# Patient Record
Sex: FEMALE | Race: WHITE | Employment: OTHER | URBAN - METROPOLITAN AREA
[De-identification: names, ages, dates, MRNs, and addresses within clinical notes are randomized per-mention and may not be internally consistent; named-entity substitution may affect disease eponyms.]

---

## 2017-09-02 PROBLEM — T14.8XXA CLOSED FRACTURE: Status: ACTIVE | Noted: 2017-09-02

## 2017-09-09 PROBLEM — S42.253A CLOSED FRACTURE OF GREATER TUBEROSITY OF HUMERUS: Status: ACTIVE | Noted: 2017-09-09

## 2017-09-10 PROBLEM — S42.202D: Status: ACTIVE | Noted: 2017-09-09

## 2017-09-19 ENCOUNTER — CARE COORDINATION (OUTPATIENT)
Dept: CASE MANAGEMENT | Age: 77
End: 2017-09-19

## 2017-10-02 ENCOUNTER — CARE COORDINATION (OUTPATIENT)
Dept: CASE MANAGEMENT | Age: 77
End: 2017-10-02

## 2017-10-02 NOTE — CARE COORDINATION
Called [facility] for a patient update.  informed this nurse patient discharged 9/25/17 with Jessie 76. Notified Johanna Dillon RN to f/u. Post acute care transition coordinator signing off.  Edward Michael, BSN RN  Care Transition Coordinator  [phone]

## 2017-12-11 PROBLEM — G45.3 AMAUROSIS FUGAX OF RIGHT EYE: Status: ACTIVE | Noted: 2017-12-11

## 2017-12-11 PROBLEM — H54.40 BLINDNESS OF LEFT EYE: Status: ACTIVE | Noted: 2017-12-11

## 2017-12-11 PROBLEM — R70.0 ELEVATED SED RATE: Status: ACTIVE | Noted: 2017-12-11

## 2017-12-18 ENCOUNTER — CARE COORDINATION (OUTPATIENT)
Dept: CARE COORDINATION | Age: 77
End: 2017-12-18

## 2017-12-21 PROBLEM — Z98.890 POST-OPERATIVE STATE: Status: ACTIVE | Noted: 2017-12-21

## 2018-03-13 ENCOUNTER — OFFICE VISIT (OUTPATIENT)
Dept: FAMILY MEDICINE CLINIC | Age: 78
End: 2018-03-13
Payer: MEDICARE

## 2018-03-13 VITALS
SYSTOLIC BLOOD PRESSURE: 176 MMHG | HEIGHT: 60 IN | BODY MASS INDEX: 38.32 KG/M2 | HEART RATE: 66 BPM | TEMPERATURE: 97.2 F | RESPIRATION RATE: 16 BRPM | OXYGEN SATURATION: 98 % | WEIGHT: 195.2 LBS | DIASTOLIC BLOOD PRESSURE: 74 MMHG

## 2018-03-13 DIAGNOSIS — R60.9 PERIPHERAL EDEMA: Primary | ICD-10-CM

## 2018-03-13 PROCEDURE — G8417 CALC BMI ABV UP PARAM F/U: HCPCS | Performed by: FAMILY MEDICINE

## 2018-03-13 PROCEDURE — G8484 FLU IMMUNIZE NO ADMIN: HCPCS | Performed by: FAMILY MEDICINE

## 2018-03-13 PROCEDURE — G8400 PT W/DXA NO RESULTS DOC: HCPCS | Performed by: FAMILY MEDICINE

## 2018-03-13 PROCEDURE — G8427 DOCREV CUR MEDS BY ELIG CLIN: HCPCS | Performed by: FAMILY MEDICINE

## 2018-03-13 PROCEDURE — 1036F TOBACCO NON-USER: CPT | Performed by: FAMILY MEDICINE

## 2018-03-13 PROCEDURE — 1090F PRES/ABSN URINE INCON ASSESS: CPT | Performed by: FAMILY MEDICINE

## 2018-03-13 PROCEDURE — 1123F ACP DISCUSS/DSCN MKR DOCD: CPT | Performed by: FAMILY MEDICINE

## 2018-03-13 PROCEDURE — 4040F PNEUMOC VAC/ADMIN/RCVD: CPT | Performed by: FAMILY MEDICINE

## 2018-03-13 PROCEDURE — 99213 OFFICE O/P EST LOW 20 MIN: CPT | Performed by: FAMILY MEDICINE

## 2018-03-13 NOTE — PROGRESS NOTES
3/13/2018    Chief Complaint   Patient presents with    Edema     Pt here for 1 week follow-up for leg swelling       HPI    Laurian Gitelman is a 76 y.o. patient that presents today for 1 week follow-up for bilateral leg/ankle swelling--states she notices a little improvement. Edema: Patient complains of edema. The location of the edema is lower leg(s) bilateral.  The edema has been moderate. Onset of symptoms was several weeks ago, stable since that time. The edema is present all day and worsens as the day progresses. The patient states the patient has had a few such episodes. The swelling has been aggravated by dependency of involved area, relieved by diuretics, support stockings, and been associated with nothing. Cardiac risk factors include advanced age (older than 54 for men, 72 for women), dyslipidemia, hypertension, obesity (BMI >= 30 kg/m2) and sedentary lifestyle. LAST VISIT, noted 1+ pitting edema LE-B. Feeling well otherwise, no complaints. Taking medications as prescribed. Patient's past medical, surgical, social and/or family history reviewed, updated in chart, and are non-contributory (unless otherwise stated). Medications and allergies also reviewed and updated in chart.      ROS  Review of Systems - General ROS: negative for - chills, fatigue, fever, night sweats, sleep disturbance, weight gain or weight loss  Psychological ROS: negative for - anxiety, behavioral disorder, depression, hallucinations, irritability, memory difficulties, mood swings, sleep disturbances or suicidal ideation  ENT ROS: negative for - epistaxis, headaches, hearing change, nasal congestion, nasal discharge, nasal polyps, sinus pain, tinnitus, vertigo or visual changes  Hematological and Lymphatic ROS: negative for - bleeding problems, blood clots, fatigue or swollen lymph nodes  Respiratory ROS: negative for - cough, orthopnea, shortness of breath, sputum changes, tachypnea or wheezing  Cardiovascular person, place, and time, normal mood, behavior, speech, dress, motor activity, and thought processes      Assessment/Plan  Ricky Vigil was seen today for edema. Diagnoses and all orders for this visit:    Peripheral edema  - RESTART HCTZ and 1 lasix in AM. Check BP in 1 week        Return in about 7 weeks (around 5/1/2018), or if symptoms worsen or fail to improve. Daja Peña, DO    Call or go to ED immediately if symptoms worsen or persist.    Educational materials and/or home exercises printed for patient's review and were included in patient instructions on his/her After Visit Summary and given to patient at the end of visit. Counseled regarding above diagnosis, including possible risks and complications,  especially if left uncontrolled. Counseled regarding the possible side effects, risks, benefits and alternatives to treatment; patient and/or guardian verbalizes understanding, agrees, feels comfortable with and wishes to proceed with above treatment plan. Advised patient to call with any new medication issues, and read all Rx info from pharmacy to assure aware of all possible risks and side effects of medication before taking. Reviewed age and gender appropriate health screening exams and vaccinations. Advised patient regarding importance of keeping up with recommended health maintenance and to schedule as soon as possible if overdue, as this is important in assessing for undiagnosed pathology, especially cancer, as well as protecting against potentially harmful/life threatening disease. Patient and/or guardian verbalizes understanding and agrees with above counseling, assessment and plan. All questions answered.

## 2018-03-28 ENCOUNTER — TELEPHONE (OUTPATIENT)
Dept: FAMILY MEDICINE CLINIC | Age: 78
End: 2018-03-28

## 2018-03-28 ENCOUNTER — HOSPITAL ENCOUNTER (OUTPATIENT)
Age: 78
Discharge: HOME | End: 2018-03-28
Payer: MEDICARE

## 2018-03-28 DIAGNOSIS — R60.9 PERIPHERAL EDEMA: ICD-10-CM

## 2018-03-28 LAB
ANION GAP SERPL CALCULATED.3IONS-SCNC: 15 MMOL/L (ref 7–16)
BUN BLDV-MCNC: 20 MG/DL (ref 8–23)
CALCIUM SERPL-MCNC: 9.1 MG/DL (ref 8.6–10.2)
CHLORIDE BLD-SCNC: 99 MMOL/L (ref 98–107)
CO2: 26 MMOL/L (ref 22–29)
CREAT SERPL-MCNC: 0.7 MG/DL (ref 0.5–1)
GFR AFRICAN AMERICAN: >60
GFR NON-AFRICAN AMERICAN: >60 ML/MIN/1.73
GLUCOSE BLD-MCNC: 109 MG/DL (ref 74–109)
POTASSIUM SERPL-SCNC: 3.6 MMOL/L (ref 3.5–5)
SODIUM BLD-SCNC: 140 MMOL/L (ref 132–146)

## 2018-03-28 PROCEDURE — 36415 COLL VENOUS BLD VENIPUNCTURE: CPT

## 2018-03-28 PROCEDURE — 80048 BASIC METABOLIC PNL TOTAL CA: CPT

## 2018-04-02 ENCOUNTER — TELEPHONE (OUTPATIENT)
Dept: FAMILY MEDICINE CLINIC | Age: 78
End: 2018-04-02

## 2018-04-02 RX ORDER — POTASSIUM CHLORIDE 20 MEQ/1
20 TABLET, EXTENDED RELEASE ORAL DAILY
Qty: 30 TABLET | Refills: 0 | Status: SHIPPED | OUTPATIENT
Start: 2018-04-02 | End: 2018-08-08 | Stop reason: SDUPTHER

## 2018-04-09 ENCOUNTER — OFFICE VISIT (OUTPATIENT)
Dept: FAMILY MEDICINE CLINIC | Age: 78
End: 2018-04-09
Payer: MEDICARE

## 2018-04-09 VITALS
RESPIRATION RATE: 18 BRPM | SYSTOLIC BLOOD PRESSURE: 138 MMHG | HEIGHT: 60 IN | WEIGHT: 194 LBS | TEMPERATURE: 97.4 F | BODY MASS INDEX: 38.09 KG/M2 | OXYGEN SATURATION: 96 % | HEART RATE: 68 BPM | DIASTOLIC BLOOD PRESSURE: 76 MMHG

## 2018-04-09 DIAGNOSIS — Z76.0 MEDICATION REFILL: ICD-10-CM

## 2018-04-09 DIAGNOSIS — R41.89 COGNITIVE CHANGE: Primary | ICD-10-CM

## 2018-04-09 DIAGNOSIS — G89.29 CHRONIC NONINTRACTABLE HEADACHE, UNSPECIFIED HEADACHE TYPE: ICD-10-CM

## 2018-04-09 DIAGNOSIS — R51.9 CHRONIC NONINTRACTABLE HEADACHE, UNSPECIFIED HEADACHE TYPE: ICD-10-CM

## 2018-04-09 PROCEDURE — 1090F PRES/ABSN URINE INCON ASSESS: CPT | Performed by: FAMILY MEDICINE

## 2018-04-09 PROCEDURE — 99214 OFFICE O/P EST MOD 30 MIN: CPT | Performed by: FAMILY MEDICINE

## 2018-04-09 PROCEDURE — G8417 CALC BMI ABV UP PARAM F/U: HCPCS | Performed by: FAMILY MEDICINE

## 2018-04-09 PROCEDURE — 1123F ACP DISCUSS/DSCN MKR DOCD: CPT | Performed by: FAMILY MEDICINE

## 2018-04-09 PROCEDURE — G8427 DOCREV CUR MEDS BY ELIG CLIN: HCPCS | Performed by: FAMILY MEDICINE

## 2018-04-09 PROCEDURE — G8400 PT W/DXA NO RESULTS DOC: HCPCS | Performed by: FAMILY MEDICINE

## 2018-04-09 PROCEDURE — 1036F TOBACCO NON-USER: CPT | Performed by: FAMILY MEDICINE

## 2018-04-09 PROCEDURE — 4040F PNEUMOC VAC/ADMIN/RCVD: CPT | Performed by: FAMILY MEDICINE

## 2018-04-09 RX ORDER — LORAZEPAM 1 MG/1
1 TABLET ORAL DAILY PRN
Qty: 30 TABLET | Refills: 2 | Status: SHIPPED | OUTPATIENT
Start: 2018-04-09 | End: 2018-06-06 | Stop reason: SDUPTHER

## 2018-04-11 PROBLEM — Z98.890 POST-OPERATIVE STATE: Status: RESOLVED | Noted: 2017-12-21 | Resolved: 2018-04-11

## 2018-04-13 ENCOUNTER — HOSPITAL ENCOUNTER (OUTPATIENT)
Dept: MRI IMAGING | Age: 78
Discharge: HOME | End: 2018-04-15
Payer: MEDICARE

## 2018-04-13 DIAGNOSIS — R41.89 COGNITIVE CHANGE: ICD-10-CM

## 2018-04-13 PROCEDURE — 70551 MRI BRAIN STEM W/O DYE: CPT

## 2018-04-18 ENCOUNTER — TELEPHONE (OUTPATIENT)
Dept: FAMILY MEDICINE CLINIC | Age: 78
End: 2018-04-18

## 2018-04-18 ENCOUNTER — HOSPITAL ENCOUNTER (OUTPATIENT)
Dept: OCCUPATIONAL THERAPY | Age: 78
Setting detail: THERAPIES SERIES
Discharge: HOME | End: 2018-04-18
Payer: MEDICARE

## 2018-04-18 NOTE — PROGRESS NOTES
Phone: [phone] Fax: [phone]     Occupational Therapy   Cancellation    Patient Name:  Isabella Epperson  : 1940  Date:  2018  MRN: [id number]    For today's appointment patient:   [x]  Cancelled   []  Rescheduled appointment   []  No-show     Reason given by patient:   []  Patient ill   []  Conflicting appointment   []  No transportation   []  Conflict with work   []  No reason given   [x]  Other:    Comments: Pt unable to attend today. Re-scheduled for 8:00 am next Tuesday. Electronically signed by:  Lonny Santiago [address] [place] [address], OTR/L [id number]

## 2018-04-24 ENCOUNTER — HOSPITAL ENCOUNTER (OUTPATIENT)
Dept: OCCUPATIONAL THERAPY | Age: 78
Setting detail: THERAPIES SERIES
Discharge: HOME | End: 2018-04-24
Payer: MEDICARE

## 2018-04-24 ENCOUNTER — TELEPHONE (OUTPATIENT)
Dept: FAMILY MEDICINE CLINIC | Age: 78
End: 2018-04-24

## 2018-04-24 ENCOUNTER — HOSPITAL ENCOUNTER (EMERGENCY)
Age: 78
Discharge: HOME | End: 2018-04-24
Attending: EMERGENCY MEDICINE
Payer: MEDICARE

## 2018-04-24 VITALS
WEIGHT: 194 LBS | SYSTOLIC BLOOD PRESSURE: 108 MMHG | DIASTOLIC BLOOD PRESSURE: 53 MMHG | HEIGHT: 60 IN | OXYGEN SATURATION: 96 % | HEART RATE: 53 BPM | RESPIRATION RATE: 16 BRPM | BODY MASS INDEX: 38.09 KG/M2 | TEMPERATURE: 97.5 F

## 2018-04-24 DIAGNOSIS — I10 ESSENTIAL HYPERTENSION: Primary | ICD-10-CM

## 2018-04-24 DIAGNOSIS — F41.1 ANXIETY STATE: ICD-10-CM

## 2018-04-24 LAB
BACTERIA: ABNORMAL /HPF
BILIRUBIN URINE: NEGATIVE
BLOOD, URINE: ABNORMAL
CLARITY: CLEAR
COLOR: YELLOW
EPITHELIAL CELLS, UA: ABNORMAL /HPF
GLUCOSE URINE: NEGATIVE MG/DL
KETONES, URINE: NEGATIVE MG/DL
LEUKOCYTE ESTERASE, URINE: ABNORMAL
NITRITE, URINE: NEGATIVE
PH UA: 5.5 (ref 5–9)
PROTEIN UA: NEGATIVE MG/DL
RBC UA: ABNORMAL /HPF (ref 0–2)
SPECIFIC GRAVITY UA: 1.01 (ref 1–1.03)
UROBILINOGEN, URINE: 0.2 E.U./DL
WBC UA: ABNORMAL /HPF (ref 0–5)

## 2018-04-24 PROCEDURE — 87088 URINE BACTERIA CULTURE: CPT

## 2018-04-24 PROCEDURE — 6360000002 HC RX W HCPCS: Performed by: EMERGENCY MEDICINE

## 2018-04-24 PROCEDURE — 99283 EMERGENCY DEPT VISIT LOW MDM: CPT

## 2018-04-24 PROCEDURE — 81001 URINALYSIS AUTO W/SCOPE: CPT

## 2018-04-24 PROCEDURE — 6370000000 HC RX 637 (ALT 250 FOR IP): Performed by: EMERGENCY MEDICINE

## 2018-04-24 RX ORDER — CLONIDINE HYDROCHLORIDE 0.1 MG/1
0.1 TABLET ORAL ONCE
Status: COMPLETED | OUTPATIENT
Start: 2018-04-24 | End: 2018-04-24

## 2018-04-24 RX ORDER — LORAZEPAM 1 MG/1
1 TABLET ORAL ONCE
Status: COMPLETED | OUTPATIENT
Start: 2018-04-24 | End: 2018-04-24

## 2018-04-24 RX ORDER — ONDANSETRON 4 MG/1
4 TABLET, ORALLY DISINTEGRATING ORAL ONCE
Status: COMPLETED | OUTPATIENT
Start: 2018-04-24 | End: 2018-04-24

## 2018-04-24 RX ADMIN — CLONIDINE HYDROCHLORIDE 0.1 MG: 0.1 TABLET ORAL at 04:07

## 2018-04-24 RX ADMIN — ONDANSETRON 4 MG: 4 TABLET, ORALLY DISINTEGRATING ORAL at 04:07

## 2018-04-24 RX ADMIN — LORAZEPAM 1 MG: 1 TABLET ORAL at 04:07

## 2018-04-24 NOTE — PROGRESS NOTES
Phone: [phone] Fax: [phone]     Occupational Therapy   Cancellation    Patient Name:  Greta Vrama  : 1940  Date:  2018  MRN: [id number]    For today's appointment patient:   [x]  Cancelled   []  Rescheduled appointment   []  No-show     Reason given by patient:   []  Patient ill   []  Conflicting appointment   []  No transportation   []  Conflict with work   []  No reason given   [x]  Other:    Comments: Pt unable to attend today 2* to having a medical issue and she was in ED last night. Pt is rescheduled for next Tuesday.      Electronically signed by: Long Bell OT/AKBAR, JUAN ANTONIO

## 2018-04-25 ENCOUNTER — OFFICE VISIT (OUTPATIENT)
Dept: FAMILY MEDICINE CLINIC | Age: 78
End: 2018-04-25
Payer: MEDICARE

## 2018-04-25 VITALS
WEIGHT: 190.8 LBS | BODY MASS INDEX: 37.46 KG/M2 | RESPIRATION RATE: 16 BRPM | DIASTOLIC BLOOD PRESSURE: 72 MMHG | SYSTOLIC BLOOD PRESSURE: 176 MMHG | OXYGEN SATURATION: 98 % | HEIGHT: 60 IN | TEMPERATURE: 97.4 F | HEART RATE: 60 BPM

## 2018-04-25 DIAGNOSIS — I10 ESSENTIAL HYPERTENSION: Primary | ICD-10-CM

## 2018-04-25 PROCEDURE — 1090F PRES/ABSN URINE INCON ASSESS: CPT | Performed by: FAMILY MEDICINE

## 2018-04-25 PROCEDURE — G8417 CALC BMI ABV UP PARAM F/U: HCPCS | Performed by: FAMILY MEDICINE

## 2018-04-25 PROCEDURE — 1123F ACP DISCUSS/DSCN MKR DOCD: CPT | Performed by: FAMILY MEDICINE

## 2018-04-25 PROCEDURE — 4040F PNEUMOC VAC/ADMIN/RCVD: CPT | Performed by: FAMILY MEDICINE

## 2018-04-25 PROCEDURE — 1036F TOBACCO NON-USER: CPT | Performed by: FAMILY MEDICINE

## 2018-04-25 PROCEDURE — G8400 PT W/DXA NO RESULTS DOC: HCPCS | Performed by: FAMILY MEDICINE

## 2018-04-25 PROCEDURE — 99213 OFFICE O/P EST LOW 20 MIN: CPT | Performed by: FAMILY MEDICINE

## 2018-04-25 PROCEDURE — G8427 DOCREV CUR MEDS BY ELIG CLIN: HCPCS | Performed by: FAMILY MEDICINE

## 2018-04-25 RX ORDER — LISINOPRIL 20 MG/1
20 TABLET ORAL DAILY
Qty: 30 TABLET | Refills: 0 | Status: SHIPPED | OUTPATIENT
Start: 2018-04-25 | End: 2018-09-27

## 2018-04-26 LAB — URINE CULTURE, ROUTINE: NORMAL

## 2018-05-01 ENCOUNTER — HOSPITAL ENCOUNTER (OUTPATIENT)
Dept: OCCUPATIONAL THERAPY | Age: 78
Setting detail: THERAPIES SERIES
Discharge: HOME | End: 2018-05-01
Payer: MEDICARE

## 2018-05-01 PROCEDURE — G8988 SELF CARE GOAL STATUS: HCPCS | Performed by: OCCUPATIONAL THERAPIST

## 2018-05-01 PROCEDURE — 97166 OT EVAL MOD COMPLEX 45 MIN: CPT | Performed by: OCCUPATIONAL THERAPIST

## 2018-05-01 PROCEDURE — G8987 SELF CARE CURRENT STATUS: HCPCS | Performed by: OCCUPATIONAL THERAPIST

## 2018-05-01 PROCEDURE — 97110 THERAPEUTIC EXERCISES: CPT | Performed by: OCCUPATIONAL THERAPIST

## 2018-05-03 ENCOUNTER — OFFICE VISIT (OUTPATIENT)
Dept: FAMILY MEDICINE CLINIC | Age: 78
End: 2018-05-03
Payer: MEDICARE

## 2018-05-03 VITALS
SYSTOLIC BLOOD PRESSURE: 126 MMHG | OXYGEN SATURATION: 98 % | TEMPERATURE: 98.2 F | HEIGHT: 60 IN | DIASTOLIC BLOOD PRESSURE: 60 MMHG | RESPIRATION RATE: 16 BRPM | HEART RATE: 58 BPM | WEIGHT: 189.2 LBS | BODY MASS INDEX: 37.15 KG/M2

## 2018-05-03 DIAGNOSIS — I10 ESSENTIAL HYPERTENSION: Primary | ICD-10-CM

## 2018-05-03 PROCEDURE — 99213 OFFICE O/P EST LOW 20 MIN: CPT | Performed by: FAMILY MEDICINE

## 2018-05-03 PROCEDURE — 4040F PNEUMOC VAC/ADMIN/RCVD: CPT | Performed by: FAMILY MEDICINE

## 2018-05-03 PROCEDURE — 1036F TOBACCO NON-USER: CPT | Performed by: FAMILY MEDICINE

## 2018-05-03 PROCEDURE — 1123F ACP DISCUSS/DSCN MKR DOCD: CPT | Performed by: FAMILY MEDICINE

## 2018-05-03 PROCEDURE — 1090F PRES/ABSN URINE INCON ASSESS: CPT | Performed by: FAMILY MEDICINE

## 2018-05-03 PROCEDURE — G8427 DOCREV CUR MEDS BY ELIG CLIN: HCPCS | Performed by: FAMILY MEDICINE

## 2018-05-03 PROCEDURE — G8400 PT W/DXA NO RESULTS DOC: HCPCS | Performed by: FAMILY MEDICINE

## 2018-05-03 PROCEDURE — G8417 CALC BMI ABV UP PARAM F/U: HCPCS | Performed by: FAMILY MEDICINE

## 2018-05-03 RX ORDER — CETIRIZINE HYDROCHLORIDE 10 MG/1
10 TABLET ORAL DAILY
Qty: 30 TABLET | Refills: 6 | Status: SHIPPED | OUTPATIENT
Start: 2018-05-03 | End: 2018-09-27 | Stop reason: SDUPTHER

## 2018-05-04 ENCOUNTER — TELEPHONE (OUTPATIENT)
Dept: FAMILY MEDICINE CLINIC | Age: 78
End: 2018-05-04

## 2018-05-07 RX ORDER — AMLODIPINE BESYLATE 5 MG/1
5 TABLET ORAL DAILY
Qty: 30 TABLET | Refills: 1 | Status: SHIPPED | OUTPATIENT
Start: 2018-05-07 | End: 2018-07-26 | Stop reason: SDUPTHER

## 2018-05-08 ENCOUNTER — HOSPITAL ENCOUNTER (OUTPATIENT)
Dept: OCCUPATIONAL THERAPY | Age: 78
Setting detail: THERAPIES SERIES
Discharge: HOME | End: 2018-05-08
Payer: MEDICARE

## 2018-05-08 PROCEDURE — 97110 THERAPEUTIC EXERCISES: CPT | Performed by: OCCUPATIONAL THERAPIST

## 2018-05-08 PROCEDURE — 97140 MANUAL THERAPY 1/> REGIONS: CPT | Performed by: OCCUPATIONAL THERAPIST

## 2018-05-15 ENCOUNTER — HOSPITAL ENCOUNTER (OUTPATIENT)
Dept: OCCUPATIONAL THERAPY | Age: 78
Setting detail: THERAPIES SERIES
Discharge: HOME | End: 2018-05-15
Payer: MEDICARE

## 2018-05-15 PROCEDURE — 97110 THERAPEUTIC EXERCISES: CPT | Performed by: OCCUPATIONAL THERAPIST

## 2018-05-15 PROCEDURE — 97140 MANUAL THERAPY 1/> REGIONS: CPT | Performed by: OCCUPATIONAL THERAPIST

## 2018-05-25 ENCOUNTER — HOSPITAL ENCOUNTER (OUTPATIENT)
Dept: OCCUPATIONAL THERAPY | Age: 78
Setting detail: THERAPIES SERIES
Discharge: HOME | End: 2018-05-25
Payer: MEDICARE

## 2018-05-25 PROCEDURE — 97140 MANUAL THERAPY 1/> REGIONS: CPT

## 2018-05-25 PROCEDURE — 97110 THERAPEUTIC EXERCISES: CPT

## 2018-05-29 DIAGNOSIS — K59.01 SLOW TRANSIT CONSTIPATION: ICD-10-CM

## 2018-05-29 RX ORDER — ASPIRIN 81 MG
1 TABLET, DELAYED RELEASE (ENTERIC COATED) ORAL DAILY
Qty: 90 TABLET | Refills: 1 | Status: SHIPPED | OUTPATIENT
Start: 2018-05-29 | End: 2019-03-07 | Stop reason: SDUPTHER

## 2018-05-29 RX ORDER — LEVOTHYROXINE SODIUM 88 UG/1
88 TABLET ORAL DAILY
Qty: 90 TABLET | Refills: 1 | Status: SHIPPED | OUTPATIENT
Start: 2018-05-29 | End: 2019-01-03 | Stop reason: SDUPTHER

## 2018-06-01 ENCOUNTER — APPOINTMENT (OUTPATIENT)
Dept: OCCUPATIONAL THERAPY | Age: 78
End: 2018-06-01
Payer: MEDICARE

## 2018-06-05 ENCOUNTER — HOSPITAL ENCOUNTER (OUTPATIENT)
Dept: OCCUPATIONAL THERAPY | Age: 78
Setting detail: THERAPIES SERIES
Discharge: HOME | End: 2018-06-05
Payer: MEDICARE

## 2018-06-05 PROCEDURE — 97110 THERAPEUTIC EXERCISES: CPT | Performed by: OCCUPATIONAL THERAPIST

## 2018-06-05 PROCEDURE — 97140 MANUAL THERAPY 1/> REGIONS: CPT | Performed by: OCCUPATIONAL THERAPIST

## 2018-06-06 ENCOUNTER — OFFICE VISIT (OUTPATIENT)
Dept: FAMILY MEDICINE CLINIC | Age: 78
End: 2018-06-06
Payer: MEDICARE

## 2018-06-06 VITALS
RESPIRATION RATE: 16 BRPM | TEMPERATURE: 97.4 F | BODY MASS INDEX: 36.52 KG/M2 | OXYGEN SATURATION: 97 % | WEIGHT: 187 LBS | HEART RATE: 60 BPM | SYSTOLIC BLOOD PRESSURE: 136 MMHG | DIASTOLIC BLOOD PRESSURE: 60 MMHG

## 2018-06-06 DIAGNOSIS — Z76.0 MEDICATION REFILL: ICD-10-CM

## 2018-06-06 DIAGNOSIS — S13.9XXA NECK SPRAIN, INITIAL ENCOUNTER: Primary | ICD-10-CM

## 2018-06-06 PROCEDURE — 1090F PRES/ABSN URINE INCON ASSESS: CPT | Performed by: FAMILY MEDICINE

## 2018-06-06 PROCEDURE — G8400 PT W/DXA NO RESULTS DOC: HCPCS | Performed by: FAMILY MEDICINE

## 2018-06-06 PROCEDURE — 99213 OFFICE O/P EST LOW 20 MIN: CPT | Performed by: FAMILY MEDICINE

## 2018-06-06 PROCEDURE — 4040F PNEUMOC VAC/ADMIN/RCVD: CPT | Performed by: FAMILY MEDICINE

## 2018-06-06 PROCEDURE — G8427 DOCREV CUR MEDS BY ELIG CLIN: HCPCS | Performed by: FAMILY MEDICINE

## 2018-06-06 PROCEDURE — G8417 CALC BMI ABV UP PARAM F/U: HCPCS | Performed by: FAMILY MEDICINE

## 2018-06-06 PROCEDURE — 1036F TOBACCO NON-USER: CPT | Performed by: FAMILY MEDICINE

## 2018-06-06 PROCEDURE — 1123F ACP DISCUSS/DSCN MKR DOCD: CPT | Performed by: FAMILY MEDICINE

## 2018-06-06 RX ORDER — LORAZEPAM 1 MG/1
1 TABLET ORAL DAILY PRN
Qty: 30 TABLET | Refills: 2 | Status: SHIPPED | OUTPATIENT
Start: 2018-06-06 | End: 2018-07-06

## 2018-06-08 ENCOUNTER — HOSPITAL ENCOUNTER (OUTPATIENT)
Dept: OCCUPATIONAL THERAPY | Age: 78
Setting detail: THERAPIES SERIES
Discharge: HOME | End: 2018-06-08
Payer: MEDICARE

## 2018-06-12 ENCOUNTER — APPOINTMENT (OUTPATIENT)
Dept: OCCUPATIONAL THERAPY | Age: 78
End: 2018-06-12
Payer: MEDICARE

## 2018-06-15 ENCOUNTER — APPOINTMENT (OUTPATIENT)
Dept: OCCUPATIONAL THERAPY | Age: 78
End: 2018-06-15
Payer: MEDICARE

## 2018-07-17 ENCOUNTER — TELEPHONE (OUTPATIENT)
Dept: FAMILY MEDICINE CLINIC | Age: 78
End: 2018-07-17

## 2018-07-24 ENCOUNTER — HOSPITAL ENCOUNTER (EMERGENCY)
Age: 78
Discharge: HOME | End: 2018-07-24
Payer: MEDICARE

## 2018-07-24 VITALS
TEMPERATURE: 97.6 F | RESPIRATION RATE: 16 BRPM | HEIGHT: 66 IN | BODY MASS INDEX: 30.53 KG/M2 | DIASTOLIC BLOOD PRESSURE: 70 MMHG | WEIGHT: 190 LBS | HEART RATE: 66 BPM | OXYGEN SATURATION: 98 % | SYSTOLIC BLOOD PRESSURE: 130 MMHG
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
## 2018-07-24 PROCEDURE — 99281 EMR DPT VST MAYX REQ PHY/QHP: CPT

## 2018-07-24 PROCEDURE — 96374 THER/PROPH/DIAG INJ IV PUSH: CPT

## 2018-07-24 PROCEDURE — S0028 INJECTION, FAMOTIDINE, 20 MG: HCPCS | Performed by: NURSE PRACTITIONER

## 2018-07-24 PROCEDURE — 2500000003 HC RX 250 WO HCPCS: Performed by: NURSE PRACTITIONER

## 2018-07-24 PROCEDURE — 2580000003 HC RX 258: Performed by: NURSE PRACTITIONER

## 2018-07-24 PROCEDURE — 96375 TX/PRO/DX INJ NEW DRUG ADDON: CPT

## 2018-07-24 PROCEDURE — 6360000002 HC RX W HCPCS: Performed by: NURSE PRACTITIONER

## 2018-07-24 RX ORDER — DIPHENHYDRAMINE HCL 25 MG
25 CAPSULE ORAL EVERY 6 HOURS PRN
Qty: 20 CAPSULE | Refills: 0 | Status: SHIPPED | OUTPATIENT
Start: 2018-07-24 | End: 2018-07-29

## 2018-07-24 RX ORDER — FAMOTIDINE 20 MG/1
20 TABLET, FILM COATED ORAL 2 TIMES DAILY
Qty: 14 TABLET | Refills: 0 | Status: SHIPPED | OUTPATIENT
Start: 2018-07-24 | End: 2018-12-27 | Stop reason: ALTCHOICE

## 2018-07-24 RX ORDER — 0.9 % SODIUM CHLORIDE 0.9 %
500 INTRAVENOUS SOLUTION INTRAVENOUS ONCE
Status: COMPLETED | OUTPATIENT
Start: 2018-07-24 | End: 2018-07-24

## 2018-07-24 RX ORDER — DIPHENHYDRAMINE HYDROCHLORIDE 50 MG/ML
12.5 INJECTION INTRAMUSCULAR; INTRAVENOUS ONCE
Status: COMPLETED | OUTPATIENT
Start: 2018-07-24 | End: 2018-07-24

## 2018-07-24 RX ORDER — METHYLPREDNISOLONE SODIUM SUCCINATE 125 MG/2ML
125 INJECTION, POWDER, LYOPHILIZED, FOR SOLUTION INTRAMUSCULAR; INTRAVENOUS ONCE
Status: COMPLETED | OUTPATIENT
Start: 2018-07-24 | End: 2018-07-24

## 2018-07-24 RX ADMIN — METHYLPREDNISOLONE SODIUM SUCCINATE 125 MG: 125 INJECTION, POWDER, FOR SOLUTION INTRAMUSCULAR; INTRAVENOUS at 18:03

## 2018-07-24 RX ADMIN — SODIUM CHLORIDE 1000 ML: 9 INJECTION, SOLUTION INTRAVENOUS at 18:02

## 2018-07-24 RX ADMIN — DIPHENHYDRAMINE HYDROCHLORIDE 12.5 MG: 50 INJECTION, SOLUTION INTRAMUSCULAR; INTRAVENOUS at 18:03

## 2018-07-24 RX ADMIN — FAMOTIDINE 20 MG: 10 INJECTION, SOLUTION INTRAVENOUS at 18:03

## 2018-07-24 ASSESSMENT — PAIN DESCRIPTION - PAIN TYPE: TYPE: ACUTE PAIN

## 2018-07-24 ASSESSMENT — PAIN SCALES - GENERAL: PAINLEVEL_OUTOF10: 2

## 2018-07-24 ASSESSMENT — PAIN DESCRIPTION - PROGRESSION: CLINICAL_PROGRESSION: GRADUALLY WORSENING

## 2018-07-24 ASSESSMENT — PAIN DESCRIPTION - FREQUENCY: FREQUENCY: CONTINUOUS

## 2018-07-24 ASSESSMENT — PAIN DESCRIPTION - DESCRIPTORS: DESCRIPTORS: BURNING

## 2018-07-24 ASSESSMENT — PAIN DESCRIPTION - ORIENTATION: ORIENTATION: LEFT

## 2018-07-24 ASSESSMENT — PAIN DESCRIPTION - LOCATION: LOCATION: CHEST

## 2018-07-25 NOTE — ED PROVIDER NOTES
[facility]     Department of Emergency Medicine   ED  Provider Note  Admit Date/RoomTime: 7/24/2018  5:33 PM  ED Room: 11/11  Chief Complaint   Insect Bite (pt states she was stung by a bee, bee sting to left side of chest.  Pt states her face feels flushed, denies any throat swelling or difficulty swallowing. Airway patent)    History of Present Illness   Source of history provided by:  patient. History/Exam Limitations: none. Barbara Silverman is a [de-identified] y.o. 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 Mumtaz Clarke Sleep apnea     uses cpap at times     Stroke [facility])     Thyroid disease     presents to the emergency department by private vehicle, for a bee bite to the right chest wall, which occured 10 minute(s) prior to arrival.  The complaint is associated with itching and flushed feeling. Since onset the symptoms have been persistent. She denies difficulty breathing, difficulty swallowing, wheezing, throat tightness, hoarseness, stridor, lightheadedness, dizziness, facial swelling, lip swelling or tongue swelling. The patient has a history of allergies to bee stings with throat swelling in the past. Tetanus Status:  unknown.     Additional Symptoms:      Drainage:   no.     Abrasion:   no.     Pustule:   no.     Puncture:   no.     ROS    Pertinent positives and negatives are noted.  Integument:  Normal turgor. Warm and dry 2 cm of erythema to right chest wall with puncture wound in the center. Lymphatics: No lymphangitis or adenopathy noted. Neurological:  Oriented. Motor functions intact. Lab / Imaging Results   (All laboratory and radiology results have been personally reviewed by myself)  Labs:  No results found for this visit on 07/24/18. Imaging: All Radiology results interpreted by Radiologist unless otherwise noted. No orders to display     ED Course / Medical Decision Making     Medications   0.9 % sodium chloride bolus (0 mLs Intravenous Stopped 7/24/18 2013)   diphenhydrAMINE (BENADRYL) injection 12.5 mg (12.5 mg Intravenous Given 7/24/18 1803)   methylPREDNISolone sodium (SOLU-MEDROL) injection 125 mg (125 mg Intravenous Given 7/24/18 1803)   famotidine (PEPCID) injection 20 mg (20 mg Intravenous Given 7/24/18 1803)     ED Course as of Jul 24 2225   Tue Jul 24, 2018   1935 Awake and alert. Respirations even and non labored. No wheezing. No face or tongue swelling noted. No hives or rash noted. Will continue to monitor. [JG]   2028 Exam unchanged. No new symptoms. Plan to discharge with instructions to return for new or worsening symptoms. Refusing prednisone stating she \"gets hives\" from prednisone. She has tolerated the solumedrol without difficulty while in the emergency department. [JG]      ED Course User Index  [JG] BETINA Damian - CNP     Consults:   None    Procedure(s):   none    Medical Decision Making:     Patient medicated with IV solumedrol, IV fluids, benadryl and pepcid as she has had what she describes as anaphylaxsis in the past. She is monitored for a Time in the emergency department with no worsening or return of symptoms. Patient remained hemodynamically stable throughout her ED course of treatment.  When discussing discharge with the patient she states that she is allergic to prednisone stating she had a reaction when she had a

## 2018-07-26 ENCOUNTER — OFFICE VISIT (OUTPATIENT)
Dept: FAMILY MEDICINE CLINIC | Age: 78
End: 2018-07-26
Payer: MEDICARE

## 2018-07-26 VITALS
OXYGEN SATURATION: 96 % | TEMPERATURE: 97.4 F | DIASTOLIC BLOOD PRESSURE: 60 MMHG | WEIGHT: 182.2 LBS | HEART RATE: 60 BPM | BODY MASS INDEX: 35.77 KG/M2 | HEIGHT: 60 IN | SYSTOLIC BLOOD PRESSURE: 120 MMHG
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
## 2018-07-26 DIAGNOSIS — K64.9 HEMORRHOIDS, UNSPECIFIED HEMORRHOID TYPE: ICD-10-CM

## 2018-07-26 DIAGNOSIS — F41.9 ANXIETY AND DEPRESSION: Chronic | ICD-10-CM

## 2018-07-26 DIAGNOSIS — F32.A ANXIETY AND DEPRESSION: Chronic | ICD-10-CM

## 2018-07-26 PROCEDURE — 1123F ACP DISCUSS/DSCN MKR DOCD: CPT | Performed by: FAMILY MEDICINE

## 2018-07-26 PROCEDURE — 1101F PT FALLS ASSESS-DOCD LE1/YR: CPT | Performed by: FAMILY MEDICINE

## 2018-07-26 PROCEDURE — 1090F PRES/ABSN URINE INCON ASSESS: CPT | Performed by: FAMILY MEDICINE

## 2018-07-26 PROCEDURE — 1036F TOBACCO NON-USER: CPT | Performed by: FAMILY MEDICINE

## 2018-07-26 PROCEDURE — G8417 CALC BMI ABV UP PARAM F/U: HCPCS | Performed by: FAMILY MEDICINE

## 2018-07-26 PROCEDURE — G8427 DOCREV CUR MEDS BY ELIG CLIN: HCPCS | Performed by: FAMILY MEDICINE

## 2018-07-26 PROCEDURE — 4040F PNEUMOC VAC/ADMIN/RCVD: CPT | Performed by: FAMILY MEDICINE

## 2018-07-26 PROCEDURE — 99213 OFFICE O/P EST LOW 20 MIN: CPT | Performed by: FAMILY MEDICINE

## 2018-07-26 PROCEDURE — G8400 PT W/DXA NO RESULTS DOC: HCPCS | Performed by: FAMILY MEDICINE

## 2018-07-26 RX ORDER — LORAZEPAM 1 MG/1
1 TABLET ORAL NIGHTLY
Qty: 30 TABLET | Refills: 2 | Status: SHIPPED | OUTPATIENT
Start: 2018-07-26 | End: 2018-11-26 | Stop reason: SDUPTHER

## 2018-07-26 RX ORDER — DEXLANSOPRAZOLE 30 MG/1
30 CAPSULE, DELAYED RELEASE ORAL DAILY
Qty: 5 CAPSULE | Refills: 0 | Status: SHIPPED | OUTPATIENT
Start: 2018-07-26 | End: 2018-12-27

## 2018-07-26 RX ORDER — LORAZEPAM 1 MG/1
1 TABLET ORAL NIGHTLY
COMMUNITY
End: 2018-07-26 | Stop reason: SDUPTHER

## 2018-07-26 RX ORDER — AMLODIPINE BESYLATE 5 MG/1
5 TABLET ORAL DAILY
Qty: 30 TABLET | Refills: 5 | Status: SHIPPED | OUTPATIENT
Start: 2018-07-26 | End: 2018-12-21 | Stop reason: ALTCHOICE

## 2018-07-26 NOTE — PROGRESS NOTES
7/26/2018    Chief Complaint   Patient presents with    Insect Bite     Pt here for ER follow-up from bee sting Rt upper chest    Medication Refill       HPI    Jeff Gutierrez is a [de-identified] y.o. patient that presents today for:    Bit by a bee on Sunday. Went to ER. Given IVF, IV solumedrol, benadryl and Pepcid. Discharged home with instructions to continue Pepcid and benadryl. Called last night with complaints of pain. Denies fever, chills, nausea or vomiting. Hemorrhoid, internal. Sees Dr. Dav Smith. Using   Taking medications as prescribed. Patient's past medical, surgical, social and/or family history reviewed, updated in chart, and are non-contributory (unless otherwise stated). Medications and allergies also reviewed and updated in chart.      ROS  Review of Systems - General ROS: negative for - chills, fatigue, fever, night sweats, sleep disturbance, weight gain or weight loss  Psychological ROS: negative for - anxiety, behavioral disorder, depression, hallucinations, irritability, memory difficulties, mood swings, sleep disturbances or suicidal ideation  ENT ROS: negative for - epistaxis, headaches, hearing change, nasal congestion, nasal discharge, nasal polyps, sinus pain, tinnitus, vertigo or visual changes  Hematological and Lymphatic ROS: negative for - bleeding problems, blood clots, fatigue or swollen lymph nodes  Respiratory ROS: negative for - cough, orthopnea, shortness of breath, sputum changes, tachypnea or wheezing  Cardiovascular ROS: negative for - chest pain, dyspnea on exertion, irregular heartbeat, loss of consciousness, palpitations, paroxysmal nocturnal dyspnea or rapid heart rate  Gastrointestinal ROS: negative for - abdominal pain, blood in stools, change in bowel habits, constipation, diarrhea, gas/bloating, heartburn or nausea/vomiting  Musculoskeletal ROS: negative for - joint pain, joint stiffness, joint swelling or muscle, back pain, bowel or bladder tablet by mouth nightly for 30 days. .    Other orders  -     amLODIPine (NORVASC) 5 MG tablet; Take 1 tablet by mouth daily          Return if symptoms worsen or fail to improve. Daja Peña, DO    Call or go to ED immediately if symptoms worsen or persist.    Educational materials and/or home exercises printed for patient's review and were included in patient instructions on his/her After Visit Summary and given to patient at the end of visit. Counseled regarding above diagnosis, including possible risks and complications,  especially if left uncontrolled. Counseled regarding the possible side effects, risks, benefits and alternatives to treatment; patient and/or guardian verbalizes understanding, agrees, feels comfortable with and wishes to proceed with above treatment plan. Advised patient to call with any new medication issues, and read all Rx info from pharmacy to assure aware of all possible risks and side effects of medication before taking. Reviewed age and gender appropriate health screening exams and vaccinations. Advised patient regarding importance of keeping up with recommended health maintenance and to schedule as soon as possible if overdue, as this is important in assessing for undiagnosed pathology, especially cancer, as well as protecting against potentially harmful/life threatening disease. Patient and/or guardian verbalizes understanding and agrees with above counseling, assessment and plan. All questions answered.

## 2018-08-06 ENCOUNTER — HOSPITAL ENCOUNTER (OUTPATIENT)
Age: 78
Discharge: HOME | End: 2018-08-06
Payer: MEDICARE

## 2018-08-06 LAB
ALBUMIN SERPL-MCNC: 4.2 G/DL (ref 3.5–5.2)
ALP BLD-CCNC: 67 U/L (ref 35–104)
ALT SERPL-CCNC: 14 U/L (ref 0–32)
ANION GAP SERPL CALCULATED.3IONS-SCNC: 13 MMOL/L (ref 7–16)
AST SERPL-CCNC: 19 U/L (ref 0–31)
BASOPHILS ABSOLUTE: 0.04 E9/L (ref 0–0.2)
BASOPHILS RELATIVE PERCENT: 0.4 % (ref 0–2)
BILIRUB SERPL-MCNC: 0.4 MG/DL (ref 0–1.2)
BUN BLDV-MCNC: 19 MG/DL (ref 8–23)
C-REACTIVE PROTEIN: 0.9 MG/DL (ref 0–0.4)
CALCIUM SERPL-MCNC: 9.3 MG/DL (ref 8.6–10.2)
CEA: 3.1 NG/ML (ref 0–5.2)
CHLORIDE BLD-SCNC: 99 MMOL/L (ref 98–107)
CO2: 27 MMOL/L (ref 22–29)
CREAT SERPL-MCNC: 0.7 MG/DL (ref 0.5–1)
EOSINOPHILS ABSOLUTE: 0.11 E9/L (ref 0.05–0.5)
EOSINOPHILS RELATIVE PERCENT: 1.1 % (ref 0–6)
GFR AFRICAN AMERICAN: >60
GFR NON-AFRICAN AMERICAN: >60 ML/MIN/1.73
GLUCOSE BLD-MCNC: 102 MG/DL (ref 74–109)
HCT VFR BLD CALC: 39.4 % (ref 34–48)
HEMOGLOBIN: 13.3 G/DL (ref 11.5–15.5)
IMMATURE GRANULOCYTES #: 0.03 E9/L
IMMATURE GRANULOCYTES %: 0.3 % (ref 0–5)
LYMPHOCYTES ABSOLUTE: 2.57 E9/L (ref 1.5–4)
LYMPHOCYTES RELATIVE PERCENT: 26.6 % (ref 20–42)
MCH RBC QN AUTO: 29.2 PG (ref 26–35)
MCHC RBC AUTO-ENTMCNC: 33.8 % (ref 32–34.5)
MCV RBC AUTO: 86.6 FL (ref 80–99.9)
MONOCYTES ABSOLUTE: 0.6 E9/L (ref 0.1–0.95)
MONOCYTES RELATIVE PERCENT: 6.2 % (ref 2–12)
NEUTROPHILS ABSOLUTE: 6.31 E9/L (ref 1.8–7.3)
NEUTROPHILS RELATIVE PERCENT: 65.4 % (ref 43–80)
PDW BLD-RTO: 13.8 FL (ref 11.5–15)
PLATELET # BLD: 231 E9/L (ref 130–450)
PMV BLD AUTO: 9.8 FL (ref 7–12)
POTASSIUM SERPL-SCNC: 3.5 MMOL/L (ref 3.5–5)
RBC # BLD: 4.55 E12/L (ref 3.5–5.5)
SODIUM BLD-SCNC: 139 MMOL/L (ref 132–146)
TOTAL PROTEIN: 7.1 G/DL (ref 6.4–8.3)
WBC # BLD: 9.7 E9/L (ref 4.5–11.5)

## 2018-08-06 PROCEDURE — 36415 COLL VENOUS BLD VENIPUNCTURE: CPT

## 2018-08-06 PROCEDURE — 82378 CARCINOEMBRYONIC ANTIGEN: CPT

## 2018-08-06 PROCEDURE — 86140 C-REACTIVE PROTEIN: CPT

## 2018-08-06 PROCEDURE — 85025 COMPLETE CBC W/AUTO DIFF WBC: CPT

## 2018-08-06 PROCEDURE — 80053 COMPREHEN METABOLIC PANEL: CPT

## 2018-08-08 RX ORDER — POTASSIUM CHLORIDE 20 MEQ/1
20 TABLET, EXTENDED RELEASE ORAL DAILY
Qty: 30 TABLET | Refills: 5 | Status: SHIPPED | OUTPATIENT
Start: 2018-08-08 | End: 2019-03-07 | Stop reason: SDUPTHER

## 2018-08-08 NOTE — TELEPHONE ENCOUNTER
Denise Martini called and said she is going out of town for a few days and needs her K+ refilled. I looked in meds list and she hasn't had any since April refill. Said she wasn't taking every day. Does she need to continue this for now? Has appt here in Sept. Dr. Rick Grey did CMP last week and BW looked OK.

## 2018-08-21 ENCOUNTER — HOSPITAL ENCOUNTER (OUTPATIENT)
Dept: OCCUPATIONAL THERAPY | Age: 78
Setting detail: THERAPIES SERIES
Discharge: HOME | End: 2018-08-21
Payer: MEDICARE

## 2018-09-04 ENCOUNTER — HOSPITAL ENCOUNTER (OUTPATIENT)
Dept: CT IMAGING | Age: 78
Discharge: HOME | End: 2018-09-06
Payer: MEDICARE

## 2018-09-04 DIAGNOSIS — K57.30 DIVERTICULOSIS OF LARGE INTESTINE WITHOUT DIVERTICULITIS: ICD-10-CM

## 2018-09-04 PROCEDURE — 74176 CT ABD & PELVIS W/O CONTRAST: CPT

## 2018-09-04 PROCEDURE — 6360000004 HC RX CONTRAST MEDICATION: Performed by: RADIOLOGY

## 2018-09-04 RX ADMIN — IOHEXOL 50 ML: 240 INJECTION, SOLUTION INTRATHECAL; INTRAVASCULAR; INTRAVENOUS; ORAL at 09:24

## 2018-09-06 ENCOUNTER — HOSPITAL ENCOUNTER (OUTPATIENT)
Age: 78
Discharge: HOME | End: 2018-09-08
Payer: MEDICARE

## 2018-09-06 ENCOUNTER — OFFICE VISIT (OUTPATIENT)
Dept: FAMILY MEDICINE CLINIC | Age: 78
End: 2018-09-06
Payer: MEDICARE

## 2018-09-06 VITALS
DIASTOLIC BLOOD PRESSURE: 70 MMHG | BODY MASS INDEX: 37.3 KG/M2 | SYSTOLIC BLOOD PRESSURE: 130 MMHG | WEIGHT: 190 LBS | HEIGHT: 60 IN | HEART RATE: 56 BPM | TEMPERATURE: 97.4 F | OXYGEN SATURATION: 97 %

## 2018-09-06 DIAGNOSIS — R53.83 FATIGUE, UNSPECIFIED TYPE: ICD-10-CM

## 2018-09-06 DIAGNOSIS — I10 HTN (HYPERTENSION), BENIGN: ICD-10-CM

## 2018-09-06 DIAGNOSIS — Z00.00 ROUTINE GENERAL MEDICAL EXAMINATION AT A HEALTH CARE FACILITY: Primary | ICD-10-CM

## 2018-09-06 DIAGNOSIS — Z76.0 MEDICATION REFILL: ICD-10-CM

## 2018-09-06 LAB
FOLATE: 15.8 NG/ML (ref 4.8–24.2)
VITAMIN B-12: 292 PG/ML (ref 211–946)

## 2018-09-06 PROCEDURE — 36415 COLL VENOUS BLD VENIPUNCTURE: CPT | Performed by: FAMILY MEDICINE

## 2018-09-06 PROCEDURE — 82607 VITAMIN B-12: CPT

## 2018-09-06 PROCEDURE — 82746 ASSAY OF FOLIC ACID SERUM: CPT

## 2018-09-06 PROCEDURE — 4040F PNEUMOC VAC/ADMIN/RCVD: CPT | Performed by: FAMILY MEDICINE

## 2018-09-06 PROCEDURE — G0438 PPPS, INITIAL VISIT: HCPCS | Performed by: FAMILY MEDICINE

## 2018-09-06 RX ORDER — HYDROCHLOROTHIAZIDE 25 MG/1
25 TABLET ORAL DAILY
Qty: 90 TABLET | Refills: 1 | Status: SHIPPED | OUTPATIENT
Start: 2018-09-06 | End: 2019-03-07 | Stop reason: SDUPTHER

## 2018-09-06 RX ORDER — METOPROLOL SUCCINATE 50 MG/1
50 TABLET, EXTENDED RELEASE ORAL DAILY
Qty: 90 TABLET | Refills: 1 | Status: SHIPPED | OUTPATIENT
Start: 2018-09-06 | End: 2019-03-07 | Stop reason: SDUPTHER

## 2018-09-06 ASSESSMENT — PATIENT HEALTH QUESTIONNAIRE - PHQ9
SUM OF ALL RESPONSES TO PHQ QUESTIONS 1-9: 2
2. FEELING DOWN, DEPRESSED OR HOPELESS: 1
SUM OF ALL RESPONSES TO PHQ QUESTIONS 1-9: 2
1. LITTLE INTEREST OR PLEASURE IN DOING THINGS: 1
SUM OF ALL RESPONSES TO PHQ QUESTIONS 1-9: 2
SUM OF ALL RESPONSES TO PHQ QUESTIONS 1-9: 2
SUM OF ALL RESPONSES TO PHQ9 QUESTIONS 1 & 2: 2

## 2018-09-06 ASSESSMENT — ANXIETY QUESTIONNAIRES: GAD7 TOTAL SCORE: 1

## 2018-09-06 ASSESSMENT — LIFESTYLE VARIABLES: HOW OFTEN DO YOU HAVE A DRINK CONTAINING ALCOHOL: 0

## 2018-09-06 NOTE — PATIENT INSTRUCTIONS
Personalized Preventive Plan for [facility] 9/6/2018  Medicare offers a range of preventive health benefits. Some of the tests and screenings are paid in full while other may be subject to a deductible, co-insurance, and/or copay. Some of these benefits include a comprehensive review of your medical history including lifestyle, illnesses that may run in your family, and various assessments and screenings as appropriate. After reviewing your medical record and screening and assessments performed today your provider may have ordered immunizations, labs, imaging, and/or referrals for you. A list of these orders (if applicable) as well as your Preventive Care list are included within your After Visit Summary for your review. Other Preventive Recommendations:    · A preventive eye exam performed by an eye specialist is recommended every 1-2 years to screen for glaucoma; cataracts, macular degeneration, and other eye disorders. · A preventive dental visit is recommended every 6 months. · Try to get at least 150 minutes of exercise per week or 10,000 steps per day on a pedometer . · Order or download the FREE \"Exercise & Physical Activity: Your Everyday Guide\" from [employer]. Call 7-268.608.4158 or search [employer] online. · You need 4066-5803 mg of calcium and 7608-6234 IU of vitamin D per day. It is possible to meet your calcium requirement with diet alone, but a vitamin D supplement is usually necessary to meet this goal.  · When exposed to the sun, use a sunscreen that protects against both UVA and UVB radiation with an SPF of 30 or greater. Reapply every 2 to 3 hours or after sweating, drying off with a towel, or swimming. · Always wear a seat belt when traveling in a car. Always wear a helmet when riding a bicycle or motorcycle.

## 2018-09-06 NOTE — PROGRESS NOTES
Medicare Annual Wellness Visit  Name: Fay Junior Date: 2018   MRN: [id number] Sex: Female   Age: [de-identified] y.o. Ethnicity: Non-/Non    : 1940 Race: Stefanie Mata is here for Hypertension (Pt here for 6 month check-up); Medicare AWV (Annual Medicare check); Medication Refill (Also requesting samples of Linzess); and Results (Pt would like to discuss CT results & bloodwork)    Screenings for behavioral, psychosocial and functional/safety risks, and cognitive dysfunction are all negative except as indicated below. These results, as well as other patient data from the [address] form, are documented in Flowsheets linked to this Encounter. Allergies   Allergen Reactions    Iodine Shortness Of Breath, Swelling and Rash    Codeine     Amoxicillin-Pot Clavulanate Nausea And Vomiting    Darvocet [Propoxyphene N-Acetaminophen] Nausea And Vomiting    Eggs Or Egg-Derived Products Nausea And Vomiting    Percodan [Oxycodone-Aspirin] Nausea And Vomiting and Other (See Comments)     sick    Percodan [Oxycodone-Aspirin] Nausea And Vomiting       Prior to Visit Medications    Medication Sig Taking? Authorizing Provider   metoprolol succinate (TOPROL XL) 50 MG extended release tablet Take 1 tablet by mouth daily Yes Daja Boyd DO   hydrochlorothiazide (HYDRODIURIL) 25 MG tablet Take 1 tablet by mouth daily Yes Daja Boyd DO   potassium chloride (KLOR-CON M) 20 MEQ extended release tablet Take 1 tablet by mouth daily Yes Daja Boyd DO   levothyroxine (SYNTHROID) 88 MCG tablet Take 1 tablet by mouth daily Yes Daja Boyd DO   MAGNESIUM-OXIDE 400 (241.3 Mg) MG TABS tablet Take 1 tablet by mouth daily Yes Daja Boyd DO   cetirizine (ZYRTEC) 10 MG tablet Take 1 tablet by mouth daily Yes Daja Boyd DO   Misc.  Devices (RAISED TOILET SEAT/LOCK & ARMS) MISC 1 Units by Does not apply route daily Yes Daja Boyd DO back pain     Depression     Diarrhea     Eczema     Elevated sed rate 12/11/2017    GERD (gastroesophageal reflux disease)     Hemorrhoids     history of    Hyperlipidemia     diet controlled    Hypertension     Macular degeneration     Prosthetic eye globe     left eye implant;    Seasonal allergies     Skipped heart beats     on metoprolol; managed by Dr. Esequiel Harmon Sleep apnea     uses cpap at times     Stroke [facility])     Thyroid disease      Past Surgical History:   Procedure Laterality Date   [address]    open?     COLECTOMY  1974    COLONOSCOPY  04/26/2012    and egd    COLONOSCOPY  05/30/2014    COLONOSCOPY  01/08/2015    EYE SURGERY      left eye, has artificial eye    EYE SURGERY Left     HYSTERECTOMY  1974    JOINT REPLACEMENT Left 2010/2012    x 2-knee    SHOULDER SURGERY      LEFT SHOULDER    TONSILLECTOMY      TUMOR EXCISION      from arm, fatty    UPPER GASTROINTESTINAL ENDOSCOPY  05/30/2014    with biopsy    UPPER GASTROINTESTINAL ENDOSCOPY  01/08/2015       Family History   Problem Relation Age of Onset    Stroke Father     Hypertension Father     Heart Disease Father     Stroke Mother     Hypertension Mother     Other Mother         aneurysm    Heart Disease Mother     Hypertension Brother     Cancer Brother     Breast Cancer Sister     Hypertension Sister     Cancer Sister         breast ca    Heart Disease Sister     Hypertension Brother         parkinson    Heart Disease Brother     Cancer Brother     Cancer Brother     Cancer Brother     Cancer Brother     Heart Disease Brother     Cancer Brother     Cancer Sister     High Blood Pressure Daughter     Breast Cancer Daughter     High Blood Pressure Daughter        CareTeam (Including outside providers/suppliers regularly involved in providing care):   Patient Care Team:  Elton Boyd, DO as PCP - General (Family Medicine)  Elton Boyd, DO as PCP - [facility] Attributed Provider    Wt Readings from Last 3 Encounters:   09/06/18 190 lb (86.2 kg)   07/26/18 182 lb 3.2 oz (82.6 kg)   07/24/18 190 lb (86.2 kg)     Vitals:    09/06/18 0954   BP: 130/70   Pulse: 56   Temp: 97.4 °F (36.3 °C)   SpO2: 97%   Weight: 190 lb (86.2 kg)   Height: 5' (1.524 m)     Body mass index is 37.11 kg/m². General Appearance: alert and oriented to person, place and time, well developed and well- nourished, in no acute distress  Skin: warm and dry, no rash or erythema  Head: normocephalic and atraumatic  Eyes: pupils equal, round, and reactive to light, extraocular eye movements intact, conjunctivae normal  ENT: tympanic membrane, external ear and ear canal normal bilaterally, nose without deformity, nasal mucosa and turbinates normal without polyps  Neck: supple and non-tender without mass, no thyromegaly or thyroid nodules, no cervical lymphadenopathy  Pulmonary/Chest: clear to auscultation bilaterally- no wheezes, rales or rhonchi, normal air movement, no respiratory distress  Cardiovascular: normal rate, regular rhythm, normal S1 and S2, no murmurs, rubs, clicks, or gallops, distal pulses intact, no carotid bruits  Abdomen: soft, non-tender, non-distended, normal bowel sounds, no masses or organomegaly  Extremities: no cyanosis, clubbing or edema  Musculoskeletal: normal range of motion, no joint swelling, deformity or tenderness  Neurologic: reflexes normal and symmetric, no cranial nerve deficit, gait, coordination and speech normal    Patient's complete Health Risk Assessment and screening values have been reviewed and are found in Flowsheets. The following problems were reviewed today and where indicated follow up appointments were made and/or referrals ordered. Positive Risk Factor Screenings with Interventions:     Cognitive:   Words recalled: 2 Words Recalled  Total Score Interpretation: Positive Mini-Cog  Cognitive Impairment Interventions:  · None indicated    General Health:  General  In general, how would you say your health is?: Fair  In the past 7 days, have you experienced any of the following?: (!) Stress  Do you get the social and emotional support that you need?: Yes  Do you have a Living Will?: (!) No  General Health Risk Interventions:  · Stress: patient declines any further evaluation/treatment for this issue for depression, does NOT want to see counselor. Does deny homicidal or suicidal ideations.]    Health Habits/Nutrition:  Health Habits/Nutrition  Do you exercise for at least 20 minutes 2-3 times per week?: (!) No  Have you lost any weight without trying in the past 3 months?: No  Do you eat fewer than 2 meals per day?: No  Have you seen a dentist within the past year?: (!) No (will be scheduling exam)  Body mass index is 37.11 kg/m².   Health Habits/Nutrition Interventions:  · Dental exam overdue:  patient encouraged to make appointment with his/her dentist    Hearing/Vision:  Hearing/Vision  Do you or your family notice any trouble with your hearing?: No  Do you have difficulty driving, watching TV, or doing any of your daily activities because of your eyesight?: (!) Yes (Has only Rt eye--Lt eye is artificial)  Have you had an eye exam within the past year?: Yes  Hearing/Vision Interventions:  · Vision concerns:  patient encouraged to make appointment with his/her eye specialist    Safety:  Safety  Do you have working smoke detectors?: Yes  Have all throw rugs been removed or fastened?: Yes  Do you have non-slip mats in all bathtubs?: Yes  Do all of your stairways have a railing or banister?: Yes  Are your doorways, halls and stairs free of clutter?: Yes  Do you always fasten your seatbelt when you are in a car?: (!) No (Sometimes)  Safety Interventions:  · Patient declines any further evaluation/treatment for this issue    Personalized Preventive Plan   Current Health Maintenance Status  Immunization History   Administered Date(s) Administered    Pneumococcal 13-valent Conjugate ([id number]) 04/04/2016        Health Maintenance   Topic Date Due    DTaP/Tdap/Td vaccine (1 - Tdap) 07/02/1957    Shingles Vaccine (1 of 2 - 2 Dose Series) 07/02/1988    Pneumococcal low/med risk (2 of 2 - PPSV23) 04/04/2017    Flu vaccine (1) 09/01/2018    TSH testing  02/20/2019    Potassium monitoring  08/06/2019    Creatinine monitoring  08/06/2019    DEXA (modify frequency per FRAX score)  Addressed     Recommendations for Preventive Services Due: see orders and patient instructions/AVS.  .   Recommended screening schedule for the next 5-10 years is provided to the patient in written form: see Patient Instructions/AVS.

## 2018-09-24 ENCOUNTER — HOSPITAL ENCOUNTER (EMERGENCY)
Age: 78
Discharge: HOME | End: 2018-09-24
Payer: MEDICARE

## 2018-09-24 VITALS
HEART RATE: 58 BPM | BODY MASS INDEX: 38.68 KG/M2 | WEIGHT: 197 LBS | TEMPERATURE: 97.7 F | HEIGHT: 60 IN | SYSTOLIC BLOOD PRESSURE: 206 MMHG | OXYGEN SATURATION: 95 % | DIASTOLIC BLOOD PRESSURE: 84 MMHG | RESPIRATION RATE: 16 BRPM

## 2018-09-24 DIAGNOSIS — G89.29 ACUTE EXACERBATION OF CHRONIC LOW BACK PAIN: Primary | ICD-10-CM

## 2018-09-24 DIAGNOSIS — M54.50 ACUTE EXACERBATION OF CHRONIC LOW BACK PAIN: Primary | ICD-10-CM

## 2018-09-24 LAB
BACTERIA: NORMAL /HPF
BILIRUBIN URINE: NEGATIVE
BLOOD, URINE: NEGATIVE
CLARITY: CLEAR
COLOR: YELLOW
GLUCOSE URINE: NEGATIVE MG/DL
KETONES, URINE: NEGATIVE MG/DL
LEUKOCYTE ESTERASE, URINE: ABNORMAL
NITRITE, URINE: NEGATIVE
PH UA: 6 (ref 5–9)
PROTEIN UA: NEGATIVE MG/DL
RBC UA: NORMAL /HPF (ref 0–2)
SPECIFIC GRAVITY UA: 1.01 (ref 1–1.03)
UROBILINOGEN, URINE: 0.2 E.U./DL
WBC UA: NORMAL /HPF (ref 0–5)

## 2018-09-24 PROCEDURE — 99283 EMERGENCY DEPT VISIT LOW MDM: CPT

## 2018-09-24 PROCEDURE — 96372 THER/PROPH/DIAG INJ SC/IM: CPT

## 2018-09-24 PROCEDURE — 6360000002 HC RX W HCPCS: Performed by: PHYSICIAN ASSISTANT

## 2018-09-24 PROCEDURE — 81001 URINALYSIS AUTO W/SCOPE: CPT

## 2018-09-24 PROCEDURE — 87088 URINE BACTERIA CULTURE: CPT

## 2018-09-24 RX ORDER — KETOROLAC TROMETHAMINE 30 MG/ML
30 INJECTION, SOLUTION INTRAMUSCULAR; INTRAVENOUS ONCE
Status: COMPLETED | OUTPATIENT
Start: 2018-09-24 | End: 2018-09-24

## 2018-09-24 RX ORDER — HYDROCODONE BITARTRATE AND ACETAMINOPHEN 5; 325 MG/1; MG/1
1 TABLET ORAL EVERY 6 HOURS PRN
Qty: 12 TABLET | Refills: 0 | Status: SHIPPED | OUTPATIENT
Start: 2018-09-24 | End: 2018-09-27

## 2018-09-24 RX ORDER — METHYLPREDNISOLONE 4 MG/1
TABLET ORAL
Qty: 21 TABLET | Status: SHIPPED | OUTPATIENT
Start: 2018-09-24 | End: 2018-09-30

## 2018-09-24 RX ADMIN — KETOROLAC TROMETHAMINE 30 MG: 30 INJECTION, SOLUTION INTRAMUSCULAR; INTRAVENOUS at 11:14

## 2018-09-24 ASSESSMENT — PAIN DESCRIPTION - PROGRESSION: CLINICAL_PROGRESSION: NOT CHANGED

## 2018-09-24 ASSESSMENT — PAIN DESCRIPTION - DESCRIPTORS: DESCRIPTORS: SHARP

## 2018-09-24 ASSESSMENT — PAIN SCALES - GENERAL
PAINLEVEL_OUTOF10: 10
PAINLEVEL_OUTOF10: 10

## 2018-09-24 ASSESSMENT — PAIN DESCRIPTION - ORIENTATION: ORIENTATION: LOWER

## 2018-09-24 ASSESSMENT — PAIN DESCRIPTION - LOCATION: LOCATION: BACK

## 2018-09-24 ASSESSMENT — PAIN DESCRIPTION - FREQUENCY: FREQUENCY: CONTINUOUS

## 2018-09-24 ASSESSMENT — PAIN DESCRIPTION - PAIN TYPE: TYPE: CHRONIC PAIN

## 2018-09-24 NOTE — ED PROVIDER NOTES
TABLET    Take 1 tablet by mouth every 6 hours as needed for Pain for up to 3 days. .    METHYLPREDNISOLONE (MEDROL, TORSTEN,) 4 MG TABLET    Take as directed on package insert days 1-6     Electronically signed by YUDITH Menendez   DD: 9/24/18  **This report was transcribed using voice recognition software. Every effort was made to ensure accuracy; however, inadvertent computerized transcription errors may be present.   END OF ED PROVIDER NOTE       YUDITH Pineda  09/24/18 [address]  09/24/18 1392

## 2018-09-24 NOTE — ED NOTES
Discharge instructions given. Patient verbalizes understanding. No other noted or stated problems at this time. Patient will follow up with primary care.      Anahi Paul RN  09/24/18 [id number]

## 2018-09-25 ENCOUNTER — TELEPHONE (OUTPATIENT)
Dept: FAMILY MEDICINE CLINIC | Age: 78
End: 2018-09-25

## 2018-09-26 ENCOUNTER — TELEPHONE (OUTPATIENT)
Dept: FAMILY MEDICINE CLINIC | Age: 78
End: 2018-09-26

## 2018-09-26 LAB — URINE CULTURE, ROUTINE: NORMAL

## 2018-09-26 NOTE — TELEPHONE ENCOUNTER
Pt called and is having rectum pain. She was constipated but took something and had a bowel movement this am.   She wanted to know if her hemorrhoid is causing her sciatica pain ? Pt is coming in tomorrow afternoon. What can I tell her ? [facility] ph# [phone].

## 2018-09-27 ENCOUNTER — CARE COORDINATION (OUTPATIENT)
Dept: CARE COORDINATION | Age: 78
End: 2018-09-27

## 2018-09-27 ENCOUNTER — OFFICE VISIT (OUTPATIENT)
Dept: FAMILY MEDICINE CLINIC | Age: 78
End: 2018-09-27
Payer: MEDICARE

## 2018-09-27 VITALS
WEIGHT: 188.8 LBS | RESPIRATION RATE: 16 BRPM | HEIGHT: 60 IN | TEMPERATURE: 97.3 F | HEART RATE: 68 BPM | SYSTOLIC BLOOD PRESSURE: 138 MMHG | BODY MASS INDEX: 37.07 KG/M2 | OXYGEN SATURATION: 95 % | DIASTOLIC BLOOD PRESSURE: 62 MMHG

## 2018-09-27 DIAGNOSIS — K57.92 DIVERTICULITIS: Primary | ICD-10-CM

## 2018-09-27 PROCEDURE — 99214 OFFICE O/P EST MOD 30 MIN: CPT | Performed by: FAMILY MEDICINE

## 2018-09-27 PROCEDURE — 1036F TOBACCO NON-USER: CPT | Performed by: FAMILY MEDICINE

## 2018-09-27 PROCEDURE — 1123F ACP DISCUSS/DSCN MKR DOCD: CPT | Performed by: FAMILY MEDICINE

## 2018-09-27 PROCEDURE — 4040F PNEUMOC VAC/ADMIN/RCVD: CPT | Performed by: FAMILY MEDICINE

## 2018-09-27 PROCEDURE — G8417 CALC BMI ABV UP PARAM F/U: HCPCS | Performed by: FAMILY MEDICINE

## 2018-09-27 PROCEDURE — G8427 DOCREV CUR MEDS BY ELIG CLIN: HCPCS | Performed by: FAMILY MEDICINE

## 2018-09-27 PROCEDURE — G8400 PT W/DXA NO RESULTS DOC: HCPCS | Performed by: FAMILY MEDICINE

## 2018-09-27 PROCEDURE — 1090F PRES/ABSN URINE INCON ASSESS: CPT | Performed by: FAMILY MEDICINE

## 2018-09-27 PROCEDURE — 1101F PT FALLS ASSESS-DOCD LE1/YR: CPT | Performed by: FAMILY MEDICINE

## 2018-09-27 RX ORDER — CIPROFLOXACIN 500 MG/1
500 TABLET, FILM COATED ORAL 2 TIMES DAILY
Qty: 20 TABLET | Refills: 0 | Status: SHIPPED | OUTPATIENT
Start: 2018-09-27 | End: 2018-12-21 | Stop reason: ALTCHOICE

## 2018-09-27 RX ORDER — METRONIDAZOLE 500 MG/1
500 TABLET ORAL 3 TIMES DAILY
Qty: 30 TABLET | Refills: 0 | Status: SHIPPED | OUTPATIENT
Start: 2018-09-27 | End: 2018-10-07

## 2018-09-27 RX ORDER — CETIRIZINE HYDROCHLORIDE 10 MG/1
10 TABLET ORAL DAILY
Qty: 30 TABLET | Refills: 6 | Status: SHIPPED | OUTPATIENT
Start: 2018-09-27 | End: 2018-12-27

## 2018-09-27 ASSESSMENT — ENCOUNTER SYMPTOMS
VOICE CHANGE: 0
CONSTIPATION: 0
COUGH: 0
WHEEZING: 0
DIARRHEA: 0
SORE THROAT: 0
NAUSEA: 0
SHORTNESS OF BREATH: 0
BLOOD IN STOOL: 0
VOMITING: 0
CHEST TIGHTNESS: 0
SINUS PRESSURE: 0
PHOTOPHOBIA: 0
TROUBLE SWALLOWING: 0

## 2018-09-27 ASSESSMENT — PATIENT HEALTH QUESTIONNAIRE - PHQ9: SUM OF ALL RESPONSES TO PHQ QUESTIONS 1-9: 6

## 2018-09-27 NOTE — CARE COORDINATION
Ambulatory Care Coordination ED Follow up Call       Reason for ED Visit:  Back pain  Care Management Risk Score: CMRS 8      Left voice message for patient regarding ED follow up call. Requested patient please return call;  CCSS contact information provided.

## 2018-09-27 NOTE — PROGRESS NOTES
500 MG tablet; Take 1 tablet by mouth 2 times daily  -     metroNIDAZOLE (FLAGYL) 500 MG tablet; Take 1 tablet by mouth 3 times daily for 10 days    Other orders  -     cetirizine (ZYRTEC) 10 MG tablet; Take 1 tablet by mouth daily          Return in about 1 week (around 10/4/2018), or if symptoms worsen or fail to improve. Daja Peña, DO    Call or go to ED immediately if symptoms worsen or persist.    Educational materials and/or home exercises printed for patient's review and were included in patient instructions on his/her After Visit Summary and given to patient at the end of visit. Counseled regarding above diagnosis, including possible risks and complications,  especially if left uncontrolled. Counseled regarding the possible side effects, risks, benefits and alternatives to treatment; patient and/or guardian verbalizes understanding, agrees, feels comfortable with and wishes to proceed with above treatment plan. Advised patient to call with any new medication issues, and read all Rx info from pharmacy to assure aware of all possible risks and side effects of medication before taking. Reviewed age and gender appropriate health screening exams and vaccinations. Advised patient regarding importance of keeping up with recommended health maintenance and to schedule as soon as possible if overdue, as this is important in assessing for undiagnosed pathology, especially cancer, as well as protecting against potentially harmful/life threatening disease. Patient and/or guardian verbalizes understanding and agrees with above counseling, assessment and plan. All questions answered.

## 2018-09-28 ENCOUNTER — TELEPHONE (OUTPATIENT)
Dept: FAMILY MEDICINE CLINIC | Age: 78
End: 2018-09-28

## 2018-09-28 NOTE — TELEPHONE ENCOUNTER
Pt called and is having a lot of pain in her lower back. Should she try the medrol dose pk they gave her in the ER or what should she do ? [facility] ph# [phone].

## 2018-09-30 ASSESSMENT — ENCOUNTER SYMPTOMS
BACK PAIN: 1
ABDOMINAL PAIN: 1

## 2018-10-03 ENCOUNTER — OFFICE VISIT (OUTPATIENT)
Dept: FAMILY MEDICINE CLINIC | Age: 78
End: 2018-10-03
Payer: MEDICARE

## 2018-10-03 VITALS
OXYGEN SATURATION: 96 % | RESPIRATION RATE: 16 BRPM | BODY MASS INDEX: 36.21 KG/M2 | TEMPERATURE: 97.4 F | SYSTOLIC BLOOD PRESSURE: 138 MMHG | HEART RATE: 68 BPM | DIASTOLIC BLOOD PRESSURE: 78 MMHG | WEIGHT: 185.4 LBS

## 2018-10-03 DIAGNOSIS — Z91.81 AT HIGH RISK FOR FALLS: ICD-10-CM

## 2018-10-03 DIAGNOSIS — K57.92 DIVERTICULITIS: Primary | ICD-10-CM

## 2018-10-03 PROCEDURE — 1123F ACP DISCUSS/DSCN MKR DOCD: CPT | Performed by: FAMILY MEDICINE

## 2018-10-03 PROCEDURE — G8484 FLU IMMUNIZE NO ADMIN: HCPCS | Performed by: FAMILY MEDICINE

## 2018-10-03 PROCEDURE — 1101F PT FALLS ASSESS-DOCD LE1/YR: CPT | Performed by: FAMILY MEDICINE

## 2018-10-03 PROCEDURE — 1090F PRES/ABSN URINE INCON ASSESS: CPT | Performed by: FAMILY MEDICINE

## 2018-10-03 PROCEDURE — G8417 CALC BMI ABV UP PARAM F/U: HCPCS | Performed by: FAMILY MEDICINE

## 2018-10-03 PROCEDURE — G8400 PT W/DXA NO RESULTS DOC: HCPCS | Performed by: FAMILY MEDICINE

## 2018-10-03 PROCEDURE — 99213 OFFICE O/P EST LOW 20 MIN: CPT | Performed by: FAMILY MEDICINE

## 2018-10-03 PROCEDURE — 1036F TOBACCO NON-USER: CPT | Performed by: FAMILY MEDICINE

## 2018-10-03 PROCEDURE — 4040F PNEUMOC VAC/ADMIN/RCVD: CPT | Performed by: FAMILY MEDICINE

## 2018-10-03 PROCEDURE — G8427 DOCREV CUR MEDS BY ELIG CLIN: HCPCS | Performed by: FAMILY MEDICINE

## 2018-10-03 ASSESSMENT — ENCOUNTER SYMPTOMS
BACK PAIN: 0
DIARRHEA: 0
COUGH: 0
RECTAL PAIN: 1
PHOTOPHOBIA: 0
ABDOMINAL PAIN: 1
WHEEZING: 0
NAUSEA: 0
VOICE CHANGE: 0
SINUS PRESSURE: 0
SHORTNESS OF BREATH: 0
VOMITING: 0
CONSTIPATION: 0
ABDOMINAL DISTENTION: 1
BLOOD IN STOOL: 0
TROUBLE SWALLOWING: 0
CHEST TIGHTNESS: 0
SORE THROAT: 0

## 2018-10-22 ENCOUNTER — CARE COORDINATION (OUTPATIENT)
Dept: CARE COORDINATION | Age: 78
End: 2018-10-22

## 2018-10-22 NOTE — CARE COORDINATION
Spoke with Allegra Garcias today , denies any current complaints ,   other than her rectal discomfort , in which she states that she has an appointment with Dr. Viral Bustillos on Oct 29, 2108  No other physical complaints at this time   She does still express a concern of needing to talk with a counselor due to life's stressors   reviewed her medications and she is confused regarding a few of her meds as to whether she should be taking them or not , her lasix, potassium and her Zocor,, will clarify with the provider and inform patient   Patient still not participating in any routine exercise as erika, but expresses that she will after she sees the general surgeon and treatment plan is completes will revisit at that time    In the meantime , some chair exercises could be beneficial for the patient , as erika   Next outreach will be in 1 week

## 2018-11-01 ENCOUNTER — CARE COORDINATION (OUTPATIENT)
Dept: CARE COORDINATION | Age: 78
End: 2018-11-01

## 2018-11-07 ENCOUNTER — CARE COORDINATION (OUTPATIENT)
Dept: CARE COORDINATION | Age: 78
End: 2018-11-07

## 2018-11-07 NOTE — CARE COORDINATION
Spoke with patient today , she inquired about some resources needed for herself, safety bars in ehr bathroom, possible shower chair and medic alert button as well. Insured patient that I would look into all of those items for her. She is taking a trip to see her grandchildren and will call me when she returns . No other changes or physical concerns expresses during this encounter .

## 2018-11-19 ENCOUNTER — CARE COORDINATION (OUTPATIENT)
Dept: CARE COORDINATION | Age: 78
End: 2018-11-19

## 2018-11-19 NOTE — CARE COORDINATION
Spoke with Managers at [facility] apartment complex to check on allowance of additions to bathroom to increase the safety of it , requested  Shower rails , they stated that the walls are   not durable enough to hold that kind of equipment   They state that a adjustable height grab bar safety rail that fits on the cheli of the tub approx $32.84  She is also allowed a hand held shower head  of the tub

## 2018-11-26 DIAGNOSIS — F32.A ANXIETY AND DEPRESSION: Chronic | ICD-10-CM

## 2018-11-26 DIAGNOSIS — F41.9 ANXIETY AND DEPRESSION: Chronic | ICD-10-CM

## 2018-11-28 RX ORDER — LORAZEPAM 1 MG/1
1 TABLET ORAL NIGHTLY
Qty: 30 TABLET | Refills: 2 | Status: SHIPPED | OUTPATIENT
Start: 2018-11-28 | End: 2018-12-28

## 2018-12-17 ENCOUNTER — OFFICE VISIT (OUTPATIENT)
Dept: FAMILY MEDICINE CLINIC | Age: 78
End: 2018-12-17
Payer: MEDICARE

## 2018-12-17 VITALS
HEIGHT: 60 IN | SYSTOLIC BLOOD PRESSURE: 132 MMHG | RESPIRATION RATE: 16 BRPM | OXYGEN SATURATION: 96 % | BODY MASS INDEX: 36.91 KG/M2 | DIASTOLIC BLOOD PRESSURE: 80 MMHG | WEIGHT: 188 LBS | HEART RATE: 66 BPM | TEMPERATURE: 96.8 F

## 2018-12-17 DIAGNOSIS — B96.89 ACUTE BACTERIAL SINUSITIS: Primary | ICD-10-CM

## 2018-12-17 DIAGNOSIS — F32.A DEPRESSION, UNSPECIFIED DEPRESSION TYPE: ICD-10-CM

## 2018-12-17 DIAGNOSIS — J01.90 ACUTE BACTERIAL SINUSITIS: Primary | ICD-10-CM

## 2018-12-17 DIAGNOSIS — R53.1 GENERALIZED WEAKNESS: ICD-10-CM

## 2018-12-17 PROCEDURE — 1101F PT FALLS ASSESS-DOCD LE1/YR: CPT | Performed by: FAMILY MEDICINE

## 2018-12-17 PROCEDURE — 1090F PRES/ABSN URINE INCON ASSESS: CPT | Performed by: FAMILY MEDICINE

## 2018-12-17 PROCEDURE — 1036F TOBACCO NON-USER: CPT | Performed by: FAMILY MEDICINE

## 2018-12-17 PROCEDURE — 99213 OFFICE O/P EST LOW 20 MIN: CPT | Performed by: FAMILY MEDICINE

## 2018-12-17 PROCEDURE — G8427 DOCREV CUR MEDS BY ELIG CLIN: HCPCS | Performed by: FAMILY MEDICINE

## 2018-12-17 PROCEDURE — 1123F ACP DISCUSS/DSCN MKR DOCD: CPT | Performed by: FAMILY MEDICINE

## 2018-12-17 PROCEDURE — G8400 PT W/DXA NO RESULTS DOC: HCPCS | Performed by: FAMILY MEDICINE

## 2018-12-17 PROCEDURE — 4040F PNEUMOC VAC/ADMIN/RCVD: CPT | Performed by: FAMILY MEDICINE

## 2018-12-17 PROCEDURE — G8484 FLU IMMUNIZE NO ADMIN: HCPCS | Performed by: FAMILY MEDICINE

## 2018-12-17 PROCEDURE — G8417 CALC BMI ABV UP PARAM F/U: HCPCS | Performed by: FAMILY MEDICINE

## 2018-12-17 RX ORDER — CEFDINIR 300 MG/1
300 CAPSULE ORAL 2 TIMES DAILY
Qty: 14 CAPSULE | Refills: 0 | Status: SHIPPED | OUTPATIENT
Start: 2018-12-17 | End: 2018-12-21 | Stop reason: ALTCHOICE

## 2018-12-17 ASSESSMENT — ENCOUNTER SYMPTOMS
ABDOMINAL PAIN: 0
DIARRHEA: 0
SHORTNESS OF BREATH: 0
WHEEZING: 0
BACK PAIN: 0
VOMITING: 0
BLOOD IN STOOL: 0
CONSTIPATION: 0
VOICE CHANGE: 0
NAUSEA: 0
SORE THROAT: 0
PHOTOPHOBIA: 0
CHEST TIGHTNESS: 0
SINUS PRESSURE: 0
COUGH: 0
TROUBLE SWALLOWING: 0

## 2018-12-17 NOTE — PROGRESS NOTES
12/31/2018    Chief Complaint   Patient presents with    Foot Injury     dropped a kettle on left foot friday and hit so hard that it gave her pain right to her  head also said her blood pressure was high yesterday at home     Back Pain    Rectal Pain    Otalgia    Headache     head gets hot        HPI    Charli Simental is a [de-identified] y.o. patient that presents today for:    Dropped a kettle on her foot and now has pain in her head. Complaining of pain in her sacrum and coccyx. Thought it was due to prior history of rectal cancer. Did go to see Dr. Yandy Lam. No concerns. Headache with sinus drainage. Denies fever, chills, nausea or vomiting. KNown depression. Improved since she moved. Does deny homicidal or suicidal ideations. Feeling well otherwise, no complaints. Labs reviewed. Taking medications as prescribed. Patient's past medical, surgical, social and/or family history reviewed, updated in chart, and are non-contributory (unless otherwise stated). Medications and allergies also reviewed and updated in chart. Review of Systems   Constitutional: Negative for activity change, appetite change, chills, diaphoresis, fatigue, fever and unexpected weight change. HENT: Negative for hearing loss, sinus pressure, sore throat, tinnitus, trouble swallowing and voice change. Eyes: Negative for photophobia and visual disturbance. Respiratory: Negative for cough, chest tightness, shortness of breath and wheezing. Cardiovascular: Negative for chest pain, palpitations and leg swelling. Gastrointestinal: Negative for abdominal pain, blood in stool, constipation, diarrhea, nausea and vomiting. Endocrine: Negative for cold intolerance, heat intolerance, polydipsia, polyphagia and polyuria. Genitourinary: Negative for difficulty urinating, frequency, hematuria and urgency. Musculoskeletal: Negative for back pain, joint swelling and myalgias.    Allergic/Immunologic: Negative for environmental allergies, food allergies and immunocompromised state. Neurological: Negative for dizziness, weakness, numbness and headaches. Hematological: Negative for adenopathy. Does not bruise/bleed easily. Psychiatric/Behavioral: Negative for agitation, behavioral problems, confusion, decreased concentration and sleep disturbance. The patient is not nervous/anxious. All other systems reviewed and are negative. Physical Exam  Temp Readings from Last 3 Encounters:   12/27/18 96.9 °F (36.1 °C)   12/26/18 96.9 °F (36.1 °C)   12/21/18 97.5 °F (36.4 °C) (Oral)     Wt Readings from Last 3 Encounters:   12/27/18 189 lb (85.7 kg)   12/26/18 189 lb (85.7 kg)   12/21/18 189 lb (85.7 kg)     BP Readings from Last 3 Encounters:   12/27/18 (!) 143/58   12/26/18 124/68   12/21/18 (!) 123/53     Pulse Readings from Last 3 Encounters:   12/27/18 69   12/26/18 56   12/21/18 60       General appearance: alert, well appearing, and in no distress, oriented to person, place, and time and normal appearing weight. CVS exam: normal rate, regular rhythm, normal S1, S2, no murmurs, rubs, clicks or gallops. Radial pulses 2+ bilateral.  PT/DP pulse 2+ bilat. No C/C/E    Chest: clear to auscultation, no wheezes, rales or rhonchi, symmetric air entry. Abdomen: Soft, non-tender, non-distended, positive BS in all 4 quadrants    Extremities:Dorsalis pedis pulses palpated bilaterally, no clubbing, cyanosis, edema or erythema     SKIN: no lesions, jaundice, petechiae, pallor, cyanosis, ecchymosis    NEURO: gross motor exam normal by observation, gait normal    Mental status - alert, oriented to person, place, and time, normal mood, behavior, speech, dress, motor activity, and thought processes      Assessment/Plan  Mari Landeros was seen today for foot injury, back pain, rectal pain, otalgia and headache.     Diagnoses and all orders for this visit:    Acute bacterial sinusitis  -     Discontinue: cefdinir (OMNICEF) 300 MG capsule; Take 1 capsule by mouth 2 times daily for 7 days    Generalized weakness  - Recent labs and CT, as well as ER visits with normal CT. Advised on symptomatic relief. Does want to go back to see GI. To call to schedule appointment    Depression  -  Stable, continue medications as prescribed. Return if symptoms worsen or fail to improve. Daja Peña, DO    Call or go to ED immediately if symptoms worsen or persist.    Educational materials and/or home exercises printed for patient's review and were included in patient instructions on his/her After Visit Summary and given to patient at the end of visit. Counseled regarding above diagnosis, including possible risks and complications,  especially if left uncontrolled. Counseled regarding the possible side effects, risks, benefits and alternatives to treatment; patient and/or guardian verbalizes understanding, agrees, feels comfortable with and wishes to proceed with above treatment plan. Advised patient to call with any new medication issues, and read all Rx info from pharmacy to assure aware of all possible risks and side effects of medication before taking. Reviewed age and gender appropriate health screening exams and vaccinations. Advised patient regarding importance of keeping up with recommended health maintenance and to schedule as soon as possible if overdue, as this is important in assessing for undiagnosed pathology, especially cancer, as well as protecting against potentially harmful/life threatening disease. Patient and/or guardian verbalizes understanding and agrees with above counseling, assessment and plan. All questions answered.

## 2018-12-20 ENCOUNTER — HOSPITAL ENCOUNTER (OUTPATIENT)
Dept: GENERAL RADIOLOGY | Age: 78
Discharge: HOME | End: 2018-12-22
Payer: MEDICARE

## 2018-12-20 ENCOUNTER — HOSPITAL ENCOUNTER (EMERGENCY)
Age: 78
Discharge: HOME | End: 2018-12-21
Attending: EMERGENCY MEDICINE
Payer: MEDICARE

## 2018-12-20 ENCOUNTER — HOSPITAL ENCOUNTER (OUTPATIENT)
Age: 78
Discharge: HOME | End: 2018-12-22
Payer: MEDICARE

## 2018-12-20 ENCOUNTER — TELEPHONE (OUTPATIENT)
Dept: FAMILY MEDICINE CLINIC | Age: 78
End: 2018-12-20

## 2018-12-20 DIAGNOSIS — N39.0 URINARY TRACT INFECTION WITH HEMATURIA, SITE UNSPECIFIED: ICD-10-CM

## 2018-12-20 DIAGNOSIS — D18.09 VERTEBRAL BODY HEMANGIOMA: ICD-10-CM

## 2018-12-20 DIAGNOSIS — R05.9 COUGH: ICD-10-CM

## 2018-12-20 DIAGNOSIS — R31.9 URINARY TRACT INFECTION WITH HEMATURIA, SITE UNSPECIFIED: ICD-10-CM

## 2018-12-20 DIAGNOSIS — R05.9 COUGH: Primary | ICD-10-CM

## 2018-12-20 DIAGNOSIS — R10.9 ABDOMINAL PAIN, UNSPECIFIED ABDOMINAL LOCATION: Primary | ICD-10-CM

## 2018-12-20 PROCEDURE — 99284 EMERGENCY DEPT VISIT MOD MDM: CPT

## 2018-12-20 PROCEDURE — 71046 X-RAY EXAM CHEST 2 VIEWS: CPT

## 2018-12-21 ENCOUNTER — TELEPHONE (OUTPATIENT)
Dept: FAMILY MEDICINE CLINIC | Age: 78
End: 2018-12-21

## 2018-12-21 ENCOUNTER — APPOINTMENT (OUTPATIENT)
Dept: CT IMAGING | Age: 78
End: 2018-12-21
Payer: MEDICARE

## 2018-12-21 VITALS
HEART RATE: 60 BPM | DIASTOLIC BLOOD PRESSURE: 53 MMHG | RESPIRATION RATE: 16 BRPM | SYSTOLIC BLOOD PRESSURE: 123 MMHG | HEIGHT: 60 IN | TEMPERATURE: 97.5 F | BODY MASS INDEX: 37.11 KG/M2 | OXYGEN SATURATION: 90 % | WEIGHT: 189 LBS

## 2018-12-21 LAB
ALBUMIN SERPL-MCNC: 4.4 G/DL (ref 3.5–5.2)
ALP BLD-CCNC: 69 U/L (ref 35–104)
ALT SERPL-CCNC: 12 U/L (ref 0–32)
ANION GAP SERPL CALCULATED.3IONS-SCNC: 14 MMOL/L (ref 7–16)
AST SERPL-CCNC: 15 U/L (ref 0–31)
BACTERIA: ABNORMAL /HPF
BASOPHILS ABSOLUTE: 0.06 E9/L (ref 0–0.2)
BASOPHILS RELATIVE PERCENT: 0.6 % (ref 0–2)
BILIRUB SERPL-MCNC: 0.4 MG/DL (ref 0–1.2)
BILIRUBIN DIRECT: <0.2 MG/DL (ref 0–0.3)
BILIRUBIN URINE: NEGATIVE
BILIRUBIN, INDIRECT: NORMAL MG/DL (ref 0–1)
BLOOD, URINE: NEGATIVE
BUN BLDV-MCNC: 20 MG/DL (ref 8–23)
CALCIUM SERPL-MCNC: 9.2 MG/DL (ref 8.6–10.2)
CHLORIDE BLD-SCNC: 100 MMOL/L (ref 98–107)
CLARITY: CLEAR
CO2: 24 MMOL/L (ref 22–29)
COLOR: YELLOW
CREAT SERPL-MCNC: 0.8 MG/DL (ref 0.5–1)
EKG ATRIAL RATE: 59 BPM
EKG P AXIS: 13 DEGREES
EKG P-R INTERVAL: 176 MS
EKG Q-T INTERVAL: 462 MS
EKG QRS DURATION: 76 MS
EKG QTC CALCULATION (BAZETT): 457 MS
EKG R AXIS: -12 DEGREES
EKG T AXIS: 12 DEGREES
EKG VENTRICULAR RATE: 59 BPM
EOSINOPHILS ABSOLUTE: 0.18 E9/L (ref 0.05–0.5)
EOSINOPHILS RELATIVE PERCENT: 1.9 % (ref 0–6)
EPITHELIAL CELLS, UA: ABNORMAL /HPF
GFR AFRICAN AMERICAN: >60
GFR NON-AFRICAN AMERICAN: >60 ML/MIN/1.73
GLUCOSE BLD-MCNC: 101 MG/DL (ref 74–99)
GLUCOSE URINE: NEGATIVE MG/DL
HCT VFR BLD CALC: 40.7 % (ref 34–48)
HEMOGLOBIN: 13.8 G/DL (ref 11.5–15.5)
IMMATURE GRANULOCYTES #: 0.03 E9/L
IMMATURE GRANULOCYTES %: 0.3 % (ref 0–5)
INR BLD: 1
KETONES, URINE: ABNORMAL MG/DL
LEUKOCYTE ESTERASE, URINE: ABNORMAL
LIPASE: 37 U/L (ref 13–60)
LYMPHOCYTES ABSOLUTE: 3.93 E9/L (ref 1.5–4)
LYMPHOCYTES RELATIVE PERCENT: 42 % (ref 20–42)
MCH RBC QN AUTO: 29.5 PG (ref 26–35)
MCHC RBC AUTO-ENTMCNC: 33.9 % (ref 32–34.5)
MCV RBC AUTO: 87 FL (ref 80–99.9)
MONOCYTES ABSOLUTE: 0.89 E9/L (ref 0.1–0.95)
MONOCYTES RELATIVE PERCENT: 9.5 % (ref 2–12)
NEUTROPHILS ABSOLUTE: 4.27 E9/L (ref 1.8–7.3)
NEUTROPHILS RELATIVE PERCENT: 45.7 % (ref 43–80)
NITRITE, URINE: NEGATIVE
PDW BLD-RTO: 13.4 FL (ref 11.5–15)
PH UA: 6 (ref 5–9)
PLATELET # BLD: 221 E9/L (ref 130–450)
PMV BLD AUTO: 9.9 FL (ref 7–12)
POTASSIUM REFLEX MAGNESIUM: 3.7 MMOL/L (ref 3.5–5)
PROTEIN UA: NEGATIVE MG/DL
PROTHROMBIN TIME: 11.4 SEC (ref 9.3–12.4)
RBC # BLD: 4.68 E12/L (ref 3.5–5.5)
RBC UA: ABNORMAL /HPF (ref 0–2)
SODIUM BLD-SCNC: 138 MMOL/L (ref 132–146)
SPECIFIC GRAVITY UA: 1.02 (ref 1–1.03)
TOTAL PROTEIN: 7.1 G/DL (ref 6.4–8.3)
UROBILINOGEN, URINE: 0.2 E.U./DL
WBC # BLD: 9.4 E9/L (ref 4.5–11.5)
WBC UA: ABNORMAL /HPF (ref 0–5)

## 2018-12-21 PROCEDURE — 6360000002 HC RX W HCPCS: Performed by: EMERGENCY MEDICINE

## 2018-12-21 PROCEDURE — 80076 HEPATIC FUNCTION PANEL: CPT

## 2018-12-21 PROCEDURE — 96374 THER/PROPH/DIAG INJ IV PUSH: CPT

## 2018-12-21 PROCEDURE — 83690 ASSAY OF LIPASE: CPT

## 2018-12-21 PROCEDURE — 6370000000 HC RX 637 (ALT 250 FOR IP): Performed by: EMERGENCY MEDICINE

## 2018-12-21 PROCEDURE — 71250 CT THORAX DX C-: CPT

## 2018-12-21 PROCEDURE — 85610 PROTHROMBIN TIME: CPT

## 2018-12-21 PROCEDURE — 2500000003 HC RX 250 WO HCPCS: Performed by: EMERGENCY MEDICINE

## 2018-12-21 PROCEDURE — 74150 CT ABDOMEN W/O CONTRAST: CPT

## 2018-12-21 PROCEDURE — 6360000004 HC RX CONTRAST MEDICATION: Performed by: RADIOLOGY

## 2018-12-21 PROCEDURE — S0028 INJECTION, FAMOTIDINE, 20 MG: HCPCS | Performed by: EMERGENCY MEDICINE

## 2018-12-21 PROCEDURE — 96375 TX/PRO/DX INJ NEW DRUG ADDON: CPT

## 2018-12-21 PROCEDURE — 80048 BASIC METABOLIC PNL TOTAL CA: CPT

## 2018-12-21 PROCEDURE — 85025 COMPLETE CBC W/AUTO DIFF WBC: CPT

## 2018-12-21 PROCEDURE — 81001 URINALYSIS AUTO W/SCOPE: CPT

## 2018-12-21 PROCEDURE — 70450 CT HEAD/BRAIN W/O DYE: CPT

## 2018-12-21 PROCEDURE — 2580000003 HC RX 258: Performed by: EMERGENCY MEDICINE

## 2018-12-21 RX ORDER — KETOROLAC TROMETHAMINE 30 MG/ML
15 INJECTION, SOLUTION INTRAMUSCULAR; INTRAVENOUS ONCE
Status: COMPLETED | OUTPATIENT
Start: 2018-12-21 | End: 2018-12-21

## 2018-12-21 RX ORDER — MORPHINE SULFATE 2 MG/ML
2 INJECTION, SOLUTION INTRAMUSCULAR; INTRAVENOUS ONCE
Status: COMPLETED | OUTPATIENT
Start: 2018-12-21 | End: 2018-12-21

## 2018-12-21 RX ORDER — ONDANSETRON 4 MG/1
4 TABLET, ORALLY DISINTEGRATING ORAL EVERY 8 HOURS PRN
Qty: 24 TABLET | Refills: 0 | Status: SHIPPED | OUTPATIENT
Start: 2018-12-21 | End: 2018-12-27 | Stop reason: ALTCHOICE

## 2018-12-21 RX ORDER — LIDOCAINE 4 G/G
1 PATCH TOPICAL ONCE
Status: DISCONTINUED | OUTPATIENT
Start: 2018-12-21 | End: 2018-12-21 | Stop reason: HOSPADM

## 2018-12-21 RX ORDER — ONDANSETRON 2 MG/ML
4 INJECTION INTRAMUSCULAR; INTRAVENOUS ONCE
Status: COMPLETED | OUTPATIENT
Start: 2018-12-21 | End: 2018-12-21

## 2018-12-21 RX ORDER — HYDRALAZINE HYDROCHLORIDE 20 MG/ML
10 INJECTION INTRAMUSCULAR; INTRAVENOUS ONCE
Status: COMPLETED | OUTPATIENT
Start: 2018-12-21 | End: 2018-12-21

## 2018-12-21 RX ORDER — 0.9 % SODIUM CHLORIDE 0.9 %
500 INTRAVENOUS SOLUTION INTRAVENOUS ONCE
Status: COMPLETED | OUTPATIENT
Start: 2018-12-21 | End: 2018-12-21

## 2018-12-21 RX ORDER — CEFDINIR 300 MG/1
300 CAPSULE ORAL 2 TIMES DAILY
Qty: 14 CAPSULE | Refills: 0 | Status: SHIPPED | OUTPATIENT
Start: 2018-12-21 | End: 2018-12-26 | Stop reason: ALTCHOICE

## 2018-12-21 RX ORDER — LIDOCAINE 50 MG/G
1 PATCH TOPICAL ONCE
Status: DISCONTINUED | OUTPATIENT
Start: 2018-12-21 | End: 2018-12-21 | Stop reason: CLARIF

## 2018-12-21 RX ADMIN — HYDRALAZINE HYDROCHLORIDE 10 MG: 20 INJECTION INTRAMUSCULAR; INTRAVENOUS at 02:36

## 2018-12-21 RX ADMIN — KETOROLAC TROMETHAMINE 15 MG: 30 INJECTION, SOLUTION INTRAMUSCULAR at 02:36

## 2018-12-21 RX ADMIN — LIDOCAINE HYDROCHLORIDE: 20 SOLUTION ORAL; TOPICAL at 03:23

## 2018-12-21 RX ADMIN — FAMOTIDINE 20 MG: 10 INJECTION, SOLUTION INTRAVENOUS at 01:29

## 2018-12-21 RX ADMIN — SODIUM CHLORIDE 500 ML: 9 INJECTION, SOLUTION INTRAVENOUS at 01:29

## 2018-12-21 RX ADMIN — MORPHINE SULFATE 2 MG: 2 INJECTION, SOLUTION INTRAMUSCULAR; INTRAVENOUS at 01:29

## 2018-12-21 RX ADMIN — IOHEXOL 50 ML: 240 INJECTION, SOLUTION INTRATHECAL; INTRAVASCULAR; INTRAVENOUS; ORAL at 02:50

## 2018-12-21 RX ADMIN — ONDANSETRON 4 MG: 2 INJECTION INTRAMUSCULAR; INTRAVENOUS at 01:29

## 2018-12-21 ASSESSMENT — PAIN SCALES - GENERAL
PAINLEVEL_OUTOF10: 10

## 2018-12-21 ASSESSMENT — PAIN DESCRIPTION - ORIENTATION: ORIENTATION: RIGHT

## 2018-12-21 ASSESSMENT — PAIN DESCRIPTION - LOCATION: LOCATION: ABDOMEN

## 2018-12-21 ASSESSMENT — PAIN DESCRIPTION - DESCRIPTORS: DESCRIPTORS: ACHING;STABBING

## 2018-12-21 ASSESSMENT — PAIN DESCRIPTION - PAIN TYPE: TYPE: ACUTE PAIN

## 2018-12-24 ENCOUNTER — CARE COORDINATION (OUTPATIENT)
Dept: CARE COORDINATION | Age: 78
End: 2018-12-24

## 2018-12-26 ENCOUNTER — CARE COORDINATION (OUTPATIENT)
Dept: CARE COORDINATION | Age: 78
End: 2018-12-26

## 2018-12-26 ENCOUNTER — OFFICE VISIT (OUTPATIENT)
Dept: FAMILY MEDICINE CLINIC | Age: 78
End: 2018-12-26
Payer: MEDICARE

## 2018-12-26 ENCOUNTER — HOSPITAL ENCOUNTER (OUTPATIENT)
Age: 78
Discharge: HOME | End: 2018-12-28
Payer: MEDICARE

## 2018-12-26 VITALS
TEMPERATURE: 96.9 F | RESPIRATION RATE: 16 BRPM | HEART RATE: 56 BPM | WEIGHT: 189 LBS | BODY MASS INDEX: 37.11 KG/M2 | DIASTOLIC BLOOD PRESSURE: 68 MMHG | HEIGHT: 60 IN | OXYGEN SATURATION: 98 % | SYSTOLIC BLOOD PRESSURE: 124 MMHG

## 2018-12-26 DIAGNOSIS — N39.0 URINARY TRACT INFECTION WITHOUT HEMATURIA, SITE UNSPECIFIED: Primary | ICD-10-CM

## 2018-12-26 DIAGNOSIS — N39.0 URINARY TRACT INFECTION WITHOUT HEMATURIA, SITE UNSPECIFIED: ICD-10-CM

## 2018-12-26 LAB
BILIRUBIN, POC: ABNORMAL
BLOOD URINE, POC: ABNORMAL
CLARITY, POC: CLEAR
COLOR, POC: YELLOW
GLUCOSE URINE, POC: ABNORMAL
KETONES, POC: ABNORMAL
LEUKOCYTE EST, POC: ABNORMAL
NITRITE, POC: ABNORMAL
PH, POC: 5.5
PROTEIN, POC: ABNORMAL
SPECIFIC GRAVITY, POC: 1
UROBILINOGEN, POC: 0.2

## 2018-12-26 PROCEDURE — 1090F PRES/ABSN URINE INCON ASSESS: CPT | Performed by: PHYSICIAN ASSISTANT

## 2018-12-26 PROCEDURE — 99213 OFFICE O/P EST LOW 20 MIN: CPT | Performed by: PHYSICIAN ASSISTANT

## 2018-12-26 PROCEDURE — 4040F PNEUMOC VAC/ADMIN/RCVD: CPT | Performed by: PHYSICIAN ASSISTANT

## 2018-12-26 PROCEDURE — G8400 PT W/DXA NO RESULTS DOC: HCPCS | Performed by: PHYSICIAN ASSISTANT

## 2018-12-26 PROCEDURE — 1101F PT FALLS ASSESS-DOCD LE1/YR: CPT | Performed by: PHYSICIAN ASSISTANT

## 2018-12-26 PROCEDURE — 1123F ACP DISCUSS/DSCN MKR DOCD: CPT | Performed by: PHYSICIAN ASSISTANT

## 2018-12-26 PROCEDURE — G8427 DOCREV CUR MEDS BY ELIG CLIN: HCPCS | Performed by: PHYSICIAN ASSISTANT

## 2018-12-26 PROCEDURE — 87088 URINE BACTERIA CULTURE: CPT

## 2018-12-26 PROCEDURE — G8417 CALC BMI ABV UP PARAM F/U: HCPCS | Performed by: PHYSICIAN ASSISTANT

## 2018-12-26 PROCEDURE — 1036F TOBACCO NON-USER: CPT | Performed by: PHYSICIAN ASSISTANT

## 2018-12-26 PROCEDURE — G8484 FLU IMMUNIZE NO ADMIN: HCPCS | Performed by: PHYSICIAN ASSISTANT

## 2018-12-26 PROCEDURE — 81002 URINALYSIS NONAUTO W/O SCOPE: CPT | Performed by: PHYSICIAN ASSISTANT

## 2018-12-26 RX ORDER — NITROFURANTOIN 25; 75 MG/1; MG/1
100 CAPSULE ORAL 2 TIMES DAILY
Qty: 20 CAPSULE | Refills: 0 | Status: CANCELLED | OUTPATIENT
Start: 2018-12-26 | End: 2019-01-05

## 2018-12-26 RX ORDER — NITROFURANTOIN 25; 75 MG/1; MG/1
100 CAPSULE ORAL 2 TIMES DAILY
Qty: 20 CAPSULE | Refills: 0 | Status: SHIPPED | OUTPATIENT
Start: 2018-12-26 | End: 2019-01-05

## 2018-12-26 ASSESSMENT — ENCOUNTER SYMPTOMS
DIARRHEA: 0
NAUSEA: 0
COUGH: 0
ABDOMINAL PAIN: 0
VOMITING: 0
SHORTNESS OF BREATH: 0

## 2018-12-26 NOTE — PROGRESS NOTES
OFFICE PROGRESS NOTE      SUBJECTIVE:        Patient ID:   Adonis Campa is a [de-identified] y.o. female who presents for ER follow up    Chief Complaint   Patient presents with    Abdominal Pain     er follow up     HPI:   HPI   Patient was seen in the ER 6 days ago and was diagnosed with a UTI. She had been on omnicef prior to that for a sinus infection which was continued. No urine culture was done in the ER. She finished the antibiotic yesterday. She reports lower abdominal pressure and increased urinary frequency are still present. She also reports a foul smelling odor to the urine. CT scan was normal. Denies fever, chills, N/V/D, or hematuria. Prior to Admission medications    Medication Sig Start Date End Date Taking? Authorizing Provider   nitrofurantoin, macrocrystal-monohydrate, (MACROBID) 100 MG capsule Take 1 capsule by mouth 2 times daily for 10 days 12/26/18 1/5/19 Yes Marylou De Guzman PA-C   ondansetron (ZOFRAN ODT) 4 MG disintegrating tablet Take 1 tablet by mouth every 8 hours as needed for Nausea or Vomiting 12/21/18  Yes Brandon Fulton,    LORazepam (ATIVAN) 1 MG tablet Take 1 tablet by mouth nightly for 30 days. . 11/28/18 12/28/18 Yes Daja Boyd DO   cetirizine (ZYRTEC) 10 MG tablet Take 1 tablet by mouth daily 9/27/18  Yes Daja Boyd DO   metoprolol succinate (TOPROL XL) 50 MG extended release tablet Take 1 tablet by mouth daily 9/6/18  Yes Daja Boyd DO   hydrochlorothiazide (HYDRODIURIL) 25 MG tablet Take 1 tablet by mouth daily 9/6/18  Yes Daja Boyd DO   Linaclotide (LINZESS) 72 MCG CAPS Take 1 tablet by mouth nightly 3 sample boxes given Lot [id number]  Exp  6-19 9/6/18  Yes Daja Boyd DO   potassium chloride (KLOR-CON M) 20 MEQ extended release tablet Take 1 tablet by mouth daily 8/8/18  Yes Daja Boyd DO   hydrocortisone (ANUSOL-HC) 2.5 % rectal cream Place rectally 2 times daily.  7/26/18  Yes [facility] [employer]

## 2018-12-27 ENCOUNTER — HOSPITAL ENCOUNTER (EMERGENCY)
Age: 78
Discharge: HOME | End: 2018-12-27
Attending: EMERGENCY MEDICINE
Payer: MEDICARE

## 2018-12-27 VITALS
RESPIRATION RATE: 18 BRPM | DIASTOLIC BLOOD PRESSURE: 58 MMHG | TEMPERATURE: 96.9 F | SYSTOLIC BLOOD PRESSURE: 143 MMHG | HEIGHT: 60 IN | HEART RATE: 69 BPM | OXYGEN SATURATION: 94 % | WEIGHT: 189 LBS | BODY MASS INDEX: 37.11 KG/M2

## 2018-12-27 DIAGNOSIS — R10.84 GENERALIZED ABDOMINAL PAIN: Primary | ICD-10-CM

## 2018-12-27 DIAGNOSIS — R11.0 NAUSEA: ICD-10-CM

## 2018-12-27 LAB
ALBUMIN SERPL-MCNC: 4 G/DL (ref 3.5–5.2)
ALP BLD-CCNC: 70 U/L (ref 35–104)
ALT SERPL-CCNC: 9 U/L (ref 0–32)
ANION GAP SERPL CALCULATED.3IONS-SCNC: 9 MMOL/L (ref 7–16)
AST SERPL-CCNC: 17 U/L (ref 0–31)
BILIRUB SERPL-MCNC: 0.4 MG/DL (ref 0–1.2)
BILIRUBIN URINE: NEGATIVE
BLOOD, URINE: NEGATIVE
BUN BLDV-MCNC: 23 MG/DL (ref 8–23)
CALCIUM SERPL-MCNC: 8.8 MG/DL (ref 8.6–10.2)
CHLORIDE BLD-SCNC: 101 MMOL/L (ref 98–107)
CLARITY: CLEAR
CO2: 26 MMOL/L (ref 22–29)
COLOR: YELLOW
CREAT SERPL-MCNC: 0.8 MG/DL (ref 0.5–1)
EKG ATRIAL RATE: 68 BPM
EKG P AXIS: 69 DEGREES
EKG P-R INTERVAL: 174 MS
EKG Q-T INTERVAL: 394 MS
EKG QRS DURATION: 72 MS
EKG QTC CALCULATION (BAZETT): 418 MS
EKG R AXIS: 25 DEGREES
EKG T AXIS: 57 DEGREES
EKG VENTRICULAR RATE: 68 BPM
GFR AFRICAN AMERICAN: >60
GFR NON-AFRICAN AMERICAN: >60 ML/MIN/1.73
GLUCOSE BLD-MCNC: 112 MG/DL (ref 74–99)
GLUCOSE URINE: NEGATIVE MG/DL
HCT VFR BLD CALC: 41.8 % (ref 34–48)
HEMOGLOBIN: 13.7 G/DL (ref 11.5–15.5)
KETONES, URINE: NEGATIVE MG/DL
LEUKOCYTE ESTERASE, URINE: NEGATIVE
LIPASE: 40 U/L (ref 13–60)
MCH RBC QN AUTO: 29 PG (ref 26–35)
MCHC RBC AUTO-ENTMCNC: 32.8 % (ref 32–34.5)
MCV RBC AUTO: 88.4 FL (ref 80–99.9)
NITRITE, URINE: NEGATIVE
PDW BLD-RTO: 13.1 FL (ref 11.5–15)
PH UA: 5.5 (ref 5–9)
PLATELET # BLD: 235 E9/L (ref 130–450)
PMV BLD AUTO: 10.2 FL (ref 7–12)
POTASSIUM SERPL-SCNC: 4.1 MMOL/L (ref 3.5–5)
PROTEIN UA: NEGATIVE MG/DL
RBC # BLD: 4.73 E12/L (ref 3.5–5.5)
SODIUM BLD-SCNC: 136 MMOL/L (ref 132–146)
SPECIFIC GRAVITY UA: 1.02 (ref 1–1.03)
TOTAL PROTEIN: 7.1 G/DL (ref 6.4–8.3)
UROBILINOGEN, URINE: 0.2 E.U./DL
WBC # BLD: 9.6 E9/L (ref 4.5–11.5)

## 2018-12-27 PROCEDURE — 93005 ELECTROCARDIOGRAM TRACING: CPT | Performed by: EMERGENCY MEDICINE

## 2018-12-27 PROCEDURE — 96374 THER/PROPH/DIAG INJ IV PUSH: CPT

## 2018-12-27 PROCEDURE — 81003 URINALYSIS AUTO W/O SCOPE: CPT

## 2018-12-27 PROCEDURE — 6370000000 HC RX 637 (ALT 250 FOR IP): Performed by: STUDENT IN AN ORGANIZED HEALTH CARE EDUCATION/TRAINING PROGRAM

## 2018-12-27 PROCEDURE — 2580000003 HC RX 258: Performed by: STUDENT IN AN ORGANIZED HEALTH CARE EDUCATION/TRAINING PROGRAM

## 2018-12-27 PROCEDURE — 85027 COMPLETE CBC AUTOMATED: CPT

## 2018-12-27 PROCEDURE — 87040 BLOOD CULTURE FOR BACTERIA: CPT

## 2018-12-27 PROCEDURE — 36415 COLL VENOUS BLD VENIPUNCTURE: CPT

## 2018-12-27 PROCEDURE — 6360000002 HC RX W HCPCS: Performed by: STUDENT IN AN ORGANIZED HEALTH CARE EDUCATION/TRAINING PROGRAM

## 2018-12-27 PROCEDURE — S0028 INJECTION, FAMOTIDINE, 20 MG: HCPCS | Performed by: STUDENT IN AN ORGANIZED HEALTH CARE EDUCATION/TRAINING PROGRAM

## 2018-12-27 PROCEDURE — 96375 TX/PRO/DX INJ NEW DRUG ADDON: CPT

## 2018-12-27 PROCEDURE — 2500000003 HC RX 250 WO HCPCS: Performed by: STUDENT IN AN ORGANIZED HEALTH CARE EDUCATION/TRAINING PROGRAM

## 2018-12-27 PROCEDURE — 87088 URINE BACTERIA CULTURE: CPT

## 2018-12-27 PROCEDURE — 80053 COMPREHEN METABOLIC PANEL: CPT

## 2018-12-27 PROCEDURE — 99284 EMERGENCY DEPT VISIT MOD MDM: CPT

## 2018-12-27 PROCEDURE — 83690 ASSAY OF LIPASE: CPT

## 2018-12-27 RX ORDER — ONDANSETRON 4 MG/1
4 TABLET, ORALLY DISINTEGRATING ORAL EVERY 8 HOURS PRN
Qty: 10 TABLET | Refills: 0 | Status: SHIPPED | OUTPATIENT
Start: 2018-12-27 | End: 2019-03-07

## 2018-12-27 RX ORDER — ONDANSETRON 2 MG/ML
4 INJECTION INTRAMUSCULAR; INTRAVENOUS ONCE
Status: COMPLETED | OUTPATIENT
Start: 2018-12-27 | End: 2018-12-27

## 2018-12-27 RX ORDER — 0.9 % SODIUM CHLORIDE 0.9 %
1000 INTRAVENOUS SOLUTION INTRAVENOUS ONCE
Status: COMPLETED | OUTPATIENT
Start: 2018-12-27 | End: 2018-12-27

## 2018-12-27 RX ORDER — CETIRIZINE HYDROCHLORIDE 10 MG/1
10 TABLET ORAL NIGHTLY
COMMUNITY
End: 2019-07-23 | Stop reason: SDUPTHER

## 2018-12-27 RX ORDER — KETOROLAC TROMETHAMINE 30 MG/ML
15 INJECTION, SOLUTION INTRAMUSCULAR; INTRAVENOUS ONCE
Status: COMPLETED | OUTPATIENT
Start: 2018-12-27 | End: 2018-12-27

## 2018-12-27 RX ORDER — METOCLOPRAMIDE HYDROCHLORIDE 5 MG/ML
10 INJECTION INTRAMUSCULAR; INTRAVENOUS ONCE
Status: COMPLETED | OUTPATIENT
Start: 2018-12-27 | End: 2018-12-27

## 2018-12-27 RX ORDER — FAMOTIDINE 20 MG/1
20 TABLET, FILM COATED ORAL 2 TIMES DAILY
Qty: 14 TABLET | Refills: 0 | Status: SHIPPED | OUTPATIENT
Start: 2018-12-27 | End: 2019-03-07 | Stop reason: ALTCHOICE

## 2018-12-27 RX ORDER — MORPHINE SULFATE 4 MG/ML
6 INJECTION, SOLUTION INTRAMUSCULAR; INTRAVENOUS ONCE
Status: COMPLETED | OUTPATIENT
Start: 2018-12-27 | End: 2018-12-27

## 2018-12-27 RX ADMIN — METOCLOPRAMIDE 10 MG: 5 INJECTION, SOLUTION INTRAMUSCULAR; INTRAVENOUS at 13:30

## 2018-12-27 RX ADMIN — FAMOTIDINE 20 MG: 10 INJECTION, SOLUTION INTRAVENOUS at 13:25

## 2018-12-27 RX ADMIN — KETOROLAC TROMETHAMINE 15 MG: 30 INJECTION, SOLUTION INTRAMUSCULAR at 09:55

## 2018-12-27 RX ADMIN — MORPHINE SULFATE 6 MG: 4 INJECTION, SOLUTION INTRAMUSCULAR; INTRAVENOUS at 07:43

## 2018-12-27 RX ADMIN — ONDANSETRON HYDROCHLORIDE 4 MG: 2 SOLUTION INTRAMUSCULAR; INTRAVENOUS at 07:43

## 2018-12-27 RX ADMIN — LIDOCAINE HYDROCHLORIDE: 20 SOLUTION ORAL; TOPICAL at 13:23

## 2018-12-27 RX ADMIN — SODIUM CHLORIDE 1000 ML: 9 INJECTION, SOLUTION INTRAVENOUS at 07:43

## 2018-12-27 ASSESSMENT — ENCOUNTER SYMPTOMS
VOMITING: 1
BACK PAIN: 0
EYE REDNESS: 0
BLOOD IN STOOL: 0
SHORTNESS OF BREATH: 0
DIARRHEA: 0
HEMATEMESIS: 0
CONSTIPATION: 0
ANAL BLEEDING: 0
HEMATOCHEZIA: 0
SORE THROAT: 0
NAUSEA: 1
COUGH: 0
ABDOMINAL PAIN: 1
TROUBLE SWALLOWING: 0
FLATUS: 0

## 2018-12-27 ASSESSMENT — PAIN SCALES - GENERAL
PAINLEVEL_OUTOF10: 6
PAINLEVEL_OUTOF10: 6
PAINLEVEL_OUTOF10: 4

## 2018-12-27 ASSESSMENT — PAIN DESCRIPTION - LOCATION: LOCATION: ABDOMEN

## 2018-12-27 ASSESSMENT — PAIN DESCRIPTION - PAIN TYPE: TYPE: ACUTE PAIN

## 2018-12-28 ENCOUNTER — CARE COORDINATION (OUTPATIENT)
Dept: CARE COORDINATION | Age: 78
End: 2018-12-28

## 2018-12-28 ENCOUNTER — TELEPHONE (OUTPATIENT)
Dept: FAMILY MEDICINE CLINIC | Age: 78
End: 2018-12-28

## 2018-12-28 DIAGNOSIS — R11.0 NAUSEA: ICD-10-CM

## 2018-12-28 DIAGNOSIS — R10.84 GENERALIZED ABDOMINAL PAIN: Primary | ICD-10-CM

## 2018-12-28 LAB — URINE CULTURE, ROUTINE: NORMAL

## 2018-12-29 LAB
ORGANISM: ABNORMAL
URINE CULTURE, ROUTINE: ABNORMAL
URINE CULTURE, ROUTINE: ABNORMAL

## 2019-01-01 LAB
BLOOD CULTURE, ROUTINE: NORMAL
CULTURE, BLOOD 2: NORMAL

## 2019-01-02 ENCOUNTER — HOSPITAL ENCOUNTER (OUTPATIENT)
Age: 79
Discharge: HOME | End: 2019-01-04
Payer: MEDICARE

## 2019-01-02 ENCOUNTER — NURSE ONLY (OUTPATIENT)
Dept: FAMILY MEDICINE CLINIC | Age: 79
End: 2019-01-02
Payer: MEDICARE

## 2019-01-02 DIAGNOSIS — N39.0 URINARY TRACT INFECTION WITHOUT HEMATURIA, SITE UNSPECIFIED: Primary | ICD-10-CM

## 2019-01-02 LAB
BILIRUBIN, POC: NEGATIVE
BLOOD URINE, POC: NEGATIVE
CLARITY, POC: CLEAR
COLOR, POC: YELLOW
GLUCOSE URINE, POC: NEGATIVE
KETONES, POC: NEGATIVE
LEUKOCYTE EST, POC: NEGATIVE
NITRITE, POC: NEGATIVE
PH, POC: 6.5
PROTEIN, POC: NEGATIVE
SPECIFIC GRAVITY, POC: <=1.005
UROBILINOGEN, POC: 0.2

## 2019-01-02 PROCEDURE — 81002 URINALYSIS NONAUTO W/O SCOPE: CPT | Performed by: FAMILY MEDICINE

## 2019-01-02 PROCEDURE — 87088 URINE BACTERIA CULTURE: CPT

## 2019-01-03 ENCOUNTER — OFFICE VISIT (OUTPATIENT)
Dept: FAMILY MEDICINE CLINIC | Age: 79
End: 2019-01-03
Payer: MEDICARE

## 2019-01-03 ENCOUNTER — HOSPITAL ENCOUNTER (OUTPATIENT)
Age: 79
Discharge: HOME | End: 2019-01-03
Payer: MEDICARE

## 2019-01-03 VITALS
BODY MASS INDEX: 36.28 KG/M2 | HEIGHT: 60 IN | RESPIRATION RATE: 16 BRPM | DIASTOLIC BLOOD PRESSURE: 74 MMHG | WEIGHT: 184.8 LBS | TEMPERATURE: 97 F | SYSTOLIC BLOOD PRESSURE: 138 MMHG

## 2019-01-03 DIAGNOSIS — K21.9 GASTROESOPHAGEAL REFLUX DISEASE, ESOPHAGITIS PRESENCE NOT SPECIFIED: Primary | ICD-10-CM

## 2019-01-03 LAB
ALBUMIN SERPL-MCNC: 4.2 G/DL (ref 3.5–5.2)
ALP BLD-CCNC: 67 U/L (ref 35–104)
ALT SERPL-CCNC: 19 U/L (ref 0–32)
ANION GAP SERPL CALCULATED.3IONS-SCNC: 12 MMOL/L (ref 7–16)
AST SERPL-CCNC: 19 U/L (ref 0–31)
BASOPHILS ABSOLUTE: 0.05 E9/L (ref 0–0.2)
BASOPHILS RELATIVE PERCENT: 0.6 % (ref 0–2)
BILIRUB SERPL-MCNC: 0.3 MG/DL (ref 0–1.2)
BUN BLDV-MCNC: 15 MG/DL (ref 8–23)
CALCIUM SERPL-MCNC: 8.9 MG/DL (ref 8.6–10.2)
CHLORIDE BLD-SCNC: 102 MMOL/L (ref 98–107)
CO2: 24 MMOL/L (ref 22–29)
CREAT SERPL-MCNC: 0.7 MG/DL (ref 0.5–1)
EOSINOPHILS ABSOLUTE: 0.1 E9/L (ref 0.05–0.5)
EOSINOPHILS RELATIVE PERCENT: 1.2 % (ref 0–6)
GFR AFRICAN AMERICAN: >60
GFR NON-AFRICAN AMERICAN: >60 ML/MIN/1.73
GLUCOSE BLD-MCNC: 122 MG/DL (ref 74–99)
HCT VFR BLD CALC: 40 % (ref 34–48)
HEMOGLOBIN: 13.2 G/DL (ref 11.5–15.5)
IMMATURE GRANULOCYTES #: 0.04 E9/L
IMMATURE GRANULOCYTES %: 0.5 % (ref 0–5)
LYMPHOCYTES ABSOLUTE: 3.24 E9/L (ref 1.5–4)
LYMPHOCYTES RELATIVE PERCENT: 38.3 % (ref 20–42)
MCH RBC QN AUTO: 29.1 PG (ref 26–35)
MCHC RBC AUTO-ENTMCNC: 33 % (ref 32–34.5)
MCV RBC AUTO: 88.3 FL (ref 80–99.9)
MONOCYTES ABSOLUTE: 0.67 E9/L (ref 0.1–0.95)
MONOCYTES RELATIVE PERCENT: 7.9 % (ref 2–12)
NEUTROPHILS ABSOLUTE: 4.35 E9/L (ref 1.8–7.3)
NEUTROPHILS RELATIVE PERCENT: 51.5 % (ref 43–80)
PDW BLD-RTO: 13.1 FL (ref 11.5–15)
PLATELET # BLD: 276 E9/L (ref 130–450)
PMV BLD AUTO: 9.7 FL (ref 7–12)
POTASSIUM SERPL-SCNC: 3.8 MMOL/L (ref 3.5–5)
RBC # BLD: 4.53 E12/L (ref 3.5–5.5)
SODIUM BLD-SCNC: 138 MMOL/L (ref 132–146)
TOTAL PROTEIN: 7 G/DL (ref 6.4–8.3)
WBC # BLD: 8.5 E9/L (ref 4.5–11.5)

## 2019-01-03 PROCEDURE — G8427 DOCREV CUR MEDS BY ELIG CLIN: HCPCS | Performed by: FAMILY MEDICINE

## 2019-01-03 PROCEDURE — 1090F PRES/ABSN URINE INCON ASSESS: CPT | Performed by: FAMILY MEDICINE

## 2019-01-03 PROCEDURE — 80053 COMPREHEN METABOLIC PANEL: CPT

## 2019-01-03 PROCEDURE — 99213 OFFICE O/P EST LOW 20 MIN: CPT | Performed by: FAMILY MEDICINE

## 2019-01-03 PROCEDURE — 4040F PNEUMOC VAC/ADMIN/RCVD: CPT | Performed by: FAMILY MEDICINE

## 2019-01-03 PROCEDURE — 36415 COLL VENOUS BLD VENIPUNCTURE: CPT

## 2019-01-03 PROCEDURE — 1101F PT FALLS ASSESS-DOCD LE1/YR: CPT | Performed by: FAMILY MEDICINE

## 2019-01-03 PROCEDURE — 1123F ACP DISCUSS/DSCN MKR DOCD: CPT | Performed by: FAMILY MEDICINE

## 2019-01-03 PROCEDURE — 85025 COMPLETE CBC W/AUTO DIFF WBC: CPT

## 2019-01-03 PROCEDURE — G8484 FLU IMMUNIZE NO ADMIN: HCPCS | Performed by: FAMILY MEDICINE

## 2019-01-03 PROCEDURE — G8417 CALC BMI ABV UP PARAM F/U: HCPCS | Performed by: FAMILY MEDICINE

## 2019-01-03 PROCEDURE — G8400 PT W/DXA NO RESULTS DOC: HCPCS | Performed by: FAMILY MEDICINE

## 2019-01-03 PROCEDURE — 1036F TOBACCO NON-USER: CPT | Performed by: FAMILY MEDICINE

## 2019-01-03 RX ORDER — LEVOTHYROXINE SODIUM 88 UG/1
88 TABLET ORAL DAILY
Qty: 90 TABLET | Refills: 1 | Status: SHIPPED
Start: 2019-01-03 | End: 2020-08-05 | Stop reason: SDUPTHER

## 2019-01-03 ASSESSMENT — ENCOUNTER SYMPTOMS
DIARRHEA: 0
PHOTOPHOBIA: 0
CHEST TIGHTNESS: 0
ABDOMINAL PAIN: 0
VOMITING: 0
COUGH: 0
SORE THROAT: 0
BACK PAIN: 0
WHEEZING: 0
BLOOD IN STOOL: 0
NAUSEA: 0
VOICE CHANGE: 0
SINUS PRESSURE: 0
SHORTNESS OF BREATH: 0
CONSTIPATION: 0
TROUBLE SWALLOWING: 0

## 2019-01-04 LAB — URINE CULTURE, ROUTINE: NORMAL

## 2019-01-06 ENCOUNTER — HOSPITAL ENCOUNTER (EMERGENCY)
Age: 79
Discharge: HOME | End: 2019-01-06
Attending: EMERGENCY MEDICINE
Payer: MEDICARE

## 2019-01-06 ENCOUNTER — APPOINTMENT (OUTPATIENT)
Dept: CT IMAGING | Age: 79
End: 2019-01-06
Payer: MEDICARE

## 2019-01-06 VITALS
HEART RATE: 64 BPM | HEIGHT: 60 IN | RESPIRATION RATE: 16 BRPM | WEIGHT: 189 LBS | OXYGEN SATURATION: 97 % | BODY MASS INDEX: 37.11 KG/M2 | DIASTOLIC BLOOD PRESSURE: 65 MMHG | SYSTOLIC BLOOD PRESSURE: 148 MMHG | TEMPERATURE: 97.8 F

## 2019-01-06 DIAGNOSIS — R10.9 ABDOMINAL PAIN, UNSPECIFIED ABDOMINAL LOCATION: Primary | ICD-10-CM

## 2019-01-06 LAB
ALBUMIN SERPL-MCNC: 4.4 G/DL (ref 3.5–5.2)
ALP BLD-CCNC: 74 U/L (ref 35–104)
ALT SERPL-CCNC: 16 U/L (ref 0–32)
ANION GAP SERPL CALCULATED.3IONS-SCNC: 12 MMOL/L (ref 7–16)
AST SERPL-CCNC: 17 U/L (ref 0–31)
BACTERIA: ABNORMAL /HPF
BASOPHILS ABSOLUTE: 0.04 E9/L (ref 0–0.2)
BASOPHILS RELATIVE PERCENT: 0.4 % (ref 0–2)
BILIRUB SERPL-MCNC: 0.4 MG/DL (ref 0–1.2)
BILIRUBIN URINE: NEGATIVE
BLOOD, URINE: NEGATIVE
BUN BLDV-MCNC: 22 MG/DL (ref 8–23)
CALCIUM SERPL-MCNC: 9.3 MG/DL (ref 8.6–10.2)
CHLORIDE BLD-SCNC: 102 MMOL/L (ref 98–107)
CLARITY: CLEAR
CO2: 23 MMOL/L (ref 22–29)
COLOR: YELLOW
CREAT SERPL-MCNC: 0.8 MG/DL (ref 0.5–1)
EOSINOPHILS ABSOLUTE: 0.12 E9/L (ref 0.05–0.5)
EOSINOPHILS RELATIVE PERCENT: 1.3 % (ref 0–6)
EPITHELIAL CELLS, UA: ABNORMAL /HPF
GFR AFRICAN AMERICAN: >60
GFR NON-AFRICAN AMERICAN: >60 ML/MIN/1.73
GLUCOSE BLD-MCNC: 123 MG/DL (ref 74–99)
GLUCOSE URINE: NEGATIVE MG/DL
HCT VFR BLD CALC: 40.2 % (ref 34–48)
HEMOGLOBIN: 13.6 G/DL (ref 11.5–15.5)
IMMATURE GRANULOCYTES #: 0.05 E9/L
IMMATURE GRANULOCYTES %: 0.5 % (ref 0–5)
KETONES, URINE: NEGATIVE MG/DL
LACTIC ACID: 1 MMOL/L (ref 0.5–2.2)
LEUKOCYTE ESTERASE, URINE: ABNORMAL
LIPASE: 103 U/L (ref 13–60)
LYMPHOCYTES ABSOLUTE: 2.75 E9/L (ref 1.5–4)
LYMPHOCYTES RELATIVE PERCENT: 28.9 % (ref 20–42)
MCH RBC QN AUTO: 29.4 PG (ref 26–35)
MCHC RBC AUTO-ENTMCNC: 33.8 % (ref 32–34.5)
MCV RBC AUTO: 86.8 FL (ref 80–99.9)
MONOCYTES ABSOLUTE: 0.91 E9/L (ref 0.1–0.95)
MONOCYTES RELATIVE PERCENT: 9.6 % (ref 2–12)
NEUTROPHILS ABSOLUTE: 5.63 E9/L (ref 1.8–7.3)
NEUTROPHILS RELATIVE PERCENT: 59.3 % (ref 43–80)
NITRITE, URINE: NEGATIVE
PDW BLD-RTO: 13.2 FL (ref 11.5–15)
PH UA: 5.5 (ref 5–9)
PLATELET # BLD: 289 E9/L (ref 130–450)
PMV BLD AUTO: 9.6 FL (ref 7–12)
POTASSIUM SERPL-SCNC: 3.8 MMOL/L (ref 3.5–5)
PROTEIN UA: NEGATIVE MG/DL
RBC # BLD: 4.63 E12/L (ref 3.5–5.5)
RBC UA: ABNORMAL /HPF (ref 0–2)
SODIUM BLD-SCNC: 137 MMOL/L (ref 132–146)
SPECIFIC GRAVITY UA: 1.02 (ref 1–1.03)
TOTAL PROTEIN: 7.2 G/DL (ref 6.4–8.3)
UROBILINOGEN, URINE: 0.2 E.U./DL
WBC # BLD: 9.5 E9/L (ref 4.5–11.5)
WBC UA: ABNORMAL /HPF (ref 0–5)

## 2019-01-06 PROCEDURE — 80053 COMPREHEN METABOLIC PANEL: CPT

## 2019-01-06 PROCEDURE — 85025 COMPLETE CBC W/AUTO DIFF WBC: CPT

## 2019-01-06 PROCEDURE — 83605 ASSAY OF LACTIC ACID: CPT

## 2019-01-06 PROCEDURE — 96374 THER/PROPH/DIAG INJ IV PUSH: CPT

## 2019-01-06 PROCEDURE — 2580000003 HC RX 258: Performed by: EMERGENCY MEDICINE

## 2019-01-06 PROCEDURE — 81001 URINALYSIS AUTO W/SCOPE: CPT

## 2019-01-06 PROCEDURE — 83690 ASSAY OF LIPASE: CPT

## 2019-01-06 PROCEDURE — 96375 TX/PRO/DX INJ NEW DRUG ADDON: CPT

## 2019-01-06 PROCEDURE — 99284 EMERGENCY DEPT VISIT MOD MDM: CPT

## 2019-01-06 PROCEDURE — 74176 CT ABD & PELVIS W/O CONTRAST: CPT

## 2019-01-06 PROCEDURE — 6360000002 HC RX W HCPCS: Performed by: EMERGENCY MEDICINE

## 2019-01-06 RX ORDER — 0.9 % SODIUM CHLORIDE 0.9 %
500 INTRAVENOUS SOLUTION INTRAVENOUS ONCE
Status: COMPLETED | OUTPATIENT
Start: 2019-01-06 | End: 2019-01-06

## 2019-01-06 RX ORDER — KETOROLAC TROMETHAMINE 30 MG/ML
15 INJECTION, SOLUTION INTRAMUSCULAR; INTRAVENOUS ONCE
Status: COMPLETED | OUTPATIENT
Start: 2019-01-06 | End: 2019-01-06

## 2019-01-06 RX ORDER — SUCRALFATE 1 G/1
1 TABLET ORAL 4 TIMES DAILY
Qty: 120 TABLET | Refills: 3 | Status: ON HOLD | OUTPATIENT
Start: 2019-01-06 | End: 2019-12-30 | Stop reason: HOSPADM

## 2019-01-06 RX ORDER — HYOSCYAMINE SULFATE 0.125 MG
0.12 TABLET ORAL EVERY 4 HOURS PRN
Qty: 30 TABLET | Refills: 3 | Status: SHIPPED | OUTPATIENT
Start: 2019-01-06 | End: 2019-10-03

## 2019-01-06 RX ORDER — MORPHINE SULFATE 2 MG/ML
4 INJECTION, SOLUTION INTRAMUSCULAR; INTRAVENOUS ONCE
Status: COMPLETED | OUTPATIENT
Start: 2019-01-06 | End: 2019-01-06

## 2019-01-06 RX ORDER — ONDANSETRON 2 MG/ML
4 INJECTION INTRAMUSCULAR; INTRAVENOUS ONCE
Status: COMPLETED | OUTPATIENT
Start: 2019-01-06 | End: 2019-01-06

## 2019-01-06 RX ORDER — ONDANSETRON 2 MG/ML
4 INJECTION INTRAMUSCULAR; INTRAVENOUS ONCE
Status: DISCONTINUED | OUTPATIENT
Start: 2019-01-06 | End: 2019-01-06

## 2019-01-06 RX ADMIN — MORPHINE SULFATE 4 MG: 2 INJECTION, SOLUTION INTRAMUSCULAR; INTRAVENOUS at 03:08

## 2019-01-06 RX ADMIN — SODIUM CHLORIDE 500 ML: 9 INJECTION, SOLUTION INTRAVENOUS at 03:01

## 2019-01-06 RX ADMIN — ONDANSETRON 4 MG: 2 INJECTION INTRAMUSCULAR; INTRAVENOUS at 03:09

## 2019-01-06 RX ADMIN — KETOROLAC TROMETHAMINE 15 MG: 30 INJECTION, SOLUTION INTRAMUSCULAR; INTRAVENOUS at 03:09

## 2019-01-06 ASSESSMENT — PAIN DESCRIPTION - PAIN TYPE: TYPE: ACUTE PAIN

## 2019-01-06 ASSESSMENT — PAIN DESCRIPTION - FREQUENCY: FREQUENCY: CONTINUOUS

## 2019-01-06 ASSESSMENT — PAIN DESCRIPTION - PROGRESSION: CLINICAL_PROGRESSION: GRADUALLY IMPROVING

## 2019-01-06 ASSESSMENT — PAIN SCALES - GENERAL
PAINLEVEL_OUTOF10: 8
PAINLEVEL_OUTOF10: 3

## 2019-01-06 ASSESSMENT — PAIN DESCRIPTION - ONSET: ONSET: ON-GOING

## 2019-01-06 ASSESSMENT — PAIN DESCRIPTION - LOCATION: LOCATION: ABDOMEN

## 2019-01-06 ASSESSMENT — PAIN DESCRIPTION - DESCRIPTORS: DESCRIPTORS: ACHING

## 2019-01-06 ASSESSMENT — PAIN DESCRIPTION - ORIENTATION: ORIENTATION: RIGHT;UPPER;LOWER

## 2019-01-08 ENCOUNTER — CARE COORDINATION (OUTPATIENT)
Dept: CARE COORDINATION | Age: 79
End: 2019-01-08

## 2019-01-18 ENCOUNTER — CARE COORDINATION (OUTPATIENT)
Dept: CARE COORDINATION | Age: 79
End: 2019-01-18

## 2019-02-06 ENCOUNTER — CARE COORDINATION (OUTPATIENT)
Dept: CARE COORDINATION | Age: 79
End: 2019-02-06

## 2019-02-13 ENCOUNTER — HOSPITAL ENCOUNTER (EMERGENCY)
Age: 79
Discharge: HOME | End: 2019-02-13
Attending: EMERGENCY MEDICINE
Payer: MEDICARE

## 2019-02-13 VITALS
OXYGEN SATURATION: 97 % | WEIGHT: 198 LBS | RESPIRATION RATE: 16 BRPM | SYSTOLIC BLOOD PRESSURE: 165 MMHG | HEART RATE: 64 BPM | TEMPERATURE: 97.6 F | DIASTOLIC BLOOD PRESSURE: 73 MMHG | BODY MASS INDEX: 38.87 KG/M2 | HEIGHT: 60 IN

## 2019-02-13 DIAGNOSIS — M54.30 SCIATICA, UNSPECIFIED LATERALITY: ICD-10-CM

## 2019-02-13 DIAGNOSIS — S39.012A STRAIN OF LUMBAR REGION, INITIAL ENCOUNTER: Primary | ICD-10-CM

## 2019-02-13 DIAGNOSIS — I10 ESSENTIAL HYPERTENSION: ICD-10-CM

## 2019-02-13 PROCEDURE — 6370000000 HC RX 637 (ALT 250 FOR IP)

## 2019-02-13 PROCEDURE — 96372 THER/PROPH/DIAG INJ SC/IM: CPT

## 2019-02-13 PROCEDURE — 6370000000 HC RX 637 (ALT 250 FOR IP): Performed by: EMERGENCY MEDICINE

## 2019-02-13 PROCEDURE — 6360000002 HC RX W HCPCS: Performed by: EMERGENCY MEDICINE

## 2019-02-13 PROCEDURE — 99283 EMERGENCY DEPT VISIT LOW MDM: CPT

## 2019-02-13 RX ORDER — METOPROLOL SUCCINATE 50 MG/1
50 TABLET, EXTENDED RELEASE ORAL DAILY
Status: DISCONTINUED | OUTPATIENT
Start: 2019-02-13 | End: 2019-02-13

## 2019-02-13 RX ORDER — KETOROLAC TROMETHAMINE 30 MG/ML
30 INJECTION, SOLUTION INTRAMUSCULAR; INTRAVENOUS ONCE
Status: COMPLETED | OUTPATIENT
Start: 2019-02-13 | End: 2019-02-13

## 2019-02-13 RX ORDER — CLONIDINE HYDROCHLORIDE 0.1 MG/1
0.2 TABLET ORAL ONCE
Status: DISCONTINUED | OUTPATIENT
Start: 2019-02-13 | End: 2019-02-13 | Stop reason: HOSPADM

## 2019-02-13 RX ORDER — CLONIDINE HYDROCHLORIDE 0.1 MG/1
0.2 TABLET ORAL 2 TIMES DAILY
Status: DISCONTINUED | OUTPATIENT
Start: 2019-02-13 | End: 2019-02-13

## 2019-02-13 RX ORDER — METOPROLOL SUCCINATE 50 MG/1
50 TABLET, EXTENDED RELEASE ORAL ONCE
Status: DISCONTINUED | OUTPATIENT
Start: 2019-02-13 | End: 2019-02-13 | Stop reason: HOSPADM

## 2019-02-13 RX ORDER — PREDNISONE 20 MG/1
40 TABLET ORAL ONCE
Status: COMPLETED | OUTPATIENT
Start: 2019-02-13 | End: 2019-02-13

## 2019-02-13 RX ORDER — CLONIDINE HYDROCHLORIDE 0.1 MG/1
0.2 TABLET ORAL DAILY
Status: DISCONTINUED | OUTPATIENT
Start: 2019-02-13 | End: 2019-02-13 | Stop reason: CLARIF

## 2019-02-13 RX ORDER — CLONIDINE HYDROCHLORIDE 0.1 MG/1
TABLET ORAL
Status: COMPLETED
Start: 2019-02-13 | End: 2019-02-13

## 2019-02-13 RX ADMIN — KETOROLAC TROMETHAMINE 30 MG: 30 INJECTION INTRAMUSCULAR; INTRAVENOUS at 04:20

## 2019-02-13 RX ADMIN — PREDNISONE 40 MG: 20 TABLET ORAL at 04:14

## 2019-02-13 RX ADMIN — CLONIDINE HYDROCHLORIDE 0.2 MG: 0.1 TABLET ORAL at 04:19

## 2019-02-13 ASSESSMENT — ENCOUNTER SYMPTOMS
SINUS PRESSURE: 0
SHORTNESS OF BREATH: 0
BACK PAIN: 1
SORE THROAT: 0
NAUSEA: 0
EYE PAIN: 0
COUGH: 0
ABDOMINAL DISTENTION: 0
EYE REDNESS: 0
VOMITING: 0
WHEEZING: 0
EYE DISCHARGE: 0
DIARRHEA: 0

## 2019-02-13 ASSESSMENT — PAIN DESCRIPTION - PAIN TYPE: TYPE: ACUTE PAIN

## 2019-02-13 ASSESSMENT — PAIN DESCRIPTION - ONSET: ONSET: ON-GOING

## 2019-02-13 ASSESSMENT — PAIN DESCRIPTION - ORIENTATION: ORIENTATION: LOWER;MID

## 2019-02-13 ASSESSMENT — PAIN DESCRIPTION - LOCATION: LOCATION: BUTTOCKS;BACK

## 2019-02-13 ASSESSMENT — PAIN SCALES - GENERAL
PAINLEVEL_OUTOF10: 10
PAINLEVEL_OUTOF10: 10
PAINLEVEL_OUTOF10: 5

## 2019-02-13 ASSESSMENT — PAIN DESCRIPTION - DESCRIPTORS: DESCRIPTORS: ACHING

## 2019-02-13 ASSESSMENT — PAIN DESCRIPTION - FREQUENCY: FREQUENCY: CONTINUOUS

## 2019-02-13 ASSESSMENT — PAIN DESCRIPTION - PROGRESSION: CLINICAL_PROGRESSION: GRADUALLY IMPROVING

## 2019-02-14 ENCOUNTER — TELEPHONE (OUTPATIENT)
Dept: FAMILY MEDICINE CLINIC | Age: 79
End: 2019-02-14

## 2019-02-19 LAB
EKG ATRIAL RATE: 52 BPM
EKG P AXIS: 31 DEGREES
EKG P-R INTERVAL: 188 MS
EKG Q-T INTERVAL: 452 MS
EKG QRS DURATION: 74 MS
EKG QTC CALCULATION (BAZETT): 420 MS
EKG R AXIS: -8 DEGREES
EKG T AXIS: 20 DEGREES
EKG VENTRICULAR RATE: 52 BPM

## 2019-03-04 ENCOUNTER — PREP FOR PROCEDURE (OUTPATIENT)
Dept: SURGERY | Age: 79
End: 2019-03-04

## 2019-03-04 RX ORDER — SODIUM CHLORIDE 9 MG/ML
INJECTION, SOLUTION INTRAVENOUS CONTINUOUS
Status: CANCELLED | OUTPATIENT
Start: 2019-03-04

## 2019-03-04 NOTE — H&P (VIEW-ONLY)
Date:   [facility]  Name: Sirisha Gibbs                 : 1940 Sex: F  Age: [de-identified] yrs  Acct#:  [id number]            Patient was referred by Shadi Herring D.O..  Patient's primary care provider is Shadi Herring D.O..  CC: Patient presents for right sided abdominal pain     HPI: Patient is a [de-identified]year old with right upper abdomina pain. Also has had some vomiting. Present for some months. Seems to be related to food. S/p ex lap with bowel resection. She is s/p cholecystectomy. Meds Prior to Visit:  Lorazepam     Hydrochlorothiazide     Metoprolol Tartrate     Levothyroxine Sodium     Magnesium Oxide     Linzess     Multi Vitamin Daily     Aspir-81     Cetirizine HCL     Potassium Chloride ER     Dicyclomine HCL        Allergies:  Contrast Dye, Iodine - Shortness Of Breath, Swelling, Rash, Codeine, Prednisone - Hypertension After IV Steriod, Amoxicillin, Propoxyphene - Nausea And Vomiting, Eggs or Egg-derived Products - Nausea And Vomiting, Oxycodone - Nausea And Vomiting, Citalopram    PMH:  Problem List: Generalized abdominal pain, Right upper quadrant pain  (Health Maintenance)  (Medical Problems)  Surgical Hx:  Removal of Gallbladder, Knee Replacement, Knee Replacement, Bowel Cancer  Reviewed, no changes. FH:  Father:    Stroke. Mother:    Stroke. Brother 1: due to Cancer. Brother 2: due to Cancer. Brother 3: due to Cancer. Brother 4: due to Heart Attack. Brother 5: due to Heart Attack. Sister 1:    Cancer  Heart Attack. Reviewed, no changes. SH:  Personal Habits:  Smoking: Patient has never smoked. Alcohol: Current Alcohol Use; Social.Drug Use: Denies Drug Use. Daily Caffeine: Uses Caffeine, Consumes on average 2 cups of regular coffee per day. Reviewed, no changes. Date: 2019  Was the patient queried about smoking behavior? Yes  No  Does the patient currently smoke? Smoking: Patient has never smoked. ROS:  Const: Reports anxiety and fatigue, but denies anorexia, night sweats, weight gain and weight loss. Eyes: Denies eye symptoms. ENMT: Denies ear symptoms. Denies nasal symptoms. Denies mouth or throat symptoms. CV: Reports hypertension, but denies other cardiovascular symptoms. Resp: Denies respiratory symptoms. GI: Reports other gastrointestinal symptoms, but denies hepatitis and liver disease. Musculo: Denies musculoskeletal symptoms. Skin: Denies skin, hair and nail symptoms. Breast: Denies breast problems. Neuro: Reports transient ischemic attack. Psych: Reports depression, but denies substance abuse. Endocrine: Reports thyroid disease but denies diabetes and kidney disease. Hema/Lymph: Reports past transfusion, but denies anemia, blood disease and cancer. Allergy/Immuno: Denies immunosuppression. Reviewed, no changes. Wt Prior: 188lb as of 06/01/16    Exam:  Const: Appears healthy and well developed. No signs of apparent distress present. Head/Face: Atraumatic, normocephalic on inspection. Eyes: Conjunctivae pink. Pupils equal, round and reactive to light. Sclerae clear and anicteric. ENMT: External ears WNL. External nose WNL. Lips: Appear normal and healthy. Neck: Normal to inspection. Normal to palpation. No masses appreciated. Trachea midline. Thyroid is normal in size. No jugular venous distention. Resp: Respiration rate is normal. No wheezing. Clear to auscultation bilaterally. No rales or rhonchi appreciated over the lungs bilaterally. CV: Rate is regular. Rhythm is regular. S1 is normal. S2 is normal. No heart murmur appreciated. Extremities: No clubbing or cyanosis. No edema of the lower limbs bilaterally. Abdomen: No visible herniations. Surgical scars are present. Positive bowel sounds in all quadrants. Abdomen is soft, nontender, and nondistended without guarding, rigidity or rebound tenderness.  No palpable abdominal aneurysms present. No palpable hepatosplenomegaly. Lymph: No palpable lymphadenopathy in the cervical, supraclavicular, axillary and inguinal region(s). Musculo: Walks with a normal gait. Upper Extremities: Normal to inspection. ROM: Full ROM bilaterally. Lower Extremities: Normal to inspection. ROM: Full ROM bilaterally. Skin: Skin warm and dry with no evidence of unusual rashes or suspicious lesions. Neuro: Alert and oriented x3. Mood is normal.  Cranial Nerves: Cranial nerves II-XII grossly intact. Psych: Cognition: Judgment and insight are grossly intact. Assessment #1: Hx R10.11 Right upper quadrant pain   Care Plan:              Comments       :  right upper pain  no gallbladder  EGD?   Risks, benefits, alternatives, complications, discussed     start omeprazole         Seen by:

## 2019-03-07 ENCOUNTER — HOSPITAL ENCOUNTER (OUTPATIENT)
Age: 79
Discharge: HOME | End: 2019-03-09
Payer: MEDICARE

## 2019-03-07 ENCOUNTER — OFFICE VISIT (OUTPATIENT)
Dept: FAMILY MEDICINE CLINIC | Age: 79
End: 2019-03-07
Payer: MEDICARE

## 2019-03-07 ENCOUNTER — CARE COORDINATION (OUTPATIENT)
Dept: CARE COORDINATION | Age: 79
End: 2019-03-07

## 2019-03-07 VITALS
WEIGHT: 186.8 LBS | OXYGEN SATURATION: 98 % | BODY MASS INDEX: 36.67 KG/M2 | HEIGHT: 60 IN | RESPIRATION RATE: 16 BRPM | TEMPERATURE: 96.7 F | HEART RATE: 60 BPM | DIASTOLIC BLOOD PRESSURE: 70 MMHG | SYSTOLIC BLOOD PRESSURE: 164 MMHG

## 2019-03-07 DIAGNOSIS — K21.9 GASTROESOPHAGEAL REFLUX DISEASE WITHOUT ESOPHAGITIS: Chronic | ICD-10-CM

## 2019-03-07 DIAGNOSIS — R73.01 ELEVATED FASTING BLOOD SUGAR: ICD-10-CM

## 2019-03-07 DIAGNOSIS — F41.9 ANXIETY AND DEPRESSION: Chronic | ICD-10-CM

## 2019-03-07 DIAGNOSIS — K59.01 SLOW TRANSIT CONSTIPATION: ICD-10-CM

## 2019-03-07 DIAGNOSIS — Z76.0 MEDICATION REFILL: ICD-10-CM

## 2019-03-07 DIAGNOSIS — I10 HTN (HYPERTENSION), BENIGN: Primary | ICD-10-CM

## 2019-03-07 DIAGNOSIS — F32.A ANXIETY AND DEPRESSION: Chronic | ICD-10-CM

## 2019-03-07 DIAGNOSIS — E78.2 MIXED HYPERLIPIDEMIA: Chronic | ICD-10-CM

## 2019-03-07 DIAGNOSIS — I10 HTN (HYPERTENSION), BENIGN: ICD-10-CM

## 2019-03-07 DIAGNOSIS — E03.9 HYPOTHYROIDISM, UNSPECIFIED TYPE: ICD-10-CM

## 2019-03-07 DIAGNOSIS — S61.209A WOUND, OPEN, FINGER, INITIAL ENCOUNTER: ICD-10-CM

## 2019-03-07 LAB
ALBUMIN SERPL-MCNC: 4.4 G/DL (ref 3.5–5.2)
ALP BLD-CCNC: 71 U/L (ref 35–104)
ALT SERPL-CCNC: 15 U/L (ref 0–32)
ANION GAP SERPL CALCULATED.3IONS-SCNC: 17 MMOL/L (ref 7–16)
AST SERPL-CCNC: 25 U/L (ref 0–31)
BILIRUB SERPL-MCNC: 0.4 MG/DL (ref 0–1.2)
BUN BLDV-MCNC: 15 MG/DL (ref 8–23)
CALCIUM SERPL-MCNC: 9.5 MG/DL (ref 8.6–10.2)
CHLORIDE BLD-SCNC: 105 MMOL/L (ref 98–107)
CHOLESTEROL, TOTAL: 224 MG/DL (ref 0–199)
CO2: 22 MMOL/L (ref 22–29)
CREAT SERPL-MCNC: 0.7 MG/DL (ref 0.5–1)
GFR AFRICAN AMERICAN: >60
GFR NON-AFRICAN AMERICAN: >60 ML/MIN/1.73
GLUCOSE BLD-MCNC: 96 MG/DL (ref 74–99)
HBA1C MFR BLD: 5.6 % (ref 4–5.6)
HDLC SERPL-MCNC: 65 MG/DL
LDL CHOLESTEROL CALCULATED: 143 MG/DL (ref 0–99)
POTASSIUM SERPL-SCNC: 4.2 MMOL/L (ref 3.5–5)
SODIUM BLD-SCNC: 144 MMOL/L (ref 132–146)
TOTAL PROTEIN: 7.6 G/DL (ref 6.4–8.3)
TRIGL SERPL-MCNC: 80 MG/DL (ref 0–149)
TSH SERPL DL<=0.05 MIU/L-ACNC: 2.48 UIU/ML (ref 0.27–4.2)
VLDLC SERPL CALC-MCNC: 16 MG/DL

## 2019-03-07 PROCEDURE — 4040F PNEUMOC VAC/ADMIN/RCVD: CPT | Performed by: FAMILY MEDICINE

## 2019-03-07 PROCEDURE — 90471 IMMUNIZATION ADMIN: CPT | Performed by: FAMILY MEDICINE

## 2019-03-07 PROCEDURE — 1123F ACP DISCUSS/DSCN MKR DOCD: CPT | Performed by: FAMILY MEDICINE

## 2019-03-07 PROCEDURE — 1036F TOBACCO NON-USER: CPT | Performed by: FAMILY MEDICINE

## 2019-03-07 PROCEDURE — 83036 HEMOGLOBIN GLYCOSYLATED A1C: CPT

## 2019-03-07 PROCEDURE — G8417 CALC BMI ABV UP PARAM F/U: HCPCS | Performed by: FAMILY MEDICINE

## 2019-03-07 PROCEDURE — G8400 PT W/DXA NO RESULTS DOC: HCPCS | Performed by: FAMILY MEDICINE

## 2019-03-07 PROCEDURE — G8427 DOCREV CUR MEDS BY ELIG CLIN: HCPCS | Performed by: FAMILY MEDICINE

## 2019-03-07 PROCEDURE — 99214 OFFICE O/P EST MOD 30 MIN: CPT | Performed by: FAMILY MEDICINE

## 2019-03-07 PROCEDURE — 80061 LIPID PANEL: CPT

## 2019-03-07 PROCEDURE — 1090F PRES/ABSN URINE INCON ASSESS: CPT | Performed by: FAMILY MEDICINE

## 2019-03-07 PROCEDURE — 80053 COMPREHEN METABOLIC PANEL: CPT

## 2019-03-07 PROCEDURE — G8484 FLU IMMUNIZE NO ADMIN: HCPCS | Performed by: FAMILY MEDICINE

## 2019-03-07 PROCEDURE — 90714 TD VACC NO PRESV 7 YRS+ IM: CPT | Performed by: FAMILY MEDICINE

## 2019-03-07 PROCEDURE — 84443 ASSAY THYROID STIM HORMONE: CPT

## 2019-03-07 PROCEDURE — 1101F PT FALLS ASSESS-DOCD LE1/YR: CPT | Performed by: FAMILY MEDICINE

## 2019-03-07 RX ORDER — POTASSIUM CHLORIDE 20 MEQ/1
20 TABLET, EXTENDED RELEASE ORAL DAILY
Qty: 90 TABLET | Refills: 1 | Status: SHIPPED | OUTPATIENT
Start: 2019-03-07 | End: 2019-12-29

## 2019-03-07 RX ORDER — CLINDAMYCIN HYDROCHLORIDE 300 MG/1
300 CAPSULE ORAL PRN
COMMUNITY
End: 2019-03-07

## 2019-03-07 RX ORDER — HYDROCHLOROTHIAZIDE 25 MG/1
25 TABLET ORAL DAILY
Qty: 90 TABLET | Refills: 1 | Status: SHIPPED | OUTPATIENT
Start: 2019-03-07 | End: 2019-08-15 | Stop reason: SDUPTHER

## 2019-03-07 RX ORDER — CITALOPRAM 10 MG/1
10 TABLET ORAL DAILY
Qty: 30 TABLET | Refills: 3 | Status: SHIPPED | OUTPATIENT
Start: 2019-03-07 | End: 2019-09-05 | Stop reason: SINTOL

## 2019-03-07 RX ORDER — ASPIRIN 81 MG
1 TABLET, DELAYED RELEASE (ENTERIC COATED) ORAL DAILY
Qty: 90 TABLET | Refills: 1 | Status: SHIPPED | OUTPATIENT
Start: 2019-03-07 | End: 2019-12-20

## 2019-03-07 RX ORDER — METOPROLOL SUCCINATE 50 MG/1
50 TABLET, EXTENDED RELEASE ORAL DAILY
Qty: 90 TABLET | Refills: 1 | Status: SHIPPED | OUTPATIENT
Start: 2019-03-07 | End: 2019-11-18 | Stop reason: SDUPTHER

## 2019-03-07 RX ORDER — LORAZEPAM 1 MG/1
TABLET ORAL
Refills: 2 | COMMUNITY
Start: 2019-02-12 | End: 2019-03-18 | Stop reason: SDUPTHER

## 2019-03-07 RX ORDER — OMEPRAZOLE 20 MG/1
CAPSULE, DELAYED RELEASE ORAL
Refills: 2 | COMMUNITY
Start: 2019-03-01 | End: 2019-07-23 | Stop reason: SDUPTHER

## 2019-03-07 ASSESSMENT — PATIENT HEALTH QUESTIONNAIRE - PHQ9
SUM OF ALL RESPONSES TO PHQ9 QUESTIONS 1 & 2: 2
SUM OF ALL RESPONSES TO PHQ QUESTIONS 1-9: 2
2. FEELING DOWN, DEPRESSED OR HOPELESS: 1
SUM OF ALL RESPONSES TO PHQ QUESTIONS 1-9: 2
1. LITTLE INTEREST OR PLEASURE IN DOING THINGS: 1

## 2019-03-07 ASSESSMENT — ENCOUNTER SYMPTOMS
DIARRHEA: 0
SORE THROAT: 0
CHEST TIGHTNESS: 0
CONSTIPATION: 0
TROUBLE SWALLOWING: 0
BACK PAIN: 0
PHOTOPHOBIA: 0
SHORTNESS OF BREATH: 0
VOMITING: 0
COUGH: 0
BLOOD IN STOOL: 0
ABDOMINAL PAIN: 0
SINUS PRESSURE: 0
VOICE CHANGE: 0
WHEEZING: 0
NAUSEA: 0

## 2019-03-11 ENCOUNTER — TELEPHONE (OUTPATIENT)
Dept: FAMILY MEDICINE CLINIC | Age: 79
End: 2019-03-11

## 2019-03-18 DIAGNOSIS — F32.A ANXIETY AND DEPRESSION: Chronic | ICD-10-CM

## 2019-03-18 DIAGNOSIS — F41.9 ANXIETY AND DEPRESSION: Chronic | ICD-10-CM

## 2019-03-18 RX ORDER — LORAZEPAM 1 MG/1
TABLET ORAL
Qty: 30 TABLET | Refills: 2 | Status: SHIPPED | OUTPATIENT
Start: 2019-03-18 | End: 2019-06-28 | Stop reason: SDUPTHER

## 2019-03-22 NOTE — PROGRESS NOTES
[facility] PRE-ADMISSION TESTING INSTRUCTIONS    The Preadmission Testing patient is instructed accordingly using the following criteria (check applicable):    ARRIVAL INSTRUCTIONS:  [x] Parking the day of Surgery is located in the Main Entrance lot. Upon entering the door, make an immediate right to the surgery reception desk    [x] Complimentary [facility] Park[facility] is available Monday through Friday 6 am to 6 pm    [x] Bring photo ID and insurance card    [] Bring in a copy of Living will or Durable Power of  papers. [x] Please be sure to arrange for responsible adult to provide transportation to and from the hospital    [x] Please arrange for responsible adult to be with you for the 24 hour period post procedure due to having anesthesia      GENERAL INSTRUCTIONS:    [x] Nothing by mouth after midnight, including gum, candy, mints or water    [x] You may brush your teeth, but do not swallow any water    [x] Take medications as instructed with 1-2 oz of water    [x] Stop herbal supplements and vitamins 5 days prior to procedure    [] Follow preop dosing of blood thinners per physician instructions    [] Take 1/2 dose of evening insulin, but no insulin after midnight    [] No oral diabetic medications after midnight    [] If diabetic and have low blood sugar or feel symptomatic, take 1-2oz apple juice only    [] Bring inhalers day of surgery    [] Bring C-PAP/ Bi-Pap day of surgery    [] Bring urine specimen day of surgery    [x]  no lotion, powders or creams     [] Follow bowel prep as instructed per surgeon    [] No tobacco products within 24 hours of surgery     [] No alcohol or illegal drug use within 24 hours of surgery.     [x] Jewelry, body piercing's, eyeglasses, contact lenses and dentures are not permitted into surgery (bring cases)      [x] Please do not wear any nail polish, make up or hair products on the day of surgery    [x] If not already done, you can expect a call

## 2019-03-27 ENCOUNTER — OFFICE VISIT (OUTPATIENT)
Dept: FAMILY MEDICINE CLINIC | Age: 79
End: 2019-03-27
Payer: MEDICARE

## 2019-03-27 VITALS
RESPIRATION RATE: 16 BRPM | OXYGEN SATURATION: 98 % | HEART RATE: 50 BPM | HEIGHT: 60 IN | TEMPERATURE: 97.6 F | BODY MASS INDEX: 36.79 KG/M2 | WEIGHT: 187.4 LBS | SYSTOLIC BLOOD PRESSURE: 168 MMHG | DIASTOLIC BLOOD PRESSURE: 78 MMHG

## 2019-03-27 DIAGNOSIS — I10 HTN (HYPERTENSION), BENIGN: Primary | ICD-10-CM

## 2019-03-27 PROCEDURE — 1090F PRES/ABSN URINE INCON ASSESS: CPT | Performed by: FAMILY MEDICINE

## 2019-03-27 PROCEDURE — 1123F ACP DISCUSS/DSCN MKR DOCD: CPT | Performed by: FAMILY MEDICINE

## 2019-03-27 PROCEDURE — G8400 PT W/DXA NO RESULTS DOC: HCPCS | Performed by: FAMILY MEDICINE

## 2019-03-27 PROCEDURE — G8417 CALC BMI ABV UP PARAM F/U: HCPCS | Performed by: FAMILY MEDICINE

## 2019-03-27 PROCEDURE — 1036F TOBACCO NON-USER: CPT | Performed by: FAMILY MEDICINE

## 2019-03-27 PROCEDURE — 99214 OFFICE O/P EST MOD 30 MIN: CPT | Performed by: FAMILY MEDICINE

## 2019-03-27 PROCEDURE — G8484 FLU IMMUNIZE NO ADMIN: HCPCS | Performed by: FAMILY MEDICINE

## 2019-03-27 PROCEDURE — G8427 DOCREV CUR MEDS BY ELIG CLIN: HCPCS | Performed by: FAMILY MEDICINE

## 2019-03-27 PROCEDURE — 4040F PNEUMOC VAC/ADMIN/RCVD: CPT | Performed by: FAMILY MEDICINE

## 2019-03-27 RX ORDER — IRBESARTAN 75 MG/1
75 TABLET ORAL DAILY
Qty: 30 TABLET | Refills: 0 | Status: SHIPPED | OUTPATIENT
Start: 2019-03-27 | End: 2019-05-13 | Stop reason: SDUPTHER

## 2019-04-01 ENCOUNTER — ANESTHESIA EVENT (OUTPATIENT)
Dept: ENDOSCOPY | Age: 79
End: 2019-04-01
Payer: MEDICARE

## 2019-04-01 ENCOUNTER — HOSPITAL ENCOUNTER (OUTPATIENT)
Age: 79
Setting detail: OUTPATIENT SURGERY
Discharge: HOME | End: 2019-04-01
Attending: SURGERY | Admitting: SURGERY
Payer: MEDICARE

## 2019-04-01 ENCOUNTER — ANESTHESIA (OUTPATIENT)
Dept: ENDOSCOPY | Age: 79
End: 2019-04-01
Payer: MEDICARE

## 2019-04-01 VITALS
BODY MASS INDEX: 37.11 KG/M2 | RESPIRATION RATE: 18 BRPM | OXYGEN SATURATION: 95 % | DIASTOLIC BLOOD PRESSURE: 73 MMHG | SYSTOLIC BLOOD PRESSURE: 155 MMHG | HEIGHT: 60 IN | TEMPERATURE: 97 F | WEIGHT: 189 LBS | HEART RATE: 53 BPM

## 2019-04-01 VITALS
SYSTOLIC BLOOD PRESSURE: 179 MMHG | RESPIRATION RATE: 17 BRPM | OXYGEN SATURATION: 96 % | DIASTOLIC BLOOD PRESSURE: 70 MMHG

## 2019-04-01 PROCEDURE — 3609012400 HC EGD TRANSORAL BIOPSY SINGLE/MULTIPLE: Performed by: SURGERY

## 2019-04-01 PROCEDURE — 6360000002 HC RX W HCPCS: Performed by: NURSE ANESTHETIST, CERTIFIED REGISTERED

## 2019-04-01 PROCEDURE — 2580000003 HC RX 258: Performed by: SURGERY

## 2019-04-01 PROCEDURE — 7100000011 HC PHASE II RECOVERY - ADDTL 15 MIN: Performed by: SURGERY

## 2019-04-01 PROCEDURE — 88342 IMHCHEM/IMCYTCHM 1ST ANTB: CPT

## 2019-04-01 PROCEDURE — 3700000000 HC ANESTHESIA ATTENDED CARE: Performed by: SURGERY

## 2019-04-01 PROCEDURE — 7100000010 HC PHASE II RECOVERY - FIRST 15 MIN: Performed by: SURGERY

## 2019-04-01 PROCEDURE — 3700000001 HC ADD 15 MINUTES (ANESTHESIA): Performed by: SURGERY

## 2019-04-01 PROCEDURE — 88305 TISSUE EXAM BY PATHOLOGIST: CPT

## 2019-04-01 PROCEDURE — 2709999900 HC NON-CHARGEABLE SUPPLY: Performed by: SURGERY

## 2019-04-01 RX ORDER — SODIUM CHLORIDE 9 MG/ML
INJECTION, SOLUTION INTRAVENOUS CONTINUOUS
Status: DISCONTINUED | OUTPATIENT
Start: 2019-04-01 | End: 2019-04-01 | Stop reason: HOSPADM

## 2019-04-01 RX ORDER — PROPOFOL 10 MG/ML
INJECTION, EMULSION INTRAVENOUS PRN
Status: DISCONTINUED | OUTPATIENT
Start: 2019-04-01 | End: 2019-04-01 | Stop reason: SDUPTHER

## 2019-04-01 RX ADMIN — PROPOFOL 170 MG: 10 INJECTION, EMULSION INTRAVENOUS at 09:27

## 2019-04-01 RX ADMIN — SODIUM CHLORIDE: 9 INJECTION, SOLUTION INTRAVENOUS at 09:15

## 2019-04-01 ASSESSMENT — PAIN DESCRIPTION - LOCATION
LOCATION: ABDOMEN

## 2019-04-01 ASSESSMENT — PAIN - FUNCTIONAL ASSESSMENT
PAIN_FUNCTIONAL_ASSESSMENT: ACTIVITIES ARE NOT PREVENTED

## 2019-04-01 ASSESSMENT — PAIN SCALES - GENERAL
PAINLEVEL_OUTOF10: 0

## 2019-04-01 ASSESSMENT — PAIN DESCRIPTION - PAIN TYPE: TYPE: OTHER (COMMENT)

## 2019-04-01 ASSESSMENT — PAIN DESCRIPTION - PROGRESSION
CLINICAL_PROGRESSION: NOT CHANGED
CLINICAL_PROGRESSION: NOT CHANGED

## 2019-04-01 NOTE — OP NOTE
ESOPHAGOGASTRODUODENOSCOPY PROCEDURE NOTE    DATE OF PROCEDURE: 4/1/2019    PREOPERATIVE DIAGNOSIS:  Right upper abdominal pain     POSTOPERATIVE DIAGNOSIS/FINDINGS:  bx    SURGEON: Romeo Beth MD    ASSISTANT: None    OPERATION: Esophagogastroduodenoscopy bx    ANESTHESIA: Local monitored anesthesia. CONDITION: Stable    COMPLICATIONS: None. BRIEF HISTORY:  This is a [de-identified] y.o. female who presents with the complaint of right upper abdominal pain. It was recommended the patient undergo an esophagogastrduodenoscopy. The risks/benefits/alternatives/expected outcomes were explained the the patient. The patient verbalized understanding and agreed to proceed. PROCEDURE:  The patient was brought into the endoscopy suite and placed in the left lateral decubitus position. A bite block was placed in the patients mouth. After the initiation of LMAC anesthesia, a gastroscope was inserted into the patient's mouth and passed into the esophagus and into the stomach. Immediately upon entering the stomach the note of gastritis  was made. The scope was advanced through the pylorus into the first and second portion of the duodenum making the note of normal mucosa. The scope was then withdrawn back into the stomach where it was retroflexed. There was no hiatal hernia noted. The scope was then straightened out and bx done. The scope was then withdrawn the entire length of the esophagus, making the note of a normal esophagus without masses, ulcerations, or lesions noted. The scope was withdrawn entirely. The patient tolerated the procedure well and there were no complications. GEJ at 35 cm.        Romeo Beth MD  4/1/2019

## 2019-04-01 NOTE — INTERVAL H&P NOTE
H&P Update    Patient's History and Physical from April 1,  was reviewed. Patient examined. There has been no change.     Sonja Levin

## 2019-04-01 NOTE — ANESTHESIA PRE PROCEDURE
Department of Anesthesiology  Preprocedure Note       Name:  Danial Tijerina   Age:  [de-identified] y.o.  :  1940                                          MRN:  [id number]         Date:  2019      Surgeon: Junior Castillo):  Ayush Walton MD    Procedure: EGD ESOPHAGOGASTRODUODENOSCOPY  ++IODINE ALLERGY++ (N/A )    Medications prior to admission:   Prior to Admission medications    Medication Sig Start Date End Date Taking?  Authorizing Provider   irbesartan (AVAPRO) 75 MG tablet Take 1 tablet by mouth daily 3/27/19  Yes Daja Boyd DO   LORazepam (ATIVAN) 1 MG tablet TAKE ONE TABLET BY MOUTH NIGHTLY 3/18/19 4/18/19 Yes Twan Boyd DO   omeprazole (PRILOSEC) 20 MG delayed release capsule TAKE ONE CAPSULE BY MOUTH EVERY DAY 3/1/19  Yes Historical Provider, MD   Docusate Sodium 100 MG TABS Take 1 tablet by mouth daily 3/7/19  Yes Daja Boyd DO   MAGNESIUM-OXIDE 400 (241.3 Mg) MG TABS tablet Take 1 tablet by mouth daily 3/7/19  Yes Twan Boyd DO   potassium chloride (KLOR-CON M) 20 MEQ extended release tablet Take 1 tablet by mouth daily 3/7/19  Yes Daja Boyd DO   hydrochlorothiazide (HYDRODIURIL) 25 MG tablet Take 1 tablet by mouth daily 3/7/19  Yes Daja Boyd DO   metoprolol succinate (TOPROL XL) 50 MG extended release tablet Take 1 tablet by mouth daily 3/7/19  Yes Daja Boyd DO   citalopram (CELEXA) 10 MG tablet Take 1 tablet by mouth daily 3/7/19  Yes Daja Boyd DO   Linaclotide (LINZESS) 72 MCG CAPS Take 1 tablet by mouth nightly 3 sample boxes given Lot [id number]  Exp  6-19 3/7/19  Yes Daja Boyd DO   sucralfate (CARAFATE) 1 GM tablet Take 1 tablet by mouth 4 times daily 19  Yes Valencia Salter MD   hyoscyamine (LEVSIN) 125 MCG tablet Take 1 tablet by mouth every 4 hours as needed for Cramping 19  Yes Valencia Salter MD   levothyroxine (SYNTHROID) 88 MCG tablet Take 1 tablet by mouth daily 1/3/19  Yes Daja Boyd,    cetirizine  Abdominal pain R10.9    Constipation K59.00    Closed fracture T14. 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, treated with surgery and chemo    Cerebral artery occlusion with cerebral infarction ([address]     possibly TIA    Chronic back pain     Constipation     Depression     Elevated sed rate 12/11/2017    hx of    GERD (gastroesophageal reflux disease)     Hemorrhoids     history of    Hyperlipidemia     diet controlled    Hypertension     Left foot pain     dropped a Kettle on foot    Macular degeneration     Poor vision     right eye / had bleeding behind eye, is receiving \"shots\" at this time  / 3/22/2019    Prosthetic eye globe     left eye implant;    Seasonal allergies     Skipped heart beats     on metoprolol; managed by Dr. Shirley Cook Sleep apnea     uses cpap at times     Thyroid disease        Past Surgical History:        Procedure Laterality Date   [address]    open?  COLECTOMY  1974    COLONOSCOPY  04/26/2012    and egd    COLONOSCOPY  05/30/2014    COLONOSCOPY  01/08/2015    CYST REMOVAL  years ago    upper gum    EYE SURGERY  years ago    left eye removal,  has artificial eye    HYSTERECTOMY  1974    JOINT REPLACEMENT Left 2010/2012    x 2-knee    TONSILLECTOMY      TUMOR EXCISION      from arm, fatty    UPPER GASTROINTESTINAL ENDOSCOPY  05/30/2014    with biopsy    UPPER GASTROINTESTINAL ENDOSCOPY  01/08/2015       Social History:    Social History     Tobacco Use    Smoking status: Never Smoker    Smokeless tobacco: Never Used   Substance Use Topics    Alcohol use:  No                                Counseling given: Not Answered      Vital Signs (Current):   Vitals:    03/22/19 1142 04/01/19 0856   BP:  (!) 178/88   Pulse:  56   Resp:  16   Temp:  96.9 °F (36.1 °C)   TempSrc:  Temporal   SpO2:  94%   Weight: 189 lb (85.7 kg) 189 lb (85.7 kg)   Height: 5' (1.524 m) 5' (1.524 m)                                              BP Readings from Last 3 Encounters:   04/01/19 (!) 178/88   03/27/19 (!) 168/78   03/07/19 (!) 164/70       NPO Status: Time of last liquid consumption: 1700                        Time of last solid consumption: 1700                        Date of last liquid consumption: 03/31/19                        Date of last solid food consumption: 03/31/19    BMI:   Wt Readings from Last 3 Encounters:   04/01/19 189 lb (85.7 kg)   03/27/19 187 lb 6.4 oz (85 kg)   03/07/19 186 lb 12.8 oz (84.7 kg)     Body mass index is 36.91 kg/m². CBC:   Lab Results   Component Value Date    WBC 9.5 01/06/2019    RBC 4.63 01/06/2019    HGB 13.6 01/06/2019    HCT 40.2 01/06/2019    MCV 86.8 01/06/2019    RDW 13.2 01/06/2019     01/06/2019       CMP:   Lab Results   Component Value Date     03/07/2019    K 4.2 03/07/2019    K 3.7 12/21/2018     03/07/2019    CO2 22 03/07/2019    BUN 15 03/07/2019    CREATININE 0.7 03/07/2019    GFRAA >60 03/07/2019    LABGLOM >60 03/07/2019    GLUCOSE 96 03/07/2019    GLUCOSE 109 05/04/2012    PROT 7.6 03/07/2019    CALCIUM 9.5 03/07/2019    BILITOT 0.4 03/07/2019    ALKPHOS 71 03/07/2019    AST 25 03/07/2019    ALT 15 03/07/2019       POC Tests: No results for input(s): POCGLU, POCNA, POCK, POCCL, POCBUN, POCHEMO, POCHCT in the last 72 hours.     Coags:   Lab Results   Component Value Date    PROTIME 11.4 12/21/2018    PROTIME 10.8 03/24/2012    INR 1.0 12/21/2018    APTT 30.1 12/13/2017       HCG (If Applicable): No results found for: PREGTESTUR, PREGSERUM, HCG, HCGQUANT     ABGs:   Lab Results   Component Value Date    T1ODEOMR 98.4 06/14/2012        Type & Screen (If Applicable):  No results found for: LABABO, [address]    Anesthesia Evaluation  Patient summary reviewed and Nursing notes reviewed no history of anesthetic complications:   Airway: Mallampati: III  TM distance: >3 FB   Neck ROM: full  Mouth opening: > = 3 FB Dental:      Comment: Several capped teeth    Pulmonary:   (+) sleep apnea: on CPAP and noncompliant,                             Cardiovascular:    (+) hypertension:, dysrhythmias:,                   Neuro/Psych:   (+) CVA:, TIA, headaches:, psychiatric history:            GI/Hepatic/Renal:   (+) GERD:,           Endo/Other:    (+) hypothyroidism: arthritis:., .                 Abdominal:           Vascular:                                        Anesthesia Plan      MAC     ASA 3       Induction: intravenous. Anesthetic plan and risks discussed with patient. Plan discussed with CRNA.                   Ashley Nino MD   4/1/2019

## 2019-04-01 NOTE — PROGRESS NOTES
7794 admit to phase 2 pacu from endoscopy on cart. Call light within reach. Sister here with patient. Stephenie Scott   1031 alert. Vss. Continues to deny any post procedure discomfort or nausea. Po water taken freely. Up to bathroom, voided, dressed for discharge. Activity tolerated.  Stephenie Scott

## 2019-04-01 NOTE — ANESTHESIA POSTPROCEDURE EVALUATION
Department of Anesthesiology  Postprocedure Note    Patient: Jc Bhatti  MRN: [id number]  YOB: 1940  Date of evaluation: 4/1/2019  Time:  10:49 AM     Procedure Summary     Date:  04/01/19 Room / Location:  [place] [address] / [facility] ENDOSCOPY    Anesthesia Start:  0926 Anesthesia Stop:  4321    Procedure:  EGD ESOPHAGOGASTRODUODENOSCOPY  ++IODINE ALLERGY++ and bx (N/A ) Diagnosis:  (RIGHT UPPER QUAD PAIN )    Surgeon:  Carlos Patton MD Responsible Provider:  Serg Goff MD    Anesthesia Type:  MAC ASA Status:  3          Anesthesia Type: MAC    Yesenia Phase I: Yesenia Score: 10    Yesenia Phase II: Yesenia Score: 10    Last vitals: Reviewed and per EMR flowsheets.        Anesthesia Post Evaluation    Patient location during evaluation: PACU  Patient participation: complete - patient participated  Level of consciousness: awake and alert  Pain score: 0  Airway patency: patent  Nausea & Vomiting: no vomiting and no nausea  Complications: no  Cardiovascular status: hemodynamically stable  Respiratory status: spontaneous ventilation  Hydration status: stable

## 2019-04-04 ENCOUNTER — NURSE ONLY (OUTPATIENT)
Dept: FAMILY MEDICINE CLINIC | Age: 79
End: 2019-04-04

## 2019-04-04 VITALS — DIASTOLIC BLOOD PRESSURE: 62 MMHG | SYSTOLIC BLOOD PRESSURE: 138 MMHG

## 2019-04-17 ENCOUNTER — OFFICE VISIT (OUTPATIENT)
Dept: FAMILY MEDICINE CLINIC | Age: 79
End: 2019-04-17
Payer: MEDICARE

## 2019-04-17 ENCOUNTER — CARE COORDINATION (OUTPATIENT)
Dept: CARE COORDINATION | Age: 79
End: 2019-04-17

## 2019-04-17 VITALS
HEART RATE: 56 BPM | RESPIRATION RATE: 16 BRPM | SYSTOLIC BLOOD PRESSURE: 128 MMHG | DIASTOLIC BLOOD PRESSURE: 68 MMHG | HEIGHT: 60 IN | TEMPERATURE: 97.9 F | BODY MASS INDEX: 37.42 KG/M2 | OXYGEN SATURATION: 98 % | WEIGHT: 190.6 LBS

## 2019-04-17 DIAGNOSIS — R09.82 PND (POST-NASAL DRIP): Primary | ICD-10-CM

## 2019-04-17 DIAGNOSIS — J30.2 SEASONAL ALLERGIES: ICD-10-CM

## 2019-04-17 PROCEDURE — G8417 CALC BMI ABV UP PARAM F/U: HCPCS | Performed by: PHYSICIAN ASSISTANT

## 2019-04-17 PROCEDURE — 1090F PRES/ABSN URINE INCON ASSESS: CPT | Performed by: PHYSICIAN ASSISTANT

## 2019-04-17 PROCEDURE — 1123F ACP DISCUSS/DSCN MKR DOCD: CPT | Performed by: PHYSICIAN ASSISTANT

## 2019-04-17 PROCEDURE — 1036F TOBACCO NON-USER: CPT | Performed by: PHYSICIAN ASSISTANT

## 2019-04-17 PROCEDURE — 99213 OFFICE O/P EST LOW 20 MIN: CPT | Performed by: PHYSICIAN ASSISTANT

## 2019-04-17 PROCEDURE — G8400 PT W/DXA NO RESULTS DOC: HCPCS | Performed by: PHYSICIAN ASSISTANT

## 2019-04-17 PROCEDURE — 4040F PNEUMOC VAC/ADMIN/RCVD: CPT | Performed by: PHYSICIAN ASSISTANT

## 2019-04-17 PROCEDURE — G8427 DOCREV CUR MEDS BY ELIG CLIN: HCPCS | Performed by: PHYSICIAN ASSISTANT

## 2019-04-17 RX ORDER — FLUTICASONE PROPIONATE 50 MCG
2 SPRAY, SUSPENSION (ML) NASAL DAILY
Qty: 1 BOTTLE | Refills: 0 | Status: SHIPPED
Start: 2019-04-17 | End: 2019-07-16

## 2019-04-17 NOTE — PROGRESS NOTES
Chief Complaint:   Otalgia (Pt having Lt inner ear pain x 1 week) and Pharyngitis (Pt has alot of drainage -- previously had sore throat, is better now)    History of Present Illness   Source of history provided by:  patient. Iglesia Hoyt is a [de-identified] y.o. old female presenting for pain in the left ear which began 5 days ago. The patient has had sneezing, nasal congestion, and throat irritation- was sore a week ago but is now just scratchy. She takes zyrtec at night. Denies any fever, chills, cough, HA, chills, abdominal pain, N/V/D, or lethargy. ROS    Unless otherwise stated in this report or unable to obtain because of the patient's clinical or mental status as evidenced by the medical record, this patients's positive and negative responses for Review of Systems, constitutional, psych, eyes, ENT, cardiovascular, respiratory, gastrointestinal, neurological, genitourinary, musculoskeletal, integument systems and systems related to the presenting problem are either stated in the preceding or were not pertinent or were negative for the symptoms and/or complaints related to the medical problem. Past Surgical History:  has a past surgical history that includes Eye surgery (years ago); colectomy (1974); Colonoscopy (04/26/2012); Cholecystectomy (1985); Hysterectomy (1974); tumor excision; Upper gastrointestinal endoscopy (05/30/2014); Colonoscopy (05/30/2014); Tonsillectomy; joint replacement (Left, 2010/2012); Upper gastrointestinal endoscopy (01/08/2015); Colonoscopy (01/08/2015); cyst removal (years ago); and Upper gastrointestinal endoscopy (N/A, 4/1/2019). Social History:  reports that she has never smoked. She has never used smokeless tobacco. She reports that she does not drink alcohol or use drugs. Family History: family history includes Breast Cancer in her daughter and sister; Cancer in her brother, brother, brother, brother, brother, brother, sister, and sister;  Heart Disease in her brother, brother, father, mother, and sister; High Blood Pressure in her daughter and daughter; Hypertension in her brother, brother, father, mother, and sister; Other in her mother; Stroke in her father and mother. Allergies: Iodine; Codeine; Oxycodone-aspirin; Amoxicillin-pot clavulanate; Darvocet [propoxyphene n-acetaminophen]; Eggs or egg-derived products; Influenza vaccines; Percodan [oxycodone-aspirin]; and Percodan [oxycodone-aspirin]    Physical Exam         VS:  /68   Pulse 56   Temp 97.9 °F (36.6 °C)   Resp 16   Ht 5' (1.524 m)   Wt 190 lb 9.6 oz (86.5 kg)   LMP 07/24/1974   SpO2 98%   BMI 37.22 kg/m²    Oxygen Saturation Interpretation: Normal.    Constitutional:  Alert, development consistent with age. Ears:  External Ears: Normal pinna bilaterally. TM's & External Canals:  External canals without erythema or cerumen. Bilateral TM's without erythema or perforation. Nose:  Patent nostrils present. Throat:  PND noted. Pharynx without injection, exudate, or tonsillar hypertrophy. Airway patient. Neck:  Normal ROM. Supple. No adenopathy. Respiratory:  Clear to auscultation and breath sounds equal.    CV: Regular rate and rhythm, normal heart sounds, without pathological murmurs, ectopy, gallops, or rubs. Skin:  No rashes, erythema present, unless noted elsewhere. Lymphatic: No lymphangitis or adenopathy noted. Neurological:  Oriented. Motor functions intact. Lab / Imaging Results   (All laboratory and radiology results have been personally reviewed by myself)  Labs:  No results found for this visit on 04/17/19. Assessment / Plan     Impression(s):  1. PND (post-nasal drip)    2. Seasonal allergies      Disposition:  Disposition: Discharge to home    New Prescriptions    FLUTICASONE (FLONASE) 50 MCG/ACT NASAL SPRAY    2 sprays by Nasal route daily       To call if symptoms persist, change, or worsen. Discussed red flag symptoms.  Advised to take all medications as prescribed.

## 2019-04-19 NOTE — CARE COORDINATION
General Assessment    Do you have any symptoms that are causing concern?:  Yes  Progression since Onset:  Unchanged  Reported Symptoms:   (Comment: see visit note from today /Allergies )     patient was in office today to see PA   Spoke with me regarding concerns of financial instability   She doesn't feel that she brings on enough to make ends meet   Or even afford all of her medications   All of the time , states that she has many bills   Informed patient to gather all of her financial information , bills , income and expenditures and we will confer with Social work to explore options , and community resources

## 2019-05-12 ENCOUNTER — APPOINTMENT (OUTPATIENT)
Dept: GENERAL RADIOLOGY | Age: 79
End: 2019-05-12
Payer: MEDICARE

## 2019-05-12 ENCOUNTER — HOSPITAL ENCOUNTER (EMERGENCY)
Age: 79
Discharge: HOME | End: 2019-05-12
Attending: EMERGENCY MEDICINE
Payer: MEDICARE

## 2019-05-12 ENCOUNTER — APPOINTMENT (OUTPATIENT)
Dept: CT IMAGING | Age: 79
End: 2019-05-12
Payer: MEDICARE

## 2019-05-12 VITALS
TEMPERATURE: 98 F | SYSTOLIC BLOOD PRESSURE: 172 MMHG | WEIGHT: 189 LBS | RESPIRATION RATE: 18 BRPM | HEIGHT: 63 IN | HEART RATE: 63 BPM | DIASTOLIC BLOOD PRESSURE: 86 MMHG | BODY MASS INDEX: 33.49 KG/M2 | OXYGEN SATURATION: 96 %

## 2019-05-12 DIAGNOSIS — I10 HYPERTENSION, UNSPECIFIED TYPE: ICD-10-CM

## 2019-05-12 DIAGNOSIS — R51.9 ACUTE NONINTRACTABLE HEADACHE, UNSPECIFIED HEADACHE TYPE: Primary | ICD-10-CM

## 2019-05-12 LAB
ALBUMIN SERPL-MCNC: 4.2 G/DL (ref 3.5–5.2)
ALP BLD-CCNC: 82 U/L (ref 35–104)
ALT SERPL-CCNC: 16 U/L (ref 0–32)
ANION GAP SERPL CALCULATED.3IONS-SCNC: 13 MMOL/L (ref 7–16)
AST SERPL-CCNC: 23 U/L (ref 0–31)
BACTERIA: NORMAL /HPF
BASOPHILS ABSOLUTE: 0.04 E9/L (ref 0–0.2)
BASOPHILS RELATIVE PERCENT: 0.5 % (ref 0–2)
BILIRUB SERPL-MCNC: 0.5 MG/DL (ref 0–1.2)
BILIRUBIN DIRECT: <0.2 MG/DL (ref 0–0.3)
BILIRUBIN URINE: NEGATIVE
BILIRUBIN, INDIRECT: NORMAL MG/DL (ref 0–1)
BLOOD, URINE: NEGATIVE
BUN BLDV-MCNC: 21 MG/DL (ref 8–23)
CALCIUM SERPL-MCNC: 9 MG/DL (ref 8.6–10.2)
CHLORIDE BLD-SCNC: 101 MMOL/L (ref 98–107)
CLARITY: CLEAR
CO2: 24 MMOL/L (ref 22–29)
COLOR: YELLOW
CREAT SERPL-MCNC: 0.7 MG/DL (ref 0.5–1)
EOSINOPHILS ABSOLUTE: 0.13 E9/L (ref 0.05–0.5)
EOSINOPHILS RELATIVE PERCENT: 1.5 % (ref 0–6)
EPITHELIAL CELLS, UA: NORMAL /HPF
GFR AFRICAN AMERICAN: >60
GFR NON-AFRICAN AMERICAN: >60 ML/MIN/1.73
GLUCOSE BLD-MCNC: 104 MG/DL (ref 74–99)
GLUCOSE URINE: NEGATIVE MG/DL
HCT VFR BLD CALC: 40.6 % (ref 34–48)
HEMOGLOBIN: 13.7 G/DL (ref 11.5–15.5)
IMMATURE GRANULOCYTES #: 0.04 E9/L
IMMATURE GRANULOCYTES %: 0.5 % (ref 0–5)
KETONES, URINE: NEGATIVE MG/DL
LEUKOCYTE ESTERASE, URINE: ABNORMAL
LYMPHOCYTES ABSOLUTE: 3.2 E9/L (ref 1.5–4)
LYMPHOCYTES RELATIVE PERCENT: 37.3 % (ref 20–42)
MCH RBC QN AUTO: 29.1 PG (ref 26–35)
MCHC RBC AUTO-ENTMCNC: 33.7 % (ref 32–34.5)
MCV RBC AUTO: 86.2 FL (ref 80–99.9)
MONOCYTES ABSOLUTE: 0.63 E9/L (ref 0.1–0.95)
MONOCYTES RELATIVE PERCENT: 7.4 % (ref 2–12)
NEUTROPHILS ABSOLUTE: 4.53 E9/L (ref 1.8–7.3)
NEUTROPHILS RELATIVE PERCENT: 52.8 % (ref 43–80)
NITRITE, URINE: NEGATIVE
PDW BLD-RTO: 13.4 FL (ref 11.5–15)
PH UA: 6 (ref 5–9)
PLATELET # BLD: 239 E9/L (ref 130–450)
PMV BLD AUTO: 9.7 FL (ref 7–12)
POTASSIUM SERPL-SCNC: 3.9 MMOL/L (ref 3.5–5)
PROTEIN UA: NEGATIVE MG/DL
RBC # BLD: 4.71 E12/L (ref 3.5–5.5)
RBC UA: NORMAL /HPF (ref 0–2)
SODIUM BLD-SCNC: 138 MMOL/L (ref 132–146)
SPECIFIC GRAVITY UA: <=1.005 (ref 1–1.03)
TOTAL PROTEIN: 7.3 G/DL (ref 6.4–8.3)
TROPONIN: <0.01 NG/ML (ref 0–0.03)
UROBILINOGEN, URINE: 0.2 E.U./DL
WBC # BLD: 8.6 E9/L (ref 4.5–11.5)
WBC UA: NORMAL /HPF (ref 0–5)

## 2019-05-12 PROCEDURE — 99284 EMERGENCY DEPT VISIT MOD MDM: CPT

## 2019-05-12 PROCEDURE — 85025 COMPLETE CBC W/AUTO DIFF WBC: CPT

## 2019-05-12 PROCEDURE — 93005 ELECTROCARDIOGRAM TRACING: CPT | Performed by: PHYSICIAN ASSISTANT

## 2019-05-12 PROCEDURE — 96375 TX/PRO/DX INJ NEW DRUG ADDON: CPT

## 2019-05-12 PROCEDURE — 93010 ELECTROCARDIOGRAM REPORT: CPT | Performed by: INTERNAL MEDICINE

## 2019-05-12 PROCEDURE — 84484 ASSAY OF TROPONIN QUANT: CPT

## 2019-05-12 PROCEDURE — 81001 URINALYSIS AUTO W/SCOPE: CPT

## 2019-05-12 PROCEDURE — 36415 COLL VENOUS BLD VENIPUNCTURE: CPT

## 2019-05-12 PROCEDURE — 96374 THER/PROPH/DIAG INJ IV PUSH: CPT

## 2019-05-12 PROCEDURE — 80048 BASIC METABOLIC PNL TOTAL CA: CPT

## 2019-05-12 PROCEDURE — 71045 X-RAY EXAM CHEST 1 VIEW: CPT

## 2019-05-12 PROCEDURE — 80076 HEPATIC FUNCTION PANEL: CPT

## 2019-05-12 PROCEDURE — 70450 CT HEAD/BRAIN W/O DYE: CPT

## 2019-05-12 PROCEDURE — 6360000002 HC RX W HCPCS: Performed by: EMERGENCY MEDICINE

## 2019-05-12 RX ORDER — METOCLOPRAMIDE HYDROCHLORIDE 5 MG/ML
10 INJECTION INTRAMUSCULAR; INTRAVENOUS ONCE
Status: COMPLETED | OUTPATIENT
Start: 2019-05-12 | End: 2019-05-12

## 2019-05-12 RX ORDER — ONDANSETRON 2 MG/ML
4 INJECTION INTRAMUSCULAR; INTRAVENOUS ONCE
Status: DISCONTINUED | OUTPATIENT
Start: 2019-05-12 | End: 2019-05-12

## 2019-05-12 RX ORDER — DIPHENHYDRAMINE HYDROCHLORIDE 50 MG/ML
25 INJECTION INTRAMUSCULAR; INTRAVENOUS ONCE
Status: COMPLETED | OUTPATIENT
Start: 2019-05-12 | End: 2019-05-12

## 2019-05-12 RX ADMIN — DIPHENHYDRAMINE HYDROCHLORIDE 25 MG: 50 INJECTION, SOLUTION INTRAMUSCULAR; INTRAVENOUS at 19:10

## 2019-05-12 RX ADMIN — METOCLOPRAMIDE 10 MG: 5 INJECTION, SOLUTION INTRAMUSCULAR; INTRAVENOUS at 19:10

## 2019-05-12 ASSESSMENT — ENCOUNTER SYMPTOMS
VOMITING: 0
CHEST TIGHTNESS: 0
COUGH: 0
BLOOD IN STOOL: 0
SHORTNESS OF BREATH: 0
COLOR CHANGE: 0
BACK PAIN: 0
NAUSEA: 1
ABDOMINAL PAIN: 0
DIARRHEA: 0

## 2019-05-12 ASSESSMENT — PAIN DESCRIPTION - DESCRIPTORS: DESCRIPTORS: ACHING

## 2019-05-12 ASSESSMENT — PAIN DESCRIPTION - LOCATION: LOCATION: HEAD;ABDOMEN

## 2019-05-12 ASSESSMENT — PAIN SCALES - GENERAL: PAINLEVEL_OUTOF10: 6

## 2019-05-12 ASSESSMENT — PAIN DESCRIPTION - PAIN TYPE: TYPE: ACUTE PAIN

## 2019-05-13 DIAGNOSIS — I10 HTN (HYPERTENSION), BENIGN: ICD-10-CM

## 2019-05-13 LAB
EKG ATRIAL RATE: 59 BPM
EKG P AXIS: 68 DEGREES
EKG P-R INTERVAL: 170 MS
EKG Q-T INTERVAL: 454 MS
EKG QRS DURATION: 70 MS
EKG QTC CALCULATION (BAZETT): 449 MS
EKG R AXIS: -21 DEGREES
EKG T AXIS: 16 DEGREES
EKG VENTRICULAR RATE: 59 BPM

## 2019-05-13 RX ORDER — IRBESARTAN 75 MG/1
75 TABLET ORAL DAILY
Qty: 30 TABLET | Refills: 5 | Status: SHIPPED | OUTPATIENT
Start: 2019-05-13 | End: 2019-09-26 | Stop reason: SDUPTHER

## 2019-05-13 NOTE — ED NOTES
Discharge instructions given. Patient verbalizes understanding. No other noted or stated problems at this time. Patient will follow up with primary care.      Lam Tai RN  05/12/19 9452

## 2019-05-16 ENCOUNTER — CARE COORDINATION (OUTPATIENT)
Dept: CARE COORDINATION | Age: 79
End: 2019-05-16

## 2019-05-17 NOTE — CARE COORDINATION
spoke with Ivan Maldonado over the phone today to discuss her ED visit on 05/12/2019 for HTN  Ivan Maldonado went to the ED because she ran out of her BP medication and hadn't taken it for over a week   Erika Sotelo been working with Ivan Maldonado to increase her knowledge of going to the right place at the right time for the right care   I informed Ivan Maldonado that she should have called her PCP as soon as she realized that she was out of medicine and optimally , she should have called 7 days prior to running out of her medicine to prevent interruptions in her regimen   If she had done this it would have prevented her trip to the ED department   She verbalizes and understanding, but continues to make excuses for the encounter   Patient stated that she has all of her medications now     Today she is complaining of feeling constipated ,  She states that she hasn't had a BM in a few days .   she has already consumed 1/2 of a bottle of MOM, which is more than the recommended dosage, and an enema    instructed her to not take any more a this time ,     allow the medicine to work    drink plenty of water  , increase activity as erika   verbal understanding noted   instructed her to call the office tomorrow if she still hadn't had a BM ,    If she experiences severe pain report to ED      Also reminded her of the need to review finances , to determine if she qualifies for additional resources

## 2019-05-23 ENCOUNTER — TELEPHONE (OUTPATIENT)
Dept: FAMILY MEDICINE CLINIC | Age: 79
End: 2019-05-23

## 2019-05-23 NOTE — TELEPHONE ENCOUNTER
Pt called and stated she talked to you last night re her blood pressure. She said her bp this am was 158/ 84. And she wanted to know if she should take extra bp meds like she did last night again today ? Or what should she do ? Rhonda's ph# [phone].

## 2019-05-23 NOTE — TELEPHONE ENCOUNTER
Extra lorazepam. Do not repeatedly take BP. If symptomatic, to ER otherwise will have BP check tomorrow. I spoke to kia about this.

## 2019-05-23 NOTE — TELEPHONE ENCOUNTER
Renae Mcghee spoke to pt and gave her info re her bp and pt is coming in tomorrow to get her BP checked.

## 2019-05-24 ENCOUNTER — NURSE ONLY (OUTPATIENT)
Dept: FAMILY MEDICINE CLINIC | Age: 79
End: 2019-05-24

## 2019-05-24 VITALS — DIASTOLIC BLOOD PRESSURE: 60 MMHG | SYSTOLIC BLOOD PRESSURE: 138 MMHG

## 2019-06-20 ENCOUNTER — CARE COORDINATION (OUTPATIENT)
Dept: CARE COORDINATION | Age: 79
End: 2019-06-20

## 2019-06-21 NOTE — CARE COORDINATION
Unable to reach by phone for [address] outreach call , left message for patient to reach me M-F [phone] @ [phone] or cell [phone], if I do not hear from patient today, next attempt to call will be wihtin 1-2 weeks (refer to next outreach. ..

## 2019-06-26 ENCOUNTER — TELEPHONE (OUTPATIENT)
Dept: FAMILY MEDICINE CLINIC | Age: 79
End: 2019-06-26

## 2019-06-28 ENCOUNTER — HOSPITAL ENCOUNTER (OUTPATIENT)
Dept: ULTRASOUND IMAGING | Age: 79
Discharge: HOME | End: 2019-06-30
Payer: MEDICARE

## 2019-06-28 ENCOUNTER — OFFICE VISIT (OUTPATIENT)
Dept: FAMILY MEDICINE CLINIC | Age: 79
End: 2019-06-28
Payer: MEDICARE

## 2019-06-28 VITALS
SYSTOLIC BLOOD PRESSURE: 134 MMHG | DIASTOLIC BLOOD PRESSURE: 70 MMHG | WEIGHT: 192 LBS | HEART RATE: 73 BPM | OXYGEN SATURATION: 96 % | TEMPERATURE: 97.3 F | HEIGHT: 63 IN | RESPIRATION RATE: 18 BRPM | BODY MASS INDEX: 34.02 KG/M2

## 2019-06-28 DIAGNOSIS — F32.A ANXIETY AND DEPRESSION: Chronic | ICD-10-CM

## 2019-06-28 DIAGNOSIS — M79.605 LEFT LEG PAIN: ICD-10-CM

## 2019-06-28 DIAGNOSIS — I10 HTN (HYPERTENSION), BENIGN: Primary | Chronic | ICD-10-CM

## 2019-06-28 DIAGNOSIS — F41.9 ANXIETY AND DEPRESSION: Chronic | ICD-10-CM

## 2019-06-28 PROCEDURE — 93971 EXTREMITY STUDY: CPT

## 2019-06-28 PROCEDURE — G8417 CALC BMI ABV UP PARAM F/U: HCPCS | Performed by: PHYSICIAN ASSISTANT

## 2019-06-28 PROCEDURE — 4040F PNEUMOC VAC/ADMIN/RCVD: CPT | Performed by: PHYSICIAN ASSISTANT

## 2019-06-28 PROCEDURE — 99213 OFFICE O/P EST LOW 20 MIN: CPT | Performed by: PHYSICIAN ASSISTANT

## 2019-06-28 PROCEDURE — G8400 PT W/DXA NO RESULTS DOC: HCPCS | Performed by: PHYSICIAN ASSISTANT

## 2019-06-28 PROCEDURE — 1090F PRES/ABSN URINE INCON ASSESS: CPT | Performed by: PHYSICIAN ASSISTANT

## 2019-06-28 PROCEDURE — 1123F ACP DISCUSS/DSCN MKR DOCD: CPT | Performed by: PHYSICIAN ASSISTANT

## 2019-06-28 PROCEDURE — 1036F TOBACCO NON-USER: CPT | Performed by: PHYSICIAN ASSISTANT

## 2019-06-28 PROCEDURE — G8427 DOCREV CUR MEDS BY ELIG CLIN: HCPCS | Performed by: PHYSICIAN ASSISTANT

## 2019-06-28 RX ORDER — LORAZEPAM 1 MG/1
TABLET ORAL
Qty: 30 TABLET | Refills: 2 | Status: SHIPPED | OUTPATIENT
Start: 2019-06-28 | End: 2019-07-29

## 2019-06-28 ASSESSMENT — ENCOUNTER SYMPTOMS
DIARRHEA: 0
COUGH: 0
NAUSEA: 0
SHORTNESS OF BREATH: 0
VOMITING: 0
ABDOMINAL PAIN: 0

## 2019-06-28 NOTE — PROGRESS NOTES
1 tablet by mouth daily 3/7/19  Yes Daja Boyd, DO   potassium chloride (KLOR-CON M) 20 MEQ extended release tablet Take 1 tablet by mouth daily 3/7/19  Yes Daja Boyd DO   hydrochlorothiazide (HYDRODIURIL) 25 MG tablet Take 1 tablet by mouth daily 3/7/19  Yes Daja Boyd DO   metoprolol succinate (TOPROL XL) 50 MG extended release tablet Take 1 tablet by mouth daily 3/7/19  Yes Daja Boyd DO   citalopram (CELEXA) 10 MG tablet Take 1 tablet by mouth daily 3/7/19  Yes Daja Boyd DO   Linaclotide (LINZESS) 72 MCG CAPS Take 1 tablet by mouth nightly 3 sample boxes given Lot [id number]  Exp  6-19 3/7/19  Yes Daja Boyd DO   sucralfate (CARAFATE) 1 GM tablet Take 1 tablet by mouth 4 times daily 1/6/19  Yes Risa Ganser, MD   hyoscyamine (LEVSIN) 125 MCG tablet Take 1 tablet by mouth every 4 hours as needed for Cramping 1/6/19  Yes Risa Ganser, MD   levothyroxine (SYNTHROID) 88 MCG tablet Take 1 tablet by mouth daily 1/3/19  Yes Daja Boyd DO   cetirizine (ZYRTEC) 10 MG tablet Take 10 mg by mouth nightly   Yes Historical Provider, MD   Multiple Vitamins-Minerals (PRESERVISION AREDS 2) CAPS Take 2 capsules by mouth daily LD 3/26/2019   Yes Historical Provider, MD        Social History     Socioeconomic History    Marital status:       Spouse name: Not on file    Number of children: Not on file    Years of education: Not on file    Highest education level: Not on file   Occupational History    Not on file   Social Needs    Financial resource strain: Not on file    Food insecurity:     Worry: Not on file     Inability: Not on file    Transportation needs:     Medical: Not on file     Non-medical: Not on file   Tobacco Use    Smoking status: Never Smoker    Smokeless tobacco: Never Used   Substance and Sexual Activity    Alcohol use: No    Drug use: No    Sexual activity: Not on file   Lifestyle    Physical activity:     Days per week: Not on file Minutes per session: Not on file    Stress: Not on file   Relationships    Social connections:     Talks on phone: Not on file     Gets together: Not on file     Attends [employer]ism service: Not on file     Active member of club or organization: Not on file     Attends meetings of clubs or organizations: Not on file     Relationship status: Not on file    Intimate partner violence:     Fear of current or ex partner: Not on file     Emotionally abused: Not on file     Physically abused: Not on file     Forced sexual activity: Not on file   Other Topics Concern    Not on file   Social History Narrative    Not on file       I have reviewed Rhonda's allergies, medications, problem list, medical, social and family history and have updated as needed in the electronic medical record    Review Of Systems:    Review of Systems   Constitutional: Negative for chills and fever. HENT: Negative for ear pain. Eyes: Negative for visual disturbance. Respiratory: Negative for cough and shortness of breath. Cardiovascular: Negative for chest pain, palpitations and leg swelling. Gastrointestinal: Negative for abdominal pain, diarrhea, nausea and vomiting. Genitourinary: Negative for dysuria and frequency. Musculoskeletal:        +left calf pain   Skin: Negative for rash. Neurological: Negative for dizziness (resolved after stopping Irbesartan). OBJECTIVE:     VS:  Wt Readings from Last 3 Encounters:   06/28/19 192 lb (87.1 kg)   05/12/19 189 lb (85.7 kg)   04/17/19 190 lb 9.6 oz (86.5 kg)                       Vitals:    06/28/19 0930   BP: 134/70   Pulse: 73   Resp: 18   Temp: 97.3 °F (36.3 °C)   SpO2: 96%      Physical Exam   Constitutional: She is oriented to person, place, and time. She appears well-developed and well-nourished. HENT:   Head: Normocephalic. Neck: Neck supple. No thyromegaly present. Cardiovascular: Normal rate, regular rhythm, normal heart sounds and intact distal pulses.  Exam reveals no gallop and no friction rub. No murmur heard. Pulmonary/Chest: Effort normal and breath sounds normal. No respiratory distress. She has no wheezes. She has no rales. Musculoskeletal: She exhibits no edema. Mild TTP of left calf. No erythema or warmth noted. No rash or wounds. Negative emery's sign bilaterally. Neurological: She is alert and oriented to person, place, and time. Skin: Skin is warm and dry. No rash noted.       Results for orders placed or performed during the hospital encounter of 05/12/19   CBC Auto Differential   Result Value Ref Range    WBC 8.6 4.5 - 11.5 E9/L    RBC 4.71 3.50 - 5.50 E12/L    Hemoglobin 13.7 11.5 - 15.5 g/dL    Hematocrit 40.6 34.0 - 48.0 %    MCV 86.2 80.0 - 99.9 fL    MCH 29.1 26.0 - 35.0 pg    MCHC 33.7 32.0 - 34.5 %    RDW 13.4 11.5 - 15.0 fL    Platelets 826 391 - 157 E9/L    MPV 9.7 7.0 - 12.0 fL    Neutrophils % 52.8 43.0 - 80.0 %    Immature Granulocytes % 0.5 0.0 - 5.0 %    Lymphocytes % 37.3 20.0 - 42.0 %    Monocytes % 7.4 2.0 - 12.0 %    Eosinophils % 1.5 0.0 - 6.0 %    Basophils % 0.5 0.0 - 2.0 %    Neutrophils # 4.53 1.80 - 7.30 E9/L    Immature Granulocytes # 0.04 E9/L    Lymphocytes # 3.20 1.50 - 4.00 E9/L    Monocytes # 0.63 0.10 - 0.95 E9/L    Eosinophils # 0.13 0.05 - 0.50 E9/L    Basophils # 0.04 0.00 - 0.20 C5/L   Basic Metabolic Panel   Result Value Ref Range    Sodium 138 132 - 146 mmol/L    Potassium 3.9 3.5 - 5.0 mmol/L    Chloride 101 98 - 107 mmol/L    CO2 24 22 - 29 mmol/L    Anion Gap 13 7 - 16 mmol/L    Glucose 104 (H) 74 - 99 mg/dL    BUN 21 8 - 23 mg/dL    CREATININE 0.7 0.5 - 1.0 mg/dL    GFR Non-African American >60 >=60 mL/min/1.73    GFR African American >60     Calcium 9.0 8.6 - 10.2 mg/dL   Hepatic Function Panel   Result Value Ref Range    Total Protein 7.3 6.4 - 8.3 g/dL    Alb 4.2 3.5 - 5.2 g/dL    Alkaline Phosphatase 82 35 - 104 U/L    ALT 16 0 - 32 U/L    AST 23 0 - 31 U/L    Total Bilirubin 0.5 0.0 - 1.2 mg/dL Bilirubin, Direct <0.2 0.0 - 0.3 mg/dL    Bilirubin, Indirect see below 0.0 - 1.0 mg/dL   Urinalysis   Result Value Ref Range    Color, UA Yellow Straw/Yellow    Clarity, UA Clear Clear    Glucose, Ur Negative Negative mg/dL    Bilirubin Urine Negative Negative    Ketones, Urine Negative Negative mg/dL    Specific Gravity, UA <=1.005 1.005 - 1.030    Blood, Urine Negative Negative    pH, UA 6.0 5.0 - 9.0    Protein, UA Negative Negative mg/dL    Urobilinogen, Urine 0.2 <2.0 E.U./dL    Nitrite, Urine Negative Negative    Leukocyte Esterase, Urine SMALL (A) Negative   Troponin   Result Value Ref Range    Troponin <0.01 0.00 - 0.03 ng/mL   Microscopic Urinalysis   Result Value Ref Range    WBC, UA 2-5 0 - 5 /HPF    RBC, UA NONE 0 - 2 /HPF    Epi Cells RARE /HPF    Bacteria, UA NONE /HPF   EKG 12 Lead   Result Value Ref Range    Ventricular Rate 59 BPM    Atrial Rate 59 BPM    P-R Interval 170 ms    QRS Duration 70 ms    Q-T Interval 454 ms    QTc Calculation (Bazett) 449 ms    P Axis 68 degrees    R Axis -21 degrees    T Axis 16 degrees     ASSESSMENT/PLAN   Peter Mi was seen today for leg pain and hypotension. Diagnoses and all orders for this visit:    HTN (hypertension), benign        -     Doing well off Irbesartan. Discussed checking BP and contacting office if >140/90.         -     Discussed red flag symptoms. Anxiety and depression  -     Refilled LORazepam (ATIVAN) 1 MG tablet; TAKE ONE TABLET BY MOUTH NIGHTLY    Left leg pain  -     US Duplex Lower Extremity Left Orlin; Future    Phone/MyChart follow up if tests abnormal.    Return in about 2 weeks (around 7/12/2019). I have reviewed my findings and recommendations with Mouna Fernandez.     Kp Weaver PA-C      Controlled Substance Monitoring:  Acute and Chronic Pain Monitoring:   RX Monitoring 6/28/2019   Attestation -   Periodic Controlled Substance Monitoring Possible medication side effects, risk of tolerance/dependence & alternative treatments discussed. ;No signs of potential drug abuse or diversion identified.

## 2019-07-11 ENCOUNTER — TELEPHONE (OUTPATIENT)
Dept: FAMILY MEDICINE CLINIC | Age: 79
End: 2019-07-11

## 2019-07-16 ENCOUNTER — HOSPITAL ENCOUNTER (OUTPATIENT)
Age: 79
Discharge: HOME | End: 2019-07-18
Payer: MEDICARE

## 2019-07-16 ENCOUNTER — OFFICE VISIT (OUTPATIENT)
Dept: FAMILY MEDICINE CLINIC | Age: 79
End: 2019-07-16
Payer: MEDICARE

## 2019-07-16 VITALS
WEIGHT: 191 LBS | TEMPERATURE: 97.3 F | DIASTOLIC BLOOD PRESSURE: 78 MMHG | SYSTOLIC BLOOD PRESSURE: 158 MMHG | HEART RATE: 71 BPM | BODY MASS INDEX: 33.84 KG/M2 | RESPIRATION RATE: 16 BRPM | OXYGEN SATURATION: 98 % | HEIGHT: 63 IN

## 2019-07-16 DIAGNOSIS — F32.A DEPRESSION, UNSPECIFIED DEPRESSION TYPE: ICD-10-CM

## 2019-07-16 DIAGNOSIS — M06.9 RHEUMATOID ARTHRITIS, INVOLVING UNSPECIFIED SITE, UNSPECIFIED RHEUMATOID FACTOR PRESENCE: ICD-10-CM

## 2019-07-16 DIAGNOSIS — R31.9 URINARY TRACT INFECTION WITH HEMATURIA, SITE UNSPECIFIED: Primary | ICD-10-CM

## 2019-07-16 DIAGNOSIS — N39.0 URINARY TRACT INFECTION WITH HEMATURIA, SITE UNSPECIFIED: ICD-10-CM

## 2019-07-16 DIAGNOSIS — R31.9 URINARY TRACT INFECTION WITH HEMATURIA, SITE UNSPECIFIED: ICD-10-CM

## 2019-07-16 DIAGNOSIS — N39.0 URINARY TRACT INFECTION WITH HEMATURIA, SITE UNSPECIFIED: Primary | ICD-10-CM

## 2019-07-16 DIAGNOSIS — I10 HTN (HYPERTENSION), BENIGN: ICD-10-CM

## 2019-07-16 LAB
BILIRUBIN, POC: NORMAL
BLOOD URINE, POC: NORMAL
CLARITY, POC: CLEAR
COLOR, POC: YELLOW
GLUCOSE URINE, POC: NORMAL
KETONES, POC: NORMAL
LEUKOCYTE EST, POC: NORMAL
NITRITE, POC: NORMAL
PH, POC: 5
PROTEIN, POC: NORMAL
SPECIFIC GRAVITY, POC: 1.01
UROBILINOGEN, POC: 0.2

## 2019-07-16 PROCEDURE — G8427 DOCREV CUR MEDS BY ELIG CLIN: HCPCS | Performed by: FAMILY MEDICINE

## 2019-07-16 PROCEDURE — 1036F TOBACCO NON-USER: CPT | Performed by: FAMILY MEDICINE

## 2019-07-16 PROCEDURE — 87088 URINE BACTERIA CULTURE: CPT

## 2019-07-16 PROCEDURE — G8400 PT W/DXA NO RESULTS DOC: HCPCS | Performed by: FAMILY MEDICINE

## 2019-07-16 PROCEDURE — 81003 URINALYSIS AUTO W/O SCOPE: CPT | Performed by: FAMILY MEDICINE

## 2019-07-16 PROCEDURE — 1123F ACP DISCUSS/DSCN MKR DOCD: CPT | Performed by: FAMILY MEDICINE

## 2019-07-16 PROCEDURE — 1090F PRES/ABSN URINE INCON ASSESS: CPT | Performed by: FAMILY MEDICINE

## 2019-07-16 PROCEDURE — 99214 OFFICE O/P EST MOD 30 MIN: CPT | Performed by: FAMILY MEDICINE

## 2019-07-16 PROCEDURE — 4040F PNEUMOC VAC/ADMIN/RCVD: CPT | Performed by: FAMILY MEDICINE

## 2019-07-16 PROCEDURE — G8417 CALC BMI ABV UP PARAM F/U: HCPCS | Performed by: FAMILY MEDICINE

## 2019-07-16 RX ORDER — NITROFURANTOIN 25; 75 MG/1; MG/1
100 CAPSULE ORAL 2 TIMES DAILY
Qty: 14 CAPSULE | Refills: 0
Start: 2019-07-16 | End: 2019-07-23

## 2019-07-16 RX ORDER — NITROFURANTOIN 25; 75 MG/1; MG/1
100 CAPSULE ORAL 2 TIMES DAILY
Qty: 14 CAPSULE | Refills: 0 | Status: SHIPPED | OUTPATIENT
Start: 2019-07-16 | End: 2019-07-16

## 2019-07-16 NOTE — PATIENT INSTRUCTIONS
Take Irbesartan 75 mg and Hydrochlorothiazide 25 mg in AM   Take Metoprolol 50 mg in PM      Counselors:    Andrew Nicholas PhD  [address], [facility]    21  [address]   [address]  [place], [address] [place] [address]   21  [address]  [facility], [facility]  [phone]    [facility]  [address] [place] [address]. [address]  [place] [address]  [phone]

## 2019-07-19 LAB — URINE CULTURE, ROUTINE: NORMAL

## 2019-07-23 ENCOUNTER — OFFICE VISIT (OUTPATIENT)
Dept: FAMILY MEDICINE CLINIC | Age: 79
End: 2019-07-23
Payer: MEDICARE

## 2019-07-23 VITALS
BODY MASS INDEX: 33.84 KG/M2 | HEART RATE: 74 BPM | DIASTOLIC BLOOD PRESSURE: 80 MMHG | OXYGEN SATURATION: 98 % | HEIGHT: 63 IN | TEMPERATURE: 97.4 F | SYSTOLIC BLOOD PRESSURE: 138 MMHG | RESPIRATION RATE: 16 BRPM | WEIGHT: 191 LBS

## 2019-07-23 DIAGNOSIS — K21.9 GASTROESOPHAGEAL REFLUX DISEASE WITHOUT ESOPHAGITIS: Chronic | ICD-10-CM

## 2019-07-23 DIAGNOSIS — F32.89 OTHER DEPRESSION: ICD-10-CM

## 2019-07-23 DIAGNOSIS — R31.9 URINARY TRACT INFECTION WITH HEMATURIA, SITE UNSPECIFIED: Primary | ICD-10-CM

## 2019-07-23 DIAGNOSIS — N39.0 URINARY TRACT INFECTION WITH HEMATURIA, SITE UNSPECIFIED: Primary | ICD-10-CM

## 2019-07-23 LAB
BILIRUBIN, POC: NORMAL
BLOOD URINE, POC: NORMAL
CLARITY, POC: CLEAR
COLOR, POC: YELLOW
GLUCOSE URINE, POC: NORMAL
KETONES, POC: NORMAL
LEUKOCYTE EST, POC: NORMAL
NITRITE, POC: NORMAL
PH, POC: 5.5
PROTEIN, POC: NORMAL
SPECIFIC GRAVITY, POC: 1
UROBILINOGEN, POC: 0.2

## 2019-07-23 PROCEDURE — 1123F ACP DISCUSS/DSCN MKR DOCD: CPT | Performed by: FAMILY MEDICINE

## 2019-07-23 PROCEDURE — 1090F PRES/ABSN URINE INCON ASSESS: CPT | Performed by: FAMILY MEDICINE

## 2019-07-23 PROCEDURE — 81003 URINALYSIS AUTO W/O SCOPE: CPT | Performed by: FAMILY MEDICINE

## 2019-07-23 PROCEDURE — G8427 DOCREV CUR MEDS BY ELIG CLIN: HCPCS | Performed by: FAMILY MEDICINE

## 2019-07-23 PROCEDURE — G8417 CALC BMI ABV UP PARAM F/U: HCPCS | Performed by: FAMILY MEDICINE

## 2019-07-23 PROCEDURE — 1036F TOBACCO NON-USER: CPT | Performed by: FAMILY MEDICINE

## 2019-07-23 PROCEDURE — 4040F PNEUMOC VAC/ADMIN/RCVD: CPT | Performed by: FAMILY MEDICINE

## 2019-07-23 PROCEDURE — G8400 PT W/DXA NO RESULTS DOC: HCPCS | Performed by: FAMILY MEDICINE

## 2019-07-23 PROCEDURE — 99213 OFFICE O/P EST LOW 20 MIN: CPT | Performed by: FAMILY MEDICINE

## 2019-07-23 RX ORDER — CETIRIZINE HYDROCHLORIDE 10 MG/1
10 TABLET ORAL NIGHTLY
Qty: 30 TABLET | Refills: 5 | Status: SHIPPED | OUTPATIENT
Start: 2019-07-23 | End: 2019-08-15

## 2019-07-23 RX ORDER — OMEPRAZOLE 20 MG/1
20 CAPSULE, DELAYED RELEASE ORAL DAILY
Qty: 30 CAPSULE | Refills: 5 | Status: SHIPPED | OUTPATIENT
Start: 2019-07-23 | End: 2019-08-15 | Stop reason: SDUPTHER

## 2019-07-30 ENCOUNTER — OFFICE VISIT (OUTPATIENT)
Dept: FAMILY MEDICINE CLINIC | Age: 79
End: 2019-07-30
Payer: MEDICARE

## 2019-07-30 ENCOUNTER — HOSPITAL ENCOUNTER (OUTPATIENT)
Age: 79
Discharge: HOME | End: 2019-08-01
Payer: MEDICARE

## 2019-07-30 VITALS
OXYGEN SATURATION: 97 % | DIASTOLIC BLOOD PRESSURE: 58 MMHG | BODY MASS INDEX: 33.49 KG/M2 | HEART RATE: 76 BPM | RESPIRATION RATE: 18 BRPM | TEMPERATURE: 97.2 F | HEIGHT: 63 IN | WEIGHT: 189 LBS | SYSTOLIC BLOOD PRESSURE: 168 MMHG

## 2019-07-30 DIAGNOSIS — F32.89 OTHER DEPRESSION: ICD-10-CM

## 2019-07-30 DIAGNOSIS — R42 DIZZINESS: Primary | ICD-10-CM

## 2019-07-30 DIAGNOSIS — R42 DIZZINESS: ICD-10-CM

## 2019-07-30 LAB
BILIRUBIN, POC: NEGATIVE
BLOOD URINE, POC: NEGATIVE
CLARITY, POC: NORMAL
COLOR, POC: YELLOW
GLUCOSE URINE, POC: NEGATIVE
KETONES, POC: NEGATIVE
LEUKOCYTE EST, POC: NORMAL
NITRITE, POC: NEGATIVE
PH, POC: 6
PROTEIN, POC: NEGATIVE
SPECIFIC GRAVITY, POC: <=1.005
UROBILINOGEN, POC: 0.2

## 2019-07-30 PROCEDURE — 81002 URINALYSIS NONAUTO W/O SCOPE: CPT | Performed by: FAMILY MEDICINE

## 2019-07-30 PROCEDURE — G8400 PT W/DXA NO RESULTS DOC: HCPCS | Performed by: FAMILY MEDICINE

## 2019-07-30 PROCEDURE — G8427 DOCREV CUR MEDS BY ELIG CLIN: HCPCS | Performed by: FAMILY MEDICINE

## 2019-07-30 PROCEDURE — 4040F PNEUMOC VAC/ADMIN/RCVD: CPT | Performed by: FAMILY MEDICINE

## 2019-07-30 PROCEDURE — 87088 URINE BACTERIA CULTURE: CPT

## 2019-07-30 PROCEDURE — G8417 CALC BMI ABV UP PARAM F/U: HCPCS | Performed by: FAMILY MEDICINE

## 2019-07-30 PROCEDURE — 1123F ACP DISCUSS/DSCN MKR DOCD: CPT | Performed by: FAMILY MEDICINE

## 2019-07-30 PROCEDURE — 1090F PRES/ABSN URINE INCON ASSESS: CPT | Performed by: FAMILY MEDICINE

## 2019-07-30 PROCEDURE — 1036F TOBACCO NON-USER: CPT | Performed by: FAMILY MEDICINE

## 2019-07-30 PROCEDURE — 99213 OFFICE O/P EST LOW 20 MIN: CPT | Performed by: FAMILY MEDICINE

## 2019-07-30 NOTE — PROGRESS NOTES
change in bowel habits, constipation, diarrhea, gas/bloating, heartburn or nausea/vomiting  Musculoskeletal ROS: negative for - joint pain, joint stiffness, joint swelling or muscle, back pain, bowel or bladder incontinence  Neurological ROS: negative for - behavioral changes, confusion, dizziness, headaches, memory loss, numbness/tingling, seizures or speech problems, weakness  Dermatological ROS: negative for - dry skin, mole changes, nail changes, pruritus, rash or skin lesion changes    Physical Exam  BP (!) 168/58 (Site: Right Upper Arm)   Pulse 76   Temp 97.2 °F (36.2 °C) (Oral)   Resp 18   Ht 5' 3\" (1.6 m)   Wt 189 lb (85.7 kg)   LMP 07/24/1974   SpO2 97%   BMI 33.48 kg/m²     Wt Readings from Last 3 Encounters:   07/30/19 189 lb (85.7 kg)   07/23/19 191 lb (86.6 kg)   07/16/19 191 lb (86.6 kg)     BP Readings from Last 3 Encounters:   07/30/19 (!) 168/58   07/23/19 138/80   07/16/19 (!) 158/78         General appearance: alert, well appearing, and in no distress, oriented to person, place, and time and normal appearing weight. HEENT:  HEAD: Atraumatic, normocephalic  EYES: PERRL  EOM intact  EARS: bilateral TM's and external ear canals normal  NOSE: nasal mucosa, septum, turbinates normal bilaterally  MOUTH: mucous membranes moist and normal tonsils  NECK: supple, full range of motion, no mass, normal lymphadenopathy, no thyromegaly    CVS exam: normal rate, regular rhythm, normal S1, S2, no murmurs, rubs, clicks or gallops. Radial pulses 2+ bilateral.  PT/DP pulse 2+ bilat. No C/C/E    Chest: clear to auscultation, no wheezes, rales or rhonchi, symmetric air entry. SKIN: no lesions, jaundice, petechiae, pallor, cyanosis, ecchymosis        Assessment/Plan  Brittney Monzon was seen today for sinus problem, dizziness and sweats.     Diagnoses and all orders for this visit:    Dizziness  - Advised to stay hydrated, will send for culture and treat if necessary  -     POCT Urinalysis no Micro  -     URINE

## 2019-07-31 ENCOUNTER — CARE COORDINATION (OUTPATIENT)
Dept: CARE COORDINATION | Age: 79
End: 2019-07-31

## 2019-08-01 ENCOUNTER — HOSPITAL ENCOUNTER (EMERGENCY)
Age: 79
Discharge: HOME | End: 2019-08-01
Attending: EMERGENCY MEDICINE
Payer: MEDICARE

## 2019-08-01 ENCOUNTER — TELEPHONE (OUTPATIENT)
Dept: FAMILY MEDICINE CLINIC | Age: 79
End: 2019-08-01

## 2019-08-01 ENCOUNTER — APPOINTMENT (OUTPATIENT)
Dept: CT IMAGING | Age: 79
End: 2019-08-01
Payer: MEDICARE

## 2019-08-01 ENCOUNTER — APPOINTMENT (OUTPATIENT)
Dept: GENERAL RADIOLOGY | Age: 79
End: 2019-08-01
Payer: MEDICARE

## 2019-08-01 VITALS
WEIGHT: 190 LBS | HEIGHT: 60 IN | DIASTOLIC BLOOD PRESSURE: 75 MMHG | BODY MASS INDEX: 37.3 KG/M2 | OXYGEN SATURATION: 94 % | RESPIRATION RATE: 18 BRPM | HEART RATE: 54 BPM | SYSTOLIC BLOOD PRESSURE: 169 MMHG | TEMPERATURE: 97.7 F

## 2019-08-01 DIAGNOSIS — R42 LIGHTHEADEDNESS: Primary | ICD-10-CM

## 2019-08-01 LAB
ANION GAP SERPL CALCULATED.3IONS-SCNC: 10 MMOL/L (ref 7–16)
BACTERIA: NORMAL /HPF
BASOPHILS ABSOLUTE: 0.07 E9/L (ref 0–0.2)
BASOPHILS RELATIVE PERCENT: 0.9 % (ref 0–2)
BILIRUBIN URINE: NEGATIVE
BLOOD, URINE: NEGATIVE
BUN BLDV-MCNC: 23 MG/DL (ref 8–23)
CALCIUM SERPL-MCNC: 9.8 MG/DL (ref 8.6–10.2)
CHLORIDE BLD-SCNC: 101 MMOL/L (ref 98–107)
CLARITY: CLEAR
CO2: 30 MMOL/L (ref 22–29)
COLOR: YELLOW
CREAT SERPL-MCNC: 0.9 MG/DL (ref 0.5–1)
EOSINOPHILS ABSOLUTE: 0.18 E9/L (ref 0.05–0.5)
EOSINOPHILS RELATIVE PERCENT: 2.2 % (ref 0–6)
GFR AFRICAN AMERICAN: >60
GFR NON-AFRICAN AMERICAN: >60 ML/MIN/1.73
GLUCOSE BLD-MCNC: 101 MG/DL (ref 74–99)
GLUCOSE URINE: NEGATIVE MG/DL
HCT VFR BLD CALC: 40.4 % (ref 34–48)
HEMOGLOBIN: 13.5 G/DL (ref 11.5–15.5)
IMMATURE GRANULOCYTES #: 0.01 E9/L
IMMATURE GRANULOCYTES %: 0.1 % (ref 0–5)
KETONES, URINE: NEGATIVE MG/DL
LEUKOCYTE ESTERASE, URINE: ABNORMAL
LYMPHOCYTES ABSOLUTE: 3.05 E9/L (ref 1.5–4)
LYMPHOCYTES RELATIVE PERCENT: 37.7 % (ref 20–42)
MCH RBC QN AUTO: 29 PG (ref 26–35)
MCHC RBC AUTO-ENTMCNC: 33.4 % (ref 32–34.5)
MCV RBC AUTO: 86.9 FL (ref 80–99.9)
MONOCYTES ABSOLUTE: 0.76 E9/L (ref 0.1–0.95)
MONOCYTES RELATIVE PERCENT: 9.4 % (ref 2–12)
NEUTROPHILS ABSOLUTE: 4.03 E9/L (ref 1.8–7.3)
NEUTROPHILS RELATIVE PERCENT: 49.7 % (ref 43–80)
NITRITE, URINE: NEGATIVE
PDW BLD-RTO: 13.2 FL (ref 11.5–15)
PH UA: 6 (ref 5–9)
PLATELET # BLD: 239 E9/L (ref 130–450)
PMV BLD AUTO: 10.5 FL (ref 7–12)
POTASSIUM REFLEX MAGNESIUM: 3.8 MMOL/L (ref 3.5–5)
PROTEIN UA: NEGATIVE MG/DL
RBC # BLD: 4.65 E12/L (ref 3.5–5.5)
RBC UA: NORMAL /HPF (ref 0–2)
SODIUM BLD-SCNC: 141 MMOL/L (ref 132–146)
SPECIFIC GRAVITY UA: <=1.005 (ref 1–1.03)
UROBILINOGEN, URINE: 0.2 E.U./DL
WBC # BLD: 8.1 E9/L (ref 4.5–11.5)
WBC UA: NORMAL /HPF (ref 0–5)

## 2019-08-01 PROCEDURE — 85025 COMPLETE CBC W/AUTO DIFF WBC: CPT

## 2019-08-01 PROCEDURE — 6370000000 HC RX 637 (ALT 250 FOR IP): Performed by: EMERGENCY MEDICINE

## 2019-08-01 PROCEDURE — 93005 ELECTROCARDIOGRAM TRACING: CPT | Performed by: EMERGENCY MEDICINE

## 2019-08-01 PROCEDURE — 87088 URINE BACTERIA CULTURE: CPT

## 2019-08-01 PROCEDURE — 99284 EMERGENCY DEPT VISIT MOD MDM: CPT

## 2019-08-01 PROCEDURE — 36415 COLL VENOUS BLD VENIPUNCTURE: CPT

## 2019-08-01 PROCEDURE — 70450 CT HEAD/BRAIN W/O DYE: CPT

## 2019-08-01 PROCEDURE — 80048 BASIC METABOLIC PNL TOTAL CA: CPT

## 2019-08-01 PROCEDURE — 81001 URINALYSIS AUTO W/SCOPE: CPT

## 2019-08-01 PROCEDURE — 71045 X-RAY EXAM CHEST 1 VIEW: CPT

## 2019-08-01 RX ORDER — MECLIZINE HCL 12.5 MG/1
25 TABLET ORAL ONCE
Status: COMPLETED | OUTPATIENT
Start: 2019-08-01 | End: 2019-08-01

## 2019-08-01 RX ADMIN — MECLIZINE 25 MG: 12.5 TABLET ORAL at 19:09

## 2019-08-01 NOTE — ED PROVIDER NOTES
perfused, no clubbing, cyanosis, or edema. Integument: Skin warm and dry. No rashes. Neurologic: GCS 15, CN II-XII grossly intact, no focal deficits,   Psychiatric: Normal Affect    -------------------------------------------------- RESULTS -------------------------------------------------  I have personally reviewed all laboratory and imaging results for this patient. Results are listed below.      LABS:  Results for orders placed or performed during the hospital encounter of 08/01/19   Urine Culture   Result Value Ref Range    Urine Culture, Routine <10,000 CFU/mL  Mixed gram positive organisms      CBC Auto Differential   Result Value Ref Range    WBC 8.1 4.5 - 11.5 E9/L    RBC 4.65 3.50 - 5.50 E12/L    Hemoglobin 13.5 11.5 - 15.5 g/dL    Hematocrit 40.4 34.0 - 48.0 %    MCV 86.9 80.0 - 99.9 fL    MCH 29.0 26.0 - 35.0 pg    MCHC 33.4 32.0 - 34.5 %    RDW 13.2 11.5 - 15.0 fL    Platelets 450 954 - 260 E9/L    MPV 10.5 7.0 - 12.0 fL    Neutrophils % 49.7 43.0 - 80.0 %    Immature Granulocytes % 0.1 0.0 - 5.0 %    Lymphocytes % 37.7 20.0 - 42.0 %    Monocytes % 9.4 2.0 - 12.0 %    Eosinophils % 2.2 0.0 - 6.0 %    Basophils % 0.9 0.0 - 2.0 %    Neutrophils # 4.03 1.80 - 7.30 E9/L    Immature Granulocytes # 0.01 E9/L    Lymphocytes # 3.05 1.50 - 4.00 E9/L    Monocytes # 0.76 0.10 - 0.95 E9/L    Eosinophils # 0.18 0.05 - 0.50 E9/L    Basophils # 0.07 0.00 - 0.20 K1/U   Basic Metabolic Panel w/ Reflex to MG   Result Value Ref Range    Sodium 141 132 - 146 mmol/L    Potassium reflex Magnesium 3.8 3.5 - 5.0 mmol/L    Chloride 101 98 - 107 mmol/L    CO2 30 (H) 22 - 29 mmol/L    Anion Gap 10 7 - 16 mmol/L    Glucose 101 (H) 74 - 99 mg/dL    BUN 23 8 - 23 mg/dL    CREATININE 0.9 0.5 - 1.0 mg/dL    GFR Non-African American >60 >=60 mL/min/1.73    GFR African American >60     Calcium 9.8 8.6 - 10.2 mg/dL   Urinalysis, reflex to microscopic   Result Value Ref Range    Color, UA Yellow Straw/Yellow    Clarity, UA Clear Clear IMPRESSION AND DISPOSITION ---------------------------------    IMPRESSION  1. Lightheadedness        DISPOSITION  Disposition: Discharge to home  Patient condition is good    SCRIBE ATTESTATION  8/1/19, 6:48 PM.    This note is prepared by Sarai Aguilar, acting as Scribe for DO Shekhar. DO Shekhar: The scribe's documentation has been prepared under my direction and personally reviewed by me in its entirety. I confirm that the note above accurately reflects all work, treatment, procedures, and medical decision making performed by me. NOTE: This report was transcribed using voice recognition software. Every effort was made to ensure accuracy; however, inadvertent computerized transcription errors may be present.        DO Shekhar  08/02/19 1761

## 2019-08-01 NOTE — ED NOTES
Bed: 18B-18  Expected date:   Expected time:   Means of arrival:   Comments:  ems     Efrain Villalpando RN  08/01/19 8473

## 2019-08-02 ENCOUNTER — CARE COORDINATION (OUTPATIENT)
Dept: CARE COORDINATION | Age: 79
End: 2019-08-02

## 2019-08-02 LAB
EKG ATRIAL RATE: 59 BPM
EKG P AXIS: 67 DEGREES
EKG P-R INTERVAL: 174 MS
EKG Q-T INTERVAL: 426 MS
EKG QRS DURATION: 76 MS
EKG QTC CALCULATION (BAZETT): 421 MS
EKG R AXIS: -10 DEGREES
EKG T AXIS: 38 DEGREES
EKG VENTRICULAR RATE: 59 BPM
URINE CULTURE, ROUTINE: NORMAL

## 2019-08-02 PROCEDURE — 93010 ELECTROCARDIOGRAM REPORT: CPT | Performed by: INTERNAL MEDICINE

## 2019-08-02 NOTE — CARE COORDINATION
Ambulatory Care Coordination ED Follow up Call       Reason for ED Visit:  Dizziness   Care Management Risk Score: CMRS 8  How are you feeling? :     Unknown   Patient Reports the following:  Patient reports she isn't dizzy at this time. She is very tired. The medication she was given in ED helped her to sleep last night. Did you call your PCP prior to going to the ED? No          Post Discharge Status:  What health concerns since you left the Emergency Room? None    Do you have wounds that you are caring for at home? No    Do you have a follow up appt scheduled? No.Declined to have call transferred to Pre-Service to schedule a follow up appointment. Review of Instructions:                                 Do you have any questions regarding your discharge instructions?:  No  Medications:    What questions do you have about your medications? None  Are you taking your medications as directed? If not - why? Yes   Can you afford your medications? yes  ADLS:  Do you need assistance of any kind at home? Yes:   What assistance is needed? Has home aide. Helps with light housework      FU appts/Provider:    Future Appointments   Date Time Provider Tay Singh   8/15/2019  9:00 AM Francisco Boyd DO [facility]   9/12/2019  9:00 AM Francisco Boyd DO [facility]       Health Maintenance Due   Topic Date Due    DTaP/Tdap/Td vaccine (1 - Tdap) 07/02/1957    Shingles Vaccine (1 of 2) 07/02/1988    Pneumococcal 65+ years Vaccine (2 of 2 - PPSV23) 04/04/2017     Patient advised to contact PCP office to have HM items/records faxed to PCP Office directly?   N/A

## 2019-08-03 LAB — URINE CULTURE, ROUTINE: NORMAL

## 2019-08-15 ENCOUNTER — HOSPITAL ENCOUNTER (OUTPATIENT)
Age: 79
Discharge: HOME | End: 2019-08-17
Payer: MEDICARE

## 2019-08-15 ENCOUNTER — OFFICE VISIT (OUTPATIENT)
Dept: FAMILY MEDICINE CLINIC | Age: 79
End: 2019-08-15
Payer: MEDICARE

## 2019-08-15 VITALS
SYSTOLIC BLOOD PRESSURE: 146 MMHG | TEMPERATURE: 98.1 F | WEIGHT: 190.4 LBS | HEIGHT: 59 IN | RESPIRATION RATE: 16 BRPM | HEART RATE: 62 BPM | BODY MASS INDEX: 38.38 KG/M2 | DIASTOLIC BLOOD PRESSURE: 62 MMHG | OXYGEN SATURATION: 98 %

## 2019-08-15 DIAGNOSIS — I10 HTN (HYPERTENSION), BENIGN: ICD-10-CM

## 2019-08-15 DIAGNOSIS — R42 DIZZINESS: Primary | ICD-10-CM

## 2019-08-15 DIAGNOSIS — F33.1 MODERATE EPISODE OF RECURRENT MAJOR DEPRESSIVE DISORDER (HCC): ICD-10-CM

## 2019-08-15 DIAGNOSIS — R30.0 DYSURIA: ICD-10-CM

## 2019-08-15 LAB
BILIRUBIN, POC: NORMAL
BLOOD URINE, POC: NORMAL
CLARITY, POC: CLEAR
COLOR, POC: YELLOW
GLUCOSE URINE, POC: NORMAL
KETONES, POC: NORMAL
LEUKOCYTE EST, POC: NORMAL
NITRITE, POC: NORMAL
PH, POC: 5
PROTEIN, POC: NORMAL
SPECIFIC GRAVITY, POC: 1
UROBILINOGEN, POC: 0.2

## 2019-08-15 PROCEDURE — G8400 PT W/DXA NO RESULTS DOC: HCPCS | Performed by: FAMILY MEDICINE

## 2019-08-15 PROCEDURE — 1036F TOBACCO NON-USER: CPT | Performed by: FAMILY MEDICINE

## 2019-08-15 PROCEDURE — 81003 URINALYSIS AUTO W/O SCOPE: CPT | Performed by: FAMILY MEDICINE

## 2019-08-15 PROCEDURE — 87088 URINE BACTERIA CULTURE: CPT

## 2019-08-15 PROCEDURE — G8417 CALC BMI ABV UP PARAM F/U: HCPCS | Performed by: FAMILY MEDICINE

## 2019-08-15 PROCEDURE — 99213 OFFICE O/P EST LOW 20 MIN: CPT | Performed by: FAMILY MEDICINE

## 2019-08-15 PROCEDURE — G8427 DOCREV CUR MEDS BY ELIG CLIN: HCPCS | Performed by: FAMILY MEDICINE

## 2019-08-15 PROCEDURE — 1090F PRES/ABSN URINE INCON ASSESS: CPT | Performed by: FAMILY MEDICINE

## 2019-08-15 PROCEDURE — 4040F PNEUMOC VAC/ADMIN/RCVD: CPT | Performed by: FAMILY MEDICINE

## 2019-08-15 PROCEDURE — 1123F ACP DISCUSS/DSCN MKR DOCD: CPT | Performed by: FAMILY MEDICINE

## 2019-08-15 RX ORDER — LORAZEPAM 1 MG/1
TABLET ORAL
Refills: 2 | COMMUNITY
Start: 2019-08-02 | End: 2019-09-30 | Stop reason: SDUPTHER

## 2019-08-15 RX ORDER — CETIRIZINE HYDROCHLORIDE 10 MG/1
TABLET ORAL
COMMUNITY
Start: 2019-07-23 | End: 2019-10-14 | Stop reason: SDUPTHER

## 2019-08-15 RX ORDER — OMEPRAZOLE 20 MG/1
20 CAPSULE, DELAYED RELEASE ORAL DAILY
Status: ON HOLD | COMMUNITY
Start: 2019-07-23 | End: 2019-12-30 | Stop reason: HOSPADM

## 2019-08-15 RX ORDER — CALCIUM CARBONATE 300MG(750)
400 TABLET,CHEWABLE ORAL
COMMUNITY
Start: 2019-03-07 | End: 2019-12-29

## 2019-08-15 RX ORDER — HYDROCHLOROTHIAZIDE 25 MG/1
25 TABLET ORAL DAILY
Qty: 90 TABLET | Refills: 1 | Status: SHIPPED
Start: 2019-08-15 | End: 2020-05-04 | Stop reason: SDUPTHER

## 2019-08-15 NOTE — PROGRESS NOTES
habits, constipation, diarrhea, gas/bloating, heartburn or nausea/vomiting  Musculoskeletal ROS: negative for - joint pain, joint stiffness, joint swelling or muscle, back pain, bowel or bladder incontinence  Neurological ROS: negative for - behavioral changes, confusion, dizziness, headaches, memory loss, numbness/tingling, seizures or speech problems, weakness  Dermatological ROS: negative for - dry skin, mole changes, nail changes, pruritus, rash or skin lesion changes    Physical Exam  Temp Readings from Last 3 Encounters:   08/15/19 98.1 °F (36.7 °C) (Oral)   08/01/19 97.7 °F (36.5 °C) (Oral)   07/30/19 97.2 °F (36.2 °C) (Oral)     Wt Readings from Last 3 Encounters:   08/15/19 190 lb 6.4 oz (86.4 kg)   08/01/19 190 lb (86.2 kg)   07/30/19 189 lb (85.7 kg)     BP Readings from Last 3 Encounters:   08/15/19 (!) 146/62   08/01/19 (!) 169/75   07/30/19 (!) 168/58     Pulse Readings from Last 3 Encounters:   08/15/19 62   08/01/19 54   07/30/19 76       General appearance: alert, well appearing, and in no distress, oriented to person, place, and time and normal appearing weight. CVS exam: normal rate, regular rhythm, normal S1, S2, no murmurs, rubs, clicks or gallops. Radial pulses 2+ bilateral.  PT/DP pulse 2+ bilat. No C/C/E    Chest: clear to auscultation, no wheezes, rales or rhonchi, symmetric air entry. Abdomen: Soft, non-tender, non-distended, positive BS in all 4 quadrants    Extremities:Dorsalis pedis pulses palpated bilaterally, no clubbing, cyanosis, edema or erythema     SKIN: no lesions, jaundice, petechiae, pallor, cyanosis, ecchymosis    NEURO: gross motor exam normal by observation, gait normal    Mental status - alert, oriented to person, place, and time, normal mood, behavior, speech, dress, motor activity, and thought processes      Assessment/Plan  [facility] was seen today for dizziness and urinary tract infection.     Diagnoses and all orders for this visit:    Dizziness  - To see ENT    Moderate episode of recurrent major depressive disorder (HCC)  - Stable    HTN (hypertension), benign  - Did not take meds as prescribed today    Dysuria  -     POCT Urinalysis No Micro (Auto)- NORMAL        Quality & Risk Score Accuracy    Visit Dx:  F33.1 - Moderate episode of recurrent major depressive disorder ([facility] [address]  Assessment and plan:  Stable based upon symptoms and exam. Continue current treatment plan and follow up at least yearly. Last edited 08/15/19 09:57 EDT by Edilberto Hoang, DO           Return in about 3 months (around 11/15/2019). Daja Peña, DO    Call or go to ED immediately if symptoms worsen or persist.    Educational materials and/or home exercises printed for patient's review and were included in patient instructions on his/her After Visit Summary and given to patient at the end of visit. Counseled regarding above diagnosis, including possible risks and complications,  especially if left uncontrolled. Counseled regarding the possible side effects, risks, benefits and alternatives to treatment; patient and/or guardian verbalizes understanding, agrees, feels comfortable with and wishes to proceed with above treatment plan. Advised patient to call with any new medication issues, and read all Rx info from pharmacy to assure aware of all possible risks and side effects of medication before taking. Reviewed age and gender appropriate health screening exams and vaccinations. Advised patient regarding importance of keeping up with recommended health maintenance and to schedule as soon as possible if overdue, as this is important in assessing for undiagnosed pathology, especially cancer, as well as protecting against potentially harmful/life threatening disease. Patient and/or guardian verbalizes understanding and agrees with above counseling, assessment and plan. All questions answered.

## 2019-08-17 LAB — URINE CULTURE, ROUTINE: NORMAL

## 2019-08-22 ENCOUNTER — TELEPHONE (OUTPATIENT)
Dept: PHARMACY | Facility: CLINIC | Age: 79
End: 2019-08-22

## 2019-08-22 NOTE — TELEPHONE ENCOUNTER
CLINICAL PHARMACY CONSULT: MED RECONCILIATION/REVIEW ADDENDUM    For Pharmacy Admin Tracking Only    PHSO: Yes  Total # of Interventions Recommended: 1  - New Order #: 0 New Medication Order Reason(s): Adherence  - Maintenance Safety Lab Monitoring #: 1  Recommended intervention potential cost savings: 1  Time Spent (min): 15    Chcihi Ramos CP.   [address]

## 2019-09-03 ENCOUNTER — CARE COORDINATION (OUTPATIENT)
Dept: FAMILY MEDICINE CLINIC | Age: 79
End: 2019-09-03

## 2019-09-03 ENCOUNTER — TELEPHONE (OUTPATIENT)
Dept: FAMILY MEDICINE CLINIC | Age: 79
End: 2019-09-03

## 2019-09-03 SDOH — HEALTH STABILITY: MENTAL HEALTH
STRESS IS WHEN SOMEONE FEELS TENSE, NERVOUS, ANXIOUS, OR CAN'T SLEEP AT NIGHT BECAUSE THEIR MIND IS TROUBLED. HOW STRESSED ARE YOU?: VERY MUCH

## 2019-09-03 SDOH — SOCIAL STABILITY: SOCIAL INSECURITY
WITHIN THE LAST YEAR, HAVE YOU BEEN KICKED, HIT, SLAPPED, OR OTHERWISE PHYSICALLY HURT BY YOUR PARTNER OR EX-PARTNER?: NO

## 2019-09-03 SDOH — SOCIAL STABILITY: SOCIAL NETWORK
DO YOU BELONG TO ANY CLUBS OR ORGANIZATIONS SUCH AS CHURCH GROUPS UNIONS, FRATERNAL OR ATHLETIC GROUPS, OR SCHOOL GROUPS?: YES

## 2019-09-03 SDOH — SOCIAL STABILITY: SOCIAL NETWORK: HOW OFTEN DO YOU GET TOGETHER WITH FRIENDS OR RELATIVES?: MORE THAN THREE TIMES A WEEK

## 2019-09-03 SDOH — ECONOMIC STABILITY: FOOD INSECURITY: WITHIN THE PAST 12 MONTHS, YOU WORRIED THAT YOUR FOOD WOULD RUN OUT BEFORE YOU GOT MONEY TO BUY MORE.: SOMETIMES TRUE

## 2019-09-03 SDOH — ECONOMIC STABILITY: TRANSPORTATION INSECURITY
IN THE PAST 12 MONTHS, HAS THE LACK OF TRANSPORTATION KEPT YOU FROM MEDICAL APPOINTMENTS OR FROM GETTING MEDICATIONS?: NO

## 2019-09-03 SDOH — SOCIAL STABILITY: SOCIAL NETWORK: HOW OFTEN DO YOU ATTENT MEETINGS OF THE CLUB OR ORGANIZATION YOU BELONG TO?: 1 TO 4 TIMES PER YEAR

## 2019-09-03 SDOH — SOCIAL STABILITY: SOCIAL INSECURITY: WITHIN THE LAST YEAR, HAVE YOU BEEN AFRAID OF YOUR PARTNER OR EX-PARTNER?: NO

## 2019-09-03 SDOH — SOCIAL STABILITY: SOCIAL NETWORK
IN A TYPICAL WEEK, HOW MANY TIMES DO YOU TALK ON THE PHONE WITH FAMILY, FRIENDS, OR NEIGHBORS?: MORE THAN THREE TIMES A WEEK

## 2019-09-03 SDOH — SOCIAL STABILITY: SOCIAL NETWORK: HOW OFTEN DO YOU ATTEND CHURCH OR RELIGIOUS SERVICES?: MORE THAN 4 TIMES PER YEAR

## 2019-09-03 SDOH — ECONOMIC STABILITY: INCOME INSECURITY: HOW HARD IS IT FOR YOU TO PAY FOR THE VERY BASICS LIKE FOOD, HOUSING, MEDICAL CARE, AND HEATING?: SOMEWHAT HARD

## 2019-09-03 SDOH — HEALTH STABILITY: MENTAL HEALTH: HOW OFTEN DO YOU HAVE A DRINK CONTAINING ALCOHOL?: NEVER

## 2019-09-03 SDOH — SOCIAL STABILITY: SOCIAL NETWORK: ARE YOU MARRIED, WIDOWED, DIVORCED, SEPARATED, NEVER MARRIED, OR LIVING WITH A PARTNER?: WIDOWED

## 2019-09-03 SDOH — SOCIAL STABILITY: SOCIAL INSECURITY: WITHIN THE LAST YEAR, HAVE YOU BEEN HUMILIATED OR EMOTIONALLY ABUSED IN OTHER WAYS BY YOUR PARTNER OR EX-PARTNER?: NO

## 2019-09-03 SDOH — ECONOMIC STABILITY: TRANSPORTATION INSECURITY
IN THE PAST 12 MONTHS, HAS LACK OF TRANSPORTATION KEPT YOU FROM MEETINGS, WORK, OR FROM GETTING THINGS NEEDED FOR DAILY LIVING?: NO

## 2019-09-03 SDOH — SOCIAL STABILITY: SOCIAL INSECURITY
WITHIN THE LAST YEAR, HAVE TO BEEN RAPED OR FORCED TO HAVE ANY KIND OF SEXUAL ACTIVITY BY YOUR PARTNER OR EX-PARTNER?: NO

## 2019-09-03 SDOH — ECONOMIC STABILITY: FOOD INSECURITY: WITHIN THE PAST 12 MONTHS, THE FOOD YOU BOUGHT JUST DIDN'T LAST AND YOU DIDN'T HAVE MONEY TO GET MORE.: SOMETIMES TRUE

## 2019-09-03 SDOH — HEALTH STABILITY: PHYSICAL HEALTH: ON AVERAGE, HOW MANY DAYS PER WEEK DO YOU ENGAGE IN MODERATE TO STRENUOUS EXERCISE (LIKE A BRISK WALK)?: 0 DAYS

## 2019-09-03 SDOH — HEALTH STABILITY: PHYSICAL HEALTH: ON AVERAGE, HOW MANY MINUTES DO YOU ENGAGE IN EXERCISE AT THIS LEVEL?: 0 MIN

## 2019-09-03 NOTE — TELEPHONE ENCOUNTER
Chastity Bond called and was just on the phone with patient for 40 minutes. Patient is leaving for [facility] on Monday. Patient is having sinus infection symptoms and OTC medication is not working. No fever, runny nose, headache. Requesting a Rx [place]. Also Patient's depression and anxiety is very bad-Patient cried almost the entire 40 minutes of the telephone conversation. Patient is in agreement to start a depression medication however the Celexa was not working so she has stopped it. But is willing to try something else if you are willing to call something in. And Patient is calling Chastity Bond when she gets back from [facility] to set up Counseling and work on application for [place].

## 2019-09-05 ENCOUNTER — OFFICE VISIT (OUTPATIENT)
Dept: FAMILY MEDICINE CLINIC | Age: 79
End: 2019-09-05
Payer: MEDICARE

## 2019-09-05 VITALS
RESPIRATION RATE: 14 BRPM | BODY MASS INDEX: 39.67 KG/M2 | TEMPERATURE: 97.6 F | DIASTOLIC BLOOD PRESSURE: 80 MMHG | WEIGHT: 189 LBS | OXYGEN SATURATION: 96 % | HEART RATE: 69 BPM | HEIGHT: 58 IN | SYSTOLIC BLOOD PRESSURE: 130 MMHG

## 2019-09-05 DIAGNOSIS — J01.81 OTHER ACUTE RECURRENT SINUSITIS: Primary | ICD-10-CM

## 2019-09-05 PROCEDURE — 1123F ACP DISCUSS/DSCN MKR DOCD: CPT | Performed by: FAMILY MEDICINE

## 2019-09-05 PROCEDURE — G8400 PT W/DXA NO RESULTS DOC: HCPCS | Performed by: FAMILY MEDICINE

## 2019-09-05 PROCEDURE — 1036F TOBACCO NON-USER: CPT | Performed by: FAMILY MEDICINE

## 2019-09-05 PROCEDURE — 99214 OFFICE O/P EST MOD 30 MIN: CPT | Performed by: FAMILY MEDICINE

## 2019-09-05 PROCEDURE — G8417 CALC BMI ABV UP PARAM F/U: HCPCS | Performed by: FAMILY MEDICINE

## 2019-09-05 PROCEDURE — G8427 DOCREV CUR MEDS BY ELIG CLIN: HCPCS | Performed by: FAMILY MEDICINE

## 2019-09-05 PROCEDURE — 1090F PRES/ABSN URINE INCON ASSESS: CPT | Performed by: FAMILY MEDICINE

## 2019-09-05 PROCEDURE — 4040F PNEUMOC VAC/ADMIN/RCVD: CPT | Performed by: FAMILY MEDICINE

## 2019-09-05 RX ORDER — CEFDINIR 300 MG/1
300 CAPSULE ORAL 2 TIMES DAILY
Qty: 14 CAPSULE | Refills: 0 | Status: SHIPPED | OUTPATIENT
Start: 2019-09-05 | End: 2019-09-12

## 2019-09-05 NOTE — PROGRESS NOTES
on his/her After Visit Summary and given to patient at the end of visit. Counseled regarding above diagnosis, including possible risks and complications,  especially if left uncontrolled. Counseled regarding the possible side effects, risks, benefits and alternatives to treatment; patient and/or guardian verbalizes understanding, agrees, feels comfortable with and wishes to proceed with above treatment plan. Advised patient to call with any new medication issues, and read all Rx info from pharmacy to assure aware of all possible risks and side effects of medication before taking. Reviewed age and gender appropriate health screening exams and vaccinations. Advised patient regarding importance of keeping up with recommended health maintenance and to schedule as soon as possible if overdue, as this is important in assessing for undiagnosed pathology, especially cancer, as well as protecting against potentially harmful/life threatening disease. Patient and/or guardian verbalizes understanding and agrees with above counseling, assessment and plan. All questions answered.

## 2019-09-23 ENCOUNTER — HOSPITAL ENCOUNTER (OUTPATIENT)
Age: 79
Discharge: HOME | End: 2019-09-25
Payer: MEDICARE

## 2019-09-23 ENCOUNTER — OFFICE VISIT (OUTPATIENT)
Dept: FAMILY MEDICINE CLINIC | Age: 79
End: 2019-09-23
Payer: MEDICARE

## 2019-09-23 VITALS
HEART RATE: 63 BPM | WEIGHT: 192 LBS | TEMPERATURE: 97.5 F | SYSTOLIC BLOOD PRESSURE: 146 MMHG | RESPIRATION RATE: 16 BRPM | OXYGEN SATURATION: 97 % | BODY MASS INDEX: 40.13 KG/M2 | DIASTOLIC BLOOD PRESSURE: 60 MMHG

## 2019-09-23 DIAGNOSIS — R31.9 HEMATURIA, UNSPECIFIED TYPE: ICD-10-CM

## 2019-09-23 DIAGNOSIS — Z87.19 HISTORY OF DIVERTICULITIS: ICD-10-CM

## 2019-09-23 DIAGNOSIS — R10.9 LEFT SIDED ABDOMINAL PAIN: Primary | ICD-10-CM

## 2019-09-23 DIAGNOSIS — R93.89 ABNORMAL FINDING ON CT SCAN: ICD-10-CM

## 2019-09-23 LAB
BILIRUBIN, POC: NEGATIVE
BLOOD URINE, POC: NORMAL
CLARITY, POC: CLEAR
COLOR, POC: NORMAL
GLUCOSE URINE, POC: NEGATIVE
KETONES, POC: NEGATIVE
LEUKOCYTE EST, POC: NORMAL
NITRITE, POC: NEGATIVE
PH, POC: 5
PROTEIN, POC: NEGATIVE
SPECIFIC GRAVITY, POC: <=1.005
UROBILINOGEN, POC: 0.2

## 2019-09-23 PROCEDURE — 1123F ACP DISCUSS/DSCN MKR DOCD: CPT | Performed by: PHYSICIAN ASSISTANT

## 2019-09-23 PROCEDURE — 81002 URINALYSIS NONAUTO W/O SCOPE: CPT | Performed by: PHYSICIAN ASSISTANT

## 2019-09-23 PROCEDURE — 4040F PNEUMOC VAC/ADMIN/RCVD: CPT | Performed by: PHYSICIAN ASSISTANT

## 2019-09-23 PROCEDURE — G8417 CALC BMI ABV UP PARAM F/U: HCPCS | Performed by: PHYSICIAN ASSISTANT

## 2019-09-23 PROCEDURE — 1036F TOBACCO NON-USER: CPT | Performed by: PHYSICIAN ASSISTANT

## 2019-09-23 PROCEDURE — G8400 PT W/DXA NO RESULTS DOC: HCPCS | Performed by: PHYSICIAN ASSISTANT

## 2019-09-23 PROCEDURE — 1090F PRES/ABSN URINE INCON ASSESS: CPT | Performed by: PHYSICIAN ASSISTANT

## 2019-09-23 PROCEDURE — 87088 URINE BACTERIA CULTURE: CPT

## 2019-09-23 PROCEDURE — G8427 DOCREV CUR MEDS BY ELIG CLIN: HCPCS | Performed by: PHYSICIAN ASSISTANT

## 2019-09-23 PROCEDURE — 99213 OFFICE O/P EST LOW 20 MIN: CPT | Performed by: PHYSICIAN ASSISTANT

## 2019-09-23 NOTE — PROGRESS NOTES
[oxycodone-aspirin]; and Percodan [oxycodone-aspirin]    Physical Exam         VS:  BP (!) 146/60 (Site: Right Upper Arm, Position: Sitting, Cuff Size: Large Adult)   Pulse 63   Temp 97.5 °F (36.4 °C) (Oral)   Resp 16   Wt 192 lb (87.1 kg)   LMP 07/24/1974   SpO2 97%   BMI 40.13 kg/m²    Oxygen Saturation Interpretation: Normal.    General Appearance/Constitutional:  Alert, development consistent with age, NAD. HEENT:  NCAT. Lungs: CTAB without wheezing, rales, or rhonchi. Heart:  RRR, no murmurs, rubs, or gallops. Abdomen:  General Appearance: No obvious trauma or bruising. No rashes or lesions. Bowel sounds: BS+x4       Distension:  No distension. Tenderness: Generalized tenderness to deep palpation, non-distended without guarding, rebound, or rigidity. Liver/Spleen: Non-tender and no hepatosplenomegaly. Pulsatile Mass: None noted. Back: CVA Tenderness: No bilateral tenderness or bruising. Skin:  Normal turgor. Warm, dry, without visible rash, unless noted elsewhere. Neurological:  Orientation age-appropriate. Motor functions intact. Lab / Imaging Results   (All laboratory and radiology results have been personally reviewed by myself)  Labs:  Results for orders placed or performed in visit on 09/23/19   POCT Urinalysis no Micro   Result Value Ref Range    Color, UA light yellow     Clarity, UA clear     Glucose, UA POC negative     Bilirubin, UA negative     Ketones, UA negative     Spec Grav, UA <=1.005     Blood, UA POC Trace-lysed     pH, UA 5.0     Protein, UA POC negative     Urobilinogen, UA 0.2     Leukocytes, UA Trace     Nitrite, UA negative      Imaging: All Radiology results interpreted by Radiologist unless otherwise noted. Assessment / Plan     Impression(s):  1. Left sided abdominal pain    2. Hematuria, unspecified type    3. History of diverticulitis    4.  Abnormal finding on CT scan      Disposition:  Disposition: Discharge to home    Discussed with ANA. Urine culture sent and CT abd/pelvis ordered due to symptoms and to recheck lymph nodes from previous CT. Instructed to avoid NSAID use. Patient voiced understanding and states that she knows she shouldn't take it and stopped it a few days ago. She would like to hold off on Flagyl until CT scan. Recommended bland diet. Avoid spicy foods, caffeine, and alcohol to prevent exacerbation. Advise f/u with PCP in 5-7 days for recheck and further workup as indicated. ED sooner if symptoms worsen or change. ED immediately with the development of fever, shaking chills, body aches, severe/worsening pain, lethargy,  hematochezia, hematemesis, coffee-ground emesis, CP, or SOB. Pt is in agreement with this care plan. All questions answered.

## 2019-09-25 LAB — URINE CULTURE, ROUTINE: NORMAL

## 2019-09-26 DIAGNOSIS — I10 HTN (HYPERTENSION), BENIGN: ICD-10-CM

## 2019-09-26 RX ORDER — IRBESARTAN 75 MG/1
75 TABLET ORAL DAILY
Qty: 90 TABLET | Refills: 1 | Status: SHIPPED
Start: 2019-09-26 | End: 2020-05-04 | Stop reason: SDUPTHER

## 2019-09-27 ENCOUNTER — TELEPHONE (OUTPATIENT)
Dept: FAMILY MEDICINE CLINIC | Age: 79
End: 2019-09-27

## 2019-09-27 ENCOUNTER — HOSPITAL ENCOUNTER (OUTPATIENT)
Dept: CT IMAGING | Age: 79
Discharge: HOME | End: 2019-09-29
Payer: MEDICARE

## 2019-09-27 DIAGNOSIS — Z87.19 HISTORY OF DIVERTICULITIS: ICD-10-CM

## 2019-09-27 DIAGNOSIS — R31.9 HEMATURIA, UNSPECIFIED TYPE: ICD-10-CM

## 2019-09-27 DIAGNOSIS — R93.89 ABNORMAL FINDING ON CT SCAN: ICD-10-CM

## 2019-09-27 DIAGNOSIS — R10.9 LEFT SIDED ABDOMINAL PAIN: ICD-10-CM

## 2019-09-27 DIAGNOSIS — R10.9 LEFT SIDED ABDOMINAL PAIN: Primary | ICD-10-CM

## 2019-09-27 PROCEDURE — 74176 CT ABD & PELVIS W/O CONTRAST: CPT

## 2019-09-27 NOTE — TELEPHONE ENCOUNTER
CLINICAL PHARMACY CONSULT: MED RECONCILIATION/REVIEW ADDENDUM    For Pharmacy Admin Tracking Only    PHSO: Yes  Total # of Interventions Recommended: 1  - New Order #: 1 New Medication Order Reason(s): Adherence  - Maintenance Safety Lab Monitoring #: 1  Recommended intervention potential cost savings: 1  Time Spent (min): 15    Chichi Ramos CP.   [address]

## 2019-09-30 DIAGNOSIS — F41.9 ANXIETY AND DEPRESSION: Primary | Chronic | ICD-10-CM

## 2019-09-30 DIAGNOSIS — F32.A ANXIETY AND DEPRESSION: Primary | Chronic | ICD-10-CM

## 2019-09-30 RX ORDER — LORAZEPAM 1 MG/1
1 TABLET ORAL NIGHTLY PRN
Qty: 30 TABLET | Refills: 2 | Status: SHIPPED | OUTPATIENT
Start: 2019-09-30 | End: 2019-10-30

## 2019-10-02 ENCOUNTER — CARE COORDINATION (OUTPATIENT)
Dept: CARE COORDINATION | Age: 79
End: 2019-10-02

## 2019-10-03 ENCOUNTER — OFFICE VISIT (OUTPATIENT)
Dept: FAMILY MEDICINE CLINIC | Age: 79
End: 2019-10-03
Payer: MEDICARE

## 2019-10-03 VITALS
BODY MASS INDEX: 40.26 KG/M2 | WEIGHT: 191.8 LBS | HEART RATE: 57 BPM | HEIGHT: 58 IN | TEMPERATURE: 98.1 F | RESPIRATION RATE: 18 BRPM | DIASTOLIC BLOOD PRESSURE: 70 MMHG | SYSTOLIC BLOOD PRESSURE: 138 MMHG | OXYGEN SATURATION: 97 %

## 2019-10-03 DIAGNOSIS — Z13.31 POSITIVE DEPRESSION SCREENING: ICD-10-CM

## 2019-10-03 DIAGNOSIS — E66.01 MORBID OBESITY WITH BMI OF 40.0-44.9, ADULT (HCC): ICD-10-CM

## 2019-10-03 DIAGNOSIS — Z00.00 ROUTINE GENERAL MEDICAL EXAMINATION AT A HEALTH CARE FACILITY: Primary | ICD-10-CM

## 2019-10-03 PROCEDURE — 1123F ACP DISCUSS/DSCN MKR DOCD: CPT | Performed by: FAMILY MEDICINE

## 2019-10-03 PROCEDURE — G8431 POS CLIN DEPRES SCRN F/U DOC: HCPCS | Performed by: FAMILY MEDICINE

## 2019-10-03 PROCEDURE — G0439 PPPS, SUBSEQ VISIT: HCPCS | Performed by: FAMILY MEDICINE

## 2019-10-03 PROCEDURE — G0444 DEPRESSION SCREEN ANNUAL: HCPCS | Performed by: FAMILY MEDICINE

## 2019-10-03 PROCEDURE — 4040F PNEUMOC VAC/ADMIN/RCVD: CPT | Performed by: FAMILY MEDICINE

## 2019-10-03 PROCEDURE — G8484 FLU IMMUNIZE NO ADMIN: HCPCS | Performed by: FAMILY MEDICINE

## 2019-10-03 ASSESSMENT — LIFESTYLE VARIABLES: HOW OFTEN DO YOU HAVE A DRINK CONTAINING ALCOHOL: 0

## 2019-10-03 ASSESSMENT — PATIENT HEALTH QUESTIONNAIRE - PHQ9: SUM OF ALL RESPONSES TO PHQ QUESTIONS 1-9: 15

## 2019-10-07 ENCOUNTER — CARE COORDINATION (OUTPATIENT)
Dept: CARE COORDINATION | Age: 79
End: 2019-10-07

## 2019-10-08 ENCOUNTER — CARE COORDINATION (OUTPATIENT)
Dept: FAMILY MEDICINE CLINIC | Age: 79
End: 2019-10-08

## 2019-10-09 ENCOUNTER — CARE COORDINATION (OUTPATIENT)
Dept: FAMILY MEDICINE CLINIC | Age: 79
End: 2019-10-09

## 2019-10-14 RX ORDER — CETIRIZINE HYDROCHLORIDE 10 MG/1
10 TABLET ORAL DAILY
Qty: 90 TABLET | Refills: 1 | Status: SHIPPED | OUTPATIENT
Start: 2019-10-14 | End: 2019-12-20

## 2019-11-07 ENCOUNTER — CARE COORDINATION (OUTPATIENT)
Dept: CARE COORDINATION | Age: 79
End: 2019-11-07

## 2019-11-08 ENCOUNTER — CARE COORDINATION (OUTPATIENT)
Dept: FAMILY MEDICINE CLINIC | Age: 79
End: 2019-11-08

## 2019-11-18 DIAGNOSIS — Z76.0 MEDICATION REFILL: ICD-10-CM

## 2019-11-18 RX ORDER — METOPROLOL SUCCINATE 50 MG/1
50 TABLET, EXTENDED RELEASE ORAL DAILY
Qty: 90 TABLET | Refills: 1 | Status: ON HOLD | OUTPATIENT
Start: 2019-11-18 | End: 2019-12-22 | Stop reason: HOSPADM

## 2019-12-03 ENCOUNTER — OFFICE VISIT (OUTPATIENT)
Dept: FAMILY MEDICINE CLINIC | Age: 79
End: 2019-12-03
Payer: MEDICARE

## 2019-12-03 ENCOUNTER — TELEPHONE (OUTPATIENT)
Dept: FAMILY MEDICINE CLINIC | Age: 79
End: 2019-12-03

## 2019-12-03 VITALS
BODY MASS INDEX: 40.3 KG/M2 | HEIGHT: 58 IN | WEIGHT: 192 LBS | TEMPERATURE: 98.6 F | SYSTOLIC BLOOD PRESSURE: 132 MMHG | DIASTOLIC BLOOD PRESSURE: 74 MMHG | OXYGEN SATURATION: 98 % | RESPIRATION RATE: 16 BRPM | HEART RATE: 74 BPM

## 2019-12-03 DIAGNOSIS — J32.9 SINOBRONCHITIS: Primary | ICD-10-CM

## 2019-12-03 DIAGNOSIS — J40 SINOBRONCHITIS: Primary | ICD-10-CM

## 2019-12-03 DIAGNOSIS — Z91.81 AT HIGH RISK FOR FALLS: ICD-10-CM

## 2019-12-03 PROCEDURE — G8417 CALC BMI ABV UP PARAM F/U: HCPCS | Performed by: FAMILY MEDICINE

## 2019-12-03 PROCEDURE — 1090F PRES/ABSN URINE INCON ASSESS: CPT | Performed by: FAMILY MEDICINE

## 2019-12-03 PROCEDURE — G8400 PT W/DXA NO RESULTS DOC: HCPCS | Performed by: FAMILY MEDICINE

## 2019-12-03 PROCEDURE — G8427 DOCREV CUR MEDS BY ELIG CLIN: HCPCS | Performed by: FAMILY MEDICINE

## 2019-12-03 PROCEDURE — 99214 OFFICE O/P EST MOD 30 MIN: CPT | Performed by: FAMILY MEDICINE

## 2019-12-03 PROCEDURE — 1036F TOBACCO NON-USER: CPT | Performed by: FAMILY MEDICINE

## 2019-12-03 PROCEDURE — 1123F ACP DISCUSS/DSCN MKR DOCD: CPT | Performed by: FAMILY MEDICINE

## 2019-12-03 PROCEDURE — G8484 FLU IMMUNIZE NO ADMIN: HCPCS | Performed by: FAMILY MEDICINE

## 2019-12-03 PROCEDURE — 4040F PNEUMOC VAC/ADMIN/RCVD: CPT | Performed by: FAMILY MEDICINE

## 2019-12-03 RX ORDER — DOXYCYCLINE HYCLATE 100 MG
100 TABLET ORAL 2 TIMES DAILY
Qty: 20 TABLET | Refills: 0 | Status: SHIPPED | OUTPATIENT
Start: 2019-12-03 | End: 2019-12-12 | Stop reason: ALTCHOICE

## 2019-12-03 RX ORDER — BENZONATATE 200 MG/1
200 CAPSULE ORAL 3 TIMES DAILY PRN
Qty: 30 CAPSULE | Refills: 0 | Status: SHIPPED | OUTPATIENT
Start: 2019-12-03 | End: 2019-12-10

## 2019-12-09 ENCOUNTER — CARE COORDINATION (OUTPATIENT)
Dept: FAMILY MEDICINE CLINIC | Age: 79
End: 2019-12-09

## 2019-12-12 ENCOUNTER — TELEPHONE (OUTPATIENT)
Dept: FAMILY MEDICINE CLINIC | Age: 79
End: 2019-12-12

## 2019-12-12 ENCOUNTER — CARE COORDINATION (OUTPATIENT)
Dept: CARE COORDINATION | Age: 79
End: 2019-12-12

## 2019-12-12 RX ORDER — LORAZEPAM 1 MG/1
TABLET ORAL
Refills: 2 | COMMUNITY
Start: 2019-11-03 | End: 2019-12-31 | Stop reason: SINTOL

## 2019-12-17 ENCOUNTER — CARE COORDINATION (OUTPATIENT)
Dept: FAMILY MEDICINE CLINIC | Age: 79
End: 2019-12-17

## 2019-12-19 ENCOUNTER — APPOINTMENT (OUTPATIENT)
Dept: GENERAL RADIOLOGY | Age: 79
End: 2019-12-19
Payer: MEDICARE

## 2019-12-19 ENCOUNTER — HOSPITAL ENCOUNTER (OUTPATIENT)
Age: 79
Setting detail: OBSERVATION
Discharge: HOME | End: 2019-12-22
Attending: EMERGENCY MEDICINE | Admitting: FAMILY MEDICINE
Payer: MEDICARE

## 2019-12-19 ENCOUNTER — APPOINTMENT (OUTPATIENT)
Dept: CT IMAGING | Age: 79
End: 2019-12-19
Payer: MEDICARE

## 2019-12-19 DIAGNOSIS — R26.2 AMBULATORY DYSFUNCTION: ICD-10-CM

## 2019-12-19 DIAGNOSIS — R42 DIZZINESS: Primary | ICD-10-CM

## 2019-12-19 LAB
ALBUMIN SERPL-MCNC: 4.1 G/DL (ref 3.5–5.2)
ALP BLD-CCNC: 79 U/L (ref 35–104)
ALT SERPL-CCNC: 14 U/L (ref 0–32)
ANION GAP SERPL CALCULATED.3IONS-SCNC: 13 MMOL/L (ref 7–16)
APTT: 34.6 SEC (ref 24.5–35.1)
AST SERPL-CCNC: 20 U/L (ref 0–31)
BACTERIA: NORMAL /HPF
BASOPHILS ABSOLUTE: 0.04 E9/L (ref 0–0.2)
BASOPHILS RELATIVE PERCENT: 0.4 % (ref 0–2)
BILIRUB SERPL-MCNC: 0.2 MG/DL (ref 0–1.2)
BILIRUBIN URINE: NEGATIVE
BLOOD, URINE: NEGATIVE
BUN BLDV-MCNC: 26 MG/DL (ref 8–23)
CALCIUM SERPL-MCNC: 9 MG/DL (ref 8.6–10.2)
CHLORIDE BLD-SCNC: 103 MMOL/L (ref 98–107)
CHP ED QC CHECK: NORMAL
CLARITY: CLEAR
CO2: 21 MMOL/L (ref 22–29)
COLOR: ABNORMAL
CREAT SERPL-MCNC: 1.1 MG/DL (ref 0.5–1)
EOSINOPHILS ABSOLUTE: 0.26 E9/L (ref 0.05–0.5)
EOSINOPHILS RELATIVE PERCENT: 2.7 % (ref 0–6)
GFR AFRICAN AMERICAN: 58
GFR NON-AFRICAN AMERICAN: 48 ML/MIN/1.73
GLUCOSE BLD-MCNC: 110 MG/DL
GLUCOSE BLD-MCNC: 110 MG/DL (ref 74–99)
GLUCOSE URINE: NEGATIVE MG/DL
HCT VFR BLD CALC: 38.2 % (ref 34–48)
HEMOGLOBIN: 12.4 G/DL (ref 11.5–15.5)
IMMATURE GRANULOCYTES #: 0.03 E9/L
IMMATURE GRANULOCYTES %: 0.3 % (ref 0–5)
INR BLD: 0.9
KETONES, URINE: NEGATIVE MG/DL
LEUKOCYTE ESTERASE, URINE: ABNORMAL
LYMPHOCYTES ABSOLUTE: 2.75 E9/L (ref 1.5–4)
LYMPHOCYTES RELATIVE PERCENT: 28.1 % (ref 20–42)
MCH RBC QN AUTO: 28.3 PG (ref 26–35)
MCHC RBC AUTO-ENTMCNC: 32.5 % (ref 32–34.5)
MCV RBC AUTO: 87.2 FL (ref 80–99.9)
MONOCYTES ABSOLUTE: 0.87 E9/L (ref 0.1–0.95)
MONOCYTES RELATIVE PERCENT: 8.9 % (ref 2–12)
NEUTROPHILS ABSOLUTE: 5.83 E9/L (ref 1.8–7.3)
NEUTROPHILS RELATIVE PERCENT: 59.6 % (ref 43–80)
NITRITE, URINE: NEGATIVE
PDW BLD-RTO: 13.4 FL (ref 11.5–15)
PH UA: 5.5 (ref 5–9)
PLATELET # BLD: 232 E9/L (ref 130–450)
PMV BLD AUTO: 10.2 FL (ref 7–12)
POTASSIUM SERPL-SCNC: 3.8 MMOL/L (ref 3.5–5)
PROTEIN UA: NEGATIVE MG/DL
PROTHROMBIN TIME: 10.4 SEC (ref 9.3–12.4)
RBC # BLD: 4.38 E12/L (ref 3.5–5.5)
RBC UA: NORMAL /HPF (ref 0–2)
SODIUM BLD-SCNC: 137 MMOL/L (ref 132–146)
SPECIFIC GRAVITY UA: <=1.005 (ref 1–1.03)
TOTAL CK: 111 U/L (ref 20–180)
TOTAL PROTEIN: 7.3 G/DL (ref 6.4–8.3)
TROPONIN: <0.01 NG/ML (ref 0–0.03)
TSH SERPL DL<=0.05 MIU/L-ACNC: 4.53 UIU/ML (ref 0.27–4.2)
UROBILINOGEN, URINE: 0.2 E.U./DL
WBC # BLD: 9.8 E9/L (ref 4.5–11.5)
WBC UA: NORMAL /HPF (ref 0–5)

## 2019-12-19 PROCEDURE — 96376 TX/PRO/DX INJ SAME DRUG ADON: CPT

## 2019-12-19 PROCEDURE — 93005 ELECTROCARDIOGRAM TRACING: CPT | Performed by: EMERGENCY MEDICINE

## 2019-12-19 PROCEDURE — 70450 CT HEAD/BRAIN W/O DYE: CPT

## 2019-12-19 PROCEDURE — 81001 URINALYSIS AUTO W/SCOPE: CPT

## 2019-12-19 PROCEDURE — 85025 COMPLETE CBC W/AUTO DIFF WBC: CPT

## 2019-12-19 PROCEDURE — 85610 PROTHROMBIN TIME: CPT

## 2019-12-19 PROCEDURE — 96374 THER/PROPH/DIAG INJ IV PUSH: CPT

## 2019-12-19 PROCEDURE — 84484 ASSAY OF TROPONIN QUANT: CPT

## 2019-12-19 PROCEDURE — 6360000002 HC RX W HCPCS: Performed by: EMERGENCY MEDICINE

## 2019-12-19 PROCEDURE — 85730 THROMBOPLASTIN TIME PARTIAL: CPT

## 2019-12-19 PROCEDURE — 99285 EMERGENCY DEPT VISIT HI MDM: CPT

## 2019-12-19 PROCEDURE — 80053 COMPREHEN METABOLIC PANEL: CPT

## 2019-12-19 PROCEDURE — 71045 X-RAY EXAM CHEST 1 VIEW: CPT

## 2019-12-19 PROCEDURE — 6370000000 HC RX 637 (ALT 250 FOR IP): Performed by: EMERGENCY MEDICINE

## 2019-12-19 PROCEDURE — 82550 ASSAY OF CK (CPK): CPT

## 2019-12-19 PROCEDURE — 6360000002 HC RX W HCPCS

## 2019-12-19 PROCEDURE — 84443 ASSAY THYROID STIM HORMONE: CPT

## 2019-12-19 PROCEDURE — 96375 TX/PRO/DX INJ NEW DRUG ADDON: CPT

## 2019-12-19 RX ORDER — ONDANSETRON 2 MG/ML
INJECTION INTRAMUSCULAR; INTRAVENOUS
Status: COMPLETED
Start: 2019-12-19 | End: 2019-12-19

## 2019-12-19 RX ORDER — HYDRALAZINE HYDROCHLORIDE 20 MG/ML
5 INJECTION INTRAMUSCULAR; INTRAVENOUS ONCE
Status: COMPLETED | OUTPATIENT
Start: 2019-12-19 | End: 2019-12-19

## 2019-12-19 RX ORDER — ONDANSETRON 2 MG/ML
4 INJECTION INTRAMUSCULAR; INTRAVENOUS ONCE
Status: COMPLETED | OUTPATIENT
Start: 2019-12-19 | End: 2019-12-19

## 2019-12-19 RX ORDER — MECLIZINE HCL 12.5 MG/1
12.5 TABLET ORAL ONCE
Status: COMPLETED | OUTPATIENT
Start: 2019-12-19 | End: 2019-12-19

## 2019-12-19 RX ADMIN — HYDRALAZINE HYDROCHLORIDE 5 MG: 20 INJECTION INTRAMUSCULAR; INTRAVENOUS at 22:04

## 2019-12-19 RX ADMIN — MECLIZINE 12.5 MG: 12.5 TABLET ORAL at 22:46

## 2019-12-19 RX ADMIN — ONDANSETRON 4 MG: 2 INJECTION INTRAMUSCULAR; INTRAVENOUS at 22:44

## 2019-12-19 ASSESSMENT — ENCOUNTER SYMPTOMS
CONSTIPATION: 0
ABDOMINAL PAIN: 1
NAUSEA: 1
EYE PAIN: 0
BLOOD IN STOOL: 0
DIARRHEA: 0
EYE REDNESS: 0
WHEEZING: 0
COUGH: 1
SINUS PAIN: 0
SHORTNESS OF BREATH: 0
SINUS PRESSURE: 0
VOMITING: 0

## 2019-12-20 ENCOUNTER — APPOINTMENT (OUTPATIENT)
Dept: MRI IMAGING | Age: 79
End: 2019-12-20
Payer: MEDICARE

## 2019-12-20 ENCOUNTER — APPOINTMENT (OUTPATIENT)
Dept: ULTRASOUND IMAGING | Age: 79
End: 2019-12-20
Payer: MEDICARE

## 2019-12-20 PROBLEM — R26.2 AMBULATORY DYSFUNCTION: Status: ACTIVE | Noted: 2019-12-20

## 2019-12-20 PROBLEM — R42 DIZZINESS: Status: ACTIVE | Noted: 2019-12-20

## 2019-12-20 LAB
ANION GAP SERPL CALCULATED.3IONS-SCNC: 10 MMOL/L (ref 7–16)
BASOPHILS ABSOLUTE: 0.03 E9/L (ref 0–0.2)
BASOPHILS RELATIVE PERCENT: 0.4 % (ref 0–2)
BUN BLDV-MCNC: 26 MG/DL (ref 8–23)
CALCIUM SERPL-MCNC: 8.8 MG/DL (ref 8.6–10.2)
CHLORIDE BLD-SCNC: 106 MMOL/L (ref 98–107)
CO2: 24 MMOL/L (ref 22–29)
CREAT SERPL-MCNC: 0.8 MG/DL (ref 0.5–1)
EKG ATRIAL RATE: 64 BPM
EKG P AXIS: 38 DEGREES
EKG P-R INTERVAL: 178 MS
EKG Q-T INTERVAL: 426 MS
EKG QRS DURATION: 74 MS
EKG QTC CALCULATION (BAZETT): 439 MS
EKG R AXIS: -10 DEGREES
EKG T AXIS: 39 DEGREES
EKG VENTRICULAR RATE: 64 BPM
EOSINOPHILS ABSOLUTE: 0.17 E9/L (ref 0.05–0.5)
EOSINOPHILS RELATIVE PERCENT: 2.4 % (ref 0–6)
GFR AFRICAN AMERICAN: >60
GFR NON-AFRICAN AMERICAN: >60 ML/MIN/1.73
GLUCOSE BLD-MCNC: 123 MG/DL (ref 74–99)
HCT VFR BLD CALC: 36.7 % (ref 34–48)
HEMOGLOBIN: 12 G/DL (ref 11.5–15.5)
IMMATURE GRANULOCYTES #: 0.03 E9/L
IMMATURE GRANULOCYTES %: 0.4 % (ref 0–5)
LYMPHOCYTES ABSOLUTE: 1.28 E9/L (ref 1.5–4)
LYMPHOCYTES RELATIVE PERCENT: 18.2 % (ref 20–42)
MAGNESIUM: 2.2 MG/DL (ref 1.6–2.6)
MCH RBC QN AUTO: 28.8 PG (ref 26–35)
MCHC RBC AUTO-ENTMCNC: 32.7 % (ref 32–34.5)
MCV RBC AUTO: 88 FL (ref 80–99.9)
MONOCYTES ABSOLUTE: 0.58 E9/L (ref 0.1–0.95)
MONOCYTES RELATIVE PERCENT: 8.2 % (ref 2–12)
NEUTROPHILS ABSOLUTE: 4.96 E9/L (ref 1.8–7.3)
NEUTROPHILS RELATIVE PERCENT: 70.4 % (ref 43–80)
PDW BLD-RTO: 13.4 FL (ref 11.5–15)
PLATELET # BLD: 237 E9/L (ref 130–450)
PMV BLD AUTO: 10.6 FL (ref 7–12)
POTASSIUM REFLEX MAGNESIUM: 4.1 MMOL/L (ref 3.5–5)
RBC # BLD: 4.17 E12/L (ref 3.5–5.5)
SODIUM BLD-SCNC: 140 MMOL/L (ref 132–146)
WBC # BLD: 7.1 E9/L (ref 4.5–11.5)

## 2019-12-20 PROCEDURE — 83735 ASSAY OF MAGNESIUM: CPT

## 2019-12-20 PROCEDURE — 6370000000 HC RX 637 (ALT 250 FOR IP): Performed by: FAMILY MEDICINE

## 2019-12-20 PROCEDURE — 70553 MRI BRAIN STEM W/O & W/DYE: CPT

## 2019-12-20 PROCEDURE — 93880 EXTRACRANIAL BILAT STUDY: CPT

## 2019-12-20 PROCEDURE — 96372 THER/PROPH/DIAG INJ SC/IM: CPT

## 2019-12-20 PROCEDURE — 96376 TX/PRO/DX INJ SAME DRUG ADON: CPT

## 2019-12-20 PROCEDURE — G0378 HOSPITAL OBSERVATION PER HR: HCPCS

## 2019-12-20 PROCEDURE — 85025 COMPLETE CBC W/AUTO DIFF WBC: CPT

## 2019-12-20 PROCEDURE — 6360000002 HC RX W HCPCS: Performed by: FAMILY MEDICINE

## 2019-12-20 PROCEDURE — A9577 INJ MULTIHANCE: HCPCS | Performed by: RADIOLOGY

## 2019-12-20 PROCEDURE — 99222 1ST HOSP IP/OBS MODERATE 55: CPT | Performed by: FAMILY MEDICINE

## 2019-12-20 PROCEDURE — 93010 ELECTROCARDIOGRAM REPORT: CPT | Performed by: INTERNAL MEDICINE

## 2019-12-20 PROCEDURE — 36415 COLL VENOUS BLD VENIPUNCTURE: CPT

## 2019-12-20 PROCEDURE — 97161 PT EVAL LOW COMPLEX 20 MIN: CPT

## 2019-12-20 PROCEDURE — 80048 BASIC METABOLIC PNL TOTAL CA: CPT

## 2019-12-20 PROCEDURE — 2580000003 HC RX 258: Performed by: FAMILY MEDICINE

## 2019-12-20 PROCEDURE — 6360000004 HC RX CONTRAST MEDICATION: Performed by: RADIOLOGY

## 2019-12-20 RX ORDER — ASPIRIN 81 MG/1
81 TABLET, CHEWABLE ORAL DAILY
Status: DISCONTINUED | OUTPATIENT
Start: 2019-12-20 | End: 2019-12-22 | Stop reason: HOSPADM

## 2019-12-20 RX ORDER — LORAZEPAM 1 MG/1
1 TABLET ORAL EVERY EVENING
Status: DISCONTINUED | OUTPATIENT
Start: 2019-12-20 | End: 2019-12-20

## 2019-12-20 RX ORDER — ACETAMINOPHEN 325 MG/1
650 TABLET ORAL EVERY 4 HOURS PRN
Status: DISCONTINUED | OUTPATIENT
Start: 2019-12-20 | End: 2019-12-22 | Stop reason: HOSPADM

## 2019-12-20 RX ORDER — PANTOPRAZOLE SODIUM 40 MG/1
40 TABLET, DELAYED RELEASE ORAL
Status: DISCONTINUED | OUTPATIENT
Start: 2019-12-20 | End: 2019-12-22 | Stop reason: HOSPADM

## 2019-12-20 RX ORDER — LOSARTAN POTASSIUM 25 MG/1
25 TABLET ORAL DAILY
Status: DISCONTINUED | OUTPATIENT
Start: 2019-12-20 | End: 2019-12-22 | Stop reason: HOSPADM

## 2019-12-20 RX ORDER — METOPROLOL SUCCINATE 50 MG/1
50 TABLET, EXTENDED RELEASE ORAL DAILY
Status: DISCONTINUED | OUTPATIENT
Start: 2019-12-20 | End: 2019-12-22

## 2019-12-20 RX ORDER — LORAZEPAM 1 MG/1
1 TABLET ORAL ONCE
Status: COMPLETED | OUTPATIENT
Start: 2019-12-20 | End: 2019-12-20

## 2019-12-20 RX ORDER — HYDRALAZINE HYDROCHLORIDE 20 MG/ML
5 INJECTION INTRAMUSCULAR; INTRAVENOUS EVERY 6 HOURS PRN
Status: DISCONTINUED | OUTPATIENT
Start: 2019-12-20 | End: 2019-12-22 | Stop reason: HOSPADM

## 2019-12-20 RX ORDER — HYDROCHLOROTHIAZIDE 25 MG/1
25 TABLET ORAL DAILY
Status: DISCONTINUED | OUTPATIENT
Start: 2019-12-20 | End: 2019-12-22 | Stop reason: HOSPADM

## 2019-12-20 RX ORDER — DIPHENHYDRAMINE HCL 25 MG
50 TABLET ORAL ONCE
Status: CANCELLED | OUTPATIENT
Start: 2019-12-20 | End: 2019-12-20

## 2019-12-20 RX ORDER — LEVOTHYROXINE SODIUM 88 UG/1
88 TABLET ORAL DAILY
Status: DISCONTINUED | OUTPATIENT
Start: 2019-12-20 | End: 2019-12-22 | Stop reason: HOSPADM

## 2019-12-20 RX ORDER — SODIUM CHLORIDE 0.9 % (FLUSH) 0.9 %
10 SYRINGE (ML) INJECTION PRN
Status: DISCONTINUED | OUTPATIENT
Start: 2019-12-20 | End: 2019-12-22 | Stop reason: HOSPADM

## 2019-12-20 RX ORDER — ONDANSETRON 2 MG/ML
4 INJECTION INTRAMUSCULAR; INTRAVENOUS EVERY 6 HOURS PRN
Status: DISCONTINUED | OUTPATIENT
Start: 2019-12-20 | End: 2019-12-22 | Stop reason: HOSPADM

## 2019-12-20 RX ORDER — SODIUM CHLORIDE 0.9 % (FLUSH) 0.9 %
10 SYRINGE (ML) INJECTION EVERY 12 HOURS SCHEDULED
Status: DISCONTINUED | OUTPATIENT
Start: 2019-12-20 | End: 2019-12-22 | Stop reason: HOSPADM

## 2019-12-20 RX ADMIN — LEVOTHYROXINE SODIUM 88 MCG: 88 TABLET ORAL at 06:34

## 2019-12-20 RX ADMIN — LORAZEPAM 1 MG: 1 TABLET ORAL at 04:05

## 2019-12-20 RX ADMIN — PANTOPRAZOLE SODIUM 40 MG: 40 TABLET, DELAYED RELEASE ORAL at 06:34

## 2019-12-20 RX ADMIN — METOPROLOL SUCCINATE 50 MG: 50 TABLET, EXTENDED RELEASE ORAL at 08:22

## 2019-12-20 RX ADMIN — ENOXAPARIN SODIUM 40 MG: 40 INJECTION SUBCUTANEOUS at 08:17

## 2019-12-20 RX ADMIN — ACETAMINOPHEN 650 MG: 325 TABLET ORAL at 07:16

## 2019-12-20 RX ADMIN — ACETAMINOPHEN 650 MG: 325 TABLET ORAL at 23:05

## 2019-12-20 RX ADMIN — GADOBENATE DIMEGLUMINE 18 ML: 529 INJECTION, SOLUTION INTRAVENOUS at 13:49

## 2019-12-20 RX ADMIN — ASPIRIN 81 MG 81 MG: 81 TABLET ORAL at 08:16

## 2019-12-20 RX ADMIN — SODIUM CHLORIDE, PRESERVATIVE FREE 10 ML: 5 INJECTION INTRAVENOUS at 20:29

## 2019-12-20 RX ADMIN — ONDANSETRON 4 MG: 2 INJECTION INTRAMUSCULAR; INTRAVENOUS at 04:05

## 2019-12-20 RX ADMIN — LOSARTAN POTASSIUM 25 MG: 25 TABLET ORAL at 08:17

## 2019-12-20 RX ADMIN — HYDROCHLOROTHIAZIDE 25 MG: 25 TABLET ORAL at 08:17

## 2019-12-20 ASSESSMENT — PAIN SCALES - GENERAL
PAINLEVEL_OUTOF10: 7
PAINLEVEL_OUTOF10: 2
PAINLEVEL_OUTOF10: 10
PAINLEVEL_OUTOF10: 5

## 2019-12-20 ASSESSMENT — ENCOUNTER SYMPTOMS
CONSTIPATION: 0
BLOOD IN STOOL: 0
DIARRHEA: 0
WHEEZING: 0
SHORTNESS OF BREATH: 0
NAUSEA: 0
VOMITING: 0
ABDOMINAL PAIN: 0

## 2019-12-20 ASSESSMENT — PAIN DESCRIPTION - PAIN TYPE
TYPE: CHRONIC PAIN
TYPE: ACUTE PAIN

## 2019-12-20 ASSESSMENT — PAIN DESCRIPTION - DESCRIPTORS: DESCRIPTORS: HEADACHE

## 2019-12-20 ASSESSMENT — PAIN DESCRIPTION - LOCATION: LOCATION: BACK

## 2019-12-21 ENCOUNTER — APPOINTMENT (OUTPATIENT)
Dept: ULTRASOUND IMAGING | Age: 79
End: 2019-12-21
Payer: MEDICARE

## 2019-12-21 PROBLEM — H70.93 MASTOIDITIS OF BOTH SIDES: Status: ACTIVE | Noted: 2019-12-21

## 2019-12-21 LAB
ANION GAP SERPL CALCULATED.3IONS-SCNC: 11 MMOL/L (ref 7–16)
BASOPHILS ABSOLUTE: 0.03 E9/L (ref 0–0.2)
BASOPHILS RELATIVE PERCENT: 0.5 % (ref 0–2)
BUN BLDV-MCNC: 27 MG/DL (ref 8–23)
CALCIUM SERPL-MCNC: 8.7 MG/DL (ref 8.6–10.2)
CHLORIDE BLD-SCNC: 101 MMOL/L (ref 98–107)
CO2: 24 MMOL/L (ref 22–29)
CREAT SERPL-MCNC: 1 MG/DL (ref 0.5–1)
EOSINOPHILS ABSOLUTE: 0.1 E9/L (ref 0.05–0.5)
EOSINOPHILS RELATIVE PERCENT: 1.8 % (ref 0–6)
GFR AFRICAN AMERICAN: >60
GFR NON-AFRICAN AMERICAN: 53 ML/MIN/1.73
GLUCOSE BLD-MCNC: 94 MG/DL (ref 74–99)
HCT VFR BLD CALC: 37.7 % (ref 34–48)
HEMOGLOBIN: 12.3 G/DL (ref 11.5–15.5)
IMMATURE GRANULOCYTES #: 0.02 E9/L
IMMATURE GRANULOCYTES %: 0.4 % (ref 0–5)
LV EF: 63 %
LVEF MODALITY: NORMAL
LYMPHOCYTES ABSOLUTE: 1.42 E9/L (ref 1.5–4)
LYMPHOCYTES RELATIVE PERCENT: 26 % (ref 20–42)
MCH RBC QN AUTO: 28.7 PG (ref 26–35)
MCHC RBC AUTO-ENTMCNC: 32.6 % (ref 32–34.5)
MCV RBC AUTO: 88.1 FL (ref 80–99.9)
MONOCYTES ABSOLUTE: 0.58 E9/L (ref 0.1–0.95)
MONOCYTES RELATIVE PERCENT: 10.6 % (ref 2–12)
NEUTROPHILS ABSOLUTE: 3.31 E9/L (ref 1.8–7.3)
NEUTROPHILS RELATIVE PERCENT: 60.7 % (ref 43–80)
PDW BLD-RTO: 13.7 FL (ref 11.5–15)
PLATELET # BLD: 206 E9/L (ref 130–450)
PMV BLD AUTO: 10.8 FL (ref 7–12)
POTASSIUM REFLEX MAGNESIUM: 3.9 MMOL/L (ref 3.5–5)
RBC # BLD: 4.28 E12/L (ref 3.5–5.5)
SODIUM BLD-SCNC: 136 MMOL/L (ref 132–146)
WBC # BLD: 5.5 E9/L (ref 4.5–11.5)

## 2019-12-21 PROCEDURE — 36415 COLL VENOUS BLD VENIPUNCTURE: CPT

## 2019-12-21 PROCEDURE — 96376 TX/PRO/DX INJ SAME DRUG ADON: CPT

## 2019-12-21 PROCEDURE — 93971 EXTREMITY STUDY: CPT

## 2019-12-21 PROCEDURE — G0378 HOSPITAL OBSERVATION PER HR: HCPCS

## 2019-12-21 PROCEDURE — 99232 SBSQ HOSP IP/OBS MODERATE 35: CPT | Performed by: FAMILY MEDICINE

## 2019-12-21 PROCEDURE — 6360000004 HC RX CONTRAST MEDICATION: Performed by: FAMILY MEDICINE

## 2019-12-21 PROCEDURE — 2580000003 HC RX 258: Performed by: FAMILY MEDICINE

## 2019-12-21 PROCEDURE — 93306 TTE W/DOPPLER COMPLETE: CPT

## 2019-12-21 PROCEDURE — 96372 THER/PROPH/DIAG INJ SC/IM: CPT

## 2019-12-21 PROCEDURE — 85025 COMPLETE CBC W/AUTO DIFF WBC: CPT

## 2019-12-21 PROCEDURE — 80048 BASIC METABOLIC PNL TOTAL CA: CPT

## 2019-12-21 PROCEDURE — 6360000002 HC RX W HCPCS: Performed by: FAMILY MEDICINE

## 2019-12-21 PROCEDURE — 6370000000 HC RX 637 (ALT 250 FOR IP): Performed by: FAMILY MEDICINE

## 2019-12-21 RX ORDER — TRAZODONE HYDROCHLORIDE 50 MG/1
50 TABLET ORAL NIGHTLY
Status: DISCONTINUED | OUTPATIENT
Start: 2019-12-21 | End: 2019-12-22 | Stop reason: HOSPADM

## 2019-12-21 RX ORDER — MECLIZINE HCL 12.5 MG/1
12.5 TABLET ORAL 3 TIMES DAILY PRN
Status: DISCONTINUED | OUTPATIENT
Start: 2019-12-21 | End: 2019-12-22 | Stop reason: HOSPADM

## 2019-12-21 RX ORDER — FLUTICASONE PROPIONATE 50 MCG
2 SPRAY, SUSPENSION (ML) NASAL DAILY
Status: DISCONTINUED | OUTPATIENT
Start: 2019-12-21 | End: 2019-12-22 | Stop reason: HOSPADM

## 2019-12-21 RX ORDER — MINERAL OIL 100 G/100G
1 OIL RECTAL PRN
Status: DISCONTINUED | OUTPATIENT
Start: 2019-12-21 | End: 2019-12-22 | Stop reason: HOSPADM

## 2019-12-21 RX ADMIN — MINERAL OIL 1 ENEMA: 118 ENEMA RECTAL at 19:20

## 2019-12-21 RX ADMIN — METOPROLOL SUCCINATE 50 MG: 50 TABLET, EXTENDED RELEASE ORAL at 16:53

## 2019-12-21 RX ADMIN — HYDROCHLOROTHIAZIDE 25 MG: 25 TABLET ORAL at 09:36

## 2019-12-21 RX ADMIN — PERFLUTREN 2.2 MG: 6.52 INJECTION, SUSPENSION INTRAVENOUS at 08:41

## 2019-12-21 RX ADMIN — LOSARTAN POTASSIUM 25 MG: 25 TABLET ORAL at 09:36

## 2019-12-21 RX ADMIN — SODIUM CHLORIDE, PRESERVATIVE FREE 10 ML: 5 INJECTION INTRAVENOUS at 16:54

## 2019-12-21 RX ADMIN — SODIUM CHLORIDE, PRESERVATIVE FREE 10 ML: 5 INJECTION INTRAVENOUS at 20:24

## 2019-12-21 RX ADMIN — PANTOPRAZOLE SODIUM 40 MG: 40 TABLET, DELAYED RELEASE ORAL at 05:41

## 2019-12-21 RX ADMIN — ACETAMINOPHEN 650 MG: 325 TABLET ORAL at 22:44

## 2019-12-21 RX ADMIN — ACETAMINOPHEN 650 MG: 325 TABLET ORAL at 09:36

## 2019-12-21 RX ADMIN — ONDANSETRON 4 MG: 2 INJECTION INTRAMUSCULAR; INTRAVENOUS at 22:48

## 2019-12-21 RX ADMIN — ENOXAPARIN SODIUM 40 MG: 40 INJECTION SUBCUTANEOUS at 09:36

## 2019-12-21 RX ADMIN — FLUTICASONE PROPIONATE 2 SPRAY: 50 SPRAY, METERED NASAL at 16:52

## 2019-12-21 RX ADMIN — TRAZODONE HYDROCHLORIDE 50 MG: 50 TABLET ORAL at 20:24

## 2019-12-21 RX ADMIN — LEVOTHYROXINE SODIUM 88 MCG: 88 TABLET ORAL at 05:41

## 2019-12-21 RX ADMIN — ASPIRIN 81 MG 81 MG: 81 TABLET ORAL at 09:36

## 2019-12-21 ASSESSMENT — PAIN DESCRIPTION - DESCRIPTORS: DESCRIPTORS: SHARP;SHOOTING

## 2019-12-21 ASSESSMENT — PAIN DESCRIPTION - PAIN TYPE: TYPE: ACUTE PAIN

## 2019-12-21 ASSESSMENT — PAIN DESCRIPTION - LOCATION: LOCATION: BACK;LEG

## 2019-12-21 ASSESSMENT — PAIN SCALES - GENERAL
PAINLEVEL_OUTOF10: 10
PAINLEVEL_OUTOF10: 8
PAINLEVEL_OUTOF10: 8

## 2019-12-21 ASSESSMENT — PAIN DESCRIPTION - ORIENTATION: ORIENTATION: LEFT

## 2019-12-22 VITALS
HEIGHT: 59 IN | TEMPERATURE: 97.6 F | RESPIRATION RATE: 16 BRPM | WEIGHT: 194.8 LBS | BODY MASS INDEX: 39.27 KG/M2 | OXYGEN SATURATION: 93 % | HEART RATE: 55 BPM | SYSTOLIC BLOOD PRESSURE: 144 MMHG | DIASTOLIC BLOOD PRESSURE: 60 MMHG

## 2019-12-22 PROBLEM — H70.93 MASTOIDITIS OF BOTH SIDES: Status: RESOLVED | Noted: 2019-12-21 | Resolved: 2019-12-22

## 2019-12-22 PROBLEM — R42 DIZZINESS: Status: RESOLVED | Noted: 2019-12-20 | Resolved: 2019-12-22

## 2019-12-22 LAB
ANION GAP SERPL CALCULATED.3IONS-SCNC: 12 MMOL/L (ref 7–16)
BASOPHILS ABSOLUTE: 0.02 E9/L (ref 0–0.2)
BASOPHILS RELATIVE PERCENT: 0.3 % (ref 0–2)
BUN BLDV-MCNC: 24 MG/DL (ref 8–23)
CALCIUM SERPL-MCNC: 8.7 MG/DL (ref 8.6–10.2)
CHLORIDE BLD-SCNC: 97 MMOL/L (ref 98–107)
CO2: 23 MMOL/L (ref 22–29)
CREAT SERPL-MCNC: 0.9 MG/DL (ref 0.5–1)
EOSINOPHILS ABSOLUTE: 0.16 E9/L (ref 0.05–0.5)
EOSINOPHILS RELATIVE PERCENT: 2.4 % (ref 0–6)
GFR AFRICAN AMERICAN: >60
GFR NON-AFRICAN AMERICAN: >60 ML/MIN/1.73
GLUCOSE BLD-MCNC: 103 MG/DL (ref 74–99)
HCT VFR BLD CALC: 38.8 % (ref 34–48)
HEMOGLOBIN: 12.7 G/DL (ref 11.5–15.5)
IMMATURE GRANULOCYTES #: 0.03 E9/L
IMMATURE GRANULOCYTES %: 0.5 % (ref 0–5)
LYMPHOCYTES ABSOLUTE: 1.72 E9/L (ref 1.5–4)
LYMPHOCYTES RELATIVE PERCENT: 26.1 % (ref 20–42)
MCH RBC QN AUTO: 28.6 PG (ref 26–35)
MCHC RBC AUTO-ENTMCNC: 32.7 % (ref 32–34.5)
MCV RBC AUTO: 87.4 FL (ref 80–99.9)
MONOCYTES ABSOLUTE: 0.61 E9/L (ref 0.1–0.95)
MONOCYTES RELATIVE PERCENT: 9.2 % (ref 2–12)
NEUTROPHILS ABSOLUTE: 4.06 E9/L (ref 1.8–7.3)
NEUTROPHILS RELATIVE PERCENT: 61.5 % (ref 43–80)
PDW BLD-RTO: 13.5 FL (ref 11.5–15)
PLATELET # BLD: 223 E9/L (ref 130–450)
PMV BLD AUTO: 10.3 FL (ref 7–12)
POTASSIUM REFLEX MAGNESIUM: 3.9 MMOL/L (ref 3.5–5)
RBC # BLD: 4.44 E12/L (ref 3.5–5.5)
SODIUM BLD-SCNC: 132 MMOL/L (ref 132–146)
WBC # BLD: 6.6 E9/L (ref 4.5–11.5)

## 2019-12-22 PROCEDURE — 99238 HOSP IP/OBS DSCHRG MGMT 30/<: CPT | Performed by: FAMILY MEDICINE

## 2019-12-22 PROCEDURE — 36415 COLL VENOUS BLD VENIPUNCTURE: CPT

## 2019-12-22 PROCEDURE — G0378 HOSPITAL OBSERVATION PER HR: HCPCS

## 2019-12-22 PROCEDURE — 6370000000 HC RX 637 (ALT 250 FOR IP): Performed by: FAMILY MEDICINE

## 2019-12-22 PROCEDURE — 96372 THER/PROPH/DIAG INJ SC/IM: CPT

## 2019-12-22 PROCEDURE — 85025 COMPLETE CBC W/AUTO DIFF WBC: CPT

## 2019-12-22 PROCEDURE — 96376 TX/PRO/DX INJ SAME DRUG ADON: CPT

## 2019-12-22 PROCEDURE — 80048 BASIC METABOLIC PNL TOTAL CA: CPT

## 2019-12-22 RX ORDER — CETIRIZINE HYDROCHLORIDE 10 MG/1
10 TABLET ORAL DAILY
Qty: 90 TABLET | Refills: 1 | Status: ON HOLD | OUTPATIENT
Start: 2019-12-22 | End: 2020-01-03 | Stop reason: HOSPADM

## 2019-12-22 RX ORDER — MECLIZINE HCL 12.5 MG/1
12.5 TABLET ORAL 3 TIMES DAILY PRN
Qty: 30 TABLET | Refills: 0 | Status: SHIPPED | OUTPATIENT
Start: 2019-12-22 | End: 2020-01-01

## 2019-12-22 RX ORDER — NAPROXEN 500 MG/1
500 TABLET ORAL ONCE
Status: COMPLETED | OUTPATIENT
Start: 2019-12-22 | End: 2019-12-22

## 2019-12-22 RX ORDER — TRAMADOL HYDROCHLORIDE 50 MG/1
25 TABLET ORAL EVERY 6 HOURS PRN
Status: DISCONTINUED | OUTPATIENT
Start: 2019-12-22 | End: 2019-12-22 | Stop reason: HOSPADM

## 2019-12-22 RX ORDER — FLUTICASONE PROPIONATE 50 MCG
2 SPRAY, SUSPENSION (ML) NASAL DAILY
Qty: 1 BOTTLE | Refills: 3 | Status: ON HOLD | OUTPATIENT
Start: 2019-12-22 | End: 2021-02-03 | Stop reason: HOSPADM

## 2019-12-22 RX ADMIN — HYDROCHLOROTHIAZIDE 25 MG: 25 TABLET ORAL at 09:51

## 2019-12-22 RX ADMIN — NAPROXEN 500 MG: 500 TABLET ORAL at 10:05

## 2019-12-22 RX ADMIN — PANTOPRAZOLE SODIUM 40 MG: 40 TABLET, DELAYED RELEASE ORAL at 05:14

## 2019-12-22 RX ADMIN — LOSARTAN POTASSIUM 25 MG: 25 TABLET ORAL at 09:51

## 2019-12-22 RX ADMIN — ACETAMINOPHEN 650 MG: 325 TABLET ORAL at 02:58

## 2019-12-22 RX ADMIN — LEVOTHYROXINE SODIUM 88 MCG: 88 TABLET ORAL at 05:14

## 2019-12-22 RX ADMIN — ASPIRIN 81 MG 81 MG: 81 TABLET ORAL at 09:51

## 2019-12-22 ASSESSMENT — ENCOUNTER SYMPTOMS
VOMITING: 0
NAUSEA: 0
SHORTNESS OF BREATH: 0
DIARRHEA: 0
CONSTIPATION: 0
ABDOMINAL PAIN: 0
BACK PAIN: 1

## 2019-12-22 ASSESSMENT — PAIN SCALES - GENERAL
PAINLEVEL_OUTOF10: 6
PAINLEVEL_OUTOF10: 6

## 2019-12-23 ENCOUNTER — TELEPHONE (OUTPATIENT)
Dept: FAMILY MEDICINE CLINIC | Age: 79
End: 2019-12-23

## 2019-12-24 ENCOUNTER — HOSPITAL ENCOUNTER (EMERGENCY)
Age: 79
Discharge: HOME | End: 2019-12-24
Payer: MEDICARE

## 2019-12-24 VITALS
HEART RATE: 78 BPM | TEMPERATURE: 97.6 F | HEIGHT: 59 IN | RESPIRATION RATE: 18 BRPM | DIASTOLIC BLOOD PRESSURE: 61 MMHG | SYSTOLIC BLOOD PRESSURE: 172 MMHG | WEIGHT: 192 LBS | BODY MASS INDEX: 38.71 KG/M2 | OXYGEN SATURATION: 97 %

## 2019-12-24 DIAGNOSIS — R19.7 DIARRHEA, UNSPECIFIED TYPE: Primary | ICD-10-CM

## 2019-12-24 LAB
ALBUMIN SERPL-MCNC: 4.2 G/DL (ref 3.5–5.2)
ALP BLD-CCNC: 73 U/L (ref 35–104)
ALT SERPL-CCNC: 24 U/L (ref 0–32)
ANION GAP SERPL CALCULATED.3IONS-SCNC: 14 MMOL/L (ref 7–16)
AST SERPL-CCNC: 32 U/L (ref 0–31)
BACTERIA: ABNORMAL /HPF
BASOPHILS ABSOLUTE: 0.03 E9/L (ref 0–0.2)
BASOPHILS RELATIVE PERCENT: 0.4 % (ref 0–2)
BILIRUB SERPL-MCNC: 0.4 MG/DL (ref 0–1.2)
BILIRUBIN DIRECT: <0.2 MG/DL (ref 0–0.3)
BILIRUBIN URINE: NEGATIVE
BILIRUBIN, INDIRECT: ABNORMAL MG/DL (ref 0–1)
BLOOD, URINE: NEGATIVE
BUN BLDV-MCNC: 25 MG/DL (ref 8–23)
CALCIUM SERPL-MCNC: 8.8 MG/DL (ref 8.6–10.2)
CHLORIDE BLD-SCNC: 99 MMOL/L (ref 98–107)
CLARITY: CLEAR
CO2: 24 MMOL/L (ref 22–29)
COLOR: ABNORMAL
CREAT SERPL-MCNC: 0.8 MG/DL (ref 0.5–1)
EOSINOPHILS ABSOLUTE: 0.16 E9/L (ref 0.05–0.5)
EOSINOPHILS RELATIVE PERCENT: 2.1 % (ref 0–6)
GFR AFRICAN AMERICAN: >60
GFR NON-AFRICAN AMERICAN: >60 ML/MIN/1.73
GLUCOSE BLD-MCNC: 101 MG/DL (ref 74–99)
GLUCOSE URINE: NEGATIVE MG/DL
HCT VFR BLD CALC: 38.2 % (ref 34–48)
HEMOGLOBIN: 12.7 G/DL (ref 11.5–15.5)
IMMATURE GRANULOCYTES #: 0.02 E9/L
IMMATURE GRANULOCYTES %: 0.3 % (ref 0–5)
KETONES, URINE: NEGATIVE MG/DL
LEUKOCYTE ESTERASE, URINE: ABNORMAL
LYMPHOCYTES ABSOLUTE: 2.67 E9/L (ref 1.5–4)
LYMPHOCYTES RELATIVE PERCENT: 35.8 % (ref 20–42)
MCH RBC QN AUTO: 28.7 PG (ref 26–35)
MCHC RBC AUTO-ENTMCNC: 33.2 % (ref 32–34.5)
MCV RBC AUTO: 86.4 FL (ref 80–99.9)
MONOCYTES ABSOLUTE: 0.84 E9/L (ref 0.1–0.95)
MONOCYTES RELATIVE PERCENT: 11.3 % (ref 2–12)
NEUTROPHILS ABSOLUTE: 3.74 E9/L (ref 1.8–7.3)
NEUTROPHILS RELATIVE PERCENT: 50.1 % (ref 43–80)
NITRITE, URINE: NEGATIVE
PDW BLD-RTO: 13.4 FL (ref 11.5–15)
PH UA: 6 (ref 5–9)
PLATELET # BLD: 227 E9/L (ref 130–450)
PMV BLD AUTO: 9.8 FL (ref 7–12)
POTASSIUM SERPL-SCNC: 3.4 MMOL/L (ref 3.5–5)
PROTEIN UA: NEGATIVE MG/DL
RBC # BLD: 4.42 E12/L (ref 3.5–5.5)
RBC UA: ABNORMAL /HPF (ref 0–2)
SODIUM BLD-SCNC: 137 MMOL/L (ref 132–146)
SPECIFIC GRAVITY UA: <=1.005 (ref 1–1.03)
TOTAL PROTEIN: 7.5 G/DL (ref 6.4–8.3)
UROBILINOGEN, URINE: 0.2 E.U./DL
WBC # BLD: 7.5 E9/L (ref 4.5–11.5)
WBC UA: ABNORMAL /HPF (ref 0–5)

## 2019-12-24 PROCEDURE — 2580000003 HC RX 258: Performed by: PHYSICIAN ASSISTANT

## 2019-12-24 PROCEDURE — 99284 EMERGENCY DEPT VISIT MOD MDM: CPT

## 2019-12-24 PROCEDURE — 80048 BASIC METABOLIC PNL TOTAL CA: CPT

## 2019-12-24 PROCEDURE — 80076 HEPATIC FUNCTION PANEL: CPT

## 2019-12-24 PROCEDURE — 81001 URINALYSIS AUTO W/SCOPE: CPT

## 2019-12-24 PROCEDURE — 85025 COMPLETE CBC W/AUTO DIFF WBC: CPT

## 2019-12-24 RX ORDER — 0.9 % SODIUM CHLORIDE 0.9 %
1000 INTRAVENOUS SOLUTION INTRAVENOUS ONCE
Status: COMPLETED | OUTPATIENT
Start: 2019-12-24 | End: 2019-12-24

## 2019-12-24 RX ADMIN — SODIUM CHLORIDE 1000 ML: 9 INJECTION, SOLUTION INTRAVENOUS at 19:54

## 2019-12-26 ENCOUNTER — HOSPITAL ENCOUNTER (OUTPATIENT)
Age: 79
Discharge: HOME | End: 2019-12-26
Payer: MEDICARE

## 2019-12-26 DIAGNOSIS — R19.7 DIARRHEA, UNSPECIFIED TYPE: ICD-10-CM

## 2019-12-26 PROCEDURE — 87449 NOS EACH ORGANISM AG IA: CPT

## 2019-12-26 PROCEDURE — 87324 CLOSTRIDIUM AG IA: CPT

## 2019-12-27 LAB — C DIFF TOXIN/ANTIGEN: NORMAL

## 2019-12-29 ENCOUNTER — APPOINTMENT (OUTPATIENT)
Dept: GENERAL RADIOLOGY | Age: 79
End: 2019-12-29
Payer: MEDICARE

## 2019-12-29 ENCOUNTER — APPOINTMENT (OUTPATIENT)
Dept: CT IMAGING | Age: 79
End: 2019-12-29
Payer: MEDICARE

## 2019-12-29 ENCOUNTER — HOSPITAL ENCOUNTER (OUTPATIENT)
Age: 79
Setting detail: OBSERVATION
Discharge: HOME | End: 2019-12-30
Attending: EMERGENCY MEDICINE | Admitting: INTERNAL MEDICINE
Payer: MEDICARE

## 2019-12-29 PROBLEM — I10 ESSENTIAL HYPERTENSION: Chronic | Status: ACTIVE | Noted: 2019-12-29

## 2019-12-29 PROBLEM — H54.40 BLINDNESS OF LEFT EYE: Status: RESOLVED | Noted: 2017-12-11 | Resolved: 2019-12-29

## 2019-12-29 PROBLEM — R26.2 AMBULATORY DYSFUNCTION: Status: RESOLVED | Noted: 2019-12-20 | Resolved: 2019-12-29

## 2019-12-29 PROBLEM — G45.3 AMAUROSIS FUGAX OF RIGHT EYE: Status: RESOLVED | Noted: 2017-12-11 | Resolved: 2019-12-29

## 2019-12-29 PROBLEM — S42.202D: Status: RESOLVED | Noted: 2017-09-09 | Resolved: 2019-12-29

## 2019-12-29 PROBLEM — T14.8XXA CLOSED FRACTURE: Status: RESOLVED | Noted: 2017-09-02 | Resolved: 2019-12-29

## 2019-12-29 PROBLEM — R70.0 ELEVATED SED RATE: Status: RESOLVED | Noted: 2017-12-11 | Resolved: 2019-12-29

## 2019-12-29 PROBLEM — R53.1 WEAKNESS: Status: ACTIVE | Noted: 2019-12-29

## 2019-12-29 PROBLEM — R29.6 FREQUENT FALLS: Status: ACTIVE | Noted: 2019-12-29

## 2019-12-29 LAB
ALBUMIN SERPL-MCNC: 4 G/DL (ref 3.5–5.2)
ALP BLD-CCNC: 84 U/L (ref 35–104)
ALT SERPL-CCNC: 19 U/L (ref 0–32)
ANION GAP SERPL CALCULATED.3IONS-SCNC: 12 MMOL/L (ref 7–16)
APTT: 34.1 SEC (ref 24.5–35.1)
AST SERPL-CCNC: 22 U/L (ref 0–31)
BACTERIA: NORMAL /HPF
BILIRUB SERPL-MCNC: 0.3 MG/DL (ref 0–1.2)
BILIRUBIN URINE: NEGATIVE
BLOOD, URINE: NEGATIVE
BUN BLDV-MCNC: 24 MG/DL (ref 8–23)
CALCIUM SERPL-MCNC: 9.2 MG/DL (ref 8.6–10.2)
CHLORIDE BLD-SCNC: 105 MMOL/L (ref 98–107)
CLARITY: CLEAR
CO2: 27 MMOL/L (ref 22–29)
COLOR: YELLOW
CREAT SERPL-MCNC: 0.7 MG/DL (ref 0.5–1)
GFR AFRICAN AMERICAN: >60
GFR NON-AFRICAN AMERICAN: >60 ML/MIN/1.73
GLUCOSE BLD-MCNC: 102 MG/DL (ref 74–99)
GLUCOSE URINE: NEGATIVE MG/DL
HCT VFR BLD CALC: 38.2 % (ref 34–48)
HEMOGLOBIN: 12.3 G/DL (ref 11.5–15.5)
INR BLD: 0.9
KETONES, URINE: NEGATIVE MG/DL
LEUKOCYTE ESTERASE, URINE: ABNORMAL
MCH RBC QN AUTO: 28.1 PG (ref 26–35)
MCHC RBC AUTO-ENTMCNC: 32.2 % (ref 32–34.5)
MCV RBC AUTO: 87.4 FL (ref 80–99.9)
NITRITE, URINE: NEGATIVE
PDW BLD-RTO: 13.2 FL (ref 11.5–15)
PH UA: 6 (ref 5–9)
PLATELET # BLD: 253 E9/L (ref 130–450)
PMV BLD AUTO: 9.9 FL (ref 7–12)
POTASSIUM REFLEX MAGNESIUM: 3.9 MMOL/L (ref 3.5–5)
PROTEIN UA: NEGATIVE MG/DL
PROTHROMBIN TIME: 10.5 SEC (ref 9.3–12.4)
RBC # BLD: 4.37 E12/L (ref 3.5–5.5)
RBC UA: NORMAL /HPF (ref 0–2)
SODIUM BLD-SCNC: 144 MMOL/L (ref 132–146)
SPECIFIC GRAVITY UA: <=1.005 (ref 1–1.03)
T4 FREE: 1.16 NG/DL (ref 0.93–1.7)
TOTAL PROTEIN: 7.4 G/DL (ref 6.4–8.3)
TSH SERPL DL<=0.05 MIU/L-ACNC: 1.63 UIU/ML (ref 0.27–4.2)
UROBILINOGEN, URINE: 0.2 E.U./DL
WBC # BLD: 7.9 E9/L (ref 4.5–11.5)
WBC UA: NORMAL /HPF (ref 0–5)

## 2019-12-29 PROCEDURE — 36415 COLL VENOUS BLD VENIPUNCTURE: CPT

## 2019-12-29 PROCEDURE — 84443 ASSAY THYROID STIM HORMONE: CPT

## 2019-12-29 PROCEDURE — 80053 COMPREHEN METABOLIC PANEL: CPT

## 2019-12-29 PROCEDURE — 71045 X-RAY EXAM CHEST 1 VIEW: CPT

## 2019-12-29 PROCEDURE — 2580000003 HC RX 258: Performed by: NURSE PRACTITIONER

## 2019-12-29 PROCEDURE — 2580000003 HC RX 258: Performed by: PHYSICIAN ASSISTANT

## 2019-12-29 PROCEDURE — 81001 URINALYSIS AUTO W/SCOPE: CPT

## 2019-12-29 PROCEDURE — 96374 THER/PROPH/DIAG INJ IV PUSH: CPT

## 2019-12-29 PROCEDURE — 6360000002 HC RX W HCPCS: Performed by: PHYSICIAN ASSISTANT

## 2019-12-29 PROCEDURE — 72072 X-RAY EXAM THORAC SPINE 3VWS: CPT

## 2019-12-29 PROCEDURE — G0378 HOSPITAL OBSERVATION PER HR: HCPCS

## 2019-12-29 PROCEDURE — 72170 X-RAY EXAM OF PELVIS: CPT

## 2019-12-29 PROCEDURE — 84439 ASSAY OF FREE THYROXINE: CPT

## 2019-12-29 PROCEDURE — 6360000002 HC RX W HCPCS: Performed by: NURSE PRACTITIONER

## 2019-12-29 PROCEDURE — 72125 CT NECK SPINE W/O DYE: CPT

## 2019-12-29 PROCEDURE — 85610 PROTHROMBIN TIME: CPT

## 2019-12-29 PROCEDURE — 85730 THROMBOPLASTIN TIME PARTIAL: CPT

## 2019-12-29 PROCEDURE — 99285 EMERGENCY DEPT VISIT HI MDM: CPT

## 2019-12-29 PROCEDURE — 6370000000 HC RX 637 (ALT 250 FOR IP): Performed by: NURSE PRACTITIONER

## 2019-12-29 PROCEDURE — 73030 X-RAY EXAM OF SHOULDER: CPT

## 2019-12-29 PROCEDURE — 96375 TX/PRO/DX INJ NEW DRUG ADDON: CPT

## 2019-12-29 PROCEDURE — 72110 X-RAY EXAM L-2 SPINE 4/>VWS: CPT

## 2019-12-29 PROCEDURE — 73564 X-RAY EXAM KNEE 4 OR MORE: CPT

## 2019-12-29 PROCEDURE — 85027 COMPLETE CBC AUTOMATED: CPT

## 2019-12-29 RX ORDER — SODIUM CHLORIDE 0.9 % (FLUSH) 0.9 %
10 SYRINGE (ML) INJECTION EVERY 12 HOURS SCHEDULED
Status: DISCONTINUED | OUTPATIENT
Start: 2019-12-29 | End: 2019-12-30 | Stop reason: HOSPADM

## 2019-12-29 RX ORDER — ONDANSETRON 2 MG/ML
4 INJECTION INTRAMUSCULAR; INTRAVENOUS EVERY 6 HOURS PRN
Status: DISCONTINUED | OUTPATIENT
Start: 2019-12-29 | End: 2019-12-30 | Stop reason: HOSPADM

## 2019-12-29 RX ORDER — SUCRALFATE 1 G/1
1 TABLET ORAL 4 TIMES DAILY
Status: DISCONTINUED | OUTPATIENT
Start: 2019-12-29 | End: 2019-12-30

## 2019-12-29 RX ORDER — LEVOTHYROXINE SODIUM 88 UG/1
88 TABLET ORAL DAILY
Status: DISCONTINUED | OUTPATIENT
Start: 2019-12-30 | End: 2019-12-30 | Stop reason: HOSPADM

## 2019-12-29 RX ORDER — MECLIZINE HCL 12.5 MG/1
12.5 TABLET ORAL 3 TIMES DAILY PRN
Status: DISCONTINUED | OUTPATIENT
Start: 2019-12-29 | End: 2019-12-30 | Stop reason: HOSPADM

## 2019-12-29 RX ORDER — 0.9 % SODIUM CHLORIDE 0.9 %
1000 INTRAVENOUS SOLUTION INTRAVENOUS ONCE
Status: COMPLETED | OUTPATIENT
Start: 2019-12-29 | End: 2019-12-29

## 2019-12-29 RX ORDER — POTASSIUM CHLORIDE 20 MEQ/1
20 TABLET, EXTENDED RELEASE ORAL DAILY
Status: DISCONTINUED | OUTPATIENT
Start: 2019-12-29 | End: 2019-12-30 | Stop reason: HOSPADM

## 2019-12-29 RX ORDER — SODIUM CHLORIDE 0.9 % (FLUSH) 0.9 %
10 SYRINGE (ML) INJECTION PRN
Status: DISCONTINUED | OUTPATIENT
Start: 2019-12-29 | End: 2019-12-30 | Stop reason: HOSPADM

## 2019-12-29 RX ORDER — HYDRALAZINE HYDROCHLORIDE 20 MG/ML
10 INJECTION INTRAMUSCULAR; INTRAVENOUS ONCE
Status: COMPLETED | OUTPATIENT
Start: 2019-12-29 | End: 2019-12-29

## 2019-12-29 RX ORDER — HYDROCHLOROTHIAZIDE 25 MG/1
25 TABLET ORAL DAILY
Status: DISCONTINUED | OUTPATIENT
Start: 2019-12-30 | End: 2019-12-30 | Stop reason: HOSPADM

## 2019-12-29 RX ORDER — LORAZEPAM 1 MG/1
1 TABLET ORAL NIGHTLY PRN
Status: DISCONTINUED | OUTPATIENT
Start: 2019-12-29 | End: 2019-12-30 | Stop reason: HOSPADM

## 2019-12-29 RX ORDER — METHYLPREDNISOLONE SODIUM SUCCINATE 40 MG/ML
40 INJECTION, POWDER, LYOPHILIZED, FOR SOLUTION INTRAMUSCULAR; INTRAVENOUS ONCE
Status: COMPLETED | OUTPATIENT
Start: 2019-12-29 | End: 2019-12-29

## 2019-12-29 RX ORDER — PANTOPRAZOLE SODIUM 40 MG/1
40 TABLET, DELAYED RELEASE ORAL
Status: DISCONTINUED | OUTPATIENT
Start: 2019-12-30 | End: 2019-12-30 | Stop reason: HOSPADM

## 2019-12-29 RX ORDER — ACETAMINOPHEN 325 MG/1
650 TABLET ORAL EVERY 4 HOURS PRN
Status: DISCONTINUED | OUTPATIENT
Start: 2019-12-29 | End: 2019-12-30 | Stop reason: HOSPADM

## 2019-12-29 RX ORDER — ASPIRIN 81 MG/1
81 TABLET, CHEWABLE ORAL DAILY
Status: DISCONTINUED | OUTPATIENT
Start: 2019-12-30 | End: 2019-12-30 | Stop reason: HOSPADM

## 2019-12-29 RX ORDER — LOSARTAN POTASSIUM 25 MG/1
25 TABLET ORAL DAILY
Status: DISCONTINUED | OUTPATIENT
Start: 2019-12-30 | End: 2019-12-30 | Stop reason: HOSPADM

## 2019-12-29 RX ADMIN — SODIUM CHLORIDE, PRESERVATIVE FREE 10 ML: 5 INJECTION INTRAVENOUS at 20:54

## 2019-12-29 RX ADMIN — LORAZEPAM 1 MG: 1 TABLET ORAL at 20:53

## 2019-12-29 RX ADMIN — HYDRALAZINE HYDROCHLORIDE 10 MG: 20 INJECTION INTRAMUSCULAR; INTRAVENOUS at 18:27

## 2019-12-29 RX ADMIN — SODIUM CHLORIDE 1000 ML: 9 INJECTION, SOLUTION INTRAVENOUS at 11:45

## 2019-12-29 RX ADMIN — METHYLPREDNISOLONE SODIUM SUCCINATE 40 MG: 40 INJECTION, POWDER, FOR SOLUTION INTRAMUSCULAR; INTRAVENOUS at 18:26

## 2019-12-29 ASSESSMENT — PAIN SCALES - GENERAL
PAINLEVEL_OUTOF10: 5
PAINLEVEL_OUTOF10: 2

## 2019-12-29 NOTE — ED PROVIDER NOTES
reflux disease), Hemorrhoids, Hyperlipidemia, Hypertension, Left foot pain, Macular degeneration, Poor vision, Prosthetic eye globe, Seasonal allergies, Skipped heart beats, Sleep apnea, and Thyroid disease. Past Surgical History:  has a past surgical history that includes Eye surgery (years ago); colectomy (1974); Colonoscopy (04/26/2012); Cholecystectomy (1985); Hysterectomy (1974); tumor excision; Upper gastrointestinal endoscopy (05/30/2014); Colonoscopy (05/30/2014); Tonsillectomy; joint replacement (Left, 2010/2012); Upper gastrointestinal endoscopy (01/08/2015); Colonoscopy (01/08/2015); cyst removal (years ago); and Upper gastrointestinal endoscopy (N/A, 4/1/2019). Social History:  reports that she has never smoked. She has never used smokeless tobacco. She reports that she does not drink alcohol or use drugs. Family History: family history includes Breast Cancer in her daughter and sister; Cancer in her brother, brother, brother, brother, brother, brother, sister, and sister; Heart Disease in her brother, brother, father, mother, and sister; High Blood Pressure in her daughter and daughter; Hypertension in her brother, brother, father, mother, and sister; Other in her mother; Stroke in her father and mother. The patients home medications have been reviewed. Allergies: Iodine; Codeine; Oxycodone-aspirin; Amoxicillin-pot clavulanate; Darvocet [propoxyphene n-acetaminophen]; Eggs or egg-derived products; Influenza vaccines; Percodan [oxycodone-aspirin]; and Percodan [oxycodone-aspirin]            ------------------------- NURSING NOTES AND VITALS REVIEWED ---------------------------   The nursing notes within the ED encounter and vital signs as below have been reviewed.    BP (!) 190/75   Pulse 60   Temp 97.9 °F (36.6 °C) (Oral)   Resp (!) 6   Ht 4' 11\" (1.499 m)   Wt 192 lb (87.1 kg)   LMP 07/24/1974   SpO2 95%   BMI 38.78 kg/m²   Oxygen Saturation Interpretation: Normal    The patients available past medical records and past encounters were reviewed.           -------------------------------------------------- RESULTS -------------------------------------------------    LABS:  Results for orders placed or performed during the hospital encounter of 12/29/19   CBC   Result Value Ref Range    WBC 7.9 4.5 - 11.5 E9/L    RBC 4.37 3.50 - 5.50 E12/L    Hemoglobin 12.3 11.5 - 15.5 g/dL    Hematocrit 38.2 34.0 - 48.0 %    MCV 87.4 80.0 - 99.9 fL    MCH 28.1 26.0 - 35.0 pg    MCHC 32.2 32.0 - 34.5 %    RDW 13.2 11.5 - 15.0 fL    Platelets 537 235 - 358 E9/L    MPV 9.9 7.0 - 12.0 fL   Comprehensive Metabolic Panel w/ Reflex to MG   Result Value Ref Range    Sodium 144 132 - 146 mmol/L    Potassium reflex Magnesium 3.9 3.5 - 5.0 mmol/L    Chloride 105 98 - 107 mmol/L    CO2 27 22 - 29 mmol/L    Anion Gap 12 7 - 16 mmol/L    Glucose 102 (H) 74 - 99 mg/dL    BUN 24 (H) 8 - 23 mg/dL    CREATININE 0.7 0.5 - 1.0 mg/dL    GFR Non-African American >60 >=60 mL/min/1.73    GFR African American >60     Calcium 9.2 8.6 - 10.2 mg/dL    Total Protein 7.4 6.4 - 8.3 g/dL    Alb 4.0 3.5 - 5.2 g/dL    Total Bilirubin 0.3 0.0 - 1.2 mg/dL    Alkaline Phosphatase 84 35 - 104 U/L    ALT 19 0 - 32 U/L    AST 22 0 - 31 U/L   Protime-INR   Result Value Ref Range    Protime 10.5 9.3 - 12.4 sec    INR 0.9    APTT   Result Value Ref Range    aPTT 34.1 24.5 - 35.1 sec   Urinalysis   Result Value Ref Range    Color, UA Yellow Straw/Yellow    Clarity, UA Clear Clear    Glucose, Ur Negative Negative mg/dL    Bilirubin Urine Negative Negative    Ketones, Urine Negative Negative mg/dL    Specific Gravity, UA <=1.005 1.005 - 1.030    Blood, Urine Negative Negative    pH, UA 6.0 5.0 - 9.0    Protein, UA Negative Negative mg/dL    Urobilinogen, Urine 0.2 <2.0 E.U./dL    Nitrite, Urine Negative Negative    Leukocyte Esterase, Urine TRACE (A) Negative   Microscopic Urinalysis   Result Value Ref Range    WBC, UA 1-3 0 - 5 /HPF    RBC, UA density pulmonary apical regions could   represent mild edema. XR CHEST 1 VW    (Results Pending)           ---------------------------------------------------PHYSICAL EXAM--------------------------------------      Primary Survey:  Airway: patient, trachea midline,   Breathing: Spontaneous, breath sounds equal bilaterally, symmetric chest rise  Circulation: 2+ femoral pulses, 2+ DP/PT pulses  Disability: GCS 15      Constitutional/General: Alert and oriented x3, well appearing, non toxic in NAD  Head: Normocephalic and atraumatic  Eyes: PERRL, EOMI  Ears: TMs clear with no hemotympanum  Mouth: Oropharynx clear, handling secretions, no trismus. No dental trauma, no oral trauma  Neck: No crepitus, no lacerations, abrasions, deformities, or stepoffs. Back: No midline cervical, thoracic, lumbar spine tenderness. No Stepoffs, abrasions, lacerations, or deformities. Pulmonary: Lungs clear to auscultation bilaterally, no wheezes, rales, or rhonchi. Not in respiratory distress  Chest: no chest wall tenderness, no crepitus  Cardiovascular:  Regular rate and rhythm, no murmurs, gallops, or rubs. 2+ distal pulses  Abdomen: Soft, non tender, non distended, +BS, no rebound, guarding, or rigidity. No pulsatile masses appreciated  Extremities: Moves all extremities x 4. Warm and well perfused, no clubbing, cyanosis, or edema.  Capillary refill <3 seconds, TTP to left shoulder, hip, and lumbar spine  Skin: warm and dry without rash  Neurologic: GCS 15, CN 2-12 grossly intact, no focal deficits, symmetric strength 5/5 in the upper and lower extremities bilaterally  Psych: Normal Affect        ------------------------------ ED COURSE/MEDICAL DECISION MAKING----------------------  Medications   hydrALAZINE (APRESOLINE) injection 10 mg (has no administration in time range)   0.9 % sodium chloride bolus (0 mLs Intravenous Stopped 12/29/19 [id number])         Medical Decision Making:    Male presenting to the emergency department status post fall yesterday. Patient does not have any acute injuries from her fall however she has had many falls over the last several months. Patient has seen her PCP regarding these falls who recommended PT OT evaluation strengthening however patient has been reluctant to do this up until today. At this time patient feels that it would be best for her to be in a rehab facility for further strengthening so she does not injure herself any further with any further falls. Patient is agreeable to hospital admission at this time for further evaluation and possible rehab placement. Re-Evaluations:             Re-evaluation. Patients symptoms are improving      Consultations:         Spoke with Art Laird who was agreeable to hospital admission to evaluate for rehab placement. This patient's ED course included: multiple bedside re-evaluations and a personal history and physicial eaxmination    This patient has remained hemodynamically stable and improved during their ED course. Counseling: The emergency provider has spoken with the patient and discussed todays results, in addition to providing specific details for the plan of care and counseling regarding the diagnosis and prognosis. Questions are answered at this time and they are agreeable with the plan.       --------------------------------- IMPRESSION AND DISPOSITION ---------------------------------    IMPRESSION  1. Frequent falls    2.  Lumbar strain, initial encounter        DISPOSITION  Disposition: Admit to med/surg floor  Patient condition is good           Ashkan Arellanoma  12/29/19 2411

## 2019-12-30 VITALS
HEIGHT: 59 IN | OXYGEN SATURATION: 95 % | RESPIRATION RATE: 18 BRPM | BODY MASS INDEX: 38.71 KG/M2 | TEMPERATURE: 97.2 F | DIASTOLIC BLOOD PRESSURE: 78 MMHG | HEART RATE: 86 BPM | WEIGHT: 192 LBS | SYSTOLIC BLOOD PRESSURE: 162 MMHG

## 2019-12-30 LAB
ANION GAP SERPL CALCULATED.3IONS-SCNC: 18 MMOL/L (ref 7–16)
BUN BLDV-MCNC: 23 MG/DL (ref 8–23)
CALCIUM SERPL-MCNC: 9.4 MG/DL (ref 8.6–10.2)
CHLORIDE BLD-SCNC: 103 MMOL/L (ref 98–107)
CO2: 19 MMOL/L (ref 22–29)
CREAT SERPL-MCNC: 0.7 MG/DL (ref 0.5–1)
GFR AFRICAN AMERICAN: >60
GFR NON-AFRICAN AMERICAN: >60 ML/MIN/1.73
GLUCOSE BLD-MCNC: 133 MG/DL (ref 74–99)
HCT VFR BLD CALC: 38.3 % (ref 34–48)
HEMOGLOBIN: 12.4 G/DL (ref 11.5–15.5)
MAGNESIUM: 1.9 MG/DL (ref 1.6–2.6)
MCH RBC QN AUTO: 27.8 PG (ref 26–35)
MCHC RBC AUTO-ENTMCNC: 32.4 % (ref 32–34.5)
MCV RBC AUTO: 85.9 FL (ref 80–99.9)
PDW BLD-RTO: 13.3 FL (ref 11.5–15)
PLATELET # BLD: 259 E9/L (ref 130–450)
PMV BLD AUTO: 10.5 FL (ref 7–12)
POTASSIUM REFLEX MAGNESIUM: 3.8 MMOL/L (ref 3.5–5)
RBC # BLD: 4.46 E12/L (ref 3.5–5.5)
SODIUM BLD-SCNC: 140 MMOL/L (ref 132–146)
WBC # BLD: 8.4 E9/L (ref 4.5–11.5)

## 2019-12-30 PROCEDURE — 83735 ASSAY OF MAGNESIUM: CPT

## 2019-12-30 PROCEDURE — 36415 COLL VENOUS BLD VENIPUNCTURE: CPT

## 2019-12-30 PROCEDURE — 97161 PT EVAL LOW COMPLEX 20 MIN: CPT

## 2019-12-30 PROCEDURE — G0378 HOSPITAL OBSERVATION PER HR: HCPCS

## 2019-12-30 PROCEDURE — 97530 THERAPEUTIC ACTIVITIES: CPT

## 2019-12-30 PROCEDURE — 6370000000 HC RX 637 (ALT 250 FOR IP): Performed by: NURSE PRACTITIONER

## 2019-12-30 PROCEDURE — 85027 COMPLETE CBC AUTOMATED: CPT

## 2019-12-30 PROCEDURE — 80048 BASIC METABOLIC PNL TOTAL CA: CPT

## 2019-12-30 RX ORDER — IRBESARTAN 75 MG/1
150 TABLET ORAL DAILY
Qty: 90 TABLET | Refills: 1 | Status: CANCELLED
Start: 2019-12-30

## 2019-12-30 RX ORDER — PANTOPRAZOLE SODIUM 40 MG/1
40 TABLET, DELAYED RELEASE ORAL
Qty: 30 TABLET | Refills: 0 | Status: SHIPPED | OUTPATIENT
Start: 2019-12-31 | End: 2019-12-30

## 2019-12-30 RX ORDER — PANTOPRAZOLE SODIUM 40 MG/1
40 TABLET, DELAYED RELEASE ORAL
Qty: 30 TABLET | Refills: 0 | Status: SHIPPED | OUTPATIENT
Start: 2019-12-31 | End: 2020-02-20 | Stop reason: SDUPTHER

## 2019-12-30 RX ADMIN — ACETAMINOPHEN 650 MG: 325 TABLET, FILM COATED ORAL at 05:20

## 2019-12-30 RX ADMIN — ASPIRIN 81 MG 81 MG: 81 TABLET ORAL at 10:57

## 2019-12-30 RX ADMIN — LOSARTAN POTASSIUM 25 MG: 25 TABLET, FILM COATED ORAL at 10:57

## 2019-12-30 RX ADMIN — LEVOTHYROXINE SODIUM 88 MCG: 88 TABLET ORAL at 10:57

## 2019-12-30 RX ADMIN — HYDROCHLOROTHIAZIDE 25 MG: 25 TABLET ORAL at 10:57

## 2019-12-30 RX ADMIN — PANTOPRAZOLE SODIUM 40 MG: 40 TABLET, DELAYED RELEASE ORAL at 06:34

## 2019-12-30 ASSESSMENT — PAIN SCALES - GENERAL
PAINLEVEL_OUTOF10: 8
PAINLEVEL_OUTOF10: 6

## 2019-12-30 NOTE — HOME CARE
[facility] received referral. Will follow after discharge. Spoke with patient and verified demographics. Haley Keller LPN, [facility].

## 2019-12-30 NOTE — PROGRESS NOTES
Patient's  Gold earrings removed and placed in an envelope. Envelope given to patient. Patient left CT department with jewelry.
increasing L calf pain. Pt required VCs for sequencing with steps. Pt amb back to room with decreased gait speed. Pt performed all bed mobility as noted above and SPT to bedside chair. Pt instructed in seated TE (marches, LAQs, AP, heel raises and glut set) and benefits of therapy services at discharge. Pt would benefit from Marco Riddle PT for home safety evaluation and outpatient PT following for higher level balance retraining and BLE strengthening. Pt was left sitting up in bedside chair with call button within reach and all needs met. Patient education  Pt educated on PT role, safety with mobility, transfer technique, gait training and postural reducation. Education provided on benefits of OOB activity to prevent iatrogenic effects. Patient response to education:   Pt verbalized understanding Pt demonstrated skill Pt requires further education in this area   Yes Requires assist/VCs Yes     Rehab potential is Good for reaching above PT goals. Pts/ family goals   1. To not fall    Patient and or family understand(s) diagnosis, prognosis, and plan of care. PLAN  PT care will be provided in accordance with the objectives noted above. Whenever appropriate, clear delegation orders will be provided for nursing staff. Exercises and functional mobility practice will be used as well as appropriate assistive devices or modalities to obtain goals. Patient and family education will also be administered as needed. Frequency of treatments will be 2-5x/week x 7-10 days.     Time in: [facility]  Time out: [address] Dayo Aguilar DPT  License [id number]

## 2019-12-30 NOTE — H&P
[address]                  [address] [facility],  [facility]                              HISTORY AND PHYSICAL    PATIENT NAME: Ronald Faria                      :        1940  MED REC NO:   [id number]                            ROOM:       8208  ACCOUNT NO:   [de-identified]                           ADMIT DATE: 2019  PROVIDER:     Jose Burgos DO      CHIEF COMPLAINT:  Status post fall. HISTORY OF PRESENT ILLNESS:  The patient is an 78-year-old   female who presented to the emergency room after a fall at home. There  was no loss of consciousness. Also, she fell about a week ago. She was  dizzy. PRIMARY CARE PROVIDER:  Marli Boyd DO    MEDICATIONS PRIOR TO ADMISSION:  Antivert, Flonase, Zyrtec, Ativan,  Avapro, Prilosec, hydrochlorothiazide, aspirin, Carafate, Synthroid,  PreserVision vitamins. PAST MEDICAL HISTORY:  Macular degeneration, right eye, receiving  injections; prosthetic, left eye; hypertension; hypothyroidism; GERD;  CVA; bowel cancer, status post surgery and chemo. REVIEW OF SYSTEMS:  Remarkable for above-stated chief complaint plus  chronic left lower extremity pain. ALLERGIES:  IODINE, CODEINE, OXYCODONE-ASPIRIN, AUGMENTIN, DARVOCET,  EGGS or EGG-DERIVED PRODUCTS, INFLUENZA VACCINE, PERCODAN. SOCIAL HISTORY:  No tobacco, no alcohol. PAST SURGICAL HISTORY:  Left eye removal, colectomy, cholecystectomy,  hysterectomy, tonsillectomy, left total knee replacement x2, right eye  cataract surgery. PHYSICAL EXAMINATION:  GENERAL APPEARANCE:  Reveals an 78-year-old  female who is  alert and oriented x3, cooperative and a fair historian. VITAL SIGNS:  On admission, temperature 98 degrees, pulse 60,  respirations 40, blood pressure 126/57. HEENT:  Head:  Normocephalic, atraumatic. Eyes:  Left eye prosthetic  eye noted. Right eye, pupil round and reactive to light.   Fundus not  well visualized. Nose:  No obstruction, polyp or discharge noted. Mouth mucosa without lesion. Teeth fair repair. Pharynx noninjected  without exudate. NECK:  Supple. No JVD. No thyromegaly. No carotid bruits. HEART:  Regular rate and rhythm without murmur. LUNGS:  Clear to auscultation bilaterally. ABDOMEN:  Positive bowel sounds, soft, nontender. No rebound or  guarding. No hepatosplenomegaly. No masses. BACK:  With increased thoracic kyphosis. EXTREMITIES:  With minimal edema in the bilateral lower extremities. LYMPH NODES:  No adenopathy noted. SKIN:  With a few abrasions noted on the extremities. IMPRESSION:  Status post fall, hypertension, hypothyroidism, GERD. PLAN:  Assign the patient observation status. PT, OT eval.  Social  Services for discharge planning. Discharge plan, home versus rehab as  per the patient's progress.         Francoise Cano DO    D: 12/30/2019 8:46:31       T: 12/30/2019 8:50:57     [email or web address]  Job#: [id number]     Doc#: [id number]    CC:

## 2019-12-31 ENCOUNTER — OFFICE VISIT (OUTPATIENT)
Dept: FAMILY MEDICINE CLINIC | Age: 79
End: 2019-12-31
Payer: MEDICARE

## 2019-12-31 ENCOUNTER — CARE COORDINATION (OUTPATIENT)
Dept: CASE MANAGEMENT | Age: 79
End: 2019-12-31

## 2019-12-31 VITALS
DIASTOLIC BLOOD PRESSURE: 66 MMHG | TEMPERATURE: 97.6 F | RESPIRATION RATE: 16 BRPM | HEART RATE: 68 BPM | OXYGEN SATURATION: 97 % | SYSTOLIC BLOOD PRESSURE: 128 MMHG | BODY MASS INDEX: 38.58 KG/M2 | WEIGHT: 191 LBS

## 2019-12-31 DIAGNOSIS — R26.81 GAIT INSTABILITY: ICD-10-CM

## 2019-12-31 DIAGNOSIS — R53.1 GENERALIZED WEAKNESS: Primary | ICD-10-CM

## 2019-12-31 PROCEDURE — 99496 TRANSJ CARE MGMT HIGH F2F 7D: CPT | Performed by: FAMILY MEDICINE

## 2019-12-31 PROCEDURE — 1111F DSCHRG MED/CURRENT MED MERGE: CPT | Performed by: FAMILY MEDICINE

## 2020-01-02 ENCOUNTER — APPOINTMENT (OUTPATIENT)
Dept: CT IMAGING | Age: 80
DRG: 149 | End: 2020-01-02
Payer: MEDICARE

## 2020-01-02 ENCOUNTER — HOSPITAL ENCOUNTER (INPATIENT)
Age: 80
LOS: 1 days | Discharge: NURSING, REHAB OR LONG-TERM CARE | DRG: 149 | End: 2020-01-03
Attending: EMERGENCY MEDICINE | Admitting: INTERNAL MEDICINE
Payer: MEDICARE

## 2020-01-02 PROBLEM — R53.1 WEAKNESS: Status: RESOLVED | Noted: 2019-12-29 | Resolved: 2020-01-02

## 2020-01-02 PROBLEM — R29.6 FREQUENT FALLS: Chronic | Status: ACTIVE | Noted: 2019-12-29

## 2020-01-02 PROBLEM — R27.0 ATAXIA: Status: ACTIVE | Noted: 2020-01-02

## 2020-01-02 PROBLEM — R55 NEAR SYNCOPE: Status: ACTIVE | Noted: 2020-01-02

## 2020-01-02 PROBLEM — H54.40 BLINDNESS OF LEFT EYE: Chronic | Status: ACTIVE | Noted: 2017-12-11

## 2020-01-02 LAB
ALBUMIN SERPL-MCNC: 3.7 G/DL (ref 3.5–5.2)
ALP BLD-CCNC: 69 U/L (ref 35–104)
ALT SERPL-CCNC: 15 U/L (ref 0–32)
ANION GAP SERPL CALCULATED.3IONS-SCNC: 12 MMOL/L (ref 7–16)
AST SERPL-CCNC: 18 U/L (ref 0–31)
BACTERIA: NORMAL /HPF
BILIRUB SERPL-MCNC: 0.3 MG/DL (ref 0–1.2)
BILIRUBIN URINE: NEGATIVE
BLOOD, URINE: NEGATIVE
BUN BLDV-MCNC: 25 MG/DL (ref 8–23)
CALCIUM SERPL-MCNC: 9.2 MG/DL (ref 8.6–10.2)
CHLORIDE BLD-SCNC: 100 MMOL/L (ref 98–107)
CLARITY: CLEAR
CO2: 24 MMOL/L (ref 22–29)
COLOR: ABNORMAL
CREAT SERPL-MCNC: 0.8 MG/DL (ref 0.5–1)
EKG ATRIAL RATE: 59 BPM
EKG P AXIS: 59 DEGREES
EKG P-R INTERVAL: 174 MS
EKG Q-T INTERVAL: 450 MS
EKG QRS DURATION: 76 MS
EKG QTC CALCULATION (BAZETT): 445 MS
EKG R AXIS: -18 DEGREES
EKG T AXIS: 13 DEGREES
EKG VENTRICULAR RATE: 59 BPM
EPITHELIAL CELLS, UA: NORMAL /HPF
GFR AFRICAN AMERICAN: >60
GFR NON-AFRICAN AMERICAN: >60 ML/MIN/1.73
GLUCOSE BLD-MCNC: 111 MG/DL (ref 74–99)
GLUCOSE URINE: NEGATIVE MG/DL
HCT VFR BLD CALC: 36.9 % (ref 34–48)
HEMOGLOBIN: 11.8 G/DL (ref 11.5–15.5)
KETONES, URINE: NEGATIVE MG/DL
LEUKOCYTE ESTERASE, URINE: ABNORMAL
MCH RBC QN AUTO: 27.8 PG (ref 26–35)
MCHC RBC AUTO-ENTMCNC: 32 % (ref 32–34.5)
MCV RBC AUTO: 86.8 FL (ref 80–99.9)
NITRITE, URINE: NEGATIVE
PDW BLD-RTO: 13.6 FL (ref 11.5–15)
PH UA: 6 (ref 5–9)
PLATELET # BLD: 240 E9/L (ref 130–450)
PMV BLD AUTO: 9.7 FL (ref 7–12)
POTASSIUM SERPL-SCNC: 4 MMOL/L (ref 3.5–5)
PROTEIN UA: NEGATIVE MG/DL
RBC # BLD: 4.25 E12/L (ref 3.5–5.5)
RBC UA: NORMAL /HPF (ref 0–2)
SODIUM BLD-SCNC: 136 MMOL/L (ref 132–146)
SPECIFIC GRAVITY UA: <=1.005 (ref 1–1.03)
TOTAL PROTEIN: 6.8 G/DL (ref 6.4–8.3)
TROPONIN: <0.01 NG/ML (ref 0–0.03)
UROBILINOGEN, URINE: 0.2 E.U./DL
WBC # BLD: 8.6 E9/L (ref 4.5–11.5)
WBC UA: NORMAL /HPF (ref 0–5)

## 2020-01-02 PROCEDURE — 97535 SELF CARE MNGMENT TRAINING: CPT

## 2020-01-02 PROCEDURE — 97162 PT EVAL MOD COMPLEX 30 MIN: CPT

## 2020-01-02 PROCEDURE — 96374 THER/PROPH/DIAG INJ IV PUSH: CPT

## 2020-01-02 PROCEDURE — 97530 THERAPEUTIC ACTIVITIES: CPT

## 2020-01-02 PROCEDURE — 96375 TX/PRO/DX INJ NEW DRUG ADDON: CPT

## 2020-01-02 PROCEDURE — 96372 THER/PROPH/DIAG INJ SC/IM: CPT

## 2020-01-02 PROCEDURE — 99285 EMERGENCY DEPT VISIT HI MDM: CPT

## 2020-01-02 PROCEDURE — 2580000003 HC RX 258: Performed by: INTERNAL MEDICINE

## 2020-01-02 PROCEDURE — 6360000002 HC RX W HCPCS: Performed by: EMERGENCY MEDICINE

## 2020-01-02 PROCEDURE — 93005 ELECTROCARDIOGRAM TRACING: CPT | Performed by: EMERGENCY MEDICINE

## 2020-01-02 PROCEDURE — 2060000000 HC ICU INTERMEDIATE R&B

## 2020-01-02 PROCEDURE — 80053 COMPREHEN METABOLIC PANEL: CPT

## 2020-01-02 PROCEDURE — 81001 URINALYSIS AUTO W/SCOPE: CPT

## 2020-01-02 PROCEDURE — 2580000003 HC RX 258: Performed by: EMERGENCY MEDICINE

## 2020-01-02 PROCEDURE — 36415 COLL VENOUS BLD VENIPUNCTURE: CPT

## 2020-01-02 PROCEDURE — 6360000002 HC RX W HCPCS: Performed by: INTERNAL MEDICINE

## 2020-01-02 PROCEDURE — 6370000000 HC RX 637 (ALT 250 FOR IP): Performed by: INTERNAL MEDICINE

## 2020-01-02 PROCEDURE — 97165 OT EVAL LOW COMPLEX 30 MIN: CPT

## 2020-01-02 PROCEDURE — 6370000000 HC RX 637 (ALT 250 FOR IP): Performed by: EMERGENCY MEDICINE

## 2020-01-02 PROCEDURE — 84484 ASSAY OF TROPONIN QUANT: CPT

## 2020-01-02 PROCEDURE — 93010 ELECTROCARDIOGRAM REPORT: CPT | Performed by: INTERNAL MEDICINE

## 2020-01-02 PROCEDURE — 99221 1ST HOSP IP/OBS SF/LOW 40: CPT | Performed by: PSYCHIATRY & NEUROLOGY

## 2020-01-02 PROCEDURE — 85027 COMPLETE CBC AUTOMATED: CPT

## 2020-01-02 PROCEDURE — 70450 CT HEAD/BRAIN W/O DYE: CPT

## 2020-01-02 RX ORDER — LOSARTAN POTASSIUM 25 MG/1
25 TABLET ORAL DAILY
Status: DISCONTINUED | OUTPATIENT
Start: 2020-01-02 | End: 2020-01-03 | Stop reason: HOSPADM

## 2020-01-02 RX ORDER — DIPHENHYDRAMINE HYDROCHLORIDE 50 MG/ML
25 INJECTION INTRAMUSCULAR; INTRAVENOUS ONCE
Status: COMPLETED | OUTPATIENT
Start: 2020-01-02 | End: 2020-01-02

## 2020-01-02 RX ORDER — SODIUM CHLORIDE 9 MG/ML
INJECTION, SOLUTION INTRAVENOUS CONTINUOUS
Status: DISCONTINUED | OUTPATIENT
Start: 2020-01-02 | End: 2020-01-03

## 2020-01-02 RX ORDER — VITS A,C,E/LUTEIN/MINERALS 300MCG-200
1 TABLET ORAL 2 TIMES DAILY
Status: DISCONTINUED | OUTPATIENT
Start: 2020-01-02 | End: 2020-01-03 | Stop reason: HOSPADM

## 2020-01-02 RX ORDER — LEVOTHYROXINE SODIUM 88 UG/1
88 TABLET ORAL DAILY
Status: DISCONTINUED | OUTPATIENT
Start: 2020-01-02 | End: 2020-01-03 | Stop reason: HOSPADM

## 2020-01-02 RX ORDER — COVID-19 ANTIGEN TEST
1 KIT MISCELLANEOUS 2 TIMES DAILY PRN
Status: ON HOLD | COMMUNITY
End: 2020-01-03 | Stop reason: HOSPADM

## 2020-01-02 RX ORDER — ONDANSETRON 2 MG/ML
4 INJECTION INTRAMUSCULAR; INTRAVENOUS EVERY 6 HOURS PRN
Status: DISCONTINUED | OUTPATIENT
Start: 2020-01-02 | End: 2020-01-03 | Stop reason: HOSPADM

## 2020-01-02 RX ORDER — CHOLECALCIFEROL (VITAMIN D3) 125 MCG
5 CAPSULE ORAL NIGHTLY
COMMUNITY
End: 2020-07-30

## 2020-01-02 RX ORDER — PANTOPRAZOLE SODIUM 40 MG/1
40 TABLET, DELAYED RELEASE ORAL
Status: DISCONTINUED | OUTPATIENT
Start: 2020-01-03 | End: 2020-01-03 | Stop reason: HOSPADM

## 2020-01-02 RX ORDER — CHOLECALCIFEROL (VITAMIN D3) 125 MCG
5 CAPSULE ORAL NIGHTLY
Status: DISCONTINUED | OUTPATIENT
Start: 2020-01-02 | End: 2020-01-03 | Stop reason: HOSPADM

## 2020-01-02 RX ORDER — SODIUM CHLORIDE 0.9 % (FLUSH) 0.9 %
10 SYRINGE (ML) INJECTION PRN
Status: DISCONTINUED | OUTPATIENT
Start: 2020-01-02 | End: 2020-01-03 | Stop reason: HOSPADM

## 2020-01-02 RX ORDER — SODIUM CHLORIDE 0.9 % (FLUSH) 0.9 %
10 SYRINGE (ML) INJECTION EVERY 12 HOURS SCHEDULED
Status: DISCONTINUED | OUTPATIENT
Start: 2020-01-02 | End: 2020-01-03 | Stop reason: HOSPADM

## 2020-01-02 RX ORDER — ASPIRIN 81 MG/1
81 TABLET, CHEWABLE ORAL DAILY
Status: DISCONTINUED | OUTPATIENT
Start: 2020-01-02 | End: 2020-01-03 | Stop reason: HOSPADM

## 2020-01-02 RX ORDER — HYDROCHLOROTHIAZIDE 25 MG/1
25 TABLET ORAL DAILY
Status: DISCONTINUED | OUTPATIENT
Start: 2020-01-02 | End: 2020-01-03 | Stop reason: HOSPADM

## 2020-01-02 RX ORDER — ACETAMINOPHEN 325 MG/1
650 TABLET ORAL EVERY 4 HOURS PRN
Status: DISCONTINUED | OUTPATIENT
Start: 2020-01-02 | End: 2020-01-03 | Stop reason: HOSPADM

## 2020-01-02 RX ORDER — LORAZEPAM 2 MG/ML
0.5 INJECTION INTRAMUSCULAR ONCE
Status: COMPLETED | OUTPATIENT
Start: 2020-01-02 | End: 2020-01-02

## 2020-01-02 RX ORDER — FLUTICASONE PROPIONATE 50 MCG
2 SPRAY, SUSPENSION (ML) NASAL DAILY
Status: DISCONTINUED | OUTPATIENT
Start: 2020-01-02 | End: 2020-01-03 | Stop reason: HOSPADM

## 2020-01-02 RX ORDER — CETIRIZINE HYDROCHLORIDE 10 MG/1
5 TABLET ORAL NIGHTLY
Status: DISCONTINUED | OUTPATIENT
Start: 2020-01-02 | End: 2020-01-03 | Stop reason: HOSPADM

## 2020-01-02 RX ORDER — MECLIZINE HCL 12.5 MG/1
25 TABLET ORAL ONCE
Status: COMPLETED | OUTPATIENT
Start: 2020-01-02 | End: 2020-01-02

## 2020-01-02 RX ADMIN — SODIUM CHLORIDE: 9 INJECTION, SOLUTION INTRAVENOUS at 02:25

## 2020-01-02 RX ADMIN — CYCLOPENTOLATE HYDROCHLORIDE 1 TABLET: 10 SOLUTION/ DROPS OPHTHALMIC at 11:10

## 2020-01-02 RX ADMIN — SODIUM CHLORIDE, PRESERVATIVE FREE 10 ML: 5 INJECTION INTRAVENOUS at 11:09

## 2020-01-02 RX ADMIN — Medication 5 MG: at 21:01

## 2020-01-02 RX ADMIN — ACETAMINOPHEN 650 MG: 325 TABLET, FILM COATED ORAL at 18:25

## 2020-01-02 RX ADMIN — LEVOTHYROXINE SODIUM 88 MCG: 88 TABLET ORAL at 11:10

## 2020-01-02 RX ADMIN — HYDROCHLOROTHIAZIDE 25 MG: 25 TABLET ORAL at 11:10

## 2020-01-02 RX ADMIN — CETIRIZINE HYDROCHLORIDE 5 MG: 10 TABLET, FILM COATED ORAL at 21:01

## 2020-01-02 RX ADMIN — SODIUM CHLORIDE, PRESERVATIVE FREE 10 ML: 5 INJECTION INTRAVENOUS at 21:02

## 2020-01-02 RX ADMIN — CYCLOPENTOLATE HYDROCHLORIDE 1 TABLET: 10 SOLUTION/ DROPS OPHTHALMIC at 21:01

## 2020-01-02 RX ADMIN — LORAZEPAM 0.5 MG: 2 INJECTION INTRAMUSCULAR; INTRAVENOUS at 04:00

## 2020-01-02 RX ADMIN — LOSARTAN POTASSIUM 25 MG: 25 TABLET, FILM COATED ORAL at 11:10

## 2020-01-02 RX ADMIN — ASPIRIN 81 MG 81 MG: 81 TABLET ORAL at 11:10

## 2020-01-02 RX ADMIN — ACETAMINOPHEN 650 MG: 325 TABLET, FILM COATED ORAL at 11:10

## 2020-01-02 RX ADMIN — ENOXAPARIN SODIUM 40 MG: 40 INJECTION SUBCUTANEOUS at 11:09

## 2020-01-02 RX ADMIN — MECLIZINE 25 MG: 12.5 TABLET ORAL at 02:23

## 2020-01-02 RX ADMIN — DIPHENHYDRAMINE HYDROCHLORIDE 25 MG: 50 INJECTION, SOLUTION INTRAMUSCULAR; INTRAVENOUS at 04:00

## 2020-01-02 ASSESSMENT — PAIN SCALES - GENERAL
PAINLEVEL_OUTOF10: 9
PAINLEVEL_OUTOF10: 9
PAINLEVEL_OUTOF10: 6
PAINLEVEL_OUTOF10: 0

## 2020-01-02 ASSESSMENT — ENCOUNTER SYMPTOMS
BACK PAIN: 1
GASTROINTESTINAL NEGATIVE: 1
RESPIRATORY NEGATIVE: 1
EYES NEGATIVE: 1

## 2020-01-02 ASSESSMENT — PAIN DESCRIPTION - DESCRIPTORS: DESCRIPTORS: ACHING;CONSTANT;DISCOMFORT;SORE

## 2020-01-02 ASSESSMENT — PAIN - FUNCTIONAL ASSESSMENT: PAIN_FUNCTIONAL_ASSESSMENT: PREVENTS OR INTERFERES SOME ACTIVE ACTIVITIES AND ADLS

## 2020-01-02 ASSESSMENT — PAIN DESCRIPTION - LOCATION: LOCATION: BACK;LEG

## 2020-01-02 ASSESSMENT — PAIN DESCRIPTION - ORIENTATION: ORIENTATION: LEFT

## 2020-01-02 ASSESSMENT — PAIN DESCRIPTION - ONSET: ONSET: ON-GOING

## 2020-01-02 ASSESSMENT — PAIN DESCRIPTION - FREQUENCY: FREQUENCY: CONTINUOUS

## 2020-01-02 ASSESSMENT — PAIN DESCRIPTION - PROGRESSION: CLINICAL_PROGRESSION: NOT CHANGED

## 2020-01-02 ASSESSMENT — PAIN DESCRIPTION - PAIN TYPE: TYPE: CHRONIC PAIN

## 2020-01-02 NOTE — DISCHARGE INSTR - COC
Continuity of Care Form    Patient Name: Nick De La Cruz   :  1940  MRN:  [id number]    Admit date:  2020  Discharge date:  1/3/2020    Code Status Order: Full Code   Advance Directives: None    Admitting Physician:  Jp Juarez DO  PCP: Ruperto Arce DO    Discharging Nurse: Katalina Prabhakar Unit/Room#: 8619/6586-K  Discharging Unit Phone Number: [phone]    Emergency Contact:   Extended Emergency Contact Information  Primary Emergency Contact: Marbella Stafford [facility] [address]  Phone: [phone]  Mobile Phone: [phone]  Relation: Brother/Sister  Secondary Emergency Contact: Madhavi Choi  Address: [facility] [address]  Phone: [phone]  Work Phone: [phone]  Relation: Child    Past Surgical History:  Past Surgical History:   Procedure Laterality Date   [address]    open?     COLECTOMY  1974    COLONOSCOPY  2012    and egd    COLONOSCOPY  2014    COLONOSCOPY  2015    CYST REMOVAL  years ago    upper gum    EYE SURGERY  years ago    left eye removal,  has artificial eye    HYSTERECTOMY  1974    JOINT REPLACEMENT Left 2010/2012    x 2-knee    TONSILLECTOMY      TUMOR EXCISION      from arm, fatty    UPPER GASTROINTESTINAL ENDOSCOPY  2014    with biopsy    UPPER GASTROINTESTINAL ENDOSCOPY  2015    UPPER GASTROINTESTINAL ENDOSCOPY N/A 2019    EGD ESOPHAGOGASTRODUODENOSCOPY  ++IODINE ALLERGY++ and bx performed by Candice House MD at [address] [place] [address]       Immunization History:   Immunization History   Administered Date(s) Administered    Pneumococcal Conjugate 13-valent ([id number]) 2016    Tetanus 2019       Active Problems:  Patient Active Problem List   Diagnosis Code    Dizziness R42    Hypothyroidism E03.9    Obstructive sleep apnea syndrome, non-compliant with CPAP therapy G47.33    Hyperlipidemia LDL goal <100 E78.5    Obesity Restriction: no  Last Modified Barium Swallow with Video (Video Swallowing Test): not done    Treatments at the Time of Hospital Discharge:   Respiratory Treatments: None  Oxygen Therapy:  Room air  Ventilator:    - No ventilator support    Rehab Therapies: Physical Therapy and Occupational Therapy  Weight Bearing Status/Restrictions: No weight bearing restirctions  Other Medical Equipment (for information only, NOT a DME order):  walker, bedside commode and hospital bed  Other Treatments: Up with assistance    Patient's personal belongings (please select all that are sent with patient):  pajamas & keys    RN SIGNATURE:  Electronically signed by Colton Boyer RN on 1/3/20 at 4:02 PM    CASE MANAGEMENT/SOCIAL WORK SECTION    Inpatient Status Date: ***    Readmission Risk Assessment Score:  Readmission Risk              Risk of Unplanned Readmission:        11           Discharging to Facility/ Agency   · Name:   · Address:  · Phone:  · Fax:    Dialysis Facility (if applicable)   · Name:  · Address:  · Dialysis Schedule:  · Phone:  · Fax:    / signature: {Esignature:[id number]}    PHYSICIAN SECTION    Prognosis: {Prognosis:1438810018}    Condition at Discharge: [address] [place] [address] Patient Condition:539287688}    Rehab Potential (if transferring to Rehab): {Prognosis:1632538259}    Recommended Labs or Other Treatments After Discharge: ***    Physician Certification: I certify the above information and transfer of Remy Bejarano  is necessary for the continuing treatment of the diagnosis listed and that she requires {Admit to Appropriate Level of Care:59600} for {GREATER/LESS:361062540} 30 days.      Update Admission H&P: {CHP DME Changes in OKFSD:092530576}    PHYSICIAN SIGNATURE:  {Esignature:[id number]} Electronically signed by Myron May DO on 1/2/2020 at 2:41 PM

## 2020-01-02 NOTE — PROGRESS NOTES
[facility] PICU  Physical Therapy Initial Evaluation    Name: Katt Gilbert  : 1940  MRN: [id number]    Date of Service: 2020    Evaluating Therapist: Jolene Saenz PT, DPT  [id number]    Room #: 3827/9933-X  DIAGNOSIS: Near Syncope  PRECAUTIONS: Falls, L eye blind  PMH: Anxiety, OA, Bowel Ca, TIA, Chronic back pain, Depression, GERD, HLD, HTN, Macular degeneration, L prosthetic eye     Social:  Pt lives alone in a 1 story with 1 entry step and no HR. Pt owns a SPC and uses it intermittently as needed. Pt also owns a rollator. Initial Evaluation  Date:  Treatment  Short Term/ Long Term   Goals   Was pt agreeable to Eval/treatment? Yes     Does pt have pain? No c/o pain     Bed Mobility  Rolling: Supervision  Supine to sit: SBA  Sit to supine: NT  Scooting: SBA to EOB  Mod Independent    Transfers Sit to stand: Min A  Stand to sit: CGA  Stand pivot: Min A using no AD  Supervision   Ambulation   125 feet using no AD with Min A  >250 feet using AAD with Supervision   Stair negotiation:  NT due to dizziness  >1 steps using no rail with Supervision   ROM RLE: WFL  LLE: WFL     Strength RLE: 4-/5  LLE: 4/5     Balance   Sitting: SBA  Standing: Min A using no AD     AM-PAC Basic Mobility Inpatient Short Form Raw Score:  16/24       Patient is A&Ox4. Pt demonstrated STM deficits during evaluation. Sensation: NT  Coordination: NT  Edema: None noted     ASSESSMENT  Pt displays functional ability as noted in the objective portion of this evaluation. Comments/Treatment:  Pt was cleared by RN prior to PT evaluation. Pt was received supine and was agreeable to PT evaluation. Pt's BP assessed in position changes and was as noted below. Pt amb with guarded gait and declined use of WW due to not using one at home.   Pt very unsteady and demonstrated increased balance deficits since last time seen by this therapist.  Pt reported increasing dizziness and RLE feeling weaker with increasing distance amb and requested to return to bed. Pt was assisted back to bedside and performed STS from bedside chair with assist to steady upon standing. Pt returned to sitting in chair and reported dizziness subsiding. Pt was left with call button within reach and all needs met. Pt would benefit from continued therapy services at discharge to improve strength, balance, functional tolerance and reduce risk for falls to increase functional independence and promote return to PLOF. Patient education  Pt educated on PT role, safety with mobility, transfer technique, gait training and postural reducation. Education provided on benefits of OOB activity to prevent iatrogenic effects. Patient response to education:   Pt verbalized understanding Pt demonstrated skill Pt requires further education in this area   Yes Requires assist/VCs Yes     Rehab potential is Good for reaching above PT goals. Pts/ family goals   1. To improve balance and not fall. Patient and or family understand(s) diagnosis, prognosis, and plan of care. PLAN  PT care will be provided in accordance with the objectives noted above. Whenever appropriate, clear delegation orders will be provided for nursing staff. Exercises and functional mobility practice will be used as well as appropriate assistive devices or modalities to obtain goals. Patient and family education will also be administered as needed. Frequency of treatments will be 2-5x/week x 7-10 days.     Time in: 1010  Time out: Corky Boone DPT  License [id number]

## 2020-01-02 NOTE — ED NOTES
Pt alert oriented skin warm dry resp easy c/o dizziness and feels shaky     Bon Neal RN  01/02/20 1646

## 2020-01-02 NOTE — PROGRESS NOTES
Status  Date: Short Term Goals  Treatment frequency: PRN 1-3 tx/wk   Feeding IND                          IND   while seated up in chair to increase activity tolerance        Grooming S; set up                          S   while standing sink level requiring WW as needed for balance and G  tolerance      UB dressing/bathing Min A                      S; set up       LB dressing/bathing Mod A  Seated EOB; decreased functional reach. Min c/o dizziness throughout session. Pt reports improved from earlier today. S; set up   using AE as needed for safe reach/ energy conservation       Toileting Min A  Using bedside commode                          S     Bed Mobility  Supine to sit: SBA    Sit to supine: SBA                         Mod I  in prep of ADL tasks & transfers   Functional Transfers Sit to stand: Min A    Stand to sit: Min A    SPT to commode: Min A    BSC transfer: SBA                        S  sit<>stand/functional bathroom transfers using AD/DME as needed for balance and safety   Functional Mobility Min A hand held                        S   functional/bathroom mobility using AD as needed & demonstrating G safety     Balance Sitting:     Static:  SBA    Dynamic:Min A   Standing: Min A                 Grace dynamic sitting balance and S dynamic standing balance during ADL tasks & transfers   Endurance/Activity Tolerance   F tolerance with light activity; lethargic throughout session due to c/o dizziness     G   tolerance with moderate activity/self care routine   Visual/  Perceptual               WFL  Reports no new vision changes. Comments/Treatment: OK from RN to see patient. Upon arrival, patient supine in bed; agreeable to session. Patient assisted to EOB to increase functional endurance in preparation of self care activities and functional transfers. Patient educated on energy conservation regarding LB dressing and self care routine.  Pt required assist to

## 2020-01-03 ENCOUNTER — APPOINTMENT (OUTPATIENT)
Dept: MRI IMAGING | Age: 80
DRG: 149 | End: 2020-01-03
Payer: MEDICARE

## 2020-01-03 VITALS
WEIGHT: 203.9 LBS | HEART RATE: 62 BPM | DIASTOLIC BLOOD PRESSURE: 67 MMHG | RESPIRATION RATE: 16 BRPM | SYSTOLIC BLOOD PRESSURE: 152 MMHG | TEMPERATURE: 96.8 F | OXYGEN SATURATION: 95 % | BODY MASS INDEX: 41.11 KG/M2 | HEIGHT: 59 IN

## 2020-01-03 PROCEDURE — A9579 GAD-BASE MR CONTRAST NOS,1ML: HCPCS | Performed by: RADIOLOGY

## 2020-01-03 PROCEDURE — 2580000003 HC RX 258: Performed by: INTERNAL MEDICINE

## 2020-01-03 PROCEDURE — 6360000002 HC RX W HCPCS: Performed by: INTERNAL MEDICINE

## 2020-01-03 PROCEDURE — 96372 THER/PROPH/DIAG INJ SC/IM: CPT

## 2020-01-03 PROCEDURE — 70553 MRI BRAIN STEM W/O & W/DYE: CPT

## 2020-01-03 PROCEDURE — 96376 TX/PRO/DX INJ SAME DRUG ADON: CPT

## 2020-01-03 PROCEDURE — 6370000000 HC RX 637 (ALT 250 FOR IP): Performed by: INTERNAL MEDICINE

## 2020-01-03 PROCEDURE — 6370000000 HC RX 637 (ALT 250 FOR IP): Performed by: NURSE PRACTITIONER

## 2020-01-03 PROCEDURE — 6360000004 HC RX CONTRAST MEDICATION: Performed by: RADIOLOGY

## 2020-01-03 RX ORDER — MECLIZINE HCL 12.5 MG/1
25 TABLET ORAL EVERY 6 HOURS SCHEDULED
Status: DISCONTINUED | OUTPATIENT
Start: 2020-01-03 | End: 2020-01-03 | Stop reason: HOSPADM

## 2020-01-03 RX ORDER — CETIRIZINE HYDROCHLORIDE 5 MG/1
5 TABLET ORAL NIGHTLY
Qty: 30 TABLET | Refills: 0
Start: 2020-01-03 | End: 2021-01-26 | Stop reason: SDUPTHER

## 2020-01-03 RX ORDER — LORAZEPAM 2 MG/ML
0.5 INJECTION INTRAMUSCULAR ONCE
Status: COMPLETED | OUTPATIENT
Start: 2020-01-03 | End: 2020-01-03

## 2020-01-03 RX ORDER — MECLIZINE HYDROCHLORIDE 25 MG/1
25 TABLET ORAL 3 TIMES DAILY PRN
Qty: 30 TABLET | Refills: 0
Start: 2020-01-03 | End: 2021-01-26 | Stop reason: SDUPTHER

## 2020-01-03 RX ADMIN — LOSARTAN POTASSIUM 25 MG: 25 TABLET, FILM COATED ORAL at 10:12

## 2020-01-03 RX ADMIN — ACETAMINOPHEN 650 MG: 325 TABLET, FILM COATED ORAL at 00:46

## 2020-01-03 RX ADMIN — ACETAMINOPHEN 650 MG: 325 TABLET, FILM COATED ORAL at 16:37

## 2020-01-03 RX ADMIN — LEVOTHYROXINE SODIUM 88 MCG: 88 TABLET ORAL at 06:18

## 2020-01-03 RX ADMIN — GADOTERIDOL 19 ML: 279.3 INJECTION, SOLUTION INTRAVENOUS at 08:15

## 2020-01-03 RX ADMIN — ACETAMINOPHEN 650 MG: 325 TABLET, FILM COATED ORAL at 10:19

## 2020-01-03 RX ADMIN — SODIUM CHLORIDE, PRESERVATIVE FREE 10 ML: 5 INJECTION INTRAVENOUS at 10:12

## 2020-01-03 RX ADMIN — PANTOPRAZOLE SODIUM 40 MG: 40 TABLET, DELAYED RELEASE ORAL at 06:18

## 2020-01-03 RX ADMIN — ACETAMINOPHEN 650 MG: 325 TABLET, FILM COATED ORAL at 06:18

## 2020-01-03 RX ADMIN — ENOXAPARIN SODIUM 40 MG: 40 INJECTION SUBCUTANEOUS at 10:12

## 2020-01-03 RX ADMIN — CYCLOPENTOLATE HYDROCHLORIDE 1 TABLET: 10 SOLUTION/ DROPS OPHTHALMIC at 10:13

## 2020-01-03 RX ADMIN — ASPIRIN 81 MG 81 MG: 81 TABLET ORAL at 10:13

## 2020-01-03 RX ADMIN — LORAZEPAM 0.5 MG: 2 INJECTION INTRAMUSCULAR; INTRAVENOUS at 07:26

## 2020-01-03 RX ADMIN — HYDROCHLOROTHIAZIDE 25 MG: 25 TABLET ORAL at 10:12

## 2020-01-03 RX ADMIN — MECLIZINE 25 MG: 12.5 TABLET ORAL at 15:03

## 2020-01-03 ASSESSMENT — PAIN DESCRIPTION - DESCRIPTORS
DESCRIPTORS: CONSTANT;DISCOMFORT;SORE

## 2020-01-03 ASSESSMENT — PAIN - FUNCTIONAL ASSESSMENT
PAIN_FUNCTIONAL_ASSESSMENT: PREVENTS OR INTERFERES SOME ACTIVE ACTIVITIES AND ADLS

## 2020-01-03 ASSESSMENT — PAIN DESCRIPTION - ORIENTATION
ORIENTATION: LEFT

## 2020-01-03 ASSESSMENT — PAIN DESCRIPTION - LOCATION
LOCATION: LEG
LOCATION: BACK;LEG

## 2020-01-03 ASSESSMENT — PAIN DESCRIPTION - PROGRESSION
CLINICAL_PROGRESSION: NOT CHANGED

## 2020-01-03 ASSESSMENT — PAIN DESCRIPTION - PAIN TYPE
TYPE: ACUTE PAIN

## 2020-01-03 ASSESSMENT — PAIN SCALES - GENERAL
PAINLEVEL_OUTOF10: 10
PAINLEVEL_OUTOF10: 0
PAINLEVEL_OUTOF10: 0
PAINLEVEL_OUTOF10: 10
PAINLEVEL_OUTOF10: 0
PAINLEVEL_OUTOF10: 8
PAINLEVEL_OUTOF10: 7
PAINLEVEL_OUTOF10: 8

## 2020-01-03 ASSESSMENT — PAIN DESCRIPTION - ONSET
ONSET: ON-GOING

## 2020-01-03 ASSESSMENT — PAIN DESCRIPTION - FREQUENCY
FREQUENCY: CONTINUOUS

## 2020-01-03 NOTE — DISCHARGE SUMMARY
[address]                  [address] [facility],  [facility]                               DISCHARGE SUMMARY    PATIENT NAME: Reece Jarrell                      :        1940  MED REC NO:   [id number]                            ROOM:       8208  ACCOUNT NO:   [de-identified]                           ADMIT DATE: 2019  PROVIDER:     Jesús Glass DO                  DISCHARGE DATE:  2019    DISCHARGE DIAGNOSES:  1.  Status post fall. 2.  Hypertension. 3.  Hypothyroidism. 4.  GERD. HOSPITAL COURSE:  The patient is an 47-year-old  female who  presented to the emergency room after a fall at home. She was assigned  to observation status. She was seen by PT/OT. She was discharged to  home in stable condition on 2019, with recommendations to followup  as an outpatient with primary care physician, Dr. Kd Echeverria,  call office for appointment. DISCHARGE MEDICATIONS:  As per discharge med rec which include:  1. Protonix 40 mg daily. 2.  Zyrtec daily. 3.  Aspirin daily. 4.  Flonase daily. 5.  Hydrochlorothiazide daily. 6.  Avapro daily. 7.  Synthroid daily. 8.  PreserVision vitamins b.i.d. Home care to follow.         Nitesh Yang DO    D: 2020 16:00:02       T: 2020 0:57:51     [id number]  Job#: [id number]     Doc#: [id number]    CC:  Kaye Boyd DO

## 2020-01-03 NOTE — CARE COORDINATION
Patient for discharge today. Rachel arranged for  at 1630. Paco notified of discharge time. Discussed with patient, she plans to discuss with family and does not want me to notify them of discharge. She will notify them herself.

## 2020-01-06 ENCOUNTER — CARE COORDINATION (OUTPATIENT)
Dept: CARE COORDINATION | Age: 80
End: 2020-01-06

## 2020-01-14 ENCOUNTER — CARE COORDINATION (OUTPATIENT)
Dept: CARE COORDINATION | Age: 80
End: 2020-01-14

## 2020-01-14 NOTE — CARE COORDINATION
[facility] Transitions Initial Follow Up Call    Call within 2 business days of discharge: Yes    Patient: Leigha Peraza Patient : 1940   MRN: [id number]  Reason for Admission: Ataxia  Discharge Date: 1/3/20 RARS: Readmission Risk Score: 15      Last Discharge [employer]       Complaint Diagnosis Description Type Department Provider    20 Dizziness Dizziness . .. ED to Hosp-Admission (Discharged) (ADMITTED) [facility] PICU Stuart Villafana DO; Fernandez Daniel. .. Spoke with: Patient, Gerda Calero. Facility: Discharged from [facility] to [facility] on 1/3/2020. Discharged from [facility] to home on 2020. Non-face-to-face services provided:  Medication reconciliation completed with the patient. -Meclizine 25 mg tablet listed on AVS is not listed on patient's current medication list.   -Patient wishes to discuss with PCP at  instructions for taking Meclizine. Care Transitions 24 Hour Call    Do you have any ongoing symptoms?:  Yes  Patient-reported symptoms:  Pain (Comment: pain on left side when ambulating; \"hurts\" rates pain at 8/10)  Interventions for patient-reported symptoms:   (Comment: note forwarded to PCP for review)  Do you have a copy of your discharge instructions?:  Yes  Do you have all of your prescriptions and are they filled?:  Yes  Have you been contacted by a [facility] Pharmacist?:  No  Have you scheduled your follow up appointment?:  Yes  How are you going to get to your appointment?:  Car - family or friend to transport (Comment: friend)  -Patient reports she is visually impaired. Were you discharged with any Home Care or Post Acute Services:  No  Post Acute Services: Outpatient therapies at [facility]  Patient DME:  Straight cane  Do you feel like you have everything you need to keep you well at home?:  Yes  Care Transitions Interventions; none at this time    Supportive listening and emotional support provided.   CTN thanked the patient for taking the time to call back and complete this call.        Follow Up  Future Appointments   Date Time Provider Tay Singh   1/15/2020  9:45 AM DO DOUGLAS [facility]   4/6/2020  8:15 AM DO DOUGLAS Jose [facility]   10/8/2020  8:15 AM DO DOUGLAS Jose [facility]       Baljinder Messer RN

## 2020-01-15 ENCOUNTER — OFFICE VISIT (OUTPATIENT)
Dept: FAMILY MEDICINE CLINIC | Age: 80
End: 2020-01-15
Payer: MEDICARE

## 2020-01-15 ENCOUNTER — TELEPHONE (OUTPATIENT)
Dept: FAMILY MEDICINE CLINIC | Age: 80
End: 2020-01-15

## 2020-01-15 VITALS
BODY MASS INDEX: 37.9 KG/M2 | OXYGEN SATURATION: 98 % | SYSTOLIC BLOOD PRESSURE: 114 MMHG | HEART RATE: 71 BPM | DIASTOLIC BLOOD PRESSURE: 70 MMHG | HEIGHT: 59 IN | TEMPERATURE: 97.8 F | WEIGHT: 188 LBS | RESPIRATION RATE: 14 BRPM

## 2020-01-15 PROCEDURE — G8427 DOCREV CUR MEDS BY ELIG CLIN: HCPCS | Performed by: FAMILY MEDICINE

## 2020-01-15 PROCEDURE — 1123F ACP DISCUSS/DSCN MKR DOCD: CPT | Performed by: FAMILY MEDICINE

## 2020-01-15 PROCEDURE — 4040F PNEUMOC VAC/ADMIN/RCVD: CPT | Performed by: FAMILY MEDICINE

## 2020-01-15 PROCEDURE — G8417 CALC BMI ABV UP PARAM F/U: HCPCS | Performed by: FAMILY MEDICINE

## 2020-01-15 PROCEDURE — 1036F TOBACCO NON-USER: CPT | Performed by: FAMILY MEDICINE

## 2020-01-15 PROCEDURE — G8484 FLU IMMUNIZE NO ADMIN: HCPCS | Performed by: FAMILY MEDICINE

## 2020-01-15 PROCEDURE — 1111F DSCHRG MED/CURRENT MED MERGE: CPT | Performed by: FAMILY MEDICINE

## 2020-01-15 PROCEDURE — 1090F PRES/ABSN URINE INCON ASSESS: CPT | Performed by: FAMILY MEDICINE

## 2020-01-15 PROCEDURE — 99213 OFFICE O/P EST LOW 20 MIN: CPT | Performed by: FAMILY MEDICINE

## 2020-01-15 PROCEDURE — G8400 PT W/DXA NO RESULTS DOC: HCPCS | Performed by: FAMILY MEDICINE

## 2020-01-15 ASSESSMENT — PATIENT HEALTH QUESTIONNAIRE - PHQ9
1. LITTLE INTEREST OR PLEASURE IN DOING THINGS: 0
2. FEELING DOWN, DEPRESSED OR HOPELESS: 0
SUM OF ALL RESPONSES TO PHQ9 QUESTIONS 1 & 2: 0
SUM OF ALL RESPONSES TO PHQ QUESTIONS 1-9: 0
SUM OF ALL RESPONSES TO PHQ QUESTIONS 1-9: 0

## 2020-01-15 NOTE — PROGRESS NOTES
1/15/2020    Chief Complaint   Patient presents with    Dizziness     2 week follow up  going to therapy now  not sure if it is helping        HPI    Antione Valencia is a 78 y.o. patient that presents today for:    Dizziness is not improved. Is to see Dr. Lyndsey Bahena on 1/20/20. Spent 1 week in rehab, had to leave due to Medicare refusing her eye injections while inpatient. Is to call to schedule OP rehab, has not called yet. No CP, diaphoresis, SOB, palp, HA, visual issues. Patient's past medical, surgical, social and/or family history reviewed, updated in chart, and are non-contributory (unless otherwise stated). Medications and allergies also reviewed and updated in chart.      ROS Unless otherwise specified  Review of Systems - General ROS: negative for - chills, fatigue, fever, night sweats, sleep disturbance, weight gain or weight loss  Psychological ROS: negative for - anxiety, behavioral disorder, depression, hallucinations, irritability, memory difficulties, mood swings, sleep disturbances or suicidal ideation  ENT ROS: negative for - epistaxis, headaches, hearing change, nasal congestion, nasal discharge, nasal polyps, sinus pain, tinnitus, vertigo or visual changes  Hematological and Lymphatic ROS: negative for - bleeding problems, blood clots, fatigue or swollen lymph nodes  Respiratory ROS: negative for - cough, orthopnea, shortness of breath, sputum changes, tachypnea or wheezing  Cardiovascular ROS: negative for - chest pain, dyspnea on exertion, irregular heartbeat, loss of consciousness, palpitations, paroxysmal nocturnal dyspnea or rapid heart rate  Gastrointestinal ROS: negative for - abdominal pain, blood in stools, change in bowel habits, constipation, diarrhea, gas/bloating, heartburn or nausea/vomiting  Musculoskeletal ROS: negative for - joint pain, joint stiffness, joint swelling or muscle, back pain, bowel or bladder incontinence  Neurological ROS: negative for - behavioral changes, confusion, dizziness, headaches, memory loss, numbness/tingling, seizures or speech problems, weakness  Dermatological ROS: negative for - dry skin, mole changes, nail changes, pruritus, rash or skin lesion changes    Physical Exam  Temp Readings from Last 3 Encounters:   01/15/20 97.8 °F (36.6 °C)   01/03/20 96.8 °F (36 °C) (Temporal)   12/31/19 97.6 °F (36.4 °C)     Wt Readings from Last 3 Encounters:   01/15/20 188 lb (85.3 kg)   01/02/20 203 lb 14.4 oz (92.5 kg)   12/31/19 191 lb (86.6 kg)     BP Readings from Last 3 Encounters:   01/15/20 114/70   01/03/20 (!) 152/67   12/31/19 128/66     Pulse Readings from Last 3 Encounters:   01/15/20 71   01/03/20 62   12/31/19 68       General appearance: alert, well appearing, and in no distress, oriented to person, place, and time and normal appearing weight. CVS exam: normal rate, regular rhythm, normal S1, S2, no murmurs, rubs, clicks or gallops. Radial pulses 2+ bilateral.  PT/DP pulse 2+ bilat. No C/C/E    Chest: clear to auscultation, no wheezes, rales or rhonchi, symmetric air entry. Abdomen: Soft, non-tender, non-distended, positive BS in all 4 quadrants    Extremities:Dorsalis pedis pulses palpated bilaterally, no clubbing, cyanosis, edema or erythema,     SKIN: no lesions, jaundice, petechiae, pallor, cyanosis, ecchymosis    NEURO: gross motor exam normal by observation, gait normal    Mental status - alert, oriented to person, place, and time, normal mood, behavior, speech, dress, motor activity, and thought processes      Assessment/Plan  Amelie Chen was seen today for dizziness. Diagnoses and all orders for this visit:    Dizziness  - Appointment with Dr. Maria Gutierrez    Obesity (BMI 35.0-39.9 without comorbidity)  - Body mass index is 37.97 kg/m². BMI was elevated today, and weight loss plan recommended is : conventional weight loss and daily exercise regimen. Other orders  -     linaCLOtide (LINZESS) 72 MCG CAPS capsule;  Take 1 capsule by mouth

## 2020-02-07 ENCOUNTER — HOSPITAL ENCOUNTER (EMERGENCY)
Age: 80
Discharge: HOME | End: 2020-02-07
Attending: EMERGENCY MEDICINE
Payer: MEDICARE

## 2020-02-07 ENCOUNTER — TELEPHONE (OUTPATIENT)
Dept: FAMILY MEDICINE CLINIC | Age: 80
End: 2020-02-07

## 2020-02-07 VITALS
HEART RATE: 55 BPM | BODY MASS INDEX: 38.3 KG/M2 | SYSTOLIC BLOOD PRESSURE: 156 MMHG | DIASTOLIC BLOOD PRESSURE: 86 MMHG | RESPIRATION RATE: 16 BRPM | HEIGHT: 59 IN | OXYGEN SATURATION: 92 % | TEMPERATURE: 97.9 F | WEIGHT: 190 LBS

## 2020-02-07 LAB
ANION GAP SERPL CALCULATED.3IONS-SCNC: 12 MMOL/L (ref 7–16)
BASOPHILS ABSOLUTE: 0.04 E9/L (ref 0–0.2)
BASOPHILS RELATIVE PERCENT: 0.6 % (ref 0–2)
BILIRUBIN URINE: NEGATIVE
BLOOD, URINE: NEGATIVE
BUN BLDV-MCNC: 24 MG/DL (ref 8–23)
CALCIUM SERPL-MCNC: 9 MG/DL (ref 8.6–10.2)
CHLORIDE BLD-SCNC: 100 MMOL/L (ref 98–107)
CHP ED QC CHECK: NORMAL
CLARITY: CLEAR
CO2: 25 MMOL/L (ref 22–29)
COLOR: NORMAL
CREAT SERPL-MCNC: 1 MG/DL (ref 0.5–1)
EOSINOPHILS ABSOLUTE: 0.14 E9/L (ref 0.05–0.5)
EOSINOPHILS RELATIVE PERCENT: 1.9 % (ref 0–6)
GFR AFRICAN AMERICAN: >60
GFR NON-AFRICAN AMERICAN: 53 ML/MIN/1.73
GLUCOSE BLD-MCNC: 102 MG/DL
GLUCOSE BLD-MCNC: 99 MG/DL (ref 74–99)
GLUCOSE URINE: NEGATIVE MG/DL
HCT VFR BLD CALC: 40.4 % (ref 34–48)
HEMOGLOBIN: 13.3 G/DL (ref 11.5–15.5)
IMMATURE GRANULOCYTES #: 0.02 E9/L
IMMATURE GRANULOCYTES %: 0.3 % (ref 0–5)
KETONES, URINE: NEGATIVE MG/DL
LEUKOCYTE ESTERASE, URINE: NEGATIVE
LYMPHOCYTES ABSOLUTE: 2.41 E9/L (ref 1.5–4)
LYMPHOCYTES RELATIVE PERCENT: 33.2 % (ref 20–42)
MCH RBC QN AUTO: 28.7 PG (ref 26–35)
MCHC RBC AUTO-ENTMCNC: 32.9 % (ref 32–34.5)
MCV RBC AUTO: 87.3 FL (ref 80–99.9)
METER GLUCOSE: 102 MG/DL (ref 74–99)
MONOCYTES ABSOLUTE: 0.61 E9/L (ref 0.1–0.95)
MONOCYTES RELATIVE PERCENT: 8.4 % (ref 2–12)
NEUTROPHILS ABSOLUTE: 4.03 E9/L (ref 1.8–7.3)
NEUTROPHILS RELATIVE PERCENT: 55.6 % (ref 43–80)
NITRITE, URINE: NEGATIVE
PDW BLD-RTO: 13.9 FL (ref 11.5–15)
PH UA: 7 (ref 5–9)
PLATELET # BLD: 235 E9/L (ref 130–450)
PMV BLD AUTO: 10 FL (ref 7–12)
POTASSIUM REFLEX MAGNESIUM: 3.7 MMOL/L (ref 3.5–5)
PROTEIN UA: NEGATIVE MG/DL
RBC # BLD: 4.63 E12/L (ref 3.5–5.5)
SODIUM BLD-SCNC: 137 MMOL/L (ref 132–146)
SPECIFIC GRAVITY UA: <=1.005 (ref 1–1.03)
UROBILINOGEN, URINE: 0.2 E.U./DL
WBC # BLD: 7.3 E9/L (ref 4.5–11.5)

## 2020-02-07 PROCEDURE — 82962 GLUCOSE BLOOD TEST: CPT

## 2020-02-07 PROCEDURE — 80048 BASIC METABOLIC PNL TOTAL CA: CPT

## 2020-02-07 PROCEDURE — 93005 ELECTROCARDIOGRAM TRACING: CPT | Performed by: EMERGENCY MEDICINE

## 2020-02-07 PROCEDURE — 99284 EMERGENCY DEPT VISIT MOD MDM: CPT

## 2020-02-07 PROCEDURE — 81003 URINALYSIS AUTO W/O SCOPE: CPT

## 2020-02-07 PROCEDURE — 85025 COMPLETE CBC W/AUTO DIFF WBC: CPT

## 2020-02-07 ASSESSMENT — ENCOUNTER SYMPTOMS
VOICE CHANGE: 0
SHORTNESS OF BREATH: 0
COUGH: 0
PHOTOPHOBIA: 0
DIARRHEA: 0
VOMITING: 0
NAUSEA: 0
COLOR CHANGE: 0
EYE PAIN: 0
SINUS PAIN: 0
ABDOMINAL PAIN: 0
CHEST TIGHTNESS: 0
CONSTIPATION: 0
VISUAL CHANGE: 0
BLOOD IN STOOL: 0
SINUS PRESSURE: 0
TROUBLE SWALLOWING: 0
EYE REDNESS: 0

## 2020-02-07 NOTE — ED PROVIDER NOTES
Pulmonary effort is normal. No respiratory distress. Breath sounds: Normal breath sounds. No wheezing or rales. Abdominal:      General: Bowel sounds are normal. There is no distension. Palpations: Abdomen is soft. Tenderness: There is no abdominal tenderness. There is no guarding or rebound. Musculoskeletal: Normal range of motion. General: No tenderness or deformity. Right lower leg: No edema. Left lower leg: No edema. Skin:     General: Skin is warm and dry. Capillary Refill: Capillary refill takes less than 2 seconds. Coloration: Skin is not pale. Findings: No erythema or rash. Neurological:      General: No focal deficit present. Mental Status: She is alert and oriented to person, place, and time. GCS: GCS eye subscore is 4. GCS verbal subscore is 5. GCS motor subscore is 6. Cranial Nerves: No cranial nerve deficit. Sensory: No sensory deficit. Motor: No weakness or abnormal muscle tone. Coordination: Coordination normal.      Deep Tendon Reflexes: Reflexes are normal and symmetric. Reflexes normal.      Comments: No facial droop or slurred speech, no drift of the upper or lower extremities, visual fields intact along with extraocular motion; finger-nose and heel-to-shin intact bilaterally; patient intact in face torso upper and lower extremities, strength is 5/5 bilaterally in upper and lower extremities   Psychiatric:      Comments: Anxious          Procedures     MDM     ED Course as of Feb 07 1417   Fri Feb 07, 2020   1414 ATTENDING PROVIDER ATTESTATION:     I have personally performed and/or participated in the history, exam, medical decision making, and procedures and agree with all pertinent clinical information unless otherwise noted. I have also reviewed and agree with the past medical, family and social history unless otherwise noted.     I have discussed this patient in detail with the resident and provided the interpreted by me. Rate: 62  Rhythm: Sinus  Interpretation: no acute changes and non-specific EKG  Comparison: stable as compared to patient's most recent EKG      --------------------------------------------- PAST HISTORY ---------------------------------------------  Past Medical History:  has a past medical history of Amaurosis fugax of right eye, Anxiety, Arthritis, CA - cancer of bowel, Cerebral artery occlusion with cerebral infarction (HCC), Chronic back pain, Constipation, Depression, Elevated sed rate, GERD (gastroesophageal reflux disease), Hemorrhoids, Hyperlipidemia, Hypertension, Left foot pain, Macular degeneration, Poor vision, Prosthetic eye globe, Seasonal allergies, Skipped heart beats, Sleep apnea, and Thyroid disease. Past Surgical History:  has a past surgical history that includes Eye surgery (years ago); colectomy (1974); Colonoscopy (04/26/2012); Cholecystectomy (1985); Hysterectomy (1974); tumor excision; Upper gastrointestinal endoscopy (05/30/2014); Colonoscopy (05/30/2014); Tonsillectomy; joint replacement (Left, 2010/2012); Upper gastrointestinal endoscopy (01/08/2015); Colonoscopy (01/08/2015); cyst removal (years ago); and Upper gastrointestinal endoscopy (N/A, 4/1/2019). Social History:  reports that she has never smoked. She has never used smokeless tobacco. She reports that she does not drink alcohol or use drugs. Family History: family history includes Breast Cancer in her daughter and sister; Cancer in her brother, brother, brother, brother, brother, brother, sister, and sister; Heart Disease in her brother, brother, father, mother, and sister; High Blood Pressure in her daughter and daughter; Hypertension in her brother, brother, father, mother, and sister; Other in her mother; Stroke in her father and mother. The patients home medications have been reviewed. Allergies: Iodine; Codeine;  Oxycodone-aspirin; Amoxicillin-pot clavulanate; Darvocet [propoxyphene n-acetaminophen]; Eggs or egg-derived products;  Influenza vaccines; Percodan [oxycodone-aspirin]; and Percodan [oxycodone-aspirin]    -------------------------------------------------- RESULTS -------------------------------------------------  Labs:  Results for orders placed or performed during the hospital encounter of 02/07/20   CBC Auto Differential   Result Value Ref Range    WBC 7.3 4.5 - 11.5 E9/L    RBC 4.63 3.50 - 5.50 E12/L    Hemoglobin 13.3 11.5 - 15.5 g/dL    Hematocrit 40.4 34.0 - 48.0 %    MCV 87.3 80.0 - 99.9 fL    MCH 28.7 26.0 - 35.0 pg    MCHC 32.9 32.0 - 34.5 %    RDW 13.9 11.5 - 15.0 fL    Platelets 124 307 - 229 E9/L    MPV 10.0 7.0 - 12.0 fL    Neutrophils % 55.6 43.0 - 80.0 %    Immature Granulocytes % 0.3 0.0 - 5.0 %    Lymphocytes % 33.2 20.0 - 42.0 %    Monocytes % 8.4 2.0 - 12.0 %    Eosinophils % 1.9 0.0 - 6.0 %    Basophils % 0.6 0.0 - 2.0 %    Neutrophils Absolute 4.03 1.80 - 7.30 E9/L    Immature Granulocytes # 0.02 E9/L    Lymphocytes Absolute 2.41 1.50 - 4.00 E9/L    Monocytes Absolute 0.61 0.10 - 0.95 E9/L    Eosinophils Absolute 0.14 0.05 - 0.50 E9/L    Basophils Absolute 0.04 0.00 - 0.20 E9/L   Urinalysis, reflex to microscopic   Result Value Ref Range    Color, UA Straw Straw/Yellow    Clarity, UA Clear Clear    Glucose, Ur Negative Negative mg/dL    Bilirubin Urine Negative Negative    Ketones, Urine Negative Negative mg/dL    Specific Gravity, UA <=1.005 1.005 - 1.030    Blood, Urine Negative Negative    pH, UA 7.0 5.0 - 9.0    Protein, UA Negative Negative mg/dL    Urobilinogen, Urine 0.2 <2.0 E.U./dL    Nitrite, Urine Negative Negative    Leukocyte Esterase, Urine Negative Negative   Basic Metabolic Panel w/ Reflex to MG   Result Value Ref Range    Sodium 137 132 - 146 mmol/L    Potassium reflex Magnesium 3.7 3.5 - 5.0 mmol/L    Chloride 100 98 - 107 mmol/L    CO2 25 22 - 29 mmol/L    Anion Gap 12 7 - 16 mmol/L    Glucose 99 74 - 99 mg/dL    BUN 24 (H) 8 - 23 mg/dL CREATININE 1.0 0.5 - 1.0 mg/dL    GFR Non-African American 53 >=60 mL/min/1.73    GFR African American >60     Calcium 9.0 8.6 - 10.2 mg/dL   POCT Glucose   Result Value Ref Range    Glucose 102 mg/dL    QC OK? ok    POCT Glucose   Result Value Ref Range    Meter Glucose 102 (H) 74 - 99 mg/dL   EKG 12 Lead   Result Value Ref Range    Ventricular Rate 62 BPM    Atrial Rate 62 BPM    P-R Interval 156 ms    QRS Duration 72 ms    Q-T Interval 420 ms    QTc Calculation (Bazett) 426 ms    P Axis 66 degrees    R Axis -21 degrees    T Axis 38 degrees       Radiology:  No orders to display       ------------------------- NURSING NOTES AND VITALS REVIEWED ---------------------------  Date / Time Roomed:  2/7/2020 12:25 PM  ED Bed Assignment:  26/26    The nursing notes within the ED encounter and vital signs as below have been reviewed. BP (!) 156/86   Pulse 55   Temp 97.9 °F (36.6 °C) (Oral)   Resp 16   Ht 4' 11\" (1.499 m)   Wt 190 lb (86.2 kg)   LMP 07/24/1974   SpO2 92%   BMI 38.38 kg/m²   Oxygen Saturation Interpretation: Normal      ------------------------------------------ PROGRESS NOTES ------------------------------------------  4:52 PM  I have spoken with the patient and discussed todays results, in addition to providing specific details for the plan of care and counseling regarding the diagnosis and prognosis. Their questions are answered at this time and they are agreeable with the plan. I discussed at length with them reasons for immediate return here for re evaluation. They will followup with their primary care physician by calling their office on Monday.      --------------------------------- ADDITIONAL PROVIDER NOTES ---------------------------------  At this time the patient is without objective evidence of an acute process requiring hospitalization or inpatient management.   They have remained hemodynamically stable throughout their entire ED visit and are stable for discharge with outpatient follow-up. The plan has been discussed in detail and they are aware of the specific conditions for emergent return, as well as the importance of follow-up. Discharge Medication List as of 2/7/2020  2:17 PM          Diagnosis:  1. Dizziness    2. Dysgeusia    3. Hypertension, unspecified type        Disposition:  Patient's disposition: Discharge to home  Patient's condition is stable.        Loida Andrea,   Resident  02/07/20 0151

## 2020-02-08 LAB
EKG ATRIAL RATE: 62 BPM
EKG P AXIS: 66 DEGREES
EKG P-R INTERVAL: 156 MS
EKG Q-T INTERVAL: 420 MS
EKG QRS DURATION: 72 MS
EKG QTC CALCULATION (BAZETT): 426 MS
EKG R AXIS: -21 DEGREES
EKG T AXIS: 38 DEGREES
EKG VENTRICULAR RATE: 62 BPM

## 2020-02-08 PROCEDURE — 93010 ELECTROCARDIOGRAM REPORT: CPT | Performed by: INTERNAL MEDICINE

## 2020-02-19 ENCOUNTER — OFFICE VISIT (OUTPATIENT)
Dept: FAMILY MEDICINE CLINIC | Age: 80
End: 2020-02-19
Payer: MEDICARE

## 2020-02-19 VITALS
HEART RATE: 67 BPM | WEIGHT: 191.8 LBS | BODY MASS INDEX: 38.74 KG/M2 | OXYGEN SATURATION: 97 % | RESPIRATION RATE: 16 BRPM | TEMPERATURE: 97.2 F | DIASTOLIC BLOOD PRESSURE: 58 MMHG | SYSTOLIC BLOOD PRESSURE: 116 MMHG

## 2020-02-19 PROCEDURE — 1123F ACP DISCUSS/DSCN MKR DOCD: CPT | Performed by: PHYSICIAN ASSISTANT

## 2020-02-19 PROCEDURE — G8417 CALC BMI ABV UP PARAM F/U: HCPCS | Performed by: PHYSICIAN ASSISTANT

## 2020-02-19 PROCEDURE — G8484 FLU IMMUNIZE NO ADMIN: HCPCS | Performed by: PHYSICIAN ASSISTANT

## 2020-02-19 PROCEDURE — G8400 PT W/DXA NO RESULTS DOC: HCPCS | Performed by: PHYSICIAN ASSISTANT

## 2020-02-19 PROCEDURE — 4040F PNEUMOC VAC/ADMIN/RCVD: CPT | Performed by: PHYSICIAN ASSISTANT

## 2020-02-19 PROCEDURE — 99214 OFFICE O/P EST MOD 30 MIN: CPT | Performed by: PHYSICIAN ASSISTANT

## 2020-02-19 PROCEDURE — 1090F PRES/ABSN URINE INCON ASSESS: CPT | Performed by: PHYSICIAN ASSISTANT

## 2020-02-19 PROCEDURE — G8427 DOCREV CUR MEDS BY ELIG CLIN: HCPCS | Performed by: PHYSICIAN ASSISTANT

## 2020-02-19 PROCEDURE — 1036F TOBACCO NON-USER: CPT | Performed by: PHYSICIAN ASSISTANT

## 2020-02-19 RX ORDER — CITALOPRAM 10 MG/1
10 TABLET ORAL DAILY
Qty: 30 TABLET | Refills: 0 | Status: SHIPPED
Start: 2020-02-19 | End: 2020-03-19 | Stop reason: SDUPTHER

## 2020-02-19 RX ORDER — SUCRALFATE ORAL 1 G/10ML
1 SUSPENSION ORAL 4 TIMES DAILY
Qty: 300 ML | Refills: 3 | Status: SHIPPED
Start: 2020-02-19 | End: 2020-02-20

## 2020-02-19 NOTE — PROGRESS NOTES
Chief Complaint:   Anxiety (was taken off of ativan, nerves are bad.); Sinus Problem (sinus drainage/headache); and Otalgia (right ear pain)    History of Present Illness   Source of history provided by:  patient. Romy Fernando is a 78 y.o. old female with a past medical history of:   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, treated with surgery and chemo    Cerebral artery occlusion with cerebral infarction ([address]     possibly TIA    Chronic back pain     Constipation     Depression     Elevated sed rate 12/11/2017    hx of    GERD (gastroesophageal reflux disease)     Hemorrhoids     history of    Hyperlipidemia     diet controlled    Hypertension     Left foot pain     dropped a Kettle on foot    Macular degeneration     Poor vision     right eye / had bleeding behind eye, is receiving \"shots\" at this time  / 3/22/2019    Prosthetic eye globe     left eye implant;    Seasonal allergies     Skipped heart beats     on metoprolol; managed by Dr. Klein Scale    Sleep apnea     uses cpap at times     Thyroid disease    Patient presents for persistent anxiety. She states that the hospital took her off Ativan and she has not been taking anything for anxiety since. She was on Celexa 10mg last year but states that she never asked for a refill. and stopped it herself. Anxiety increases BP which then causes her more anxiety. BP today is WNL. Denies suicidal or homicidal ideations. She states anxiety is now interfering with sleep. Patient reports persistent rhinorrhea, postnasal drainage, headache, and right ear pain. She has been seeing ENT for several years on and off  Takes Zyrtec with moderate relief. She has not been taking Flonase. Denies fever, chills, body aches, CP or SOB.      ROS    Unless otherwise stated in this report or unable to obtain because of the patient's clinical or mental Alert, development consistent with age. Ears:  External Ears: Bilateral pinna normal. TMs without erythema or perforation bilaterally. Canals normal bilaterally without swelling or exudate  Nose:  No congestion of the nasal mucosa. There is no injection to middle turbinates bilaterally. Throat: No posterior pharyngeal erythema with mild post nasal drip present. No exudate or tonsillar hypertrophy noted. Neck:  Supple. There is no anterior cervical adenopathy. Lungs: CTAB without wheezes, rales, or rhonchi  Heart:  Regular rate and rhythm, normal heart sounds, without pathological murmurs, ectopy, gallops, or rubs. Skin:  Normal turgor. Warm, dry, without visible rash. Neurological:  Alert and oriented. Motor functions intact. Responds to verbal commands. Lab / Imaging Results   (All laboratory and radiology results have been personally reviewed by myself)  Labs:  No results found for this visit on 02/19/20. Assessment / Plan     Impression(s):  1. Anxiety    2. Rhinorrhea      Disposition:  Disposition: home    Celexa 10mg started. Advised to take Flonase daily along with Zyrtec for PND. Side effects discussed. Increase fluids and rest. Symptomatic relief discussed. Recheck in 1 month. Red flag symptoms discussed. Pt is in agreement with this care plan. All questions answered. More than 25 minutes of face-to-face time was spent with the patient during the encounter, during which more than half of that time was spent counseling and/or coordinating her care.

## 2020-02-20 ENCOUNTER — HOSPITAL ENCOUNTER (OUTPATIENT)
Dept: PHYSICAL THERAPY | Age: 80
Setting detail: THERAPIES SERIES
Discharge: HOME | End: 2020-02-20
Payer: MEDICARE

## 2020-02-20 PROCEDURE — 97161 PT EVAL LOW COMPLEX 20 MIN: CPT

## 2020-02-20 RX ORDER — PANTOPRAZOLE SODIUM 40 MG/1
40 TABLET, DELAYED RELEASE ORAL
Qty: 30 TABLET | Refills: 3 | Status: SHIPPED
Start: 2020-02-20 | End: 2020-05-04 | Stop reason: SDUPTHER

## 2020-02-20 RX ORDER — SUCRALFATE 1 G/1
1 TABLET ORAL 4 TIMES DAILY
Qty: 120 TABLET | Refills: 3 | Status: ON HOLD | OUTPATIENT
Start: 2020-02-20 | End: 2021-02-03 | Stop reason: HOSPADM

## 2020-02-20 NOTE — PROGRESS NOTES
Physical Therapy  Initial Assessment  Date: 2020  Patient Name: Jayro Gaines  MRN: [id number]  : 1940          Restrictions       Subjective   General  Chart Reviewed: Yes  Patient assessed for rehabilitation services?: Yes  Additional Pertinent Hx: Patient presents to PT to address gait/Mobility issues and increased risk of falling. Patient has a long hx of degenerative issues and left LE radicualr issues.  PMHx Left TKA Recently hospitalized Recent hx of several falls   Family / Caregiver Present: No  Referring Practitioner: Dr Abdullahi Ospina   Referral Date : 19  Diagnosis: Gait Dysfunction   Follows Commands: Within Functional Limits  PT Visit Information  Onset Date: 19  PT Insurance Information: [facility] Medicare   Subjective  Subjective: Persistent back pain with limited mobility Impaired ADL's Impaired ambulation Limited endurance Dizziness Visual Deficits   Pain Screening  Patient Currently in Pain: Yes  Vital Signs  Patient Currently in Pain: Yes    Vision/Hearing  Vision  Vision: Within Functional Limits  Hearing  Hearing: Within functional limits    Orientation  Orientation  Overall Orientation Status: Within Functional Limits    Social/Functional History  Social/Functional History  Lives With: Alone  Lives With: Alone  Type of Home: Apartment  Home Layout: One level  Home Equipment: Cane;Rolling walker  Homemaking Responsibilities: Yes  Active : Yes  Mode of Transportation: Car  Occupation: Retired    Objective     Observation/Palpation  Posture: Fair  Palpation: Pain with palpation mid line lumbar region paraspinally   Observation: Moderate consistent limp left LE Limited stance time and step length left LE     AROM RLE (degrees)  RLE General AROM: Limited Hip Mobility   PROM LLE (degrees)  LLE General PROM: Limited Hip mobility all ranges limited with pain   Spine  Lumbar: Limited all ranges with pain     Strength RLE  Comment: 4- to 4/5   Strength LLE  Comment: 3+ to 4-/5

## 2020-02-24 ENCOUNTER — HOSPITAL ENCOUNTER (OUTPATIENT)
Dept: PHYSICAL THERAPY | Age: 80
Setting detail: THERAPIES SERIES
Discharge: HOME | End: 2020-02-24
Payer: MEDICARE

## 2020-02-26 ENCOUNTER — HOSPITAL ENCOUNTER (OUTPATIENT)
Dept: PHYSICAL THERAPY | Age: 80
Setting detail: THERAPIES SERIES
Discharge: HOME | End: 2020-02-26
Payer: MEDICARE

## 2020-02-26 PROCEDURE — 97110 THERAPEUTIC EXERCISES: CPT

## 2020-02-26 NOTE — PROGRESS NOTES
[facility]  Phone: [phone] Fax: [phone]       Physical Therapy Daily Treatment Note  Date:  2020    Patient Name:  Jennifer Webster    :  1940  MRN: [id number]    Restrictions/Precautions:    Diagnosis:  Gait Dysfunction   Treatment Diagnosis:    Insurance/Certification information:  [facility] Medicare   Referring Physician:  Dr Rodney Sandra of care signed (Y/N):    Visit# / total visits:  2/  Pain level: /10  Time In:   0950     Time Out:  1030        Subjective:      Exercises:  Exercise/Equipment Resistance/Repetitions Other comments   Seated Flex with ball  10 reps              LTR 10 reps       SKTC  5 reps bilateral      SLR/HS Stretch   5 reps bilateral       Sit to stand  5 reps      Lateral trunk stretch   5 reps       Trunk rotation  5 reps      Hip/knee flex on step  5 reps bilateral                                                                                Other Therapeutic Activities:      Home Exercise Program:      Manual Treatments:      Modalities:      Timed Code Treatment Minutes:  30     Total Treatment Minutes:  40     Treatment/Activity Tolerance:  [x] Patient tolerated treatment well [] Patient limited by fatigue  [] Patient limited by pain  [] Patient limited by other medical complications  [] Other:     Plan:   [x] Continue per plan of care [] Alter current plan (see comments)  [] Plan of care initiated [] Hold pending MD visit [] Discharge  Plan for Next Session:         Treatment Charges: Mins Units   Initial Evaluation     Re-Evaluation     Ther Exercise         TE 30 2   Manual Therapy     MT     Ther Activities        TA     Gait Training          GT     Neuro Re-education NR     Modalities     Non-Billable Service Time     Other     Total Time/Units 30 2     Electronically signed by:  Bernice Emanuel, [address] [place] [address]

## 2020-02-27 ENCOUNTER — APPOINTMENT (OUTPATIENT)
Dept: PHYSICAL THERAPY | Age: 80
End: 2020-02-27
Payer: MEDICARE

## 2020-03-02 ENCOUNTER — HOSPITAL ENCOUNTER (OUTPATIENT)
Dept: PHYSICAL THERAPY | Age: 80
Setting detail: THERAPIES SERIES
Discharge: HOME | End: 2020-03-02
Payer: MEDICARE

## 2020-03-02 PROCEDURE — 97110 THERAPEUTIC EXERCISES: CPT

## 2020-03-05 ENCOUNTER — HOSPITAL ENCOUNTER (OUTPATIENT)
Dept: PHYSICAL THERAPY | Age: 80
Setting detail: THERAPIES SERIES
Discharge: HOME | End: 2020-03-05
Payer: MEDICARE

## 2020-03-05 NOTE — PROGRESS NOTES
[facility]  Phone: [phone] Fax: [phone]     Physical Therapy  Cancellation/No-show Note  Patient Name:  Merrilyn Cooks  :  1940   Date:  3/5/2020    For today's appointment patient:  [x]  Cancelled  []  Rescheduled appointment  []  No-show     Reason given by patient:  [x]  Patient ill  []  Conflicting appointment  []  No transportation    []  Conflict with work  []  No reason given  [x]  Other:     Comments: next PT session: TBD                                                                 Patient having co-pay issues      Electronically signed by:  Oh Batres, [address] [facility]

## 2020-03-11 NOTE — FLOWSHEET NOTE
[facility]  Phone: [phone] Fax: [phone]     Physical Therapy  Out Patient Initial Evaluation    Date:  2020    Patient Name:  Buddy Garcia    :  1940  MRN: [id number]    DIAGNOSIS:  Impaired Mobility/Gait/Dysfunction   EVALUATION DATE:  2020  REFERRING PHYSICIAN:  Dr Glenda Medeirosary:  2019    PROBLEMS FOUND DURING EVALUATION  · Impaired mobility with altered/dysfunctional gait  · Back Pain with LE Radicular symptoms  · Deficits of ROM/Mobility lumbar region and LE's   · Deficits of strength trunk/core and LE's   · Deficits of endurance     SHORT TERM GOALS  · Patient will be able to engage in consistent and progressive active exercise   · Establish HEP    LONG TERM GOALS  · Patient will be able to sustain Fair or better quality of gait over functional distances on a consistent basis  · Patient will be bale to effectively control back/LE pain at 4/10 or less  · AROM trunk and LE's Fair or better  · Strength: Trunk/core 3+ to 4-/5 LE's 4-/5   · General endurance Fair or better  · Patient will be able to maintain and self direct an appropriate follow up independent exercise program     PATIENT GOALS  · Improve mobility     REHAB POTENTIAL:  Fair    FREQUENCY/DURATION:  1-2 times per week 4-5 weeks     PLAN OF CARE:  Progressive exercise HEP       Thank you for the opportunity to work with your patient. If you have questions or comments, please feel free to contact me by phone or fax.       Electronically Signed by Todd La PT [id number]        ___________________________________  2020    Physician     Date

## 2020-03-19 ENCOUNTER — OFFICE VISIT (OUTPATIENT)
Dept: FAMILY MEDICINE CLINIC | Age: 80
End: 2020-03-19
Payer: MEDICARE

## 2020-03-19 VITALS
SYSTOLIC BLOOD PRESSURE: 160 MMHG | BODY MASS INDEX: 38.42 KG/M2 | WEIGHT: 190.2 LBS | TEMPERATURE: 97.8 F | DIASTOLIC BLOOD PRESSURE: 62 MMHG | HEART RATE: 55 BPM | OXYGEN SATURATION: 99 % | RESPIRATION RATE: 16 BRPM

## 2020-03-19 PROCEDURE — G8484 FLU IMMUNIZE NO ADMIN: HCPCS | Performed by: FAMILY MEDICINE

## 2020-03-19 PROCEDURE — 1036F TOBACCO NON-USER: CPT | Performed by: FAMILY MEDICINE

## 2020-03-19 PROCEDURE — 99214 OFFICE O/P EST MOD 30 MIN: CPT | Performed by: FAMILY MEDICINE

## 2020-03-19 PROCEDURE — G8417 CALC BMI ABV UP PARAM F/U: HCPCS | Performed by: FAMILY MEDICINE

## 2020-03-19 PROCEDURE — 1123F ACP DISCUSS/DSCN MKR DOCD: CPT | Performed by: FAMILY MEDICINE

## 2020-03-19 PROCEDURE — 1090F PRES/ABSN URINE INCON ASSESS: CPT | Performed by: FAMILY MEDICINE

## 2020-03-19 PROCEDURE — 4040F PNEUMOC VAC/ADMIN/RCVD: CPT | Performed by: FAMILY MEDICINE

## 2020-03-19 PROCEDURE — G8427 DOCREV CUR MEDS BY ELIG CLIN: HCPCS | Performed by: FAMILY MEDICINE

## 2020-03-19 PROCEDURE — G8400 PT W/DXA NO RESULTS DOC: HCPCS | Performed by: FAMILY MEDICINE

## 2020-03-19 RX ORDER — CITALOPRAM 20 MG/1
20 TABLET ORAL DAILY
Qty: 30 TABLET | Refills: 5 | Status: SHIPPED
Start: 2020-03-19 | End: 2020-08-07 | Stop reason: ALTCHOICE

## 2020-03-19 NOTE — PROGRESS NOTES
changes  Hematological and Lymphatic ROS: negative for - bleeding problems, blood clots, fatigue or swollen lymph nodes  Respiratory ROS: negative for - cough, orthopnea, shortness of breath, sputum changes, tachypnea or wheezing  Cardiovascular ROS: negative for - chest pain, dyspnea on exertion, irregular heartbeat, loss of consciousness, palpitations, paroxysmal nocturnal dyspnea or rapid heart rate  Gastrointestinal ROS: negative for - abdominal pain, blood in stools, change in bowel habits, constipation, diarrhea, gas/bloating, heartburn or nausea/vomiting  Musculoskeletal ROS: negative for - joint pain, joint stiffness, joint swelling or muscle, back pain, bowel or bladder incontinence  Neurological ROS: negative for - behavioral changes, confusion, dizziness, headaches, memory loss, numbness/tingling, seizures or speech problems, weakness  Dermatological ROS: negative for - dry skin, mole changes, nail changes, pruritus, rash or skin lesion changes    Physical Exam  Temp Readings from Last 3 Encounters:   03/19/20 97.8 °F (36.6 °C)   02/19/20 97.2 °F (36.2 °C)   02/07/20 97.9 °F (36.6 °C) (Oral)     Wt Readings from Last 3 Encounters:   03/19/20 190 lb 3.2 oz (86.3 kg)   02/19/20 191 lb 12.8 oz (87 kg)   02/07/20 190 lb (86.2 kg)     BP Readings from Last 3 Encounters:   03/19/20 (!) 160/62   02/19/20 (!) 116/58   02/07/20 (!) 156/86     Pulse Readings from Last 3 Encounters:   03/19/20 55   02/19/20 67   02/07/20 55       General appearance: alert, well appearing, and in no distress, oriented to person, place, and time and normal appearing weight. CVS exam: normal rate, regular rhythm, normal S1, S2, no murmurs, rubs, clicks or gallops. Radial pulses 2+ bilateral.  PT/DP pulse 2+ bilat. No C/C/E    Chest: clear to auscultation, no wheezes, rales or rhonchi, symmetric air entry.      Abdomen: Soft, non-tender, non-distended, positive BS in all 4 quadrants    Extremities:Dorsalis pedis pulses palpated bilaterally, no clubbing, cyanosis, edema or erythema,     SKIN: no lesions, jaundice, petechiae, pallor, cyanosis, ecchymosis    NEURO: gross motor exam normal by observation, gait normal    Mental status - alert, oriented to person, place, and time, normal mood, behavior, speech, dress, motor activity, and thought processes      Assessment/Plan  Mychal Galvan was seen today for anxiety, dizziness and other. Diagnoses and all orders for this visit:    Vertigo  - Advised that restarting Lorazepam could worsen issue. Major Depression/Anxiety ([facility] [address]  -     START: citalopram (CELEXA) 20 MG tablet; Take 1 tablet by mouth daily          Return in about 3 months (around 6/19/2020), or if symptoms worsen or fail to improve. Daja Peña, DO    Call or go to ED immediately if symptoms worsen or persist.    Educational materials and/or home exercises printed for patient's review and were included in patient instructions on his/her After Visit Summary and given to patient at the end of visit. Counseled regarding above diagnosis, including possible risks and complications,  especially if left uncontrolled. Counseled regarding the possible side effects, risks, benefits and alternatives to treatment; patient and/or guardian verbalizes understanding, agrees, feels comfortable with and wishes to proceed with above treatment plan. Advised patient to call with any new medication issues, and read all Rx info from pharmacy to assure aware of all possible risks and side effects of medication before taking. Reviewed age and gender appropriate health screening exams and vaccinations. Advised patient regarding importance of keeping up with recommended health maintenance and to schedule as soon as possible if overdue, as this is important in assessing for undiagnosed pathology, especially cancer, as well as protecting against potentially harmful/life threatening disease.       Patient and/or guardian verbalizes understanding and agrees with above counseling, assessment and plan. All questions answered.

## 2020-03-30 PROBLEM — E66.01 MORBID OBESITY WITH BMI OF 40.0-44.9, ADULT (HCC): Status: ACTIVE | Noted: 2020-03-30

## 2020-03-30 PROBLEM — F33.1 MODERATE EPISODE OF RECURRENT MAJOR DEPRESSIVE DISORDER (HCC): Status: ACTIVE | Noted: 2020-03-30

## 2020-04-10 ENCOUNTER — TELEPHONE (OUTPATIENT)
Dept: FAMILY MEDICINE CLINIC | Age: 80
End: 2020-04-10

## 2020-04-20 ENCOUNTER — OFFICE VISIT (OUTPATIENT)
Dept: FAMILY MEDICINE CLINIC | Age: 80
End: 2020-04-20
Payer: MEDICARE

## 2020-04-20 VITALS
RESPIRATION RATE: 16 BRPM | HEART RATE: 83 BPM | HEIGHT: 59 IN | WEIGHT: 191 LBS | TEMPERATURE: 96.6 F | DIASTOLIC BLOOD PRESSURE: 80 MMHG | OXYGEN SATURATION: 98 % | SYSTOLIC BLOOD PRESSURE: 122 MMHG | BODY MASS INDEX: 38.51 KG/M2

## 2020-04-20 PROCEDURE — G8400 PT W/DXA NO RESULTS DOC: HCPCS | Performed by: FAMILY MEDICINE

## 2020-04-20 PROCEDURE — 1123F ACP DISCUSS/DSCN MKR DOCD: CPT | Performed by: FAMILY MEDICINE

## 2020-04-20 PROCEDURE — 1036F TOBACCO NON-USER: CPT | Performed by: FAMILY MEDICINE

## 2020-04-20 PROCEDURE — 1090F PRES/ABSN URINE INCON ASSESS: CPT | Performed by: FAMILY MEDICINE

## 2020-04-20 PROCEDURE — 99214 OFFICE O/P EST MOD 30 MIN: CPT | Performed by: FAMILY MEDICINE

## 2020-04-20 PROCEDURE — G8427 DOCREV CUR MEDS BY ELIG CLIN: HCPCS | Performed by: FAMILY MEDICINE

## 2020-04-20 PROCEDURE — G8417 CALC BMI ABV UP PARAM F/U: HCPCS | Performed by: FAMILY MEDICINE

## 2020-04-20 PROCEDURE — 4040F PNEUMOC VAC/ADMIN/RCVD: CPT | Performed by: FAMILY MEDICINE

## 2020-04-20 RX ORDER — NYSTATIN 100000 [USP'U]/G
POWDER TOPICAL
Qty: 60 G | Refills: 0 | Status: SHIPPED
Start: 2020-04-20 | End: 2021-06-09

## 2020-04-20 NOTE — PROGRESS NOTES
4/20/2020    Chief Complaint   Patient presents with    Rash     rash itchy under left breast        HPI    Tristin Goode is a 78 y.o. patient that presents today for:    Dermatitis: Patient complains of a rash. Symptoms began several days ago. Patient describes the rash as red. Characteristics of rash and associated history: Is rash pruritic?  yes. Patient's previous dermatologic history includes dermatitis. Family history of derm problems: none. Medications currently using: aloe vera. Environmental exposures or allergies: none      Patient's past medical, surgical, social and/or family history reviewed, updated in chart, and are non-contributory (unless otherwise stated). Medications and allergies also reviewed and updated in chart.      ROS Unless otherwise specified  Review of Systems - General ROS: negative for - chills, fatigue, fever, night sweats, sleep disturbance, weight gain or weight loss  Psychological ROS: negative for - anxiety, behavioral disorder, depression, hallucinations, irritability, memory difficulties, mood swings, sleep disturbances or suicidal ideation  ENT ROS: negative for - epistaxis, headaches, hearing change, nasal congestion, nasal discharge, nasal polyps, sinus pain, tinnitus, vertigo or visual changes  Hematological and Lymphatic ROS: negative for - bleeding problems, blood clots, fatigue or swollen lymph nodes  Respiratory ROS: negative for - cough, orthopnea, shortness of breath, sputum changes, tachypnea or wheezing  Cardiovascular ROS: negative for - chest pain, dyspnea on exertion, irregular heartbeat, loss of consciousness, palpitations, paroxysmal nocturnal dyspnea or rapid heart rate  Gastrointestinal ROS: negative for - abdominal pain, blood in stools, change in bowel habits, constipation, diarrhea, gas/bloating, heartburn or nausea/vomiting  Musculoskeletal ROS: negative for - joint pain, joint stiffness, joint swelling or muscle, back pain, bowel or bladder

## 2020-05-04 ENCOUNTER — OFFICE VISIT (OUTPATIENT)
Dept: FAMILY MEDICINE CLINIC | Age: 80
End: 2020-05-04
Payer: MEDICARE

## 2020-05-04 ENCOUNTER — HOSPITAL ENCOUNTER (OUTPATIENT)
Age: 80
Discharge: HOME | End: 2020-05-06
Payer: MEDICARE

## 2020-05-04 VITALS
RESPIRATION RATE: 16 BRPM | SYSTOLIC BLOOD PRESSURE: 134 MMHG | OXYGEN SATURATION: 99 % | WEIGHT: 189.6 LBS | DIASTOLIC BLOOD PRESSURE: 80 MMHG | HEIGHT: 59 IN | TEMPERATURE: 97 F | BODY MASS INDEX: 38.22 KG/M2 | HEART RATE: 60 BPM

## 2020-05-04 LAB
ANION GAP SERPL CALCULATED.3IONS-SCNC: 13 MMOL/L (ref 7–16)
BILIRUBIN, POC: NEGATIVE
BLOOD URINE, POC: NEGATIVE
BUN BLDV-MCNC: 18 MG/DL (ref 8–23)
CALCIUM SERPL-MCNC: 9.4 MG/DL (ref 8.6–10.2)
CHLORIDE BLD-SCNC: 101 MMOL/L (ref 98–107)
CHOLESTEROL, TOTAL: 194 MG/DL (ref 0–199)
CLARITY, POC: CLEAR
CO2: 25 MMOL/L (ref 22–29)
COLOR, POC: YELLOW
CREAT SERPL-MCNC: 0.8 MG/DL (ref 0.5–1)
GFR AFRICAN AMERICAN: >60
GFR NON-AFRICAN AMERICAN: >60 ML/MIN/1.73
GLUCOSE BLD-MCNC: 88 MG/DL (ref 74–99)
GLUCOSE URINE, POC: NEGATIVE
HCT VFR BLD CALC: 39.7 % (ref 34–48)
HDLC SERPL-MCNC: 59 MG/DL
HEMOGLOBIN: 12.7 G/DL (ref 11.5–15.5)
KETONES, POC: NEGATIVE
LDL CHOLESTEROL CALCULATED: 109 MG/DL (ref 0–99)
LEUKOCYTE EST, POC: NORMAL
MCH RBC QN AUTO: 28.1 PG (ref 26–35)
MCHC RBC AUTO-ENTMCNC: 32 % (ref 32–34.5)
MCV RBC AUTO: 87.8 FL (ref 80–99.9)
NITRITE, POC: NEGATIVE
PDW BLD-RTO: 13.5 FL (ref 11.5–15)
PH, POC: 5.5
PLATELET # BLD: 244 E9/L (ref 130–450)
PMV BLD AUTO: 10.9 FL (ref 7–12)
POTASSIUM SERPL-SCNC: 4.1 MMOL/L (ref 3.5–5)
PROTEIN, POC: NEGATIVE
RBC # BLD: 4.52 E12/L (ref 3.5–5.5)
RHEUMATOID FACTOR: >650 IU/ML (ref 0–13)
SEDIMENTATION RATE, ERYTHROCYTE: 30 MM/HR (ref 0–20)
SODIUM BLD-SCNC: 139 MMOL/L (ref 132–146)
SPECIFIC GRAVITY, POC: >=1.03
TRIGL SERPL-MCNC: 132 MG/DL (ref 0–149)
TSH SERPL DL<=0.05 MIU/L-ACNC: 2.56 UIU/ML (ref 0.27–4.2)
UROBILINOGEN, POC: 0.2
VLDLC SERPL CALC-MCNC: 26 MG/DL
WBC # BLD: 6.5 E9/L (ref 4.5–11.5)

## 2020-05-04 PROCEDURE — 4040F PNEUMOC VAC/ADMIN/RCVD: CPT | Performed by: FAMILY MEDICINE

## 2020-05-04 PROCEDURE — G8417 CALC BMI ABV UP PARAM F/U: HCPCS | Performed by: FAMILY MEDICINE

## 2020-05-04 PROCEDURE — 1036F TOBACCO NON-USER: CPT | Performed by: FAMILY MEDICINE

## 2020-05-04 PROCEDURE — 87088 URINE BACTERIA CULTURE: CPT

## 2020-05-04 PROCEDURE — 1123F ACP DISCUSS/DSCN MKR DOCD: CPT | Performed by: FAMILY MEDICINE

## 2020-05-04 PROCEDURE — 1090F PRES/ABSN URINE INCON ASSESS: CPT | Performed by: FAMILY MEDICINE

## 2020-05-04 PROCEDURE — 36415 COLL VENOUS BLD VENIPUNCTURE: CPT | Performed by: FAMILY MEDICINE

## 2020-05-04 PROCEDURE — 80061 LIPID PANEL: CPT

## 2020-05-04 PROCEDURE — 99214 OFFICE O/P EST MOD 30 MIN: CPT | Performed by: FAMILY MEDICINE

## 2020-05-04 PROCEDURE — 85027 COMPLETE CBC AUTOMATED: CPT

## 2020-05-04 PROCEDURE — G8400 PT W/DXA NO RESULTS DOC: HCPCS | Performed by: FAMILY MEDICINE

## 2020-05-04 PROCEDURE — 86038 ANTINUCLEAR ANTIBODIES: CPT

## 2020-05-04 PROCEDURE — 84443 ASSAY THYROID STIM HORMONE: CPT

## 2020-05-04 PROCEDURE — 81003 URINALYSIS AUTO W/O SCOPE: CPT | Performed by: FAMILY MEDICINE

## 2020-05-04 PROCEDURE — 85651 RBC SED RATE NONAUTOMATED: CPT

## 2020-05-04 PROCEDURE — 86431 RHEUMATOID FACTOR QUANT: CPT

## 2020-05-04 PROCEDURE — G8427 DOCREV CUR MEDS BY ELIG CLIN: HCPCS | Performed by: FAMILY MEDICINE

## 2020-05-04 PROCEDURE — 80048 BASIC METABOLIC PNL TOTAL CA: CPT

## 2020-05-04 RX ORDER — LORAZEPAM 0.5 MG/1
0.5 TABLET ORAL DAILY PRN
Qty: 30 TABLET | Refills: 2 | Status: SHIPPED | OUTPATIENT
Start: 2020-05-04 | End: 2020-06-03

## 2020-05-04 RX ORDER — IRBESARTAN 75 MG/1
75 TABLET ORAL DAILY
Qty: 90 TABLET | Refills: 3 | Status: ON HOLD
Start: 2020-05-04 | End: 2020-05-22 | Stop reason: SDUPTHER

## 2020-05-04 RX ORDER — PANTOPRAZOLE SODIUM 40 MG/1
40 TABLET, DELAYED RELEASE ORAL
Qty: 90 TABLET | Refills: 3 | Status: SHIPPED
Start: 2020-05-04 | End: 2020-10-01 | Stop reason: SDUPTHER

## 2020-05-04 RX ORDER — HYDROCHLOROTHIAZIDE 25 MG/1
25 TABLET ORAL DAILY
Qty: 90 TABLET | Refills: 3 | Status: ON HOLD
Start: 2020-05-04 | End: 2020-05-22 | Stop reason: HOSPADM

## 2020-05-04 NOTE — PROGRESS NOTES
Encounters:   05/04/20 97 °F (36.1 °C)   04/20/20 96.6 °F (35.9 °C)   03/19/20 97.8 °F (36.6 °C)     Wt Readings from Last 3 Encounters:   05/04/20 189 lb 9.6 oz (86 kg)   04/20/20 191 lb (86.6 kg)   03/19/20 190 lb 3.2 oz (86.3 kg)     BP Readings from Last 3 Encounters:   05/04/20 134/80   04/20/20 122/80   03/19/20 (!) 160/62     Pulse Readings from Last 3 Encounters:   05/04/20 60   04/20/20 83   03/19/20 55       General appearance: alert, well appearing, and in no distress, oriented to person, place, and time and normal appearing weight. CVS exam: normal rate, regular rhythm, normal S1, S2, no murmurs, rubs, clicks or gallops. Radial pulses 2+ bilateral.  PT/DP pulse 2+ bilat. No C/C/E    Chest: clear to auscultation, no wheezes, rales or rhonchi, symmetric air entry. Abdomen: Soft, non-tender, non-distended, positive BS in all 4 quadrants    Extremities:Dorsalis pedis pulses palpated bilaterally, no clubbing, cyanosis, edema or erythema,     SKIN: no lesions, jaundice, petechiae, pallor, cyanosis, ecchymosis    NEURO: gross motor exam normal by observation, gait normal    Mental status - alert, oriented to person, place, and time, normal mood, behavior, speech, dress, motor activity, and thought processes      Assessment/Plan  Breonna Delgado was seen today for rash, medication refill and discuss medications. Diagnoses and all orders for this visit:    Essential hypertension  -     irbesartan (AVAPRO) 75 MG tablet; Take 1 tablet by mouth daily  -     hydroCHLOROthiazide (HYDRODIURIL) 25 MG tablet; Take 1 tablet by mouth daily  -     CBC; Future  -     Basic Metabolic Panel; Future  -     Lipid Panel; Future    Anxiety  -     LORazepam (ATIVAN) 0.5 MG tablet; Take 1 tablet by mouth daily as needed for Anxiety for up to 30 days. Arthritis  -     ROMIE; Future  -     Rheumatoid Factor; Future  -     Sedimentation Rate; Future    Acquired hypothyroidism  -     TSH without Reflex;  Future    Dysuria  -  normal

## 2020-05-05 LAB — ANTI-NUCLEAR ANTIBODY (ANA): NEGATIVE

## 2020-05-06 ENCOUNTER — TELEPHONE (OUTPATIENT)
Dept: FAMILY MEDICINE CLINIC | Age: 80
End: 2020-05-06

## 2020-05-06 LAB — URINE CULTURE, ROUTINE: NORMAL

## 2020-05-12 ENCOUNTER — TELEPHONE (OUTPATIENT)
Dept: FAMILY MEDICINE CLINIC | Age: 80
End: 2020-05-12

## 2020-05-20 ENCOUNTER — APPOINTMENT (OUTPATIENT)
Dept: CT IMAGING | Age: 80
End: 2020-05-20
Payer: MEDICARE

## 2020-05-20 ENCOUNTER — HOSPITAL ENCOUNTER (OUTPATIENT)
Age: 80
Setting detail: OBSERVATION
Discharge: NURSING, REHAB OR LONG-TERM CARE | End: 2020-05-22
Attending: EMERGENCY MEDICINE | Admitting: FAMILY MEDICINE
Payer: MEDICARE

## 2020-05-20 PROBLEM — R26.81 GAIT INSTABILITY: Status: ACTIVE | Noted: 2020-05-20

## 2020-05-20 LAB
ANION GAP SERPL CALCULATED.3IONS-SCNC: 10 MMOL/L (ref 7–16)
APTT: 35.3 SEC (ref 24.5–35.1)
BACTERIA: ABNORMAL /HPF
BASOPHILS ABSOLUTE: 0.03 E9/L (ref 0–0.2)
BASOPHILS RELATIVE PERCENT: 0.5 % (ref 0–2)
BILIRUBIN URINE: NEGATIVE
BLOOD, URINE: NEGATIVE
BUN BLDV-MCNC: 22 MG/DL (ref 8–23)
CALCIUM SERPL-MCNC: 9.2 MG/DL (ref 8.6–10.2)
CHLORIDE BLD-SCNC: 102 MMOL/L (ref 98–107)
CLARITY: CLEAR
CO2: 24 MMOL/L (ref 22–29)
COLOR: YELLOW
CREAT SERPL-MCNC: 0.7 MG/DL (ref 0.5–1)
EOSINOPHILS ABSOLUTE: 0.13 E9/L (ref 0.05–0.5)
EOSINOPHILS RELATIVE PERCENT: 2.2 % (ref 0–6)
GFR AFRICAN AMERICAN: >60
GFR NON-AFRICAN AMERICAN: >60 ML/MIN/1.73
GLUCOSE BLD-MCNC: 97 MG/DL (ref 74–99)
GLUCOSE URINE: NEGATIVE MG/DL
HCT VFR BLD CALC: 38 % (ref 34–48)
HEMOGLOBIN: 12.4 G/DL (ref 11.5–15.5)
IMMATURE GRANULOCYTES #: 0.02 E9/L
IMMATURE GRANULOCYTES %: 0.3 % (ref 0–5)
KETONES, URINE: NEGATIVE MG/DL
LEUKOCYTE ESTERASE, URINE: ABNORMAL
LYMPHOCYTES ABSOLUTE: 2.23 E9/L (ref 1.5–4)
LYMPHOCYTES RELATIVE PERCENT: 37.5 % (ref 20–42)
MCH RBC QN AUTO: 28.4 PG (ref 26–35)
MCHC RBC AUTO-ENTMCNC: 32.6 % (ref 32–34.5)
MCV RBC AUTO: 87 FL (ref 80–99.9)
MONOCYTES ABSOLUTE: 0.48 E9/L (ref 0.1–0.95)
MONOCYTES RELATIVE PERCENT: 8.1 % (ref 2–12)
NEUTROPHILS ABSOLUTE: 3.05 E9/L (ref 1.8–7.3)
NEUTROPHILS RELATIVE PERCENT: 51.4 % (ref 43–80)
NITRITE, URINE: NEGATIVE
PDW BLD-RTO: 13.8 FL (ref 11.5–15)
PH UA: 6 (ref 5–9)
PLATELET # BLD: 242 E9/L (ref 130–450)
PMV BLD AUTO: 9.5 FL (ref 7–12)
POTASSIUM SERPL-SCNC: 3.5 MMOL/L (ref 3.5–5)
PROTEIN UA: NEGATIVE MG/DL
RBC # BLD: 4.37 E12/L (ref 3.5–5.5)
RBC UA: ABNORMAL /HPF (ref 0–2)
SODIUM BLD-SCNC: 136 MMOL/L (ref 132–146)
SPECIFIC GRAVITY UA: 1.01 (ref 1–1.03)
UROBILINOGEN, URINE: 0.2 E.U./DL
WBC # BLD: 5.9 E9/L (ref 4.5–11.5)
WBC UA: ABNORMAL /HPF (ref 0–5)

## 2020-05-20 PROCEDURE — 2580000003 HC RX 258: Performed by: NURSE PRACTITIONER

## 2020-05-20 PROCEDURE — 96376 TX/PRO/DX INJ SAME DRUG ADON: CPT

## 2020-05-20 PROCEDURE — 70450 CT HEAD/BRAIN W/O DYE: CPT

## 2020-05-20 PROCEDURE — 96375 TX/PRO/DX INJ NEW DRUG ADDON: CPT

## 2020-05-20 PROCEDURE — 72131 CT LUMBAR SPINE W/O DYE: CPT

## 2020-05-20 PROCEDURE — 81001 URINALYSIS AUTO W/SCOPE: CPT

## 2020-05-20 PROCEDURE — 93005 ELECTROCARDIOGRAM TRACING: CPT | Performed by: STUDENT IN AN ORGANIZED HEALTH CARE EDUCATION/TRAINING PROGRAM

## 2020-05-20 PROCEDURE — 70486 CT MAXILLOFACIAL W/O DYE: CPT

## 2020-05-20 PROCEDURE — 80048 BASIC METABOLIC PNL TOTAL CA: CPT

## 2020-05-20 PROCEDURE — G0378 HOSPITAL OBSERVATION PER HR: HCPCS

## 2020-05-20 PROCEDURE — 2580000003 HC RX 258: Performed by: STUDENT IN AN ORGANIZED HEALTH CARE EDUCATION/TRAINING PROGRAM

## 2020-05-20 PROCEDURE — 85730 THROMBOPLASTIN TIME PARTIAL: CPT

## 2020-05-20 PROCEDURE — 85025 COMPLETE CBC W/AUTO DIFF WBC: CPT

## 2020-05-20 PROCEDURE — 6370000000 HC RX 637 (ALT 250 FOR IP): Performed by: STUDENT IN AN ORGANIZED HEALTH CARE EDUCATION/TRAINING PROGRAM

## 2020-05-20 PROCEDURE — 6360000002 HC RX W HCPCS: Performed by: NURSE PRACTITIONER

## 2020-05-20 PROCEDURE — 72125 CT NECK SPINE W/O DYE: CPT

## 2020-05-20 PROCEDURE — 74176 CT ABD & PELVIS W/O CONTRAST: CPT

## 2020-05-20 PROCEDURE — 96374 THER/PROPH/DIAG INJ IV PUSH: CPT

## 2020-05-20 PROCEDURE — 99285 EMERGENCY DEPT VISIT HI MDM: CPT

## 2020-05-20 PROCEDURE — 71250 CT THORAX DX C-: CPT

## 2020-05-20 RX ORDER — LOSARTAN POTASSIUM 25 MG/1
25 TABLET ORAL DAILY
Status: DISCONTINUED | OUTPATIENT
Start: 2020-05-21 | End: 2020-05-21

## 2020-05-20 RX ORDER — PROMETHAZINE HYDROCHLORIDE 25 MG/1
12.5 TABLET ORAL EVERY 6 HOURS PRN
Status: DISCONTINUED | OUTPATIENT
Start: 2020-05-20 | End: 2020-05-22 | Stop reason: HOSPADM

## 2020-05-20 RX ORDER — TRAMADOL HYDROCHLORIDE 50 MG/1
50 TABLET ORAL ONCE
Status: DISCONTINUED | OUTPATIENT
Start: 2020-05-20 | End: 2020-05-20

## 2020-05-20 RX ORDER — HYDRALAZINE HYDROCHLORIDE 20 MG/ML
10 INJECTION INTRAMUSCULAR; INTRAVENOUS EVERY 6 HOURS PRN
Status: DISCONTINUED | OUTPATIENT
Start: 2020-05-20 | End: 2020-05-22 | Stop reason: HOSPADM

## 2020-05-20 RX ORDER — FENTANYL CITRATE 50 UG/ML
25 INJECTION, SOLUTION INTRAMUSCULAR; INTRAVENOUS ONCE
Status: COMPLETED | OUTPATIENT
Start: 2020-05-20 | End: 2020-05-20

## 2020-05-20 RX ORDER — 0.9 % SODIUM CHLORIDE 0.9 %
500 INTRAVENOUS SOLUTION INTRAVENOUS ONCE
Status: COMPLETED | OUTPATIENT
Start: 2020-05-20 | End: 2020-05-20

## 2020-05-20 RX ORDER — ONDANSETRON 2 MG/ML
4 INJECTION INTRAMUSCULAR; INTRAVENOUS EVERY 6 HOURS PRN
Status: DISCONTINUED | OUTPATIENT
Start: 2020-05-20 | End: 2020-05-22 | Stop reason: HOSPADM

## 2020-05-20 RX ORDER — ONDANSETRON 2 MG/ML
4 INJECTION INTRAMUSCULAR; INTRAVENOUS ONCE
Status: COMPLETED | OUTPATIENT
Start: 2020-05-20 | End: 2020-05-20

## 2020-05-20 RX ORDER — CETIRIZINE HYDROCHLORIDE 10 MG/1
5 TABLET ORAL NIGHTLY
Status: DISCONTINUED | OUTPATIENT
Start: 2020-05-20 | End: 2020-05-22 | Stop reason: HOSPADM

## 2020-05-20 RX ORDER — POLYETHYLENE GLYCOL 3350 17 G/17G
17 POWDER, FOR SOLUTION ORAL DAILY PRN
Status: DISCONTINUED | OUTPATIENT
Start: 2020-05-20 | End: 2020-05-22 | Stop reason: HOSPADM

## 2020-05-20 RX ORDER — KETOROLAC TROMETHAMINE 30 MG/ML
15 INJECTION, SOLUTION INTRAMUSCULAR; INTRAVENOUS EVERY 6 HOURS PRN
Status: DISCONTINUED | OUTPATIENT
Start: 2020-05-20 | End: 2020-05-22 | Stop reason: HOSPADM

## 2020-05-20 RX ORDER — LEVOTHYROXINE SODIUM 88 UG/1
88 TABLET ORAL DAILY
Status: DISCONTINUED | OUTPATIENT
Start: 2020-05-21 | End: 2020-05-22 | Stop reason: HOSPADM

## 2020-05-20 RX ORDER — PANTOPRAZOLE SODIUM 40 MG/1
40 TABLET, DELAYED RELEASE ORAL
Status: DISCONTINUED | OUTPATIENT
Start: 2020-05-21 | End: 2020-05-22 | Stop reason: HOSPADM

## 2020-05-20 RX ORDER — FENTANYL CITRATE 50 UG/ML
50 INJECTION, SOLUTION INTRAMUSCULAR; INTRAVENOUS ONCE
Status: COMPLETED | OUTPATIENT
Start: 2020-05-20 | End: 2020-05-20

## 2020-05-20 RX ORDER — CHOLECALCIFEROL (VITAMIN D3) 125 MCG
5 CAPSULE ORAL NIGHTLY
Status: DISCONTINUED | OUTPATIENT
Start: 2020-05-20 | End: 2020-05-20 | Stop reason: CLARIF

## 2020-05-20 RX ORDER — SODIUM CHLORIDE 0.9 % (FLUSH) 0.9 %
10 SYRINGE (ML) INJECTION EVERY 12 HOURS SCHEDULED
Status: DISCONTINUED | OUTPATIENT
Start: 2020-05-20 | End: 2020-05-22 | Stop reason: HOSPADM

## 2020-05-20 RX ORDER — SODIUM CHLORIDE 0.9 % (FLUSH) 0.9 %
10 SYRINGE (ML) INJECTION PRN
Status: DISCONTINUED | OUTPATIENT
Start: 2020-05-20 | End: 2020-05-22 | Stop reason: HOSPADM

## 2020-05-20 RX ORDER — OXYCODONE HYDROCHLORIDE AND ACETAMINOPHEN 5; 325 MG/1; MG/1
1 TABLET ORAL ONCE
Status: DISCONTINUED | OUTPATIENT
Start: 2020-05-20 | End: 2020-05-20

## 2020-05-20 RX ORDER — ACETAMINOPHEN 325 MG/1
650 TABLET ORAL EVERY 6 HOURS PRN
Status: DISCONTINUED | OUTPATIENT
Start: 2020-05-20 | End: 2020-05-22 | Stop reason: HOSPADM

## 2020-05-20 RX ORDER — HYDROCHLOROTHIAZIDE 25 MG/1
25 TABLET ORAL DAILY
Status: DISCONTINUED | OUTPATIENT
Start: 2020-05-21 | End: 2020-05-21

## 2020-05-20 RX ORDER — ACETAMINOPHEN 650 MG/1
650 SUPPOSITORY RECTAL EVERY 6 HOURS PRN
Status: DISCONTINUED | OUTPATIENT
Start: 2020-05-20 | End: 2020-05-22 | Stop reason: HOSPADM

## 2020-05-20 RX ORDER — LORAZEPAM 0.5 MG/1
0.5 TABLET ORAL DAILY PRN
Status: DISCONTINUED | OUTPATIENT
Start: 2020-05-20 | End: 2020-05-22 | Stop reason: HOSPADM

## 2020-05-20 RX ORDER — SUCRALFATE 1 G/1
1 TABLET ORAL 4 TIMES DAILY
Status: DISCONTINUED | OUTPATIENT
Start: 2020-05-20 | End: 2020-05-22 | Stop reason: HOSPADM

## 2020-05-20 RX ORDER — ASPIRIN 81 MG/1
81 TABLET, CHEWABLE ORAL DAILY
Status: DISCONTINUED | OUTPATIENT
Start: 2020-05-21 | End: 2020-05-22 | Stop reason: HOSPADM

## 2020-05-20 RX ADMIN — LORAZEPAM 0.5 MG: 0.5 TABLET ORAL at 22:46

## 2020-05-20 RX ADMIN — SODIUM CHLORIDE 500 ML: 9 INJECTION, SOLUTION INTRAVENOUS at 15:10

## 2020-05-20 RX ADMIN — FENTANYL CITRATE 50 MCG: 50 INJECTION, SOLUTION INTRAMUSCULAR; INTRAVENOUS at 17:13

## 2020-05-20 RX ADMIN — SUCRALFATE 1 G: 1 TABLET ORAL at 22:53

## 2020-05-20 RX ADMIN — FENTANYL CITRATE 25 MCG: 50 INJECTION, SOLUTION INTRAMUSCULAR; INTRAVENOUS at 15:10

## 2020-05-20 RX ADMIN — SODIUM CHLORIDE 500 ML: 9 INJECTION, SOLUTION INTRAVENOUS at 14:16

## 2020-05-20 RX ADMIN — CETIRIZINE HYDROCHLORIDE 5 MG: 10 TABLET, FILM COATED ORAL at 22:53

## 2020-05-20 RX ADMIN — FENTANYL CITRATE 25 MCG: 50 INJECTION, SOLUTION INTRAMUSCULAR; INTRAVENOUS at 14:16

## 2020-05-20 RX ADMIN — Medication 10 ML: at 22:53

## 2020-05-20 RX ADMIN — PROMETHAZINE HYDROCHLORIDE 12.5 MG: 25 TABLET ORAL at 22:41

## 2020-05-20 RX ADMIN — ONDANSETRON 4 MG: 2 INJECTION INTRAMUSCULAR; INTRAVENOUS at 18:33

## 2020-05-20 ASSESSMENT — PAIN SCALES - GENERAL
PAINLEVEL_OUTOF10: 0
PAINLEVEL_OUTOF10: 8
PAINLEVEL_OUTOF10: 7
PAINLEVEL_OUTOF10: 5
PAINLEVEL_OUTOF10: 8
PAINLEVEL_OUTOF10: 8
PAINLEVEL_OUTOF10: 9

## 2020-05-20 ASSESSMENT — ENCOUNTER SYMPTOMS
NAUSEA: 0
COUGH: 0
SHORTNESS OF BREATH: 0
ABDOMINAL PAIN: 0
VOMITING: 0
RHINORRHEA: 0

## 2020-05-20 ASSESSMENT — PAIN DESCRIPTION - LOCATION
LOCATION: BACK;RIB CAGE
LOCATION: HEAD;MOUTH
LOCATION: HEAD

## 2020-05-20 ASSESSMENT — PAIN DESCRIPTION - FREQUENCY
FREQUENCY: CONTINUOUS
FREQUENCY: CONTINUOUS

## 2020-05-20 ASSESSMENT — PAIN DESCRIPTION - PAIN TYPE
TYPE: ACUTE PAIN
TYPE: ACUTE PAIN

## 2020-05-20 ASSESSMENT — PAIN DESCRIPTION - DESCRIPTORS
DESCRIPTORS: SHARP;THROBBING
DESCRIPTORS: SHARP;THROBBING

## 2020-05-20 ASSESSMENT — PAIN DESCRIPTION - ORIENTATION
ORIENTATION: MID;RIGHT
ORIENTATION: RIGHT;MID

## 2020-05-20 NOTE — ED NOTES
Assessment: Bruising and some swelling noted on forehead. No open laceration noted. Patient denies numbness and tingling in extremities. All peripheral pulses moderate to strong. Assessment otherwise within range for patient.       Marlo Jones RN  05/20/20 0990

## 2020-05-20 NOTE — ED PROVIDER NOTES
Index  [SE] BETINA Amanda CNP     Re-examination:  5/20/20     Time: 1648 cervical collar removed 1502 notified by nurse that pain is returning and will re medicate   1658 Patients symptoms show no change. 1830 attempted to ambulate and unable to get patient off of cart pain is improved and is nauseated and dizzy. Consult(s):   IP CONSULT TO FAMILY MEDICINE  IP CONSULT TO SOCIAL WORK 3413 discussed with Dr. Anton Braga and he will be into see patient and put order in for the patient. Procedure(s):   none    MDM:   Patient complaining of neck pain was placed in a hard cervical collar will obtain CT images of head, cervical spine, chest, lumbar spine and abdomen and pelvis will give IV fluids medicate and reassess. CT images of the head were negative for any fractures or any intracranial bleed. Patient did get relief of pain after fentanyl administration. CT of the lumbar spine reveals spondylitic changes patient unable to ambulate consulted PCP for admission and further evaluation because patient lives at home alone unable to ambulate safely. Counseling: The emergency provider has spoken with the patient and discussed todays results, in addition to providing specific details for the plan of care and counseling regarding the diagnosis and prognosis. Questions are answered at this time and they are agreeable with the plan. Assessment      1. Facial contusion, initial encounter    2. Cervical sprain, initial encounter    3. Lumbar sprain, initial encounter    4. Fall from slip, trip, or stumble, initial encounter    5. Pain, dental      Plan   Admit to general medical surgical bed  Patient condition is stable    New Medications     New Prescriptions    No medications on file     Electronically signed by BETINA Amanda CNP   DD: 5/20/20  **This report was transcribed using voice recognition software.  Every effort was made to ensure accuracy; however, inadvertent computerized

## 2020-05-20 NOTE — ED NOTES
Please call sister Ja Basilio  at [phone] as soon as possible. With up dates and/or patient condition.       Gee Vicente RN  05/20/20 4269

## 2020-05-21 LAB
ANION GAP SERPL CALCULATED.3IONS-SCNC: 11 MMOL/L (ref 7–16)
BASOPHILS ABSOLUTE: 0.03 E9/L (ref 0–0.2)
BASOPHILS RELATIVE PERCENT: 0.4 % (ref 0–2)
BUN BLDV-MCNC: 17 MG/DL (ref 8–23)
CALCIUM SERPL-MCNC: 8.8 MG/DL (ref 8.6–10.2)
CHLORIDE BLD-SCNC: 105 MMOL/L (ref 98–107)
CO2: 22 MMOL/L (ref 22–29)
CREAT SERPL-MCNC: 0.7 MG/DL (ref 0.5–1)
EKG ATRIAL RATE: 58 BPM
EKG P AXIS: 28 DEGREES
EKG P-R INTERVAL: 188 MS
EKG Q-T INTERVAL: 468 MS
EKG QRS DURATION: 74 MS
EKG QTC CALCULATION (BAZETT): 459 MS
EKG R AXIS: -5 DEGREES
EKG T AXIS: 33 DEGREES
EKG VENTRICULAR RATE: 58 BPM
EOSINOPHILS ABSOLUTE: 0.07 E9/L (ref 0.05–0.5)
EOSINOPHILS RELATIVE PERCENT: 1 % (ref 0–6)
GFR AFRICAN AMERICAN: >60
GFR NON-AFRICAN AMERICAN: >60 ML/MIN/1.73
GLUCOSE BLD-MCNC: 196 MG/DL (ref 74–99)
HCT VFR BLD CALC: 35.8 % (ref 34–48)
HEMOGLOBIN: 11.8 G/DL (ref 11.5–15.5)
IMMATURE GRANULOCYTES #: 0.03 E9/L
IMMATURE GRANULOCYTES %: 0.4 % (ref 0–5)
LYMPHOCYTES ABSOLUTE: 2.46 E9/L (ref 1.5–4)
LYMPHOCYTES RELATIVE PERCENT: 35.3 % (ref 20–42)
MAGNESIUM: 2.3 MG/DL (ref 1.6–2.6)
MCH RBC QN AUTO: 28.9 PG (ref 26–35)
MCHC RBC AUTO-ENTMCNC: 33 % (ref 32–34.5)
MCV RBC AUTO: 87.5 FL (ref 80–99.9)
MONOCYTES ABSOLUTE: 0.58 E9/L (ref 0.1–0.95)
MONOCYTES RELATIVE PERCENT: 8.3 % (ref 2–12)
NEUTROPHILS ABSOLUTE: 3.8 E9/L (ref 1.8–7.3)
NEUTROPHILS RELATIVE PERCENT: 54.6 % (ref 43–80)
PDW BLD-RTO: 13.9 FL (ref 11.5–15)
PLATELET # BLD: 262 E9/L (ref 130–450)
PMV BLD AUTO: 10.1 FL (ref 7–12)
POTASSIUM REFLEX MAGNESIUM: 3.2 MMOL/L (ref 3.5–5)
RBC # BLD: 4.09 E12/L (ref 3.5–5.5)
SODIUM BLD-SCNC: 138 MMOL/L (ref 132–146)
T4 FREE: 1.27 NG/DL (ref 0.93–1.7)
TOTAL CK: 92 U/L (ref 20–180)
TSH SERPL DL<=0.05 MIU/L-ACNC: 1.87 UIU/ML (ref 0.27–4.2)
WBC # BLD: 7 E9/L (ref 4.5–11.5)

## 2020-05-21 PROCEDURE — G0378 HOSPITAL OBSERVATION PER HR: HCPCS

## 2020-05-21 PROCEDURE — 84439 ASSAY OF FREE THYROXINE: CPT

## 2020-05-21 PROCEDURE — 36415 COLL VENOUS BLD VENIPUNCTURE: CPT

## 2020-05-21 PROCEDURE — 84443 ASSAY THYROID STIM HORMONE: CPT

## 2020-05-21 PROCEDURE — 99214 OFFICE O/P EST MOD 30 MIN: CPT | Performed by: INTERNAL MEDICINE

## 2020-05-21 PROCEDURE — 82550 ASSAY OF CK (CPK): CPT

## 2020-05-21 PROCEDURE — 2580000003 HC RX 258: Performed by: STUDENT IN AN ORGANIZED HEALTH CARE EDUCATION/TRAINING PROGRAM

## 2020-05-21 PROCEDURE — 85025 COMPLETE CBC W/AUTO DIFF WBC: CPT

## 2020-05-21 PROCEDURE — 93010 ELECTROCARDIOGRAM REPORT: CPT | Performed by: INTERNAL MEDICINE

## 2020-05-21 PROCEDURE — 97161 PT EVAL LOW COMPLEX 20 MIN: CPT

## 2020-05-21 PROCEDURE — 6370000000 HC RX 637 (ALT 250 FOR IP): Performed by: FAMILY MEDICINE

## 2020-05-21 PROCEDURE — 6370000000 HC RX 637 (ALT 250 FOR IP): Performed by: STUDENT IN AN ORGANIZED HEALTH CARE EDUCATION/TRAINING PROGRAM

## 2020-05-21 PROCEDURE — 80048 BASIC METABOLIC PNL TOTAL CA: CPT

## 2020-05-21 PROCEDURE — 6360000002 HC RX W HCPCS: Performed by: STUDENT IN AN ORGANIZED HEALTH CARE EDUCATION/TRAINING PROGRAM

## 2020-05-21 PROCEDURE — 99219 PR INITIAL OBSERVATION CARE/DAY 50 MINUTES: CPT | Performed by: FAMILY MEDICINE

## 2020-05-21 PROCEDURE — 97165 OT EVAL LOW COMPLEX 30 MIN: CPT

## 2020-05-21 PROCEDURE — APPSS45 APP SPLIT SHARED TIME 31-45 MINUTES: Performed by: NURSE PRACTITIONER

## 2020-05-21 PROCEDURE — 83735 ASSAY OF MAGNESIUM: CPT

## 2020-05-21 RX ORDER — LOSARTAN POTASSIUM 50 MG/1
50 TABLET ORAL DAILY
Status: DISCONTINUED | OUTPATIENT
Start: 2020-05-22 | End: 2020-05-22 | Stop reason: HOSPADM

## 2020-05-21 RX ORDER — POTASSIUM CHLORIDE 20 MEQ/1
40 TABLET, EXTENDED RELEASE ORAL 2 TIMES DAILY WITH MEALS
Status: DISCONTINUED | OUTPATIENT
Start: 2020-05-21 | End: 2020-05-22

## 2020-05-21 RX ORDER — MECLIZINE HCL 12.5 MG/1
12.5 TABLET ORAL 3 TIMES DAILY PRN
Status: DISCONTINUED | OUTPATIENT
Start: 2020-05-21 | End: 2020-05-21

## 2020-05-21 RX ORDER — LIDOCAINE 4 G/G
1 PATCH TOPICAL DAILY
Status: DISCONTINUED | OUTPATIENT
Start: 2020-05-21 | End: 2020-05-22 | Stop reason: HOSPADM

## 2020-05-21 RX ORDER — AMLODIPINE BESYLATE 5 MG/1
5 TABLET ORAL DAILY
Status: DISCONTINUED | OUTPATIENT
Start: 2020-05-22 | End: 2020-05-22 | Stop reason: HOSPADM

## 2020-05-21 RX ADMIN — Medication 10 ML: at 09:08

## 2020-05-21 RX ADMIN — LEVOTHYROXINE SODIUM 88 MCG: 0.09 TABLET ORAL at 06:50

## 2020-05-21 RX ADMIN — SUCRALFATE 1 G: 1 TABLET ORAL at 21:52

## 2020-05-21 RX ADMIN — ONDANSETRON 4 MG: 2 INJECTION INTRAMUSCULAR; INTRAVENOUS at 08:54

## 2020-05-21 RX ADMIN — SUCRALFATE 1 G: 1 TABLET ORAL at 06:50

## 2020-05-21 RX ADMIN — PANTOPRAZOLE SODIUM 40 MG: 40 TABLET, DELAYED RELEASE ORAL at 06:50

## 2020-05-21 RX ADMIN — CETIRIZINE HYDROCHLORIDE 5 MG: 10 TABLET, FILM COATED ORAL at 21:52

## 2020-05-21 RX ADMIN — HYDROCHLOROTHIAZIDE 25 MG: 25 TABLET ORAL at 09:07

## 2020-05-21 RX ADMIN — MAGNESIUM OXIDE TAB 400 MG (241.3 MG ELEMENTAL MG) 400 MG: 400 (241.3 MG) TAB at 09:07

## 2020-05-21 RX ADMIN — LOSARTAN POTASSIUM 25 MG: 25 TABLET, FILM COATED ORAL at 09:07

## 2020-05-21 RX ADMIN — ENOXAPARIN SODIUM 40 MG: 40 INJECTION SUBCUTANEOUS at 09:08

## 2020-05-21 RX ADMIN — POTASSIUM CHLORIDE 40 MEQ: 20 TABLET, EXTENDED RELEASE ORAL at 12:25

## 2020-05-21 RX ADMIN — SUCRALFATE 1 G: 1 TABLET ORAL at 12:25

## 2020-05-21 RX ADMIN — POTASSIUM CHLORIDE 40 MEQ: 20 TABLET, EXTENDED RELEASE ORAL at 17:05

## 2020-05-21 RX ADMIN — LORAZEPAM 0.5 MG: 0.5 TABLET ORAL at 21:52

## 2020-05-21 RX ADMIN — Medication 10 ML: at 22:02

## 2020-05-21 RX ADMIN — SUCRALFATE 1 G: 1 TABLET ORAL at 17:05

## 2020-05-21 ASSESSMENT — PAIN SCALES - GENERAL
PAINLEVEL_OUTOF10: 0

## 2020-05-21 ASSESSMENT — ENCOUNTER SYMPTOMS
FACIAL SWELLING: 1
COUGH: 0
ABDOMINAL PAIN: 0
BACK PAIN: 1
DIARRHEA: 0
EYE REDNESS: 0
SORE THROAT: 0
SHORTNESS OF BREATH: 0

## 2020-05-21 NOTE — PROGRESS NOTES
Physical Therapy    Facility/Department: [facility] MED SURG  Initial Assessment    NAME: John Agudelo  : 1940  MRN: [id number]    Date of Service: 2020       REQUIRES PT FOLLOW UP: Yes       Patient Diagnosis(es): The primary encounter diagnosis was Facial contusion, initial encounter. Diagnoses of Cervical sprain, initial encounter, Lumbar sprain, initial encounter, Fall from slip, trip, or stumble, initial encounter, and Pain, dental were also pertinent to this visit. has a past medical history of Amaurosis fugax of right eye, Anxiety, Arthritis, CA - cancer of bowel, Cerebral artery occlusion with cerebral infarction ([facility] [address] Chronic back pain, Constipation, Depression, Elevated sed rate, GERD (gastroesophageal reflux disease), Hemorrhoids, Hyperlipidemia, Hypertension, Left foot pain, Macular degeneration, Poor vision, Prosthetic eye globe, Seasonal allergies, Skipped heart beats, Sleep apnea, and Thyroid disease. has a past surgical history that includes Eye surgery (years ago); colectomy (); Colonoscopy (2012); Cholecystectomy (); Hysterectomy (); tumor excision; Upper gastrointestinal endoscopy (2014); Colonoscopy (2014); Tonsillectomy; joint replacement (Left, ); Upper gastrointestinal endoscopy (2015); Colonoscopy (2015); cyst removal (years ago); and Upper gastrointestinal endoscopy (N/A, 2019). Evaluating Therapist: Rani Awad PT     Referring Provider:  Monroe Luther MD    Room #: 505   DIAGNOSIS:  Gait instability, fall   PRECAUTIONS: falls, decreased vision      Social:  Pt lives alone  in a 1 floor apartment with  1  step to enter. Prior to admission pt walked with no AD, but has [facility]      Initial Evaluation  Date: 2020  Treatment      Short Term/ Long Term   Goals   Was pt agreeable to Eval/treatment? yes      Does pt have pain?  Reports dizziness and nausea      Bed Mobility  Rolling:  Min assist Supine to sit: mod assist   Sit to supine: NT   Scooting: NT    SBA    Transfers Sit to stand: min assist   Stand to sit: min assist   Stand pivot: min assist    SBA    Ambulation     3  feet with  ww  with  Min assist    100  feet with ww  with  SBA        Stair negotiation: ascended and descended  NT    1-4  steps with  1  rail with  SBA    LE ROM  WFL      LE strength  4/ 5      AM- PAC RAW score   15/ 24            Pt is alert and Oriented x  3     Balance:  Min assist, fall risk. Pt reports dizziness with mobility   Endurance: poor   Bed/Chair alarm: yes      ASSESSMENT  Pt displays functional ability as noted in the objective portion of this evaluation. Treatment/Education:     mobility as above. Pt reports dizziness and nausea throughout . RN informed. Limited activity tolerance     Pt educated on fall risk,  Safety with mobility        Patient response to education:   Pt verbalized understanding Pt demonstrated skill Pt requires further education in this area   x  x       Comments:  Pt left  In chair after session, with call light in reach. Rehab potential is Good for reaching above PT goals. Pts/ family goals   1. None stated     Patient and or family understand(s) diagnosis, prognosis, and plan of care. - yes     PLAN  PT care will be provided in accordance with the objectives noted above. Whenever appropriate, clear delegation orders will be provided for nursing staff. Exercises and functional mobility practice will be used as well as appropriate assistive devices or modalities to obtain goals. Patient and family education will also be administered as needed. Frequency of treatments will be 2-5x/week x  5 days.     Time in: 0827   Time out: 0838       Evaluation Time includes thorough review of current medical information, gathering information on past medical history/social history and prior level of function, completion of standardized testing/informal observation of tasks, assessment of data and education on plan of care and goals.     CPT codes:  [x] Low Complexity PT evaluation 86762  [] Moderate Complexity PT evaluation 17224  [] High Complexity PT evaluation 97745  [] PT Re-evaluation 34709  [] Gait training 29847  minutes  [] Therapeutic activities 44005  minutes  [] Therapeutic exercises 68490  minutes  [] Neuromuscular reeducation 48005  minutes       [address] number:  PT 6463

## 2020-05-21 NOTE — PROGRESS NOTES
[address]  Progress Note    Chief complaint :  Chief Complaint   Patient presents with    Fall     in parking lot, hit front of head and right side of face and mouth, no LOC, no dizziness       Subjective:    No overnight problems. Patient describes feeling better than yesterday. She states that she hasn't felt dizzy today but did overnight. Denies any current nausea. Patient states that she has taken Meclizine in the past for her dizziness. Patient states that she has tenderness on her forehead and pain in her back and left flank. Tolerating diet. Past medical, surgical, family and social history were reviewed, non-contributory, and unchanged unless otherwise stated. 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, treated with surgery and chemo    Cerebral artery occlusion with cerebral infarction ([facility] [address]     possibly TIA    Chronic back pain     Constipation     Depression     Elevated sed rate 12/11/2017    hx of    GERD (gastroesophageal reflux disease)     Hemorrhoids     history of    Hyperlipidemia     diet controlled    Hypertension     Left foot pain     dropped a Kettle on foot    Macular degeneration     Poor vision     right eye / had bleeding behind eye, is receiving \"shots\" at this time  / 3/22/2019    Prosthetic eye globe     left eye implant;    Seasonal allergies     Skipped heart beats     on metoprolol; managed by Dr. Nils Ureña    Sleep apnea     uses cpap at times     Thyroid disease        Review of Systems   Constitutional: Negative for activity change, chills, fatigue and fever. HENT: Positive for facial swelling. Negative for congestion and sore throat. Eyes: Negative for redness and visual disturbance. Respiratory: Negative for cough and shortness of breath. Cardiovascular: Negative for chest pain and palpitations. Gastrointestinal: Negative for abdominal pain and diarrhea. Endocrine: Negative for polydipsia and polyuria. Genitourinary: Negative for dysuria and hematuria. Musculoskeletal: Positive for arthralgias and back pain. Negative for joint swelling. Skin: Negative for rash and wound. Neurological: Negative for dizziness, light-headedness and headaches. Psychiatric/Behavioral: Negative for confusion and dysphoric mood. Objective:  BP (!) 143/63   Pulse 53   Temp 97.6 °F (36.4 °C) (Oral)   Resp 16   Ht 4' 11\" (1.499 m)   Wt 193 lb 8 oz (87.8 kg)   LMP 07/24/2014   SpO2 97%   BMI 39.08 kg/m²     Physical Exam  Vitals signs reviewed. Constitutional:       Appearance: Normal appearance. She is not ill-appearing. Comments: Patient did not experience dizziness when sitting up   HENT:      Head: Normocephalic. Comments: Swelling over right eye     Mouth/Throat:      Mouth: Mucous membranes are moist.   Cardiovascular:      Rate and Rhythm: Normal rate and regular rhythm. Heart sounds: Normal heart sounds. Pulmonary:      Effort: Pulmonary effort is normal.      Breath sounds: Normal breath sounds. No wheezing. Abdominal:      General: Bowel sounds are normal.      Palpations: Abdomen is soft. Tenderness: There is no abdominal tenderness. Musculoskeletal:      Right lower leg: Edema (trace, pitting) present. Left lower leg: Edema (trace, pitting) present. Comments: Tenderness to left flank, no obvious ecchymosis   Skin:     General: Skin is warm. Capillary Refill: Capillary refill takes less than 2 seconds. Coloration: Skin is not pale. Neurological:      Mental Status: She is alert and oriented to person, place, and time. Psychiatric:         Mood and Affect: Mood normal.         Behavior: Behavior normal.         Thought Content:  Thought content normal.         Judgment: Judgment normal.         Labs:  Recent Results (from the past 24 hour(s)) CBC Auto Differential    Collection Time: 05/20/20  2:06 PM   Result Value Ref Range    WBC 5.9 4.5 - 11.5 E9/L    RBC 4.37 3.50 - 5.50 E12/L    Hemoglobin 12.4 11.5 - 15.5 g/dL    Hematocrit 38.0 34.0 - 48.0 %    MCV 87.0 80.0 - 99.9 fL    MCH 28.4 26.0 - 35.0 pg    MCHC 32.6 32.0 - 34.5 %    RDW 13.8 11.5 - 15.0 fL    Platelets 675 382 - 650 E9/L    MPV 9.5 7.0 - 12.0 fL    Neutrophils % 51.4 43.0 - 80.0 %    Immature Granulocytes % 0.3 0.0 - 5.0 %    Lymphocytes % 37.5 20.0 - 42.0 %    Monocytes % 8.1 2.0 - 12.0 %    Eosinophils % 2.2 0.0 - 6.0 %    Basophils % 0.5 0.0 - 2.0 %    Neutrophils Absolute 3.05 1.80 - 7.30 E9/L    Immature Granulocytes # 0.02 E9/L    Lymphocytes Absolute 2.23 1.50 - 4.00 E9/L    Monocytes Absolute 0.48 0.10 - 0.95 E9/L    Eosinophils Absolute 0.13 0.05 - 0.50 E9/L    Basophils Absolute 0.03 0.00 - 0.20 P2/G   Basic Metabolic Panel    Collection Time: 05/20/20  2:06 PM   Result Value Ref Range    Sodium 136 132 - 146 mmol/L    Potassium 3.5 3.5 - 5.0 mmol/L    Chloride 102 98 - 107 mmol/L    CO2 24 22 - 29 mmol/L    Anion Gap 10 7 - 16 mmol/L    Glucose 97 74 - 99 mg/dL    BUN 22 8 - 23 mg/dL    CREATININE 0.7 0.5 - 1.0 mg/dL    GFR Non-African American >60 >=60 mL/min/1.73    GFR African American >60     Calcium 9.2 8.6 - 10.2 mg/dL   APTT    Collection Time: 05/20/20  2:06 PM   Result Value Ref Range    aPTT 35.3 (H) 24.5 - 35.1 sec   Urinalysis    Collection Time: 05/20/20  2:06 PM   Result Value Ref Range    Color, UA Yellow Straw/Yellow    Clarity, UA Clear Clear    Glucose, Ur Negative Negative mg/dL    Bilirubin Urine Negative Negative    Ketones, Urine Negative Negative mg/dL    Specific Gravity, UA 1.015 1.005 - 1.030    Blood, Urine Negative Negative    pH, UA 6.0 5.0 - 9.0    Protein, UA Negative Negative mg/dL    Urobilinogen, Urine 0.2 <2.0 E.U./dL    Nitrite, Urine Negative Negative    Leukocyte Esterase, Urine SMALL (A) Negative   Microscopic Urinalysis    Collection

## 2020-05-21 NOTE — PATIENT CARE CONFERENCE
[facility] Quality Flow/Interdisciplinary Rounds Progress Note        Quality Flow Rounds held on May 21, 2020    Disciplines Attending:  Bedside Nurse, ,  and Nursing Unit Leadership    Nabil Waterman was admitted on 5/20/2020  1:11 PM    Anticipated Discharge Date:  Expected Discharge Date: 05/22/20    Disposition:    Ozzy Score:  Ozzy Scale Score: 19    Readmission Risk              Risk of Unplanned Readmission:        0           Discussed patient goal for the day, patient clinical progression, and barriers to discharge.   The following Goal(s) of the Day/Commitment(s) have been identified:  Occupational Therapy - Obtain Consult Order and Physical Therapy - Obtain Consult Order      Italo Padilla  May 21, 2020

## 2020-05-21 NOTE — PROGRESS NOTES
arrival, patient supine in bed. At end of session, patient seated in chair with call light and phone within reach, all lines and tubes intact. Pt would benefit from continued skilled OT to increase safety and independence with completion of ADL/IADL tasks for functional independence and quality of life. Bed/chair alarm: ON.      Low Evaluation + 0 timed treatment minutes    Evaluation time includes thorough review of current medical information, gathering information on past medical history/social history and prior level of function, completion of standardized testing/informal observation of tasks, assessment of data, and development of POC/Goals    [facility] OTR/L  [id number]

## 2020-05-22 ENCOUNTER — APPOINTMENT (OUTPATIENT)
Dept: MRI IMAGING | Age: 80
End: 2020-05-22
Payer: MEDICARE

## 2020-05-22 VITALS
HEIGHT: 59 IN | SYSTOLIC BLOOD PRESSURE: 142 MMHG | RESPIRATION RATE: 14 BRPM | HEART RATE: 56 BPM | DIASTOLIC BLOOD PRESSURE: 62 MMHG | TEMPERATURE: 98.3 F | WEIGHT: 191.4 LBS | BODY MASS INDEX: 38.59 KG/M2 | OXYGEN SATURATION: 96 %

## 2020-05-22 LAB
ANION GAP SERPL CALCULATED.3IONS-SCNC: 8 MMOL/L (ref 7–16)
BUN BLDV-MCNC: 16 MG/DL (ref 8–23)
CALCIUM SERPL-MCNC: 8.8 MG/DL (ref 8.6–10.2)
CHLORIDE BLD-SCNC: 108 MMOL/L (ref 98–107)
CO2: 25 MMOL/L (ref 22–29)
CREAT SERPL-MCNC: 0.8 MG/DL (ref 0.5–1)
GFR AFRICAN AMERICAN: >60
GFR NON-AFRICAN AMERICAN: >60 ML/MIN/1.73
GLUCOSE BLD-MCNC: 94 MG/DL (ref 74–99)
POTASSIUM REFLEX MAGNESIUM: 4.3 MMOL/L (ref 3.5–5)
SARS-COV-2, NAAT: NOT DETECTED
SODIUM BLD-SCNC: 141 MMOL/L (ref 132–146)

## 2020-05-22 PROCEDURE — 2580000003 HC RX 258: Performed by: STUDENT IN AN ORGANIZED HEALTH CARE EDUCATION/TRAINING PROGRAM

## 2020-05-22 PROCEDURE — 70553 MRI BRAIN STEM W/O & W/DYE: CPT

## 2020-05-22 PROCEDURE — 6370000000 HC RX 637 (ALT 250 FOR IP): Performed by: STUDENT IN AN ORGANIZED HEALTH CARE EDUCATION/TRAINING PROGRAM

## 2020-05-22 PROCEDURE — 80048 BASIC METABOLIC PNL TOTAL CA: CPT

## 2020-05-22 PROCEDURE — 6370000000 HC RX 637 (ALT 250 FOR IP): Performed by: FAMILY MEDICINE

## 2020-05-22 PROCEDURE — G0378 HOSPITAL OBSERVATION PER HR: HCPCS

## 2020-05-22 PROCEDURE — A9579 GAD-BASE MR CONTRAST NOS,1ML: HCPCS | Performed by: RADIOLOGY

## 2020-05-22 PROCEDURE — 6360000002 HC RX W HCPCS: Performed by: STUDENT IN AN ORGANIZED HEALTH CARE EDUCATION/TRAINING PROGRAM

## 2020-05-22 PROCEDURE — 99217 PR OBSERVATION CARE DISCHARGE MANAGEMENT: CPT | Performed by: FAMILY MEDICINE

## 2020-05-22 PROCEDURE — U0002 COVID-19 LAB TEST NON-CDC: HCPCS

## 2020-05-22 PROCEDURE — 6360000004 HC RX CONTRAST MEDICATION: Performed by: RADIOLOGY

## 2020-05-22 PROCEDURE — 36415 COLL VENOUS BLD VENIPUNCTURE: CPT

## 2020-05-22 RX ORDER — DIPHENHYDRAMINE HCL 25 MG
50 TABLET ORAL ONCE
Status: DISCONTINUED | OUTPATIENT
Start: 2020-05-22 | End: 2020-05-22

## 2020-05-22 RX ORDER — MINERAL OIL AND WHITE PETROLATUM 150; 830 MG/G; MG/G
OINTMENT OPHTHALMIC PRN
Status: DISCONTINUED | OUTPATIENT
Start: 2020-05-22 | End: 2020-05-22

## 2020-05-22 RX ORDER — AMLODIPINE BESYLATE 5 MG/1
5 TABLET ORAL DAILY
Qty: 30 TABLET | Refills: 0 | DISCHARGE
Start: 2020-05-23 | End: 2020-05-28 | Stop reason: SDUPTHER

## 2020-05-22 RX ORDER — MINERAL OIL AND WHITE PETROLATUM 150; 830 MG/G; MG/G
OINTMENT OPHTHALMIC PRN
Status: DISCONTINUED | OUTPATIENT
Start: 2020-05-22 | End: 2020-05-22 | Stop reason: HOSPADM

## 2020-05-22 RX ORDER — IRBESARTAN 150 MG/1
150 TABLET ORAL DAILY
Qty: 30 TABLET | Refills: 0 | DISCHARGE
Start: 2020-05-22 | End: 2020-06-11 | Stop reason: DRUGHIGH

## 2020-05-22 RX ORDER — POLYVINYL ALCOHOL 14 MG/ML
1 SOLUTION/ DROPS OPHTHALMIC PRN
Status: DISCONTINUED | OUTPATIENT
Start: 2020-05-22 | End: 2020-05-22

## 2020-05-22 RX ADMIN — SUCRALFATE 1 G: 1 TABLET ORAL at 15:26

## 2020-05-22 RX ADMIN — LOSARTAN POTASSIUM 50 MG: 50 TABLET, FILM COATED ORAL at 08:37

## 2020-05-22 RX ADMIN — PANTOPRAZOLE SODIUM 40 MG: 40 TABLET, DELAYED RELEASE ORAL at 06:12

## 2020-05-22 RX ADMIN — POTASSIUM CHLORIDE 40 MEQ: 20 TABLET, EXTENDED RELEASE ORAL at 08:37

## 2020-05-22 RX ADMIN — SUCRALFATE 1 G: 1 TABLET ORAL at 10:36

## 2020-05-22 RX ADMIN — MAGNESIUM OXIDE TAB 400 MG (241.3 MG ELEMENTAL MG) 400 MG: 400 (241.3 MG) TAB at 08:37

## 2020-05-22 RX ADMIN — ENOXAPARIN SODIUM 40 MG: 40 INJECTION SUBCUTANEOUS at 08:36

## 2020-05-22 RX ADMIN — Medication 10 ML: at 08:37

## 2020-05-22 RX ADMIN — ASPIRIN 81 MG 81 MG: 81 TABLET ORAL at 08:37

## 2020-05-22 RX ADMIN — LEVOTHYROXINE SODIUM 88 MCG: 0.09 TABLET ORAL at 06:12

## 2020-05-22 RX ADMIN — AMLODIPINE BESYLATE 5 MG: 5 TABLET ORAL at 08:37

## 2020-05-22 RX ADMIN — SUCRALFATE 1 G: 1 TABLET ORAL at 06:12

## 2020-05-22 RX ADMIN — GADOTERIDOL 17 ML: 279.3 INJECTION, SOLUTION INTRAVENOUS at 12:48

## 2020-05-22 RX ADMIN — MINERAL OIL AND WHITE PETROLATUM: 150; 830 OINTMENT OPHTHALMIC at 10:37

## 2020-05-22 ASSESSMENT — ENCOUNTER SYMPTOMS
SHORTNESS OF BREATH: 0
BACK PAIN: 0
ABDOMINAL PAIN: 0
EYE PAIN: 0
COUGH: 0
DIARRHEA: 0
SORE THROAT: 0
EYE REDNESS: 0

## 2020-05-22 ASSESSMENT — PAIN SCALES - GENERAL: PAINLEVEL_OUTOF10: 0

## 2020-05-22 NOTE — PLAN OF CARE
Problem: Falls - Risk of:  Goal: Will remain free from falls  Description: Will remain free from falls  Outcome: Met This Shift  Goal: Absence of physical injury  Description: Absence of physical injury  Outcome: Met This Shift     Problem: Cardiac:  Goal: Ability to maintain vital signs within normal range will improve  Description: Ability to maintain vital signs within normal range will improve  Outcome: Met This Shift  Goal: Cardiovascular alteration will improve  Description: Cardiovascular alteration will improve  Outcome: Met This Shift

## 2020-05-22 NOTE — CARE COORDINATION
5/22/2020  Social Work Discharge Planning:SW was informed by Una Prescott that they accepted Pt to [facility] . No need to wait for precert. Can go over the weekend or when medically ready. N-17 generated. , PASSR completed and transport form is in chart. Electronically signed by BELINDA Camacho on 5/22/2020 at 9:36 AM    5/22/2020  Social Work Discharge Planning:SW set up Pt to go to [facility] by TERENCE Maynard, [facility] for transport at 7pm today. SW notified nurse here and Un[facility] Prescott at [address] and left a voicemail il with sister Julisa Bailey.  Electronically signed by BELINDA Camacho on 5/22/2020 at 3:20 PM

## 2020-05-22 NOTE — DISCHARGE INSTR - COC
Continuity of Care Form    Patient Name: Delano Lezama   :  1940  MRN:  [id number]    Admit date:  2020  Discharge date:  20    Code Status Order: Full Code   Advance Directives:   [address] [facility] Documentation     Date/Time Healthcare Directive Type of Healthcare Directive Copy in [address] Agent's Name Healthcare Agent's Phone Number    20 [phone]  No, patient does not have an advance directive for healthcare treatment  --  --  --  --  --          Admitting Physician:  Chris Dias MD  PCP: Acacia Nunez DO    Discharging Nurse: [facility] Unit/Room#: 7261/9074-Z  Discharging Unit Phone Number: [phone]    Emergency Contact:   Extended Emergency Contact Information  Primary Emergency Contact: Viki Robison [facility] [address] Phone: [phone]  Mobile Phone: [phone]  Relation: Brother/Sister  Secondary Emergency Contact: Madhavi Choi  Address: PO  [facility], [address] [place] [address] [address] Phone: [phone]  Work Phone: [phone]  Relation: Child    Past Surgical History:  Past Surgical History:   Procedure Laterality Date   [facility]    open?     COLECTOMY  1974    COLONOSCOPY  2012    and egd    COLONOSCOPY  2014    COLONOSCOPY  2015    CYST REMOVAL  years ago    upper gum    EYE SURGERY  years ago    left eye removal,  has artificial eye    HYSTERECTOMY  1974    JOINT REPLACEMENT Left 2010/2012    x 2-knee    TONSILLECTOMY      TUMOR EXCISION      from arm, fatty    UPPER GASTROINTESTINAL ENDOSCOPY  2014    with biopsy    UPPER GASTROINTESTINAL ENDOSCOPY  2015    UPPER GASTROINTESTINAL ENDOSCOPY N/A 2019    EGD ESOPHAGOGASTRODUODENOSCOPY  ++IODINE ALLERGY++ and bx performed by Jocelyne Reid MD at [address] History:   Immunization History   Administered Date(s) Administered   [place] Colostomy/Ileostomy/Ileal Conduit: No       Date of Last BM: 5/18    Intake/Output Summary (Last 24 hours) at 5/22/2020 5243  Last data filed at 5/21/2020 2336  Gross per 24 hour   Intake 240 ml   Output --   Net 240 ml     I/O last 3 completed shifts: In: 240 [P.O.:240]  Out: -     Safety Concerns:     History of Falls (last 30 days)    Impairments/Disabilities:      None    Nutrition Therapy:  Current Nutrition Therapy:   - Oral Diet:  Cardiac    Routes of Feeding: Oral  Liquids: Thin Liquids  Daily Fluid Restriction: no  Last Modified Barium Swallow with Video (Video Swallowing Test): not done    Treatments at the Time of Hospital Discharge:   Respiratory Treatments:   Oxygen Therapy:  is not on home oxygen therapy.   Ventilator:    - No ventilator support    Rehab Therapies: Physical Therapy and Occupational Therapy  Weight Bearing Status/Restrictions: No weight bearing restirctions  Other Medical Equipment (for information only, NOT a DME order):  hospital bed  Other Treatments:     Patient's personal belongings (please select all that are sent with patient):  None    RN SIGNATURE:  Electronically signed by Espinoza Woodall RN on 5/22/20 at 4:55 PM EDT    CASE MANAGEMENT/SOCIAL WORK SECTION    Inpatient Status Date:     Readmission Risk Assessment Score:  Readmission Risk              Risk of Unplanned Readmission:        0           Discharging to Facility/ Agency   · Name: [address] [place] [address]  · [address] [place] [address]  · Phone:[phone]  · Fax:[phone]    Dialysis Facility (if applicable)   · Name:  · Address:  · Dialysis Schedule:  · Phone:  · Fax:    / signature: Electronically signed by BELINDA Navarro on 5/22/2020 at 9:37 AM      PHYSICIAN SECTION    Prognosis: Fair    Condition at Discharge: Stable    Rehab Potential (if transferring to Rehab): Good    Recommended Labs or Other Treatments After Discharge: none    Physician Certification: I certify the above information and transfer of Shonna Nicole  is necessary for the continuing treatment of the diagnosis listed and that she requires skilled snf for greater 30 days.      Update Admission H&P: No change in H&P    PHYSICIAN SIGNATURE:  Electronically signed by Jeff Wu MD on 5/22/20 at 10:43 AM EDT

## 2020-05-22 NOTE — PROGRESS NOTES
mmol/L    Chloride 108 (H) 98 - 107 mmol/L    CO2 25 22 - 29 mmol/L    Anion Gap 8 7 - 16 mmol/L    Glucose 94 74 - 99 mg/dL    BUN 16 8 - 23 mg/dL    CREATININE 0.8 0.5 - 1.0 mg/dL    GFR Non-African American >60 >=60 mL/min/1.73    GFR African American >60     Calcium 8.8 8.6 - 10.2 mg/dL       Radiology and other tests reviewed:  CT Head WO Contrast   Final Result   1. No acute intracranial hemorrhage or edema. 2. 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 3. Chronic small vessel ischemic changes. 4. Age-appropriate atrophy. 5. There is scalp hematoma in the frontal area and left eye   prosthesis. CT Cervical Spine WO Contrast   Final Result   No acute fracture or dislocation at the face or C-spine. Degenerative spondylotic changes. See above. CT Facial Bones WO Contrast   Final Result   No acute fracture or dislocation at the face or C-spine. Degenerative spondylotic changes. See above. CT ABDOMEN PELVIS WO CONTRAST   Final Result         1. No acute abnormality seen in the abdomen or the pelvis. 2. Findings suggestive of mild mesenteric panniculitis. CT Lumbar Spine WO Contrast   Final Result   No acute fracture or dislocation. Degenerative spondylotic changes. Spinal stenoses as noted. CT Chest WO Contrast   Final Result   Small noncalcified pleural plaques bilaterally. No other active   process. Assessment:  Active Hospital Problems    Diagnosis Date Noted    Facial contusion, initial encounter [S00.83XA]     Cervical sprain, initial encounter [S13. 9XXA]     Fall from slip, trip, or stumble, initial encounter [W01. 0XXA]     Lumbar sprain [S33. 5XXA]     Gait instability [R26.81] 05/20/2020       Plan:  Falls/Dizzy/Light-headed  -Fall appears to be mechanical from story, light headedness, dizziness, and inability to ambulate is likely secondary to 100 mcg of fentanyl received in ER  -Pan CT negative  -PT/OT consulted  -Social work consulted for placement  -Up with assistance only; ambulate patient daily  -Orthostatics positive; repeat today  -Lidocaine patch for her left flank pain   -Cardiology consult- 30 day monitor as outpatient, stop HCTZ, optimize ARB and CCB, neurology outpatient for vertigo; repeat orthostatics   -Compression stockings     Headache  -Pain control with Tylenol and Toradol 15 mg q 6 hours PRN for breakthrough  -No focal neurologic defecits     Hypertension  Improved; /86  -Hydralazine PRN for elevated pressures  -Per cardiology recs- increased Losartan to 50 mg QD, stopped HCTZ, added amlodipine 5 mg     Continue home medication:   Nightly 0.5 mg of ativan ordered  Home Protonix ordered  Home synthroid ordered 88 mcg/daily  Continue home aspirin     Diet: Cardiac  DVT PPx: Lovenox  Code: Full     Dispo: discharge once accepted by [facility]    Electronically signed by Rebecca Aviles MD on 5/22/2020 at 6:17 AM

## 2020-05-22 NOTE — DISCHARGE SUMMARY
closer monitoring at this time. During admission, patient remained weak and intermittently dizzy. Patient decided to be discharged to a SNF. Patient requested that an MRI be done prior to discharge due to cardiology's recommendation for neurology to evaluate patient for vertigo. MRI showed no acute abnormality; no mass, mass effect, or hemorrhage. Patient has had an extensive work-up with ENT for her dizziness which has been normal.  Patient was discharged to SNF in a stable and improved condition. Things to follow-up as outpatient: 30-day cardiac monitoring and neurology referral for further evaluation of vertigo. Discharge Exam:  BP (!) 118/56   Pulse 58   Temp 98.7 °F (37.1 °C) (Oral)   Resp 16   Ht 4' 11\" (1.499 m)   Wt 191 lb 6.4 oz (86.8 kg)   LMP 07/24/2014   SpO2 97%   BMI 38.66 kg/m²      Physical Exam  Vitals signs reviewed. Constitutional:       Appearance: Normal appearance. She is not ill-appearing. HENT:      Head: Normocephalic. Mouth/Throat:      Mouth: Mucous membranes are moist.   Eyes:      Extraocular Movements: Extraocular movements intact. Pupils: Pupils are equal, round, and reactive to light. Comments: Ecchymosis present around right eye     Cardiovascular:      Rate and Rhythm: Normal rate and regular rhythm. Heart sounds: Normal heart sounds. No murmur. Pulmonary:      Effort: Pulmonary effort is normal.      Breath sounds: Normal breath sounds. No wheezing or rhonchi. Abdominal:      General: Bowel sounds are normal.      Palpations: Abdomen is soft. Tenderness: There is no abdominal tenderness. Musculoskeletal:      Right lower leg: Edema present. Left lower leg: Edema present. Skin:     General: Skin is warm. Capillary Refill: Capillary refill takes less than 2 seconds. Coloration: Skin is not pale. Neurological:      Mental Status: She is alert and oriented to person, place, and time.    Psychiatric:         Mood

## 2020-05-26 ENCOUNTER — OFFICE VISIT (OUTPATIENT)
Dept: FAMILY MEDICINE CLINIC | Age: 80
End: 2020-05-26
Payer: MEDICARE

## 2020-05-26 VITALS
HEART RATE: 72 BPM | WEIGHT: 191 LBS | DIASTOLIC BLOOD PRESSURE: 82 MMHG | OXYGEN SATURATION: 98 % | RESPIRATION RATE: 16 BRPM | HEIGHT: 59 IN | TEMPERATURE: 98.6 F | SYSTOLIC BLOOD PRESSURE: 144 MMHG | BODY MASS INDEX: 38.51 KG/M2

## 2020-05-26 PROCEDURE — G8427 DOCREV CUR MEDS BY ELIG CLIN: HCPCS | Performed by: FAMILY MEDICINE

## 2020-05-26 PROCEDURE — 99214 OFFICE O/P EST MOD 30 MIN: CPT | Performed by: FAMILY MEDICINE

## 2020-05-26 PROCEDURE — 1036F TOBACCO NON-USER: CPT | Performed by: FAMILY MEDICINE

## 2020-05-26 PROCEDURE — 1090F PRES/ABSN URINE INCON ASSESS: CPT | Performed by: FAMILY MEDICINE

## 2020-05-26 PROCEDURE — G8417 CALC BMI ABV UP PARAM F/U: HCPCS | Performed by: FAMILY MEDICINE

## 2020-05-26 PROCEDURE — 4040F PNEUMOC VAC/ADMIN/RCVD: CPT | Performed by: FAMILY MEDICINE

## 2020-05-26 PROCEDURE — 1123F ACP DISCUSS/DSCN MKR DOCD: CPT | Performed by: FAMILY MEDICINE

## 2020-05-26 PROCEDURE — G8400 PT W/DXA NO RESULTS DOC: HCPCS | Performed by: FAMILY MEDICINE

## 2020-05-26 NOTE — PROGRESS NOTES
5/26/2020    Chief Complaint   Patient presents with   Mayaeze Lozadaromy Fall     follow up from hospital and nursing home  signed self out of nursing home        HPI    Loan Love is a 78 y.o. patient that presents today for:    Thuan Gaines in [employer] lot. Taken to the ER. Admitted. States that she had no lorazepam in her system   Did stop HCTZ and began Amlodipine. Dizziness has resolved. BMP reviewed and was normal.   Is seeing Rheumatology, Dr. Felicita Aggarwal, 6/26/2020. Left nursing home due to conditions. States her bed was on the floor. Patient's past medical, surgical, social and/or family history reviewed, updated in chart, and are non-contributory (unless otherwise stated). Medications and allergies also reviewed and updated in chart.      ROS Unless otherwise specified  Review of Systems - General ROS: negative for - chills, fatigue, fever, night sweats, sleep disturbance, weight gain or weight loss  Psychological ROS: negative for - anxiety, behavioral disorder, depression, hallucinations, irritability, memory difficulties, mood swings, sleep disturbances or suicidal ideation  ENT ROS: negative for - epistaxis, headaches, hearing change, nasal congestion, nasal discharge, nasal polyps, sinus pain, tinnitus, vertigo or visual changes  Hematological and Lymphatic ROS: negative for - bleeding problems, blood clots, fatigue or swollen lymph nodes  Respiratory ROS: negative for - cough, orthopnea, shortness of breath, sputum changes, tachypnea or wheezing  Cardiovascular ROS: negative for - chest pain, dyspnea on exertion, irregular heartbeat, loss of consciousness, palpitations, paroxysmal nocturnal dyspnea or rapid heart rate  Gastrointestinal ROS: negative for - abdominal pain, blood in stools, change in bowel habits, constipation, diarrhea, gas/bloating, heartburn or nausea/vomiting  Musculoskeletal ROS: negative for - joint pain, joint stiffness, joint swelling or muscle, back pain, bowel or

## 2020-05-27 ENCOUNTER — TELEPHONE (OUTPATIENT)
Dept: FAMILY MEDICINE CLINIC | Age: 80
End: 2020-05-27

## 2020-05-27 ENCOUNTER — NURSE ONLY (OUTPATIENT)
Dept: CARDIOLOGY CLINIC | Age: 80
End: 2020-05-27

## 2020-05-27 VITALS — TEMPERATURE: 97 F

## 2020-05-28 ENCOUNTER — NURSE ONLY (OUTPATIENT)
Dept: CARDIOLOGY CLINIC | Age: 80
End: 2020-05-28

## 2020-05-28 VITALS — TEMPERATURE: 97.2 F

## 2020-05-28 RX ORDER — AMLODIPINE BESYLATE 5 MG/1
5 TABLET ORAL DAILY
Qty: 90 TABLET | Refills: 1 | Status: SHIPPED
Start: 2020-05-28 | End: 2021-02-04

## 2020-06-01 ENCOUNTER — TELEPHONE (OUTPATIENT)
Dept: FAMILY MEDICINE CLINIC | Age: 80
End: 2020-06-01

## 2020-06-01 ENCOUNTER — TELEPHONE (OUTPATIENT)
Dept: CARDIOLOGY CLINIC | Age: 80
End: 2020-06-01

## 2020-06-01 NOTE — TELEPHONE ENCOUNTER
Contacted Dr. Kavin Garza regarding patient and issues with monitor. He states we can send referral to EP, per her request.  Referral has been placed in [facility] for [facility].

## 2020-06-02 ENCOUNTER — TELEPHONE (OUTPATIENT)
Dept: NON INVASIVE DIAGNOSTICS | Age: 80
End: 2020-06-02

## 2020-06-10 ENCOUNTER — VIRTUAL VISIT (OUTPATIENT)
Dept: NON INVASIVE DIAGNOSTICS | Age: 80
End: 2020-06-10
Payer: MEDICARE

## 2020-06-10 PROCEDURE — 99204 OFFICE O/P NEW MOD 45 MIN: CPT | Performed by: INTERNAL MEDICINE

## 2020-06-10 NOTE — PROGRESS NOTES
6/10/2020    TELEHEALTH EVALUATION -- Audio/Visual (During JRPBE-74 public health emergency)    HPI:    Jadon Vela (:  1940) has requested an audio/video evaluation for the following concern(s): Linq placement    Patient is an 24-year-old female with a past medical history of hypothyroidism, TIA, colon cancer, hypertension, hyperlipidemia, and chronic back  who  presented to the emergency room 18 due to mechanical fall. She is not sure what really happened but she hit her head on the ground. She is not sure she had LOC. CT scans of the head, facial bones, chest, cervical spine, lumbar spine, abdomen pelvis were within normal limits. She had some dizziness and bradycardia while hospitalized and a 30 day event monitor was recommended. No AV block or indication for pacemaker was found by cardiology consult physician. It was also recommended that she stop hydrochlorothiazide and optimize ARB and calcium channel blocker, ARB was doubled and Norvasc 5 mg daily was added. MRI showed no acute abnormality; no mass, mass effect, or hemorrhage. No further falls since hospitalization. She has some dizziness associated with head spinning sensation. She saw ENT per pt and neurology was recommended    Review of Systems     General: No unusual weight gain, change in exercise tolerance  Skin: No rash or itching  EENT: No vision changes or nosebleeds  Cardiovascular: No orthopnea or paroxysmal nocturnal dyspnea, neg chest pain, palpitation  Respiratory: neg Cough  and sob  Gastrointestinal: No hematemesis or recent changes in bowel habits  Genitourinary: No hematuria, urgency or frequency  Musculoskeletal: No muscular weakness or joint swelling   Neurologic / Psychiatric: No incoordination or convulsions  Allergic / Immunologic/ Lymphatic / Endocrine: No anemia or bleeding tendency      Prior to Visit Medications    Medication Sig Taking?  Authorizing Provider   amLODIPine (NORVASC) 5 MG tablet Take 1 tablet regional wall motion abnormalities seen. Normal left ventricular wall thickness. Mildly enlarged right ventricle. Right ventricle global systolic function is normal.   The left atrium is mildly dilated. Agitated saline injected for shunt evaluation. No definitive evidence of atrial level shunt, but visualization was   limited. Lexiscan Stress test 4/2015  IMPRESSION:       No evidence of stress-induced left ventricular myocardial   ischemia. ASSESSMENT/PLAN:    1. Dizziness    2. Abnormal EKG    3. Essential hypertension    4. Obesity (BMI 35.0-39.9 without comorbidity)    5. Obstructive sleep apnea syndrome, non-compliant with CPAP therapy    6. Ataxia    7. Fall from slip, trip, or stumble, initial encounter      1. Dizziness  Unclear if she had syncope. I will mail a 30 day MCOT evaluate heart rhythm, she will keep clear diary of her symptoms. Further recommendations after that  LVEF normal 12/2019  No angina symptoms      2. ANA MARIA  CPAP compliance recommended    3. HTN  BP normotensive per pt  Agree with stopping diuretic, cont Norvasc and Avapro    4. TIA  On Asa    5. Obesity  BMI 38  Weight loss recommended      Return in about 8 weeks (around 8/5/2020). Allyson Meadows is a 78 y.o. female being evaluated by a Virtual Visit/phone/(video visit) encounter to address concerns as mentioned above. A caregiver was present when appropriate. Due to this being a TeleHealth encounter (During XKJZH-35 public health emergency), evaluation of the following organ systems was limited: Vitals/Constitutional/EENT/Resp/CV/GI//MS/Neuro/Skin/Heme-Lymph-Imm.   Pursuant to the emergency declaration under the [address] [place] [address], [address] [place] [address] authority and the [employer] Act, this Virtual Visit was conducted with patient's (and/or legal guardian's) consent, to reduce the patient's risk of exposure to COVID-19 and provide necessary medical care. The patient (and/or legal guardian) has also been advised to contact this office for worsening conditions or problems, and seek emergency medical treatment and/or call 911 if deemed necessary. Services were provided through a phone/video synchronous discussion virtually to substitute for in-person clinic visit. Patient and provider were located at their individual homes. --Dayanna Mcdonald MD on 6/10/2020 at 12:45 PM    An electronic signature was used to authenticate this note.

## 2020-06-11 ENCOUNTER — OFFICE VISIT (OUTPATIENT)
Dept: FAMILY MEDICINE CLINIC | Age: 80
End: 2020-06-11
Payer: MEDICARE

## 2020-06-11 VITALS
BODY MASS INDEX: 38.3 KG/M2 | SYSTOLIC BLOOD PRESSURE: 130 MMHG | HEART RATE: 76 BPM | DIASTOLIC BLOOD PRESSURE: 60 MMHG | TEMPERATURE: 97.8 F | HEIGHT: 59 IN | WEIGHT: 190 LBS | RESPIRATION RATE: 16 BRPM | OXYGEN SATURATION: 97 %

## 2020-06-11 PROCEDURE — 1036F TOBACCO NON-USER: CPT | Performed by: FAMILY MEDICINE

## 2020-06-11 PROCEDURE — 4040F PNEUMOC VAC/ADMIN/RCVD: CPT | Performed by: FAMILY MEDICINE

## 2020-06-11 PROCEDURE — 99213 OFFICE O/P EST LOW 20 MIN: CPT | Performed by: FAMILY MEDICINE

## 2020-06-11 PROCEDURE — 1090F PRES/ABSN URINE INCON ASSESS: CPT | Performed by: FAMILY MEDICINE

## 2020-06-11 PROCEDURE — G8400 PT W/DXA NO RESULTS DOC: HCPCS | Performed by: FAMILY MEDICINE

## 2020-06-11 PROCEDURE — G8427 DOCREV CUR MEDS BY ELIG CLIN: HCPCS | Performed by: FAMILY MEDICINE

## 2020-06-11 PROCEDURE — 1123F ACP DISCUSS/DSCN MKR DOCD: CPT | Performed by: FAMILY MEDICINE

## 2020-06-11 PROCEDURE — G8417 CALC BMI ABV UP PARAM F/U: HCPCS | Performed by: FAMILY MEDICINE

## 2020-06-11 NOTE — PROGRESS NOTES
6/30/2020    Chief Complaint   Patient presents with    Dizziness     recheck vertigo got dizziness again when getting hair done         HPI    Loan Love is a 78 y.o. patient that presents today for:    Thuan Gaines in [employer] lot. Taken to the ER. Admitted. States that she had no lorazepam in her system. Did stop HCTZ and began Amlodipine. Dizziness has resolved. BMP reviewed and was normal.   Is seeing Rheumatology, Dr. Felicita Aggarwal, 6/26/2020. Left nursing home due to conditions. States her bed was on the floor. Vertigo - Dizziness: Patient presents with dizziness . The dizziness has been present for several month. The patient describes the symptoms as disequalibirum, vertigo and lightheadedness. Symptoms are exacerbated by rapid head movements The patient also complains of none. Patient denies none. She has been treated with meclizine (Antivert) with inadequate improvement. Previous work up has been neurology. Patient's past medical, surgical, social and/or family history reviewed, updated in chart, and are non-contributory (unless otherwise stated). Medications and allergies also reviewed and updated in chart.      ROS Unless otherwise specified  Review of Systems - General ROS: negative for - chills, fatigue, fever, night sweats, sleep disturbance, weight gain or weight loss  Psychological ROS: negative for - anxiety, behavioral disorder, depression, hallucinations, irritability, memory difficulties, mood swings, sleep disturbances or suicidal ideation  ENT ROS: negative for - epistaxis, headaches, hearing change, nasal congestion, nasal discharge, nasal polyps, sinus pain, tinnitus, vertigo or visual changes  Hematological and Lymphatic ROS: negative for - bleeding problems, blood clots, fatigue or swollen lymph nodes  Respiratory ROS: negative for - cough, orthopnea, shortness of breath, sputum changes, tachypnea or wheezing  Cardiovascular ROS: negative for - chest pain, dyspnea on exertion, irregular heartbeat, loss of consciousness, palpitations, paroxysmal nocturnal dyspnea or rapid heart rate  Gastrointestinal ROS: negative for - abdominal pain, blood in stools, change in bowel habits, constipation, diarrhea, gas/bloating, heartburn or nausea/vomiting  Musculoskeletal ROS: negative for - joint pain, joint stiffness, joint swelling or muscle, back pain, bowel or bladder incontinence  Neurological ROS: negative for - behavioral changes, confusion, dizziness, headaches, memory loss, numbness/tingling, seizures or speech problems, weakness  Dermatological ROS: negative for - dry skin, mole changes, nail changes, pruritus, rash or skin lesion changes    Physical Exam  Temp Readings from Last 3 Encounters:   06/11/20 97.8 °F (36.6 °C)   05/28/20 97.2 °F (36.2 °C)   05/27/20 97 °F (36.1 °C)     Wt Readings from Last 3 Encounters:   06/11/20 190 lb (86.2 kg)   05/26/20 191 lb (86.6 kg)   05/22/20 191 lb 6.4 oz (86.8 kg)     BP Readings from Last 3 Encounters:   06/11/20 130/60   05/26/20 (!) 144/82   05/22/20 (!) 142/62     Pulse Readings from Last 3 Encounters:   06/11/20 76   05/26/20 72   05/22/20 56       General appearance: alert, well appearing, and in no distress, oriented to person, place, and time and normal appearing weight. CVS exam: normal rate, regular rhythm, normal S1, S2, no murmurs, rubs, clicks or gallops. Radial pulses 2+ bilateral.  PT/DP pulse 2+ bilat. No C/C/E    Chest: clear to auscultation, no wheezes, rales or rhonchi, symmetric air entry.      Abdomen: Soft, non-tender, non-distended, positive BS in all 4 quadrants    Extremities:Dorsalis pedis pulses palpated bilaterally, no clubbing, cyanosis, edema or erythema,     SKIN: no lesions, jaundice, petechiae, pallor, cyanosis, ecchymosis    NEURO: gross motor exam normal by observation, gait normal    Mental status - alert, oriented to person, place, and time, normal mood, behavior, speech, dress, motor

## 2020-07-02 ENCOUNTER — TELEPHONE (OUTPATIENT)
Dept: NON INVASIVE DIAGNOSTICS | Age: 80
End: 2020-07-02

## 2020-07-02 NOTE — TELEPHONE ENCOUNTER
Patient called stating she does not want to wear the 30 day heart monitor that Dr. Pierre Veronica recommended. She says she would like to get a linq implant.

## 2020-07-06 ENCOUNTER — TELEPHONE (OUTPATIENT)
Dept: FAMILY MEDICINE CLINIC | Age: 80
End: 2020-07-06

## 2020-07-06 NOTE — TELEPHONE ENCOUNTER
Patient had heart palpitations last night but feels tired and stressed out today, her sinus has drainage, has been taking  Zyrtec every night, will put on heart monitor today, other ones would not stay on

## 2020-07-07 ENCOUNTER — NURSE ONLY (OUTPATIENT)
Dept: NON INVASIVE DIAGNOSTICS | Age: 80
End: 2020-07-07

## 2020-07-15 ENCOUNTER — TELEPHONE (OUTPATIENT)
Dept: NON INVASIVE DIAGNOSTICS | Age: 80
End: 2020-07-15

## 2020-07-15 NOTE — TELEPHONE ENCOUNTER
Patient called today stating that she no longer wanted to wear the heart monitor due to skin irritation.

## 2020-07-29 ENCOUNTER — HOSPITAL ENCOUNTER (OUTPATIENT)
Age: 80
Discharge: HOME | End: 2020-07-29
Payer: MEDICARE

## 2020-07-29 ENCOUNTER — HOSPITAL ENCOUNTER (OUTPATIENT)
Age: 80
Discharge: HOME | End: 2020-07-31
Payer: MEDICARE

## 2020-07-29 ENCOUNTER — HOSPITAL ENCOUNTER (OUTPATIENT)
Dept: GENERAL RADIOLOGY | Age: 80
Discharge: HOME | End: 2020-07-31
Payer: MEDICARE

## 2020-07-29 LAB
ALT SERPL-CCNC: 9 U/L (ref 0–32)
AST SERPL-CCNC: 17 U/L (ref 0–31)
C-REACTIVE PROTEIN: 0.9 MG/DL (ref 0–0.4)
CREAT SERPL-MCNC: 0.7 MG/DL (ref 0.5–1)
GFR AFRICAN AMERICAN: >60
GFR NON-AFRICAN AMERICAN: >60 ML/MIN/1.73
HCT VFR BLD CALC: 37.8 % (ref 34–48)
HEMOGLOBIN: 12.5 G/DL (ref 11.5–15.5)
MCH RBC QN AUTO: 28.9 PG (ref 26–35)
MCHC RBC AUTO-ENTMCNC: 33.1 % (ref 32–34.5)
MCV RBC AUTO: 87.5 FL (ref 80–99.9)
PDW BLD-RTO: 13.6 FL (ref 11.5–15)
PLATELET # BLD: 237 E9/L (ref 130–450)
PMV BLD AUTO: 10 FL (ref 7–12)
RBC # BLD: 4.32 E12/L (ref 3.5–5.5)
RHEUMATOID FACTOR: >650 IU/ML (ref 0–13)
SEDIMENTATION RATE, ERYTHROCYTE: 38 MM/HR (ref 0–20)
TSH SERPL DL<=0.05 MIU/L-ACNC: 1.72 UIU/ML (ref 0.27–4.2)
URIC ACID, SERUM: 4.4 MG/DL (ref 2.4–5.7)
WBC # BLD: 7.2 E9/L (ref 4.5–11.5)

## 2020-07-29 PROCEDURE — 73630 X-RAY EXAM OF FOOT: CPT

## 2020-07-29 PROCEDURE — 85027 COMPLETE CBC AUTOMATED: CPT

## 2020-07-29 PROCEDURE — 87340 HEPATITIS B SURFACE AG IA: CPT

## 2020-07-29 PROCEDURE — 73110 X-RAY EXAM OF WRIST: CPT

## 2020-07-29 PROCEDURE — 36415 COLL VENOUS BLD VENIPUNCTURE: CPT

## 2020-07-29 PROCEDURE — 84450 TRANSFERASE (AST) (SGOT): CPT

## 2020-07-29 PROCEDURE — 86812 HLA TYPING A B OR C: CPT

## 2020-07-29 PROCEDURE — 86481 TB AG RESPONSE T-CELL SUSP: CPT

## 2020-07-29 PROCEDURE — 84550 ASSAY OF BLOOD/URIC ACID: CPT

## 2020-07-29 PROCEDURE — 73564 X-RAY EXAM KNEE 4 OR MORE: CPT

## 2020-07-29 PROCEDURE — 86038 ANTINUCLEAR ANTIBODIES: CPT

## 2020-07-29 PROCEDURE — 73130 X-RAY EXAM OF HAND: CPT

## 2020-07-29 PROCEDURE — 86140 C-REACTIVE PROTEIN: CPT

## 2020-07-29 PROCEDURE — 82565 ASSAY OF CREATININE: CPT

## 2020-07-29 PROCEDURE — 84443 ASSAY THYROID STIM HORMONE: CPT

## 2020-07-29 PROCEDURE — 85651 RBC SED RATE NONAUTOMATED: CPT

## 2020-07-29 PROCEDURE — 86431 RHEUMATOID FACTOR QUANT: CPT

## 2020-07-29 PROCEDURE — 84460 ALANINE AMINO (ALT) (SGPT): CPT

## 2020-07-30 ENCOUNTER — OFFICE VISIT (OUTPATIENT)
Dept: NEUROLOGY | Age: 80
End: 2020-07-30
Payer: MEDICARE

## 2020-07-30 VITALS
OXYGEN SATURATION: 96 % | WEIGHT: 190 LBS | BODY MASS INDEX: 38.3 KG/M2 | DIASTOLIC BLOOD PRESSURE: 88 MMHG | SYSTOLIC BLOOD PRESSURE: 170 MMHG | RESPIRATION RATE: 18 BRPM | HEIGHT: 59 IN | TEMPERATURE: 97.8 F

## 2020-07-30 LAB
ANTI-NUCLEAR ANTIBODY (ANA): NEGATIVE
HEPATITIS B SURFACE ANTIGEN INTERPRETATION: NORMAL

## 2020-07-30 PROCEDURE — 1123F ACP DISCUSS/DSCN MKR DOCD: CPT | Performed by: CLINICAL NURSE SPECIALIST

## 2020-07-30 PROCEDURE — G8400 PT W/DXA NO RESULTS DOC: HCPCS | Performed by: CLINICAL NURSE SPECIALIST

## 2020-07-30 PROCEDURE — 4040F PNEUMOC VAC/ADMIN/RCVD: CPT | Performed by: CLINICAL NURSE SPECIALIST

## 2020-07-30 PROCEDURE — 1036F TOBACCO NON-USER: CPT | Performed by: CLINICAL NURSE SPECIALIST

## 2020-07-30 PROCEDURE — G8427 DOCREV CUR MEDS BY ELIG CLIN: HCPCS | Performed by: CLINICAL NURSE SPECIALIST

## 2020-07-30 PROCEDURE — G8417 CALC BMI ABV UP PARAM F/U: HCPCS | Performed by: CLINICAL NURSE SPECIALIST

## 2020-07-30 PROCEDURE — 1090F PRES/ABSN URINE INCON ASSESS: CPT | Performed by: CLINICAL NURSE SPECIALIST

## 2020-07-30 PROCEDURE — 99204 OFFICE O/P NEW MOD 45 MIN: CPT | Performed by: CLINICAL NURSE SPECIALIST

## 2020-07-30 NOTE — PROGRESS NOTES
Reshma Connor is a 78 y.o. right handed woman    Past Medical History:     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, treated with surgery and chemo    Cerebral artery occlusion with cerebral infarction ([facility] [address]     possibly TIA    Chronic back pain     Constipation     Depression     Elevated sed rate 12/11/2017    hx of    GERD (gastroesophageal reflux disease)     Hemorrhoids     history of    Hyperlipidemia     diet controlled    Hypertension     Left foot pain     dropped a Kettle on foot    Macular degeneration     Poor vision     right eye / had bleeding behind eye, is receiving \"shots\" at this time  / 3/22/2019    Prosthetic eye globe     left eye implant;    Seasonal allergies     Skipped heart beats     on metoprolol; managed by Dr. Spike Jane Sleep apnea     uses cpap at times     Thyroid disease        Past Surgical History:     Past Surgical History:   Procedure Laterality Date   [address]    open?  COLECTOMY  1974    COLONOSCOPY  04/26/2012    and egd    COLONOSCOPY  05/30/2014    COLONOSCOPY  01/08/2015    CYST REMOVAL  years ago    upper gum    EYE SURGERY  years ago    left eye removal,  has artificial eye    HYSTERECTOMY  1974    JOINT REPLACEMENT Left 2010/2012    x 2-knee    TONSILLECTOMY      TUMOR EXCISION      from arm, fatty    UPPER GASTROINTESTINAL ENDOSCOPY  05/30/2014    with biopsy    UPPER GASTROINTESTINAL ENDOSCOPY  01/08/2015    UPPER GASTROINTESTINAL ENDOSCOPY N/A 4/1/2019    EGD ESOPHAGOGASTRODUODENOSCOPY  ++IODINE ALLERGY++ and bx performed by Kunal Macario MD at [facility] ENDOSCOPY       Allergies:     Iodine; Codeine; Oxycodone-aspirin; Amoxicillin-pot clavulanate; Darvocet [propoxyphene n-acetaminophen]; Eggs or egg-derived products;  Influenza vaccines; Percodan [oxycodone-aspirin]; and Percodan [oxycodone-aspirin]    Medications: Prior to Admission medications    Medication Sig Start Date End Date Taking?  Authorizing Provider   amLODIPine (NORVASC) 5 MG tablet Take 1 tablet by mouth daily 5/28/20  Yes Daja Boyd DO   pantoprazole (PROTONIX) 40 MG tablet Take 1 tablet by mouth every morning (before breakfast) 5/4/20  Yes Daja Boyd DO   Handicap Placard MISC by Does not apply route Patient cannot walk 200 ft without stopping Duration: Lifetime 2/24/20  Yes Daja Boyd DO   sucralfate (CARAFATE) 1 GM tablet Take 1 tablet by mouth 4 times daily 2/20/20  Yes Marylou De Guzman PA-C   magnesium oxide (MAG-OX) 400 (241.3 Mg) MG TABS tablet Take 1 tablet by mouth daily 2/20/20  Yes Marylou De Guzman PA-C   linaCLOtide (LINZESS) 72 MCG CAPS capsule Take 1 capsule by mouth every morning (before breakfast) 1/15/20  Yes Daja Boyd DO   cetirizine (ZYRTEC) 5 MG tablet Take 1 tablet by mouth nightly 1/3/20  Yes Porter Suarez, DO   fluticasone (FLONASE) 50 MCG/ACT nasal spray 2 sprays by Each Nostril route daily 12/22/19  Yes Alanan Stoddard MD   aspirin 81 MG tablet Take 81 mg by mouth daily    Yes Historical Provider, MD   levothyroxine (SYNTHROID) 88 MCG tablet Take 1 tablet by mouth daily 1/3/19  Yes Daja Boyd DO   Multiple Vitamins-Minerals (PRESERVISION AREDS 2) CAPS Take 1 capsule by mouth 2 times daily    Yes Historical Provider, MD   nystatin (MYCOSTATIN) 946055 UNIT/GM powder Apply 3 times daily under the breasts  Patient not taking: Reported on 7/30/2020 4/20/20   Julia Boyd DO   citalopram (CELEXA) 20 MG tablet Take 1 tablet by mouth daily  Patient not taking: Reported on 7/30/2020 3/19/20   Julia Boyd DO       Social History:     Social History     Tobacco Use    Smoking status: Never Smoker    Smokeless tobacco: Never Used   Substance Use Topics    Alcohol use: No     Frequency: Never     Binge frequency: Never    Drug use: Never       Review of Systems:     No chest pain or palpitations  No SOB  No vertigo, lightheadedness or loss of consciousness  No incontinence of bowels or bladder  No itching or bruising appreciated    ROS otherwise negative     Family History:     Family History   Problem Relation Age of Onset   [de-identified] Stroke Father     Hypertension Father     Heart Disease Father     Stroke Mother     Hypertension Mother     Other Mother         aneurysm    Heart Disease Mother     Hypertension Brother     Cancer Brother     Breast Cancer Sister     Hypertension Sister     Cancer Sister         breast ca    Heart Disease Sister     Hypertension Brother         parkinson    Heart Disease Brother     Cancer Brother     Cancer Brother     Cancer Brother     Cancer Brother     Heart Disease Brother     Cancer Brother     Cancer Sister     High Blood Pressure Daughter     Breast Cancer Daughter     High Blood Pressure Daughter         History of Present Illness:     Patient was referred for dizziness    Previously evaluated in hospital for spinning sensations and falls   MRI obtained and was unrevealing for acute events    She informs me that she is being worked up for RA by a new rheumatologist (per her PCP's notes indicate she is seeing Dr Belen Llanes)     For me, today    She denies spinning sensations  She states she feels lightheaded only when rising   If she sits or is supine she is \"fine\"    As soon as she gets up, she feels as if she is going to pass out    She tells me she never passed out \"completely\" -- she remains \"lucid\"     No GTC activity - no tongue biting     If she feels lightheaded, she gets supine and feels better, if she does not feel better, she goes to ED    She was recently told to wear a heart monitor but states the pads irritate her and it was recently removed   appt with cardiology next week     No focal arm or leg weakness    C/o back pains and calf pains    Niece is Melina August     Objective:   BP (!) 170/88 (Site: Right Upper Arm, Position: Sitting, Cuff Size: Medium Adult)   Temp 97.8 °F (36.6 °C) (Oral)   Resp 18   Ht 4' 11\" (1.499 m)   Wt 190 lb (86.2 kg)   LMP 07/24/2014   SpO2 96%   BMI 38.38 kg/m²      General appearance: alert, appears stated age and cooperative  Head: Normocephalic, without obvious abnormality, atraumatic  Neck: limited ROM  Extremities: no cyanosis or edema  Pulses: 2+ and symmetric  Skin: no rashes or lesions     Mental Status: Alert, oriented to person place and year     Speech: clear  Language: appropriate     Cranial Nerves:  I: smell    II: visual acuity     II: visual fields False left eye   II: pupils Right pupil minimally reactive    III,VII: ptosis Left    III,IV,VI: extraocular muscles  Right eye moves well without nystagmus    V: mastication    V: facial light touch sensation  Normal   V,VII: corneal reflex  Present   VII: facial muscle function - upper     VII: facial muscle function - lower Normal   VIII: hearing Normal   IX: soft palate elevation  Normal   IX,X: gag reflex Present   XI: trapezius strength  5/5   XI: sternocleidomastoid strength 5/5   XI: neck extension strength  5/5   XII: tongue strength  Normal     Motor:  5/5 throughout  Normal bulk and tone   No drift    Sensory:  LT normal  PP stocking glove to mid shin   Vibration markedly decreased in ankles     Coordination:   FN, FFM and ZORA normal    Gait:  Cane in right hand  Slow and steady     DTR:   No reflexes    No Babinski  No Case's     Laboratory/Radiology:     CBC with Differential:    Lab Results   Component Value Date    WBC 7.2 07/29/2020    RBC 4.32 07/29/2020    HGB 12.5 07/29/2020    HCT 37.8 07/29/2020     07/29/2020    MCV 87.5 07/29/2020    MCH 28.9 07/29/2020    MCHC 33.1 07/29/2020    RDW 13.6 07/29/2020    SEGSPCT 58 03/11/2014    LYMPHOPCT 35.3 05/21/2020    MONOPCT 8.3 05/21/2020    EOSPCT 1 10/07/2010    BASOPCT 0.4 05/21/2020    MONOSABS 0.58 05/21/2020    LYMPHSABS 2.46 05/21/2020    EOSABS 0.07 05/21/2020    BASOSABS 0.03 05/21/2020     CMP:    Lab Results   Component Value Date     05/22/2020    K 4.3 05/22/2020     05/22/2020    CO2 25 05/22/2020    BUN 16 05/22/2020    CREATININE 0.7 07/29/2020    GFRAA >60 07/29/2020    LABGLOM >60 07/29/2020    GLUCOSE 94 05/22/2020    GLUCOSE 109 05/04/2012    PROT 6.8 01/02/2020    LABALBU 3.7 01/02/2020    LABALBU 4.2 03/24/2012    CALCIUM 8.8 05/22/2020    BILITOT 0.3 01/02/2020    ALKPHOS 69 01/02/2020    AST 17 07/29/2020    ALT 9 07/29/2020      Ref. Range 7/29/2020 11:35   Sed Rate Latest Ref Range: 0 - 20 mm/Hr 38 (H)   Rheumatoid Factor Latest Ref Range: 0 - 13 IU/mL >650 (H)       MRI Brain May 2020  1. No acute intracranial abnormality. 2. No mass, mass effect, edema or hemorrhage. I independently reviewed the labs and imaging studies today. Assessment:     Patient c/o recurrent spells of lightheadedness and unsteadiness   Few falls also reported     On exam, I note marked sensory difficulties in her legs -- I cannot, at this time distinguish between a PN or a LS radiculopathy      Her unsteadiness is most likely from this and her orthostasis may be autonomic related - but may also be related to her sensory difficulties    She does not describe spinning or s/s consistent with vertigo for me     ANA MARIA - noncompliant with Cpap    Colon cancer    Rheumatoid arthritis vs other rheum disease -- currently being investigated       Plan: Will start with EMG of her legs   Depending on findings, she may need further lab work correlated with rheumatology or imaging of her lumber spine     Marianne Lan  10:03 AM  7/30/2020    I spent 45 minutes with the patient, with 50% or more counseling them on their diagnosis, diagnostic workup and treatment options.

## 2020-07-31 ENCOUNTER — TELEPHONE (OUTPATIENT)
Dept: NEUROLOGY | Age: 80
End: 2020-07-31

## 2020-07-31 NOTE — TELEPHONE ENCOUNTER
[place] [id number] No authorization needed for EMG - #[id number]    Scheduled 8/6 @ [place] 9 am w/arrival time 8:30 am    Scheduled by Margarette Peñaloza.  Confirmed with patient  Electronically signed by Gavino Sanders on 7/31/20 at 8:11 AM EDT

## 2020-08-02 LAB — HLA B27: NEGATIVE

## 2020-08-03 LAB
COMMENT: NORMAL
REPORT: NORMAL

## 2020-08-04 NOTE — PROGRESS NOTES
Patient was in today for help hooking up event monitor. Patient given instructions and verbalized understanding.

## 2020-08-05 ENCOUNTER — OFFICE VISIT (OUTPATIENT)
Dept: FAMILY MEDICINE CLINIC | Age: 80
End: 2020-08-05
Payer: MEDICARE

## 2020-08-05 VITALS
HEIGHT: 59 IN | WEIGHT: 188 LBS | OXYGEN SATURATION: 94 % | TEMPERATURE: 96.7 F | BODY MASS INDEX: 37.9 KG/M2 | HEART RATE: 77 BPM | SYSTOLIC BLOOD PRESSURE: 132 MMHG | RESPIRATION RATE: 16 BRPM | DIASTOLIC BLOOD PRESSURE: 78 MMHG

## 2020-08-05 PROCEDURE — 4040F PNEUMOC VAC/ADMIN/RCVD: CPT | Performed by: FAMILY MEDICINE

## 2020-08-05 PROCEDURE — 1090F PRES/ABSN URINE INCON ASSESS: CPT | Performed by: FAMILY MEDICINE

## 2020-08-05 PROCEDURE — G8427 DOCREV CUR MEDS BY ELIG CLIN: HCPCS | Performed by: FAMILY MEDICINE

## 2020-08-05 PROCEDURE — 1036F TOBACCO NON-USER: CPT | Performed by: FAMILY MEDICINE

## 2020-08-05 PROCEDURE — 1123F ACP DISCUSS/DSCN MKR DOCD: CPT | Performed by: FAMILY MEDICINE

## 2020-08-05 PROCEDURE — G8400 PT W/DXA NO RESULTS DOC: HCPCS | Performed by: FAMILY MEDICINE

## 2020-08-05 PROCEDURE — 99214 OFFICE O/P EST MOD 30 MIN: CPT | Performed by: FAMILY MEDICINE

## 2020-08-05 PROCEDURE — G8417 CALC BMI ABV UP PARAM F/U: HCPCS | Performed by: FAMILY MEDICINE

## 2020-08-05 RX ORDER — LEVOTHYROXINE SODIUM 88 UG/1
88 TABLET ORAL DAILY
Qty: 90 TABLET | Refills: 1 | Status: SHIPPED
Start: 2020-08-05 | End: 2021-02-08 | Stop reason: SDUPTHER

## 2020-08-05 NOTE — PROGRESS NOTES
8/5/2020    Chief Complaint   Patient presents with    Hypertension     has several doctor appointments with several complaints         Malcolm Peters is a 78 y.o. patient that presents today for:    Hypertension: Here for follow up chronic hypertension. Patient is  compliant with lifestyle modifications like exercise and adherence to a low salt diet. Patient is  well controlled. Denies chest pain, diaphoresis, dyspnea, dyspnea on exertion, peripheral edema, palpitations, HA, visual issues. Is currently on Amlodipine 5 mg. Taking as prescribed. No adverse effects. Hypothyroidism:  Patient is here today to follow up chronic hypothyroidism. This problem is longstanding. This is   generally controlled on current medication regimen, Levothyroxine 88 mcg. Takes medication as directed and tolerates well. Denies fatigue, changes in skin, hair or nails, unintentional weight loss or gain. Most recent labs reviewed with patient and are not remarkable. TSH:    Lab Results   Component Value Date    TSH 1.720 07/29/2020      Anxiety: Patient complains of evaluation of anxiety disorder. She has the following anxiety symptoms: irritable, racing thoughts. Onset of symptoms was approximately several years ago, gradually worsening since she has been off of lorazepam. She denies current suicidal and homicidal ideation. Family history significant for no psychiatric illness. Possible organic causes contributing are: endocrine/metabolic, neuro, nutritional. Risk factors: previous episode of depression Previous treatment includes Ativan and Lexapro and individual therapy. She complains of the following side effects from the treatment: none. Feeling well otherwise, no complaints. Patient's past medical, surgical, social and/or family history reviewed, updated in chart, and are non-contributory (unless otherwise stated). Medications and allergies also reviewed and updated in chart.     ROS Unless otherwise specified  Review of Systems - General ROS: negative for - chills, fatigue, fever, night sweats, sleep disturbance, weight gain or weight loss  Psychological ROS: negative for - anxiety, behavioral disorder, depression, hallucinations, irritability, memory difficulties, mood swings, sleep disturbances or suicidal ideation  ENT ROS: negative for - epistaxis, headaches, hearing change, nasal congestion, nasal discharge, nasal polyps, sinus pain, tinnitus, vertigo or visual changes  Hematological and Lymphatic ROS: negative for - bleeding problems, blood clots, fatigue or swollen lymph nodes  Respiratory ROS: negative for - cough, orthopnea, shortness of breath, sputum changes, tachypnea or wheezing  Cardiovascular ROS: negative for - chest pain, dyspnea on exertion, irregular heartbeat, loss of consciousness, palpitations, paroxysmal nocturnal dyspnea or rapid heart rate  Gastrointestinal ROS: negative for - abdominal pain, blood in stools, change in bowel habits, constipation, diarrhea, gas/bloating, heartburn or nausea/vomiting  Musculoskeletal ROS: negative for - joint pain, joint stiffness, joint swelling or muscle, back pain, bowel or bladder incontinence  Neurological ROS: negative for - behavioral changes, confusion, dizziness, headaches, memory loss, numbness/tingling, seizures or speech problems, weakness  Dermatological ROS: negative for - dry skin, mole changes, nail changes, pruritus, rash or skin lesion changes    Physical Exam  Wt Readings from Last 3 Encounters:   08/05/20 188 lb (85.3 kg)   07/30/20 190 lb (86.2 kg)   06/11/20 190 lb (86.2 kg)     Temp Readings from Last 3 Encounters:   08/05/20 96.7 °F (35.9 °C)   07/30/20 97.8 °F (36.6 °C) (Oral)   06/11/20 97.8 °F (36.6 °C)     BP Readings from Last 3 Encounters:   08/05/20 132/78   07/30/20 (!) 170/88   06/11/20 130/60     Pulse Readings from Last 3 Encounters:   08/05/20 77   06/11/20 76   05/26/20 72       General appearance: alert, well appearing, and in no distress, oriented to person, place, and time and normal appearing weight. CVS exam: normal rate, regular rhythm, normal S1, S2, no murmurs, rubs, clicks or gallops. Radial pulses 2+ bilateral.  PT/DP pulse 2+ bilat. No C/C/E    Chest: clear to auscultation, no wheezes, rales or rhonchi, symmetric air entry. Abdomen: Soft, non-tender, non-distended, positive BS in all 4 quadrants    Extremities:Dorsalis pedis pulses palpated bilaterally, no clubbing, cyanosis, edema or erythema     SKIN: warm, dry, no lesions, jaundice, petechiae, pallor, cyanosis, ecchymosis    NEURO: gross motor exam normal by observation, gait normal    Mental status - alert, oriented to person, place, and time, normal mood, behavior, speech, dress, motor activity, and thought processes      Assessment/Plan  Angy Ramsay was seen today for hypertension. Diagnoses and all orders for this visit:    Acquired hypothyroidism  -     TSH without Reflex; Future    Essential hypertension  -     CBC; Future  -     Comprehensive Metabolic Panel; Future    Hyperlipidemia LDL goal <100  -     Lipid Panel; Future    Other orders  -     levothyroxine (SYNTHROID) 88 MCG tablet; Take 1 tablet by mouth daily        Return in about 3 months (around 11/5/2020), or if symptoms worsen or fail to improve. Call or go to ED immediately if symptoms worsen or persist.    Educational materials and/or home exercises printed for patient's review and were included in patient instructions on his/her After Visit Summary and given to patient at the end of visit. Counseled regarding above diagnosis, including possible risks and complications,  especially if left uncontrolled. Counseled regarding the possible side effects, risks, benefits and alternatives to treatment; patient and/or guardian verbalizes understanding, agrees, feels comfortable with and wishes to proceed with above treatment plan.     Advised patient to call with any new medication issues, and read all Rx info from pharmacy to assure aware of all possible risks and side effects of medication before taking. Reviewed age and gender appropriate health screening exams and vaccinations. Advised patient regarding importance of keeping up with recommended health maintenance and to schedule as soon as possible if overdue, as this is important in assessing for undiagnosed pathology, especially cancer, as well as protecting against potentially harmful/life threatening disease. Patient and/or guardian verbalizes understanding and agrees with above counseling, assessment and plan. All questions answered.       Daja Peña, DO

## 2020-08-06 ENCOUNTER — OFFICE VISIT (OUTPATIENT)
Dept: NEUROLOGY | Age: 80
End: 2020-08-06
Payer: MEDICARE

## 2020-08-06 VITALS
BODY MASS INDEX: 38.3 KG/M2 | TEMPERATURE: 97.8 F | WEIGHT: 190 LBS | SYSTOLIC BLOOD PRESSURE: 110 MMHG | HEIGHT: 59 IN | DIASTOLIC BLOOD PRESSURE: 60 MMHG

## 2020-08-06 PROBLEM — M54.17 LUMBOSACRAL RADICULOPATHY: Chronic | Status: ACTIVE | Noted: 2020-08-06

## 2020-08-06 PROBLEM — G62.9 PERIPHERAL NEUROPATHY: Chronic | Status: ACTIVE | Noted: 2020-08-06

## 2020-08-06 PROCEDURE — G8427 DOCREV CUR MEDS BY ELIG CLIN: HCPCS | Performed by: PSYCHIATRY & NEUROLOGY

## 2020-08-06 PROCEDURE — 4040F PNEUMOC VAC/ADMIN/RCVD: CPT | Performed by: PSYCHIATRY & NEUROLOGY

## 2020-08-06 PROCEDURE — G8417 CALC BMI ABV UP PARAM F/U: HCPCS | Performed by: PSYCHIATRY & NEUROLOGY

## 2020-08-06 PROCEDURE — 99212 OFFICE O/P EST SF 10 MIN: CPT | Performed by: PSYCHIATRY & NEUROLOGY

## 2020-08-06 PROCEDURE — G8400 PT W/DXA NO RESULTS DOC: HCPCS | Performed by: PSYCHIATRY & NEUROLOGY

## 2020-08-06 PROCEDURE — 95911 NRV CNDJ TEST 9-10 STUDIES: CPT | Performed by: PSYCHIATRY & NEUROLOGY

## 2020-08-06 PROCEDURE — 1036F TOBACCO NON-USER: CPT | Performed by: PSYCHIATRY & NEUROLOGY

## 2020-08-06 PROCEDURE — 1123F ACP DISCUSS/DSCN MKR DOCD: CPT | Performed by: PSYCHIATRY & NEUROLOGY

## 2020-08-06 PROCEDURE — 1090F PRES/ABSN URINE INCON ASSESS: CPT | Performed by: PSYCHIATRY & NEUROLOGY

## 2020-08-06 PROCEDURE — 95886 MUSC TEST DONE W/N TEST COMP: CPT | Performed by: PSYCHIATRY & NEUROLOGY

## 2020-08-06 NOTE — PROGRESS NOTES
[address] [facility]  Electrodiagnostic Laboratory  *Accredited by the [facility] with exemplary status  [address]  Phone: [phone]  Fax: [phone]    Referring Provider: BETINA Jimenes*  Primary Care Physician: Katty Rodney DO  Patient Name: Ana Rosario  Patient YOB: 1940  Gender: female  BMI: Body mass index is 38.38 kg/m². Blood pressure 110/60, temperature 97.8 °F (36.6 °C), height 4' 11\" (1.499 m), weight 190 lb (86.2 kg), last menstrual period 07/24/2014, not currently breastfeeding. 8/6/2020    Description of clinical problem: referred for bilat. LE study, L/S radiculopathy, peripheral neuropathy. C/o low back and calf pains; peripheral neuropathy of legs on exam. Hx left knee replacement. Pain Yes   ; Numbness/tingling  Yes; Weakness  No       Brief physical exam:   Sensory deficit Yes; Weakness No; Atrophy  No; Reflex abnormality Yes  Limited neurologic exam performed of the bilateral lower extremity shows grossly intact motor power, somewhat effort-related without foot drop and normal motor tone. DTRs: Absent. No ankle clonus, plantar responses are flexor. Sensory exam to light touch and sharp stick testing shows a stocking distribution of sensory loss to the mid-thigh levels approximately; absent vibratory sensation at the ankles. Study Limitations:  Pain, obesity      Full Name: Trinidad Mata Gender: Female  MRN: [id number] YOB: 1940  Location[de-identified] [facility] (07)      Visit Date: 8/6/2020 08:42  Age: 78 Years 1 Months Old  Examining Physician: Dr. Collins Fenton   Referring Physician: BETINA De Paz-CNS  Technician: Brittani Greene   Height: 4 feet 11 inch  Weight: 190 lbs  Notes: L-S Radiculopathy, BLE study; chronic peripheral neuropathy. Motor NCS      Nerve / Sites Latency Amplitude Amp. 1-2 Distance Lat Diff Velocity Temp.    ms mV % cm ms m/s °C   R Peroneal - EDB      Ankle 3.85 3.0 100 8   31.2      Pop fossa 10.00 2.7 88.6 31 6.15 50 31.2   L Peroneal - EDB      Ankle 3.23 3.5 100 8   31.2      Pop fossa 9.74 2.0 57.2 32 6.51 49 31.2   R Tibial - AH      Ankle 3.91 4.8 100 8   31.1      Pop fossa 12.34 0.1 3.02 42 8.44 50 31.1   L Tibial - AH      Ankle 4.48 3.7 100 8   31.1      Pop fossa 12.92 0.4 11.9 42 8.44 50 31.1               Sensory NCS      Nerve / Sites Onset Lat Peak Lat PP Amp Amp. 1-2 Distance Velocity Temp.    ms ms µV % cm m/s °C   R Sural - Ankle (Calf)      Calf NR NR NR NR 14 NR 31.2   L Sural - Ankle (Calf)      Calf NR NR NR NR 14 NR 31.2   R Superficial peroneal - Ankle      Lat leg NR NR NR NR 10 NR 31.2   L Superficial peroneal - Ankle      Lat leg NR NR NR NR 10 NR 31.2              F  Wave      Nerve F Lat M Lat F-M Lat    ms ms ms   R Peroneal - EDB 49.0 4.0 45.0   R Tibial - AH 50.3 4.1 46.1   L Tibial - AH 51.9 4.2 47.8   L Peroneal - EDB 48.5 3.8 44.8       H Reflex      Nerve Lat Hmax    ms   R Tibial - Soleus NR   L Tibial - Soleus NR       EMG         EMG Summary Table     Spontaneous MUAP Recruitment   Muscle IA Fib PSW Fasc H.F. Amp Dur. PPP Pattern   R. Tibialis anterior Normal None None None None 1+ 1+ None Decr   R. Gastroc (Medial head) Normal None None None None 2+ 2+ None Decr   R. Flexor digitorum longus Normal None None None None 1+ 1+ None Decr   R. Extensor digitorum brevis Normal None None None None 2+ 2+ None Decr   R. Abductor hallucis Normal None None None None Giant 2+ None Decr   R. Vastus lateralis Normal None None None None 2+ 2+ None Decr   L. Tibialis anterior Normal None None None None 2+ 2+ None Decr   L. Gastroc (Medial head) Normal None None None None 2+ 2+ None Decr   L. Flexor digitorum longus Normal None None None None 2+ 2+ None Decr   L. Extensor digitorum brev Normal None None None None 2+ 2+ None Decr   L. Vastus lateralis Normal None None None None 2+ 2+ None Decr   R. Gluteus medius Normal None None None None 1+ 1+ None Decr   R.  Upper Sacral paraspinals Normal None None None None -- -- -- --       Summary of Findings:   Nerve conduction studies:   · The following nerve conduction studies were abnormal:   · The right and left sural and superficial peroneal sensory nerve conduction show no response. · The left tibial motor nerve conduction (recording from the abductor hallucis muscle) is normal in distal latency with reduced amplitude and normal motor nerve conduction velocity. · The bilateral H-reflexes show no response. · All other nerve conduction studies listed in the table above were normal in latency, amplitude and conduction velocity. Needle EMG:   · Needle EMG was performed using a concentric needle. · The following abnormalities were seen on needle EMG: Enlarged motor unit potentials in duration and amplitude with decreased recruitment (loss of motor units) in primarily a right and left lumbosacral ((I.e., L5/S1 myotomal distribution) of mostly a moderately severe degree.  All other muscles tested, as listed in the table above demonstrated normal amplitude, duration, phases and recruitment and no active denervation signs were seen. Diagnostic Interpretation: This study was abnormal.   Electrodiagnosis: NCS/EMG examination performed of the right and left lower extremities shows electrodiagnostic evidence of the followin. A chronic right and left lumbosacral radiculopathy (I.e., primarily L5/S1 myotomal distribution) with chronic denervation of mostly a moderately severe degree. 2.  A chronic sensorimotor peripheral neuropathy with chronic denervation of moderately severe degree underlying the above. Previous study: None recent    Technologist:   Physician: Jairo Ward MD    Nerve conduction studies and electromyography were performed according to our laboratory policies and procedures which can be provided upon request. All abnormal values are identified in the table.  Laboratory normal values can also be provided upon request.       Cc: Lamar Osuna., APRN*  Daja Peña, DO

## 2020-08-07 ENCOUNTER — TELEPHONE (OUTPATIENT)
Dept: NEUROLOGY | Age: 80
End: 2020-08-07

## 2020-08-07 ENCOUNTER — OFFICE VISIT (OUTPATIENT)
Dept: NON INVASIVE DIAGNOSTICS | Age: 80
End: 2020-08-07
Payer: MEDICARE

## 2020-08-07 VITALS
SYSTOLIC BLOOD PRESSURE: 148 MMHG | WEIGHT: 190 LBS | HEART RATE: 64 BPM | DIASTOLIC BLOOD PRESSURE: 70 MMHG | HEIGHT: 59 IN | BODY MASS INDEX: 38.3 KG/M2

## 2020-08-07 PROCEDURE — 93000 ELECTROCARDIOGRAM COMPLETE: CPT | Performed by: INTERNAL MEDICINE

## 2020-08-07 PROCEDURE — 99215 OFFICE O/P EST HI 40 MIN: CPT | Performed by: INTERNAL MEDICINE

## 2020-08-07 NOTE — PROGRESS NOTES
chest pain, palpitation  Respiratory: neg Cough  and sob  Gastrointestinal: No hematemesis or recent changes in bowel habits  Genitourinary: No hematuria, urgency or frequency  Musculoskeletal: No muscular weakness or joint swelling   Neurologic / Psychiatric: No incoordination or convulsions  Allergic / Immunologic/ Lymphatic / Endocrine: No anemia or bleeding tendency      Prior to Visit Medications    Medication Sig Taking?  Authorizing Provider   levothyroxine (SYNTHROID) 88 MCG tablet Take 1 tablet by mouth daily Yes Daja Boyd DO   amLODIPine (NORVASC) 5 MG tablet Take 1 tablet by mouth daily Yes Daja Boyd DO   pantoprazole (PROTONIX) 40 MG tablet Take 1 tablet by mouth every morning (before breakfast) Yes Daja Boyd DO   nystatin (MYCOSTATIN) 718710 UNIT/GM powder Apply 3 times daily under the breasts Yes Daja Boyd DO   Handicap Placard MISC by Does not apply route Patient cannot walk 200 ft without stopping Duration: Lifetime Yes Daja Boyd DO   sucralfate (CARAFATE) 1 GM tablet Take 1 tablet by mouth 4 times daily Yes Marylou De Guzman PA-C   magnesium oxide (MAG-OX) 400 (241.3 Mg) MG TABS tablet Take 1 tablet by mouth daily Yes Marylou De Guzman PA-C   linaCLOtide (LINZESS) 72 MCG CAPS capsule Take 1 capsule by mouth every morning (before breakfast) Yes Daja Boyd DO   cetirizine (ZYRTEC) 5 MG tablet Take 1 tablet by mouth nightly Yes India Gibson, DO   fluticasone (FLONASE) 50 MCG/ACT nasal spray 2 sprays by Each Nostril route daily Yes Elena Fowler MD   aspirin 81 MG tablet Take 81 mg by mouth daily  Yes Historical Provider, MD   Multiple Vitamins-Minerals (PRESERVISION AREDS 2) CAPS Take 1 capsule by mouth 2 times daily  Yes Historical Provider, MD       Social History     Tobacco Use    Smoking status: Never Smoker    Smokeless tobacco: Never Used   Substance Use Topics    Alcohol use: No     Frequency: Never     Binge frequency: Never    Drug use: Never      EK20: NSR, rate: 64 bpm    TTE 2019   Normal left ventricle size and systolic function. Ejection fraction is visually estimated at 60-65%. Grade II diastolic dysfunction. No regional wall motion abnormalities seen. Normal left ventricular wall thickness. Mildly enlarged right ventricle. Right ventricle global systolic function is normal.   The left atrium is mildly dilated. Agitated saline injected for shunt evaluation. No definitive evidence of atrial level shunt, but visualization was   limited. Lexiscan Stress test 2015  IMPRESSION:       No evidence of stress-induced left ventricular myocardial   ischemia. ASSESSMENT/PLAN:    1. Dizziness    2. Essential hypertension    3. Obstructive sleep apnea syndrome, non-compliant with CPAP therapy    4. Obesity (BMI 35.0-39.9 without comorbidity)      1. Dizziness  Unclear if she had syncope. - She was asked to wear a 30 day MCOT, in which she wore only 2 days  - Denies any episodes of syncope or dizziness  LVEF normal 2019  No angina symptoms  - will continue to monitor for reoccurrence, will schedule for a ILR to monitor for arrhythmia given abrupt syncopal episodes that have occurred a few times  - follow up in 6 months     2. ANA MARIA  CPAP compliance recommended    3. HTN  BP normotensive per pt  Agree with stopping diuretic, cont Norvasc and Avapro  Keep BP log    4. TIA  On Asa    5. Obesity  BMI: Body mass index is 38.38 kg/m².    Weight loss recommended      Johan Calderon M.D, [facility] [place], [facility] Electrophysiology  [address] [facility]

## 2020-08-07 NOTE — TELEPHONE ENCOUNTER
Pt called requesting results of EMG testing from yesterday. Pt also asking when she should follow up with provider. Forwarding to Gregory Maxwell DNP, for advisement upon his return 8/10. Pt would also like Noe Peñaloza to know she had a lot of x-rays done on 7/29 through [facility] ([facility]).   Electronically signed by Ambar Angela on 8/7/20 at 8:59 AM EDT

## 2020-08-10 ENCOUNTER — TELEPHONE (OUTPATIENT)
Dept: NON INVASIVE DIAGNOSTICS | Age: 80
End: 2020-08-10

## 2020-08-10 ENCOUNTER — TELEPHONE (OUTPATIENT)
Dept: NEUROLOGY | Age: 80
End: 2020-08-10

## 2020-08-10 NOTE — TELEPHONE ENCOUNTER
Spoke with patient let her know [address] [place] [address] would like her to schedule ILR implt. She was agreeable and would like to schedule in September. Once schedule is open I would call patient to schedule. She verbalized understanding.

## 2020-08-10 NOTE — TELEPHONE ENCOUNTER
Coleman CHOW @ [phone] No Authorization needed MRI Lumbar Spine @ [facility] - [id number]    Scheduled for 8/20 @ 5:45 pm w/arrval of 5 pm @ [facility]    Confirmed w/patient date/time/instructions  . Understood  Electronically signed by Escobar Mcdowell on 8/10/20 at 9:38 AM EDT

## 2020-08-10 NOTE — TELEPHONE ENCOUNTER
----- Message from Lubna Muir MD sent at 8/7/2020  1:14 PM EDT -----  Please schedule for ILR insertion.  [employer]

## 2020-08-18 ENCOUNTER — HOSPITAL ENCOUNTER (EMERGENCY)
Age: 80
Discharge: HOME | End: 2020-08-18
Attending: EMERGENCY MEDICINE
Payer: MEDICARE

## 2020-08-18 ENCOUNTER — TELEPHONE (OUTPATIENT)
Dept: ADMINISTRATIVE | Age: 80
End: 2020-08-18

## 2020-08-18 ENCOUNTER — APPOINTMENT (OUTPATIENT)
Dept: CT IMAGING | Age: 80
End: 2020-08-18
Payer: MEDICARE

## 2020-08-18 VITALS
OXYGEN SATURATION: 96 % | HEIGHT: 59 IN | RESPIRATION RATE: 14 BRPM | DIASTOLIC BLOOD PRESSURE: 68 MMHG | BODY MASS INDEX: 38.3 KG/M2 | TEMPERATURE: 97.7 F | HEART RATE: 59 BPM | SYSTOLIC BLOOD PRESSURE: 176 MMHG | WEIGHT: 190 LBS

## 2020-08-18 DIAGNOSIS — J40 BRONCHITIS: Primary | ICD-10-CM

## 2020-08-18 DIAGNOSIS — R53.1 GENERAL WEAKNESS: ICD-10-CM

## 2020-08-18 LAB
ALBUMIN SERPL-MCNC: 4 G/DL (ref 3.5–5.2)
ALP BLD-CCNC: 82 U/L (ref 35–104)
ALT SERPL-CCNC: 11 U/L (ref 0–32)
ANION GAP SERPL CALCULATED.3IONS-SCNC: 11 MMOL/L (ref 7–16)
APTT: 33 SEC (ref 24.5–35.1)
AST SERPL-CCNC: 15 U/L (ref 0–31)
BACTERIA: NORMAL /HPF
BASOPHILS ABSOLUTE: 0.04 E9/L (ref 0–0.2)
BASOPHILS RELATIVE PERCENT: 0.6 % (ref 0–2)
BILIRUB SERPL-MCNC: 0.3 MG/DL (ref 0–1.2)
BILIRUBIN URINE: NEGATIVE
BLOOD, URINE: NEGATIVE
BUN BLDV-MCNC: 19 MG/DL (ref 8–23)
CALCIUM SERPL-MCNC: 9 MG/DL (ref 8.6–10.2)
CHLORIDE BLD-SCNC: 102 MMOL/L (ref 98–107)
CLARITY: CLEAR
CO2: 25 MMOL/L (ref 22–29)
COLOR: YELLOW
CREAT SERPL-MCNC: 0.7 MG/DL (ref 0.5–1)
EOSINOPHILS ABSOLUTE: 0.13 E9/L (ref 0.05–0.5)
EOSINOPHILS RELATIVE PERCENT: 2.1 % (ref 0–6)
GFR AFRICAN AMERICAN: >60
GFR NON-AFRICAN AMERICAN: >60 ML/MIN/1.73
GLUCOSE BLD-MCNC: 96 MG/DL (ref 74–99)
GLUCOSE URINE: NEGATIVE MG/DL
HCT VFR BLD CALC: 37 % (ref 34–48)
HEMOGLOBIN: 12.3 G/DL (ref 11.5–15.5)
IMMATURE GRANULOCYTES #: 0.02 E9/L
IMMATURE GRANULOCYTES %: 0.3 % (ref 0–5)
INR BLD: 0.9
KETONES, URINE: NEGATIVE MG/DL
LACTIC ACID: 1.1 MMOL/L (ref 0.5–2.2)
LEUKOCYTE ESTERASE, URINE: ABNORMAL
LYMPHOCYTES ABSOLUTE: 2.14 E9/L (ref 1.5–4)
LYMPHOCYTES RELATIVE PERCENT: 34.3 % (ref 20–42)
MCH RBC QN AUTO: 28.9 PG (ref 26–35)
MCHC RBC AUTO-ENTMCNC: 33.2 % (ref 32–34.5)
MCV RBC AUTO: 87.1 FL (ref 80–99.9)
MONOCYTES ABSOLUTE: 0.51 E9/L (ref 0.1–0.95)
MONOCYTES RELATIVE PERCENT: 8.2 % (ref 2–12)
NEUTROPHILS ABSOLUTE: 3.4 E9/L (ref 1.8–7.3)
NEUTROPHILS RELATIVE PERCENT: 54.5 % (ref 43–80)
NITRITE, URINE: NEGATIVE
PDW BLD-RTO: 14 FL (ref 11.5–15)
PH UA: 7 (ref 5–9)
PLATELET # BLD: 228 E9/L (ref 130–450)
PMV BLD AUTO: 9.8 FL (ref 7–12)
POTASSIUM SERPL-SCNC: 4.1 MMOL/L (ref 3.5–5)
PROTEIN UA: NEGATIVE MG/DL
PROTHROMBIN TIME: 10.9 SEC (ref 9.3–12.4)
RBC # BLD: 4.25 E12/L (ref 3.5–5.5)
RBC UA: NORMAL /HPF (ref 0–2)
SODIUM BLD-SCNC: 138 MMOL/L (ref 132–146)
SPECIFIC GRAVITY UA: 1.01 (ref 1–1.03)
TOTAL PROTEIN: 7.1 G/DL (ref 6.4–8.3)
TROPONIN: <0.01 NG/ML (ref 0–0.03)
UROBILINOGEN, URINE: 0.2 E.U./DL
WBC # BLD: 6.2 E9/L (ref 4.5–11.5)
WBC UA: NORMAL /HPF (ref 0–5)

## 2020-08-18 PROCEDURE — 81001 URINALYSIS AUTO W/SCOPE: CPT

## 2020-08-18 PROCEDURE — 99283 EMERGENCY DEPT VISIT LOW MDM: CPT

## 2020-08-18 PROCEDURE — 83605 ASSAY OF LACTIC ACID: CPT

## 2020-08-18 PROCEDURE — 85025 COMPLETE CBC W/AUTO DIFF WBC: CPT

## 2020-08-18 PROCEDURE — 85610 PROTHROMBIN TIME: CPT

## 2020-08-18 PROCEDURE — U0003 INFECTIOUS AGENT DETECTION BY NUCLEIC ACID (DNA OR RNA); SEVERE ACUTE RESPIRATORY SYNDROME CORONAVIRUS 2 (SARS-COV-2) (CORONAVIRUS DISEASE [COVID-19]), AMPLIFIED PROBE TECHNIQUE, MAKING USE OF HIGH THROUGHPUT TECHNOLOGIES AS DESCRIBED BY CMS-2020-01-R: HCPCS

## 2020-08-18 PROCEDURE — 71250 CT THORAX DX C-: CPT

## 2020-08-18 PROCEDURE — 99282 EMERGENCY DEPT VISIT SF MDM: CPT

## 2020-08-18 PROCEDURE — 2580000003 HC RX 258: Performed by: EMERGENCY MEDICINE

## 2020-08-18 PROCEDURE — 93005 ELECTROCARDIOGRAM TRACING: CPT | Performed by: EMERGENCY MEDICINE

## 2020-08-18 PROCEDURE — 85730 THROMBOPLASTIN TIME PARTIAL: CPT

## 2020-08-18 PROCEDURE — 80053 COMPREHEN METABOLIC PANEL: CPT

## 2020-08-18 PROCEDURE — 84484 ASSAY OF TROPONIN QUANT: CPT

## 2020-08-18 RX ORDER — 0.9 % SODIUM CHLORIDE 0.9 %
250 INTRAVENOUS SOLUTION INTRAVENOUS ONCE
Status: COMPLETED | OUTPATIENT
Start: 2020-08-18 | End: 2020-08-18

## 2020-08-18 RX ORDER — ONDANSETRON 4 MG/1
4 TABLET, FILM COATED ORAL EVERY 8 HOURS PRN
Qty: 12 TABLET | Refills: 0 | Status: SHIPPED | OUTPATIENT
Start: 2020-08-18 | End: 2020-08-23

## 2020-08-18 RX ORDER — GUAIFENESIN/DEXTROMETHORPHAN 100-10MG/5
5 SYRUP ORAL 3 TIMES DAILY PRN
Qty: 120 ML | Refills: 0 | Status: SHIPPED | OUTPATIENT
Start: 2020-08-18 | End: 2020-08-28

## 2020-08-18 RX ORDER — LORAZEPAM 0.5 MG/1
0.5 TABLET ORAL PRN
COMMUNITY
Start: 2020-08-12 | End: 2020-09-15

## 2020-08-18 RX ADMIN — SODIUM CHLORIDE 250 ML: 9 INJECTION, SOLUTION INTRAVENOUS at 12:47

## 2020-08-18 NOTE — ED PROVIDER NOTES
HPI:  8/18/20,   Time: 2:04 PM EDT         Stella Yang is a 78 y.o. female presenting to the ED for generalized weakness and fatigue  HPI:  8/18/20,   Time: 2:04 PM EDT         Stella Yang is a 78 y.o. female presenting to the ED for generalized weakness and fatigue, beginning more than 1 day ago. The complaint has been intermittent, moderate in severity, and worsened by nothing. Patient states she has had a cough just does not feel well has generalized fatigue. . No vomiting    ROS:   Pertinent positives and negatives are stated within HPI, all other systems reviewed and are negative.  --------------------------------------------- PAST HISTORY ---------------------------------------------  Past Medical History:  has a past medical history of Amaurosis fugax of right eye, Anxiety, Arthritis, CA - cancer of bowel, Cerebral artery occlusion with cerebral infarction ([facility] [address] Chronic back pain, Constipation, Depression, Elevated sed rate, GERD (gastroesophageal reflux disease), Hemorrhoids, Hyperlipidemia, Hypertension, Left foot pain, Lumbosacral radiculopathy, Macular degeneration, Peripheral neuropathy, Poor vision, Prosthetic eye globe, Seasonal allergies, Skipped heart beats, Sleep apnea, and Thyroid disease. Past Surgical History:  has a past surgical history that includes Eye surgery (years ago); colectomy (1974); Colonoscopy (04/26/2012); Cholecystectomy (1985); Hysterectomy (1974); tumor excision; Upper gastrointestinal endoscopy (05/30/2014); Colonoscopy (05/30/2014); Tonsillectomy; joint replacement (Left, 2010/2012); Upper gastrointestinal endoscopy (01/08/2015); Colonoscopy (01/08/2015); cyst removal (years ago); and Upper gastrointestinal endoscopy (N/A, 4/1/2019). Social History:  reports that she has never smoked. She has never used smokeless tobacco. She reports that she does not drink alcohol or use drugs.     Family History: family history includes Breast Cancer in her daughter and sister; Cancer in her brother, brother, brother, brother, brother, brother, sister, and sister; Heart Disease in her brother, brother, father, mother, and sister; High Blood Pressure in her daughter and daughter; Hypertension in her brother, brother, father, mother, and sister; Other in her mother; Stroke in her father and mother. The patients home medications have been reviewed. Allergies: Iodine; Codeine; Oxycodone-aspirin; Amoxicillin-pot clavulanate; Darvocet [propoxyphene n-acetaminophen]; Eggs or egg-derived products;  Influenza vaccines; Percodan [oxycodone-aspirin]; and Percodan [oxycodone-aspirin]    -------------------------------------------------- RESULTS -------------------------------------------------  All laboratory and radiology results have been personally reviewed by myself   LABS:  Results for orders placed or performed during the hospital encounter of 08/18/20   CBC Auto Differential   Result Value Ref Range    WBC 6.2 4.5 - 11.5 E9/L    RBC 4.25 3.50 - 5.50 E12/L    Hemoglobin 12.3 11.5 - 15.5 g/dL    Hematocrit 37.0 34.0 - 48.0 %    MCV 87.1 80.0 - 99.9 fL    MCH 28.9 26.0 - 35.0 pg    MCHC 33.2 32.0 - 34.5 %    RDW 14.0 11.5 - 15.0 fL    Platelets 738 459 - 268 E9/L    MPV 9.8 7.0 - 12.0 fL    Neutrophils % 54.5 43.0 - 80.0 %    Immature Granulocytes % 0.3 0.0 - 5.0 %    Lymphocytes % 34.3 20.0 - 42.0 %    Monocytes % 8.2 2.0 - 12.0 %    Eosinophils % 2.1 0.0 - 6.0 %    Basophils % 0.6 0.0 - 2.0 %    Neutrophils Absolute 3.40 1.80 - 7.30 E9/L    Immature Granulocytes # 0.02 E9/L    Lymphocytes Absolute 2.14 1.50 - 4.00 E9/L    Monocytes Absolute 0.51 0.10 - 0.95 E9/L    Eosinophils Absolute 0.13 0.05 - 0.50 E9/L    Basophils Absolute 0.04 0.00 - 0.20 E9/L   Comprehensive Metabolic Panel   Result Value Ref Range    Sodium 138 132 - 146 mmol/L    Potassium 4.1 3.5 - 5.0 mmol/L    Chloride 102 98 - 107 mmol/L    CO2 25 22 - 29 mmol/L    Anion Gap 11 7 - 16 mmol/L    Glucose 96 74 - 99 mg/dL BUN 19 8 - 23 mg/dL    CREATININE 0.7 0.5 - 1.0 mg/dL    GFR Non-African American >60 >=60 mL/min/1.73    GFR African American >60     Calcium 9.0 8.6 - 10.2 mg/dL    Total Protein 7.1 6.4 - 8.3 g/dL    Alb 4.0 3.5 - 5.2 g/dL    Total Bilirubin 0.3 0.0 - 1.2 mg/dL    Alkaline Phosphatase 82 35 - 104 U/L    ALT 11 0 - 32 U/L    AST 15 0 - 31 U/L   Urinalysis   Result Value Ref Range    Color, UA Yellow Straw/Yellow    Clarity, UA Clear Clear    Glucose, Ur Negative Negative mg/dL    Bilirubin Urine Negative Negative    Ketones, Urine Negative Negative mg/dL    Specific Gravity, UA 1.010 1.005 - 1.030    Blood, Urine Negative Negative    pH, UA 7.0 5.0 - 9.0    Protein, UA Negative Negative mg/dL    Urobilinogen, Urine 0.2 <2.0 E.U./dL    Nitrite, Urine Negative Negative    Leukocyte Esterase, Urine SMALL (A) Negative   Lactic Acid, Plasma   Result Value Ref Range    Lactic Acid 1.1 0.5 - 2.2 mmol/L   Troponin   Result Value Ref Range    Troponin <0.01 0.00 - 0.03 ng/mL   Protime-INR   Result Value Ref Range    Protime 10.9 9.3 - 12.4 sec    INR 0.9    APTT   Result Value Ref Range    aPTT 33.0 24.5 - 35.1 sec   Microscopic Urinalysis   Result Value Ref Range    WBC, UA NONE 0 - 5 /HPF    RBC, UA NONE 0 - 2 /HPF    Bacteria, UA NONE SEEN None Seen /HPF   Covid-19 Ambulatory   Result Value Ref Range    Source NP swab    EKG 12 Lead   Result Value Ref Range    Ventricular Rate 54 BPM    Atrial Rate 54 BPM    P-R Interval 186 ms    QRS Duration 72 ms    Q-T Interval 460 ms    QTc Calculation (Bazett) 436 ms    P Axis 29 degrees    R Axis -9 degrees    T Axis 24 degrees       RADIOLOGY:  Interpreted by Radiologist.  CT CHEST WO CONTRAST   Final Result      1. No acute cardiopulmonary abnormality. 2. Minimal bilateral pulmonary scarring. 3. Evidence of stable mild mesenteric panniculitis in the the   partially visualized upper abdomen.           ------------------------- NURSING NOTES AND VITALS REVIEWED ---------------------------   The nursing notes within the ED encounter and vital signs as below have been reviewed. BP (!) 176/68   Pulse 59   Temp 97.7 °F (36.5 °C) (Oral)   Resp 14   Ht 4' 11\" (1.499 m)   Wt 190 lb (86.2 kg)   LMP 07/24/2014   SpO2 96%   BMI 38.38 kg/m²   Oxygen Saturation Interpretation: Normal      ---------------------------------------------------PHYSICAL EXAM--------------------------------------      Constitutional/General: Alert and oriented x3, well appearing, non toxic in NAD  Head: NC/AT  Eyes: PERRL, EOMI  Mouth: Oropharynx clear, handling secretions, no trismus  Neck: Supple, full ROM, no meningeal signs  Pulmonary: Lungs clear to auscultation bilaterally, no wheezes, rales, or rhonchi. Not in respiratory distress  Cardiovascular:  Regular rate and rhythm, no murmurs, gallops, or rubs. 2+ distal pulses  Abdomen: Soft, non tender, non distended,   Extremities: Moves all extremities x 4. Warm and well perfused  Skin: warm and dry without rash  Neurologic: GCS 15, moving all extremities no focal neurological deficits  Psych: Normal Affect      ------------------------------ ED COURSE/MEDICAL DECISION MAKING----------------------  Medications   0.9 % sodium chloride bolus (0 mLs Intravenous Stopped 8/18/20 1408)         Medical Decision Making:    Patient will have a generalized work-up including CBC CMP EKG and chest x-ray    Counseling: The emergency provider has spoken with the patient and discussed todays results, in addition to providing specific details for the plan of care and counseling regarding the diagnosis and prognosis. Questions are answered at this time and they are agreeable with the plan.      --------------------------------- IMPRESSION AND DISPOSITION ---------------------------------    IMPRESSION  1. Bronchitis    2.  General weakness        DISPOSITION  Disposition: Discharge to home  Patient condition is fair            ROS:   Pertinent positives and products;  Influenza vaccines; Percodan [oxycodone-aspirin]; and Percodan [oxycodone-aspirin]    -------------------------------------------------- RESULTS -------------------------------------------------  All laboratory and radiology results have been personally reviewed by myself   LABS:  Results for orders placed or performed during the hospital encounter of 08/18/20   CBC Auto Differential   Result Value Ref Range    WBC 6.2 4.5 - 11.5 E9/L    RBC 4.25 3.50 - 5.50 E12/L    Hemoglobin 12.3 11.5 - 15.5 g/dL    Hematocrit 37.0 34.0 - 48.0 %    MCV 87.1 80.0 - 99.9 fL    MCH 28.9 26.0 - 35.0 pg    MCHC 33.2 32.0 - 34.5 %    RDW 14.0 11.5 - 15.0 fL    Platelets 568 955 - 422 E9/L    MPV 9.8 7.0 - 12.0 fL    Neutrophils % 54.5 43.0 - 80.0 %    Immature Granulocytes % 0.3 0.0 - 5.0 %    Lymphocytes % 34.3 20.0 - 42.0 %    Monocytes % 8.2 2.0 - 12.0 %    Eosinophils % 2.1 0.0 - 6.0 %    Basophils % 0.6 0.0 - 2.0 %    Neutrophils Absolute 3.40 1.80 - 7.30 E9/L    Immature Granulocytes # 0.02 E9/L    Lymphocytes Absolute 2.14 1.50 - 4.00 E9/L    Monocytes Absolute 0.51 0.10 - 0.95 E9/L    Eosinophils Absolute 0.13 0.05 - 0.50 E9/L    Basophils Absolute 0.04 0.00 - 0.20 E9/L   Comprehensive Metabolic Panel   Result Value Ref Range    Sodium 138 132 - 146 mmol/L    Potassium 4.1 3.5 - 5.0 mmol/L    Chloride 102 98 - 107 mmol/L    CO2 25 22 - 29 mmol/L    Anion Gap 11 7 - 16 mmol/L    Glucose 96 74 - 99 mg/dL    BUN 19 8 - 23 mg/dL    CREATININE 0.7 0.5 - 1.0 mg/dL    GFR Non-African American >60 >=60 mL/min/1.73    GFR African American >60     Calcium 9.0 8.6 - 10.2 mg/dL    Total Protein 7.1 6.4 - 8.3 g/dL    Alb 4.0 3.5 - 5.2 g/dL    Total Bilirubin 0.3 0.0 - 1.2 mg/dL    Alkaline Phosphatase 82 35 - 104 U/L    ALT 11 0 - 32 U/L    AST 15 0 - 31 U/L   Urinalysis   Result Value Ref Range    Color, UA Yellow Straw/Yellow    Clarity, UA Clear Clear    Glucose, Ur Negative Negative mg/dL    Bilirubin Urine Negative Negative Ketones, Urine Negative Negative mg/dL    Specific Gravity, UA 1.010 1.005 - 1.030    Blood, Urine Negative Negative    pH, UA 7.0 5.0 - 9.0    Protein, UA Negative Negative mg/dL    Urobilinogen, Urine 0.2 <2.0 E.U./dL    Nitrite, Urine Negative Negative    Leukocyte Esterase, Urine SMALL (A) Negative   Lactic Acid, Plasma   Result Value Ref Range    Lactic Acid 1.1 0.5 - 2.2 mmol/L   Troponin   Result Value Ref Range    Troponin <0.01 0.00 - 0.03 ng/mL   Protime-INR   Result Value Ref Range    Protime 10.9 9.3 - 12.4 sec    INR 0.9    APTT   Result Value Ref Range    aPTT 33.0 24.5 - 35.1 sec   Microscopic Urinalysis   Result Value Ref Range    WBC, UA NONE 0 - 5 /HPF    RBC, UA NONE 0 - 2 /HPF    Bacteria, UA NONE SEEN None Seen /HPF   Covid-19 Ambulatory   Result Value Ref Range    Source NP swab    EKG 12 Lead   Result Value Ref Range    Ventricular Rate 54 BPM    Atrial Rate 54 BPM    P-R Interval 186 ms    QRS Duration 72 ms    Q-T Interval 460 ms    QTc Calculation (Bazett) 436 ms    P Axis 29 degrees    R Axis -9 degrees    T Axis 24 degrees       RADIOLOGY:  Interpreted by Radiologist.  CT CHEST WO CONTRAST   Final Result      1. No acute cardiopulmonary abnormality. 2. Minimal bilateral pulmonary scarring. 3. Evidence of stable mild mesenteric panniculitis in the the   partially visualized upper abdomen. ------------------------- NURSING NOTES AND VITALS REVIEWED ---------------------------   The nursing notes within the ED encounter and vital signs as below have been reviewed.    BP (!) 176/68   Pulse 59   Temp 97.7 °F (36.5 °C) (Oral)   Resp 14   Ht 4' 11\" (1.499 m)   Wt 190 lb (86.2 kg)   LMP 07/24/2014   SpO2 96%   BMI 38.38 kg/m²   Oxygen Saturation Interpretation: Normal      ---------------------------------------------------PHYSICAL EXAM--------------------------------------      Constitutional/General: Alert and oriented x3, well appearing, non toxic in NAD  Head: NC/AT  Eyes: PERRL, EOMI  Mouth: Oropharynx clear, handling secretions, no trismus  Neck: Supple, full ROM, no meningeal signs  Pulmonary: Lungs clear to auscultation bilaterally, no wheezes, rales, or rhonchi. Not in respiratory distress  Cardiovascular:  Regular rate and rhythm, no murmurs, gallops, or rubs. 2+ distal pulses  Abdomen: Soft, non tender, non distended,   Extremities: Moves all extremities x 4. Warm and well perfused  Skin: warm and dry without rash  Neurologic: GCS 15,  Psych: Normal Affect      ------------------------------ ED COURSE/MEDICAL DECISION MAKING----------------------  Medications   0.9 % sodium chloride bolus (0 mLs Intravenous Stopped 8/18/20 1408)         Medical Decision Making:    CBC and electrolytes were normal; CT of the chest revealed no acute process; outpatient cover testing was sent. Consult was placed to family medicine Case was discussed with Dr. Jennifer Mullen family medicine resident. She agrees she agrees patient may go home with close follow-up with Dr. Alan Sanders and also the patient has appointment to see cardiology tomorrow    Counseling: Advised patient to follow-up with cardiology as she has scheduled appointment tomorrow and also to follow-up with Dr. Prabha Duggan in 2 to 3 days. Patient is advised if symptoms worsen to return to ER. The emergency provider has spoken with the patient and discussed todays results, in addition to providing specific details for the plan of care and counseling regarding the diagnosis and prognosis. Questions are answered at this time and they are agreeable with the plan.      --------------------------------- IMPRESSION AND DISPOSITION ---------------------------------    IMPRESSION  1. Bronchitis    2.  General weakness        DISPOSITION  Disposition: Discharge to home  Patient condition is fair                  Mohit Smith MD  08/18/20 1821

## 2020-08-19 ENCOUNTER — CARE COORDINATION (OUTPATIENT)
Dept: CARE COORDINATION | Age: 80
End: 2020-08-19

## 2020-08-19 LAB
EKG ATRIAL RATE: 54 BPM
EKG P AXIS: 29 DEGREES
EKG P-R INTERVAL: 186 MS
EKG Q-T INTERVAL: 460 MS
EKG QRS DURATION: 72 MS
EKG QTC CALCULATION (BAZETT): 436 MS
EKG R AXIS: -9 DEGREES
EKG T AXIS: 24 DEGREES
EKG VENTRICULAR RATE: 54 BPM
SARS-COV-2: NOT DETECTED
SOURCE: NORMAL

## 2020-08-19 PROCEDURE — 93010 ELECTROCARDIOGRAM REPORT: CPT | Performed by: INTERNAL MEDICINE

## 2020-08-20 ENCOUNTER — HOSPITAL ENCOUNTER (OUTPATIENT)
Dept: MRI IMAGING | Age: 80
Discharge: HOME | End: 2020-08-22
Payer: MEDICARE

## 2020-08-20 PROCEDURE — 72148 MRI LUMBAR SPINE W/O DYE: CPT

## 2020-08-21 ENCOUNTER — TELEPHONE (OUTPATIENT)
Dept: ADMINISTRATIVE | Age: 80
End: 2020-08-21

## 2020-08-21 NOTE — TELEPHONE ENCOUNTER
Pt calling in stated that she was in [facility] and that they did a COVID-19 test on her and she wants to know what the results was, pt stated that she called the hospital and they told her to call her pcp, pt stated that alls she wants is to know the results, please advise.

## 2020-08-26 ENCOUNTER — TELEPHONE (OUTPATIENT)
Dept: NON INVASIVE DIAGNOSTICS | Age: 80
End: 2020-08-26

## 2020-08-26 NOTE — TELEPHONE ENCOUNTER
Called patient to scheduled ILR implt, patient refused to schedule, states she would like to talk to Dr. Bee Stephens first.

## 2020-08-28 ENCOUNTER — TELEPHONE (OUTPATIENT)
Dept: NEUROLOGY | Age: 80
End: 2020-08-28

## 2020-08-28 NOTE — TELEPHONE ENCOUNTER
Patient knew she had spinal stenosis, states PT does not help her and will not have done. Her concern is the burning in her feet. Please Advise.

## 2020-08-31 ENCOUNTER — OFFICE VISIT (OUTPATIENT)
Dept: CARDIOLOGY CLINIC | Age: 80
End: 2020-08-31
Payer: MEDICARE

## 2020-08-31 VITALS
SYSTOLIC BLOOD PRESSURE: 110 MMHG | WEIGHT: 190 LBS | DIASTOLIC BLOOD PRESSURE: 70 MMHG | RESPIRATION RATE: 14 BRPM | HEIGHT: 59 IN | BODY MASS INDEX: 38.3 KG/M2 | HEART RATE: 55 BPM

## 2020-08-31 PROCEDURE — 99213 OFFICE O/P EST LOW 20 MIN: CPT | Performed by: INTERNAL MEDICINE

## 2020-08-31 PROCEDURE — 93000 ELECTROCARDIOGRAM COMPLETE: CPT | Performed by: INTERNAL MEDICINE

## 2020-08-31 RX ORDER — PREDNISONE 1 MG/1
TABLET ORAL
Status: ON HOLD | COMMUNITY
Start: 2020-08-24 | End: 2021-02-03 | Stop reason: HOSPADM

## 2020-08-31 NOTE — PROGRESS NOTES
OUTPATIENT CARDIOLOGY FOLLOW-UP    Name: Lisa Garcia    Age: 78 y.o. Primary Care Physician: Cherilyn Brunner, DO    Date of Service: 8/31/2020    Chief Complaint:   Chief Complaint   Patient presents with    Follow-Up from Hospital        Interim History:   Here for follow-up of bradycardia. She had refused to wear a 30-day monitor saying it did not work. I referred her to EP. They recommended loop recorder which she is considering. She feels tired. She is non-compliant intermittently with her CPAP. She denies shortness of breath. She complains of occasional sharp left-sided chest pains that last seconds and are nonexertional.    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, treated with surgery and chemo    Cerebral artery occlusion with cerebral infarction ([address]     possibly TIA    Chronic back pain     Constipation     Depression     Elevated sed rate 12/11/2017    hx of    GERD (gastroesophageal reflux disease)     Hemorrhoids     history of    Hyperlipidemia     diet controlled    Hypertension     Left foot pain     dropped a Kettle on foot    Lumbosacral radiculopathy 8/6/2020    Macular degeneration     Peripheral neuropathy 8/6/2020    Poor vision     right eye / had bleeding behind eye, is receiving \"shots\" at this time  / 3/22/2019    Prosthetic eye globe     left eye implant;    Seasonal allergies     Skipped heart beats     on metoprolol; managed by Dr. Abby Perry Sleep apnea     uses cpap at times     Thyroid disease        Past Surgical History:  Past Surgical History:   Procedure Laterality Date   [address]    open?     COLECTOMY  1974    COLONOSCOPY  04/26/2012    and egd    COLONOSCOPY  05/30/2014    COLONOSCOPY  01/08/2015    CYST REMOVAL  years ago    upper gum    EYE SURGERY  years ago    left eye removal,  has artificial eye    HYSTERECTOMY  1974    JOINT REPLACEMENT Left 2010/2012    x 2-knee    TONSILLECTOMY      TUMOR EXCISION      from arm, fatty    UPPER GASTROINTESTINAL ENDOSCOPY  05/30/2014    with biopsy    UPPER GASTROINTESTINAL ENDOSCOPY  01/08/2015    UPPER GASTROINTESTINAL ENDOSCOPY N/A 4/1/2019    EGD ESOPHAGOGASTRODUODENOSCOPY  ++IODINE ALLERGY++ and bx performed by Breanna Caldera MD at [facility]       Family History:  Family History   Problem Relation Age of Onset   [place] Stroke Father     Hypertension Father     Heart Disease Father     Stroke Mother     Hypertension Mother     Other Mother         aneurysm    Heart Disease Mother     Hypertension Brother     Cancer Brother     Breast Cancer Sister     Hypertension Sister     Cancer Sister         breast ca    Heart Disease Sister     Hypertension Brother         parkinson    Heart Disease Brother     Cancer Brother     Cancer Brother     Cancer Brother     Cancer Brother     Heart Disease Brother     Cancer Brother     Cancer Sister     High Blood Pressure Daughter     Breast Cancer Daughter     High Blood Pressure Daughter        Social History:  Social History     Tobacco Use    Smoking status: Never Smoker    Smokeless tobacco: Never Used   Substance Use Topics    Alcohol use: No     Frequency: Never     Binge frequency: Never    Drug use: Never        Allergies:   Allergies   Allergen Reactions    Iodine Shortness Of Breath, Swelling and Rash    Codeine      Patient cannot remember reaction    Oxycodone-Aspirin      unknown    Amoxicillin-Pot Clavulanate Nausea And Vomiting    Darvocet [Propoxyphene N-Acetaminophen] Nausea And Vomiting    Eggs Or Egg-Derived Products Nausea And Vomiting    Influenza Vaccines Nausea And Vomiting    Percodan [Oxycodone-Aspirin] Nausea And Vomiting and Other (See Comments)     sick    Percodan [Oxycodone-Aspirin] Nausea And Vomiting       Current Medications:    Current Outpatient Medications:     LORazepam (ATIVAN) 0.5 MG tablet, Take 0.5 mg by mouth as needed. , Disp: , Rfl:     levothyroxine (SYNTHROID) 88 MCG tablet, Take 1 tablet by mouth daily, Disp: 90 tablet, Rfl: 1    amLODIPine (NORVASC) 5 MG tablet, Take 1 tablet by mouth daily, Disp: 90 tablet, Rfl: 1    pantoprazole (PROTONIX) 40 MG tablet, Take 1 tablet by mouth every morning (before breakfast), Disp: 90 tablet, Rfl: 3    Handicap Placard MISC, by Does not apply route Patient cannot walk 200 ft without stopping Duration: Lifetime, Disp: 1 each, Rfl: 0    sucralfate (CARAFATE) 1 GM tablet, Take 1 tablet by mouth 4 times daily, Disp: 120 tablet, Rfl: 3    magnesium oxide (MAG-OX) 400 (241.3 Mg) MG TABS tablet, Take 1 tablet by mouth daily, Disp: 30 tablet, Rfl: 3    linaCLOtide (LINZESS) 72 MCG CAPS capsule, Take 1 capsule by mouth every morning (before breakfast), Disp: 30 capsule, Rfl: 0    cetirizine (ZYRTEC) 5 MG tablet, Take 1 tablet by mouth nightly, Disp: 30 tablet, Rfl: 0    fluticasone (FLONASE) 50 MCG/ACT nasal spray, 2 sprays by Each Nostril route daily, Disp: 1 Bottle, Rfl: 3    aspirin 81 MG tablet, Take 81 mg by mouth 2 times daily , Disp: , Rfl:     Multiple Vitamins-Minerals (PRESERVISION AREDS 2) CAPS, Take 1 capsule by mouth 2 times daily , Disp: , Rfl:     methotrexate (RHEUMATREX) 2.5 MG chemo tablet, TAKE 5 TABLETS BY MOUTH ONCE A WEEK, Disp: , Rfl:     predniSONE (DELTASONE) 5 MG tablet, take 3 tablets (15mg) by mouth daily for a week  then 2 tablets (10mg) daily for a week  then 1 tablet (5mg) daily to continue, Disp: , Rfl:     nystatin (MYCOSTATIN) 567336 UNIT/GM powder, Apply 3 times daily under the breasts (Patient not taking: Reported on 8/31/2020), Disp: 60 g, Rfl: 0    Physical Exam:  /70   Pulse 55   Resp 14   Ht 4' 11\" (1.499 m)   Wt 190 lb (86.2 kg)   LMP 07/24/2014   BMI 38.38 kg/m²   Wt Readings from Last 3 Encounters:   08/31/20 190 lb (86.2 kg)   08/18/20 190 lb (86.2 kg)   08/07/20 190 lb (86.2 kg)     Appearance: Overweight female, awake, alert and oriented x 3, no acute respiratory distress  Skin: Intact, no rash  Head: Normocephalic, atraumatic  Eyes: Prosthetic left eye  ENMT: No pharyngeal erythema, MMM, no rhinorrhea  Neck: Supple, no elevated JVP, no carotid bruits  Lungs: Clear to auscultation bilaterally. No wheezes, rales, or rhonchi.   Cardiac: Regular rate and rhythm, +S1S2, no murmurs apparent  Abdomen: Soft, nontender, +bowel sounds  Extremities: Moves all extremities x 4, 1+ lower extremity edema  Neurologic: No focal motor deficits apparent, normal mood and affect, alert and oriented x 3  Peripheral Pulses: Intact posterior tibial pulses bilaterally    Laboratory Tests:  Lab Results   Component Value Date    CREATININE 0.7 08/18/2020    BUN 19 08/18/2020     08/18/2020    K 4.1 08/18/2020     08/18/2020    CO2 25 08/18/2020     Lab Results   Component Value Date    MG 2.3 05/21/2020     Lab Results   Component Value Date    WBC 6.2 08/18/2020    HGB 12.3 08/18/2020    HCT 37.0 08/18/2020    MCV 87.1 08/18/2020     08/18/2020     Lab Results   Component Value Date    ALT 11 08/18/2020    AST 15 08/18/2020    ALKPHOS 82 08/18/2020    BILITOT 0.3 08/18/2020     Lab Results   Component Value Date    CKTOTAL 92 05/21/2020    CKMB 3.8 04/25/2015    TROPONINI <0.01 08/18/2020    TROPONINI <0.01 01/02/2020    TROPONINI <0.01 12/19/2019     Lab Results   Component Value Date    INR 0.9 08/18/2020    INR 0.9 12/29/2019    INR 0.9 12/19/2019    PROTIME 10.9 08/18/2020    PROTIME 10.5 12/29/2019    PROTIME 10.4 12/19/2019     Lab Results   Component Value Date    TSH 1.720 07/29/2020     Lab Results   Component Value Date    LABA1C 5.6 03/07/2019     No results found for: EAG  Lab Results   Component Value Date    CHOL 194 05/04/2020    CHOL 224 (H) 03/07/2019    CHOL 204 (H) 02/20/2018     Lab Results   Component Value Date    TRIG 132 05/04/2020    TRIG 80 03/07/2019    TRIG 152 (H) 02/20/2018     Lab Results   Component Value Date    HDL 59 05/04/2020    HDL 65 03/07/2019    HDL 54 02/20/2018     Lab Results   Component Value Date    LDLCALC 109 (H) 05/04/2020    LDLCALC 143 (H) 03/07/2019    LDLCALC 120 (H) 02/20/2018     Lab Results   Component Value Date    LABVLDL 26 05/04/2020    LABVLDL 16 03/07/2019    LABVLDL 30 02/20/2018     No results found for: CHOLHDLRATIO  No results for input(s): PROBNP in the last 72 hours. Cardiac Tests:  ECG: Sinus bradycardia 55 bpm.  Normal axis normal intervals. No ST or T wave changes. Low voltage. Echocardiogram:   1. 2D echocardiogram December 2019      Normal left ventricle size and systolic function.   Ejection fraction is visually estimated at 60-65%.  Grade II diastolic dysfunction.   No regional wall motion abnormalities seen.   Normal left ventricular wall thickness.   Mildly enlarged right ventricle.   Right ventricle global systolic function is normal.   The left atrium is mildly dilated.   Agitated saline injected for shunt evaluation.   No definitive evidence of atrial level shunt, but visualization was   limited.   Mildly enlarged right atrium size.   Mild aortic regurgitation is noted. Stress test:   Pharm stress 2015   There is a normal distribution of left ventricular myocardial    activity on both the LexiScan stress and rest SPECT myocardial    perfusion images. Gated study shows normal left ventricular wall    motion and myocardial thickening during systole. Resting left    ventricular ejection fraction is calculated at 88%                   Impression    IMPRESSION:         No evidence of stress-induced left ventricular myocardial    ischemia.       Cardiac catheterization: NA    Orders Placed This Encounter   Procedures    EKG 12 lead        Requested Prescriptions      No prescriptions requested or ordered in this encounter ASSESSMENT / PLAN:  1. Dizziness  2. Questionable syncopal episode  3. Sinus bradycardia. No evidence of sinus node dysfunction, interventricular conduction delay or AV block on EKG. unable to complete 30-day monitor as recommended  4. Recurrent falls  5. Vertigo  6. TIA  7. Hypertension, currently well controlled  8. Possible supine hypertension / orthostatic hypotension  9. Obstructive sleep apnea, noncompliant with CPAP  10. Comorbid disease: Hyperlipidemia, left eye prosthesis, macular degeneration, colon cancer/colectomy, anxiety/depression, mastoiditis, shoulder fracture after fall, GERD, amaurosis fugax 2017, osteoarthritis    Recommendations:  Currently stable from a cardiac standpoint, blood pressure well controlled on present regimen. Overall symptoms seem to have improved. · Continue amlodipine for blood pressure  · Remain off diuretics for now  · Sleep with head of bed elevated  · Encourage compliance with CPAP  · Encourage follow-up with EP and proceed with implantable loop recorder, discussed at length with patient and all questions answered  · Aggressive risk factor modification  · Follow-up in 6 months    The patient's current medication list, allergies, problem list and results of all previously ordered testing were reviewed at today's visit.     Abbie Huynh MD   [facility]) Cardiology

## 2020-09-03 ENCOUNTER — TELEPHONE (OUTPATIENT)
Dept: NEUROLOGY | Age: 80
End: 2020-09-03

## 2020-09-03 NOTE — TELEPHONE ENCOUNTER
Returned call to patient, Message left to call office.   Electronically signed by Colt Serrato on 9/3/20 at 11:17 AM EDT

## 2020-09-15 RX ORDER — LORAZEPAM 0.5 MG/1
0.5 TABLET ORAL DAILY PRN
Qty: 30 TABLET | Refills: 2 | Status: SHIPPED
Start: 2020-09-15 | End: 2021-01-14 | Stop reason: SDUPTHER

## 2020-09-18 ENCOUNTER — TELEPHONE (OUTPATIENT)
Dept: FAMILY MEDICINE CLINIC | Age: 80
End: 2020-09-18

## 2020-09-18 RX ORDER — TIZANIDINE 2 MG/1
2 TABLET ORAL NIGHTLY PRN
Qty: 10 TABLET | Refills: 0 | Status: ON HOLD | OUTPATIENT
Start: 2020-09-18 | End: 2021-02-03 | Stop reason: HOSPADM

## 2020-10-01 ENCOUNTER — OFFICE VISIT (OUTPATIENT)
Dept: FAMILY MEDICINE CLINIC | Age: 80
End: 2020-10-01
Payer: MEDICARE

## 2020-10-01 VITALS
SYSTOLIC BLOOD PRESSURE: 138 MMHG | TEMPERATURE: 97.8 F | DIASTOLIC BLOOD PRESSURE: 60 MMHG | WEIGHT: 193 LBS | OXYGEN SATURATION: 97 % | HEART RATE: 70 BPM | BODY MASS INDEX: 38.91 KG/M2 | RESPIRATION RATE: 16 BRPM | HEIGHT: 59 IN

## 2020-10-01 PROCEDURE — G8417 CALC BMI ABV UP PARAM F/U: HCPCS | Performed by: FAMILY MEDICINE

## 2020-10-01 PROCEDURE — 1090F PRES/ABSN URINE INCON ASSESS: CPT | Performed by: FAMILY MEDICINE

## 2020-10-01 PROCEDURE — G8400 PT W/DXA NO RESULTS DOC: HCPCS | Performed by: FAMILY MEDICINE

## 2020-10-01 PROCEDURE — 1123F ACP DISCUSS/DSCN MKR DOCD: CPT | Performed by: FAMILY MEDICINE

## 2020-10-01 PROCEDURE — 99213 OFFICE O/P EST LOW 20 MIN: CPT | Performed by: FAMILY MEDICINE

## 2020-10-01 PROCEDURE — 4040F PNEUMOC VAC/ADMIN/RCVD: CPT | Performed by: FAMILY MEDICINE

## 2020-10-01 PROCEDURE — G8484 FLU IMMUNIZE NO ADMIN: HCPCS | Performed by: FAMILY MEDICINE

## 2020-10-01 PROCEDURE — G8427 DOCREV CUR MEDS BY ELIG CLIN: HCPCS | Performed by: FAMILY MEDICINE

## 2020-10-01 PROCEDURE — 0509F URINE INCON PLAN DOCD: CPT | Performed by: FAMILY MEDICINE

## 2020-10-01 PROCEDURE — 1036F TOBACCO NON-USER: CPT | Performed by: FAMILY MEDICINE

## 2020-10-01 RX ORDER — PANTOPRAZOLE SODIUM 40 MG/1
40 TABLET, DELAYED RELEASE ORAL
Qty: 90 TABLET | Refills: 3 | Status: SHIPPED
Start: 2020-10-01 | End: 2021-05-05 | Stop reason: SDUPTHER

## 2020-10-01 NOTE — PROGRESS NOTES
10/1/2020    Chief Complaint   Patient presents with    Urinary Frequency     wants to talk about urine frequency and medications        HPI    Mayra Parry is a 78 y.o. patient that presents today for dysuria. Dysuria: This has been going on for many month(s). Associated signs and symptoms include frequency, incontinence. Patient does not have a history of recurrent UTI. Patient does not have a history of pyelonephritis. Patient does not have genital complaints. Is not on any medications and is not interested on antimuscarinics. States that she does urinate frequently. Patient denies back pain, fever, chills, nausea, hematuria, nocturia, incontinence, stones or infection. Patient's past medical, surgical, social and/or family history reviewed, updated in chart, and are non-contributory (unless otherwise stated). Medications and allergies also reviewed and updated in chart.      ROS Unless otherwise specified  Review of Systems - General ROS: negative for - chills, fatigue, fever, night sweats, sleep disturbance, weight gain or weight loss  Psychological ROS: negative for - anxiety, behavioral disorder, depression, hallucinations, irritability, memory difficulties, mood swings, sleep disturbances or suicidal ideation  ENT ROS: negative for - epistaxis, headaches, hearing change, nasal congestion, nasal discharge, nasal polyps, sinus pain, tinnitus, vertigo or visual changes  Hematological and Lymphatic ROS: negative for - bleeding problems, blood clots, fatigue or swollen lymph nodes  Respiratory ROS: negative for - cough, orthopnea, shortness of breath, sputum changes, tachypnea or wheezing  Cardiovascular ROS: negative for - chest pain, dyspnea on exertion, irregular heartbeat, loss of consciousness, palpitations, paroxysmal nocturnal dyspnea or rapid heart rate  Gastrointestinal ROS: negative for - abdominal pain, blood in stools, change in bowel habits, constipation, diarrhea, gas/bloating, heartburn or nausea/vomiting  Musculoskeletal ROS: negative for - joint pain, joint stiffness, joint swelling or muscle, back pain, bowel or bladder incontinence  Neurological ROS: negative for - behavioral changes, confusion, dizziness, headaches, memory loss, numbness/tingling, seizures or speech problems, weakness  Dermatological ROS: negative for - dry skin, mole changes, nail changes, pruritus, rash or skin lesion changes    Physical Exam  Temp Readings from Last 3 Encounters:   10/01/20 97.8 °F (36.6 °C)   08/18/20 97.7 °F (36.5 °C) (Oral)   08/06/20 97.8 °F (36.6 °C)     Wt Readings from Last 3 Encounters:   10/01/20 193 lb (87.5 kg)   08/31/20 190 lb (86.2 kg)   08/18/20 190 lb (86.2 kg)     BP Readings from Last 3 Encounters:   10/01/20 138/60   08/31/20 110/70   08/18/20 (!) 176/68     Pulse Readings from Last 3 Encounters:   10/01/20 70   08/31/20 55   08/18/20 59       General appearance: alert, well appearing, and in no distress, oriented to person, place, and time and normal appearing weight. CVS exam: normal rate, regular rhythm, normal S1, S2, no murmurs, rubs, clicks or gallops. Radial pulses 2+ bilateral.  PT/DP pulse 2+ bilat. No C/C/E    Chest: clear to auscultation, no wheezes, rales or rhonchi, symmetric air entry. Abdomen: Soft, non-tender, non-distended, positive BS in all 4 quadrants    SKIN: no lesions, jaundice, petechiae, pallor, cyanosis, ecchymosis      Assessment/Plan  Al Lujan was seen today for urinary frequency. Diagnoses and all orders for this visit:    Mixed stress and urge urinary incontinence  -     Amb External Referral To Urology    Other orders  -     pantoprazole (PROTONIX) 40 MG tablet; Take 1 tablet by mouth every morning (before breakfast)          No follow-ups on file.       Daja Peña, DO    Call or go to ED immediately if symptoms worsen or persist.    Educational materials and/or home exercises printed for patient's review and were included in patient instructions on his/her After Visit Summary and given to patient at the end of visit. Counseled regarding above diagnosis, including possible risks and complications,  especially if left uncontrolled. Counseled regarding the possible side effects, risks, benefits and alternatives to treatment; patient and/or guardian verbalizes understanding, agrees, feels comfortable with and wishes to proceed with above treatment plan. Advised patient to call with any new medication issues, and read all Rx info from pharmacy to assure aware of all possible risks and side effects of medication before taking. Reviewed age and gender appropriate health screening exams and vaccinations. Advised patient regarding importance of keeping up with recommended health maintenance and to schedule as soon as possible if overdue, as this is important in assessing for undiagnosed pathology, especially cancer, as well as protecting against potentially harmful/life threatening disease. Patient and/or guardian verbalizes understanding and agrees with above counseling, assessment and plan. All questions answered.

## 2020-10-06 ENCOUNTER — TELEPHONE (OUTPATIENT)
Dept: FAMILY MEDICINE CLINIC | Age: 80
End: 2020-10-06

## 2020-10-31 ENCOUNTER — APPOINTMENT (OUTPATIENT)
Dept: CT IMAGING | Age: 80
End: 2020-10-31
Payer: MEDICARE

## 2020-10-31 ENCOUNTER — HOSPITAL ENCOUNTER (EMERGENCY)
Age: 80
Discharge: HOME | End: 2020-10-31
Attending: EMERGENCY MEDICINE
Payer: MEDICARE

## 2020-10-31 VITALS
RESPIRATION RATE: 14 BRPM | WEIGHT: 193 LBS | DIASTOLIC BLOOD PRESSURE: 71 MMHG | BODY MASS INDEX: 38.91 KG/M2 | TEMPERATURE: 98.1 F | HEART RATE: 71 BPM | SYSTOLIC BLOOD PRESSURE: 170 MMHG | HEIGHT: 59 IN | OXYGEN SATURATION: 95 %

## 2020-10-31 LAB
ANION GAP SERPL CALCULATED.3IONS-SCNC: 10 MMOL/L (ref 7–16)
BACTERIA: NORMAL /HPF
BASOPHILS ABSOLUTE: 0.04 E9/L (ref 0–0.2)
BASOPHILS RELATIVE PERCENT: 0.4 % (ref 0–2)
BILIRUBIN URINE: NEGATIVE
BLOOD, URINE: ABNORMAL
BUN BLDV-MCNC: 25 MG/DL (ref 8–23)
CALCIUM SERPL-MCNC: 9.2 MG/DL (ref 8.6–10.2)
CHLORIDE BLD-SCNC: 103 MMOL/L (ref 98–107)
CLARITY: CLEAR
CO2: 23 MMOL/L (ref 22–29)
COLOR: YELLOW
CREAT SERPL-MCNC: 0.9 MG/DL (ref 0.5–1)
EOSINOPHILS ABSOLUTE: 0.19 E9/L (ref 0.05–0.5)
EOSINOPHILS RELATIVE PERCENT: 1.8 % (ref 0–6)
GFR AFRICAN AMERICAN: >60
GFR NON-AFRICAN AMERICAN: 60 ML/MIN/1.73
GLUCOSE BLD-MCNC: 108 MG/DL (ref 74–99)
GLUCOSE URINE: NEGATIVE MG/DL
HCT VFR BLD CALC: 39.5 % (ref 34–48)
HEMOGLOBIN: 12.7 G/DL (ref 11.5–15.5)
IMMATURE GRANULOCYTES #: 0.04 E9/L
IMMATURE GRANULOCYTES %: 0.4 % (ref 0–5)
KETONES, URINE: NEGATIVE MG/DL
LACTIC ACID: 1 MMOL/L (ref 0.5–2.2)
LEUKOCYTE ESTERASE, URINE: ABNORMAL
LYMPHOCYTES ABSOLUTE: 3.24 E9/L (ref 1.5–4)
LYMPHOCYTES RELATIVE PERCENT: 31.1 % (ref 20–42)
MCH RBC QN AUTO: 28 PG (ref 26–35)
MCHC RBC AUTO-ENTMCNC: 32.2 % (ref 32–34.5)
MCV RBC AUTO: 87 FL (ref 80–99.9)
MONOCYTES ABSOLUTE: 0.87 E9/L (ref 0.1–0.95)
MONOCYTES RELATIVE PERCENT: 8.3 % (ref 2–12)
NEUTROPHILS ABSOLUTE: 6.05 E9/L (ref 1.8–7.3)
NEUTROPHILS RELATIVE PERCENT: 58 % (ref 43–80)
NITRITE, URINE: NEGATIVE
PDW BLD-RTO: 13.5 FL (ref 11.5–15)
PH UA: 7 (ref 5–9)
PLATELET # BLD: 261 E9/L (ref 130–450)
PMV BLD AUTO: 9.6 FL (ref 7–12)
POTASSIUM REFLEX MAGNESIUM: 4.2 MMOL/L (ref 3.5–5)
PROTEIN UA: 100 MG/DL
RBC # BLD: 4.54 E12/L (ref 3.5–5.5)
RBC UA: >20 /HPF (ref 0–2)
SODIUM BLD-SCNC: 136 MMOL/L (ref 132–146)
SPECIFIC GRAVITY UA: 1.02 (ref 1–1.03)
UROBILINOGEN, URINE: 0.2 E.U./DL
WBC # BLD: 10.4 E9/L (ref 4.5–11.5)
WBC UA: NORMAL /HPF (ref 0–5)

## 2020-10-31 PROCEDURE — 2580000003 HC RX 258: Performed by: RADIOLOGY

## 2020-10-31 PROCEDURE — 81001 URINALYSIS AUTO W/SCOPE: CPT

## 2020-10-31 PROCEDURE — 87088 URINE BACTERIA CULTURE: CPT

## 2020-10-31 PROCEDURE — 74176 CT ABD & PELVIS W/O CONTRAST: CPT

## 2020-10-31 PROCEDURE — 99285 EMERGENCY DEPT VISIT HI MDM: CPT

## 2020-10-31 PROCEDURE — 96374 THER/PROPH/DIAG INJ IV PUSH: CPT

## 2020-10-31 PROCEDURE — 51798 US URINE CAPACITY MEASURE: CPT

## 2020-10-31 PROCEDURE — 2580000003 HC RX 258: Performed by: EMERGENCY MEDICINE

## 2020-10-31 PROCEDURE — 96375 TX/PRO/DX INJ NEW DRUG ADDON: CPT

## 2020-10-31 PROCEDURE — 85025 COMPLETE CBC W/AUTO DIFF WBC: CPT

## 2020-10-31 PROCEDURE — 80048 BASIC METABOLIC PNL TOTAL CA: CPT

## 2020-10-31 PROCEDURE — 6360000002 HC RX W HCPCS: Performed by: EMERGENCY MEDICINE

## 2020-10-31 PROCEDURE — 83605 ASSAY OF LACTIC ACID: CPT

## 2020-10-31 RX ORDER — KETOROLAC TROMETHAMINE 30 MG/ML
15 INJECTION, SOLUTION INTRAMUSCULAR; INTRAVENOUS ONCE
Status: COMPLETED | OUTPATIENT
Start: 2020-10-31 | End: 2020-10-31

## 2020-10-31 RX ORDER — PHENAZOPYRIDINE HYDROCHLORIDE 100 MG/1
100 TABLET, FILM COATED ORAL 3 TIMES DAILY PRN
Qty: 9 TABLET | Refills: 0 | Status: SHIPPED | OUTPATIENT
Start: 2020-10-31 | End: 2020-11-03

## 2020-10-31 RX ORDER — 0.9 % SODIUM CHLORIDE 0.9 %
1000 INTRAVENOUS SOLUTION INTRAVENOUS ONCE
Status: DISCONTINUED | OUTPATIENT
Start: 2020-10-31 | End: 2020-10-31 | Stop reason: HOSPADM

## 2020-10-31 RX ORDER — SODIUM CHLORIDE 9 MG/ML
1000 INJECTION, SOLUTION INTRAVENOUS CONTINUOUS
Status: DISCONTINUED | OUTPATIENT
Start: 2020-10-31 | End: 2020-10-31 | Stop reason: HOSPADM

## 2020-10-31 RX ORDER — SODIUM CHLORIDE 0.9 % (FLUSH) 0.9 %
10 SYRINGE (ML) INJECTION 2 TIMES DAILY
Status: DISCONTINUED | OUTPATIENT
Start: 2020-10-31 | End: 2020-10-31 | Stop reason: HOSPADM

## 2020-10-31 RX ORDER — ATROPA BELLADONNA AND OPIUM 16.2; 6 MG/1; MG/1
60 SUPPOSITORY RECTAL 2 TIMES DAILY PRN
Qty: 5 SUPPOSITORY | Refills: 0 | Status: ON HOLD | OUTPATIENT
Start: 2020-10-31 | End: 2021-02-03 | Stop reason: HOSPADM

## 2020-10-31 RX ORDER — MORPHINE SULFATE 4 MG/ML
4 INJECTION, SOLUTION INTRAMUSCULAR; INTRAVENOUS ONCE
Status: COMPLETED | OUTPATIENT
Start: 2020-10-31 | End: 2020-10-31

## 2020-10-31 RX ADMIN — KETOROLAC TROMETHAMINE 15 MG: 30 INJECTION, SOLUTION INTRAMUSCULAR; INTRAVENOUS at 03:26

## 2020-10-31 RX ADMIN — SODIUM CHLORIDE 1000 ML: 9 INJECTION, SOLUTION INTRAVENOUS at 03:27

## 2020-10-31 RX ADMIN — Medication 10 ML: at 04:30

## 2020-10-31 RX ADMIN — MORPHINE SULFATE 4 MG: 4 INJECTION, SOLUTION INTRAMUSCULAR; INTRAVENOUS at 03:26

## 2020-10-31 ASSESSMENT — PAIN SCALES - GENERAL
PAINLEVEL_OUTOF10: 9
PAINLEVEL_OUTOF10: 9
PAINLEVEL_OUTOF10: 3

## 2020-10-31 ASSESSMENT — PAIN DESCRIPTION - PAIN TYPE: TYPE: ACUTE PAIN

## 2020-10-31 NOTE — ED PROVIDER NOTES
HPI:  10/31/20,   Time: 2:46 AM EDT         Pedro Sherman is a 78 y.o. female presenting to the ED for lower abdominal pain status post a genitourinary procedure that she had 2 days ago, beginning 2 days ago. The complaint has been constant, moderate in severity, and worsened by changing position. No alleviating factors. No fever chills night sweats or fatigue or cough no chest pain or shortness of breath. No vomiting or diarrhea or black or bloody stool. She is not sure exactly what she had done but she believes she had a scope put through her vagina to evaluate a possible uterine mass that was pressing against her colon    ROS:   Pertinent positives and negatives are stated within HPI, all other systems reviewed and are negative.  --------------------------------------------- PAST HISTORY ---------------------------------------------  Past Medical History:  has a past medical history of Amaurosis fugax of right eye, Anxiety, Arthritis, CA - cancer of bowel, Cerebral artery occlusion with cerebral infarction ([facility] [address] Chronic back pain, Constipation, Depression, Elevated sed rate, GERD (gastroesophageal reflux disease), Hemorrhoids, Hyperlipidemia, Hypertension, Left foot pain, Lumbosacral radiculopathy, Macular degeneration, Peripheral neuropathy, Poor vision, Prosthetic eye globe, Seasonal allergies, Skipped heart beats, Sleep apnea, and Thyroid disease. Past Surgical History:  has a past surgical history that includes Eye surgery (years ago); colectomy (1974); Colonoscopy (04/26/2012); Cholecystectomy (1985); Hysterectomy (1974); tumor excision; Upper gastrointestinal endoscopy (05/30/2014); Colonoscopy (05/30/2014); Tonsillectomy; joint replacement (Left, 2010/2012); Upper gastrointestinal endoscopy (01/08/2015); Colonoscopy (01/08/2015); cyst removal (years ago); and Upper gastrointestinal endoscopy (N/A, 4/1/2019). Social History:  reports that she has never smoked.  She has never used smokeless mmol/L    Potassium reflex Magnesium 4.2 3.5 - 5.0 mmol/L    Chloride 103 98 - 107 mmol/L    CO2 23 22 - 29 mmol/L    Anion Gap 10 7 - 16 mmol/L    Glucose 108 (H) 74 - 99 mg/dL    BUN 25 (H) 8 - 23 mg/dL    CREATININE 0.9 0.5 - 1.0 mg/dL    GFR Non-African American 60 >=60 mL/min/1.73    GFR African American >60     Calcium 9.2 8.6 - 10.2 mg/dL   Urinalysis, reflex to microscopic   Result Value Ref Range    Color, UA Yellow Straw/Yellow    Clarity, UA Clear Clear    Glucose, Ur Negative Negative mg/dL    Bilirubin Urine Negative Negative    Ketones, Urine Negative Negative mg/dL    Specific Gravity, UA 1.020 1.005 - 1.030    Blood, Urine LARGE (A) Negative    pH, UA 7.0 5.0 - 9.0    Protein,  (A) Negative mg/dL    Urobilinogen, Urine 0.2 <2.0 E.U./dL    Nitrite, Urine Negative Negative    Leukocyte Esterase, Urine SMALL (A) Negative   Lactic Acid, Plasma   Result Value Ref Range    Lactic Acid 1.0 0.5 - 2.2 mmol/L   Microscopic Urinalysis   Result Value Ref Range    WBC, UA 2-5 0 - 5 /HPF    RBC, UA >20 0 - 2 /HPF    Bacteria, UA NONE SEEN None Seen /HPF       RADIOLOGY:  Interpreted by Radiologist.  CT ABDOMEN PELVIS WO CONTRAST Additional Contrast? None   Final Result   1. Punctate non-obstructing right intrarenal calculus. 2. Mild right hydronephrosis and hydroureter although no obstructing stone   seen. 3. Small amount of air in the bladder may have been introduced during a   recent catheterization. 4. There is subtle perivesical ill definition which could reflect   infiltration. Correlate for possible cystitis. 5. Mild infiltration in the central mesentery has an appearance suggestive of   mild sclerosing mesenteritis. This finding is not significantly changed. ------------------------- NURSING NOTES AND VITALS REVIEWED ---------------------------   The nursing notes within the ED encounter and vital signs as below have been reviewed.    BP (!) 188/70   Pulse 80   Temp 98 °F (36.7

## 2020-11-02 LAB — URINE CULTURE, ROUTINE: NORMAL

## 2020-11-03 ENCOUNTER — TELEPHONE (OUTPATIENT)
Dept: FAMILY MEDICINE CLINIC | Age: 80
End: 2020-11-03

## 2020-11-03 NOTE — TELEPHONE ENCOUNTER
Patient wants to know if its okay to take advil at this time for the pain,it is helping, also her pressure is running high due to the pain she has(Dr Gates is going to increase her macrobid in a weeks time) also her ankles are a little swollen

## 2020-11-08 ENCOUNTER — APPOINTMENT (OUTPATIENT)
Dept: GENERAL RADIOLOGY | Age: 80
End: 2020-11-08
Payer: MEDICARE

## 2020-11-08 ENCOUNTER — HOSPITAL ENCOUNTER (EMERGENCY)
Age: 80
Discharge: HOME | End: 2020-11-08
Attending: EMERGENCY MEDICINE
Payer: MEDICARE

## 2020-11-08 ENCOUNTER — APPOINTMENT (OUTPATIENT)
Dept: CT IMAGING | Age: 80
End: 2020-11-08
Payer: MEDICARE

## 2020-11-08 VITALS
HEART RATE: 72 BPM | OXYGEN SATURATION: 96 % | RESPIRATION RATE: 16 BRPM | BODY MASS INDEX: 38.98 KG/M2 | SYSTOLIC BLOOD PRESSURE: 167 MMHG | WEIGHT: 193 LBS | TEMPERATURE: 97.2 F | DIASTOLIC BLOOD PRESSURE: 75 MMHG

## 2020-11-08 PROCEDURE — 74022 RADEX COMPL AQT ABD SERIES: CPT

## 2020-11-08 PROCEDURE — 99284 EMERGENCY DEPT VISIT MOD MDM: CPT

## 2020-11-08 PROCEDURE — 74176 CT ABD & PELVIS W/O CONTRAST: CPT

## 2020-11-08 RX ORDER — DOCUSATE SODIUM 100 MG/1
100 CAPSULE, LIQUID FILLED ORAL 2 TIMES DAILY
Qty: 20 CAPSULE | Refills: 0 | Status: SHIPPED | OUTPATIENT
Start: 2020-11-08 | End: 2020-11-18

## 2020-11-08 RX ORDER — POLYETHYLENE GLYCOL 3350 17 G/17G
17 POWDER, FOR SOLUTION ORAL DAILY
Qty: 255 G | Refills: 0 | Status: SHIPPED | OUTPATIENT
Start: 2020-11-08 | End: 2020-11-23

## 2020-11-08 ASSESSMENT — PAIN DESCRIPTION - DESCRIPTORS: DESCRIPTORS: ACHING;CONSTANT;DISCOMFORT

## 2020-11-08 ASSESSMENT — PAIN SCALES - GENERAL: PAINLEVEL_OUTOF10: 9

## 2020-11-08 ASSESSMENT — PAIN DESCRIPTION - PAIN TYPE: TYPE: ACUTE PAIN

## 2020-11-08 ASSESSMENT — ENCOUNTER SYMPTOMS
EYE PAIN: 0
TROUBLE SWALLOWING: 0
SHORTNESS OF BREATH: 0
CONSTIPATION: 1
PHOTOPHOBIA: 0
COUGH: 0
NAUSEA: 1
SORE THROAT: 0
VOMITING: 0
DIARRHEA: 0
ABDOMINAL DISTENTION: 0
ABDOMINAL PAIN: 0

## 2020-11-08 ASSESSMENT — PAIN DESCRIPTION - PROGRESSION: CLINICAL_PROGRESSION: NOT CHANGED

## 2020-11-08 ASSESSMENT — PAIN DESCRIPTION - LOCATION: LOCATION: ABDOMEN

## 2020-11-08 ASSESSMENT — PAIN DESCRIPTION - ONSET: ONSET: ON-GOING

## 2020-11-08 ASSESSMENT — PAIN DESCRIPTION - FREQUENCY: FREQUENCY: CONTINUOUS

## 2020-11-08 NOTE — ED PROVIDER NOTES
Patient is a 78-year-old female with past medical history colon cancer status post resection and chemo surgery, thyroid problem, sleep apnea, hypertension, hyperlipidemia, GERD, CVA who presented to ED complaining of constipation with nausea. Patient reports that her last bowel movement was 4 days ago and is with chronic constipation. She reports she took mag citrate and enema earlier this morning without any relief. She has generalized abdominal pain and has associated nausea without any vomiting. Patient also reports that she sees a urologist at [facility] who had stated that she has a lump in her likely causing the constipation. She has history of total hysterectomy. She does report having a daily bowel movement here in the ED. patient was recently seen on 10/31/2020 for cystitis. The history is provided by the patient. No  was used. Review of Systems   Constitutional: Negative for activity change, appetite change, chills, fatigue and fever. HENT: Negative for congestion, sore throat and trouble swallowing. Eyes: Negative for photophobia, pain and visual disturbance. Respiratory: Negative for cough and shortness of breath. Cardiovascular: Negative for chest pain, palpitations and leg swelling. Gastrointestinal: Positive for constipation and nausea. Negative for abdominal distention, abdominal pain, diarrhea and vomiting. Endocrine: Negative for heat intolerance and polydipsia. Genitourinary: Positive for frequency. Negative for dysuria and hematuria. Musculoskeletal: Negative for joint swelling, neck pain and neck stiffness. Skin: Negative for rash and wound. Neurological: Negative for dizziness, syncope, weakness, light-headedness, numbness and headaches. Psychiatric/Behavioral: Negative for agitation, behavioral problems and confusion. Physical Exam  Constitutional:       General: She is not in acute distress. Appearance: Normal appearance. She is obese. She is not ill-appearing, toxic-appearing or diaphoretic. HENT:      Head: Normocephalic and atraumatic. Right Ear: External ear normal.      Left Ear: External ear normal.      Nose: Nose normal. No congestion. Mouth/Throat:      Mouth: Mucous membranes are moist.      Pharynx: Oropharynx is clear. Eyes:      Conjunctiva/sclera: Conjunctivae normal.      Pupils: Pupils are equal, round, and reactive to light. Neck:      Musculoskeletal: Normal range of motion and neck supple. Cardiovascular:      Rate and Rhythm: Normal rate and regular rhythm. Pulses: Normal pulses. Heart sounds: Normal heart sounds. No murmur. Pulmonary:      Effort: Pulmonary effort is normal. No respiratory distress. Breath sounds: Normal breath sounds. No wheezing. Abdominal:      General: Bowel sounds are normal. There is no distension. Palpations: Abdomen is soft. There is no mass. Tenderness: There is abdominal tenderness (mildly and diffusely). There is no guarding or rebound. Hernia: No hernia is present. Comments: Mid longitudinal Abdominal scar   Musculoskeletal: Normal range of motion. General: No swelling or tenderness. Right lower leg: Edema (1+) present. Left lower leg: Edema (1+) present. Skin:     General: Skin is warm. Capillary Refill: Capillary refill takes less than 2 seconds. Coloration: Skin is not jaundiced. Neurological:      General: No focal deficit present. Mental Status: She is alert and oriented to person, place, and time. Mental status is at baseline. Cranial Nerves: No cranial nerve deficit. Psychiatric:         Mood and Affect: Mood normal.         Behavior: Behavior normal.         Thought Content:  Thought content normal.         Judgment: Judgment normal.          Procedures     MDM  Number of Diagnoses or Management Options  Constipation, unspecified constipation type:   [facility] Diagnosis management comments: Patient is a 78-year-old female with past medical history colon cancer status post resection and chemo surgery, thyroid problem, sleep apnea, hypertension, hyperlipidemia, GERD, CVA, chronic constipation who presented to ED complaining of constipation with nausea. Patient did have a small bowel movement here while in the ED. Does report that she strains a lot and is not in any bowel regimen except from [address] [place] [address]. Acute abdomen series showed ileus versus obstruction. CT abdomen without contrast was conducted that showed moderate amount of stool in the rectum but no other acute processes. Patient was offered manual disimpaction with mild relief. She is stable to be discharged home with a bowel regimen. ED Course as of Nov 08 1115   Enma Chen Nov 08, 2020   3050 ATTENDING PROVIDER ATTESTATION:     I have personally performed and/or participated in the history, exam, medical decision making, and procedures and agree with all pertinent clinical information unless otherwise noted. I have also reviewed and agree with the past medical, family and social history unless otherwise noted. I have discussed this patient in detail with the resident and provided the instruction and education regarding the evidence-based evaluation and treatment of constipation  History: Patient reports about 3 days of difficulty on a bowel movement. She describes her stools hard. She admits she did have small bowel movement shortly after arrival here. She tried enemas at home without success. She reports some cramping abdominal pain following magnesium citrate early this morning. My findings: Elinor Geiger is a 78 y.o. female whom is in no distress. Physical exam reveals she is alert and oriented. Heart is regular, lungs are clear. Abdomen is soft, nontender. Bowel sounds are normal.  Extremities are intact without edema. My plan: Symptomatic and supportive care. X-ray.     Electronically signed by Leisa Figueroa DO on 11/8/20 at 8:22 AM EST          [TG]      ED Course User Index  [TG] Leisa Figueroa DO            ED Course as of Nov 08 1115   Emperatriz Knox Nov 08, 2020   7179 ATTENDING PROVIDER ATTESTATION:     I have personally performed and/or participated in the history, exam, medical decision making, and procedures and agree with all pertinent clinical information unless otherwise noted. I have also reviewed and agree with the past medical, family and social history unless otherwise noted. I have discussed this patient in detail with the resident and provided the instruction and education regarding the evidence-based evaluation and treatment of constipation  History: Patient reports about 3 days of difficulty on a bowel movement. She describes her stools hard. She admits she did have small bowel movement shortly after arrival here. She tried enemas at home without success. She reports some cramping abdominal pain following magnesium citrate early this morning. My findings: Magdaleno Vigil is a 78 y.o. female whom is in no distress. Physical exam reveals she is alert and oriented. Heart is regular, lungs are clear. Abdomen is soft, nontender. Bowel sounds are normal.  Extremities are intact without edema. My plan: Symptomatic and supportive care. X-ray.     Electronically signed by Leisa Figueroa DO on 11/8/20 at 8:22 AM EST          [TG]      ED Course User Index  [TG] Leisa Figueroa DO       --------------------------------------------- PAST HISTORY ---------------------------------------------  Past Medical History:  has a past medical history of Amaurosis fugax of right eye, Anxiety, Arthritis, CA - cancer of bowel, Cerebral artery occlusion with cerebral infarction [facility]), Chronic back pain, Constipation, Depression, Elevated sed rate, GERD (gastroesophageal reflux disease), Hemorrhoids, Hyperlipidemia, Hypertension, Left foot pain, Lumbosacral radiculopathy, Macular degeneration, Peripheral neuropathy, Poor vision, Prosthetic eye globe, Seasonal allergies, Skipped heart beats, Sleep apnea, and Thyroid disease. Past Surgical History:  has a past surgical history that includes Eye surgery (years ago); colectomy (1974); Colonoscopy (04/26/2012); Cholecystectomy (1985); Hysterectomy (1974); tumor excision; Upper gastrointestinal endoscopy (05/30/2014); Colonoscopy (05/30/2014); Tonsillectomy; joint replacement (Left, 2010/2012); Upper gastrointestinal endoscopy (01/08/2015); Colonoscopy (01/08/2015); cyst removal (years ago); and Upper gastrointestinal endoscopy (N/A, 4/1/2019). Social History:  reports that she has never smoked. She has never used smokeless tobacco. She reports that she does not drink alcohol or use drugs. Family History: family history includes Breast Cancer in her daughter and sister; Cancer in her brother, brother, brother, brother, brother, brother, sister, and sister; Heart Disease in her brother, brother, father, mother, and sister; High Blood Pressure in her daughter and daughter; Hypertension in her brother, brother, father, mother, and sister; Other in her mother; Stroke in her father and mother. The patients home medications have been reviewed. Allergies: Iodine; Codeine; Oxycodone-aspirin; Amoxicillin-pot clavulanate; Darvocet [propoxyphene n-acetaminophen]; Eggs or egg-derived products; Influenza vaccines; Percodan [oxycodone-aspirin]; and Percodan [oxycodone-aspirin]    -------------------------------------------------- RESULTS -------------------------------------------------  Labs:  No results found for this visit on 11/08/20. Radiology:  CT ABDOMEN PELVIS WO CONTRAST Additional Contrast? None   Final Result   No acute process in abdomen or pelvis. XR ACUTE ABD SERIES CHEST 1 VW   Final Result   Multiple fluid fluid levels in the abdomen suggest possible obstruction   versus ileus. The bowel gas pattern is nonspecific. ------------------------- NURSING NOTES AND VITALS REVIEWED ---------------------------  Date / Time Roomed:  11/8/2020  7:07 AM  ED Bed Assignment:  17/17    The nursing notes within the ED encounter and vital signs as below have been reviewed. BP (!) 167/75   Pulse 72   Temp 97.2 °F (36.2 °C) (Oral)   Resp 16   Wt 193 lb (87.5 kg)   LMP 07/24/2014   SpO2 96%   BMI 38.98 kg/m²   Oxygen Saturation Interpretation: Normal      ------------------------------------------ PROGRESS NOTES ------------------------------------------  8:58 AM EST  I have spoken with the patient and discussed todays results, in addition to providing specific details for the plan of care and counseling regarding the diagnosis and prognosis. Their questions are answered at this time and they are agreeable with the plan. I discussed at length with them reasons for immediate return here for re evaluation. They will followup with their primary care physician by calling their office tomorrow. --------------------------------- ADDITIONAL PROVIDER NOTES ---------------------------------  At this time the patient is without objective evidence of an acute process requiring hospitalization or inpatient management. They have remained hemodynamically stable throughout their entire ED visit and are stable for discharge with outpatient follow-up. The plan has been discussed in detail and they are aware of the specific conditions for emergent return, as well as the importance of follow-up. Discharge Medication List as of 11/8/2020 11:04 AM      START taking these medications    Details   docusate sodium (COLACE) 100 MG capsule Take 1 capsule by mouth 2 times daily for 10 days, Disp-20 capsule,R-0Print      polyethylene glycol (MIRALAX) 17 GM/SCOOP powder Take 17 g by mouth daily for 15 days, Disp-255 g,R-0Print             Diagnosis:  1. Constipation, unspecified constipation type    2.  Fecal impaction ([facility] Disposition:  Patient's disposition: Discharge to home  Patient's condition is stable.        Peter Luna MD  Resident  11/08/20 [id number]

## 2020-11-08 NOTE — ED NOTES
Patient in bathroom at this time and moved bowels a small amount, Dr Demetra Lorenzana advised.      Kimberly Greene RN  11/08/20 [id number]

## 2020-11-10 ENCOUNTER — NURSE ONLY (OUTPATIENT)
Dept: FAMILY MEDICINE CLINIC | Age: 80
End: 2020-11-10
Payer: MEDICARE

## 2020-11-10 DIAGNOSIS — E78.5 HYPERLIPIDEMIA LDL GOAL <100: ICD-10-CM

## 2020-11-10 DIAGNOSIS — I10 ESSENTIAL HYPERTENSION: Chronic | ICD-10-CM

## 2020-11-10 DIAGNOSIS — Z85.038 HISTORY OF COLON CANCER: ICD-10-CM

## 2020-11-10 DIAGNOSIS — E03.9 ACQUIRED HYPOTHYROIDISM: ICD-10-CM

## 2020-11-10 LAB
ALBUMIN SERPL-MCNC: 4 G/DL (ref 3.5–5.2)
ALP BLD-CCNC: 76 U/L (ref 35–104)
ALT SERPL-CCNC: 16 U/L (ref 0–32)
ANION GAP SERPL CALCULATED.3IONS-SCNC: 14 MMOL/L (ref 7–16)
AST SERPL-CCNC: 29 U/L (ref 0–31)
BILIRUB SERPL-MCNC: 0.6 MG/DL (ref 0–1.2)
BUN BLDV-MCNC: 17 MG/DL (ref 8–23)
CALCIUM SERPL-MCNC: 9 MG/DL (ref 8.6–10.2)
CEA: 3.1 NG/ML (ref 0–5.2)
CHLORIDE BLD-SCNC: 102 MMOL/L (ref 98–107)
CHOLESTEROL, TOTAL: 194 MG/DL (ref 0–199)
CO2: 21 MMOL/L (ref 22–29)
CREAT SERPL-MCNC: 0.8 MG/DL (ref 0.5–1)
GFR AFRICAN AMERICAN: >60
GFR NON-AFRICAN AMERICAN: >60 ML/MIN/1.73
GLUCOSE BLD-MCNC: 83 MG/DL (ref 74–99)
HCT VFR BLD CALC: 37.6 % (ref 34–48)
HDLC SERPL-MCNC: 68 MG/DL
HEMOGLOBIN: 11.7 G/DL (ref 11.5–15.5)
LDL CHOLESTEROL CALCULATED: 114 MG/DL (ref 0–99)
MCH RBC QN AUTO: 27.7 PG (ref 26–35)
MCHC RBC AUTO-ENTMCNC: 31.1 % (ref 32–34.5)
MCV RBC AUTO: 89.1 FL (ref 80–99.9)
PDW BLD-RTO: 14.3 FL (ref 11.5–15)
PLATELET # BLD: 258 E9/L (ref 130–450)
PMV BLD AUTO: 10.3 FL (ref 7–12)
POTASSIUM SERPL-SCNC: 4.4 MMOL/L (ref 3.5–5)
RBC # BLD: 4.22 E12/L (ref 3.5–5.5)
SODIUM BLD-SCNC: 137 MMOL/L (ref 132–146)
TOTAL PROTEIN: 7.2 G/DL (ref 6.4–8.3)
TRIGL SERPL-MCNC: 61 MG/DL (ref 0–149)
TSH SERPL DL<=0.05 MIU/L-ACNC: 2.07 UIU/ML (ref 0.27–4.2)
VLDLC SERPL CALC-MCNC: 12 MG/DL
WBC # BLD: 5.7 E9/L (ref 4.5–11.5)

## 2020-11-10 PROCEDURE — 36415 COLL VENOUS BLD VENIPUNCTURE: CPT | Performed by: FAMILY MEDICINE

## 2020-11-11 ENCOUNTER — OFFICE VISIT (OUTPATIENT)
Dept: FAMILY MEDICINE CLINIC | Age: 80
End: 2020-11-11
Payer: MEDICARE

## 2020-11-11 ENCOUNTER — TELEPHONE (OUTPATIENT)
Dept: FAMILY MEDICINE CLINIC | Age: 80
End: 2020-11-11

## 2020-11-11 VITALS
HEART RATE: 78 BPM | BODY MASS INDEX: 38.78 KG/M2 | SYSTOLIC BLOOD PRESSURE: 138 MMHG | RESPIRATION RATE: 18 BRPM | WEIGHT: 192 LBS | OXYGEN SATURATION: 95 % | TEMPERATURE: 97.7 F | DIASTOLIC BLOOD PRESSURE: 70 MMHG

## 2020-11-11 PROCEDURE — 99214 OFFICE O/P EST MOD 30 MIN: CPT | Performed by: FAMILY MEDICINE

## 2020-11-11 PROCEDURE — G8417 CALC BMI ABV UP PARAM F/U: HCPCS | Performed by: FAMILY MEDICINE

## 2020-11-11 PROCEDURE — G8484 FLU IMMUNIZE NO ADMIN: HCPCS | Performed by: FAMILY MEDICINE

## 2020-11-11 PROCEDURE — 1123F ACP DISCUSS/DSCN MKR DOCD: CPT | Performed by: FAMILY MEDICINE

## 2020-11-11 PROCEDURE — 4040F PNEUMOC VAC/ADMIN/RCVD: CPT | Performed by: FAMILY MEDICINE

## 2020-11-11 PROCEDURE — G8400 PT W/DXA NO RESULTS DOC: HCPCS | Performed by: FAMILY MEDICINE

## 2020-11-11 PROCEDURE — 1036F TOBACCO NON-USER: CPT | Performed by: FAMILY MEDICINE

## 2020-11-11 PROCEDURE — 1090F PRES/ABSN URINE INCON ASSESS: CPT | Performed by: FAMILY MEDICINE

## 2020-11-11 PROCEDURE — G8427 DOCREV CUR MEDS BY ELIG CLIN: HCPCS | Performed by: FAMILY MEDICINE

## 2020-11-11 RX ORDER — CITALOPRAM 10 MG/1
10 TABLET ORAL DAILY
Qty: 30 TABLET | Refills: 2 | Status: SHIPPED
Start: 2020-11-11 | End: 2021-02-08 | Stop reason: SDUPTHER

## 2020-11-11 NOTE — TELEPHONE ENCOUNTER
Patient needs something for her stress and anxiety, can sh be ordered what she was on a couple years ago,sent to [facility]

## 2020-11-11 NOTE — PROGRESS NOTES
11/11/2020    Chief Complaint   Patient presents with    Back Pain     multiple complaints  leg pain     Constipation     went to ER        HPI    Neeta Hill is a 78 y.o. patient that presents today for:    Constipation: Patient complains of constipation. Stool pattern has been 1 firm stool(s) per 2-3 days. Onset was many years ago. Defecation has been difficult. Co-Morbid conditions:obesity and prior colon cancer  Associated signs and symptoms include no abdominal pain, change in bowel habits, or black or bloody stools. Symptoms have been gradually improving. Current Health Habits: Eating fiber? Yes  Exercise? No  Water intake? Yes Current OTC/RX therapy has been lubricant which has been effective Yes. Colonoscopy: Yes     Went to ER, CT A/P showed constipation. Daughter gave her an enema which caused her bowels to move. Takes prune juice daily. Preston Denney, but not regularly. Is having accident with bowels due to multiple medications. Is depressed. Does deny homicidal or suicidal ideations. Taking lorazepam prn anxiety. Was prescribed citalopram. DId not take. Patient's past medical, surgical, social and/or family history reviewed, updated in chart, and are non-contributory (unless otherwise stated). Medications and allergies also reviewed and updated in chart.      ROS Unless otherwise specified  Review of Systems - General ROS: negative for - chills, fatigue, fever, night sweats, sleep disturbance, weight gain or weight loss  Psychological ROS: negative for - anxiety, behavioral disorder, depression, hallucinations, irritability, memory difficulties, mood swings, sleep disturbances or suicidal ideation  ENT ROS: negative for - epistaxis, headaches, hearing change, nasal congestion, nasal discharge, nasal polyps, sinus pain, tinnitus, vertigo or visual changes  Hematological and Lymphatic ROS: negative for - bleeding problems, blood clots, fatigue or swollen lymph nodes  Respiratory ROS: negative for - cough, orthopnea, shortness of breath, sputum changes, tachypnea or wheezing  Cardiovascular ROS: negative for - chest pain, dyspnea on exertion, irregular heartbeat, loss of consciousness, palpitations, paroxysmal nocturnal dyspnea or rapid heart rate  Gastrointestinal ROS: negative for - abdominal pain, blood in stools, change in bowel habits, constipation, diarrhea, gas/bloating, heartburn or nausea/vomiting  Musculoskeletal ROS: negative for - joint pain, joint stiffness, joint swelling or muscle, back pain, bowel or bladder incontinence  Neurological ROS: negative for - behavioral changes, confusion, dizziness, headaches, memory loss, numbness/tingling, seizures or speech problems, weakness  Dermatological ROS: negative for - dry skin, mole changes, nail changes, pruritus, rash or skin lesion changes    Physical Exam  Temp Readings from Last 3 Encounters:   11/11/20 97.7 °F (36.5 °C)   11/08/20 97.2 °F (36.2 °C) (Oral)   10/31/20 98.1 °F (36.7 °C) (Oral)     Wt Readings from Last 3 Encounters:   11/11/20 192 lb (87.1 kg)   11/08/20 193 lb (87.5 kg)   10/31/20 193 lb (87.5 kg)     BP Readings from Last 3 Encounters:   11/11/20 138/70   11/08/20 (!) 167/75   10/31/20 (!) 170/71     Pulse Readings from Last 3 Encounters:   11/11/20 78   11/08/20 72   10/31/20 71       General appearance: alert, well appearing, and in no distress, oriented to person, place, and time and normal appearing weight. CVS exam: normal rate, regular rhythm, normal S1, S2, no murmurs, rubs, clicks or gallops. Radial pulses 2+ bilateral.  PT/DP pulse 2+ bilat. No C/C/E    Chest: clear to auscultation, no wheezes, rales or rhonchi, symmetric air entry.      Abdomen: Soft, non-tender, non-distended, positive BS in all 4 quadrants    Extremities:Dorsalis pedis pulses palpated bilaterally, no clubbing, cyanosis, edema or erythema,     SKIN: no lesions, jaundice, petechiae, pallor, cyanosis, ecchymosis    NEURO: gross motor exam normal by observation, gait normal    Mental status - alert, oriented to person, place, and time, normal mood, behavior, speech, dress, motor activity, and thought processes      Assessment/Plan  Agustin Randolph was seen today for back pain and constipation. Diagnoses and all orders for this visit:    Other constipation  - Improved with meds    Other depression  -     [facility], [place], [facility], [facility], [facility]  -     citalopram (CELEXA) 10 MG tablet; Take 1 tablet by mouth daily          No follow-ups on file. Daja Peña, DO    Call or go to ED immediately if symptoms worsen or persist.    Educational materials and/or home exercises printed for patient's review and were included in patient instructions on his/her After Visit Summary and given to patient at the end of visit. Counseled regarding above diagnosis, including possible risks and complications,  especially if left uncontrolled. Counseled regarding the possible side effects, risks, benefits and alternatives to treatment; patient and/or guardian verbalizes understanding, agrees, feels comfortable with and wishes to proceed with above treatment plan. Advised patient to call with any new medication issues, and read all Rx info from pharmacy to assure aware of all possible risks and side effects of medication before taking. Reviewed age and gender appropriate health screening exams and vaccinations. Advised patient regarding importance of keeping up with recommended health maintenance and to schedule as soon as possible if overdue, as this is important in assessing for undiagnosed pathology, especially cancer, as well as protecting against potentially harmful/life threatening disease. Patient and/or guardian verbalizes understanding and agrees with above counseling, assessment and plan. All questions answered.

## 2020-11-11 NOTE — TELEPHONE ENCOUNTER
[facility] Called patient to introduce self and role of integrated [facility] and to schedule.  Patient scheduled for Wednesday 11/18 at 8:00AM.

## 2020-11-17 ENCOUNTER — OFFICE VISIT (OUTPATIENT)
Dept: FAMILY MEDICINE CLINIC | Age: 80
End: 2020-11-17
Payer: MEDICARE

## 2020-11-17 VITALS
BODY MASS INDEX: 38.91 KG/M2 | HEIGHT: 59 IN | RESPIRATION RATE: 16 BRPM | OXYGEN SATURATION: 97 % | DIASTOLIC BLOOD PRESSURE: 80 MMHG | SYSTOLIC BLOOD PRESSURE: 124 MMHG | WEIGHT: 193 LBS | TEMPERATURE: 97.1 F | HEART RATE: 73 BPM

## 2020-11-17 PROCEDURE — 4040F PNEUMOC VAC/ADMIN/RCVD: CPT | Performed by: FAMILY MEDICINE

## 2020-11-17 PROCEDURE — G8484 FLU IMMUNIZE NO ADMIN: HCPCS | Performed by: FAMILY MEDICINE

## 2020-11-17 PROCEDURE — 1123F ACP DISCUSS/DSCN MKR DOCD: CPT | Performed by: FAMILY MEDICINE

## 2020-11-17 PROCEDURE — G8431 POS CLIN DEPRES SCRN F/U DOC: HCPCS | Performed by: FAMILY MEDICINE

## 2020-11-17 PROCEDURE — G0439 PPPS, SUBSEQ VISIT: HCPCS | Performed by: FAMILY MEDICINE

## 2020-11-17 RX ORDER — IRBESARTAN 75 MG/1
75 TABLET ORAL DAILY
Qty: 90 TABLET | Refills: 3 | Status: ON HOLD | OUTPATIENT
Start: 2020-11-17 | End: 2021-02-03 | Stop reason: HOSPADM

## 2020-11-17 ASSESSMENT — LIFESTYLE VARIABLES: HOW OFTEN DO YOU HAVE A DRINK CONTAINING ALCOHOL: 0

## 2020-11-17 ASSESSMENT — PATIENT HEALTH QUESTIONNAIRE - PHQ9
SUM OF ALL RESPONSES TO PHQ QUESTIONS 1-9: 12
8. MOVING OR SPEAKING SO SLOWLY THAT OTHER PEOPLE COULD HAVE NOTICED. OR THE OPPOSITE, BEING SO FIGETY OR RESTLESS THAT YOU HAVE BEEN MOVING AROUND A LOT MORE THAN USUAL: 3
1. LITTLE INTEREST OR PLEASURE IN DOING THINGS: 0
5. POOR APPETITE OR OVEREATING: 0
SUM OF ALL RESPONSES TO PHQ QUESTIONS 1-9: 3
4. FEELING TIRED OR HAVING LITTLE ENERGY: 3
7. TROUBLE CONCENTRATING ON THINGS, SUCH AS READING THE NEWSPAPER OR WATCHING TELEVISION: 3
2. FEELING DOWN, DEPRESSED OR HOPELESS: 3
10. IF YOU CHECKED OFF ANY PROBLEMS, HOW DIFFICULT HAVE THESE PROBLEMS MADE IT FOR YOU TO DO YOUR WORK, TAKE CARE OF THINGS AT HOME, OR GET ALONG WITH OTHER PEOPLE: 1
3. TROUBLE FALLING OR STAYING ASLEEP: 1
SUM OF ALL RESPONSES TO PHQ QUESTIONS 1-9: 11
SUM OF ALL RESPONSES TO PHQ QUESTIONS 1-9: 3
SUM OF ALL RESPONSES TO PHQ QUESTIONS 1-9: 12
SUM OF ALL RESPONSES TO PHQ QUESTIONS 1-9: 3
6. FEELING BAD ABOUT YOURSELF - OR THAT YOU ARE A FAILURE OR HAVE LET YOURSELF OR YOUR FAMILY DOWN: 1
9. THOUGHTS THAT YOU WOULD BE BETTER OFF DEAD, OR OF HURTING YOURSELF: 1
SUM OF ALL RESPONSES TO PHQ9 QUESTIONS 1 & 2: 3

## 2020-11-17 ASSESSMENT — COLUMBIA-SUICIDE SEVERITY RATING SCALE - C-SSRS
2. HAVE YOU ACTUALLY HAD ANY THOUGHTS OF KILLING YOURSELF?: NO
1. WITHIN THE PAST MONTH, HAVE YOU WISHED YOU WERE DEAD OR WISHED YOU COULD GO TO SLEEP AND NOT WAKE UP?: NO
6. HAVE YOU EVER DONE ANYTHING, STARTED TO DO ANYTHING, OR PREPARED TO DO ANYTHING TO END YOUR LIFE?: NO

## 2020-11-17 NOTE — PATIENT INSTRUCTIONS
Personalized Preventive Plan for Michael Martin - 11/17/2020  Medicare offers a range of preventive health benefits. Some of the tests and screenings are paid in full while other may be subject to a deductible, co-insurance, and/or copay. Some of these benefits include a comprehensive review of your medical history including lifestyle, illnesses that may run in your family, and various assessments and screenings as appropriate. After reviewing your medical record and screening and assessments performed today your provider may have ordered immunizations, labs, imaging, and/or referrals for you. A list of these orders (if applicable) as well as your Preventive Care list are included within your After Visit Summary for your review. Other Preventive Recommendations:    · A preventive eye exam performed by an eye specialist is recommended every 1-2 years to screen for glaucoma; cataracts, macular degeneration, and other eye disorders. · A preventive dental visit is recommended every 6 months. · Try to get at least 150 minutes of exercise per week or 10,000 steps per day on a pedometer . · Order or download the FREE \"Exercise & Physical Activity: Your Everyday Guide\" from [employer]. Call 7-118.670.1190 or search [employer] online. · You need 7954-7520 mg of calcium and 7289-0264 IU of vitamin D per day. It is possible to meet your calcium requirement with diet alone, but a vitamin D supplement is usually necessary to meet this goal.  · When exposed to the sun, use a sunscreen that protects against both UVA and UVB radiation with an SPF of 30 or greater. Reapply every 2 to 3 hours or after sweating, drying off with a towel, or swimming. · Always wear a seat belt when traveling in a car. Always wear a helmet when riding a bicycle or motorcycle.

## 2020-11-17 NOTE — PROGRESS NOTES
Medicare Annual Wellness Visit  Name: Deborah Leonard Date: 2020   MRN: [id number] Sex: Female   Age: 78 y.o. Ethnicity: Non-/Non    : 1940 Race: Dalia Desai is here for Medicare AWV    Screenings for behavioral, psychosocial and functional/safety risks, and cognitive dysfunction are all negative except as indicated below. These results, as well as other patient data from the [address] form, are documented in Flowsheets linked to this Encounter. Allergies   Allergen Reactions    Iodine Shortness Of Breath, Swelling and Rash    Codeine      Patient cannot remember reaction    Oxycodone-Aspirin      unknown    Amoxicillin-Pot Clavulanate Nausea And Vomiting    Darvocet [Propoxyphene N-Acetaminophen] Nausea And Vomiting    Eggs Or Egg-Derived Products Nausea And Vomiting    Influenza Vaccines Nausea And Vomiting    Percodan [Oxycodone-Aspirin] Nausea And Vomiting and Other (See Comments)     sick    Percodan [Oxycodone-Aspirin] Nausea And Vomiting         Prior to Visit Medications    Medication Sig Taking?  Authorizing Provider   irbesartan (AVAPRO) 75 MG tablet Take 1 tablet by mouth daily Yes Daja Boyd DO   citalopram (CELEXA) 10 MG tablet Take 1 tablet by mouth daily Yes Daja Boyd DO   docusate sodium (COLACE) 100 MG capsule Take 1 capsule by mouth 2 times daily for 10 days Yes Adán Mullen MD   polyethylene glycol (MIRALAX) 17 GM/SCOOP powder Take 17 g by mouth daily for 15 days Yes Adán Mullen MD   pantoprazole (PROTONIX) 40 MG tablet Take 1 tablet by mouth every morning (before breakfast) Yes Daja Boyd DO   tiZANidine (ZANAFLEX) 2 MG tablet Take 1 tablet by mouth nightly as needed (back spasm) Yes Mikaela Armstrong MD   methotrexate (RHEUMATREX) 2.5 MG chemo tablet TAKE 5 TABLETS BY MOUTH ONCE A WEEK Yes Historical Provider, MD   predniSONE (DELTASONE) 5 MG tablet take 3 tablets (15mg) by mouth daily for a week  then 2 tablets (10mg) daily for a week  then 1 tablet (5mg) daily to continue Yes Historical Provider, MD   levothyroxine (SYNTHROID) 88 MCG tablet Take 1 tablet by mouth daily Yes Daja Boyd DO   amLODIPine (NORVASC) 5 MG tablet Take 1 tablet by mouth daily Yes Daja Boyd DO   nystatin (MYCOSTATIN) 289971 UNIT/GM powder Apply 3 times daily under the breasts Yes Daja Boyd DO   Handicap Placard MISC by Does not apply route Patient cannot walk 200 ft without stopping Duration: Lifetime Yes Daja Boyd DO   sucralfate (CARAFATE) 1 GM tablet Take 1 tablet by mouth 4 times daily Yes Marylou De Guzman PA-C   magnesium oxide (MAG-OX) 400 (241.3 Mg) MG TABS tablet Take 1 tablet by mouth daily Yes Marylou De Guzman PA-C   linaCLOtide (LINZESS) 72 MCG CAPS capsule Take 1 capsule by mouth every morning (before breakfast) Yes Daja Boyd DO   cetirizine (ZYRTEC) 5 MG tablet Take 1 tablet by mouth nightly Yes Lazaro Gibson,    fluticasone (FLONASE) 50 MCG/ACT nasal spray 2 sprays by Each Nostril route daily Yes Armaan Sheppard, MD   aspirin 81 MG tablet Take 81 mg by mouth 2 times daily  Yes Historical Provider, MD   Multiple Vitamins-Minerals (PRESERVISION AREDS 2) CAPS Take 1 capsule by mouth 2 times daily  Yes Historical Provider, MD   opium-belladonna (B&O SUPPRETTES) 16.2-60 MG suppository Place 1 suppository rectally 2 times daily as needed for Pain for up to 3 days.   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, treated with surgery and chemo    Cerebral artery occlusion with cerebral infarction ([facility] [address]     possibly TIA    Chronic back pain     Constipation     Depression     Elevated sed rate 12/11/2017    hx of    GERD (gastroesophageal reflux disease)     Hemorrhoids     history of    Hyperlipidemia     diet controlled    Hypertension  Left foot pain     dropped a Kettle on foot    Lumbosacral radiculopathy 8/6/2020    Macular degeneration     Peripheral neuropathy 8/6/2020    Poor vision     right eye / had bleeding behind eye, is receiving \"shots\" at this time  / 3/22/2019    Prosthetic eye globe     left eye implant;    Seasonal allergies     Skipped heart beats     on metoprolol; managed by Dr. Fam Rajan Sleep apnea     uses cpap at times     Thyroid disease        Past Surgical History:   Procedure Laterality Date   [address]    open?     COLECTOMY  1974    COLONOSCOPY  04/26/2012    and egd    COLONOSCOPY  05/30/2014    COLONOSCOPY  01/08/2015    CYST REMOVAL  years ago    upper gum    EYE SURGERY  years ago    left eye removal,  has artificial eye    HYSTERECTOMY  1974    JOINT REPLACEMENT Left 2010/2012    x 2-knee    TONSILLECTOMY      TUMOR EXCISION      from arm, fatty    UPPER GASTROINTESTINAL ENDOSCOPY  05/30/2014    with biopsy    UPPER GASTROINTESTINAL ENDOSCOPY  01/08/2015    UPPER GASTROINTESTINAL ENDOSCOPY N/A 4/1/2019    EGD ESOPHAGOGASTRODUODENOSCOPY  ++IODINE ALLERGY++ and bx performed by Yudith Luevano MD at [facility]. History   Problem Relation Age of Onset   Blase Liming Stroke Father     Hypertension Father     Heart Disease Father     Stroke Mother     Hypertension Mother     Other Mother         aneurysm    Heart Disease Mother     Hypertension Brother     Cancer Brother     Breast Cancer Sister     Hypertension Sister     Cancer Sister         breast ca    Heart Disease Sister     Hypertension Brother         parkinson    Heart Disease Brother     Cancer Brother     Cancer Brother     Cancer Brother     Cancer Brother     Heart Disease Brother     Cancer Brother     Cancer Sister     High Blood Pressure Daughter     Breast Cancer Daughter     High Blood Pressure Daughter        CareTeam (Including outside providers/suppliers regularly involved in providing care):   Patient Care Team:  Rosemary Boyd DO as PCP - General (Family Medicine)  Rosemary Boyd DO [facility]  Aleyda Edward MD as Consulting Physician (Electrophysiology)    Wt Readings from Last 3 Encounters:   11/17/20 193 lb (87.5 kg)   11/11/20 192 lb (87.1 kg)   11/08/20 193 lb (87.5 kg)     Vitals:    11/17/20 0848   BP: 124/80   Pulse: 73   Resp: 16   Temp: 97.1 °F (36.2 °C)   SpO2: 97%   Weight: 193 lb (87.5 kg)   Height: 4' 11\" (1.499 m)     Body mass index is 38.98 kg/m². Based upon direct observation of the patient, evaluation of cognition reveals recent and remote memory intact. General Appearance: alert and oriented to person, place and time, well developed and well- nourished, in no acute distress  Skin: warm and dry, no rash or erythema  Head: normocephalic and atraumatic  Eyes: pupils equal, round, and reactive to light, extraocular eye movements intact, conjunctivae normal  ENT: tympanic membrane, external ear and ear canal normal bilaterally, nose without deformity, nasal mucosa and turbinates normal without polyps  Neck: supple and non-tender without mass, no thyromegaly or thyroid nodules, no cervical lymphadenopathy  Pulmonary/Chest: clear to auscultation bilaterally- no wheezes, rales or rhonchi, normal air movement, no respiratory distress  Cardiovascular: normal rate, regular rhythm, normal S1 and S2, no murmurs, rubs, clicks, or gallops, distal pulses intact, no carotid bruits  Abdomen: soft, non-tender, non-distended, normal bowel sounds, no masses or organomegaly  Extremities: no cyanosis, clubbing or edema  Musculoskeletal: normal range of motion, no joint swelling, deformity or tenderness  Neurologic: reflexes normal and symmetric, no cranial nerve deficit, gait, coordination and speech normal    Patient's complete Health Risk Assessment and screening values have been reviewed and are found in Flowsheets.  The following problems were reviewed today and where indicated follow up appointments were made and/or referrals ordered. Positive Risk Factor Screenings with Interventions:     Fall Risk:  Timed Up and Go Test > 12 seconds? (Complete if either Fall Risk answers are Yes): no  2 or more falls in past year?: no  Fall with injury in past year?: (!) yes  Fall Risk Interventions:    · Home safety tips provided    Cognitive: Words recalled: 2 Words Recalled  Total Score Interpretation: Positive Mini-Cog  Cognitive Impairment Interventions:  · Patient declines any further evaluation/treatment for cognitive impairment    Depression:  PHQ-2 Score: 3  PHQ-9 Total Score: 12    Severity:1-4 = minimal depression, 5-9 = mild depression, 10-14 = moderate depression, 15-19 = moderately severe depression, 20-27 = severe depression  Depression Interventions:  · sees Psych    General Health and ACP:  General  In general, how would you say your health is?: Fair  In the past 7 days, have you experienced any of the following?  New or Increased Pain, New or Increased Fatigue, Loneliness, Social Isolation, Stress or Anger?: (!) New or Increased Pain, Stress, Anger  Do you get the social and emotional support that you need?: (!) No  Do you have a Living Will?: (!) No  Advance Directives     Power of  Living Will ACP-Advance Directive ACP-Power of     Not on File [place] on 10/21/13 Filed [address] [place] [address] Bessy Risk Interventions:  · No Living Will: Advance Care Planning addressed with patient today    Health Habits/Nutrition:  Health Habits/Nutrition  Do you exercise for at least 20 minutes 2-3 times per week?: (!) No  Have you lost any weight without trying in the past 3 months?: No  Do you eat fewer than 2 meals per day?: No  Have you seen a dentist within the past year?: Yes  Body mass index: (!) 38.98  Health Habits/Nutrition Interventions:  · Inadequate physical activity:  patient is not ready to increase his/her physical activity level at this time    Hearing/Vision:  No exam data present  Hearing/Vision  Do you or your family notice any trouble with your hearing?: No  Do you have difficulty driving, watching TV, or doing any of your daily activities because of your eyesight?: (!) Yes  Have you had an eye exam within the past year?: Yes  Hearing/Vision Interventions:  · Vision concerns:  due for injection 11/30    ADL:  ADLs  In the past 7 days, did you need help from others to perform any of the following everyday activities? Eating, dressing, grooming, bathing, toileting, or walking/balance?: (!) Walking/Balance  In the past 7 days, did you need help from others to take care of any of the following? Laundry, housekeeping, banking/finances, shopping, telephone use, food preparation, transportation, or taking medications?: (!) Transportation  ADL Interventions:  · Patient declines any further evaluation/treatment for this issue    Personalized Preventive Plan   Current Health Maintenance Status  Immunization History   Administered Date(s) Administered    Pneumococcal Conjugate 13-valent ([id number]) 04/04/2016    Tetanus 03/07/2019        Health Maintenance   Topic Date Due    DTaP/Tdap/Td vaccine (1 - Tdap) 07[birth year]02/1957    Shingles Vaccine (1 of 2) 07/02/1988    Pneumococcal 65+ years Vaccine (2 of 2 - PPSV23) 04/04/2017    Annual Wellness Visit (AWV)  05/29/2019    TSH testing  11/10/2021    Potassium monitoring  11/10/2021    Creatinine monitoring  11/10/2021    DEXA (modify frequency per FRAX score)  Addressed    Hepatitis A vaccine  Aged Out    Hepatitis B vaccine  Aged Out    Hib vaccine  Aged Out    Meningococcal (ACWY) vaccine  Aged Out     Recommendations for [employer] Due: see orders and patient instructions/AVS.  . Recommended screening schedule for the next 5-10 years is provided to the patient in written form: see Patient Moo Jarvis was seen today for medicare awv.     Diagnoses and all orders for

## 2020-11-18 ENCOUNTER — VIRTUAL VISIT (OUTPATIENT)
Dept: FAMILY MEDICINE CLINIC | Age: 80
End: 2020-11-18
Payer: MEDICARE

## 2020-11-18 PROCEDURE — 90791 PSYCH DIAGNOSTIC EVALUATION: CPT | Performed by: SOCIAL WORKER

## 2020-11-18 NOTE — PROGRESS NOTES
Cordelia Her is a 78 y.o. female evaluated via telephone on 11/18/2020. Consent:  She and/or health care decision maker is aware that that she may receive a bill for this telephone service, depending on her insurance coverage, and has provided verbal consent to proceed: Yes    I affirm this is a Patient Initiated Episode with a Patient who has not had a related appointment within my department in the past 7 days or scheduled within the next 24 hours. Patient identification was verified at the start of the visit: Yes      1 [facility] Rose Mary Rodrigues,ALLYSSA,ADDIEW-S   Visit Date: 11/18/2020   Time of appointment:  8:00AM   Time spent with Patient: 35 minutes. This is patient's first appointment. Reason for Consult:  Depression     Referring Provider/PCP:    Daja Peña DO      Pt provided informed consent for the behavioral health program. Discussed with patient model of service to include the limits of confidentiality (i.e. abuse reporting, suicide intervention, etc.) and short-term intervention focused approach. PRESENTING PROBLEM AND Cosmekaushal Zamora is a 78 y.o. female who presents for new evaluation and treatment of  depression. She has the following symptoms: depressed mood, excessive crying, fatigue/lack of energy and low self-esteem. Irritability. Onset of symptoms was approximately several years ago. Symptoms have been gradually worsening since that time. She denies current suicidal and homicidal ideation. Risk factors: previous episode of depression. Previous treatment includes Celexa. She complains of the following medication side effects: none. MENTAL STATUS EXAM  Mood was reported to be sad. Suicidal ideation was denied. Homicidal ideation was denied. Attitude was Cooperative. Speech: rate - WNL, rhythm -  WNL, volume - WNL   Verbalizations were goal directed and coherent.   Thought processes were intact and goal-oriented without evidence of delusions, hallucinations, obsessions, or jose luis; without significant cognitive distortions. Somewhat tangential.   Associations were characterized by intact cognitive processes. Pt was oriented to person, place, time, and general circumstances;  recent:  good. Insight and judgment were estimated to be good to fair, AEB, a good  understanding of cyclical maladaptive patterns, and the ability to use insight to inform behavior change. CURRENT MEDICATIONS    Current Outpatient Medications:     irbesartan (AVAPRO) 75 MG tablet, Take 1 tablet by mouth daily, Disp: 90 tablet, Rfl: 3    citalopram (CELEXA) 10 MG tablet, Take 1 tablet by mouth daily, Disp: 30 tablet, Rfl: 2    docusate sodium (COLACE) 100 MG capsule, Take 1 capsule by mouth 2 times daily for 10 days, Disp: 20 capsule, Rfl: 0    polyethylene glycol (MIRALAX) 17 GM/SCOOP powder, Take 17 g by mouth daily for 15 days, Disp: 255 g, Rfl: 0    opium-belladonna (B&O SUPPRETTES) 16.2-60 MG suppository, Place 1 suppository rectally 2 times daily as needed for Pain for up to 3 days. , Disp: 5 suppository, Rfl: 0    pantoprazole (PROTONIX) 40 MG tablet, Take 1 tablet by mouth every morning (before breakfast), Disp: 90 tablet, Rfl: 3    tiZANidine (ZANAFLEX) 2 MG tablet, Take 1 tablet by mouth nightly as needed (back spasm), Disp: 10 tablet, Rfl: 0    methotrexate (RHEUMATREX) 2.5 MG chemo tablet, TAKE 5 TABLETS BY MOUTH ONCE A WEEK, Disp: , Rfl:     predniSONE (DELTASONE) 5 MG tablet, take 3 tablets (15mg) by mouth daily for a week  then 2 tablets (10mg) daily for a week  then 1 tablet (5mg) daily to continue, Disp: , Rfl:     levothyroxine (SYNTHROID) 88 MCG tablet, Take 1 tablet by mouth daily, Disp: 90 tablet, Rfl: 1    amLODIPine (NORVASC) 5 MG tablet, Take 1 tablet by mouth daily, Disp: 90 tablet, Rfl: 1    nystatin (MYCOSTATIN) 010261 UNIT/GM powder, Apply 3 times daily under the breasts, Disp: 60 g, Rfl: 0    Handicap Placard MISC, by Does not apply route Patient cannot walk 200 ft without stopping Duration: Lifetime, Disp: 1 each, Rfl: 0    sucralfate (CARAFATE) 1 GM tablet, Take 1 tablet by mouth 4 times daily, Disp: 120 tablet, Rfl: 3    magnesium oxide (MAG-OX) 400 (241.3 Mg) MG TABS tablet, Take 1 tablet by mouth daily, Disp: 30 tablet, Rfl: 3    linaCLOtide (LINZESS) 72 MCG CAPS capsule, Take 1 capsule by mouth every morning (before breakfast), Disp: 30 capsule, Rfl: 0    cetirizine (ZYRTEC) 5 MG tablet, Take 1 tablet by mouth nightly, Disp: 30 tablet, Rfl: 0    fluticasone (FLONASE) 50 MCG/ACT nasal spray, 2 sprays by Each Nostril route daily, Disp: 1 Bottle, Rfl: 3    aspirin 81 MG tablet, Take 81 mg by mouth 2 times daily , Disp: , Rfl:     Multiple Vitamins-Minerals (PRESERVISION AREDS 2) CAPS, Take 1 capsule by mouth 2 times daily , Disp: , Rfl:      FAMILY MEDICAL/MH HISTORY   Her family history includes Breast Cancer in her daughter and sister; Cancer in her brother, brother, brother, brother, brother, brother, sister, and sister; Heart Disease in her brother, brother, father, mother, and sister; High Blood Pressure in her daughter and daughter; Hypertension in her brother, brother, father, mother, and sister; Other in her mother; Stroke in her father and mother. PATIENT MENTAL HEALTH HISTORY  Patient reports she was in counseling briefly following the death of her spouse. PSYCHOSOCIAL HISTORY  Current living situation: lives alone     Work/Education: on disability     Support system: Jose Barraza reports having three children but none live locally. Some support reported. Spouse  28 years ago. Has services through [address], [address] of [place] [address]. Pentecostalism/Spirituality: identifies as Synagogue     DRUG AND ALCOHOL CURRENT USE/HISTORY  TOBACCO:  She reports that she has never smoked. She has never used smokeless tobacco.  ALCOHOL:  She reports no history of alcohol use. OTHER SUBSTANCES: She reports no history of drug use. [facility] presented to the appointment today for evaluation and treatment of symptoms of depression. She is currently deemed no risk to herself or others and meets criteria for outpatient tx. Hattie's symptoms are well controlled at this time. Periods of increased depression appear to be congruent with psychosocial stressors/changes and physical limitations. Patient reports many of these have been long-standing but noticing an increase in sx since the pandemic causing inability to do things she had done prior. She will also benefit from brief and solution-focused consultation to address cognitive and behavioral interventions for depression symptoms. Patient meets criteria for Major Depressive Disorder, recurrent, moderate, without psychotic features. Agustin Randolph was in agreement with recommendations. PHQ Scores 11/17/2020 11/17/2020 1/15/2020 10/3/2019 3/7/2019 9/27/2018 9/6/2018   PHQ2 Score - 3 0 4 2 3 2   PHQ9 Score 12 3 0 15 2 6 2     Interpretation of Total Score Depression Severity: 1-4 = Minimal depression, 5-9 = Mild depression, 10-14 = Moderate depression, 15-19 = Moderately severe depression, 20-27 = Severe depression    How often pt has had thoughts of death or hurting self (if PHQ positive for depression): none     DIAGNOSIS  Agustin Randolph was seen today for depression.     Diagnoses and all orders for this visit:    Moderate episode of recurrent major depressive disorder ([address]      INTERVENTION  Discussed prevalence of  depression for general population, Discussed and set plan for behavioral activation, [place]-setting to identify pt's primary goals for [facility] visit / overall health and Identified relevant behavioral strategies for targeting depression including maintaining routine/structure, implementation of adaptive coping and distress tolerance skills       PLAN  -implement adaptive coping when triggered (baking, watching TV, calling friends/family)   -maintain daily routine/structure   -F/U in one month or sooner if needed     INTERACTIVE COMPLEXITY  Is interactive complexity present?   No  Reason:  N/A  Additional Supporting Information:  N/A       Electronically signed by DORCAS Truong on 11/18/20

## 2020-12-02 ENCOUNTER — TELEPHONE (OUTPATIENT)
Dept: FAMILY MEDICINE CLINIC | Age: 80
End: 2020-12-02

## 2020-12-02 NOTE — TELEPHONE ENCOUNTER
Patient called with complaints of constipation and being in so much pain that she had to cancel her appointment with Dr Marbella Torres   She wants to know what she can do for the pain and constipation

## 2020-12-02 NOTE — TELEPHONE ENCOUNTER
She did all those things and had 3 bowel movements, now she is thinking she has an ulcer, wants to know if she should use zegerid that she has from Dr Armen Henry from years ago

## 2020-12-14 ENCOUNTER — TELEPHONE (OUTPATIENT)
Dept: FAMILY MEDICINE CLINIC | Age: 80
End: 2020-12-14

## 2020-12-18 ENCOUNTER — VIRTUAL VISIT (OUTPATIENT)
Dept: FAMILY MEDICINE CLINIC | Age: 80
End: 2020-12-18
Payer: MEDICARE

## 2020-12-18 PROCEDURE — 90832 PSYTX W PT 30 MINUTES: CPT | Performed by: SOCIAL WORKER

## 2020-12-18 NOTE — PROGRESS NOTES
Adrien Echeverria is a 78 y.o. female being evaluated by a Virtual Visit (video visit) encounter to address concerns as mentioned above. A caregiver was present when appropriate. Due to this being a TeleHealth encounter (During ZJQIW-99 public health emergency), evaluation of the following organ systems was limited: Vitals/Constitutional/EENT/Resp/CV/GI//MS/Neuro/Skin/Heme-Lymph-Imm. Pursuant to the emergency declaration under the [address] [place] [address] and the [employer] Act, this Virtual Visit was conducted with patient's (and/or legal guardian's) consent, to reduce the patient's risk of exposure to COVID-19 and provide necessary medical care. The patient (and/or legal guardian) has also been advised to contact this office for worsening conditions or problems, and seek emergency medical treatment and/or call 911 if deemed necessary. Patient identification was verified at the start of the visit: Yes    Services were provided through a telephone discussion virtually to substitute for in-person clinic visit. Patient and provider were located at their individual homes. ADULT BEHAVIORAL HEALTH FOLLOW UP  Kareen Garsia       Visit Date: 12/18/2020   Time of appointment:  8:30AM   Time spent with Patient: 30 minutes. This is patient's second appointment. Reason for Consult:  Depression     Referring Provider/PCP:    No ref. provider found  Mary Ann Perry DO      Pt provided informed consent for the behavioral health program. Discussed with patient model of service to include the limits of confidentiality (i.e. abuse reporting, suicide intervention, etc.) and short-term intervention focused approach. Pt indicated understanding.     SUBJECTIVE Kalpana Russell is a 78 y.o. female who presents for follow up of depression. She reports she had been doing okay but struggling the past few weeks due to upcoming holidays and unable to see family due to covid. MENTAL STATUS EXAM  Mood was depressed  Suicidal ideation was denied. Homicidal ideation was denied. Attitude was Cooperative. Speech: rate - WNL, rhythm - WNL, volume - WNL. Verbalizations were goal directed and coherent. Thought processes were intact and goal-oriented without evidence of delusions, hallucinations, obsessions, or jose luis; without significant cognitive distortions. Associations were characterized by intact cognitive processes. Pt was oriented to person, place, time, and general circumstances;  recent:  good. Insight and judgment were estimated to be good, AEB, a good  understanding of cyclical maladaptive patterns, and the ability to use insight to inform behavior change. [address] [facility] [address] presented to the appointment today for evaluation and treatment of symptoms of depression. She is currently deemed no risk to herself or others and meets criteria for depression. Patient is describing some periods of increased sadness related to upcoming holiday and not being able to see family due to the pandemic. She reports it has made her sad thinking of past times and memories that are no longer. Patient denies impairment in functioning, endorses eating/sleepign well. Patient reports being able to adaptively manage sx through talking with family, baking, etc.  Patient did state she was not sure if SSRI was effective. Will route to PCP to advise but also unsure of if situational. Patient still has [facility]  Mirian Pontiff she says is helpful.       PHQ Scores 11/17/2020 11/17/2020 1/15/2020 10/3/2019 3/7/2019 9/27/2018 9/6/2018   PHQ2 Score - 3 0 4 2 3 2   PHQ9 Score 12 3 0 15 2 6 2 Interpretation of Total Score Depression Severity: 1-4 = Minimal depression, 5-9 = Mild depression, 10-14 = Moderate depression, 15-19 = Moderately severe depression, 20-27 = Severe depression    No flowsheet data found. Interpretation of YUE-7 score: 5-9 = mild anxiety, 10-14 = moderate anxiety, 15+ = severe anxiety. Recommend referral to behavioral health for scores 10 or greater. DIAGNOSIS  Jono was seen today for depression. Diagnoses and all orders for this visit:    Moderate episode of recurrent major depressive disorder ([facility] [address]      INTERVENTION  CBT to target depression, Identified maladaptive thoughts and Identified relevant behavioral strategies for targeting depression including adaptive coping mechanisms, reframes. Discussed locus of control. Discussed plan for upcoming holidays to ensure social support and activity virtually. PLAN  -f/u in about one month t  -will route to PCP    INTERACTIVE COMPLEXITY  Is interactive complexity present?   No  Reason:  N/A  Additional Supporting Information:  N/A       Electronically signed by DORCAS Lopez on 12/18/20

## 2020-12-18 NOTE — Clinical Note
Celina Watts said she does not feel celexa is working but also unsure if this is related to recent situational/holiday stress. Just wanted to update you.

## 2021-01-07 ENCOUNTER — TELEPHONE (OUTPATIENT)
Dept: FAMILY MEDICINE CLINIC | Age: 81
End: 2021-01-07

## 2021-01-07 NOTE — TELEPHONE ENCOUNTER
Pt reports she is having trouble with blood presure being too high and also still having dizziness. Best number to reach her is [phone]. Unable to reach staff due to Pt care.

## 2021-01-13 ENCOUNTER — OFFICE VISIT (OUTPATIENT)
Dept: FAMILY MEDICINE CLINIC | Age: 81
End: 2021-01-13
Payer: MEDICARE

## 2021-01-13 ENCOUNTER — SOCIAL WORK (OUTPATIENT)
Dept: FAMILY MEDICINE CLINIC | Age: 81
End: 2021-01-13

## 2021-01-13 VITALS
DIASTOLIC BLOOD PRESSURE: 84 MMHG | HEIGHT: 59 IN | BODY MASS INDEX: 38.1 KG/M2 | WEIGHT: 189 LBS | TEMPERATURE: 97.4 F | HEART RATE: 69 BPM | OXYGEN SATURATION: 97 % | SYSTOLIC BLOOD PRESSURE: 148 MMHG | RESPIRATION RATE: 16 BRPM

## 2021-01-13 DIAGNOSIS — R60.9 PERIPHERAL EDEMA: ICD-10-CM

## 2021-01-13 DIAGNOSIS — I10 ESSENTIAL HYPERTENSION: Primary | Chronic | ICD-10-CM

## 2021-01-13 DIAGNOSIS — R68.89 FORGETFULNESS: ICD-10-CM

## 2021-01-13 PROCEDURE — G8417 CALC BMI ABV UP PARAM F/U: HCPCS | Performed by: FAMILY MEDICINE

## 2021-01-13 PROCEDURE — 4040F PNEUMOC VAC/ADMIN/RCVD: CPT | Performed by: FAMILY MEDICINE

## 2021-01-13 PROCEDURE — 99214 OFFICE O/P EST MOD 30 MIN: CPT | Performed by: FAMILY MEDICINE

## 2021-01-13 PROCEDURE — G8484 FLU IMMUNIZE NO ADMIN: HCPCS | Performed by: FAMILY MEDICINE

## 2021-01-13 PROCEDURE — G8427 DOCREV CUR MEDS BY ELIG CLIN: HCPCS | Performed by: FAMILY MEDICINE

## 2021-01-13 PROCEDURE — 1123F ACP DISCUSS/DSCN MKR DOCD: CPT | Performed by: FAMILY MEDICINE

## 2021-01-13 PROCEDURE — 1036F TOBACCO NON-USER: CPT | Performed by: FAMILY MEDICINE

## 2021-01-13 PROCEDURE — 1090F PRES/ABSN URINE INCON ASSESS: CPT | Performed by: FAMILY MEDICINE

## 2021-01-13 PROCEDURE — G8400 PT W/DXA NO RESULTS DOC: HCPCS | Performed by: FAMILY MEDICINE

## 2021-01-13 RX ORDER — HYDROCHLOROTHIAZIDE 25 MG/1
25 TABLET ORAL DAILY
Qty: 30 TABLET | Refills: 0 | Status: ON HOLD | OUTPATIENT
Start: 2021-01-13 | End: 2021-02-03 | Stop reason: HOSPADM

## 2021-01-13 NOTE — PROGRESS NOTES
SW met with patient who presented to PCP for f/u appointment. Reports continuing to struggle with periods of anxiety related to the covid pandemic and chronic health issues. She continues to report significant family and social supports and talking with them daily. Offered to reschedule counseling or f/u in one month to see how she is doing as she denies debilitating sx/functional impairments. Will f/u with patient in one month. Provided contact information if would like to talk sooner.

## 2021-01-13 NOTE — PROGRESS NOTES
1/13/2021    Chief Complaint   Patient presents with    Headache     having high blood pressure went to urgent care        Jaswant Alonso is a 78 y.o. patient that presents today for:    Hypertension: Here for follow up chronic hypertension. Patient is  compliant with lifestyle modifications like exercise and adherence to a low salt diet. Patient is not well controlled. Denies chest pain, diaphoresis, dyspnea, dyspnea on exertion, peripheral edema, palpitations, HA, visual issues. Med list reviewed. Taking as prescribed. No adverse effects. Edema: Patient complains of edema. The location of the edema is lower leg(s) bilateral.  The edema has been mild and moderate. Onset of symptoms was several months ago, stable since that time. The edema is present all day. The patient states the problem is long-standing. The swelling has been aggravated by dependency of involved area and use of calcium channel blockers, relieved by elevation of involved area, and been associated with nothing. Cardiac risk factors include hypertension, obesity (BMI >= 30 kg/m2) and sedentary lifestyle. Feeling forgetful. Concerned about dementia. Has left oven on at home. No issues with medications. Not concerned about finances. Would like to see Dr. Donna Michaels. Feeling well otherwise, no complaints. Patient's past medical, surgical, social and/or family history reviewed, updated in chart, and are non-contributory (unless otherwise stated). Medications and allergies also reviewed and updated in chart.     ROS Unless otherwise specified  Review of Systems - General ROS: negative for - chills, fatigue, fever, night sweats, sleep disturbance, weight gain or weight loss  Psychological ROS: negative for - anxiety, behavioral disorder, depression, hallucinations, irritability, memory difficulties, mood swings, sleep disturbances or suicidal ideation  ENT ROS: negative for - epistaxis, headaches, hearing change, nasal congestion, nasal quadrants    Extremities:Dorsalis pedis pulses palpated bilaterally, no clubbing, cyanosis, non pitting edema bilaterally     SKIN: warm, dry, no lesions, jaundice, petechiae, pallor, cyanosis, ecchymosis    NEURO: gross motor exam normal by observation, gait normal    Mental status - alert, oriented to person, place, and time, normal mood, behavior, speech, dress, motor activity, and thought processes      Assessment/Plan  [facility] was seen today for headache. Diagnoses and all orders for this visit:    Essential hypertension  -     START: hydroCHLOROthiazide (HYDRODIURIL) 25 MG tablet; Take 1 tablet by mouth daily  - rto 1 week for BP check and BMP    Peripheral edema  -     hydroCHLOROthiazide (HYDRODIURIL) 25 MG tablet; Take 1 tablet by mouth daily    Ariella Fontan. Return in about 1 week (around 1/20/2021), or if symptoms worsen or fail to improve. Call or go to ED immediately if symptoms worsen or persist.    Educational materials and/or home exercises printed for patient's review and were included in patient instructions on his/her After Visit Summary and given to patient at the end of visit. Counseled regarding above diagnosis, including possible risks and complications,  especially if left uncontrolled. Counseled regarding the possible side effects, risks, benefits and alternatives to treatment; patient and/or guardian verbalizes understanding, agrees, feels comfortable with and wishes to proceed with above treatment plan. Advised patient to call with any new medication issues, and read all Rx info from pharmacy to assure aware of all possible risks and side effects of medication before taking. Reviewed age and gender appropriate health screening exams and vaccinations.   Advised patient regarding importance of keeping up with recommended health maintenance and to schedule as soon as possible if overdue, as this is important in assessing for undiagnosed pathology, especially cancer, as well as protecting against potentially harmful/life threatening disease. Patient and/or guardian verbalizes understanding and agrees with above counseling, assessment and plan. All questions answered.       Daja Peña, DO

## 2021-01-14 DIAGNOSIS — F41.9 ANXIETY: ICD-10-CM

## 2021-01-14 RX ORDER — LORAZEPAM 0.5 MG/1
0.5 TABLET ORAL DAILY PRN
Qty: 30 TABLET | Refills: 2 | Status: SHIPPED
Start: 2021-01-14 | End: 2021-03-03 | Stop reason: SDUPTHER

## 2021-01-18 ENCOUNTER — TELEPHONE (OUTPATIENT)
Dept: FAMILY MEDICINE CLINIC | Age: 81
End: 2021-01-18

## 2021-01-19 ENCOUNTER — INITIAL CONSULT (OUTPATIENT)
Dept: GERIATRIC MEDICINE | Age: 81
End: 2021-01-19
Payer: MEDICARE

## 2021-01-19 VITALS
SYSTOLIC BLOOD PRESSURE: 140 MMHG | RESPIRATION RATE: 22 BRPM | HEART RATE: 72 BPM | BODY MASS INDEX: 37.52 KG/M2 | WEIGHT: 186.1 LBS | TEMPERATURE: 96.6 F | HEIGHT: 59 IN | DIASTOLIC BLOOD PRESSURE: 56 MMHG

## 2021-01-19 DIAGNOSIS — I70.90 ATHEROSCLEROSIS: ICD-10-CM

## 2021-01-19 DIAGNOSIS — G31.84 MCI (MILD COGNITIVE IMPAIRMENT): ICD-10-CM

## 2021-01-19 DIAGNOSIS — I70.90 ATHEROSCLEROSIS: Primary | ICD-10-CM

## 2021-01-19 LAB — HOMOCYSTEINE: 14 UMOL/L (ref 0–15)

## 2021-01-19 PROCEDURE — 1090F PRES/ABSN URINE INCON ASSESS: CPT | Performed by: INTERNAL MEDICINE

## 2021-01-19 PROCEDURE — 4040F PNEUMOC VAC/ADMIN/RCVD: CPT | Performed by: INTERNAL MEDICINE

## 2021-01-19 PROCEDURE — 36415 COLL VENOUS BLD VENIPUNCTURE: CPT | Performed by: INTERNAL MEDICINE

## 2021-01-19 PROCEDURE — G8400 PT W/DXA NO RESULTS DOC: HCPCS | Performed by: INTERNAL MEDICINE

## 2021-01-19 PROCEDURE — 1036F TOBACCO NON-USER: CPT | Performed by: INTERNAL MEDICINE

## 2021-01-19 PROCEDURE — G8484 FLU IMMUNIZE NO ADMIN: HCPCS | Performed by: INTERNAL MEDICINE

## 2021-01-19 PROCEDURE — G8427 DOCREV CUR MEDS BY ELIG CLIN: HCPCS | Performed by: INTERNAL MEDICINE

## 2021-01-19 PROCEDURE — 1123F ACP DISCUSS/DSCN MKR DOCD: CPT | Performed by: INTERNAL MEDICINE

## 2021-01-19 PROCEDURE — 99212 OFFICE O/P EST SF 10 MIN: CPT | Performed by: INTERNAL MEDICINE

## 2021-01-19 PROCEDURE — G8417 CALC BMI ABV UP PARAM F/U: HCPCS | Performed by: INTERNAL MEDICINE

## 2021-01-19 RX ORDER — CYANOCOBALAMIN 1000 UG/ML
1000 INJECTION INTRAMUSCULAR; SUBCUTANEOUS ONCE
Status: COMPLETED | OUTPATIENT
Start: 2021-01-19 | End: 2021-01-19

## 2021-01-19 RX ADMIN — CYANOCOBALAMIN 1000 MCG: 1000 INJECTION INTRAMUSCULAR; SUBCUTANEOUS at 16:11

## 2021-01-20 ENCOUNTER — OFFICE VISIT (OUTPATIENT)
Dept: FAMILY MEDICINE CLINIC | Age: 81
End: 2021-01-20
Payer: MEDICARE

## 2021-01-20 ENCOUNTER — TELEPHONE (OUTPATIENT)
Dept: GERIATRIC MEDICINE | Age: 81
End: 2021-01-20

## 2021-01-20 VITALS
BODY MASS INDEX: 38.1 KG/M2 | DIASTOLIC BLOOD PRESSURE: 68 MMHG | TEMPERATURE: 97.1 F | HEART RATE: 65 BPM | RESPIRATION RATE: 16 BRPM | HEIGHT: 59 IN | WEIGHT: 189 LBS | OXYGEN SATURATION: 97 % | SYSTOLIC BLOOD PRESSURE: 117 MMHG

## 2021-01-20 DIAGNOSIS — I10 ESSENTIAL HYPERTENSION: Chronic | ICD-10-CM

## 2021-01-20 DIAGNOSIS — I10 ESSENTIAL HYPERTENSION: Primary | Chronic | ICD-10-CM

## 2021-01-20 LAB
ANION GAP SERPL CALCULATED.3IONS-SCNC: 9 MMOL/L (ref 7–16)
BUN BLDV-MCNC: 27 MG/DL (ref 8–23)
CALCIUM SERPL-MCNC: 9.1 MG/DL (ref 8.6–10.2)
CHLORIDE BLD-SCNC: 101 MMOL/L (ref 98–107)
CO2: 27 MMOL/L (ref 22–29)
CREAT SERPL-MCNC: 0.8 MG/DL (ref 0.5–1)
GFR AFRICAN AMERICAN: >60
GFR NON-AFRICAN AMERICAN: >60 ML/MIN/1.73
GLUCOSE BLD-MCNC: 85 MG/DL (ref 74–99)
POTASSIUM SERPL-SCNC: 4.4 MMOL/L (ref 3.5–5)
SODIUM BLD-SCNC: 137 MMOL/L (ref 132–146)

## 2021-01-20 PROCEDURE — 1123F ACP DISCUSS/DSCN MKR DOCD: CPT | Performed by: FAMILY MEDICINE

## 2021-01-20 PROCEDURE — G8417 CALC BMI ABV UP PARAM F/U: HCPCS | Performed by: FAMILY MEDICINE

## 2021-01-20 PROCEDURE — 36415 COLL VENOUS BLD VENIPUNCTURE: CPT | Performed by: FAMILY MEDICINE

## 2021-01-20 PROCEDURE — 1036F TOBACCO NON-USER: CPT | Performed by: FAMILY MEDICINE

## 2021-01-20 PROCEDURE — G8427 DOCREV CUR MEDS BY ELIG CLIN: HCPCS | Performed by: FAMILY MEDICINE

## 2021-01-20 PROCEDURE — 99213 OFFICE O/P EST LOW 20 MIN: CPT | Performed by: FAMILY MEDICINE

## 2021-01-20 PROCEDURE — G8400 PT W/DXA NO RESULTS DOC: HCPCS | Performed by: FAMILY MEDICINE

## 2021-01-20 PROCEDURE — G8484 FLU IMMUNIZE NO ADMIN: HCPCS | Performed by: FAMILY MEDICINE

## 2021-01-20 PROCEDURE — 1090F PRES/ABSN URINE INCON ASSESS: CPT | Performed by: FAMILY MEDICINE

## 2021-01-20 PROCEDURE — 4040F PNEUMOC VAC/ADMIN/RCVD: CPT | Performed by: FAMILY MEDICINE

## 2021-01-20 NOTE — PROGRESS NOTES
visit:    Essential hypertension  -     BASIC METABOLIC PANEL; Future          Return in about 4 weeks (around 2/17/2021), or if symptoms worsen or fail to improve. Daja ePña, DO    Call or go to ED immediately if symptoms worsen or persist.    Educational materials and/or home exercises printed for patient's review and were included in patient instructions on his/her After Visit Summary and given to patient at the end of visit. Counseled regarding above diagnosis, including possible risks and complications,  especially if left uncontrolled. Counseled regarding the possible side effects, risks, benefits and alternatives to treatment; patient and/or guardian verbalizes understanding, agrees, feels comfortable with and wishes to proceed with above treatment plan. Advised patient to call with any new medication issues, and read all Rx info from pharmacy to assure aware of all possible risks and side effects of medication before taking. Reviewed age and gender appropriate health screening exams and vaccinations. Advised patient regarding importance of keeping up with recommended health maintenance and to schedule as soon as possible if overdue, as this is important in assessing for undiagnosed pathology, especially cancer, as well as protecting against potentially harmful/life threatening disease. Patient and/or guardian verbalizes understanding and agrees with above counseling, assessment and plan. All questions answered.

## 2021-01-22 ENCOUNTER — TELEPHONE (OUTPATIENT)
Dept: GERIATRIC MEDICINE | Age: 81
End: 2021-01-22

## 2021-01-22 NOTE — TELEPHONE ENCOUNTER
Pt notified of Homocysteine level result, & that DR has no new orders re: it. Pt would like additional detail re: what the Homocysteine level is / means. Please call pt to discuss & advise.

## 2021-01-26 ENCOUNTER — TELEPHONE (OUTPATIENT)
Dept: FAMILY MEDICINE CLINIC | Age: 81
End: 2021-01-26

## 2021-01-26 RX ORDER — CETIRIZINE HYDROCHLORIDE 5 MG/1
5 TABLET ORAL NIGHTLY
Qty: 30 TABLET | Refills: 0 | Status: SHIPPED
Start: 2021-01-26 | End: 2021-08-10

## 2021-01-26 RX ORDER — MECLIZINE HYDROCHLORIDE 25 MG/1
25 TABLET ORAL 3 TIMES DAILY PRN
Qty: 30 TABLET | Refills: 0 | Status: SHIPPED | OUTPATIENT
Start: 2021-01-26 | End: 2021-02-05

## 2021-01-29 ENCOUNTER — APPOINTMENT (OUTPATIENT)
Dept: GENERAL RADIOLOGY | Age: 81
DRG: 641 | End: 2021-01-29
Payer: MEDICARE

## 2021-01-29 ENCOUNTER — APPOINTMENT (OUTPATIENT)
Dept: CT IMAGING | Age: 81
DRG: 641 | End: 2021-01-29
Payer: MEDICARE

## 2021-01-29 ENCOUNTER — HOSPITAL ENCOUNTER (INPATIENT)
Age: 81
LOS: 1 days | Discharge: HOME | DRG: 641 | End: 2021-02-03
Attending: EMERGENCY MEDICINE | Admitting: INTERNAL MEDICINE
Payer: MEDICARE
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
## 2021-01-29 DIAGNOSIS — R55 NEAR SYNCOPE: Primary | ICD-10-CM

## 2021-01-29 PROBLEM — R26.81 GAIT INSTABILITY: Chronic | Status: ACTIVE | Noted: 2020-05-20

## 2021-01-29 LAB
ALBUMIN SERPL-MCNC: 4 G/DL (ref 3.5–5.2)
ALP BLD-CCNC: 101 U/L (ref 35–104)
ALT SERPL-CCNC: 14 U/L (ref 0–32)
ANION GAP SERPL CALCULATED.3IONS-SCNC: 13 MMOL/L (ref 7–16)
AST SERPL-CCNC: 21 U/L (ref 0–31)
BACTERIA: ABNORMAL /HPF
BASOPHILS ABSOLUTE: 0.05 E9/L (ref 0–0.2)
BASOPHILS RELATIVE PERCENT: 0.6 % (ref 0–2)
BILIRUB SERPL-MCNC: 0.2 MG/DL (ref 0–1.2)
BILIRUBIN URINE: NEGATIVE
BLOOD, URINE: NORMAL
BUN BLDV-MCNC: 26 MG/DL (ref 8–23)
CALCIUM SERPL-MCNC: 9 MG/DL (ref 8.6–10.2)
CHLORIDE BLD-SCNC: 101 MMOL/L (ref 98–107)
CLARITY: CLEAR
CO2: 25 MMOL/L (ref 22–29)
COLOR: NORMAL
CREAT SERPL-MCNC: 0.8 MG/DL (ref 0.5–1)
EKG ATRIAL RATE: 63 BPM
EKG P AXIS: 79 DEGREES
EKG P-R INTERVAL: 174 MS
EKG Q-T INTERVAL: 410 MS
EKG QRS DURATION: 78 MS
EKG QTC CALCULATION (BAZETT): 419 MS
EKG R AXIS: -6 DEGREES
EKG T AXIS: 57 DEGREES
EKG VENTRICULAR RATE: 63 BPM
EOSINOPHILS ABSOLUTE: 0.13 E9/L (ref 0.05–0.5)
EOSINOPHILS RELATIVE PERCENT: 1.7 % (ref 0–6)
GFR AFRICAN AMERICAN: >60
GFR NON-AFRICAN AMERICAN: >60 ML/MIN/1.73
GLUCOSE BLD-MCNC: 104 MG/DL (ref 74–99)
GLUCOSE URINE: NEGATIVE MG/DL
HCT VFR BLD CALC: 37.6 % (ref 34–48)
HEMOGLOBIN: 12.5 G/DL (ref 11.5–15.5)
IMMATURE GRANULOCYTES #: 0.05 E9/L
IMMATURE GRANULOCYTES %: 0.6 % (ref 0–5)
KETONES, URINE: NEGATIVE MG/DL
LEUKOCYTE ESTERASE, URINE: NEGATIVE
LYMPHOCYTES ABSOLUTE: 2.81 E9/L (ref 1.5–4)
LYMPHOCYTES RELATIVE PERCENT: 36.1 % (ref 20–42)
MCH RBC QN AUTO: 28 PG (ref 26–35)
MCHC RBC AUTO-ENTMCNC: 33.2 % (ref 32–34.5)
MCV RBC AUTO: 84.3 FL (ref 80–99.9)
MONOCYTES ABSOLUTE: 0.71 E9/L (ref 0.1–0.95)
MONOCYTES RELATIVE PERCENT: 9.1 % (ref 2–12)
NEUTROPHILS ABSOLUTE: 4.03 E9/L (ref 1.8–7.3)
NEUTROPHILS RELATIVE PERCENT: 51.9 % (ref 43–80)
NITRITE, URINE: NEGATIVE
PDW BLD-RTO: 13.8 FL (ref 11.5–15)
PH UA: 5.5 (ref 5–9)
PLATELET # BLD: 237 E9/L (ref 130–450)
PMV BLD AUTO: 9.7 FL (ref 7–12)
POTASSIUM SERPL-SCNC: 3.9 MMOL/L (ref 3.5–5)
PROTEIN UA: NEGATIVE MG/DL
RBC # BLD: 4.46 E12/L (ref 3.5–5.5)
RBC UA: ABNORMAL /HPF (ref 0–2)
SODIUM BLD-SCNC: 139 MMOL/L (ref 132–146)
SPECIFIC GRAVITY UA: <=1.005 (ref 1–1.03)
TOTAL PROTEIN: 7.2 G/DL (ref 6.4–8.3)
TROPONIN: <0.01 NG/ML (ref 0–0.03)
TSH SERPL DL<=0.05 MIU/L-ACNC: 4.33 UIU/ML (ref 0.27–4.2)
UROBILINOGEN, URINE: 0.2 E.U./DL
WBC # BLD: 7.8 E9/L (ref 4.5–11.5)
WBC UA: ABNORMAL /HPF (ref 0–5)

## 2021-01-29 PROCEDURE — 84484 ASSAY OF TROPONIN QUANT: CPT

## 2021-01-29 PROCEDURE — 72131 CT LUMBAR SPINE W/O DYE: CPT

## 2021-01-29 PROCEDURE — 6370000000 HC RX 637 (ALT 250 FOR IP): Performed by: INTERNAL MEDICINE

## 2021-01-29 PROCEDURE — 96361 HYDRATE IV INFUSION ADD-ON: CPT

## 2021-01-29 PROCEDURE — G0378 HOSPITAL OBSERVATION PER HR: HCPCS

## 2021-01-29 PROCEDURE — 71045 X-RAY EXAM CHEST 1 VIEW: CPT

## 2021-01-29 PROCEDURE — 72125 CT NECK SPINE W/O DYE: CPT

## 2021-01-29 PROCEDURE — 99284 EMERGENCY DEPT VISIT MOD MDM: CPT

## 2021-01-29 PROCEDURE — 80053 COMPREHEN METABOLIC PANEL: CPT

## 2021-01-29 PROCEDURE — 81001 URINALYSIS AUTO W/SCOPE: CPT

## 2021-01-29 PROCEDURE — 93010 ELECTROCARDIOGRAM REPORT: CPT | Performed by: INTERNAL MEDICINE

## 2021-01-29 PROCEDURE — 70450 CT HEAD/BRAIN W/O DYE: CPT

## 2021-01-29 PROCEDURE — 2580000003 HC RX 258: Performed by: INTERNAL MEDICINE

## 2021-01-29 PROCEDURE — 6360000002 HC RX W HCPCS: Performed by: INTERNAL MEDICINE

## 2021-01-29 PROCEDURE — 84443 ASSAY THYROID STIM HORMONE: CPT

## 2021-01-29 PROCEDURE — 85025 COMPLETE CBC W/AUTO DIFF WBC: CPT

## 2021-01-29 PROCEDURE — 2580000003 HC RX 258: Performed by: EMERGENCY MEDICINE

## 2021-01-29 PROCEDURE — 93005 ELECTROCARDIOGRAM TRACING: CPT | Performed by: EMERGENCY MEDICINE

## 2021-01-29 PROCEDURE — 96372 THER/PROPH/DIAG INJ SC/IM: CPT

## 2021-01-29 RX ORDER — 0.9 % SODIUM CHLORIDE 0.9 %
500 INTRAVENOUS SOLUTION INTRAVENOUS ONCE
Status: COMPLETED | OUTPATIENT
Start: 2021-01-29 | End: 2021-01-29

## 2021-01-29 RX ORDER — PROMETHAZINE HYDROCHLORIDE 25 MG/1
12.5 TABLET ORAL EVERY 6 HOURS PRN
Status: DISCONTINUED | OUTPATIENT
Start: 2021-01-29 | End: 2021-02-03 | Stop reason: HOSPADM

## 2021-01-29 RX ORDER — AMLODIPINE BESYLATE 5 MG/1
5 TABLET ORAL DAILY
Status: DISCONTINUED | OUTPATIENT
Start: 2021-01-29 | End: 2021-02-03 | Stop reason: HOSPADM

## 2021-01-29 RX ORDER — LORAZEPAM 0.5 MG/1
0.5 TABLET ORAL DAILY PRN
Status: DISCONTINUED | OUTPATIENT
Start: 2021-01-29 | End: 2021-02-03 | Stop reason: HOSPADM

## 2021-01-29 RX ORDER — ACETAMINOPHEN 650 MG/1
650 SUPPOSITORY RECTAL EVERY 6 HOURS PRN
Status: DISCONTINUED | OUTPATIENT
Start: 2021-01-29 | End: 2021-02-03 | Stop reason: HOSPADM

## 2021-01-29 RX ORDER — SODIUM CHLORIDE 9 MG/ML
INJECTION, SOLUTION INTRAVENOUS CONTINUOUS
Status: DISCONTINUED | OUTPATIENT
Start: 2021-01-29 | End: 2021-01-31

## 2021-01-29 RX ORDER — SODIUM CHLORIDE 0.9 % (FLUSH) 0.9 %
10 SYRINGE (ML) INJECTION PRN
Status: DISCONTINUED | OUTPATIENT
Start: 2021-01-29 | End: 2021-02-03 | Stop reason: HOSPADM

## 2021-01-29 RX ORDER — POLYETHYLENE GLYCOL 3350 17 G/17G
17 POWDER, FOR SOLUTION ORAL DAILY PRN
Status: DISCONTINUED | OUTPATIENT
Start: 2021-01-29 | End: 2021-02-03 | Stop reason: HOSPADM

## 2021-01-29 RX ORDER — LOSARTAN POTASSIUM 25 MG/1
25 TABLET ORAL DAILY
Status: DISCONTINUED | OUTPATIENT
Start: 2021-01-29 | End: 2021-01-30

## 2021-01-29 RX ORDER — ACETAMINOPHEN 325 MG/1
650 TABLET ORAL EVERY 6 HOURS PRN
Status: DISCONTINUED | OUTPATIENT
Start: 2021-01-29 | End: 2021-02-03 | Stop reason: HOSPADM

## 2021-01-29 RX ORDER — IRBESARTAN 75 MG/1
75 TABLET ORAL DAILY
Status: DISCONTINUED | OUTPATIENT
Start: 2021-01-29 | End: 2021-01-29 | Stop reason: CLARIF

## 2021-01-29 RX ORDER — MECLIZINE HCL 12.5 MG/1
25 TABLET ORAL 3 TIMES DAILY PRN
Status: DISCONTINUED | OUTPATIENT
Start: 2021-01-29 | End: 2021-02-03 | Stop reason: HOSPADM

## 2021-01-29 RX ORDER — SODIUM CHLORIDE 0.9 % (FLUSH) 0.9 %
10 SYRINGE (ML) INJECTION EVERY 12 HOURS SCHEDULED
Status: DISCONTINUED | OUTPATIENT
Start: 2021-01-29 | End: 2021-02-03 | Stop reason: HOSPADM

## 2021-01-29 RX ORDER — LEVOTHYROXINE SODIUM 88 UG/1
88 TABLET ORAL DAILY
Status: DISCONTINUED | OUTPATIENT
Start: 2021-01-29 | End: 2021-02-03 | Stop reason: HOSPADM

## 2021-01-29 RX ORDER — CITALOPRAM 20 MG/1
10 TABLET ORAL DAILY
Status: DISCONTINUED | OUTPATIENT
Start: 2021-01-29 | End: 2021-02-03 | Stop reason: HOSPADM

## 2021-01-29 RX ORDER — ONDANSETRON 2 MG/ML
4 INJECTION INTRAMUSCULAR; INTRAVENOUS EVERY 6 HOURS PRN
Status: DISCONTINUED | OUTPATIENT
Start: 2021-01-29 | End: 2021-02-03 | Stop reason: HOSPADM

## 2021-01-29 RX ORDER — PANTOPRAZOLE SODIUM 40 MG/1
40 TABLET, DELAYED RELEASE ORAL
Status: DISCONTINUED | OUTPATIENT
Start: 2021-01-29 | End: 2021-02-03 | Stop reason: HOSPADM

## 2021-01-29 RX ORDER — VIT C/E/ZN/COPPR/LUTEIN/ZEAXAN 60 MG-6 MG
1 CAPSULE ORAL 2 TIMES DAILY
Status: DISCONTINUED | OUTPATIENT
Start: 2021-01-29 | End: 2021-02-03 | Stop reason: HOSPADM

## 2021-01-29 RX ORDER — ASPIRIN 81 MG/1
81 TABLET, CHEWABLE ORAL 2 TIMES DAILY
Status: DISCONTINUED | OUTPATIENT
Start: 2021-01-29 | End: 2021-02-03 | Stop reason: HOSPADM

## 2021-01-29 RX ADMIN — ASPIRIN 81 MG: 81 TABLET, CHEWABLE ORAL at 10:46

## 2021-01-29 RX ADMIN — PANTOPRAZOLE SODIUM 40 MG: 40 TABLET, DELAYED RELEASE ORAL at 10:46

## 2021-01-29 RX ADMIN — Medication 1 CAPSULE: at 21:30

## 2021-01-29 RX ADMIN — SODIUM CHLORIDE 500 ML: 9 INJECTION, SOLUTION INTRAVENOUS at 06:25

## 2021-01-29 RX ADMIN — SODIUM CHLORIDE: 9 INJECTION, SOLUTION INTRAVENOUS at 10:46

## 2021-01-29 RX ADMIN — CITALOPRAM 10 MG: 20 TABLET, FILM COATED ORAL at 13:09

## 2021-01-29 RX ADMIN — LOSARTAN POTASSIUM 25 MG: 25 TABLET, FILM COATED ORAL at 13:12

## 2021-01-29 RX ADMIN — ENOXAPARIN SODIUM 40 MG: 40 INJECTION SUBCUTANEOUS at 10:46

## 2021-01-29 RX ADMIN — Medication 1 CAPSULE: at 13:08

## 2021-01-29 RX ADMIN — Medication 10 ML: at 10:47

## 2021-01-29 RX ADMIN — ACETAMINOPHEN 650 MG: 325 TABLET, FILM COATED ORAL at 10:58

## 2021-01-29 RX ADMIN — AMLODIPINE BESYLATE 5 MG: 5 TABLET ORAL at 13:08

## 2021-01-29 RX ADMIN — LEVOTHYROXINE SODIUM 88 MCG: 0.09 TABLET ORAL at 13:09

## 2021-01-29 RX ADMIN — MAGNESIUM GLUCONATE 500 MG ORAL TABLET 400 MG: 500 TABLET ORAL at 13:09

## 2021-01-29 ASSESSMENT — PAIN SCALES - GENERAL
PAINLEVEL_OUTOF10: 6
PAINLEVEL_OUTOF10: 0

## 2021-01-29 NOTE — ED NOTES
Bed: 11  Expected date:   Expected time:   Means of arrival:   Comments:  ems     Emiliano Browning RN  01/29/21 5661

## 2021-01-29 NOTE — ED PROVIDER NOTES
[facility] [address] [facility]  Department of Emergency Medicine   ED  Encounter Note  Admit Date/RoomTime: 2021  4:14 AM  ED Room: 6503/6503-B    NAME: Dawson Lopez  : 1940  MRN: [id number]     Chief Complaint:  Fall (reports dizziness throughout the day, worse this AM when she attempted to get up to go to the bathroom. fell into her door and then to the ground. reports she was unable to get up so she called 911. denies LOC)    History of Present Illness       Dawson Lopez is a 78 y.o. old female who presents to the emergency department for evaluation after a fall at home. She states that she sometimes gets vertigo and took a \"dizzy pill \"tonight. She states that she has arthritis in her feet and could not sleep due to the pain and so she got up to soak her feet in the bathtub. However, while she was walking she felt dizzy and fell into her door striking the right side of her head. She then fell to the ground. She denied loss of consciousness. She was unable to get herself back off the ground so she called 911. She denied any chest pain, shortness of breath or abdominal pain. She does complain of some low back pain but states she has this chronically. She has had no recent illnesses. She has had no vomiting or diarrhea. Pertinent positives and negatives are stated within HPI, all other systems reviewed and are negative. Past Medical History:  has a past medical history of Amaurosis fugax of right eye, Anxiety, Arthritis, CA - cancer of bowel, Cerebral artery occlusion with cerebral infarction ([address] Chronic back pain, Constipation, Depression, Elevated sed rate, GERD (gastroesophageal reflux disease), Hemorrhoids, Hyperlipidemia, Hypertension, Left foot pain, Lumbosacral radiculopathy, Macular degeneration, Peripheral neuropathy, Poor vision, Prosthetic eye globe, Seasonal allergies, Skipped heart beats, Sleep apnea, and Thyroid disease.     Surgical History:  has a past surgical history that includes Eye surgery (years ago); colectomy (8252); Colonoscopy (04/26/2012); Cholecystectomy (1985); Hysterectomy (1974); tumor excision; Upper gastrointestinal endoscopy (05/30/2014); Colonoscopy (05/30/2014); Tonsillectomy; joint replacement (Left, 2010/2012); Upper gastrointestinal endoscopy (01/08/2015); Colonoscopy (01/08/2015); cyst removal (years ago); and Upper gastrointestinal endoscopy (N/A, 4/1/2019). Social History:  reports that she is a non-smoker but has been exposed to tobacco smoke. She has never used smokeless tobacco. She reports that she does not drink alcohol or use drugs. Family History: family history includes Breast Cancer in her daughter and sister; Cancer in her brother, brother, brother, brother, brother, brother, sister, and sister; Heart Disease in her brother, brother, father, mother, and sister; High Blood Pressure in her daughter and daughter; Hypertension in her brother, brother, father, mother, and sister; Other in her mother; Stroke in her father and mother. Allergies: Iodine, Codeine, Oxycodone-aspirin, Amoxicillin-pot clavulanate, Darvocet [propoxyphene n-acetaminophen], Eggs or egg-derived products, Influenza vaccines, Percodan [oxycodone-aspirin], and Percodan [oxycodone-aspirin]    Physical Exam   Oxygen Saturation Interpretation: Normal.        ED Triage Vitals [01/29/21 0419]   BP Temp Temp src Pulse Resp SpO2 Height Weight   (!) 196/87 97.6 °F (36.4 °C) -- 67 16 94 % 4' 11\" (1.499 m) 192 lb (87.1 kg)         Physical Exam  Constitutional:  Alert, development consistent with age. HEENT:  NC/NT. Airway patent. Left eye is prosthetic  Neck:  No midline or paravertebral tenderness. Normal ROM. Supple. Chest:  Symmetrical without visible rash or tenderness. Respiratory:  Lungs Clear to auscultation and breath sounds equal.  CV:  Regular rate and rhythm, normal heart sounds, without pathological murmurs, ectopy, gallops, or rubs.   GI: Abdomen Soft, nontender, good bowel sounds. No firm or pulsatile mass. Pelvis:  Stable, nontender to palpation. Back:  No midline or paravertebral tenderness. No costovertebral tenderness. Extremities: No tenderness or edema noted. Integument:  Normal turgor. Warm, dry, without visible rash, unless noted elsewhere. Small <0.5 abrasion right hand  Lymphatic: no lymphadenopathy noted  Neurological:  Oriented x3, GCS 15. Motor functions intact.      Lab / Imaging Results   (All laboratory and radiology results have been personally reviewed by myself)  Labs:  Results for orders placed or performed during the hospital encounter of 01/29/21   CBC Auto Differential   Result Value Ref Range    WBC 7.8 4.5 - 11.5 E9/L    RBC 4.46 3.50 - 5.50 E12/L    Hemoglobin 12.5 11.5 - 15.5 g/dL    Hematocrit 37.6 34.0 - 48.0 %    MCV 84.3 80.0 - 99.9 fL    MCH 28.0 26.0 - 35.0 pg    MCHC 33.2 32.0 - 34.5 %    RDW 13.8 11.5 - 15.0 fL    Platelets 895 539 - 340 E9/L    MPV 9.7 7.0 - 12.0 fL    Neutrophils % 51.9 43.0 - 80.0 %    Immature Granulocytes % 0.6 0.0 - 5.0 %    Lymphocytes % 36.1 20.0 - 42.0 %    Monocytes % 9.1 2.0 - 12.0 %    Eosinophils % 1.7 0.0 - 6.0 %    Basophils % 0.6 0.0 - 2.0 %    Neutrophils Absolute 4.03 1.80 - 7.30 E9/L    Immature Granulocytes # 0.05 E9/L    Lymphocytes Absolute 2.81 1.50 - 4.00 E9/L    Monocytes Absolute 0.71 0.10 - 0.95 E9/L    Eosinophils Absolute 0.13 0.05 - 0.50 E9/L    Basophils Absolute 0.05 0.00 - 0.20 E9/L   Comprehensive Metabolic Panel   Result Value Ref Range    Sodium 139 132 - 146 mmol/L    Potassium 3.9 3.5 - 5.0 mmol/L    Chloride 101 98 - 107 mmol/L    CO2 25 22 - 29 mmol/L    Anion Gap 13 7 - 16 mmol/L    Glucose 104 (H) 74 - 99 mg/dL    BUN 26 (H) 8 - 23 mg/dL    CREATININE 0.8 0.5 - 1.0 mg/dL    GFR Non-African American >60 >=60 mL/min/1.73    GFR African American >60     Calcium 9.0 8.6 - 10.2 mg/dL    Total Protein 7.2 6.4 - 8.3 g/dL    Albumin 4.0 3.5 - 5.2 g/dL Total Bilirubin 0.2 0.0 - 1.2 mg/dL    Alkaline Phosphatase 101 35 - 104 U/L    ALT 14 0 - 32 U/L    AST 21 0 - 31 U/L   Troponin   Result Value Ref Range    Troponin <0.01 0.00 - 0.03 ng/mL   Urinalysis   Result Value Ref Range    Color, UA Straw Straw/Yellow    Clarity, UA Clear Clear    Glucose, Ur Negative Negative mg/dL    Bilirubin Urine Negative Negative    Ketones, Urine Negative Negative mg/dL    Specific Gravity, UA <=1.005 1.005 - 1.030    Blood, Urine TRACE-INTACT Negative    pH, UA 5.5 5.0 - 9.0    Protein, UA Negative Negative mg/dL    Urobilinogen, Urine 0.2 <2.0 E.U./dL    Nitrite, Urine Negative Negative    Leukocyte Esterase, Urine Negative Negative   TSH without Reflex   Result Value Ref Range    TSH 4.330 (H) 0.270 - 4.200 uIU/mL   Microscopic Urinalysis   Result Value Ref Range    WBC, UA NONE 0 - 5 /HPF    RBC, UA 0-1 0 - 2 /HPF    Bacteria, UA FEW (A) None Seen /HPF   EKG 12 Lead   Result Value Ref Range    Ventricular Rate 63 BPM    Atrial Rate 63 BPM    P-R Interval 174 ms    QRS Duration 78 ms    Q-T Interval 410 ms    QTc Calculation (Bazett) 419 ms    P Axis 79 degrees    R Axis -6 degrees    T Axis 57 degrees       Imaging: All Radiology results interpreted by Radiologist unless otherwise noted. XR CHEST PORTABLE   Final Result   There is no acute abnormality seen. CT HEAD WO CONTRAST   Final Result   No acute intracranial abnormality. CT CERVICAL SPINE WO CONTRAST   Final Result   No acute abnormality of the cervical spine. CT LUMBAR SPINE WO CONTRAST   Final Result   No acute post-traumatic abnormality in the lumbar spine. Prominent degenerative changes, as above.         EKG Interpretation    Interpreted by emergency department physician    Rhythm: normal sinus   Rate: normal  Ectopy: none  Conduction: normal  ST Segments: no acute change  T Waves: no acute change  Q Waves: none    Clinical Impression: no acute changes    Emogene Min      ED Course / Medical Decision Making     Medications   amLODIPine (NORVASC) tablet 5 mg (5 mg Oral Given 1/29/21 1308)   aspirin chewable tablet 81 mg (81 mg Oral Given 1/29/21 1046)   citalopram (CELEXA) tablet 10 mg (10 mg Oral Given 1/29/21 1309)   levothyroxine (SYNTHROID) tablet 88 mcg (88 mcg Oral Given 1/29/21 1309)   linaCLOtide (LINZESS) capsule 72 mcg (72 mcg Oral Not Given 1/29/21 1309)   LORazepam (ATIVAN) tablet 0.5 mg (has no administration in time range)   magnesium oxide (MAG-OX) tablet 400 mg (400 mg Oral Given 1/29/21 1309)   meclizine (ANTIVERT) tablet 25 mg (has no administration in time range)   ocuvite-lutein multivitamin 1 capsule (1 capsule Oral Given 1/29/21 1308)   pantoprazole (PROTONIX) tablet 40 mg (40 mg Oral Given 1/29/21 1046)   sodium chloride flush 0.9 % injection 10 mL (10 mLs Intravenous Not Given 1/29/21 2025)   sodium chloride flush 0.9 % injection 10 mL (has no administration in time range)   enoxaparin (LOVENOX) injection 40 mg (40 mg Subcutaneous Given 1/29/21 1046)   promethazine (PHENERGAN) tablet 12.5 mg (has no administration in time range)     Or   ondansetron (ZOFRAN) injection 4 mg (has no administration in time range)   polyethylene glycol (GLYCOLAX) packet 17 g (has no administration in time range)   acetaminophen (TYLENOL) tablet 650 mg (650 mg Oral Given 1/29/21 1058)     Or   acetaminophen (TYLENOL) suppository 650 mg ( Rectal See Alternative 1/29/21 1058)   0.9 % sodium chloride infusion ( Intravenous New Bag 1/29/21 1046)   losartan (COZAAR) tablet 25 mg (25 mg Oral Given 1/29/21 1312)   0.9 % sodium chloride bolus (0 mLs Intravenous Stopped 1/29/21 0706)        Re-examination:  1/29/21     Patients symptoms show no change. Consult(s):   IP CONSULT TO INTERNAL MEDICINE  IP CONSULT TO SOCIAL WORK    Procedure(s):   none    MDM:   Patient presents after becoming dizzy at home and falling in the bathroom. She did not fully lose consciousness.   CT imaging here was negative for traumatic injury. Cardiac work-up with no ischemia or dysrhythmia. Labs are reassuring, UA was negative for UTI. We attempted to ambulate the patient however, she was very unsteady on her feet. We could barely stand her up at the bedside with 2 of us holding her. She states she felt very dizzy like she was going to pass out. She was wobbly and almost fell backwards back onto the bed. She lives at home alone in apartment and usually ambulates with a cane. I do not feel she is safe for discharge at this time. Case discussed with Dr. Hidalgo Tustin who accepted her to telemetry for further management. Plan of Care/Counseling:  I reviewed today's visit with the patient in addition to providing specific details for the plan of care and counseling regarding the diagnosis and prognosis. Questions are answered at this time and are agreeable with the plan. Assessment      1. Near syncope    2. Fall, initial encounter      Plan   Observation Admission to Telemetry Unit. Patient condition is stable    New Medications     Current Discharge Medication List        Electronically signed by Shelley Herrera DO   DD: 1/29/21  **This report was transcribed using voice recognition software. Every effort was made to ensure accuracy; however, inadvertent computerized transcription errors may be present.   END OF ED PROVIDER NOTE        Shelley Herrera DO  01/29/21 2496

## 2021-01-29 NOTE — PATIENT CARE CONFERENCE
P Quality Flow/Interdisciplinary Rounds Progress Note        Quality Flow Rounds held on January 29, 2021    Disciplines Attending:  Bedside Nurse, ,  and Nursing Unit Leadership    Cali Noonan was admitted on 1/29/2021  4:14 AM    Anticipated Discharge Date:  Expected Discharge Date: 02/01/21    Disposition:    Ozzy Score:  Ozzy Scale Score: 18    Readmission Risk              Risk of Unplanned Readmission:        0           Discussed patient goal for the day, patient clinical progression, and barriers to discharge. The following Goal(s) of the Day/Commitment(s) have been identified: PT/OT, fluids.       Jessee Cheung  January 29, 2021

## 2021-01-29 NOTE — ED NOTES
Orthostatic VS obtained. Pt continued c/o dizziness. Pt was unable to ambulated. Dr. Crespo Hinders at bedside to witness.       Tiki Golden RN  01/29/21 4722

## 2021-01-29 NOTE — H&P
[address]                  [address] [facility],  [place]                              HISTORY AND PHYSICAL    PATIENT NAME: Ketty Marte                    :        1940  MED REC NO:   [id number]                            ROOM:       6503  ACCOUNT NO:   [de-identified]                           ADMIT DATE: 2021  PROVIDER:     Katelyn Ruano DO    CHIEF COMPLAINT:  Dizziness, status post fall. HISTORY OF PRESENT ILLNESS:  The patient is an 52-year-old   female who presented to the emergency room after a fall at home. She  got dizzy. She fell on her bathroom, hit her head, arm and back. She  was seen in the emergency room. She was assigned observation status for  further evaluation and treatment. The patient has a history of frequent  falls and chronic dizziness. The patient had ultrasound of the carotids  in 2019 which revealed no hemodynamically significant stenosis. PAST MEDICAL HISTORY:  Macular degeneration, right eye, receiving  injections; prosthetic left eye; hypertension; hypothyroidism; GERD;  CVA; bowel cancer, status post surgery and chemo. PAST SURGICAL HISTORY:  Left eye removal, colectomy, cholecystectomy,  hysterectomy, tonsillectomy, left total knee replacement x2, right eye  cataract surgery. PRIMARY CARE PROVIDER:  Zee Boyd DO    SOCIAL HISTORY:  No tobacco, no alcohol. REVIEW OF SYSTEMS:  Remarkable for above-stated chief complaint plus  chronic left lower extremity pain. ALLERGIES:  CODEINE, OXYCODONE/ASPIRIN, AUGMENTIN, DARVOCET, EGGS,  INFLUENZA VACCINE, PERCODAN. PHYSICAL EXAMINATION:  GENERAL APPEARANCE:  Physical exam reveals an 52-year-old   female who is alert and oriented x3, cooperative and a good historian. VITAL SIGNS:  On admission temperature 97.6, pulse 67, respirations 16,  blood pressure 196/87. HEENT:  Head:  Normocephalic, atraumatic.   Eyes: Left eye, prosthetic  eye. Right eye, pupil round and reactive to light. Fundus not well  visualized. Nose:  No obstruction, polyp or discharge noted. Mouth:   Mucosa without lesion. Pharynx:  Noninjected without exudate. NECK:  Supple. No JVD. No thyromegaly. No carotid bruits. HEART:  Regular rate and rhythm without murmur. LUNGS:  Clear to auscultation bilaterally. ABDOMEN:  Positive bowel sounds, soft, nontender. No rebound, no  guarding, no hepatosplenomegaly, no masses. BACK:  With increased thoracic kyphosis. EXTREMITIES:  With minimal edema in the bilateral lower extremities. LYMPH NODES:  No adenopathy noted. SKIN:  Without rash or lesion. IMPRESSION:  Near syncope, dizziness, dehydration, frequent falls,  hypertension, hypothyroidism, GERD, prosthetic left eye. PLAN:  Admit. PT/OT eval.   for discharge planning. IV  fluids. Hold diuretic. Discharge plan, home versus rehab as per the  patient's progress.         Katey Tim DO    D: 01/29/2021 8:39:57       T: 01/29/2021 8:49:53     [id number]  Job#: [id number]     Doc#: [id number]    CC:

## 2021-01-29 NOTE — CARE COORDINATION
Transition of care: Observation level of care 01/29/21 at 0645- near syncope. IVF at 75ml/hr to be started. Met with pt in room. Pt lives alone in a 1st floor apartment. Independent with ADLs and drives short distances. DME- cane, rollator and a FWW. Hx of [facility] with [facility] and [facility] at [facility]. States has had home care aides through the [address] [place] [address] on Aging([employer]), but does not have them anymore. Pt has a sister, Stefanie Sarkar, who lives in [place] and helps her out as needed at home. Also, has a daughter, Luis Cline, that lives in [place], [place]. PT/OT ordered -await their evals for discharge planning. Voiced no needs for home at present. Her family will provide transportation home. Pt said she has also gone through her insurance [employer] [facility]) for transportation. PCP is Dr. Angelina Kohler and pharmacy is [facility] on [address].  Sw/zacarias will follow

## 2021-01-29 NOTE — ED NOTES
0715- hand off received from grace solano   0720- faxed report to floor.       Lauro Cruz RN  01/29/21 2196

## 2021-01-30 PROCEDURE — 96372 THER/PROPH/DIAG INJ SC/IM: CPT

## 2021-01-30 PROCEDURE — 96375 TX/PRO/DX INJ NEW DRUG ADDON: CPT

## 2021-01-30 PROCEDURE — 97530 THERAPEUTIC ACTIVITIES: CPT | Performed by: PHYSICAL THERAPIST

## 2021-01-30 PROCEDURE — 6360000002 HC RX W HCPCS: Performed by: INTERNAL MEDICINE

## 2021-01-30 PROCEDURE — 97535 SELF CARE MNGMENT TRAINING: CPT

## 2021-01-30 PROCEDURE — G0378 HOSPITAL OBSERVATION PER HR: HCPCS

## 2021-01-30 PROCEDURE — 96374 THER/PROPH/DIAG INJ IV PUSH: CPT

## 2021-01-30 PROCEDURE — 97161 PT EVAL LOW COMPLEX 20 MIN: CPT | Performed by: PHYSICAL THERAPIST

## 2021-01-30 PROCEDURE — 6370000000 HC RX 637 (ALT 250 FOR IP): Performed by: INTERNAL MEDICINE

## 2021-01-30 PROCEDURE — 2580000003 HC RX 258: Performed by: INTERNAL MEDICINE

## 2021-01-30 PROCEDURE — 97530 THERAPEUTIC ACTIVITIES: CPT

## 2021-01-30 PROCEDURE — 97165 OT EVAL LOW COMPLEX 30 MIN: CPT

## 2021-01-30 RX ORDER — LOSARTAN POTASSIUM 50 MG/1
50 TABLET ORAL DAILY
Status: DISCONTINUED | OUTPATIENT
Start: 2021-01-31 | End: 2021-01-31

## 2021-01-30 RX ORDER — HYDRALAZINE HYDROCHLORIDE 20 MG/ML
10 INJECTION INTRAMUSCULAR; INTRAVENOUS EVERY 6 HOURS PRN
Status: DISCONTINUED | OUTPATIENT
Start: 2021-01-30 | End: 2021-01-31

## 2021-01-30 RX ADMIN — SODIUM CHLORIDE: 9 INJECTION, SOLUTION INTRAVENOUS at 15:53

## 2021-01-30 RX ADMIN — POLYETHYLENE GLYCOL 3350 17 G: 17 POWDER, FOR SOLUTION ORAL at 09:07

## 2021-01-30 RX ADMIN — ENOXAPARIN SODIUM 40 MG: 40 INJECTION SUBCUTANEOUS at 09:07

## 2021-01-30 RX ADMIN — MAGNESIUM GLUCONATE 500 MG ORAL TABLET 400 MG: 500 TABLET ORAL at 09:06

## 2021-01-30 RX ADMIN — LORAZEPAM 0.5 MG: 0.5 TABLET ORAL at 17:36

## 2021-01-30 RX ADMIN — Medication 10 ML: at 09:07

## 2021-01-30 RX ADMIN — MECLIZINE 25 MG: 12.5 TABLET ORAL at 09:20

## 2021-01-30 RX ADMIN — ASPIRIN 81 MG: 81 TABLET, CHEWABLE ORAL at 09:07

## 2021-01-30 RX ADMIN — PANTOPRAZOLE SODIUM 40 MG: 40 TABLET, DELAYED RELEASE ORAL at 09:06

## 2021-01-30 RX ADMIN — ASPIRIN 81 MG: 81 TABLET, CHEWABLE ORAL at 18:15

## 2021-01-30 RX ADMIN — ONDANSETRON 4 MG: 2 INJECTION INTRAMUSCULAR; INTRAVENOUS at 17:52

## 2021-01-30 RX ADMIN — CITALOPRAM 10 MG: 20 TABLET, FILM COATED ORAL at 09:07

## 2021-01-30 RX ADMIN — LOSARTAN POTASSIUM 25 MG: 25 TABLET, FILM COATED ORAL at 09:06

## 2021-01-30 RX ADMIN — Medication 1 CAPSULE: at 09:06

## 2021-01-30 RX ADMIN — HYDRALAZINE HYDROCHLORIDE 10 MG: 20 INJECTION INTRAMUSCULAR; INTRAVENOUS at 17:04

## 2021-01-30 RX ADMIN — AMLODIPINE BESYLATE 5 MG: 5 TABLET ORAL at 09:07

## 2021-01-30 RX ADMIN — Medication 10 ML: at 21:19

## 2021-01-30 ASSESSMENT — PAIN - FUNCTIONAL ASSESSMENT: PAIN_FUNCTIONAL_ASSESSMENT: ACTIVITIES ARE NOT PREVENTED

## 2021-01-30 ASSESSMENT — PAIN SCALES - GENERAL
PAINLEVEL_OUTOF10: 0

## 2021-01-30 ASSESSMENT — PAIN DESCRIPTION - FREQUENCY: FREQUENCY: INTERMITTENT

## 2021-01-30 ASSESSMENT — PAIN DESCRIPTION - LOCATION: LOCATION: HEAD

## 2021-01-30 ASSESSMENT — PAIN DESCRIPTION - DESCRIPTORS: DESCRIPTORS: HEADACHE

## 2021-01-30 NOTE — PROGRESS NOTES
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 Bathroom setup: tub chair with a shower curtain, laundry on same floor as apartment  Equipment owned: [address] [place] [address]  Prior Level of Function:   Independent with ADLs,.  Transfers,, mobility, IADLs & home management  Driving: yes                             Pain Level: Pt did not c/o pain during OT   Cognition: A&O: 3/3; Follows 1-2 step command   Memory:  Fair+   Sequencing:  Fair+   Problem solving:  Fair   Judgement/safety:  Fair     Functional Assessment:   Initial Eval Status  Date: 21 Treatment Status  Date: Short Term Goals=LTG  Treatment frequency: PRN 2-4 x/week   Feeding s/u  independent   Grooming Sup seated  SBA with ww standing    UB Dressing Min A  Sup   LB Dressing Mod A  Donald with AE    Bathing Mod A   Min A   Toileting NT  Min Awith ww to elevated commode with HR   Bed Mobility  Supine <> sit: SBA      Supine to sit: independent       Functional Transfers Sit<>stand: CGA     Stand pivot: min A   SBA with ww  SBA-CGA with a rw   Functional Mobility Min A funcitonal mobility using a rw  SBA-CGA with ww household distances   Balance Sitting:     Static:  SBA     Dynamic:CGA  Standing: min A   Standing balance SBA with a rw   Activity Tolerance Fair   Good- with ADL completion   Visual/  Perceptual Glasses: yes reading         Safety Fair                  Good- with ADL completion     Hand dominance: R handed  UE ROM: BUE:  3/4 in all planes  Strength: BUE:  4/5 in all planes   Strength: B  WFL     Fine Motor Coordination: B WFL      Hearing: WFL  Sensation:  No c/o numbness or tingling  Tone:  WFL  Edema: none noted                            Comments: Nursing approval to work with patient given. Upon arrival, patient supine and agreeable to evaluation. OT evaluation performed with  education on benefits of mobility and safety with ADL completion. Patient cooperative and motivated yet limited by visual deficit. At end of session, patient supine with HOB slightly elevated and  with call light and phone within reach, all lines and tubes intact. Nursing notified. OT treatment: OT for functional assessment of  ADL, Functional Transfer/Mobility Training, Equipment Needs, Energy Conservation Techniques, Pt/Family Education, Ther Ex, OT role and POC reviewed. Patient  demo fair+ understanding of education and safety with mobility and ADL completion. Pt monitored throughout OT treatment provided this date includes:  ·  ADL-  Instruction/training on safety and adapted techniques for completion of ADLs: Pt. Demonstrates fair+ understanding  Functional Mobility-  Instruction/training on safety and improved independence with bed mobility, /functional transfers using ww   ·  Sitting & standing EOB 7-10 minutes to improve dynamic sitting & standing balance and activity tolerance during ADLs. ·  Activity tolerance- Instruction/training on energy conservation/work simplification for completion of ADLs:. Pt. Demo fair tolerance       Therapeutic Exercise- Instruction on deep breathing exercise to improve functional Ind. with ADLs             Patient would  benefit from continued skilled OT to increase functional independence and quality of life.     Eval Complexity: mini    Assessment of current deficits   Functional mobility [x]  ADLs [x] Strength []  Cognition []  Functional transfers  [x] IADLs [x] Safety Awareness [x]  Endurance [x]  Fine Motor Coordination [] Balance [x] Vision/perception [] Sensation [x]   Gross Motor Coordination [] ROM [] Delirium []                  Motor Control []      Plan of Care: OT 1-3x/week for 5-7 days PRN   Instruction/training on adapted ADL techniques and safety to increase functional independence within precautions & AE as needed for LB dressing  Training on energy conservation strategies/techniques to improve independence/tolerance for self-care routine  Functional transfer/mobility training/DME recommendations for increased independence, safety, and fall prevention  Patient/Family education to increase follow through with safety techniques and functional independence  Recommendation of environmental modifications for increased safety with functional transfers/mobility and ADLs  Therapeutic exercise to improve motor endurance, ROM, and functional strength for ADLs/functional transfers  Therapeutic activities to facilitate/challenge dynamic balance, stand tolerance, fine motor dexterity/in-hand manipulation for increased independence with ADLs  Positioning to improve functional independence    Rehab Potential: Good for established goals    Patient / Family Goal: \"I make coffee, food & run the sweeper. \"    Evaluation time includes thorough review of current medical information, gathering information on past medical & social history & PLOF, completion of standardized testing, informal observation of tasks, consultation with other medical professions/disciplines, assessment of data & development of POC/goals. Patient and/or family were instructed diagnosis, prognosis/goals and plan of care. yes Demonstrated good understanding.    Min Units   OT Eval Low [id number]     OT Eval Medium 43441 X    OT Eval High [id number]     OT Re-Eval [id number]     Therapeutic Ex [address]     Therapeutic Activities 73891 10 1   ADL/Self Care 60937 10 1   Orthotic Management 88951     Neuro Re-Ed 28953     Non-Billable Time              Time In: 1350            Time Out: 1410 [] Malnutrition indicators have been identified and nursing has been notified to ensure a dietitian consult is ordered.       min OT Evaluation + 20  treatment minutes      Verena Cowan, OTR/L [id number]

## 2021-01-30 NOTE — PATIENT CARE CONFERENCE
P Quality Flow/Interdisciplinary Rounds Progress Note        Quality Flow Rounds held on January 30, 2021    Disciplines Attending:  Bedside Nurse and Nursing Unit Leadership    Brooklyn Rivas was admitted on 1/29/2021  4:14 AM    Anticipated Discharge Date:  Expected Discharge Date: 02/01/21    Disposition:    Ozzy Score:  Ozzy Scale Score: 18    Readmission Risk              Risk of Unplanned Readmission:        0           Discussed patient goal for the day, patient clinical progression, and barriers to discharge.   The following Goal(s) of the Day/Commitment(s) have been identified:  have no falls      [facility]  January 30, 2021

## 2021-01-30 NOTE — PROGRESS NOTES
Physical Therapy    Physical Therapy Initial Assessment     Name: Kate Cuevas  : 1940  MRN: [id number]    Referring Provider:  Juliano Sloan DO    Date of Service: 2021    Evaluating PT:  Leonel Knott, PT, DPT [id number]      Room #:  4608/8677-A  Diagnosis:  Near syncope  PMHx/PSHx:  GERD, anxiety, arthritis, thyroid disease, prosthetic eye globe, sleep apnea, skipped heart beats, macular degeneration, chronic back pain, depression, hemorrhoids, HTN, HLD, TIA, poor vision, L foot pain, bowel CA, peripheral neuropathy, lumbosacral radiculopathy, L TKA x2  Procedure/Surgery:  None this admission  Precautions:  Falls, intermittent vertigo  Equipment Needs:  Jr height FWW (pt is 4' 11\")    SUBJECTIVE:    Pt lives alone in a first floor apartment with threshold step to enter and no rail. Bed is on first floor and bath is on first floor. Pt ambulated with SPC prior to admission. Pt reports owning rollator also. OBJECTIVE:   Initial Evaluation  Date: 21 Treatment Short Term/ Long Term   Goals   AM-PAC 6 Clicks 82/59     Was pt agreeable to Eval/treatment? yes     Does pt have pain? No c/o pain     Bed Mobility  Rolling: SBA  Supine to sit: SBA  Sit to supine: SBA  Scooting: SBA  Rolling: Mod Independent   Supine to sit: Mod Independent   Sit to supine: Mod Independent   Scooting: Mod Independent    Transfers Sit to stand: CGA  Stand to sit: CGA  Stand pivot: Min A with FWW  Sit to stand: Mod Independent   Stand to sit: Mod Independent   Stand pivot: Mod Independent    Ambulation    45 feet x2 reps with FWW with Min A  >75 feet with AAD with Mod Independent    Stair negotiation: ascended and descended  NT  1 step with no rail Supervision   ROM BUE:  WNL  BLE:  WNl     Strength BUE:  WNL  BLE:  4/5     Balance Sitting EOB:  Supervision  Dynamic Standing:  Min A with FWW  Sitting EOB:  Independent  Dynamic Standing:   Mod Independent with AAD     Pt is A & O x 4  Sensation:  Chronic paraesthesias to

## 2021-01-30 NOTE — PLAN OF CARE
Problem: Falls - Risk of:  Goal: Will remain free from falls  Description: Will remain free from falls  1/30/2021 1726 by Abbe Pinon RN  Outcome: Met This Shift     Problem: Falls - Risk of:  Goal: Absence of physical injury  Description: Absence of physical injury  1/30/2021 1726 by Abbe Pinon RN  Outcome: Met This Shift     Problem: Skin Integrity:  Goal: Will show no infection signs and symptoms  Description: Will show no infection signs and symptoms  1/30/2021 1726 by Abbe Pinon RN  Outcome: Met This Shift     Problem: Skin Integrity:  Goal: Absence of new skin breakdown  Description: Absence of new skin breakdown  1/30/2021 1726 by Abbe Pinon RN  Outcome: Met This Shift

## 2021-01-30 NOTE — PLAN OF CARE
Problem: Falls - Risk of:  Goal: Will remain free from falls  Description: Will remain free from falls  1/30/2021 0450 by Geetha Rizo RN  Outcome: Met This Shift     Problem: Falls - Risk of:  Goal: Absence of physical injury  Description: Absence of physical injury  1/30/2021 0450 by Geetha Rizo RN  Outcome: Met This Shift     Problem: Skin Integrity:  Goal: Will show no infection signs and symptoms  Description: Will show no infection signs and symptoms  1/30/2021 0450 by Geetha Rizo RN  Outcome: Met This Shift     Problem: Skin Integrity:  Goal: Absence of new skin breakdown  Description: Absence of new skin breakdown  Outcome: Met This Shift

## 2021-01-30 NOTE — PROGRESS NOTES
Daily, Glenard Nathrop Malmer, DO, 88 mcg at 01/29/21 1309    linaCLOtide (LINZESS) capsule 72 mcg, 72 mcg, Oral, QAM AC, Glenard Nathrop Malmer, DO    LORazepam (ATIVAN) tablet 0.5 mg, 0.5 mg, Oral, Daily PRN, Glenard Nathrop Malmer, DO    magnesium oxide (MAG-OX) tablet 400 mg, 400 mg, Oral, Daily, Glenard Nathrop Malmer, DO, 400 mg at 01/30/21 2222    meclizine (ANTIVERT) tablet 25 mg, 25 mg, Oral, TID PRN, Glenard Nathrop Malmer, DO, 25 mg at 01/30/21 0920    ocuvite-lutein multivitamin 1 capsule, 1 capsule, Oral, BID, Verlean Castor, DO, 1 capsule at 01/30/21 4110    pantoprazole (PROTONIX) tablet 40 mg, 40 mg, Oral, QAM AC, Glenard Nathrop Malmer, DO, 40 mg at 01/30/21 0906    sodium chloride flush 0.9 % injection 10 mL, 10 mL, Intravenous, 2 times per day, Verlean Castor, DO, 10 mL at 01/30/21 0907    sodium chloride flush 0.9 % injection 10 mL, 10 mL, Intravenous, PRN, Glenard Nathrop Malmer, DO    enoxaparin (LOVENOX) injection 40 mg, 40 mg, Subcutaneous, Daily, Glenard Nathrop Malmer, DO, 40 mg at 01/30/21 0907    promethazine (PHENERGAN) tablet 12.5 mg, 12.5 mg, Oral, Q6H PRN **OR** ondansetron (ZOFRAN) injection 4 mg, 4 mg, Intravenous, Q6H PRN, Glenard Nathrop Malmer, DO    polyethylene glycol (GLYCOLAX) packet 17 g, 17 g, Oral, Daily PRN, Glenard Nathrop Malmer, DO, 17 g at 01/30/21 0907    acetaminophen (TYLENOL) tablet 650 mg, 650 mg, Oral, Q6H PRN, 650 mg at 01/29/21 1058 **OR** acetaminophen (TYLENOL) suppository 650 mg, 650 mg, Rectal, Q6H PRN, Glenard Nathrop Malmer, DO    0.9 % sodium chloride infusion, , Intravenous, Continuous, Glenard Nathrop Malmer, DO, Last Rate: 75 mL/hr at 01/29/21 1046, New Bag at 01/29/21 1046    losartan (COZAAR) tablet 25 mg, 25 mg, Oral, Daily, Glenard Nathrop Malmer, DO, 25 mg at 01/30/21 0906    DIET GENERAL;    XR CHEST PORTABLE   Final Result   There is no acute abnormality seen. CT HEAD WO CONTRAST   Final Result   No acute intracranial abnormality.       CT CERVICAL SPINE WO CONTRAST   Final Result   No acute abnormality of the cervical spine. CT LUMBAR SPINE WO CONTRAST   Final Result   No acute post-traumatic abnormality in the lumbar spine. Prominent degenerative changes, as above. Assessment:    Active Problems:    Dehydration    Hypothyroidism    GERD (gastroesophageal reflux disease)    Dizziness    Blindness of left eye    Essential hypertension    Frequent falls    Gait instability    Near syncope  Resolved Problems:    * No resolved hospital problems. *      Plan:    Check BMP for tomorrow  Blood pressure still intermittently high  Increase losartan dose  IV fluids  TSH  borderline elevated  Home versus subacute        Jimmy Kaiser  2:02 PM  1/30/2021    NOTE: This report was transcribed using voice recognition software.  Every effort was made to ensure accuracy; however, inadvertent transcription errors may be present

## 2021-01-31 PROCEDURE — G0378 HOSPITAL OBSERVATION PER HR: HCPCS

## 2021-01-31 PROCEDURE — 6370000000 HC RX 637 (ALT 250 FOR IP): Performed by: INTERNAL MEDICINE

## 2021-01-31 PROCEDURE — 6360000002 HC RX W HCPCS: Performed by: INTERNAL MEDICINE

## 2021-01-31 PROCEDURE — 2580000003 HC RX 258: Performed by: INTERNAL MEDICINE

## 2021-01-31 PROCEDURE — 96372 THER/PROPH/DIAG INJ SC/IM: CPT

## 2021-01-31 RX ORDER — LOSARTAN POTASSIUM 50 MG/1
50 TABLET ORAL 2 TIMES DAILY
Status: DISCONTINUED | OUTPATIENT
Start: 2021-01-31 | End: 2021-02-01

## 2021-01-31 RX ADMIN — AMLODIPINE BESYLATE 5 MG: 5 TABLET ORAL at 08:31

## 2021-01-31 RX ADMIN — SODIUM CHLORIDE: 9 INJECTION, SOLUTION INTRAVENOUS at 05:27

## 2021-01-31 RX ADMIN — Medication 1 CAPSULE: at 08:30

## 2021-01-31 RX ADMIN — LEVOTHYROXINE SODIUM 88 MCG: 0.09 TABLET ORAL at 05:27

## 2021-01-31 RX ADMIN — ENOXAPARIN SODIUM 40 MG: 40 INJECTION SUBCUTANEOUS at 08:30

## 2021-01-31 RX ADMIN — PANTOPRAZOLE SODIUM 40 MG: 40 TABLET, DELAYED RELEASE ORAL at 05:27

## 2021-01-31 RX ADMIN — ACETAMINOPHEN 650 MG: 325 TABLET, FILM COATED ORAL at 00:00

## 2021-01-31 RX ADMIN — ASPIRIN 81 MG: 81 TABLET, CHEWABLE ORAL at 08:30

## 2021-01-31 RX ADMIN — MAGNESIUM GLUCONATE 500 MG ORAL TABLET 400 MG: 500 TABLET ORAL at 08:30

## 2021-01-31 RX ADMIN — Medication 1 CAPSULE: at 20:16

## 2021-01-31 RX ADMIN — POLYETHYLENE GLYCOL 3350 17 G: 17 POWDER, FOR SOLUTION ORAL at 12:10

## 2021-01-31 RX ADMIN — LOSARTAN POTASSIUM 50 MG: 50 TABLET, FILM COATED ORAL at 20:16

## 2021-01-31 RX ADMIN — ACETAMINOPHEN 650 MG: 325 TABLET, FILM COATED ORAL at 15:12

## 2021-01-31 RX ADMIN — LOSARTAN POTASSIUM 50 MG: 50 TABLET, FILM COATED ORAL at 08:29

## 2021-01-31 RX ADMIN — Medication 10 ML: at 20:16

## 2021-01-31 RX ADMIN — ASPIRIN 81 MG: 81 TABLET, CHEWABLE ORAL at 17:23

## 2021-01-31 RX ADMIN — CITALOPRAM 10 MG: 20 TABLET, FILM COATED ORAL at 08:29

## 2021-01-31 ASSESSMENT — PAIN DESCRIPTION - DESCRIPTORS: DESCRIPTORS: DISCOMFORT;DULL;HEADACHE

## 2021-01-31 ASSESSMENT — PAIN SCALES - GENERAL
PAINLEVEL_OUTOF10: 0

## 2021-01-31 ASSESSMENT — PAIN DESCRIPTION - ORIENTATION: ORIENTATION: ANTERIOR

## 2021-01-31 NOTE — PROGRESS NOTES
Subjective:    Chief complaint:    She had issues last night after hydralazine was given  She felt that she had lightheadedness      Objective:    BP (!) 148/70   Pulse 64   Temp 97.5 °F (36.4 °C) (Temporal)   Resp 16   Ht 4' 11\" (1.499 m)   Wt 192 lb (87.1 kg)   LMP 07/24/2014   SpO2 96%   BMI 38.78 kg/m²   General : Awake ,alert,no distress. Heart:  RRR, no murmurs, gallops, or rubs. Lungs:  CTA bilaterally, no wheeze, rales or rhonchi  Abd: bowel sounds present, nontender, nondistended, no masses  Extrem:  No clubbing, cyanosis, or edema    CBC:   Lab Results   Component Value Date    WBC 7.8 01/29/2021    RBC 4.46 01/29/2021    HGB 12.5 01/29/2021    HCT 37.6 01/29/2021    MCV 84.3 01/29/2021    MCH 28.0 01/29/2021    MCHC 33.2 01/29/2021    RDW 13.8 01/29/2021     01/29/2021    MPV 9.7 01/29/2021     BMP:    Lab Results   Component Value Date     01/29/2021    K 3.9 01/29/2021    K 4.2 10/31/2020     01/29/2021    CO2 25 01/29/2021    BUN 26 01/29/2021    LABALBU 4.0 01/29/2021    LABALBU 4.2 03/24/2012    CREATININE 0.8 01/29/2021    CALCIUM 9.0 01/29/2021    GFRAA >60 01/29/2021    LABGLOM >60 01/29/2021    GLUCOSE 104 01/29/2021    GLUCOSE 109 05/04/2012     PT/INR:    Lab Results   Component Value Date    PROTIME 10.9 08/18/2020    PROTIME 10.8 03/24/2012    INR 0.9 08/18/2020     Troponin:    Lab Results   Component Value Date    TROPONINI <0.01 01/29/2021       No results for input(s): LABURIN in the last 72 hours. No results for input(s): BC in the last 72 hours. No results for input(s): Katt Oven in the last 72 hours.       Current Facility-Administered Medications:     losartan (COZAAR) tablet 50 mg, 50 mg, Oral, BID, Jan Benavidez MD    amLODIPine (NORVASC) tablet 5 mg, 5 mg, Oral, Daily, Weston Signs Malmer, DO, 5 mg at 01/31/21 0831    aspirin chewable tablet 81 mg, 81 mg, Oral, BID, Weston Signs Malmer, DO, 81 mg at 01/31/21 0830    citalopram (CELEXA) tablet 10 mg, 10 mg, Oral, Daily, Ruby Gibson, DO, 10 mg at 01/31/21 5541    levothyroxine (SYNTHROID) tablet 88 mcg, 88 mcg, Oral, Daily, Ruby Gibson, DO, 88 mcg at 01/31/21 1398    linaCLOtide (LINZESS) capsule 72 mcg, 72 mcg, Oral, QAM AC, Ruby Persaudmer, DO    LORazepam (ATIVAN) tablet 0.5 mg, 0.5 mg, Oral, Daily PRN, Ruby Gibson, DO, 0.5 mg at 01/30/21 1736    magnesium oxide (MAG-OX) tablet 400 mg, 400 mg, Oral, Daily, Ruby Gibson, DO, 400 mg at 01/31/21 0830    meclizine (ANTIVERT) tablet 25 mg, 25 mg, Oral, TID PRN, Ruby Gibson, DO, 25 mg at 01/30/21 0920    ocuvite-lutein multivitamin 1 capsule, 1 capsule, Oral, BID, Ruby Gibson DO, 1 capsule at 01/31/21 0830    pantoprazole (PROTONIX) tablet 40 mg, 40 mg, Oral, QAM AC, Ruby Gibson, DO, 40 mg at 01/31/21 1962    sodium chloride flush 0.9 % injection 10 mL, 10 mL, Intravenous, 2 times per day, Rob Ahuja, DO, 10 mL at 01/30/21 2119    sodium chloride flush 0.9 % injection 10 mL, 10 mL, Intravenous, PRN, Ruby Gibson, DO    enoxaparin (LOVENOX) injection 40 mg, 40 mg, Subcutaneous, Daily, Ruby Gibson, DO, 40 mg at 01/31/21 0830    promethazine (PHENERGAN) tablet 12.5 mg, 12.5 mg, Oral, Q6H PRN **OR** ondansetron (ZOFRAN) injection 4 mg, 4 mg, Intravenous, Q6H PRN, Ruby Gibson, DO, 4 mg at 01/30/21 1752    polyethylene glycol (GLYCOLAX) packet 17 g, 17 g, Oral, Daily PRN, Ruby Gibson, DO, 17 g at 01/31/21 1210    acetaminophen (TYLENOL) tablet 650 mg, 650 mg, Oral, Q6H PRN, 650 mg at 01/31/21 0000 **OR** acetaminophen (TYLENOL) suppository 650 mg, 650 mg, Rectal, Q6H PRN, Ruby Gibson, DO    DIET GENERAL;    XR CHEST PORTABLE   Final Result   There is no acute abnormality seen. CT HEAD WO CONTRAST   Final Result   No acute intracranial abnormality. CT CERVICAL SPINE WO CONTRAST   Final Result   No acute abnormality of the cervical spine.       CT LUMBAR SPINE WO CONTRAST   Final Result   No acute post-traumatic abnormality in the lumbar spine. Prominent degenerative changes, as above. Assessment:    Active Problems:    Dehydration    Hypothyroidism    GERD (gastroesophageal reflux disease)    Dizziness    Blindness of left eye    Essential hypertension    Frequent falls    Gait instability    Near syncope  Resolved Problems:    * No resolved hospital problems. *      Plan:    DC IV fluids  Increase losartan to 50 twice daily  Discontinue hydralazine  Monitor blood pressures closely  Home versus subacute rehab  She reports dizziness with ambulation        Jimmy Kaiser  1:56 PM  1/31/2021    NOTE: This report was transcribed using voice recognition software.  Every effort was made to ensure accuracy; however, inadvertent transcription errors may be present

## 2021-01-31 NOTE — PLAN OF CARE
Problem: Falls - Risk of:  Goal: Will remain free from falls  Description: Will remain free from falls  1/31/2021 0741 by Janna Dandy, RN  Outcome: Met This Shift  1/31/2021 0106 by Janna Dandy, RN  Outcome: Met This Shift     Problem: Skin Integrity:  Goal: Will show no infection signs and symptoms  Description: Will show no infection signs and symptoms  1/31/2021 0741 by Janna Dandy, RN  Outcome: Met This Shift  1/31/2021 0106 by Janna Dandy, RN  Outcome: Met This Shift     Problem: Pain:  Goal: Pain level will decrease  Description: Pain level will decrease  1/31/2021 0741 by Janna Dandy, RN  Outcome: Met This Shift  1/31/2021 0106 by Janna Dandy, RN  Outcome: Met This Shift

## 2021-01-31 NOTE — PATIENT CARE CONFERENCE
[facility] Quality Flow/Interdisciplinary Rounds Progress Note        Quality Flow Rounds held on January 31, 2021    Disciplines Attending:  Bedside Nurse and Nursing Unit Leadership    Yari Smith was admitted on 1/29/2021  4:14 AM    Anticipated Discharge Date:  Expected Discharge Date: 02/01/21    Disposition:    Ozzy Score:  Ozzy Scale Score: 20    Readmission Risk              Risk of Unplanned Readmission:        0           Discussed patient goal for the day, patient clinical progression, and barriers to discharge.   The following Goal(s) of the Day/Commitment(s) have been identified:  keep safe no injuries due to falls      Carmella Eagle  January 31, 2021

## 2021-02-01 PROCEDURE — 96372 THER/PROPH/DIAG INJ SC/IM: CPT

## 2021-02-01 PROCEDURE — 6360000002 HC RX W HCPCS: Performed by: INTERNAL MEDICINE

## 2021-02-01 PROCEDURE — 6370000000 HC RX 637 (ALT 250 FOR IP): Performed by: INTERNAL MEDICINE

## 2021-02-01 PROCEDURE — G0378 HOSPITAL OBSERVATION PER HR: HCPCS

## 2021-02-01 PROCEDURE — 2580000003 HC RX 258: Performed by: INTERNAL MEDICINE

## 2021-02-01 RX ORDER — LOSARTAN POTASSIUM 50 MG/1
100 TABLET ORAL DAILY
Status: DISCONTINUED | OUTPATIENT
Start: 2021-02-02 | End: 2021-02-03 | Stop reason: HOSPADM

## 2021-02-01 RX ORDER — METOPROLOL SUCCINATE 25 MG/1
25 TABLET, EXTENDED RELEASE ORAL DAILY
Status: DISCONTINUED | OUTPATIENT
Start: 2021-02-01 | End: 2021-02-03 | Stop reason: HOSPADM

## 2021-02-01 RX ADMIN — MECLIZINE 25 MG: 12.5 TABLET ORAL at 15:51

## 2021-02-01 RX ADMIN — Medication 10 ML: at 22:30

## 2021-02-01 RX ADMIN — ACETAMINOPHEN 650 MG: 325 TABLET, FILM COATED ORAL at 15:47

## 2021-02-01 RX ADMIN — MAGNESIUM GLUCONATE 500 MG ORAL TABLET 400 MG: 500 TABLET ORAL at 08:29

## 2021-02-01 RX ADMIN — ENOXAPARIN SODIUM 40 MG: 40 INJECTION SUBCUTANEOUS at 08:28

## 2021-02-01 RX ADMIN — Medication 1 CAPSULE: at 22:24

## 2021-02-01 RX ADMIN — LOSARTAN POTASSIUM 50 MG: 50 TABLET, FILM COATED ORAL at 08:29

## 2021-02-01 RX ADMIN — Medication 10 ML: at 10:22

## 2021-02-01 RX ADMIN — ASPIRIN 81 MG: 81 TABLET, CHEWABLE ORAL at 17:17

## 2021-02-01 RX ADMIN — Medication 1 CAPSULE: at 08:29

## 2021-02-01 RX ADMIN — CITALOPRAM 10 MG: 20 TABLET, FILM COATED ORAL at 08:29

## 2021-02-01 RX ADMIN — LEVOTHYROXINE SODIUM 88 MCG: 0.09 TABLET ORAL at 05:54

## 2021-02-01 RX ADMIN — LORAZEPAM 0.5 MG: 0.5 TABLET ORAL at 01:06

## 2021-02-01 RX ADMIN — ACETAMINOPHEN 650 MG: 325 TABLET, FILM COATED ORAL at 08:28

## 2021-02-01 RX ADMIN — METOPROLOL SUCCINATE 25 MG: 25 TABLET, EXTENDED RELEASE ORAL at 10:20

## 2021-02-01 RX ADMIN — AMLODIPINE BESYLATE 5 MG: 5 TABLET ORAL at 08:29

## 2021-02-01 RX ADMIN — PANTOPRAZOLE SODIUM 40 MG: 40 TABLET, DELAYED RELEASE ORAL at 05:54

## 2021-02-01 RX ADMIN — ASPIRIN 81 MG: 81 TABLET, CHEWABLE ORAL at 08:28

## 2021-02-01 RX ADMIN — ACETAMINOPHEN 650 MG: 325 TABLET, FILM COATED ORAL at 01:06

## 2021-02-01 ASSESSMENT — PAIN SCALES - GENERAL
PAINLEVEL_OUTOF10: 10
PAINLEVEL_OUTOF10: 5
PAINLEVEL_OUTOF10: 7
PAINLEVEL_OUTOF10: 0

## 2021-02-01 ASSESSMENT — PAIN DESCRIPTION - PROGRESSION: CLINICAL_PROGRESSION: GRADUALLY WORSENING

## 2021-02-01 ASSESSMENT — PAIN DESCRIPTION - LOCATION: LOCATION: SHOULDER

## 2021-02-01 ASSESSMENT — PAIN DESCRIPTION - PAIN TYPE: TYPE: ACUTE PAIN

## 2021-02-01 NOTE — PROGRESS NOTES
[facility] Medicine    Subjective:  Pt c/o ha      Current Facility-Administered Medications:     metoprolol succinate (TOPROL XL) extended release tablet 25 mg, 25 mg, Oral, Daily, Indu Rosas Malmer, DO    losartan (COZAAR) tablet 100 mg, 100 mg, Oral, Daily, Indu Rosas Malmer, DO    amLODIPine (NORVASC) tablet 5 mg, 5 mg, Oral, Daily, Indu Rosas Malmer, DO, 5 mg at 02/01/21 1927    aspirin chewable tablet 81 mg, 81 mg, Oral, BID, Indu Rosas Malmer, DO, 81 mg at 02/01/21 1739    citalopram (CELEXA) tablet 10 mg, 10 mg, Oral, Daily, Indu Rosas Malmer, DO, 10 mg at 02/01/21 6518    levothyroxine (SYNTHROID) tablet 88 mcg, 88 mcg, Oral, Daily, Indu Rosas Malmer, DO, 88 mcg at 02/01/21 0554    linaCLOtide (LINZESS) capsule 72 mcg, 72 mcg, Oral, QAM AC, Indu Rosas Malmer, DO    LORazepam (ATIVAN) tablet 0.5 mg, 0.5 mg, Oral, Daily PRN, Indu Rosas Malmer, DO, 0.5 mg at 02/01/21 0106    magnesium oxide (MAG-OX) tablet 400 mg, 400 mg, Oral, Daily, Indu Rosas Malmer, DO, 400 mg at 02/01/21 8955    meclizine (ANTIVERT) tablet 25 mg, 25 mg, Oral, TID PRN, Indu Rosas Malmer, DO, 25 mg at 01/30/21 0920    ocuvite-lutein multivitamin 1 capsule, 1 capsule, Oral, BID, Indu Rosas Malmer, DO, 1 capsule at 02/01/21 9282    pantoprazole (PROTONIX) tablet 40 mg, 40 mg, Oral, QAM AC, Indu Rosas Malmer, DO, 40 mg at 02/01/21 0554    sodium chloride flush 0.9 % injection 10 mL, 10 mL, Intravenous, 2 times per day, Leisa Medrano, DO, 10 mL at 01/31/21 2016    sodium chloride flush 0.9 % injection 10 mL, 10 mL, Intravenous, PRN, Indu Gibson, DO    enoxaparin (LOVENOX) injection 40 mg, 40 mg, Subcutaneous, Daily, Indu Gibson DO, 40 mg at 02/01/21 7278    promethazine (PHENERGAN) tablet 12.5 mg, 12.5 mg, Oral, Q6H PRN **OR** ondansetron (ZOFRAN) injection 4 mg, 4 mg, Intravenous, Q6H PRN, Indu Gibson DO, 4 mg at 01/30/21 3134    polyethylene glycol (GLYCOLAX) packet 17 g, 17 g, Oral, Daily PRN, Leisa Medrano DO, resolved hospital problems.  *      Plan:  Add toprol xl / cont to monitor        Blima Relic  8:42 AM  2/1/2021

## 2021-02-01 NOTE — PROGRESS NOTES
Dr. Roberto Carlos Magallanes notified of pt. HR this am in the 50's. Order given to hold metoprolol if HR is below 50.

## 2021-02-01 NOTE — CARE COORDINATION
Met with pt in room. We discussed PT eval AM-PAC 16/24 and ambulated 45 ft x 2 with FWW with Riley MICHAEL eval AM-PAC 17/24. Pt agreeable to [facility] for CPA, med compliance and PT/Jered had no preference for hhc agency. Referral made to Marla Cohen at [facility] and they can start services Wed. Pt stated she would not go to rehab. [place] to [place] transportation at [place] and if they are unavailable she wants to go through her [facility] for transportation. Ambulette form filled out just in case it is needed and placed with pt's soft chart. Voiced no other needs for home.  Sw/cm will follow

## 2021-02-01 NOTE — PATIENT CARE CONFERENCE
[facility] Quality Flow/Interdisciplinary Rounds Progress Note        Quality Flow Rounds held on February 1, 2021    Disciplines Attending:  Bedside Nurse, ,  and Nursing Unit Leadership    Aide Koehler was admitted on 1/29/2021  4:14 AM    Anticipated Discharge Date:  Expected Discharge Date: 02/01/21    Disposition:    Ozzy Score:  Ozzy Scale Score: 20    Readmission Risk              Risk of Unplanned Readmission:        0           Discussed patient goal for the day, patient clinical progression, and barriers to discharge. The following Goal(s) of the Day/Commitment(s) have been identified:  Monitor BP, medications adjusted.        Vasquez Mckeon  February 1, 2021

## 2021-02-02 PROCEDURE — 6370000000 HC RX 637 (ALT 250 FOR IP): Performed by: INTERNAL MEDICINE

## 2021-02-02 PROCEDURE — G0378 HOSPITAL OBSERVATION PER HR: HCPCS

## 2021-02-02 PROCEDURE — 2140000000 HC CCU INTERMEDIATE R&B

## 2021-02-02 PROCEDURE — 97530 THERAPEUTIC ACTIVITIES: CPT

## 2021-02-02 PROCEDURE — 2580000003 HC RX 258: Performed by: INTERNAL MEDICINE

## 2021-02-02 PROCEDURE — 97535 SELF CARE MNGMENT TRAINING: CPT

## 2021-02-02 PROCEDURE — 96372 THER/PROPH/DIAG INJ SC/IM: CPT

## 2021-02-02 PROCEDURE — 6360000002 HC RX W HCPCS: Performed by: INTERNAL MEDICINE

## 2021-02-02 RX ADMIN — Medication 10 ML: at 08:17

## 2021-02-02 RX ADMIN — PANTOPRAZOLE SODIUM 40 MG: 40 TABLET, DELAYED RELEASE ORAL at 06:08

## 2021-02-02 RX ADMIN — LEVOTHYROXINE SODIUM 88 MCG: 0.09 TABLET ORAL at 06:08

## 2021-02-02 RX ADMIN — AMLODIPINE BESYLATE 5 MG: 5 TABLET ORAL at 08:16

## 2021-02-02 RX ADMIN — Medication 1 CAPSULE: at 20:45

## 2021-02-02 RX ADMIN — Medication 1 CAPSULE: at 08:15

## 2021-02-02 RX ADMIN — ASPIRIN 81 MG: 81 TABLET, CHEWABLE ORAL at 16:52

## 2021-02-02 RX ADMIN — CITALOPRAM 10 MG: 20 TABLET, FILM COATED ORAL at 08:15

## 2021-02-02 RX ADMIN — ASPIRIN 81 MG: 81 TABLET, CHEWABLE ORAL at 08:16

## 2021-02-02 RX ADMIN — METOPROLOL SUCCINATE 25 MG: 25 TABLET, EXTENDED RELEASE ORAL at 08:15

## 2021-02-02 RX ADMIN — Medication 10 ML: at 20:45

## 2021-02-02 RX ADMIN — LOSARTAN POTASSIUM 100 MG: 50 TABLET, FILM COATED ORAL at 08:15

## 2021-02-02 RX ADMIN — LORAZEPAM 0.5 MG: 0.5 TABLET ORAL at 20:46

## 2021-02-02 RX ADMIN — LORAZEPAM 0.5 MG: 0.5 TABLET ORAL at 00:30

## 2021-02-02 RX ADMIN — ENOXAPARIN SODIUM 40 MG: 40 INJECTION SUBCUTANEOUS at 08:16

## 2021-02-02 RX ADMIN — MAGNESIUM GLUCONATE 500 MG ORAL TABLET 400 MG: 500 TABLET ORAL at 08:16

## 2021-02-02 ASSESSMENT — PAIN SCALES - GENERAL
PAINLEVEL_OUTOF10: 0
PAINLEVEL_OUTOF10: 0

## 2021-02-02 NOTE — PLAN OF CARE
Problem: Falls - Risk of:  Goal: Will remain free from falls  Description: Will remain free from falls  Outcome: Met This Shift     Problem: Falls - Risk of:  Goal: Absence of physical injury  Description: Absence of physical injury  Outcome: Met This Shift       Problem: Skin Integrity:  Goal: Absence of new skin breakdown  Description: Absence of new skin breakdown  2/2/2021 0140 by Thierry Baldwin RN  Outcome: Met This Shift

## 2021-02-02 NOTE — PROGRESS NOTES
[facility] Medicine    Subjective:  Pt alert conversive declines forrest      Current Facility-Administered Medications:     metoprolol succinate (TOPROL XL) extended release tablet 25 mg, 25 mg, Oral, Daily, Tyler Gibson DO, 25 mg at 02/01/21 1020    losartan (COZAAR) tablet 100 mg, 100 mg, Oral, Daily, Tyler Gibson DO    amLODIPine (NORVASC) tablet 5 mg, 5 mg, Oral, Daily, Tyler Gibson, DO, 5 mg at 02/01/21 1385    aspirin chewable tablet 81 mg, 81 mg, Oral, BID, Tyler Gibson, DO, 81 mg at 02/01/21 1717    citalopram (CELEXA) tablet 10 mg, 10 mg, Oral, Daily, Tyler Gibson DO, 10 mg at 02/01/21 9506    levothyroxine (SYNTHROID) tablet 88 mcg, 88 mcg, Oral, Daily, Tyler Gibson DO, 88 mcg at 02/02/21 0608    linaCLOtide (LINZESS) capsule 72 mcg, 72 mcg, Oral, QAM , Tyler Gibson DO    LORazepam (ATIVAN) tablet 0.5 mg, 0.5 mg, Oral, Daily PRN, Tyler Gibson DO, 0.5 mg at 02/02/21 0030    magnesium oxide (MAG-OX) tablet 400 mg, 400 mg, Oral, Daily, Tyler Gibson DO, 400 mg at 02/01/21 4757    meclizine (ANTIVERT) tablet 25 mg, 25 mg, Oral, TID PRN, Tyler Gibson DO, 25 mg at 02/01/21 1551    ocuvite-lutein multivitamin 1 capsule, 1 capsule, Oral, BID, Tyler Gibson DO, 1 capsule at 02/01/21 2224    pantoprazole (PROTONIX) tablet 40 mg, 40 mg, Oral, QAM AC, Tyler Gibson, DO, 40 mg at 02/02/21 0608    sodium chloride flush 0.9 % injection 10 mL, 10 mL, Intravenous, 2 times per day, Tyler Gibson DO, 10 mL at 02/01/21 2230    sodium chloride flush 0.9 % injection 10 mL, 10 mL, Intravenous, PRN, Tyler Gibson DO    enoxaparin (LOVENOX) injection 40 mg, 40 mg, Subcutaneous, Daily, Tyler Gibson DO, 40 mg at 02/01/21 6830    promethazine (PHENERGAN) tablet 12.5 mg, 12.5 mg, Oral, Q6H PRN **OR** ondansetron (ZOFRAN) injection 4 mg, 4 mg, Intravenous, Q6H PRN, Tyler Gibson DO, 4 mg at 01/30/21 0613    polyethylene glycol (GLYCOLAX) packet 17 g, 17 g, Oral, Daily PRN, Alen Gravettephilippe Gibson DO, 17 g at 01/31/21 1210    acetaminophen (TYLENOL) tablet 650 mg, 650 mg, Oral, Q6H PRN, 650 mg at 02/01/21 1547 **OR** acetaminophen (TYLENOL) suppository 650 mg, 650 mg, Rectal, Q6H PRN, Alen Gibson DO    Objective:    BP (!) 156/72   Pulse 54   Temp 97.8 °F (36.6 °C) (Temporal)   Resp 16   Ht 4' 11\" (1.499 m)   Wt 192 lb (87.1 kg)   LMP 07/24/2014   SpO2 93%   BMI 38.78 kg/m²     Heart:  reg  Lungs:  ctab  Abd: + bs soft nontender  Extrem:  Min edema legs    CBC with Differential:    Lab Results   Component Value Date    WBC 7.8 01/29/2021    RBC 4.46 01/29/2021    HGB 12.5 01/29/2021    HCT 37.6 01/29/2021     01/29/2021    MCV 84.3 01/29/2021    MCH 28.0 01/29/2021    MCHC 33.2 01/29/2021    RDW 13.8 01/29/2021    SEGSPCT 58 03/11/2014    LYMPHOPCT 36.1 01/29/2021    MONOPCT 9.1 01/29/2021    EOSPCT 1 10/07/2010    BASOPCT 0.6 01/29/2021    MONOSABS 0.71 01/29/2021    LYMPHSABS 2.81 01/29/2021    EOSABS 0.13 01/29/2021    BASOSABS 0.05 01/29/2021     CMP:    Lab Results   Component Value Date     01/29/2021    K 3.9 01/29/2021    K 4.2 10/31/2020     01/29/2021    CO2 25 01/29/2021    BUN 26 01/29/2021    CREATININE 0.8 01/29/2021    GFRAA >60 01/29/2021    LABGLOM >60 01/29/2021    GLUCOSE 104 01/29/2021    GLUCOSE 109 05/04/2012    PROT 7.2 01/29/2021    LABALBU 4.0 01/29/2021    LABALBU 4.2 03/24/2012    CALCIUM 9.0 01/29/2021    BILITOT 0.2 01/29/2021    ALKPHOS 101 01/29/2021    AST 21 01/29/2021    ALT 14 01/29/2021     Warfarin PT/INR:    Lab Results   Component Value Date    INR 0.9 08/18/2020    INR 0.9 12/29/2019    INR 0.9 12/19/2019    PROTIME 10.9 08/18/2020    PROTIME 10.5 12/29/2019    PROTIME 10.4 12/19/2019       Assessment:    Active Problems:    Dehydration    Hypothyroidism    GERD (gastroesophageal reflux disease)    Dizziness    Blindness of left eye    Essential hypertension    Frequent falls    Gait instability    Near syncope  Resolved Problems:    * No resolved hospital problems.  *      Plan:  Dc planning increase activity        Jljailene Baltazar  7:52 AM  2/2/2021

## 2021-02-02 NOTE — PROGRESS NOTES
OT BEDSIDE TREATMENT NOTE      Date:2021  Patient Name: Selene Mclean  MRN: [id number]  : 1940  Room: [address] [place] [address]     Evaluating OT: Everett Robison OTR/L [id number]     Referring physician: Sissy Colbert MD  AM-PAC Daily Activity Raw Score:   Recommended Adaptive Equipment: TBD     Diagnosis: near syncope  Pertinent Medical History: HTN, HLD, hx L TKR, L prosthetic eye, GERD, chronic back pain & neuropathy  Precautions:  Falls & blind in her L eye & vertigo     Home Living: Pt lives alone in a 1st floor apt. Bathroom setup: tub chair with a shower curtain, laundry on same floor as apartment  Equipment owned: [address] [place] [address]  Prior Level of Function:   Independent with ADLs,. Transfers,, mobility, IADLs & home management  Driving: yes                             Pain Level: Pt did not c/o pain during OT   Cognition: A&O: 3/3; Follows 1-2 step command              Memory:  Fair+              Sequencing:  Fair+              Problem solving:  Fair              Judgement/safety:  Fair                Functional Assessment:    Initial Eval Status  Date: 21 Treatment Status  Date:21 Short Term Goals=LTG  Treatment frequency: PRN 2-4 x/week   Feeding s/u Mod I   With lunch tray   independent   Grooming Sup seated S/SBA  To wash hands standing at sink using w.  Walker   SBA with ww standing    UB Dressing Min A CGA/min A  To [place] gown like a robe then stood to pull down over back   Sup   LB Dressing Mod A NT  Donald with AE    Bathing Mod A  Simulated Min/Mod A  Min A   Toileting NT CGA/min A  To safely complete toilet hygiene due to balance.   Min Awith ww to elevated commode with HR   Bed Mobility  Supine <> sit: SBA      NT- pt already up in bedside chair   Supine to sit: independent        Functional Transfers Sit<>stand: CGA     Stand pivot: min A  CGA  Sit to stand and transfers for safety.   SBA with ww  SBA-CGA with a rw   Functional Mobility Min A funcitonal mobility using a rw CGA  Pt ambulated into bathroom and partially down hallway using w. Walker . One cue for safety during turns.   SBA-CGA with ww household distances   Balance Sitting:     Static:  SBA     Dynamic:CGA  Standing: min A  SBA/CGA  Standing balance SBA with a rw   Activity Tolerance Fair  fair  Good- with ADL completion   Visual/  Perceptual Glasses: yes reading           Safety Fair   fair                Good- with ADL completion          Comments: Upon arrival pt sitting up in arm chair and agreed to OT tx. Pt completed ambulation from room out to hallway then into bathroom using w. Walker at [facility] for balance. Pt completed arm chair and toilet transfers at CGA/min A for safety. Pt stood at sink to wash hands using walker for balance to improve grooming skills. . At end of session pt returned to bedside arm chair with  all lines and tubes intact, call light within reach. · Pt has made fair+  progress towards set goals. · Continue with current plan of care    Treatment Time In: 11:35am            Treatment Time Out: 11:58am             Treatment Charges: Mins Units   Ther Ex  [id number]     Manual Therapy [id number]     Thera Activities 91999 13 1   ADL/Home Mgt 35164 10 1   Neuro Re-ed [id number]     Group Therapy      Orthotic manage/training  [id number]     Non-Billable Time     Total Timed Treatment 23 2   [facility] [place]     I have read & agree with the above status.     Mariya Jean OTR/L [id number]

## 2021-02-03 VITALS
WEIGHT: 192 LBS | HEART RATE: 51 BPM | TEMPERATURE: 98.2 F | BODY MASS INDEX: 38.71 KG/M2 | HEIGHT: 59 IN | RESPIRATION RATE: 18 BRPM | SYSTOLIC BLOOD PRESSURE: 151 MMHG | OXYGEN SATURATION: 97 % | DIASTOLIC BLOOD PRESSURE: 71 MMHG

## 2021-02-03 PROCEDURE — 6370000000 HC RX 637 (ALT 250 FOR IP): Performed by: INTERNAL MEDICINE

## 2021-02-03 PROCEDURE — 6360000002 HC RX W HCPCS: Performed by: INTERNAL MEDICINE

## 2021-02-03 PROCEDURE — G0378 HOSPITAL OBSERVATION PER HR: HCPCS

## 2021-02-03 PROCEDURE — 96372 THER/PROPH/DIAG INJ SC/IM: CPT

## 2021-02-03 RX ORDER — METOPROLOL SUCCINATE 25 MG/1
25 TABLET, EXTENDED RELEASE ORAL DAILY
Qty: 30 TABLET | Refills: 0 | Status: SHIPPED | OUTPATIENT
Start: 2021-02-04 | End: 2021-03-03 | Stop reason: SDUPTHER

## 2021-02-03 RX ORDER — LOSARTAN POTASSIUM 100 MG/1
100 TABLET ORAL DAILY
Qty: 30 TABLET | Refills: 0 | Status: SHIPPED | OUTPATIENT
Start: 2021-02-04 | End: 2021-03-08 | Stop reason: SDUPTHER

## 2021-02-03 RX ADMIN — ASPIRIN 81 MG: 81 TABLET, CHEWABLE ORAL at 08:10

## 2021-02-03 RX ADMIN — LOSARTAN POTASSIUM 100 MG: 50 TABLET, FILM COATED ORAL at 08:10

## 2021-02-03 RX ADMIN — ENOXAPARIN SODIUM 40 MG: 40 INJECTION SUBCUTANEOUS at 08:15

## 2021-02-03 RX ADMIN — CITALOPRAM 10 MG: 20 TABLET, FILM COATED ORAL at 08:11

## 2021-02-03 RX ADMIN — Medication 1 CAPSULE: at 08:10

## 2021-02-03 RX ADMIN — AMLODIPINE BESYLATE 5 MG: 5 TABLET ORAL at 08:10

## 2021-02-03 RX ADMIN — MECLIZINE 25 MG: 12.5 TABLET ORAL at 01:19

## 2021-02-03 RX ADMIN — LEVOTHYROXINE SODIUM 88 MCG: 0.09 TABLET ORAL at 05:53

## 2021-02-03 RX ADMIN — MAGNESIUM GLUCONATE 500 MG ORAL TABLET 400 MG: 500 TABLET ORAL at 08:10

## 2021-02-03 RX ADMIN — PANTOPRAZOLE SODIUM 40 MG: 40 TABLET, DELAYED RELEASE ORAL at 05:53

## 2021-02-03 RX ADMIN — ACETAMINOPHEN 650 MG: 325 TABLET, FILM COATED ORAL at 00:19

## 2021-02-03 ASSESSMENT — PAIN SCALES - GENERAL: PAINLEVEL_OUTOF10: 4

## 2021-02-03 NOTE — PROGRESS NOTES
SOCIAL WORK/CASEMANAGEMENT TRANSITION OF CARE [id number] Arelis Morris, [address] [facility], [place], [phone]): notified [facility] that pt is going home today and we will put in [facility] orders. Met with pt and she doesn't have family to take her home until after work. She wants us to setup transport home. I called [facility] and they have pt setup for 10:30 a.m. to  pt. They will call the floor or myself when this is in place and when the drive is to arrive. There is up to a 3 hour window for them to come and get pt for home. Dixie Flanagan  2/3/2021   I called [facility] around 1:30 p.m. since they had not called me yet or the floor. They had cancelled the transportation due to inability to find transportation for pt. I told the rep someone should have called us since pt has been waiting for 3 hours. I met with pt who became very anxious. She is working on calling family to come and get her. Dixie Flanagan  2/3/2021

## 2021-02-03 NOTE — PATIENT CARE CONFERENCE
[facility] Quality Flow/Interdisciplinary Rounds Progress Note        Quality Flow Rounds held on February 3, 2021    Disciplines Attending:  Bedside Nurse, ,  and Nursing Unit Leadership    Celina Archer was admitted on 1/29/2021  4:14 AM    Anticipated Discharge Date:  Expected Discharge Date: 02/03/21    Disposition:    Ozzy Score:  Ozzy Scale Score: 19    Readmission Risk              Risk of Unplanned Readmission:        15           Discussed patient goal for the day, patient clinical progression, and barriers to discharge.   The following Goal(s) of the Day/Commitment(s) have been identified:  to find a ride for discharge      Roc Fields  February 3, 2021

## 2021-02-03 NOTE — PROGRESS NOTES
Hospital Medicine    Subjective:  Pt alert conversive feels better today      Current Facility-Administered Medications:     metoprolol succinate (TOPROL XL) extended release tablet 25 mg, 25 mg, Oral, Daily, Vassie Forts Malmer, DO, 25 mg at 02/02/21 0815    losartan (COZAAR) tablet 100 mg, 100 mg, Oral, Daily, Vassie Forts Malmer, DO, 100 mg at 02/03/21 0810    amLODIPine (NORVASC) tablet 5 mg, 5 mg, Oral, Daily, Vassie Forts Malmer, DO, 5 mg at 02/03/21 7242    aspirin chewable tablet 81 mg, 81 mg, Oral, BID, Vassie Forts Malmer, DO, 81 mg at 02/03/21 0810    citalopram (CELEXA) tablet 10 mg, 10 mg, Oral, Daily, Vassie Forts Malmer, DO, 10 mg at 02/03/21 0898    levothyroxine (SYNTHROID) tablet 88 mcg, 88 mcg, Oral, Daily, Vassie Forts Malmer, DO, 88 mcg at 02/03/21 4475    linaCLOtide (LINZESS) capsule 72 mcg, 72 mcg, Oral, QAM AC, Vassie Forts Malmer, DO    LORazepam (ATIVAN) tablet 0.5 mg, 0.5 mg, Oral, Daily PRN, Vassie Forts Malmer, DO, 0.5 mg at 02/02/21 2046    magnesium oxide (MAG-OX) tablet 400 mg, 400 mg, Oral, Daily, Vassie Forts Malmer, DO, 400 mg at 02/03/21 8589    meclizine (ANTIVERT) tablet 25 mg, 25 mg, Oral, TID PRN, Vassie Forts Malmer, DO, 25 mg at 02/03/21 0119    ocuvite-lutein multivitamin 1 capsule, 1 capsule, Oral, BID, Vassie Forts Malmer, DO, 1 capsule at 02/03/21 0810    pantoprazole (PROTONIX) tablet 40 mg, 40 mg, Oral, QAM AC, Vassie Forts Malmer, DO, 40 mg at 02/03/21 2620    sodium chloride flush 0.9 % injection 10 mL, 10 mL, Intravenous, 2 times per day, Álvaro Adams DO, 10 mL at 02/02/21 2045    sodium chloride flush 0.9 % injection 10 mL, 10 mL, Intravenous, PRN, Yecenia Gibson DO    enoxaparin (LOVENOX) injection 40 mg, 40 mg, Subcutaneous, Daily, Yecenia Gibson DO, 40 mg at 02/03/21 0815    promethazine (PHENERGAN) tablet 12.5 mg, 12.5 mg, Oral, Q6H PRN **OR** ondansetron (ZOFRAN) injection 4 mg, 4 mg, Intravenous, Q6H PRN, Yecenia Gibson DO, 4 mg at 01/30/21 1752    polyethylene glycol (GLYCOLAX) packet 17 g, 17 g, Oral, Daily PRN, Lucía Gibson DO, 17 g at 01/31/21 1210    acetaminophen (TYLENOL) tablet 650 mg, 650 mg, Oral, Q6H PRN, 650 mg at 02/03/21 0019 **OR** acetaminophen (TYLENOL) suppository 650 mg, 650 mg, Rectal, Q6H PRN, Lucía Gibson DO    Objective:    BP (!) 151/71   Pulse 51   Temp 98.2 °F (36.8 °C) (Temporal)   Resp 18   Ht 4' 11\" (1.499 m)   Wt 192 lb (87.1 kg)   LMP 07/24/2014   SpO2 97%   BMI 38.78 kg/m²     Heart:  reg  Lungs:  ctab  Abd: + bs soft nontender  Extrem:  Min edema legs    CBC with Differential:    Lab Results   Component Value Date    WBC 7.8 01/29/2021    RBC 4.46 01/29/2021    HGB 12.5 01/29/2021    HCT 37.6 01/29/2021     01/29/2021    MCV 84.3 01/29/2021    MCH 28.0 01/29/2021    MCHC 33.2 01/29/2021    RDW 13.8 01/29/2021    SEGSPCT 58 03/11/2014    LYMPHOPCT 36.1 01/29/2021    MONOPCT 9.1 01/29/2021    EOSPCT 1 10/07/2010    BASOPCT 0.6 01/29/2021    MONOSABS 0.71 01/29/2021    LYMPHSABS 2.81 01/29/2021    EOSABS 0.13 01/29/2021    BASOSABS 0.05 01/29/2021     CMP:    Lab Results   Component Value Date     01/29/2021    K 3.9 01/29/2021    K 4.2 10/31/2020     01/29/2021    CO2 25 01/29/2021    BUN 26 01/29/2021    CREATININE 0.8 01/29/2021    GFRAA >60 01/29/2021    LABGLOM >60 01/29/2021    GLUCOSE 104 01/29/2021    GLUCOSE 109 05/04/2012    PROT 7.2 01/29/2021    LABALBU 4.0 01/29/2021    LABALBU 4.2 03/24/2012    CALCIUM 9.0 01/29/2021    BILITOT 0.2 01/29/2021    ALKPHOS 101 01/29/2021    AST 21 01/29/2021    ALT 14 01/29/2021     Warfarin PT/INR:    Lab Results   Component Value Date    INR 0.9 08/18/2020    INR 0.9 12/29/2019    INR 0.9 12/19/2019    PROTIME 10.9 08/18/2020    PROTIME 10.5 12/29/2019    PROTIME 10.4 12/19/2019       Assessment:    Active Problems:    Dehydration    Hypothyroidism    GERD (gastroesophageal reflux disease)    Dizziness    Blindness of left eye    Essential hypertension

## 2021-02-04 ENCOUNTER — TELEPHONE (OUTPATIENT)
Dept: FAMILY MEDICINE CLINIC | Age: 81
End: 2021-02-04

## 2021-02-04 RX ORDER — AMLODIPINE BESYLATE 5 MG/1
TABLET ORAL
Qty: 90 TABLET | Refills: 0 | Status: SHIPPED
Start: 2021-02-04 | End: 2021-03-03 | Stop reason: SDUPTHER

## 2021-02-08 ENCOUNTER — OFFICE VISIT (OUTPATIENT)
Dept: FAMILY MEDICINE CLINIC | Age: 81
End: 2021-02-08
Payer: MEDICARE

## 2021-02-08 VITALS
SYSTOLIC BLOOD PRESSURE: 138 MMHG | DIASTOLIC BLOOD PRESSURE: 60 MMHG | HEART RATE: 67 BPM | BODY MASS INDEX: 38.3 KG/M2 | HEIGHT: 59 IN | TEMPERATURE: 97.1 F | WEIGHT: 190 LBS | RESPIRATION RATE: 16 BRPM | OXYGEN SATURATION: 97 %

## 2021-02-08 DIAGNOSIS — F33.1 MODERATE EPISODE OF RECURRENT MAJOR DEPRESSIVE DISORDER (HCC): ICD-10-CM

## 2021-02-08 DIAGNOSIS — R55 NEAR SYNCOPE: Primary | ICD-10-CM

## 2021-02-08 PROCEDURE — 1111F DSCHRG MED/CURRENT MED MERGE: CPT | Performed by: FAMILY MEDICINE

## 2021-02-08 PROCEDURE — 99496 TRANSJ CARE MGMT HIGH F2F 7D: CPT | Performed by: FAMILY MEDICINE

## 2021-02-08 RX ORDER — LEVOTHYROXINE SODIUM 88 UG/1
88 TABLET ORAL DAILY
Qty: 90 TABLET | Refills: 1 | Status: SHIPPED
Start: 2021-02-08 | End: 2021-09-21 | Stop reason: SDUPTHER

## 2021-02-08 RX ORDER — CITALOPRAM 10 MG/1
10 TABLET ORAL DAILY
Qty: 90 TABLET | Refills: 2 | Status: SHIPPED
Start: 2021-02-08 | End: 2021-06-09

## 2021-02-08 NOTE — PROGRESS NOTES
Post-Discharge Transitional Care Management Services or Hospital Follow Up      [facility]   YOB: 1940    Date of Office Visit:  2/8/2021  Date of Hospital Admission: 1/29/21  Date of Hospital Discharge: 2/3/21  Risk of hospital readmission (high >=14%.  Medium >=10%) :Readmission Risk Score: 15      Care management risk score Rising risk (score 2-5) and Complex Care (Scores >=6): 4     Non face to face  following discharge, date last encounter closed (first attempt may have been earlier): 2/4/2021  1:09 PM    Call initiated 2 business days of discharge: Yes    Patient Active Problem List   Diagnosis    GERD (gastroesophageal reflux disease)    Hypothyroidism    Obstructive sleep apnea syndrome, non-compliant with CPAP therapy    Hyperlipidemia LDL goal <100    Obesity (BMI 35.0-39.9 without comorbidity)    Blindness of left eye    Vertigo    Essential hypertension    Frequent falls    Ataxia    Moderate episode of recurrent major depressive disorder (HCC)    Gait instability    Peripheral neuropathy    Lumbosacral radiculopathy       Allergies   Allergen Reactions    Iodine Shortness Of Breath, Swelling and Rash    Codeine      Patient cannot remember reaction    Hydralazine     Oxycodone-Aspirin      unknown    Amoxicillin-Pot Clavulanate Nausea And Vomiting    Darvocet [Propoxyphene N-Acetaminophen] Nausea And Vomiting    Eggs Or Egg-Derived Products Nausea And Vomiting    Influenza Vaccines Nausea And Vomiting    Percodan [Oxycodone-Aspirin] Nausea And Vomiting and Other (See Comments)     sick    Percodan [Oxycodone-Aspirin] Nausea And Vomiting       Medications listed as ordered at the time of discharge from hospital   Valarie Mata   Home Medication Instructions DEBBIE:    Printed on:02/08/21 3940   Medication Information                      amLODIPine (NORVASC) 5 MG tablet  TAKE ONE TABLET BY MOUTH DAILY             aspirin 81 MG tablet  Take 81 mg by mouth 2 times daily              cetirizine (ZYRTEC) 5 MG tablet  Take 1 tablet by mouth nightly             citalopram (CELEXA) 10 MG tablet  Take 1 tablet by mouth daily             Handicap Placard MISC  by Does not apply route Patient cannot walk 200 ft without stopping Duration: Lifetime             levothyroxine (SYNTHROID) 88 MCG tablet  Take 1 tablet by mouth daily             linaCLOtide (LINZESS) 72 MCG CAPS capsule  Take 1 capsule by mouth every morning (before breakfast)             LORazepam (ATIVAN) 0.5 MG tablet  Take 1 tablet by mouth daily as needed for Anxiety for up to 30 days. losartan (COZAAR) 100 MG tablet  Take 1 tablet by mouth daily             magnesium oxide (MAG-OX) 400 (241.3 Mg) MG TABS tablet  Take 1 tablet by mouth daily             metoprolol succinate (TOPROL XL) 25 MG extended release tablet  Take 1 tablet by mouth daily             Multiple Vitamins-Minerals (PRESERVISION AREDS 2) CAPS  Take 1 capsule by mouth 2 times daily              NONFORMULARY  Eye injection -- into the right eye, every 9 or 10 weeks. For Macular degen.              nystatin (MYCOSTATIN) 225672 UNIT/GM powder  Apply 3 times daily under the breasts             pantoprazole (PROTONIX) 40 MG tablet  Take 1 tablet by mouth every morning (before breakfast)                   Medications marked \"taking\" at this time  Outpatient Medications Marked as Taking for the 2/8/21 encounter (Office Visit) with Wilmer Boyd, DO   Medication Sig Dispense Refill    citalopram (CELEXA) 10 MG tablet Take 1 tablet by mouth daily 90 tablet 2    levothyroxine (SYNTHROID) 88 MCG tablet Take 1 tablet by mouth daily 90 tablet 1    amLODIPine (NORVASC) 5 MG tablet TAKE ONE TABLET BY MOUTH DAILY 90 tablet 0    losartan (COZAAR) 100 MG tablet Take 1 tablet by mouth daily 30 tablet 0    metoprolol succinate (TOPROL XL) 25 MG extended release tablet Take 1 tablet by mouth daily 30 tablet 0    cetirizine (ZYRTEC) 5 MG tablet Take 1 tablet by mouth nightly 30 tablet 0    linaCLOtide (LINZESS) 72 MCG CAPS capsule Take 1 capsule by mouth every morning (before breakfast) 30 capsule 0    NONFORMULARY Eye injection -- into the right eye, every 9 or 10 weeks. For Macular degen.  LORazepam (ATIVAN) 0.5 MG tablet Take 1 tablet by mouth daily as needed for Anxiety for up to 30 days. 30 tablet 2    pantoprazole (PROTONIX) 40 MG tablet Take 1 tablet by mouth every morning (before breakfast) 90 tablet 3    nystatin (MYCOSTATIN) 207597 UNIT/GM powder Apply 3 times daily under the breasts 60 g 0    Handicap Placard MISC by Does not apply route Patient cannot walk 200 ft without stopping Duration: Lifetime 1 each 0    magnesium oxide (MAG-OX) 400 (241.3 Mg) MG TABS tablet Take 1 tablet by mouth daily 30 tablet 3    aspirin 81 MG tablet Take 81 mg by mouth 2 times daily       Multiple Vitamins-Minerals (PRESERVISION AREDS 2) CAPS Take 1 capsule by mouth 2 times daily           Medications patient taking as of now reconciled against medications ordered at time of hospital discharge: Yes    Chief Complaint   Patient presents with    Care Management     transitions of care        History of Present illness - Follow up of Hospital diagnosis(es): near syncope    Inpatient course: Discharge summary reviewed- see chart. Labs reviewed normal.   Imaging reviewed. Normal.  BP is stable. Depression is stable. Seeing Leonidas Omalley. Does deny homicidal or suicidal ideations. Taking citalopram daily and lorazepam prn. Interval history/Current status: improved    A comprehensive review of systems was negative except for what was noted in the HPI. Vitals:    02/08/21 0820   BP: 138/60   Pulse: 67   Resp: 16   Temp: 97.1 °F (36.2 °C)   SpO2: 97%   Weight: 190 lb (86.2 kg)   Height: 4' 11\" (1.499 m)     Body mass index is 38.38 kg/m².    Wt Readings from Last 3 Encounters:   02/08/21 190 lb (86.2 kg)   01/29/21 192 lb (87.1 kg)   01/20/21 189 lb (85.7 kg)     BP Readings from Last 3 Encounters:   02/08/21 138/60   02/03/21 (!) 151/71   01/20/21 117/68        Physical Exam:  General Appearance: alert and oriented to person, place and time, well developed and well- nourished, in no acute distress  Skin: warm and dry, no rash or erythema  Head: normocephalic and atraumatic  Eyes: pupils equal, round, and reactive to light, extraocular eye movements intact, conjunctivae normal  ENT: tympanic membrane, external ear and ear canal normal bilaterally, nose without deformity, nasal mucosa and turbinates normal without polyps  Neck: supple and non-tender without mass, no thyromegaly or thyroid nodules, no cervical lymphadenopathy  Pulmonary/Chest: clear to auscultation bilaterally- no wheezes, rales or rhonchi, normal air movement, no respiratory distress  Cardiovascular: normal rate, regular rhythm, normal S1 and S2, no murmurs, rubs, clicks, or gallops, distal pulses intact, no carotid bruits  Abdomen: soft, non-tender, non-distended, normal bowel sounds, no masses or organomegaly  Extremities: no cyanosis, clubbing or edema  Musculoskeletal: normal range of motion, no joint swelling, deformity or tenderness  Neurologic: reflexes normal and symmetric, no cranial nerve deficit, gait, coordination and speech normal    Assessment/Plan:  1. Near syncope  - Improved  - MS DISCHARGE MEDS RECONCILED W/ CURRENT OUTPATIENT MED LIST  - Will see her in 1 month    2. Moderate episode of recurrent major depressive disorder (HCC)  -  Stable, continue medications as prescribed. - levothyroxine (SYNTHROID) 88 MCG tablet; Take 1 tablet by mouth daily  Dispense: 90 tablet;  Refill: 1        Medical Decision Making: moderate complexity

## 2021-02-17 ENCOUNTER — TELEPHONE (OUTPATIENT)
Dept: FAMILY MEDICINE CLINIC | Age: 81
End: 2021-02-17

## 2021-02-17 NOTE — TELEPHONE ENCOUNTER
Are they pitting? Can they measure and wrap legs? Advise to limit salt consumption. Exercise. Elevate feet above the level of the heart.  Wear compression stockings while awake

## 2021-02-17 NOTE — TELEPHONE ENCOUNTER
Quentin Rodriguez called left a message, I called another number and left the message that dr Aristides Rock wrote below for the patient    Quentin Rodriguez new number to call with no answer was [phone]

## 2021-02-17 NOTE — TELEPHONE ENCOUNTER
Bibi Bishop from [facility] called, patient is having edema of legs, she is not on any water pills, she is getting nursing visits tomorrow, they want to know if you want any measurments or new orders for them

## 2021-02-18 NOTE — DISCHARGE SUMMARY
[address]                  [address] [facility],  [facility]                               DISCHARGE SUMMARY    PATIENT NAME: Katerine Joshi                    :        1940  MED REC NO:   [id number]                            ROOM:       [facility]  ACCOUNT NO:   [de-identified]                           ADMIT DATE: 2021  PROVIDER:     Lloyd Lee DO                  DISCHARGE DATE:  2021    DISCHARGE DIAGNOSES:  Near-syncope, dizziness, dehydration, frequent  falls, hypertension, hypothyroidism, GERD. HOSPITAL COURSE:  The patient is an 56-year-old  female who  presented to the emergency room after a fall at home. She had  dizziness. She was admitted to the hospital.  She was treated for  dehydration. Her status stabilized, and she was discharged to home in  stable condition on 2021. Discharge meds as per discharge med rec which include losartan 100 mg  daily, and Toprol-XL 25 mg daily, aspirin, Zyrtec, Linzess, Mag-Ox,  Protonix, PreserVision vitamin. The patient instructed to follow up with her primary care physician,  Wilmer Boyd DO. Call office for appointment.         [facility]    D: 2021 12:31:28       T: 2021 12:38:08     [email or web address]  Job#: [id number]     Doc#: [id number]    CC:

## 2021-02-22 ENCOUNTER — TELEPHONE (OUTPATIENT)
Dept: FAMILY MEDICINE CLINIC | Age: 81
End: 2021-02-22

## 2021-02-22 NOTE — TELEPHONE ENCOUNTER
Jason Bellamy from home health called patients blood pressure is 160/62 today and past was 164/56  Any new orders

## 2021-02-23 ENCOUNTER — NURSE ONLY (OUTPATIENT)
Dept: FAMILY MEDICINE CLINIC | Age: 81
End: 2021-02-23

## 2021-02-23 VITALS — SYSTOLIC BLOOD PRESSURE: 145 MMHG | DIASTOLIC BLOOD PRESSURE: 70 MMHG

## 2021-02-26 ENCOUNTER — HOSPITAL ENCOUNTER (OUTPATIENT)
Dept: MAMMOGRAPHY | Age: 81
Discharge: HOME | End: 2021-02-28
Payer: MEDICARE

## 2021-02-26 DIAGNOSIS — Z12.31 ENCOUNTER FOR SCREENING MAMMOGRAM FOR MALIGNANT NEOPLASM OF BREAST: ICD-10-CM

## 2021-02-26 PROCEDURE — 77063 BREAST TOMOSYNTHESIS BI: CPT

## 2021-03-03 DIAGNOSIS — F41.9 ANXIETY: ICD-10-CM

## 2021-03-03 RX ORDER — METOPROLOL SUCCINATE 25 MG/1
25 TABLET, EXTENDED RELEASE ORAL DAILY
Qty: 30 TABLET | Refills: 0 | Status: SHIPPED
Start: 2021-03-03 | End: 2021-03-08 | Stop reason: SDUPTHER

## 2021-03-03 RX ORDER — AMLODIPINE BESYLATE 5 MG/1
5 TABLET ORAL DAILY
Qty: 90 TABLET | Refills: 1 | Status: SHIPPED
Start: 2021-03-03 | End: 2021-06-09 | Stop reason: DRUGHIGH

## 2021-03-03 RX ORDER — LORAZEPAM 0.5 MG/1
0.5 TABLET ORAL DAILY PRN
Qty: 30 TABLET | Refills: 2 | Status: SHIPPED | OUTPATIENT
Start: 2021-03-03 | End: 2021-04-02

## 2021-03-08 ENCOUNTER — OFFICE VISIT (OUTPATIENT)
Dept: FAMILY MEDICINE CLINIC | Age: 81
End: 2021-03-08
Payer: MEDICARE

## 2021-03-08 VITALS
BODY MASS INDEX: 39.11 KG/M2 | DIASTOLIC BLOOD PRESSURE: 80 MMHG | OXYGEN SATURATION: 96 % | HEIGHT: 59 IN | TEMPERATURE: 98 F | HEART RATE: 65 BPM | RESPIRATION RATE: 16 BRPM | WEIGHT: 194 LBS | SYSTOLIC BLOOD PRESSURE: 134 MMHG

## 2021-03-08 DIAGNOSIS — E03.9 ACQUIRED HYPOTHYROIDISM: Primary | ICD-10-CM

## 2021-03-08 DIAGNOSIS — E78.5 HYPERLIPIDEMIA LDL GOAL <100: ICD-10-CM

## 2021-03-08 DIAGNOSIS — F41.9 ANXIETY: ICD-10-CM

## 2021-03-08 DIAGNOSIS — I10 ESSENTIAL HYPERTENSION: ICD-10-CM

## 2021-03-08 PROCEDURE — G8484 FLU IMMUNIZE NO ADMIN: HCPCS | Performed by: FAMILY MEDICINE

## 2021-03-08 PROCEDURE — G8427 DOCREV CUR MEDS BY ELIG CLIN: HCPCS | Performed by: FAMILY MEDICINE

## 2021-03-08 PROCEDURE — 1036F TOBACCO NON-USER: CPT | Performed by: FAMILY MEDICINE

## 2021-03-08 PROCEDURE — 1123F ACP DISCUSS/DSCN MKR DOCD: CPT | Performed by: FAMILY MEDICINE

## 2021-03-08 PROCEDURE — 4040F PNEUMOC VAC/ADMIN/RCVD: CPT | Performed by: FAMILY MEDICINE

## 2021-03-08 PROCEDURE — G8417 CALC BMI ABV UP PARAM F/U: HCPCS | Performed by: FAMILY MEDICINE

## 2021-03-08 PROCEDURE — 99214 OFFICE O/P EST MOD 30 MIN: CPT | Performed by: FAMILY MEDICINE

## 2021-03-08 PROCEDURE — G8400 PT W/DXA NO RESULTS DOC: HCPCS | Performed by: FAMILY MEDICINE

## 2021-03-08 PROCEDURE — 1090F PRES/ABSN URINE INCON ASSESS: CPT | Performed by: FAMILY MEDICINE

## 2021-03-08 RX ORDER — LOSARTAN POTASSIUM 100 MG/1
100 TABLET ORAL DAILY
Qty: 90 TABLET | Refills: 1 | Status: SHIPPED
Start: 2021-03-08 | End: 2021-09-21

## 2021-03-08 RX ORDER — METOPROLOL SUCCINATE 25 MG/1
25 TABLET, EXTENDED RELEASE ORAL DAILY
Qty: 90 TABLET | Refills: 1 | Status: SHIPPED
Start: 2021-03-08 | End: 2021-09-21

## 2021-03-08 NOTE — PROGRESS NOTES
3/8/2021    Chief Complaint   Patient presents with    Hypertension     routine check up        Bienvenido Ayala is a 78 y.o. patient that presents today for:    Hypertension: Here for follow up chronic hypertension. Patient is  compliant with lifestyle modifications like exercise and adherence to a low salt diet. Patient is  well controlled. Denies chest pain, diaphoresis, dyspnea, dyspnea on exertion, peripheral edema, palpitations, HA, visual issues. Med list reviewed. Taking as prescribed. No adverse effects. Hypothyroidism:  Patient is here today to follow up chronic hypothyroidism. This problem is longstanding. This is   generally controlled on current medication regimen, Levothyroxine 88 mcg. Takes medication as directed and tolerates well. Denies fatigue, changes in skin, hair or nails, unintentional weight loss or gain. remarkable. TSH:    Lab Results   Component Value Date    TSH 4.330 01/29/2021      Anxiety is stable. Does deny homicidal or suicidal ideations. Feeling well otherwise, no complaints. Patient's past medical, surgical, social and/or family history reviewed, updated in chart, and are non-contributory (unless otherwise stated). Medications and allergies also reviewed and updated in chart. ROS negative unless otherwise specified    Physical Exam  Wt Readings from Last 3 Encounters:   03/08/21 194 lb (88 kg)   02/08/21 190 lb (86.2 kg)   01/29/21 192 lb (87.1 kg)     Temp Readings from Last 3 Encounters:   03/08/21 98 °F (36.7 °C)   02/08/21 97.1 °F (36.2 °C)   02/03/21 98.2 °F (36.8 °C) (Temporal)     BP Readings from Last 3 Encounters:   03/08/21 134/80   02/23/21 (!) 145/70   02/08/21 138/60     Pulse Readings from Last 3 Encounters:   03/08/21 65   02/08/21 67   02/03/21 51       General appearance: alert, well appearing, and in no distress, oriented to person, place, and time and normal appearing weight.     CVS exam: normal rate, regular rhythm, normal S1, S2, no murmurs, rubs, clicks or gallops. Radial pulses 2+ bilateral.  PT/DP pulse 2+ bilat. No C/C/E    Chest: clear to auscultation, no wheezes, rales or rhonchi, symmetric air entry. Abdomen: Soft, non-tender, non-distended, positive BS in all 4 quadrants    Extremities:Dorsalis pedis pulses palpated bilaterally, no clubbing, cyanosis, edema or erythema     SKIN: warm, dry, no lesions, jaundice, petechiae, pallor, cyanosis, ecchymosis    NEURO: gross motor exam normal by observation, gait normal    Mental status - alert, oriented to person, place, and time, normal mood, behavior, speech, dress, motor activity, and thought processes      Assessment/Plan  Hedy musa was seen today for hypertension. Diagnoses and all orders for this visit:    Acquired hypothyroidism  - Stable. Check in September    Essential hypertension  -     CBC; Future  -     Comprehensive Metabolic Panel; Future    Hyperlipidemia LDL goal <100  - . Stable. Check in September    Anxiety  -  Stable, continue medications as prescribed. Return in about 3 months (around 6/8/2021), or if symptoms worsen or fail to improve. Call or go to ED immediately if symptoms worsen or persist.    Educational materials and/or home exercises printed for patient's review and were included in patient instructions on his/her After Visit Summary and given to patient at the end of visit. Counseled regarding above diagnosis, including possible risks and complications,  especially if left uncontrolled. Counseled regarding the possible side effects, risks, benefits and alternatives to treatment; patient and/or guardian verbalizes understanding, agrees, feels comfortable with and wishes to proceed with above treatment plan. Advised patient to call with any new medication issues, and read all Rx info from pharmacy to assure aware of all possible risks and side effects of medication before taking.     Reviewed age and gender appropriate health screening exams and vaccinations. Advised patient regarding importance of keeping up with recommended health maintenance and to schedule as soon as possible if overdue, as this is important in assessing for undiagnosed pathology, especially cancer, as well as protecting against potentially harmful/life threatening disease. Patient and/or guardian verbalizes understanding and agrees with above counseling, assessment and plan. All questions answered.       Daja Peña, DO

## 2021-03-24 ENCOUNTER — TELEPHONE (OUTPATIENT)
Dept: FAMILY MEDICINE CLINIC | Age: 81
End: 2021-03-24

## 2021-03-24 NOTE — TELEPHONE ENCOUNTER
Narda Galicia from eye care called today left message on patient so I called back said patient was there today complaining of dizziness telling them she fell and hit head  Her blood pressure today was 165/70  She also told them that she called here but nobody ever followed up with her, so dr Jv Valdez wanted mumtaz to reach out to our office, I did tell her that we are know patient very well, she just wanted to make sure that we are aware of this patient, I assured her that we are aware of the patient and we do see her frequently and speak to her frequently,     I called patient she said this happened last week, first she had a bowel movement and vomiting a lot followed by dizziness   She has pains in her stomach she falls all the time her pressure is always high, she thinks she is on the wrong blood pressure medications  Says she takes all 3 every day at the correct time

## 2021-03-25 NOTE — TELEPHONE ENCOUNTER
Pt states she is having left ear pain going down neck. Denies fever. Does not want to come in. Would like something called in.       Allergies listed
no

## 2021-04-07 ENCOUNTER — OFFICE VISIT (OUTPATIENT)
Dept: FAMILY MEDICINE CLINIC | Age: 81
End: 2021-04-07
Payer: MEDICARE

## 2021-04-07 VITALS
WEIGHT: 192 LBS | TEMPERATURE: 97.3 F | SYSTOLIC BLOOD PRESSURE: 138 MMHG | HEIGHT: 59 IN | HEART RATE: 62 BPM | OXYGEN SATURATION: 97 % | RESPIRATION RATE: 16 BRPM | BODY MASS INDEX: 38.71 KG/M2 | DIASTOLIC BLOOD PRESSURE: 60 MMHG

## 2021-04-07 DIAGNOSIS — E03.9 ACQUIRED HYPOTHYROIDISM: Chronic | ICD-10-CM

## 2021-04-07 DIAGNOSIS — R42 VERTIGO: Primary | ICD-10-CM

## 2021-04-07 DIAGNOSIS — R27.0 ATAXIA: ICD-10-CM

## 2021-04-07 DIAGNOSIS — R26.81 GAIT INSTABILITY: ICD-10-CM

## 2021-04-07 PROCEDURE — 99214 OFFICE O/P EST MOD 30 MIN: CPT | Performed by: FAMILY MEDICINE

## 2021-04-07 PROCEDURE — 1090F PRES/ABSN URINE INCON ASSESS: CPT | Performed by: FAMILY MEDICINE

## 2021-04-07 PROCEDURE — G8427 DOCREV CUR MEDS BY ELIG CLIN: HCPCS | Performed by: FAMILY MEDICINE

## 2021-04-07 PROCEDURE — 4040F PNEUMOC VAC/ADMIN/RCVD: CPT | Performed by: FAMILY MEDICINE

## 2021-04-07 PROCEDURE — G8417 CALC BMI ABV UP PARAM F/U: HCPCS | Performed by: FAMILY MEDICINE

## 2021-04-07 PROCEDURE — G8400 PT W/DXA NO RESULTS DOC: HCPCS | Performed by: FAMILY MEDICINE

## 2021-04-07 PROCEDURE — 1123F ACP DISCUSS/DSCN MKR DOCD: CPT | Performed by: FAMILY MEDICINE

## 2021-04-07 PROCEDURE — 1036F TOBACCO NON-USER: CPT | Performed by: FAMILY MEDICINE

## 2021-04-07 RX ORDER — MECLIZINE HYDROCHLORIDE 25 MG/1
25 TABLET ORAL PRN
COMMUNITY
End: 2021-08-10

## 2021-04-07 RX ORDER — SUCRALFATE 1 G/1
1 TABLET ORAL 4 TIMES DAILY
COMMUNITY
End: 2021-06-09

## 2021-04-07 RX ORDER — LORAZEPAM 0.5 MG/1
TABLET ORAL
COMMUNITY
End: 2021-06-09 | Stop reason: SDUPTHER

## 2021-04-07 NOTE — PROGRESS NOTES
4/7/2021    Chief Complaint   Patient presents with    Dizziness     i told patient to bring all of her medications i do not think she is taking them the right way which is causing her dizziness        HPI    Regina Ellison is a 78 y.o. patient that presents today for:    Reviewed CT scans and CXR as well as labs reviewed from recent hospitalization 1/29-2/2/21 for dizziness and dehydration. Taking her medications as prescribed. Did bring in to review. States she was dizzy last week. Has not had episodes since then. States spells are when she rises from bed. Is staying hydrated by her account, but also states she watches fluid intake as she has urinary frequency. \"Maybe 2 bottles\". Drinks 1-2 cups of coffee. Has seen Dr. Jesus Herron, [address] Suggested surgery. States she is wearing a very large pad to prevent accidents. Patient's past medical, surgical, social and/or family history reviewed, updated in chart, and are non-contributory (unless otherwise stated). Medications and allergies also reviewed and updated in chart. ROS negative unless otherwise specified    Physical Exam  Temp Readings from Last 3 Encounters:   04/07/21 97.3 °F (36.3 °C)   03/08/21 98 °F (36.7 °C)   02/08/21 97.1 °F (36.2 °C)     Wt Readings from Last 3 Encounters:   04/07/21 192 lb (87.1 kg)   03/08/21 194 lb (88 kg)   02/08/21 190 lb (86.2 kg)     BP Readings from Last 3 Encounters:   04/07/21 138/60   03/08/21 134/80   02/23/21 (!) 145/70     Pulse Readings from Last 3 Encounters:   04/07/21 62   03/08/21 65   02/08/21 67       General appearance: alert, well appearing, and in no distress, oriented to person, place, and time and normal appearing weight. CVS exam: normal rate, regular rhythm, normal S1, S2, no murmurs, rubs, clicks or gallops. Radial pulses 2+ bilateral.  PT/DP pulse 2+ bilat. No C/C/E    Chest: clear to auscultation, no wheezes, rales or rhonchi, symmetric air entry.      Abdomen: Soft, non-tender, non-distended, positive BS in all 4 quadrants    Extremities:Dorsalis pedis pulses palpated bilaterally, no clubbing, cyanosis, edema or erythema,     SKIN: no lesions, jaundice, petechiae, pallor, cyanosis, ecchymosis    NEURO: gross motor exam normal by observation, gait normal    Mental status - alert, oriented to person, place, and time, normal mood, behavior, speech, dress, motor activity, and thought processes      Assessment/Plan  Sheryle Pellet was seen today for dizziness. Diagnoses and all orders for this visit:    Vertigo  Gait instability  Ataxia  - Multifactotorial, but likely [place].  - Medications reviewed. Advised to drink more water. - Thorough review of labs and hospital records. Return for Labs prior to next visit. Daja Peña, DO    Call or go to ED immediately if symptoms worsen or persist.    Educational materials and/or home exercises printed for patient's review and were included in patient instructions on his/her After Visit Summary and given to patient at the end of visit. Counseled regarding above diagnosis, including possible risks and complications,  especially if left uncontrolled. Counseled regarding the possible side effects, risks, benefits and alternatives to treatment; patient and/or guardian verbalizes understanding, agrees, feels comfortable with and wishes to proceed with above treatment plan. Advised patient to call with any new medication issues, and read all Rx info from pharmacy to assure aware of all possible risks and side effects of medication before taking. Reviewed age and gender appropriate health screening exams and vaccinations. Advised patient regarding importance of keeping up with recommended health maintenance and to schedule as soon as possible if overdue, as this is important in assessing for undiagnosed pathology, especially cancer, as well as protecting against potentially harmful/life threatening disease.       Patient and/or guardian verbalizes understanding and agrees with above counseling, assessment and plan. All questions answered.

## 2021-04-22 DIAGNOSIS — R53.83 OTHER FATIGUE: Primary | ICD-10-CM

## 2021-04-26 ENCOUNTER — HOSPITAL ENCOUNTER (OUTPATIENT)
Age: 81
Discharge: HOME | End: 2021-04-26
Payer: MEDICARE

## 2021-04-26 DIAGNOSIS — E03.9 ACQUIRED HYPOTHYROIDISM: Chronic | ICD-10-CM

## 2021-04-26 DIAGNOSIS — I10 ESSENTIAL HYPERTENSION: ICD-10-CM

## 2021-04-26 DIAGNOSIS — R53.83 OTHER FATIGUE: ICD-10-CM

## 2021-04-26 LAB
ALBUMIN SERPL-MCNC: 4.3 G/DL (ref 3.5–5.2)
ALP BLD-CCNC: 93 U/L (ref 35–104)
ALT SERPL-CCNC: 12 U/L (ref 0–32)
ANION GAP SERPL CALCULATED.3IONS-SCNC: 7 MMOL/L (ref 7–16)
AST SERPL-CCNC: 19 U/L (ref 0–31)
BILIRUB SERPL-MCNC: 0.3 MG/DL (ref 0–1.2)
BUN BLDV-MCNC: 14 MG/DL (ref 6–23)
CALCIUM SERPL-MCNC: 9.3 MG/DL (ref 8.6–10.2)
CHLORIDE BLD-SCNC: 106 MMOL/L (ref 98–107)
CO2: 25 MMOL/L (ref 22–29)
CREAT SERPL-MCNC: 0.7 MG/DL (ref 0.5–1)
GFR AFRICAN AMERICAN: >60
GFR NON-AFRICAN AMERICAN: >60 ML/MIN/1.73
GLUCOSE BLD-MCNC: 99 MG/DL (ref 74–99)
HCT VFR BLD CALC: 39.5 % (ref 34–48)
HEMOGLOBIN: 12.9 G/DL (ref 11.5–15.5)
MCH RBC QN AUTO: 28.4 PG (ref 26–35)
MCHC RBC AUTO-ENTMCNC: 32.7 % (ref 32–34.5)
MCV RBC AUTO: 86.8 FL (ref 80–99.9)
PDW BLD-RTO: 13.8 FL (ref 11.5–15)
PLATELET # BLD: 247 E9/L (ref 130–450)
PMV BLD AUTO: 9.9 FL (ref 7–12)
POTASSIUM SERPL-SCNC: 4.1 MMOL/L (ref 3.5–5)
RBC # BLD: 4.55 E12/L (ref 3.5–5.5)
SODIUM BLD-SCNC: 138 MMOL/L (ref 132–146)
TOTAL PROTEIN: 7.4 G/DL (ref 6.4–8.3)
TSH SERPL DL<=0.05 MIU/L-ACNC: 2.43 UIU/ML (ref 0.27–4.2)
VITAMIN D 25-HYDROXY: 13 NG/ML (ref 30–100)
WBC # BLD: 6.3 E9/L (ref 4.5–11.5)

## 2021-04-26 PROCEDURE — 82306 VITAMIN D 25 HYDROXY: CPT

## 2021-04-26 PROCEDURE — 36415 COLL VENOUS BLD VENIPUNCTURE: CPT

## 2021-04-26 PROCEDURE — 80053 COMPREHEN METABOLIC PANEL: CPT

## 2021-04-26 PROCEDURE — 84443 ASSAY THYROID STIM HORMONE: CPT

## 2021-04-26 PROCEDURE — 85027 COMPLETE CBC AUTOMATED: CPT

## 2021-04-28 ENCOUNTER — TELEPHONE (OUTPATIENT)
Dept: FAMILY MEDICINE CLINIC | Age: 81
End: 2021-04-28

## 2021-05-05 RX ORDER — PANTOPRAZOLE SODIUM 40 MG/1
40 TABLET, DELAYED RELEASE ORAL 2 TIMES DAILY
Qty: 180 TABLET | Refills: 3 | Status: SHIPPED
Start: 2021-05-05 | End: 2021-06-09

## 2021-05-07 ENCOUNTER — HOSPITAL ENCOUNTER (OUTPATIENT)
Dept: CT IMAGING | Age: 81
Discharge: HOME | End: 2021-05-09
Payer: MEDICARE

## 2021-05-07 DIAGNOSIS — R10.13 EPIGASTRIC PAIN: ICD-10-CM

## 2021-05-07 DIAGNOSIS — K92.2 GASTROINTESTINAL HEMORRHAGE, UNSPECIFIED GASTROINTESTINAL HEMORRHAGE TYPE: ICD-10-CM

## 2021-05-07 PROCEDURE — 6360000004 HC RX CONTRAST MEDICATION: Performed by: RADIOLOGY

## 2021-05-07 PROCEDURE — 2580000003 HC RX 258: Performed by: RADIOLOGY

## 2021-05-07 PROCEDURE — 74177 CT ABD & PELVIS W/CONTRAST: CPT

## 2021-05-07 RX ORDER — SODIUM CHLORIDE 0.9 % (FLUSH) 0.9 %
10 SYRINGE (ML) INJECTION ONCE
Status: COMPLETED | OUTPATIENT
Start: 2021-05-07 | End: 2021-05-07

## 2021-05-07 RX ADMIN — IOHEXOL 50 ML: 240 INJECTION, SOLUTION INTRATHECAL; INTRAVASCULAR; INTRAVENOUS; ORAL at 17:07

## 2021-05-07 RX ADMIN — IOPAMIDOL 90 ML: 755 INJECTION, SOLUTION INTRAVENOUS at 17:07

## 2021-05-07 RX ADMIN — Medication 10 ML: at 17:07

## 2021-05-10 ENCOUNTER — HOSPITAL ENCOUNTER (OUTPATIENT)
Age: 81
Discharge: HOME | End: 2021-05-12

## 2021-05-10 PROCEDURE — 88342 IMHCHEM/IMCYTCHM 1ST ANTB: CPT

## 2021-05-10 PROCEDURE — 88305 TISSUE EXAM BY PATHOLOGIST: CPT

## 2021-05-24 ENCOUNTER — TELEPHONE (OUTPATIENT)
Dept: FAMILY MEDICINE CLINIC | Age: 81
End: 2021-05-24

## 2021-05-28 ENCOUNTER — TELEPHONE (OUTPATIENT)
Dept: FAMILY MEDICINE CLINIC | Age: 81
End: 2021-05-28

## 2021-06-03 ENCOUNTER — TELEPHONE (OUTPATIENT)
Dept: FAMILY MEDICINE CLINIC | Age: 81
End: 2021-06-03

## 2021-06-03 ENCOUNTER — NURSE ONLY (OUTPATIENT)
Dept: FAMILY MEDICINE CLINIC | Age: 81
End: 2021-06-03
Payer: MEDICARE

## 2021-06-03 DIAGNOSIS — E78.5 HYPERLIPIDEMIA LDL GOAL <100: ICD-10-CM

## 2021-06-03 DIAGNOSIS — E03.9 HYPOTHYROIDISM, UNSPECIFIED TYPE: Chronic | ICD-10-CM

## 2021-06-03 DIAGNOSIS — I10 ESSENTIAL HYPERTENSION: Chronic | ICD-10-CM

## 2021-06-03 LAB
CHOLESTEROL, TOTAL: 171 MG/DL (ref 0–199)
HDLC SERPL-MCNC: 65 MG/DL
LDL CHOLESTEROL CALCULATED: 94 MG/DL (ref 0–99)
TRIGL SERPL-MCNC: 61 MG/DL (ref 0–149)
VLDLC SERPL CALC-MCNC: 12 MG/DL

## 2021-06-03 PROCEDURE — 36415 COLL VENOUS BLD VENIPUNCTURE: CPT | Performed by: FAMILY MEDICINE

## 2021-06-03 RX ORDER — OMEPRAZOLE 20 MG/1
20 CAPSULE, DELAYED RELEASE ORAL DAILY
COMMUNITY
End: 2021-07-07

## 2021-06-03 NOTE — TELEPHONE ENCOUNTER
Patient states she has chose not have the Covid Vaccination. She and her children would like her to have the Antibody test done. Please advise.  Thank you

## 2021-06-09 ENCOUNTER — HOSPITAL ENCOUNTER (OUTPATIENT)
Age: 81
Discharge: HOME | End: 2021-06-09
Payer: MEDICARE

## 2021-06-09 ENCOUNTER — TELEPHONE (OUTPATIENT)
Dept: FAMILY MEDICINE CLINIC | Age: 81
End: 2021-06-09

## 2021-06-09 ENCOUNTER — OFFICE VISIT (OUTPATIENT)
Dept: FAMILY MEDICINE CLINIC | Age: 81
End: 2021-06-09
Payer: MEDICARE

## 2021-06-09 VITALS
HEIGHT: 59 IN | HEART RATE: 58 BPM | WEIGHT: 189.2 LBS | TEMPERATURE: 97.3 F | DIASTOLIC BLOOD PRESSURE: 69 MMHG | OXYGEN SATURATION: 97 % | RESPIRATION RATE: 14 BRPM | SYSTOLIC BLOOD PRESSURE: 160 MMHG | BODY MASS INDEX: 38.14 KG/M2

## 2021-06-09 DIAGNOSIS — Z01.84 COVID-19 VIRUS IGG ANTIBODY TEST RESULT UNKNOWN: ICD-10-CM

## 2021-06-09 DIAGNOSIS — E78.5 HYPERLIPIDEMIA LDL GOAL <100: ICD-10-CM

## 2021-06-09 DIAGNOSIS — I10 ESSENTIAL HYPERTENSION: Primary | ICD-10-CM

## 2021-06-09 DIAGNOSIS — F41.9 ANXIETY: ICD-10-CM

## 2021-06-09 PROCEDURE — 1123F ACP DISCUSS/DSCN MKR DOCD: CPT | Performed by: FAMILY MEDICINE

## 2021-06-09 PROCEDURE — 36415 COLL VENOUS BLD VENIPUNCTURE: CPT

## 2021-06-09 PROCEDURE — 1036F TOBACCO NON-USER: CPT | Performed by: FAMILY MEDICINE

## 2021-06-09 PROCEDURE — 1090F PRES/ABSN URINE INCON ASSESS: CPT | Performed by: FAMILY MEDICINE

## 2021-06-09 PROCEDURE — 86769 SARS-COV-2 COVID-19 ANTIBODY: CPT

## 2021-06-09 PROCEDURE — G8417 CALC BMI ABV UP PARAM F/U: HCPCS | Performed by: FAMILY MEDICINE

## 2021-06-09 PROCEDURE — 4040F PNEUMOC VAC/ADMIN/RCVD: CPT | Performed by: FAMILY MEDICINE

## 2021-06-09 PROCEDURE — G8400 PT W/DXA NO RESULTS DOC: HCPCS | Performed by: FAMILY MEDICINE

## 2021-06-09 PROCEDURE — 99214 OFFICE O/P EST MOD 30 MIN: CPT | Performed by: FAMILY MEDICINE

## 2021-06-09 PROCEDURE — G8427 DOCREV CUR MEDS BY ELIG CLIN: HCPCS | Performed by: FAMILY MEDICINE

## 2021-06-09 RX ORDER — LORAZEPAM 0.5 MG/1
0.5 TABLET ORAL DAILY PRN
Qty: 30 TABLET | Refills: 2 | Status: SHIPPED | OUTPATIENT
Start: 2021-06-09 | End: 2021-07-09

## 2021-06-09 RX ORDER — AMLODIPINE BESYLATE 10 MG/1
10 TABLET ORAL DAILY
Qty: 90 TABLET | Refills: 1 | Status: SHIPPED
Start: 2021-06-09 | End: 2021-07-15

## 2021-06-09 SDOH — ECONOMIC STABILITY: FOOD INSECURITY: WITHIN THE PAST 12 MONTHS, YOU WORRIED THAT YOUR FOOD WOULD RUN OUT BEFORE YOU GOT MONEY TO BUY MORE.: NEVER TRUE

## 2021-06-09 SDOH — ECONOMIC STABILITY: FOOD INSECURITY: WITHIN THE PAST 12 MONTHS, THE FOOD YOU BOUGHT JUST DIDN'T LAST AND YOU DIDN'T HAVE MONEY TO GET MORE.: NEVER TRUE

## 2021-06-09 ASSESSMENT — SOCIAL DETERMINANTS OF HEALTH (SDOH): HOW HARD IS IT FOR YOU TO PAY FOR THE VERY BASICS LIKE FOOD, HOUSING, MEDICAL CARE, AND HEATING?: NOT HARD AT ALL

## 2021-06-09 NOTE — PROGRESS NOTES
7/14/2021    Chief Complaint   Patient presents with    Hypertension    Discuss Labs       Claudette Cote is a 78 y.o. patient that presents today for:    Hypertension: Here for follow up chronic hypertension. Patient is  compliant with lifestyle modifications like exercise and adherence to a low salt diet. Patient is not well controlled. Denies chest pain, diaphoresis, dyspnea, dyspnea on exertion, peripheral edema, palpitations, HA, visual issues. Med list reviewed. Taking as prescribed. No adverse effects. Anxiety is stable. Does deny homicidal or suicidal ideations. Hyperlipidemia:  Patient presents with hyperlipidemia. Is asymptomatic. This is a chronic problem. Patient is  well controlled, as reviewed and seen on most recent labs. Compliance with treatment thus far has been adequate. No adverse effects. Feeling well otherwise, no complaints. Patient's past medical, surgical, social and/or family history reviewed, updated in chart, and are non-contributory (unless otherwise stated). Medications and allergies also reviewed and updated in chart. ROS negative unless otherwise specified    Physical Exam  Wt Readings from Last 3 Encounters:   07/07/21 191 lb 12.8 oz (87 kg)   06/09/21 189 lb 3.2 oz (85.8 kg)   04/07/21 192 lb (87.1 kg)     Temp Readings from Last 3 Encounters:   07/07/21 97.9 °F (36.6 °C) (Temporal)   06/09/21 97.3 °F (36.3 °C) (Temporal)   04/07/21 97.3 °F (36.3 °C)     BP Readings from Last 3 Encounters:   07/07/21 (!) 146/64   06/17/21 122/60   06/09/21 (!) 160/69     Pulse Readings from Last 3 Encounters:   07/07/21 68   06/09/21 58   04/07/21 62       General appearance: alert, well appearing, and in no distress, oriented to person, place, and time and normal appearing weight. CVS exam: normal rate, regular rhythm, normal S1, S2, no murmurs, rubs, clicks or gallops. Radial pulses 2+ bilateral.  PT/DP pulse 2+ bilat.   No C/C/E    Chest: clear to auscultation, no wheezes, rales or rhonchi, symmetric air entry. Abdomen: Soft, non-tender, non-distended, positive BS in all 4 quadrants    Extremities:Dorsalis pedis pulses palpated bilaterally, no clubbing, cyanosis, edema or erythema     SKIN: warm, dry, no lesions, jaundice, petechiae, pallor, cyanosis, ecchymosis    NEURO: gross motor exam normal by observation, gait normal    Mental status - alert, oriented to person, place, and time, normal mood, behavior, speech, dress, motor activity, and thought processes      Assessment/Plan  Hiram Paredes was seen today for hypertension and discuss labs. Diagnoses and all orders for this visit:    Essential hypertension  -     amLODIPine (NORVASC) 10 MG tablet; Take 1 tablet by mouth daily STOP prior prescription. New prescription reflects dose increase    Anxiety  -     LORazepam (ATIVAN) 0.5 MG tablet; Take 1 tablet by mouth daily as needed for Anxiety for up to 30 days. Hyperlipidemia LDL goal <100    COVID-19 virus IgG antibody test result unknown  -     Cancel: Covid-19, Antibody; Future        Return in about 1 week (around 6/16/2021) for BP check. Call or go to ED immediately if symptoms worsen or persist.    Educational materials and/or home exercises printed for patient's review and were included in patient instructions on his/her After Visit Summary and given to patient at the end of visit. Counseled regarding above diagnosis, including possible risks and complications,  especially if left uncontrolled. Counseled regarding the possible side effects, risks, benefits and alternatives to treatment; patient and/or guardian verbalizes understanding, agrees, feels comfortable with and wishes to proceed with above treatment plan. Advised patient to call with any new medication issues, and read all Rx info from pharmacy to assure aware of all possible risks and side effects of medication before taking.     Reviewed age and gender appropriate health screening exams and vaccinations. Advised patient regarding importance of keeping up with recommended health maintenance and to schedule as soon as possible if overdue, as this is important in assessing for undiagnosed pathology, especially cancer, as well as protecting against potentially harmful/life threatening disease. Patient and/or guardian verbalizes understanding and agrees with above counseling, assessment and plan. All questions answered.       Daja Peña, DO

## 2021-06-09 NOTE — TELEPHONE ENCOUNTER
Patient called to let you know the name of the medication she was on before for blood pressure, irbesartan 75 mg

## 2021-06-10 LAB — SARS-COV-2 ANTIBODY, TOTAL: NORMAL

## 2021-06-17 ENCOUNTER — NURSE ONLY (OUTPATIENT)
Dept: FAMILY MEDICINE CLINIC | Age: 81
End: 2021-06-17

## 2021-06-17 VITALS — SYSTOLIC BLOOD PRESSURE: 122 MMHG | DIASTOLIC BLOOD PRESSURE: 60 MMHG

## 2021-07-07 ENCOUNTER — OFFICE VISIT (OUTPATIENT)
Dept: GERIATRIC MEDICINE | Age: 81
End: 2021-07-07
Payer: MEDICARE

## 2021-07-07 VITALS
RESPIRATION RATE: 20 BRPM | HEART RATE: 68 BPM | DIASTOLIC BLOOD PRESSURE: 64 MMHG | TEMPERATURE: 97.9 F | BODY MASS INDEX: 38.74 KG/M2 | WEIGHT: 191.8 LBS | SYSTOLIC BLOOD PRESSURE: 146 MMHG

## 2021-07-07 DIAGNOSIS — G31.84 MCI (MILD COGNITIVE IMPAIRMENT): Primary | ICD-10-CM

## 2021-07-07 PROCEDURE — 99212 OFFICE O/P EST SF 10 MIN: CPT | Performed by: INTERNAL MEDICINE

## 2021-07-07 PROCEDURE — G8400 PT W/DXA NO RESULTS DOC: HCPCS | Performed by: INTERNAL MEDICINE

## 2021-07-07 PROCEDURE — 4040F PNEUMOC VAC/ADMIN/RCVD: CPT | Performed by: INTERNAL MEDICINE

## 2021-07-07 PROCEDURE — 1036F TOBACCO NON-USER: CPT | Performed by: INTERNAL MEDICINE

## 2021-07-07 PROCEDURE — G8417 CALC BMI ABV UP PARAM F/U: HCPCS | Performed by: INTERNAL MEDICINE

## 2021-07-07 PROCEDURE — 1123F ACP DISCUSS/DSCN MKR DOCD: CPT | Performed by: INTERNAL MEDICINE

## 2021-07-07 PROCEDURE — 1090F PRES/ABSN URINE INCON ASSESS: CPT | Performed by: INTERNAL MEDICINE

## 2021-07-07 PROCEDURE — G8427 DOCREV CUR MEDS BY ELIG CLIN: HCPCS | Performed by: INTERNAL MEDICINE

## 2021-07-07 RX ORDER — DONEPEZIL HYDROCHLORIDE 5 MG/1
5 TABLET, FILM COATED ORAL DAILY
Qty: 30 TABLET | Refills: 5 | Status: SHIPPED
Start: 2021-07-07 | End: 2021-10-27

## 2021-07-07 NOTE — PROGRESS NOTES
Chief Complaint   Patient presents with    6 Month Follow-Up     2nd visit. Last seen in January re:  [facility]    Blood Pressure Check     144/68. Repeat 146/64.

## 2021-07-07 NOTE — PROGRESS NOTES
CC Here for follow up  Still visiting [place] daily  HYM? \"Money in garbage x 2 and retrieved it\"  Ankles swollen on Norvasc  Hands with OA  And tight  [place] is [id number] [address] as a residence  SLUMS 17   Impression; [address] with low SLUMS score   Plan; Trial with Aricept 2.5 mg q AM

## 2021-07-08 ENCOUNTER — TELEPHONE (OUTPATIENT)
Dept: FAMILY MEDICINE CLINIC | Age: 81
End: 2021-07-08

## 2021-07-08 NOTE — TELEPHONE ENCOUNTER
Advise on symptomatic relief. Rest. Stay hydrated: gatorade, juice, popsicles, soups etc. Can avoid solid foods until appetite returns or symptoms subside. If worsens to ER. Can see her next week.

## 2021-07-08 NOTE — TELEPHONE ENCOUNTER
Patient ate at [employer] yesterday,went to the car and started pooping her pants for a 1/2 hour straight, it was terrible and non stop, now today her ribs hurt and she wants to know what else she can do for the pain.  Also Dr Myrna Sherman patient) stated she needs her BP pill changed

## 2021-07-14 ENCOUNTER — NURSE TRIAGE (OUTPATIENT)
Dept: OTHER | Facility: CLINIC | Age: 81
End: 2021-07-14

## 2021-07-14 ENCOUNTER — TELEPHONE (OUTPATIENT)
Dept: ADMINISTRATIVE | Age: 81
End: 2021-07-14

## 2021-07-14 NOTE — TELEPHONE ENCOUNTER
Reason for Disposition   MILD swelling of both ankles (i.e., pedal edema) AND new onset or worsening    Answer Assessment - Initial Assessment Questions  1. ONSET: \"When did the swelling start? \" (e.g., minutes, hours, days)      Couple weeks    2. LOCATION: \"What part of the leg is swollen? \"  \"Are both legs swollen or just one leg? \"      Both ankles    3. SEVERITY: \"How bad is the swelling? \" (e.g., localized; mild, moderate, severe)   - Localized - small area of swelling localized to one leg   - MILD pedal edema - swelling limited to foot and ankle, pitting edema < 1/4 inch (6 mm) deep, rest and elevation eliminate most or all swelling   - MODERATE edema - swelling of lower leg to knee, pitting edema > 1/4 inch (6 mm) deep, rest and elevation only partially reduce swelling   - SEVERE edema - swelling extends above knee, facial or hand swelling present       Mild to mod    4. REDNESS: \"Does the swelling look red or infected? \"      Denies    5. PAIN: \"Is the swelling painful to touch? \" If so, ask: \"How painful is it? \"   (Scale 1-10; mild, moderate or severe)      Feet hurt 8/10    6. FEVER: \"Do you have a fever? \" If so, ask: \"What is it, how was it measured, and when did it start? \"       Denies    7. CAUSE: \"What do you think is causing the leg swelling? \"      Unsure    8. MEDICAL HISTORY: \"Do you have a history of heart failure, kidney disease, liver failure, or cancer? \"      htn    9. RECURRENT SYMPTOM: \"Have you had leg swelling before? \" If so, ask: \"When was the last time? \" \"What happened that time? \"    No     10. OTHER SYMPTOMS: \"Do you have any other symptoms? \" (e.g., chest pain, difficulty breathing)        Denies'    11. PREGNANCY: \"Is there any chance you are pregnant? \" \"When was your last menstrual period? \"        n/a    Protocols used: LEG SWELLING AND EDEMA-ADULT-OH    Received call from Robert Gan at [facility] with Red Flag Complaint.     Brief description of triage: as above pt c/o ankle swelling in both ankles no redness does have pitting edema and feet hurt this has been going on for couple weeks, denies sob or cp now was seen last week in [facility] for cp had pt check bp 167/71 with her automatic cuff    Triage indicates for patient to be seen in 3 days has an appointment for tomorrow already    Care advice provided, patient verbalizes understanding; denies any other questions or concerns; instructed to call back for any new or worsening symptoms. Attention Provider: Thank you for allowing me to participate in the care of your patient. The patient was connected to triage in response to information provided to the ECC. Please do not respond through this encounter as the response is not directed to a shared pool.

## 2021-07-15 ENCOUNTER — OFFICE VISIT (OUTPATIENT)
Dept: FAMILY MEDICINE CLINIC | Age: 81
End: 2021-07-15
Payer: MEDICARE

## 2021-07-15 VITALS
TEMPERATURE: 97.4 F | WEIGHT: 191 LBS | OXYGEN SATURATION: 96 % | HEART RATE: 63 BPM | HEIGHT: 59 IN | DIASTOLIC BLOOD PRESSURE: 76 MMHG | SYSTOLIC BLOOD PRESSURE: 162 MMHG | BODY MASS INDEX: 38.51 KG/M2

## 2021-07-15 DIAGNOSIS — R11.0 NAUSEA: ICD-10-CM

## 2021-07-15 DIAGNOSIS — I10 ESSENTIAL HYPERTENSION: Primary | ICD-10-CM

## 2021-07-15 PROCEDURE — 99214 OFFICE O/P EST MOD 30 MIN: CPT | Performed by: FAMILY MEDICINE

## 2021-07-15 PROCEDURE — G8400 PT W/DXA NO RESULTS DOC: HCPCS | Performed by: FAMILY MEDICINE

## 2021-07-15 PROCEDURE — 1090F PRES/ABSN URINE INCON ASSESS: CPT | Performed by: FAMILY MEDICINE

## 2021-07-15 PROCEDURE — G8427 DOCREV CUR MEDS BY ELIG CLIN: HCPCS | Performed by: FAMILY MEDICINE

## 2021-07-15 PROCEDURE — G8417 CALC BMI ABV UP PARAM F/U: HCPCS | Performed by: FAMILY MEDICINE

## 2021-07-15 PROCEDURE — 1123F ACP DISCUSS/DSCN MKR DOCD: CPT | Performed by: FAMILY MEDICINE

## 2021-07-15 PROCEDURE — 1036F TOBACCO NON-USER: CPT | Performed by: FAMILY MEDICINE

## 2021-07-15 PROCEDURE — 4040F PNEUMOC VAC/ADMIN/RCVD: CPT | Performed by: FAMILY MEDICINE

## 2021-07-15 RX ORDER — ONDANSETRON 4 MG/1
4 TABLET, FILM COATED ORAL 3 TIMES DAILY PRN
Qty: 30 TABLET | Refills: 2 | Status: SHIPPED
Start: 2021-07-15 | End: 2022-10-17 | Stop reason: SDUPTHER

## 2021-07-15 RX ORDER — CHLORTHALIDONE 25 MG/1
25 TABLET ORAL DAILY
Qty: 30 TABLET | Refills: 0 | Status: SHIPPED
Start: 2021-07-15 | End: 2021-09-08

## 2021-07-15 RX ORDER — LORAZEPAM 0.5 MG/1
0.5 TABLET ORAL PRN
COMMUNITY
End: 2021-10-06 | Stop reason: SDUPTHER

## 2021-07-15 NOTE — PROGRESS NOTES
7/15/2021    Chief Complaint   Patient presents with    Swelling     swelling in ankles for a couple of weeks    Other     requests medication for vomiting       HPI    Francesca Mooney is a 78 y.o. patient that presents today for:    Edema: Patient complains of edema. The location of the edema is lower leg(s) bilateral.  The edema has been moderate. Onset of symptoms was several days ago, gradually worsening since that time. The edema is present all day. The patient states the problem is long-standing. The swelling has been aggravated by dependency of involved area, relieved by nothing, and been associated with nothing. Cardiac risk factors include sedentary lifestyle. Patient's past medical, surgical, social and/or family history reviewed, updated in chart, and are non-contributory (unless otherwise stated). Medications and allergies also reviewed and updated in chart. ROS negative unless otherwise specified    Physical Exam  Temp Readings from Last 3 Encounters:   07/15/21 97.4 °F (36.3 °C)   07/07/21 97.9 °F (36.6 °C) (Temporal)   06/09/21 97.3 °F (36.3 °C) (Temporal)     Wt Readings from Last 3 Encounters:   07/15/21 191 lb (86.6 kg)   07/07/21 191 lb 12.8 oz (87 kg)   06/09/21 189 lb 3.2 oz (85.8 kg)     BP Readings from Last 3 Encounters:   07/15/21 (!) 162/76   07/07/21 (!) 146/64   06/17/21 122/60     Pulse Readings from Last 3 Encounters:   07/15/21 63   07/07/21 68   06/09/21 58       General appearance: alert, well appearing, and in no distress, oriented to person, place, and time and normal appearing weight. CVS exam: normal rate, regular rhythm, normal S1, S2, no murmurs, rubs, clicks or gallops. Radial pulses 2+ bilateral.  PT/DP pulse 2+ bilat. No C/C/E    Chest: clear to auscultation, no wheezes, rales or rhonchi, symmetric air entry.      Abdomen: Soft, non-tender, non-distended, positive BS in all 4 quadrants    Extremities:Dorsalis pedis pulses palpated bilaterally, no clubbing, cyanosis, edema or erythema,     SKIN: no lesions, jaundice, petechiae, pallor, cyanosis, ecchymosis    NEURO: gross motor exam normal by observation, gait normal    Mental status - alert, oriented to person, place, and time, normal mood, behavior, speech, dress, motor activity, and thought processes      Assessment/Plan  Saundra Gallardo was seen today for swelling and other. Diagnoses and all orders for this visit:    Essential hypertension  - STOP: Amlodipine  -     START: chlorthalidone (HYGROTON) 25 MG tablet; Take 1 tablet by mouth daily    Nausea  -     START: ondansetron (ZOFRAN) 4 MG tablet; Take 1 tablet by mouth 3 times daily as needed for Nausea or Vomiting          Return in about 1 week (around 7/22/2021), or LABS, for BP check, Nurse Visit. Daja Peña, DO    Call or go to ED immediately if symptoms worsen or persist.    Educational materials and/or home exercises printed for patient's review and were included in patient instructions on his/her After Visit Summary and given to patient at the end of visit. Counseled regarding above diagnosis, including possible risks and complications,  especially if left uncontrolled. Counseled regarding the possible side effects, risks, benefits and alternatives to treatment; patient and/or guardian verbalizes understanding, agrees, feels comfortable with and wishes to proceed with above treatment plan. Advised patient to call with any new medication issues, and read all Rx info from pharmacy to assure aware of all possible risks and side effects of medication before taking. Reviewed age and gender appropriate health screening exams and vaccinations.   Advised patient regarding importance of keeping up with recommended health maintenance and to schedule as soon as possible if overdue, as this is important in assessing for undiagnosed pathology, especially cancer, as well as protecting against potentially harmful/life threatening disease. Patient and/or guardian verbalizes understanding and agrees with above counseling, assessment and plan. All questions answered.

## 2021-07-22 ENCOUNTER — NURSE ONLY (OUTPATIENT)
Dept: FAMILY MEDICINE CLINIC | Age: 81
End: 2021-07-22
Payer: MEDICARE

## 2021-07-22 ENCOUNTER — TELEPHONE (OUTPATIENT)
Dept: FAMILY MEDICINE CLINIC | Age: 81
End: 2021-07-22

## 2021-07-22 VITALS — DIASTOLIC BLOOD PRESSURE: 76 MMHG | SYSTOLIC BLOOD PRESSURE: 118 MMHG

## 2021-07-22 DIAGNOSIS — I10 ESSENTIAL HYPERTENSION: ICD-10-CM

## 2021-07-22 DIAGNOSIS — I10 ESSENTIAL HYPERTENSION: Primary | ICD-10-CM

## 2021-07-22 DIAGNOSIS — E78.5 HYPERLIPIDEMIA LDL GOAL <100: ICD-10-CM

## 2021-07-22 DIAGNOSIS — I10 ESSENTIAL HYPERTENSION: Chronic | ICD-10-CM

## 2021-07-22 DIAGNOSIS — E03.9 HYPOTHYROIDISM, UNSPECIFIED TYPE: Chronic | ICD-10-CM

## 2021-07-22 LAB
ANION GAP SERPL CALCULATED.3IONS-SCNC: 13 MMOL/L (ref 7–16)
BUN BLDV-MCNC: 26 MG/DL (ref 6–23)
CALCIUM SERPL-MCNC: 9.5 MG/DL (ref 8.6–10.2)
CHLORIDE BLD-SCNC: 100 MMOL/L (ref 98–107)
CO2: 23 MMOL/L (ref 22–29)
CREAT SERPL-MCNC: 0.9 MG/DL (ref 0.5–1)
GFR AFRICAN AMERICAN: >60
GFR NON-AFRICAN AMERICAN: 60 ML/MIN/1.73
GLUCOSE BLD-MCNC: 106 MG/DL (ref 74–99)
POTASSIUM SERPL-SCNC: 4 MMOL/L (ref 3.5–5)
SODIUM BLD-SCNC: 136 MMOL/L (ref 132–146)

## 2021-07-22 PROCEDURE — 36415 COLL VENOUS BLD VENIPUNCTURE: CPT | Performed by: FAMILY MEDICINE

## 2021-07-22 NOTE — PROGRESS NOTES
Patient has multiple complaints today, swollen ankles, taking 1 pill a day and having low back pain with pain down the left leg    Shannen Suzi down a couple of weeks ago

## 2021-07-22 NOTE — TELEPHONE ENCOUNTER
Patient was in for labs and blood pressure check, complains of having back pain down the left leg from a fall a couple of weeks ago  Also says her ankles are swelling   Her blood pressure was 118/76 today  Please advise

## 2021-07-26 ENCOUNTER — TELEPHONE (OUTPATIENT)
Dept: FAMILY MEDICINE CLINIC | Age: 81
End: 2021-07-26

## 2021-07-26 DIAGNOSIS — K59.09 OTHER CONSTIPATION: Primary | ICD-10-CM

## 2021-07-26 NOTE — TELEPHONE ENCOUNTER
Patient is leaving on Sunday and is having a lot of back pain, wants to know about her bloodwork and needs a refill of her linzess,she is upset sh wasn't een when she had her blood work done(can't come on Wednesday)

## 2021-07-27 NOTE — TELEPHONE ENCOUNTER
Note in for clinical staff reharding labs. Will send in linzess.  Try otc meds and heating pad for her back

## 2021-07-29 ENCOUNTER — APPOINTMENT (OUTPATIENT)
Dept: CT IMAGING | Age: 81
End: 2021-07-29
Payer: MEDICARE

## 2021-07-29 ENCOUNTER — HOSPITAL ENCOUNTER (EMERGENCY)
Age: 81
Discharge: HOME | End: 2021-07-29
Attending: EMERGENCY MEDICINE
Payer: MEDICARE

## 2021-07-29 ENCOUNTER — APPOINTMENT (OUTPATIENT)
Dept: GENERAL RADIOLOGY | Age: 81
End: 2021-07-29
Payer: MEDICARE

## 2021-07-29 VITALS
OXYGEN SATURATION: 98 % | RESPIRATION RATE: 16 BRPM | BODY MASS INDEX: 38.51 KG/M2 | SYSTOLIC BLOOD PRESSURE: 129 MMHG | DIASTOLIC BLOOD PRESSURE: 78 MMHG | HEART RATE: 85 BPM | HEIGHT: 59 IN | WEIGHT: 191 LBS | TEMPERATURE: 97.6 F
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
## 2021-07-29 DIAGNOSIS — M54.42 ACUTE LEFT-SIDED LOW BACK PAIN WITH LEFT-SIDED SCIATICA: ICD-10-CM

## 2021-07-29 PROCEDURE — 73502 X-RAY EXAM HIP UNI 2-3 VIEWS: CPT

## 2021-07-29 PROCEDURE — 99285 EMERGENCY DEPT VISIT HI MDM: CPT

## 2021-07-29 PROCEDURE — 73700 CT LOWER EXTREMITY W/O DYE: CPT

## 2021-07-29 PROCEDURE — 72131 CT LUMBAR SPINE W/O DYE: CPT

## 2021-07-29 PROCEDURE — 72125 CT NECK SPINE W/O DYE: CPT

## 2021-07-29 PROCEDURE — 96374 THER/PROPH/DIAG INJ IV PUSH: CPT

## 2021-07-29 PROCEDURE — 6370000000 HC RX 637 (ALT 250 FOR IP): Performed by: EMERGENCY MEDICINE

## 2021-07-29 PROCEDURE — 73590 X-RAY EXAM OF LOWER LEG: CPT

## 2021-07-29 PROCEDURE — 6360000002 HC RX W HCPCS: Performed by: EMERGENCY MEDICINE

## 2021-07-29 PROCEDURE — 70450 CT HEAD/BRAIN W/O DYE: CPT

## 2021-07-29 PROCEDURE — 73552 X-RAY EXAM OF FEMUR 2/>: CPT

## 2021-07-29 RX ORDER — HYDROCORTISONE 25 MG/G
CREAM TOPICAL ONCE
Status: COMPLETED | OUTPATIENT
Start: 2021-07-29 | End: 2021-07-29

## 2021-07-29 RX ORDER — LIDOCAINE 4 G/G
1 PATCH TOPICAL ONCE
Status: DISCONTINUED | OUTPATIENT
Start: 2021-07-29 | End: 2021-07-29 | Stop reason: HOSPADM

## 2021-07-29 RX ORDER — FENTANYL CITRATE 50 UG/ML
50 INJECTION, SOLUTION INTRAMUSCULAR; INTRAVENOUS ONCE
Status: COMPLETED | OUTPATIENT
Start: 2021-07-29 | End: 2021-07-29

## 2021-07-29 RX ORDER — LIDOCAINE 50 MG/G
1 PATCH TOPICAL DAILY
Qty: 10 PATCH | Refills: 0 | Status: SHIPPED | OUTPATIENT
Start: 2021-07-29 | End: 2021-08-08

## 2021-07-29 RX ORDER — HYDROCODONE BITARTRATE AND ACETAMINOPHEN 5; 325 MG/1; MG/1
1 TABLET ORAL EVERY 4 HOURS PRN
Qty: 12 TABLET | Refills: 0 | Status: SHIPPED | OUTPATIENT
Start: 2021-07-29 | End: 2021-08-01

## 2021-07-29 RX ADMIN — HYDROCORTISONE 2.5%: 25 CREAM TOPICAL at 14:54

## 2021-07-29 RX ADMIN — FENTANYL CITRATE 50 MCG: 0.05 INJECTION, SOLUTION INTRAMUSCULAR; INTRAVENOUS at 11:48

## 2021-07-29 ASSESSMENT — PAIN DESCRIPTION - DESCRIPTORS: DESCRIPTORS: PATIENT UNABLE TO DESCRIBE

## 2021-07-29 ASSESSMENT — PAIN DESCRIPTION - FREQUENCY: FREQUENCY: CONTINUOUS

## 2021-07-29 ASSESSMENT — PAIN - FUNCTIONAL ASSESSMENT: PAIN_FUNCTIONAL_ASSESSMENT: ACTIVITIES ARE NOT PREVENTED

## 2021-07-29 ASSESSMENT — PAIN DESCRIPTION - LOCATION: LOCATION: HIP;BUTTOCKS

## 2021-07-29 ASSESSMENT — PAIN DESCRIPTION - ORIENTATION: ORIENTATION: LEFT

## 2021-07-29 ASSESSMENT — PAIN SCALES - GENERAL
PAINLEVEL_OUTOF10: 10
PAINLEVEL_OUTOF10: 10

## 2021-07-29 ASSESSMENT — PAIN DESCRIPTION - PAIN TYPE: TYPE: ACUTE PAIN

## 2021-07-29 NOTE — ED NOTES
Pt ambulated around the room well, steady on feet, still having some pain but bearable.      Nikkie Danielson RN  07/29/21 [id number]

## 2021-07-29 NOTE — ED NOTES
Discharge and follow up reviewed, pt states norco does not work for her, dr Leena Mar informed, per dr Leena Mar pt to take otc medication and follow up with pcp, pt refused norco prescription. Pt ambulated self to ed lobby to await ride home.      Jeb Spence RN  07/29/21 9617

## 2021-07-29 NOTE — ED PROVIDER NOTES
[facility]  Department of Emergency Medicine   ED  Encounter Note  Admit Date/RoomTime: 2021  9:16 AM  ED Room:     NAME: Courtney Torres  : 1940  MRN: [id number]     Chief Complaint:  Fall, Back Pain, and Leg Pain    History of Present Illness       Courtney Torres is a 78 y.o. old female who presents to the emergency department for evaluation after a fall. She states that she has been unsteady on her feet but is tearful and states she does not want to go to a nursing home. She currently lives alone. She does have a cane and a walker. She states that she was getting out of bed today when she lost her balance and fell onto the carpet. She landed on her left hip. She complains of pain mostly in her left calf and in the left buttock region. She denies hitting her head. She had no dizziness or loss of consciousness. ROS   Pertinent positives and negatives are stated within HPI, all other systems reviewed and are negative. Past Medical History:  has a past medical history of Amaurosis fugax of right eye, Anxiety, Arthritis, CA - cancer of bowel, Cerebral artery occlusion with cerebral infarction ([address] Chronic back pain, Constipation, Depression, Elevated sed rate, GERD (gastroesophageal reflux disease), Hemorrhoids, Hyperlipidemia, Hypertension, Left foot pain, Lumbosacral radiculopathy, Macular degeneration, Peripheral neuropathy, Poor vision, Prosthetic eye globe, Seasonal allergies, Skipped heart beats, Sleep apnea, and Thyroid disease. Surgical History:  has a past surgical history that includes Eye surgery (years ago); colectomy (); Colonoscopy (2012); Cholecystectomy (); Hysterectomy (); tumor excision; Upper gastrointestinal endoscopy (2014); Colonoscopy (2014); Tonsillectomy; joint replacement (Left, ); Upper gastrointestinal endoscopy (2015);  Colonoscopy (2015); cyst removal (years ago); and Upper gastrointestinal endoscopy (N/A, 4/1/2019). Social History:  reports that she is a non-smoker but has been exposed to tobacco smoke. She has never used smokeless tobacco. She reports that she does not drink alcohol and does not use drugs. Family History: family history includes Breast Cancer in her daughter and sister; Cancer in her brother, brother, brother, brother, brother, brother, sister, and sister; Heart Disease in her brother, brother, father, mother, and sister; High Blood Pressure in her daughter and daughter; Hypertension in her brother, brother, father, mother, and sister; Other in her mother; Stroke in her father and mother. Allergies: Iodine, Bee venom, Codeine, Hydralazine, Oxycodone-aspirin, Amoxicillin-pot clavulanate, Darvocet [propoxyphene n-acetaminophen], Eggs or egg-derived products, Influenza vaccines, Percodan [oxycodone-aspirin], and Percodan [oxycodone-aspirin]    Physical Exam   Oxygen Saturation Interpretation: Normal.        ED Triage Vitals [07/29/21 0913]   BP Temp Temp src Pulse Resp SpO2 Height Weight   (!) 158/77 97.1 °F (36.2 °C) -- 63 16 99 % 4' 11\" (1.499 m) 191 lb (86.6 kg)         Physical Exam  Constitutional:  Alert, development consistent with age. + anxiety  HEENT:  NC/NT. Airway patent. Neck:  No midline or paravertebral tenderness. Normal ROM. Supple. Chest:  Symmetrical without visible rash or tenderness. Respiratory:  Lungs Clear to auscultation and breath sounds equal.  CV:  Regular rate and rhythm, normal heart sounds, without pathological murmurs, ectopy, gallops, or rubs. GI:  Abdomen Soft, nontender, good bowel sounds. No firm or pulsatile mass. Pelvis:  Stable, nontender to palpation. Back:  No midline or paravertebral tenderness. No costovertebral tenderness. TTP left lumbar paraspinal musculature  Extremities: No obvious deformities. Normal pulses. Motor and sensation intact.   Mild tenderness to palpation along the left calf and left buttock. No instability with logroll of the hip. Integument:  Normal turgor. Warm, dry, without visible rash, unless noted elsewhere. Lymphatic: no lymphadenopathy noted  Neurological:  Oriented x3, GCS 15. Motor functions intact. Lab / Imaging Results   (All laboratory and radiology results have been personally reviewed by myself)  Labs:  No results found for this visit on 07/29/21. Imaging: All Radiology results interpreted by Radiologist unless otherwise noted. CT HIP LEFT WO CONTRAST   Final Result   No CT evidence of fracture or joint dislocation. XR HIP 2-3 VW W PELVIS LEFT   Final Result   Very faint radiolucency undermining the left femoral neck. If this patient   is unable to bear weight consider MRI or CT. Additional observations as outlined above. XR FEMUR LEFT (MIN 2 VIEWS)   Final Result   Very faint radiolucency undermining the left femoral neck. If this patient   is unable to bear weight consider MRI or CT. Additional observations as outlined above. XR TIBIA FIBULA LEFT (2 VIEWS)   Final Result   Very faint radiolucency undermining the left femoral neck. If this patient   is unable to bear weight consider MRI or CT. Additional observations as outlined above. CT HEAD WO CONTRAST   Final Result   1. There is no acute intracranial abnormality. Specifically, there is no   intracranial hemorrhage. 2. Atrophy and periventricular leukomalacia,   3. There are postoperative changes of the left orbit. .         CT CERVICAL SPINE WO CONTRAST   Final Result   1. There is no acute compression fracture or subluxation of the cervical   spine. 2. Multilevel degenerative disc and degenerative joint disease. CT LUMBAR SPINE WO CONTRAST   Final Result   No CT evidence of definite acute skeletal pathology.   Old fracture of the   right L3 transverse process appears chronic and ununited      Rotatory lumbar curvature with lower left convexity Multilevel vertebral hemangiomas      Multilevel degenerative change, spondylolisthesis, disc narrowing, and   posterior vertebral endplate bone spurring. Soft tissue windows suggest multilevel neural foraminal stenosis, posterior   disc bulge, and central canal stenosis      Multifocal calcified plaque in the aorta and its branches, without aneurysm           ED Course / Medical Decision Making     Medications   lidocaine 4 % external patch 1 patch (1 patch Transdermal Patch Applied 7/29/21 1539)   fentaNYL (SUBLIMAZE) injection 50 mcg (50 mcg Intravenous Given 7/29/21 1148)   hydrocortisone (ANUSOL-HC) 2.5 % rectal cream ( Rectal Given 7/29/21 1454)        Re-examination:  7/29/21     Patients symptoms are improving. Consult(s):   None    Procedure(s):  None    MDM:   Patient presents to the ED for evaluation after a fall. She did request cream for her rectum as she had some discomfort related to recent constipation and was prescribed Anusol. Her symptoms were much improved after fentanyl. Imaging obtained and reviewed. X-ray of the left hip showed possible left femoral neck fracture. CT was obtained which was normal.  Patient was able to ambulate around the emergency department. I did place a lidocaine patch on her as she stated Tylenol was not helping. I wrote a prescription for [facility] as needed however, she told the nurse she did not want this and it was shredded. She was advised to follow-up with her family doctor. She will use her cane and walker at home. She will return to the ED for new or worsening symptoms. Plan of Care/Counseling:  Priscila Heredia DO reviewed today's visit with the patient in addition to providing specific details for the plan of care and counseling regarding the diagnosis and prognosis. Questions are answered at this time and are agreeable with the plan. Assessment      1. Fall, initial encounter    2.  Acute left-sided low back pain with left-sided sciatica Plan   Discharged home. Patient condition is stable    New Medications     Discharge Medication List as of 7/29/2021  3:37 PM      START taking these medications    Details   HYDROcodone-acetaminophen (NORCO) 5-325 MG per tablet Take 1 tablet by mouth every 4 hours as needed for Pain for up to 3 days. Intended supply: 3 days. Take lowest dose possible to manage pain, Disp-12 tablet, R-0Print      lidocaine (LIDODERM) 5 % Place 1 patch onto the skin daily for 10 days 12 hours on, 12 hours off., Disp-10 patch, R-0Print           Electronically signed by Gal Cummings DO   DD: 7/29/21  **This report was transcribed using voice recognition software. Every effort was made to ensure accuracy; however, inadvertent computerized transcription errors may be present.   END OF ED PROVIDER NOTE        Gal Cummings DO  07/29/21 2650

## 2021-07-30 ENCOUNTER — TELEPHONE (OUTPATIENT)
Dept: FAMILY MEDICINE CLINIC | Age: 81
End: 2021-07-30

## 2021-07-30 RX ORDER — TIZANIDINE 2 MG/1
2 TABLET ORAL EVERY 8 HOURS
Qty: 15 TABLET | Refills: 0 | Status: SHIPPED
Start: 2021-07-30 | End: 2021-08-10

## 2021-08-10 ENCOUNTER — OFFICE VISIT (OUTPATIENT)
Dept: FAMILY MEDICINE CLINIC | Age: 81
End: 2021-08-10
Payer: MEDICARE

## 2021-08-10 VITALS
BODY MASS INDEX: 37.9 KG/M2 | WEIGHT: 188 LBS | SYSTOLIC BLOOD PRESSURE: 122 MMHG | RESPIRATION RATE: 16 BRPM | TEMPERATURE: 97 F | HEART RATE: 71 BPM | DIASTOLIC BLOOD PRESSURE: 70 MMHG | HEIGHT: 59 IN | OXYGEN SATURATION: 97 %

## 2021-08-10 DIAGNOSIS — M54.17 LUMBOSACRAL RADICULOPATHY: Primary | ICD-10-CM

## 2021-08-10 PROCEDURE — G8427 DOCREV CUR MEDS BY ELIG CLIN: HCPCS | Performed by: FAMILY MEDICINE

## 2021-08-10 PROCEDURE — 4040F PNEUMOC VAC/ADMIN/RCVD: CPT | Performed by: FAMILY MEDICINE

## 2021-08-10 PROCEDURE — 99213 OFFICE O/P EST LOW 20 MIN: CPT | Performed by: FAMILY MEDICINE

## 2021-08-10 PROCEDURE — 1090F PRES/ABSN URINE INCON ASSESS: CPT | Performed by: FAMILY MEDICINE

## 2021-08-10 PROCEDURE — 1036F TOBACCO NON-USER: CPT | Performed by: FAMILY MEDICINE

## 2021-08-10 PROCEDURE — 1123F ACP DISCUSS/DSCN MKR DOCD: CPT | Performed by: FAMILY MEDICINE

## 2021-08-10 PROCEDURE — G8417 CALC BMI ABV UP PARAM F/U: HCPCS | Performed by: FAMILY MEDICINE

## 2021-08-10 PROCEDURE — G8400 PT W/DXA NO RESULTS DOC: HCPCS | Performed by: FAMILY MEDICINE

## 2021-08-10 NOTE — PROGRESS NOTES
9/1/2021    Chief Complaint   Patient presents with    Back Pain     feet pain fell out of bed        HPI    Nasra Logan is a 78 y.o. patient that presents today for low back pain. Back Pain: Patient presents for presents evaluation of back problems. This is a/an chronic problem. This has been going on for several week(s). Exacerbating factors and alleviating factors reviewed. Pain is sharp in nature. Pain is  radiating. Previous treatments: none. Does deny bowel or bladder incontinence or saddle anesthesia. Patient's past medical, surgical, social and/or family history reviewed, updated in chart, and are non-contributory (unless otherwise stated). Medications and allergies also reviewed and updated in chart. ROS negative unless otherwise specified    Physical Exam  /70   Pulse 71   Temp 97 °F (36.1 °C)   Resp 16   Ht 4' 11\" (1.499 m)   Wt 188 lb (85.3 kg)   LMP 07/24/2014   SpO2 97%   BMI 37.97 kg/m²   Wt Readings from Last 3 Encounters:   08/26/21 190 lb (86.2 kg)   08/10/21 188 lb (85.3 kg)   07/29/21 191 lb (86.6 kg)     Temp Readings from Last 3 Encounters:   08/26/21 97.1 °F (36.2 °C) (Temporal)   08/10/21 97 °F (36.1 °C)   07/29/21 97.6 °F (36.4 °C) (Oral)     BP Readings from Last 3 Encounters:   08/26/21 (!) 162/80   08/10/21 122/70   07/29/21 129/78     Pulse Readings from Last 3 Encounters:   08/26/21 62   08/10/21 71   07/29/21 85       General appearance: alert, well appearing, and in no distress, oriented to person, place, and time and normal appearing weight. CVS exam: normal rate, regular rhythm, normal S1, S2, no murmurs, rubs, clicks or gallops. Radial pulses 2+ bilateral.  PT/DP pulse 2+ bilat. No C/C/E    Chest: clear to auscultation, no wheezes, rales or rhonchi, symmetric air entry. Musculoskeletal: MS 5/5, DTR 2/4 x4 extremities, FROM F/E spine, no tenderness, obvious masses or deformities.  Gait normal.     Abdomen: Soft, non-tender, non-distended, positive BS in all 4 quadrants    Extremities:Dorsalis pedis pulses palpated bilaterally, no clubbing, cyanosis, edema or erythema     SKIN: no lesions, jaundice, petechiae, pallor, cyanosis, ecchymosis    NEURO: gross motor exam normal by observation, gait normal      Assessment/Plan  Hedy musa was seen today for back pain. Diagnoses and all orders for this visit:    Lumbosacral radiculopathy  -     [facility] [place]          Return in about 4 weeks (around 9/7/2021). Daja Peña, DO    Call or go to ED immediately if symptoms worsen or persist.    Educational materials and/or home exercises printed for patient's review and were included in patient instructions on his/her After Visit Summary and given to patient at the end of visit. Counseled regarding above diagnosis, including possible risks and complications,  especially if left uncontrolled. Counseled regarding the possible side effects, risks, benefits and alternatives to treatment; patient and/or guardian verbalizes understanding, agrees, feels comfortable with and wishes to proceed with above treatment plan. Advised patient to call with any new medication issues, and read all Rx info from pharmacy to assure aware of all possible risks and side effects of medication before taking. Reviewed age and gender appropriate health screening exams and vaccinations. Advised patient regarding importance of keeping up with recommended health maintenance and to schedule as soon as possible if overdue, as this is important in assessing for undiagnosed pathology, especially cancer, as well as protecting against potentially harmful/life threatening disease. Patient and/or guardian verbalizes understanding and agrees with above counseling, assessment and plan. All questions answered.

## 2021-08-18 ENCOUNTER — HOSPITAL ENCOUNTER (OUTPATIENT)
Dept: PHYSICAL THERAPY | Age: 81
Setting detail: THERAPIES SERIES
Discharge: HOME | End: 2021-08-18
Payer: MEDICARE

## 2021-08-18 PROCEDURE — 97161 PT EVAL LOW COMPLEX 20 MIN: CPT | Performed by: PHYSICAL THERAPIST

## 2021-08-18 ASSESSMENT — PAIN DESCRIPTION - PAIN TYPE: TYPE: ACUTE PAIN

## 2021-08-18 ASSESSMENT — PAIN DESCRIPTION - ORIENTATION: ORIENTATION: LEFT

## 2021-08-18 ASSESSMENT — PAIN SCALES - GENERAL: PAINLEVEL_OUTOF10: 8

## 2021-08-18 ASSESSMENT — PAIN DESCRIPTION - LOCATION: LOCATION: BACK;LEG

## 2021-08-18 ASSESSMENT — PAIN DESCRIPTION - FREQUENCY: FREQUENCY: CONTINUOUS

## 2021-08-18 NOTE — PROGRESS NOTES
Physical Therapy  Initial Assessment  Date: 2021  Patient Name: Delfina Roman  MRN: [id number]  : 1940          Restrictions       Subjective   General  Referring Practitioner: Casey  Diagnosis: M54.17 (ICD-10-CM) - Lumbosacral radiculopathy  Follows Commands: Within Functional Limits  PT Visit Information  PT Insurance Information: [facility] Medicare Dual  Total # of Visits to Date: 1  Subjective  Subjective: Patient presents to PT with c/o left sided pain due to recent fall OOB. Injury occurred 21. Patient went to ER. Multiple imaging performed with no significant injury reported. She cont to have back pain with radicular symptoms across LLE. Symptoms present to level of foot with burning sensation present. She states that she ambulates with cane majority of the time however will use walker for long durations. Pt admits to multiple falls over the past year. No significant strength loss per pt. Patient take motrin nightly for pain relief.   Pain Screening  Patient Currently in Pain: Yes  Pain Assessment  Pain Assessment: 0-10  Pain Level: 8  Pain Type: Acute pain  Pain Location: Back;Leg  Pain Orientation: Left  Pain Descriptors: Burning;Radiating;Numbness  Pain Frequency: Continuous  Vital Signs  Patient Currently in Pain: Yes    Objective     Observation/Palpation  Posture: Fair (fwd head/rounded shoulder posturing)  Palpation: Pain with palpation L LS region and across thoracolumbar paraspinals  Edema: mod edema noted across BLEs    AROM RLE (degrees)  RLE AROM: WFL  AROM LLE (degrees)  LLE AROM : WFL  Spine  Lumbar: Limited by 50% all planes    Strength RLE  Comment: R hip/knee- grossly 4/5  Strength LLE  Comment: L hip- grossly 4-/5; L knee/ankle- grossly 4/5  Strength Other  Other: Trunk- grossly 4-/5 all planes        Sensation  Overall Sensation Status: Impaired  Light Touch: Partial deficits in the LLE             Ambulation  Ambulation?: Yes  Ambulation 1  Surface: level tile  Device: No to reduce back/leg pain       Therapy Time   Individual Concurrent Group Co-treatment   Time In 0805         Time Out 0845         Minutes 40                 Becky Titus, [address]

## 2021-08-20 ENCOUNTER — HOSPITAL ENCOUNTER (OUTPATIENT)
Dept: PHYSICAL THERAPY | Age: 81
Setting detail: THERAPIES SERIES
Discharge: HOME | End: 2021-08-20
Payer: MEDICARE

## 2021-08-20 PROCEDURE — 97110 THERAPEUTIC EXERCISES: CPT | Performed by: PHYSICAL THERAPIST

## 2021-08-20 PROCEDURE — G0283 ELEC STIM OTHER THAN WOUND: HCPCS | Performed by: PHYSICAL THERAPIST

## 2021-08-20 NOTE — PROGRESS NOTES
[facility]  Phone: [phone] Fax: [phone]       Physical Therapy Daily Treatment Note  Date:  2021    Patient Name:  Heath Salter    :  1940  MRN: [id number]    Restrictions/Precautions:    Diagnosis:  M54.17 (ICD-10-CM) - Lumbosacral radiculopathy  Treatment Diagnosis:    Insurance/Certification information:  [facility] Medicare Dual  Referring Physician:  Casey  Plan of care signed (Y/N):    Visit# / total visits:  2/  Pain level: 8/10  Time In:     0900  Time Out:      1000    Subjective:  Pt presents to PT for initial treatment session. States cont to have back pain    Exercises:  Exercise/Equipment Resistance/Repetitions Other comments   Bike  x7mins            Tball flex           rot   10x10s  5x10s ea            Rot str   5x10s ea    Piriformis str   5x10s ea           Bridge    2x5                                                                                      Other Therapeutic Activities:      Home Exercise Program:      Manual Treatments:      Modalities: MH/IFC x 20mins  Low Back region    Timed Code Treatment Minutes: Total Treatment Minutes:      Treatment/Activity Tolerance:  [x] Patient tolerated treatment well [] Patient limited by fatigue  [] Patient limited by pain  [] Patient limited by other medical complications  [] Other:  Pt reports significant tightness across back with stretching activities. Cramping occurred across B hamstring with exercises. Modalities following for symptom reduction.      Plan:   [x] Continue per plan of care [] Alter current plan (see comments)  [] Plan of care initiated [] Hold pending MD visit [] Discharge  Plan for Next Session:         Treatment Charges: Mins Units   Initial Evaluation     Re-Evaluation     Ther Exercise         TE 30 2   Manual Therapy     MT     Ther Activities        TA     Gait Training          GT     Neuro Re-education NR     Modalities 20 1   Non-Billable Service Time 10 0   Other Total Time/Units 60 3     Electronically signed by:  Delfino Jenkins PT

## 2021-08-23 ENCOUNTER — HOSPITAL ENCOUNTER (OUTPATIENT)
Dept: PHYSICAL THERAPY | Age: 81
Setting detail: THERAPIES SERIES
Discharge: HOME | End: 2021-08-23
Payer: MEDICARE

## 2021-08-23 PROCEDURE — 97110 THERAPEUTIC EXERCISES: CPT | Performed by: PHYSICAL THERAPIST

## 2021-08-23 PROCEDURE — G0283 ELEC STIM OTHER THAN WOUND: HCPCS | Performed by: PHYSICAL THERAPIST

## 2021-08-23 NOTE — PROGRESS NOTES
[facility]  Phone: [phone] Fax: [phone]       Physical Therapy Daily Treatment Note  Date:  2021    Patient Name:  Ashley Adorno    :  1940  MRN: [id number]    Restrictions/Precautions:    Diagnosis:  M54.17 (ICD-10-CM) - Lumbosacral radiculopathy  Treatment Diagnosis:    Insurance/Certification information:  [facility] Medicare Dual  Referring Physician:  Casey  Plan of care signed (Y/N):    Visit# / total visits:  3/  Pain level: 8/10  Time In:     0900  Time Out:      1010    Subjective:  Pt presents to PT with cont pain across back region. States reduction in pain following initial session. Pain returned the following day. Exercises:  Exercise/Equipment Resistance/Repetitions Other comments   Bike  x7mins            Tball flex           rot   10x10s  10x10s ea            Rot str   5x10s ea    Piriformis str   5x10s ea           Bridge    2x10                                                                                      Other Therapeutic Activities:      Home Exercise Program:      Manual Treatments:      Modalities: MH/IFC x 20mins  Low Back region    Timed Code Treatment Minutes: Total Treatment Minutes:      Treatment/Activity Tolerance:  [x] Patient tolerated treatment well [] Patient limited by fatigue  [] Patient limited by pain  [] Patient limited by other medical complications  [] Other:  Pt reports cont pain across back region. Tightness remains with all there ex. Modalities following for symptom reduction. Gait remains slowed but stable with SC.      Plan:   [x] Continue per plan of care [] Alter current plan (see comments)  [] Plan of care initiated [] Hold pending MD visit [] Discharge  Plan for Next Session:         Treatment Charges: Mins Units   Initial Evaluation     Re-Evaluation     Ther Exercise         TE 30 2   Manual Therapy     MT     Ther Activities        TA     Gait Training          GT     Neuro Re-education NR

## 2021-08-24 ENCOUNTER — TELEPHONE (OUTPATIENT)
Dept: FAMILY MEDICINE CLINIC | Age: 81
End: 2021-08-24

## 2021-08-24 NOTE — TELEPHONE ENCOUNTER
Patient needs a new appointment other than September 9th  She needs to be sooner   Leave on answering machine

## 2021-08-24 NOTE — TELEPHONE ENCOUNTER
----- Message from Debbie Wells sent at 8/24/2021  9:14 AM EDT -----  Subject: Message to Provider    QUESTIONS  Information for Provider? Patient wants to know if she has Neuropathy or   if she is diabetic. Her Neurologist just advised her that she has   neuropathy. Please contact patient about info in her records to confirm   this information.   ---------------------------------------------------------------------------  --------------  CALL BACK INFO  What is the best way for the office to contact you? OK to leave message on   voicemail  Preferred Call Back Phone Number? [phone]  ---------------------------------------------------------------------------  --------------  SCRIPT ANSWERS  Relationship to Patient?  Self

## 2021-08-24 NOTE — TELEPHONE ENCOUNTER
Patient was told she has neuropathy but was never told this now she is concerned that she is diabetic, we will do a A1C at her next visit

## 2021-08-24 NOTE — TELEPHONE ENCOUNTER
----- Message from Juan Manuel Arias sent at 8/24/2021  9:18 AM EDT -----  Subject: Message to Provider    QUESTIONS  Information for Provider? Patient has concerns about her existing   condition based on a conversation with her Neurologist. She also is   looking to get a sooner appt then 09/09/2021 due to a scheduling conflict   she has on that date. Please contact patient directly re? concerns. 2   messages sent for patient because call flow to check appt led me to send   message due to new concerns. ---------------------------------------------------------------------------  --------------  Reese VALLEJO  What is the best way for the office to contact you? OK to leave message on   voicemail  Preferred Call Back Phone Number? [phone]  ---------------------------------------------------------------------------  --------------  SCRIPT ANSWERS  Relationship to Patient? Self  (Is the patient requesting to be seen urgently for their symptoms?)? No  Is this follow up request related to routine Diabetes Management? No  Are you having any new concerns about your existing condition?  Yes

## 2021-08-26 ENCOUNTER — HOSPITAL ENCOUNTER (EMERGENCY)
Age: 81
Discharge: HOME | End: 2021-08-26
Attending: STUDENT IN AN ORGANIZED HEALTH CARE EDUCATION/TRAINING PROGRAM
Payer: MEDICARE

## 2021-08-26 ENCOUNTER — NURSE TRIAGE (OUTPATIENT)
Dept: OTHER | Facility: CLINIC | Age: 81
End: 2021-08-26

## 2021-08-26 ENCOUNTER — APPOINTMENT (OUTPATIENT)
Dept: ULTRASOUND IMAGING | Age: 81
End: 2021-08-26
Payer: MEDICARE

## 2021-08-26 VITALS
BODY MASS INDEX: 38.3 KG/M2 | DIASTOLIC BLOOD PRESSURE: 80 MMHG | SYSTOLIC BLOOD PRESSURE: 162 MMHG | WEIGHT: 190 LBS | RESPIRATION RATE: 16 BRPM | TEMPERATURE: 97.1 F | HEIGHT: 59 IN | HEART RATE: 62 BPM | OXYGEN SATURATION: 97 %

## 2021-08-26 DIAGNOSIS — R60.0 LEG EDEMA: ICD-10-CM

## 2021-08-26 DIAGNOSIS — G62.9 NEUROPATHY: Primary | ICD-10-CM

## 2021-08-26 PROCEDURE — 93970 EXTREMITY STUDY: CPT

## 2021-08-26 PROCEDURE — 99284 EMERGENCY DEPT VISIT MOD MDM: CPT

## 2021-08-26 ASSESSMENT — PAIN DESCRIPTION - FREQUENCY: FREQUENCY: CONTINUOUS

## 2021-08-26 ASSESSMENT — ENCOUNTER SYMPTOMS
SHORTNESS OF BREATH: 0
DIARRHEA: 0
BACK PAIN: 0
EYE PAIN: 0
SORE THROAT: 0
NAUSEA: 0
SINUS PRESSURE: 0
COUGH: 0
WHEEZING: 0
ABDOMINAL PAIN: 0
VOMITING: 0
EYE REDNESS: 0
EYE DISCHARGE: 0

## 2021-08-26 ASSESSMENT — PAIN SCALES - GENERAL: PAINLEVEL_OUTOF10: 7

## 2021-08-26 ASSESSMENT — PAIN DESCRIPTION - ORIENTATION: ORIENTATION: LEFT;RIGHT

## 2021-08-26 ASSESSMENT — PAIN DESCRIPTION - DESCRIPTORS: DESCRIPTORS: BURNING

## 2021-08-26 ASSESSMENT — PAIN DESCRIPTION - ONSET: ONSET: SUDDEN

## 2021-08-26 ASSESSMENT — PAIN DESCRIPTION - LOCATION: LOCATION: LEG

## 2021-08-26 ASSESSMENT — PAIN DESCRIPTION - PAIN TYPE: TYPE: ACUTE PAIN

## 2021-08-26 ASSESSMENT — PAIN - FUNCTIONAL ASSESSMENT: PAIN_FUNCTIONAL_ASSESSMENT: PREVENTS OR INTERFERES SOME ACTIVE ACTIVITIES AND ADLS

## 2021-08-26 NOTE — TELEPHONE ENCOUNTER
Received call from [facility] at [facility] with Red Flag Complaint. Brief description of triage: leg pain    Triage indicates for patient to be seen today, pcp office is closed and walk in clinic is closed. Patient refusing to be seen at urgent care. Stated at end of call she will get sister to take her to an urgent care. Care advice provided, patient verbalizes understanding; denies any other questions or concerns; instructed to call back for any new or worsening symptoms. Writer provided warm transfer to [facility] for appointment scheduling. Attention Provider: Thank you for allowing me to participate in the care of your patient. The patient was connected to triage in response to information provided to the ECC. Please do not respond through this encounter as the response is not directed to a shared pool. Reason for Disposition   [1] Thigh, calf, or ankle swelling AND [2] only 1 side    Answer Assessment - Initial Assessment Questions  1. ONSET: \"When did the pain start?\"       1 weeks ago    2. LOCATION: \"Where is the pain located? \"   Calf left    3. PAIN: \"How bad is the pain? \"    (Scale 1-10; or mild, moderate, severe)    -  MILD (1-3): doesn't interfere with normal activities     -  MODERATE (4-7): interferes with normal activities (e.g., work or school) or awakens from sleep, limping     -  SEVERE (8-10): excruciating pain, unable to do any normal activities, unable to walk     Moderate    4. WORK OR EXERCISE: \"Has there been any recent work or exercise that involved this part of the body? \"       States fell out of bed 2 weeks ago leg started hurting worse    5. CAUSE: \"What do you think is causing the leg pain? \"    Possible neuropathy    6. OTHER SYMPTOMS: \"Do you have any other symptoms? \" (e.g., chest pain, back pain, breathing difficulty, swelling, rash, fever, numbness, weakness)    Feet burn at night do to neuropathy    7.  PREGNANCY: \"Is there any chance you are pregnant? \" \"When was your male    Protocols used: LEG PAIN-ADULT-AH

## 2021-08-26 NOTE — ED NOTES
FIRST PROVIDER CONTACT ASSESSMENT NOTE      Department of Emergency Medicine   8/26/21  5:44 PM EDT    Chief Complaint: Leg Pain (left leg pain, ankle swelling, redness, sent in by urgent care to r/o DVT / denies cp or sob)      History of Present Illness:   Cheyenne Pang is a 78 y.o. female who presents to the ED for    Medical History:  has a past medical history of Amaurosis fugax of right eye, Anxiety, Arthritis, CA - cancer of bowel, Cerebral artery occlusion with cerebral infarction ([address] Chronic back pain, Constipation, Depression, Elevated sed rate, GERD (gastroesophageal reflux disease), Hemorrhoids, Hyperlipidemia, Hypertension, Left foot pain, Lumbosacral radiculopathy, Macular degeneration, Peripheral neuropathy, Poor vision, Prosthetic eye globe, Seasonal allergies, Skipped heart beats, Sleep apnea, and Thyroid disease. Surgical History:  has a past surgical history that includes Eye surgery (years ago); colectomy (1974); Colonoscopy (04/26/2012); Cholecystectomy (1985); Hysterectomy (1974); tumor excision; Upper gastrointestinal endoscopy (05/30/2014); Colonoscopy (05/30/2014); Tonsillectomy; joint replacement (Left, 2010/2012); Upper gastrointestinal endoscopy (01/08/2015); Colonoscopy (01/08/2015); cyst removal (years ago); and Upper gastrointestinal endoscopy (N/A, 4/1/2019). Social History:  reports that she is a non-smoker but has been exposed to tobacco smoke. She has never used smokeless tobacco. She reports that she does not drink alcohol and does not use drugs. Family History: family history includes Breast Cancer in her daughter and sister; Cancer in her brother, brother, brother, brother, brother, brother, sister, and sister; Heart Disease in her brother, brother, father, mother, and sister; High Blood Pressure in her daughter and daughter; Hypertension in her brother, brother, father, mother, and sister; Other in her mother; Stroke in her father and mother.     *ALLERGIES*     Iodine, Bee venom, Codeine, Hydralazine, Oxycodone-aspirin, Amoxicillin-pot clavulanate, Darvocet [propoxyphene n-acetaminophen], Eggs or egg-derived products, Influenza vaccines, Percodan [oxycodone-aspirin], and Percodan [oxycodone-aspirin]     Physical Exam:      VS:  LMP 07/24/2014      Initial Plan of Care:  Initiate Treatment-Testing, Proceed toTreatment Area When Bed Available for ED Attending/MLP to Continue CareInitiate Treatment-Testing, Proceed toTreatment Area When Bed Available for ED Attending/MLP to Continue Care    -----------------END OF FIRST PROVIDER CONTACT ASSESSMENT NOTE--------------  Electronically signed by BETINA Villanueva CNP   DD: 8/26/21             BETINA Cano CNP  08/26/21 1746

## 2021-08-26 NOTE — ED PROVIDER NOTES
Patient is an 43-year-old female presenting emergency department for left leg neuropathy-like pain, she has bilateral neuropathy, but left is greater than right currently, and a week of leg swelling. Pain 7 out of 10 in severity, neuropathic pain, is constant, worse with palpation, nothing makes better, associated with left leg swelling. She has a history of diuretic use, denies heart failure to me. She denies any chest pain, shortness of breath, fevers, chills, cough, congestion. Did mention that she fell on her left side about a month ago, was seen and [place], and her work-up was negative. Denies any use of blood thinners or history of blood clots. Review of Systems   Constitutional: Negative for chills and fever. HENT: Negative for ear pain, sinus pressure and sore throat. Eyes: Negative for pain, discharge and redness. Respiratory: Negative for cough, shortness of breath and wheezing. Cardiovascular: Positive for leg swelling (Left greater than right). Negative for chest pain. Gastrointestinal: Negative for abdominal pain, diarrhea, nausea and vomiting. Genitourinary: Negative for dysuria and frequency. Musculoskeletal: Negative for arthralgias and back pain. Skin: Negative for rash and wound. Neurological: Positive for numbness (Neuropathic discomfort in her extremities lower bilaterally). Negative for weakness and headaches. Hematological: Negative for adenopathy. All other systems reviewed and are negative. Physical Exam  Vitals and nursing note reviewed. Constitutional:       Appearance: Normal appearance. HENT:      Head: Normocephalic and atraumatic. Right Ear: External ear normal.      Left Ear: External ear normal.      Nose: Nose normal.      Mouth/Throat:      Mouth: Mucous membranes are moist.   Eyes:      Extraocular Movements: Extraocular movements intact. Pupils: Pupils are equal, round, and reactive to light. Cardiovascular:      Rate and Rhythm: Normal rate and regular rhythm. Pulses: Normal pulses. Heart sounds: Normal heart sounds. Pulmonary:      Effort: Pulmonary effort is normal.      Breath sounds: Normal breath sounds. Abdominal:      General: Abdomen is flat. Bowel sounds are normal.      Palpations: Abdomen is soft. Musculoskeletal:         General: Normal range of motion. Cervical back: Normal range of motion and neck supple. Right lower leg: Edema present. Left lower leg: Edema (Mild nonpitting edema of the lower extremities bilaterally, left slightly greater than right, no erythema, no warmth) present. Skin:     General: Skin is warm and dry. Neurological:      Mental Status: She is alert. Procedures     MDM     ED Course as of Aug 26 2334   Thu Aug 26, 2021   1935 No DVT noted in the extremities on ultrasound    [JG]   2016 Patient made aware of negative DVT study, they are okay with discharge    [JG]   2028 Patient presented emergency department for bilateral leg swelling, left greater than right, she also has chronic neuropathic pain. Ultrasound not show any DVT, leg did not appear cellulitic, there is no erythema, no warmth. She appeared well, did not have any complaints of shortness of breath or chest pain. Ultrasound was obtained, was negative, she was discharged home, she will follow the primary care physician, return precautions given. [JG]      ED Course User Index  [JG] Jackelyn Hull MD      Patient presented emergency department for bilateral leg swelling, left greater than right, she also has chronic neuropathic pain. Ultrasound not show any DVT, leg did not appear cellulitic, there is no erythema, no warmth. She appeared well, did not have any complaints of shortness of breath or chest pain. Ultrasound was obtained, was negative, she was discharged home, she will follow the primary care physician, return precautions given.        ED Course as of Aug 26 2334   Thu Aug 26, 2021   1935 No DVT noted in the extremities on ultrasound    [JG]   2016 Patient made aware of negative DVT study, they are okay with discharge    [JG]   2028 Patient presented emergency department for bilateral leg swelling, left greater than right, she also has chronic neuropathic pain. Ultrasound not show any DVT, leg did not appear cellulitic, there is no erythema, no warmth. She appeared well, did not have any complaints of shortness of breath or chest pain. Ultrasound was obtained, was negative, she was discharged home, she will follow the primary care physician, return precautions given. [JG]      ED Course User Index  [JG] Jackelyn Hull MD       --------------------------------------------- PAST HISTORY ---------------------------------------------  Past Medical History:  has a past medical history of Amaurosis fugax of right eye, Anxiety, Arthritis, CA - cancer of bowel, Cerebral artery occlusion with cerebral infarction [facility]), Chronic back pain, Constipation, Depression, Elevated sed rate, GERD (gastroesophageal reflux disease), Hemorrhoids, Hyperlipidemia, Hypertension, Left foot pain, Lumbosacral radiculopathy, Macular degeneration, Peripheral neuropathy, Poor vision, Prosthetic eye globe, Seasonal allergies, Skipped heart beats, Sleep apnea, and Thyroid disease. Past Surgical History:  has a past surgical history that includes Eye surgery (years ago); colectomy (1974); Colonoscopy (04/26/2012); Cholecystectomy (1985); Hysterectomy (1974); tumor excision; Upper gastrointestinal endoscopy (05/30/2014); Colonoscopy (05/30/2014); Tonsillectomy; joint replacement (Left, 2010/2012); Upper gastrointestinal endoscopy (01/08/2015); Colonoscopy (01/08/2015); cyst removal (years ago); and Upper gastrointestinal endoscopy (N/A, 4/1/2019). Social History:  reports that she is a non-smoker but has been exposed to tobacco smoke.  She has never used smokeless tobacco. She reports that she does not drink alcohol and does not use drugs. Family History: family history includes Breast Cancer in her daughter and sister; Cancer in her brother, brother, brother, brother, brother, brother, sister, and sister; Heart Disease in her brother, brother, father, mother, and sister; High Blood Pressure in her daughter and daughter; Hypertension in her brother, brother, father, mother, and sister; Other in her mother; Stroke in her father and mother. The patients home medications have been reviewed. Allergies: Iodine, Bee venom, Codeine, Hydralazine, Oxycodone-aspirin, Amoxicillin-pot clavulanate, Darvocet [propoxyphene n-acetaminophen], Eggs or egg-derived products, Influenza vaccines, Percodan [oxycodone-aspirin], and Percodan [oxycodone-aspirin]    -------------------------------------------------- RESULTS -------------------------------------------------  Labs:  No results found for this visit on 08/26/21. Radiology:  US DUP LOWER EXTREMITIES BILATERAL VENOUS   Final Result   No evidence of DVT in either lower extremity.             ------------------------- NURSING NOTES AND VITALS REVIEWED ---------------------------  Date / Time Roomed:  8/26/2021  6:47 PM  ED Bed Assignment:  [facility]    The nursing notes within the ED encounter and vital signs as below have been reviewed. BP (!) 162/80   Pulse 62   Temp 97.1 °F (36.2 °C) (Temporal)   Resp 16   Ht 4' 11\" (1.499 m)   Wt 190 lb (86.2 kg)   LMP 07/24/2014   SpO2 97%   BMI 38.38 kg/m²   Oxygen Saturation Interpretation: Normal      ------------------------------------------ PROGRESS NOTES ------------------------------------------  11:34 PM EDT  I have spoken with the patient and discussed todays results, in addition to providing specific details for the plan of care and counseling regarding the diagnosis and prognosis. Their questions are answered at this time and they are agreeable with the plan.  I discussed at length with them reasons for immediate return here for re evaluation. They will followup with their primary care physician by calling their office tomorrow. --------------------------------- ADDITIONAL PROVIDER NOTES ---------------------------------  At this time the patient is without objective evidence of an acute process requiring hospitalization or inpatient management. They have remained hemodynamically stable throughout their entire ED visit and are stable for discharge with outpatient follow-up. The plan has been discussed in detail and they are aware of the specific conditions for emergent return, as well as the importance of follow-up. Discharge Medication List as of 8/26/2021  8:28 PM          Diagnosis:  1. Neuropathy    2. Leg edema        Disposition:  Patient's disposition: Discharge to home  Patient's condition is stable.            Kylah Herrera MD  Resident  08/26/21 7179

## 2021-08-27 ENCOUNTER — TELEPHONE (OUTPATIENT)
Dept: FAMILY MEDICINE CLINIC | Age: 81
End: 2021-08-27

## 2021-08-27 ENCOUNTER — HOSPITAL ENCOUNTER (OUTPATIENT)
Dept: PHYSICAL THERAPY | Age: 81
Setting detail: THERAPIES SERIES
Discharge: HOME | End: 2021-08-27
Payer: MEDICARE

## 2021-08-27 PROCEDURE — G0283 ELEC STIM OTHER THAN WOUND: HCPCS | Performed by: PHYSICAL THERAPIST

## 2021-08-27 NOTE — PROGRESS NOTES
[facility]  Phone: [phone] Fax: [phone]       Physical Therapy Daily Treatment Note  Date:  2021    Patient Name:  Marisa Mckeon    :  1940  MRN: [id number]    Restrictions/Precautions:    Diagnosis:  M54.17 (ICD-10-CM) - Lumbosacral radiculopathy  Treatment Diagnosis:    Insurance/Certification information:  [facility] Medicare Dual  Referring Physician:  Casey  Plan of care signed (Y/N):    Visit# / total visits:  4/  Pain level: 8/10  Time In:     0900  Time Out:      940    Subjective:  Pt presents to PT with cont pain across LEs. States going to ER yesterday. States no DVTs found. Reports having neuropathy pain. Exercises:  Exercise/Equipment Resistance/Repetitions Other comments   Bike             Tball flex           rot              Rot str       Piriformis str              Bridge                                                                                          Other Therapeutic Activities:      Home Exercise Program:      Manual Treatments:      Modalities: MH/IFC x 20mins  Low Back region    Timed Code Treatment Minutes: Total Treatment Minutes:      Treatment/Activity Tolerance:  [x] Patient tolerated treatment well [] Patient limited by fatigue  [] Patient limited by pain  [] Patient limited by other medical complications  [] Other: Held from there ex. Focused on modalities to low back region to reduce back and LE pain. Pt reported slight reduction in symptoms following. PT does recommend pt transfer to aquatic therapy 2x/week x 4-6 weeks to reduce back and LE pain. Patient may benefit from aquatic therapy and the reduced stress/forces on body from being in the water.   Patient is in agreement     Plan:   [x] Continue per plan of care [] Alter current plan (see comments)  [] Plan of care initiated [] Hold pending MD visit [] Discharge  Plan for Next Session:         Treatment Charges: Mins Units   Initial Evaluation     Re-Evaluation Ther Exercise         TE     Manual Therapy     MT     Ther Activities        TA     Gait Training          GT     Neuro Re-education NR     Modalities 20 1   Non-Billable Service Time 20 0   Other     Total Time/Units 40 1     Electronically signed by:  Gato Serrano PT

## 2021-08-27 NOTE — ED PROVIDER NOTES
ATTENDING PROVIDER ATTESTATION:     Jacob Lee presented to the emergency department for evaluation of Leg Pain (bilat lower leg swelling and pain, L>R, sent in by urgent care to r/o DVT / denies cp or sob) and Leg Swelling   and was initially evaluated by the Medical Resident. See Original ED Note for H&P and ED course above. I have reviewed and discussed the case, including pertinent history (medical, surgical, family and social) and exam findings with the Medical Resident assigned to Jacob Lee. I have personally performed and/or participated in the history, exam, medical decision making, and procedures and agree with all pertinent clinical information and any additional changes or corrections are noted below in my assessment and plan. I have discussed this patient in detail with the resident, and provided the instruction and education,     I have reviewed my findings and recommendations with the assigned Medical Resident, Jacob Lee and members of family present at the time of disposition. I have performed a history and physical examination of this patient and directly supervised the resident caring for this patient    HPI  This is an 27-year-old female who presents to the emergency department today with a chief complaint of lower extremity swelling. The patient states that she was sent to the emergency department from urgent care to rule out a blood clot. She feels that her legs are swollen left greater than right. She denies history of prior blood clot. No recent travel or hormone use. The pain is 7 out of 10. Is constant and worse with touching of the leg. Denies chest pain or shortness of breath. No new numbness or tingling. The patient had trauma approximately month ago and was evaluated in the emergency department. No recent trauma. She is still able to ambulate.     ROS  A complete review of systems was performed and pertinent positives and negatives are stated within HPI, all other systems reviewed and are negative.    --------------------------------------------- PAST HISTORY ---------------------------------------------  Past Medical History:  has a past medical history of Amaurosis fugax of right eye, Anxiety, Arthritis, CA - cancer of bowel, Cerebral artery occlusion with cerebral infarction ([facility] [address] Chronic back pain, Constipation, Depression, Elevated sed rate, GERD (gastroesophageal reflux disease), Hemorrhoids, Hyperlipidemia, Hypertension, Left foot pain, Lumbosacral radiculopathy, Macular degeneration, Peripheral neuropathy, Poor vision, Prosthetic eye globe, Seasonal allergies, Skipped heart beats, Sleep apnea, and Thyroid disease. Past Surgical History:  has a past surgical history that includes Eye surgery (years ago); colectomy (1974); Colonoscopy (04/26/2012); Cholecystectomy (1985); Hysterectomy (1974); tumor excision; Upper gastrointestinal endoscopy (05/30/2014); Colonoscopy (05/30/2014); Tonsillectomy; joint replacement (Left, 2010/2012); Upper gastrointestinal endoscopy (01/08/2015); Colonoscopy (01/08/2015); cyst removal (years ago); and Upper gastrointestinal endoscopy (N/A, 4/1/2019). Social History:  reports that she is a non-smoker but has been exposed to tobacco smoke. She has never used smokeless tobacco. She reports that she does not drink alcohol and does not use drugs. Family History: family history includes Breast Cancer in her daughter and sister; Cancer in her brother, brother, brother, brother, brother, brother, sister, and sister; Heart Disease in her brother, brother, father, mother, and sister; High Blood Pressure in her daughter and daughter; Hypertension in her brother, brother, father, mother, and sister; Other in her mother; Stroke in her father and mother. Unless otherwise noted, family history is non contributory    The patients home medications have been reviewed.     Allergies: Iodine, Bee venom, Codeine, Hydralazine, Oxycodone-aspirin, Amoxicillin-pot clavulanate, Darvocet [propoxyphene n-acetaminophen], Eggs or egg-derived products, Influenza vaccines, Percodan [oxycodone-aspirin], and Percodan [oxycodone-aspirin]    Physical Exam  General: The patient is conversational and lying comfortably in bed. Head: Normocephalic and atraumatic. Eyes: Sclera non-icteric and no conjunctival injection  ENT: Nasal and oral membranes moist  Neck: Trachea midline. No JVD  Respiratory: Lungs clear to auscultation bilaterally. Patient speaking in full sentences. Cardiovascular: Regular rate. No pedal edema. 2+ DP pulses  Gastrointestinal:  Abdomen is soft and nondistended. Bowel sounds present. There is no tenderness. There is no guarding or rebound. Musculoskeletal: No deformity. Minimal tenderness of the calf. Able to flex and extend at the knees and hips. Good plantar flexion or dorsiflexion of the ankles. Skin: Skin warm and dry. No rashes. The patient has prior incision over the left knee without surrounding erythema fluctuance or drainage. Appears well-healed. Neurologic: No gross motor deficits. No aphasia. Sensation intact to light touch  Psychiatric: Normal affect. MDM  This is a 78 y.o. female presenting to the ED for concern for  blood clot. On initial presentation, the patient's vital signs are interpreted as hypertensive, afebrile saturating well on room air. Based on history and physical exam, we have a broad differential.  The patient has no evidence of pitting edema at this time. No bony tenderness. As the patient was sent for DVT evaluation we will obtain duplex. There is no appreciable swelling on my examination, no pitting edema. She is neurovascularly intact. The patient has evidence of prior surgery in the left knee this may also be contributing to her \"swelling\". Review of EMR shows the patient was seen approximately a month ago and had x-rays of the left femur and tib-fib as well as pelvic x-ray. There was questionable femoral neck radiolucency and CT was recommended if the patient was not able to ambulate. CT showed no fracture. She has been able to ambulate. Do not feel she requires repeat imaging at this time as she denies history of trauma. Duplex of the lower extremity shows no evidence of DVT. This is interpreted by radiology. At this time, we have discussed the findings with the patient. We have stressed the importance of appropriate follow-up. Her blood pressure is elevated today. She is instructed to follow with her primary care physician. We do feel she is stable for discharge. Patient verbalies understanding and is agreeable to the plan. I directly supervised any procedures performed by the resident and was present for the procedure including all critical portions of the procedure    I, Dr. Thelma Quan, am the primary provider of record    Impression  1. Neuropathy    2.  Leg edema          Thelma Quan MD  08/27/21 [id number]

## 2021-09-08 ENCOUNTER — HOSPITAL ENCOUNTER (OUTPATIENT)
Dept: PHYSICAL THERAPY | Age: 81
Setting detail: THERAPIES SERIES
Discharge: HOME | End: 2021-09-08
Payer: MEDICARE

## 2021-09-08 DIAGNOSIS — I10 ESSENTIAL HYPERTENSION: ICD-10-CM

## 2021-09-08 PROCEDURE — 97113 AQUATIC THERAPY/EXERCISES: CPT

## 2021-09-08 RX ORDER — CHLORTHALIDONE 25 MG/1
TABLET ORAL
Qty: 30 TABLET | Refills: 0 | Status: SHIPPED
Start: 2021-09-08 | End: 2021-10-27

## 2021-09-09 NOTE — PROGRESS NOTES
[facility]  Phone: [phone] Fax: [phone]        Physical Therapy Aquatic Flow Sheet   Date:  2021    Patient Name:  Courtney Torres    :  1940  Restrictions/Precautions:    Diagnosis:  M54.17 (ICD-10-CM) - Lumbosacral radiculopathyTreatment Diagnosis:    Insurance/Certification information:  [facility] Medicare Dual  Referring Physician:  Casey  Plan of care signed (Y/N):    Visit# / total visits:  5  Pain level: 10/10   Electronically signed by:  Jason Rosas PTA  Time In:1030  Time Out:1100    Subjective:Pt arrives with her niece visiting from [place]. Pt eager to get in pool due to BLE pain.       Key  B= Belt DB= Dumbells T= Theratube   H= Hydrotone N= Noodles W= Weights   P= Paddles S= Speedo equipment K= Kickboard     Exercises/Activities   Warm-up/Amb  Minutes  Exercises      Slow forward   5  HR/TR      Slow sideways  5  Marches      Slow backwards  5  Mini-squats      Medium forward    4-way SLR      Medium sideways    Hip circles/fig 8      Small shuffle    Hamstring curls      Jog    Knee extension      Braiding    Pelvic tilts      Bicycling  5-seated  Scap squeezes      Marching  5-seated  Shoulder flex/ext      Functional    Shoulder abd/add      Step    Shoulder H. abd/add      Lifting    Shoulder IR/ER      Hand to opp knee    Rowing      Push down squat    Bilateral pull down      UE PNF    Push/pull      LE PNF    Push downs      Wall push ups    Arm circles      SLS    Elbow flex/ext          Chin tuck      Stretching    UT shrugs/rolls      Gastroc/Soleus    Rocking horse      Hamstring          SKTC    Other      Piriformis    Sit to stand  x 5 min    Hip flexor          Ladder pull          Pec stretch          Post deltoid           Timed Code Treatment Minutes:  30    Total Treatment Minutes:  30    Treatment/Activity Tolerance:  [] Patient tolerated treatment well [x] Patient limited by fatique  [x] Patient limited by pain [] Patient limited by other medical complications  [x] Other: deconditioned status present  Prognosis: [] Good [x] Fair  [] Poor    Patient Requires Follow-up: [x] Yes  [] No    Plan:   [x] Continue per plan of care [] Alter current plan (see comments)  [] Plan of care initiated [] Hold pending MD visit [] Discharge    Treatment Charges: Mins Units   Initial Evaluation     Re-Evaluation     Ther Exercise         TE     Aquatic Treatment 30 2   Ther Activities        TA     Gait Training          GT     Neuro Re-education NR     Modalities     Non-Billable Service Time     Other     Total Time/Units 30 2         Electronically signed by:  Millie Nicholson PTA [id number]

## 2021-09-13 ENCOUNTER — OFFICE VISIT (OUTPATIENT)
Dept: FAMILY MEDICINE CLINIC | Age: 81
End: 2021-09-13
Payer: MEDICARE

## 2021-09-13 ENCOUNTER — HOSPITAL ENCOUNTER (OUTPATIENT)
Dept: PHYSICAL THERAPY | Age: 81
Setting detail: THERAPIES SERIES
Discharge: HOME | End: 2021-09-13
Payer: MEDICARE

## 2021-09-13 VITALS
BODY MASS INDEX: 37.5 KG/M2 | WEIGHT: 186 LBS | HEIGHT: 59 IN | TEMPERATURE: 97.8 F | OXYGEN SATURATION: 97 % | RESPIRATION RATE: 18 BRPM | DIASTOLIC BLOOD PRESSURE: 82 MMHG | HEART RATE: 72 BPM | SYSTOLIC BLOOD PRESSURE: 128 MMHG

## 2021-09-13 DIAGNOSIS — M54.17 LUMBOSACRAL RADICULOPATHY: Primary | ICD-10-CM

## 2021-09-13 DIAGNOSIS — G62.89 OTHER POLYNEUROPATHY: ICD-10-CM

## 2021-09-13 PROCEDURE — 99213 OFFICE O/P EST LOW 20 MIN: CPT | Performed by: FAMILY MEDICINE

## 2021-09-13 PROCEDURE — G8400 PT W/DXA NO RESULTS DOC: HCPCS | Performed by: FAMILY MEDICINE

## 2021-09-13 PROCEDURE — 1123F ACP DISCUSS/DSCN MKR DOCD: CPT | Performed by: FAMILY MEDICINE

## 2021-09-13 PROCEDURE — G8427 DOCREV CUR MEDS BY ELIG CLIN: HCPCS | Performed by: FAMILY MEDICINE

## 2021-09-13 PROCEDURE — G8417 CALC BMI ABV UP PARAM F/U: HCPCS | Performed by: FAMILY MEDICINE

## 2021-09-13 PROCEDURE — G0283 ELEC STIM OTHER THAN WOUND: HCPCS

## 2021-09-13 PROCEDURE — 97113 AQUATIC THERAPY/EXERCISES: CPT

## 2021-09-13 PROCEDURE — 1090F PRES/ABSN URINE INCON ASSESS: CPT | Performed by: FAMILY MEDICINE

## 2021-09-13 PROCEDURE — 4040F PNEUMOC VAC/ADMIN/RCVD: CPT | Performed by: FAMILY MEDICINE

## 2021-09-13 PROCEDURE — 1036F TOBACCO NON-USER: CPT | Performed by: FAMILY MEDICINE

## 2021-09-13 NOTE — PROGRESS NOTES
[facility]  Phone: [phone] Fax: [phone]        Physical Therapy Aquatic Flow Sheet   Date:  2021    Patient Name:  Ashley Adorno    :  1940  Restrictions/Precautions:    Diagnosis:  M54.17 (ICD-10-CM) - Lumbosacral radiculopathyTreatment Diagnosis:    Insurance/Certification information:  [facility] Medicare Dual  Referring Physician:  Casey  Plan of care signed (Y/N):    Visit# / total visits:  5  Pain level: 10/10   Electronically signed by:  Guilherme Talamantes PTA  Time In:1030  Time Out:1100  Time In 11:15 for ESTIM x 20 min post-aquatic therapy for LB pain control per Shona Javed PT      Durant  B= Belt DB= Dumbells T= Theratube   H= Hydrotone N= Noodles W= Weights   P= Paddles S= Speedo equipment K= Kickboard     Exercises/Activities   Warm-up/Amb  Minutes  Exercises      Slow forward   5  HR/TR      Slow sideways  5  Marches      Slow backwards  5  Mini-squats      Medium forward    4-way SLR      Medium sideways    Hip circles/fig 8      Small shuffle    Hamstring curls      Jog    Knee extension      Braiding    Pelvic tilts      Bicycling  5-seated  Scap squeezes      Marching  5-seated  Shoulder flex/ext      Functional    Shoulder abd/add      Step    Shoulder H. abd/add      Lifting    Shoulder IR/ER      Hand to opp knee    Rowing      Push down squat    Bilateral pull down      UE PNF    Push/pull      LE PNF    Push downs      Wall push ups    Arm circles      SLS    Elbow flex/ext          Chin tuck      Stretching    UT shrugs/rolls      Gastroc/Soleus    Rocking horse      Hamstring          SKTC    Other      Piriformis    Sit to stand  x 5 min    Hip flexor          Ladder pull          Pec stretch          Post deltoid           Timed Code Treatment Minutes:  30    Total Treatment Minutes:  30    Treatment/Activity Tolerance:  [] Patient tolerated treatment well [x] Patient limited by fatique  [x] Patient limited by pain [] Patient limited by other medical complications  [x] Other: deconditioned status present  ESTIM x 20 min post-aquatic therapy for LB pain control per Shona Javed PT      Prognosis: [] Good [x] Fair  [] Poor    Patient Requires Follow-up: [x] Yes  [] No    Plan:   [x] Continue per plan of care [] Alter current plan (see comments)  [] Plan of care initiated [] Hold pending MD visit [] Discharge    Treatment Charges: Mins Units   Initial Evaluation     Re-Evaluation     Ther Exercise         TE     Aquatic Treatment 30 2   Ther Activities        TA     Gait Training          GT     Neuro Re-education NR     Olayinka PT 20 1   Non-Billable Service Time     Other     Total Time/Units 50 3         Electronically signed by:  Guilherme Talamantes PTA [id number]

## 2021-09-13 NOTE — PROGRESS NOTES
9/13/2021    Chief Complaint   Patient presents with    Peripheral Neuropathy     gong to therapy now getting better       HPI    Goldie Barlow is a 78 y.o. patient that presents today for:    Neuropathy: She describes symptoms of numbness. Onset of symptoms was several years ago. Symptoms are currently of moderate severity. Symptoms occur all day. The patient denies tingling. Symptoms are worse on the left side. She denies  bloating. Previous treatment has included accupuncture, which has improved symptoms. Is going to PT, reports improvement. Patient's past medical, surgical, social and/or family history reviewed, updated in chart, and are non-contributory (unless otherwise stated). Medications and allergies also reviewed and updated in chart. ROS negative unless otherwise specified    Physical Exam  Temp Readings from Last 3 Encounters:   09/13/21 97.8 °F (36.6 °C)   08/26/21 97.1 °F (36.2 °C) (Temporal)   08/10/21 97 °F (36.1 °C)     Wt Readings from Last 3 Encounters:   09/13/21 186 lb (84.4 kg)   08/26/21 190 lb (86.2 kg)   08/10/21 188 lb (85.3 kg)     BP Readings from Last 3 Encounters:   09/13/21 128/82   08/26/21 (!) 162/80   08/10/21 122/70     Pulse Readings from Last 3 Encounters:   09/13/21 72   08/26/21 62   08/10/21 71       General appearance: alert, well appearing, and in no distress, oriented to person, place, and time and normal appearing weight. CVS exam: normal rate, regular rhythm, normal S1, S2, no murmurs, rubs, clicks or gallops. Radial pulses 2+ bilateral.  PT/DP pulse 2+ bilat. No C/C/E    Chest: clear to auscultation, no wheezes, rales or rhonchi, symmetric air entry.      Abdomen: Soft, non-tender, non-distended, positive BS in all 4 quadrants    Extremities:Dorsalis pedis pulses palpated bilaterally, no clubbing, cyanosis, edema or erythema,     SKIN: no lesions, jaundice, petechiae, pallor, cyanosis, ecchymosis    NEURO: gross motor exam normal by observation, gait normal    Mental status - alert, oriented to person, place, and time, normal mood, behavior, speech, dress, motor activity, and thought processes      Assessment/Plan  Summer Medina was seen today for peripheral neuropathy. Diagnoses and all orders for this visit:    Lumbosacral radiculopathy  Other polyneuropathy  - Continue PT, rto 6 weeks to reassess. Return in about 6 weeks (around 10/25/2021). Daja Peña, DO    Call or go to ED immediately if symptoms worsen or persist.    Educational materials and/or home exercises printed for patient's review and were included in patient instructions on his/her After Visit Summary and given to patient at the end of visit. Counseled regarding above diagnosis, including possible risks and complications,  especially if left uncontrolled. Counseled regarding the possible side effects, risks, benefits and alternatives to treatment; patient and/or guardian verbalizes understanding, agrees, feels comfortable with and wishes to proceed with above treatment plan. Advised patient to call with any new medication issues, and read all Rx info from pharmacy to assure aware of all possible risks and side effects of medication before taking. Reviewed age and gender appropriate health screening exams and vaccinations. Advised patient regarding importance of keeping up with recommended health maintenance and to schedule as soon as possible if overdue, as this is important in assessing for undiagnosed pathology, especially cancer, as well as protecting against potentially harmful/life threatening disease. Patient and/or guardian verbalizes understanding and agrees with above counseling, assessment and plan. All questions answered.

## 2021-09-15 ENCOUNTER — HOSPITAL ENCOUNTER (OUTPATIENT)
Dept: PHYSICAL THERAPY | Age: 81
Setting detail: THERAPIES SERIES
Discharge: HOME | End: 2021-09-15
Payer: MEDICARE

## 2021-09-15 PROCEDURE — 97113 AQUATIC THERAPY/EXERCISES: CPT

## 2021-09-15 PROCEDURE — G0283 ELEC STIM OTHER THAN WOUND: HCPCS

## 2021-09-15 NOTE — PROGRESS NOTES
[facility]  Phone: [phone] Fax: [phone]        Physical Therapy Aquatic Flow Sheet   Date:  9/15/2021    Patient Name:  Dominique Irwin    :  1940  Restrictions/Precautions:    Diagnosis:  M54.17 (ICD-10-CM) - Lumbosacral radiculopathyTreatment Diagnosis:    Insurance/Certification information:  [facility] Medicare Dual  Referring Physician:  Casey  Plan of care signed (Y/N):    Visit# / total visits:    Pain level: 10/10   Electronically signed by:  Cornelio Hernandez PTA  Time GF:4160  Time Out:1030  Time In 1045am  for ESTIM x 20 min post-aquatic therapy for LB pain control per Sanaz Alfonso PT      Durant  B= Belt DB= Dumbells T= Theratube   H= Hydrotone N= Noodles W= Weights   P= Paddles S= Speedo equipment K= Kickboard     Exercises/Activities   Warm-up/Amb  Minutes  Exercises      Slow forward   5  HR/TR      Slow sideways  5  Marches      Slow backwards  5  Mini-squats      Medium forward    4-way SLR      Medium sideways    Hip circles/fig 8      Small shuffle    Hamstring curls      Jog    Knee extension      Braiding    Pelvic tilts      Bicycling  5-seated  Scap squeezes      Marching  5-seated  Shoulder flex/ext      Functional    Shoulder abd/add      Step    Shoulder H. abd/add      Lifting    Shoulder IR/ER      Hand to opp knee    Rowing      Push down squat    Bilateral pull down      UE PNF    Push/pull      LE PNF    Push downs      Wall push ups    Arm circles      SLS    Elbow flex/ext          Chin tuck      Stretching    UT shrugs/rolls      Gastroc/Soleus    Rocking horse      Hamstring          SKTC    Other      Piriformis    Sit to stand  x 5 min    Hip flexor          Ladder pull          Pec stretch          Post deltoid           Timed Code Treatment Minutes:  30    Total Treatment Minutes:  30    Treatment/Activity Tolerance:  [] Patient tolerated treatment well [x] Patient limited by fatique  [x] Patient limited by pain [] Patient limited by other medical complications  [x] Other: deconditioned status present  ESTIM x 20 min post-aquatic therapy for LB pain control per [place] PT      Prognosis: [] Good [x] Fair  [] Poor    Patient Requires Follow-up: [x] Yes  [] No    Plan:   [x] Continue per plan of care [] Alter current plan (see comments)  [] Plan of care initiated [] Hold pending MD visit [] Discharge    Treatment Charges: Mins Units   Initial Evaluation     Re-Evaluation     Ther Exercise         TE     Aquatic Treatment 30 2   Ther Activities        TA     Gait Training          GT     Neuro Re-education JUAN C Bhagat PT 20 1   Non-Billable Service Time     Other     Total Time/Units 50 3         Electronically signed by:  Francisco Frank PTA [id number]

## 2021-09-20 ENCOUNTER — HOSPITAL ENCOUNTER (OUTPATIENT)
Dept: PHYSICAL THERAPY | Age: 81
Setting detail: THERAPIES SERIES
Discharge: HOME | End: 2021-09-20
Payer: MEDICARE

## 2021-09-21 DIAGNOSIS — F33.1 MODERATE EPISODE OF RECURRENT MAJOR DEPRESSIVE DISORDER (HCC): ICD-10-CM

## 2021-09-21 RX ORDER — LOSARTAN POTASSIUM 100 MG/1
TABLET ORAL
Qty: 90 TABLET | Refills: 0 | Status: SHIPPED
Start: 2021-09-21 | End: 2021-10-27

## 2021-09-21 RX ORDER — METOPROLOL SUCCINATE 25 MG/1
TABLET, EXTENDED RELEASE ORAL
Qty: 90 TABLET | Refills: 0 | Status: SHIPPED
Start: 2021-09-21 | End: 2021-10-27

## 2021-09-21 RX ORDER — LEVOTHYROXINE SODIUM 88 UG/1
88 TABLET ORAL DAILY
Qty: 90 TABLET | Refills: 1 | Status: SHIPPED
Start: 2021-09-21 | End: 2022-09-12

## 2021-09-22 ENCOUNTER — HOSPITAL ENCOUNTER (OUTPATIENT)
Dept: PHYSICAL THERAPY | Age: 81
Setting detail: THERAPIES SERIES
Discharge: HOME | End: 2021-09-22
Payer: MEDICARE

## 2021-09-22 PROCEDURE — G0283 ELEC STIM OTHER THAN WOUND: HCPCS

## 2021-09-22 PROCEDURE — 97113 AQUATIC THERAPY/EXERCISES: CPT

## 2021-09-22 NOTE — PROGRESS NOTES
[facility]  Phone: [phone] Fax: [phone]        Physical Therapy Aquatic Flow Sheet   Date:  2021    Patient Name:  Alec Weinstein    :  1940  Restrictions/Precautions:    Diagnosis:  M54.17 (ICD-10-CM) - Lumbosacral radiculopathy  Treatment Diagnosis:    Insurance/Certification information:  [facility] Medicare Dual  Referring Physician:  Casey  Plan of care signed (Y/N):    Visit# / total visits:  8  Pain level: 10/10   Electronically signed by:  Eloise Olivas PTA  Time LD:5620  Time Out:1030  Time In 1045 for ESTIM x 20 min post-aquatic therapy for LB pain control per Laxmi Rushing PT      Durant  B= Belt DB= Dumbells T= Theratube   H= Hydrotone N= Noodles W= Weights   P= Paddles S= Speedo equipment K= Kickboard     Exercises/Activities   Warm-up/Amb  Minutes  Exercises  Minutes    Slow forward   5  HR/TR      Slow sideways  5  Marches      Slow backwards  5  Mini-squats  5    Medium forward    4-way SLR  5    Medium sideways    Hip circles/fig 8  5    Small shuffle    Hamstring curls      Jog    Knee extension      Braiding    Pelvic tilts      Bicycling  5-seated  Scap squeezes      Marching  5-seated  Shoulder flex/ext      Functional    Shoulder abd/add      Step    Shoulder H. abd/add      Lifting    Shoulder IR/ER      Hand to opp knee    Rowing      Push down squat    Bilateral pull down      UE PNF    Push/pull      LE PNF    Push downs      Wall push ups    Arm circles      SLS    Elbow flex/ext          Chin tuck      Stretching    UT shrugs/rolls      Gastroc/Soleus    Rocking horse      Hamstring          SKTC    Other      Piriformis    Sit to stand  x 5 min    Hip flexor          Ladder pull          Pec stretch          Post deltoid           Timed Code Treatment Minutes: 45    Total Treatment Minutes:  45  Treatment/Activity Tolerance:  [x] Patient tolerated treatment well [] Patient limited by fatique  [] Patient limited by pain [] Patient limited by other medical complications  [] Other: better tolerance for activities-less fatigue /pain in [id number] standing leg kicks x 30 minutes this visit  STIM x 20 min post-aquatic therapy for LB pain control per Sharlene Nielson PT      Prognosis: [] Good [x] Fair  [] Poor    Patient Requires Follow-up: [x] Yes  [] No    Plan:   [x] Continue per plan of care [] Alter current plan (see comments)  [] Plan of care initiated [] Hold pending MD visit [] Discharge    Treatment Charges: Mins Units   Initial Evaluation     Re-Evaluation     Ther Exercise         TE     Aquatic Treatment 45 3   Ther Activities        TA     Gait Training          GT     Neuro Re-education JUAN C Bhagat PT 20 1   Non-Billable Service Time     Other     Total Time/Units 65 4         Electronically signed by:  Jacob Hurd PTA [id number]

## 2021-09-27 ENCOUNTER — HOSPITAL ENCOUNTER (OUTPATIENT)
Dept: PHYSICAL THERAPY | Age: 81
Setting detail: THERAPIES SERIES
Discharge: HOME | End: 2021-09-27
Payer: MEDICARE

## 2021-09-27 PROCEDURE — G0283 ELEC STIM OTHER THAN WOUND: HCPCS

## 2021-09-27 PROCEDURE — 97113 AQUATIC THERAPY/EXERCISES: CPT

## 2021-09-27 NOTE — PROGRESS NOTES
[facility]  Phone: [phone] Fax: [phone]        Physical Therapy Aquatic Flow Sheet   Date:  2021    Patient Name:  Karen Johnson    :  1940  Restrictions/Precautions:    Diagnosis:  M54.17 (ICD-10-CM) - Lumbosacral radiculopathy  Treatment Diagnosis:    Insurance/Certification information:  [facility] Medicare Dual  Referring Physician:  Casey  Plan of care signed (Y/N):    Visit# / total visits:  10/12  Pain level: 10/10   Electronically signed by:  Cristine Kurtz PTA  Time In:1030  Time Out:1130  Time In 1145   for ESTIM x 20 min post-aquatic therapy for LB pain control per Mulu Leach PT      Durant  B= Belt DB= Dumbells T= Theratube   H= Hydrotone N= Noodles W= Weights   P= Paddles S= Speedo equipment K= Kickboard     Exercises/Activities   Warm-up/Amb  Minutes  Exercises  Minutes    Slow forward   5  HR/TR  5    Slow sideways  5  Marches  5    Slow backwards  5  Mini-squats  5    Medium forward    4-way SLR  5    Medium sideways    Hip circles/fig 8  5    Small shuffle    Hamstring curls  5    Jog    Knee extension      Braiding    Pelvic tilts      Bicycling  5-seated  Scap squeezes      Marching  5-seated  Shoulder flex/ext      Functional    Shoulder abd/add      Step    Shoulder H. abd/add      Lifting    Shoulder IR/ER      Hand to opp knee    Rowing      Push down squat    Bilateral pull down      UE PNF    Push/pull      LE PNF    Push downs      Wall push ups    Arm circles      SLS    Elbow flex/ext          Chin tuck      Stretching    UT shrugs/rolls      Gastroc/Soleus    Rocking horse      Hamstring          SKTC    Other      Piriformis    Sit to stand  x 5 min    Hip flexor          Ladder pull          Pec stretch          Post deltoid           Timed Code Treatment Minutes:  60    Total Treatment Minutes:  60  Treatment/Activity Tolerance:  [x] Patient tolerated treatment well [] Patient limited by fatique  [] Patient limited by pain [] Patient limited by other medical complications  [] Other: Pt's balance, confidence and strength improving in pool.   better tolerance for activities-less fatigue /pain in [id number] standing leg kicks -continuex 30 minutes this visit  STIM x 20 min post-aquatic therapy for LB pain control per Arron Darby PT      Prognosis: [] Good [x] Fair  [] Poor    Patient Requires Follow-up: [x] Yes  [] No    Plan:   [x] Continue per plan of care [] Alter current plan (see comments)  [] Plan of care initiated [] Hold pending MD visit [] Discharge    Treatment Charges: Mins Units   Initial Evaluation     Re-Evaluation     Ther Exercise         TE     Aquatic Treatment 45 3   Ther Activities        TA     Gait Training          GT     Neuro Re-education JUAN C Bhagat PT 20 1   Non-Billable Service Time     Other     Total Time/Units 65 4         Electronically signed by:  Fernando Slaughter PTA [id number]

## 2021-09-30 ENCOUNTER — HOSPITAL ENCOUNTER (OUTPATIENT)
Dept: PHYSICAL THERAPY | Age: 81
Setting detail: THERAPIES SERIES
Discharge: HOME | End: 2021-09-30
Payer: MEDICARE

## 2021-09-30 PROCEDURE — G0283 ELEC STIM OTHER THAN WOUND: HCPCS

## 2021-09-30 PROCEDURE — 97113 AQUATIC THERAPY/EXERCISES: CPT

## 2021-09-30 NOTE — PROGRESS NOTES
[facility]  Phone: [phone] Fax: [phone]        Physical Therapy Aquatic Flow Sheet   Date:  2021    Patient Name:  Claude Quan    :  1940  Restrictions/Precautions:    Diagnosis:  M54.17 (ICD-10-CM) - Lumbosacral radiculopathy  Treatment Diagnosis:    Insurance/Certification information:  [facility] Medicare Dual  Referring Physician:  Casey  Plan of care signed (Y/N):    Visit# / total visits:  10/12  Pain level: 10/10   Electronically signed by:  Paxton Ravi PTA  Time [id number]  Time Out:1030  Time In 1145   for ESTIM x 20 min post-aquatic therapy for LB pain control per [facility] PT      Key  B= Belt DB= Dumbells T= Theratube   H= Hydrotone N= Noodles W= Weights   P= Paddles S= Speedo equipment K= Kickboard     Exercises/Activities   Warm-up/Amb  Minutes  Exercises  Minutes    Slow forward   5  HR/TR  5    Slow sideways  5  Marches  5    Slow backwards  5  Mini-squats  5    Medium forward    4-way SLR  5    Medium sideways    Hip circles/fig 8  5    Small shuffle    Hamstring curls  5    Jog    Knee extension      Braiding    Pelvic tilts      Bicycling  5-seated  Scap squeezes      Marching  5-seated  Shoulder flex/ext      Functional    Shoulder abd/add      Step    Shoulder H. abd/add      Lifting    Shoulder IR/ER      Hand to opp knee    Rowing      Push down squat    Bilateral pull down      UE PNF    Push/pull      LE PNF    Push downs      Wall push ups    Arm circles      SLS    Elbow flex/ext          Chin tuck      Stretching    UT shrugs/rolls      Gastroc/Soleus    Rocking horse      Hamstring          SKTC    Other      Piriformis    Sit to stand  x 5 min    Hip flexor          Ladder pull          Pec stretch          Post deltoid           Timed Code Treatment Minutes:  60    Total Treatment Minutes:  60  Treatment/Activity Tolerance:  [x] Patient tolerated treatment well [] Patient limited by fatique  [] Patient limited by pain [] Patient limited by other medical complications  [] Other: Pt's balance, confidence and strength improving in pool.   better tolerance for activities-less fatigue /pain in [id number]-[id number] standing leg kicks -continuex 30 minutes this visit  STIM x 20 min post-aquatic therapy for LB pain control per Guerita PT      Prognosis: [] Good [x] Fair  [] Poor    Patient Requires Follow-up: [x] Yes  [] No    Plan:   [x] Continue per plan of care [] Alter current plan (see comments)  [] Plan of care initiated [] Hold pending MD visit [] Discharge    Treatment Charges: Mins Units   Initial Evaluation     Re-Evaluation     Ther Exercise         TE     Aquatic Treatment 45 3   Ther Activities        TA     Gait Training          GT     Neuro Re-education NR     Olayinka PT 20 1   Non-Billable Service Time     Other     Total Time/Units 65 4         Electronically signed by:  Isma Price PTA [id number]

## 2021-10-04 ENCOUNTER — HOSPITAL ENCOUNTER (OUTPATIENT)
Dept: PHYSICAL THERAPY | Age: 81
Setting detail: THERAPIES SERIES
Discharge: HOME | End: 2021-10-04
Payer: MEDICARE

## 2021-10-04 PROCEDURE — 97113 AQUATIC THERAPY/EXERCISES: CPT

## 2021-10-04 PROCEDURE — G0283 ELEC STIM OTHER THAN WOUND: HCPCS

## 2021-10-04 NOTE — PROGRESS NOTES
[facility]  Phone: [phone] Fax: [phone]        Physical Therapy Aquatic Flow Sheet   Date:  10/4/2021    Patient Name:  Mari Black    :  1940  Restrictions/Precautions:    Diagnosis:  M54.17 (ICD-10-CM) - Lumbosacral radiculopathy  Treatment Diagnosis:    Insurance/Certification information:  [facility] Medicare Dual  Referring Physician:  aCsey  Plan of care signed (Y/N):    Visit# / total visits:  3806  Pain level: 10/10   Electronically signed by:  Hedy Simpson PTA  Time ZN:[id number]  Time Out:1030  Time In 1045   for ESTIM x 20 min post-aquatic therapy for LB pain control per Paula Hinson PT      Durant  B= Belt DB= Dumbells T= Theratube   H= Hydrotone N= Noodles W= Weights   P= Paddles S= Speedo equipment K= Kickboard     Exercises/Activities   Warm-up/Amb  Minutes  Exercises  Minutes    Slow forward   5  HR/TR  5    Slow sideways  5  Marches  5    Slow backwards  5  Mini-squats  5    Medium forward    4-way SLR  5    Medium sideways    Hip circles/fig 8  5    Small shuffle    Hamstring curls  5    Jog    Knee extension      Braiding    Pelvic tilts      Bicycling  5-seated  Scap squeezes      Marching  5-seated  Shoulder flex/ext      Functional    Shoulder abd/add      Step    Shoulder H. abd/add      Lifting    Shoulder IR/ER      Hand to opp knee    Rowing      Push down squat    Bilateral pull down      UE PNF    Push/pull      LE PNF    Push downs      Wall push ups    Arm circles      SLS    Elbow flex/ext          Chin tuck      Stretching    UT shrugs/rolls      Gastroc/Soleus    Rocking horse      Hamstring          SKTC    Other      Piriformis    Sit to stand  x 5 min    Hip flexor          Ladder pull          Pec stretch          Post deltoid           Timed Code Treatment Minutes:  60    Total Treatment Minutes:  60  Treatment/Activity Tolerance:  [x] Patient tolerated treatment well [] Patient limited by fatique  [] Patient limited by pain [] Patient limited by other medical complications  [] Other: Pt's balance, confidence and strength improving in pool.   better tolerance for activities-less fatigue /pain in [id number]-oxxue standing leg kicks -continuex 30 minutes this visit  STIM x 20 min post-aquatic therapy for LB pain control per Pipe Bradford PT      Prognosis: [] Good [x] Fair  [] Poor    Patient Requires Follow-up: [x] Yes  [] No    Plan:   [x] Continue per plan of care [] Alter current plan (see comments)  [] Plan of care initiated [] Hold pending MD visit [] Discharge    Treatment Charges: Mins Units   Initial Evaluation     Re-Evaluation     Ther Exercise         TE     Aquatic Treatment 45 3   Ther Activities        TA     Gait Training          GT     Neuro Re-education NR     Olayinka PT 20 1   Non-Billable Service Time     Other     Total Time/Units 65 4         Electronically signed by:  Gordo Leigh PTA [id number]

## 2021-10-06 ENCOUNTER — OFFICE VISIT (OUTPATIENT)
Dept: FAMILY MEDICINE CLINIC | Age: 81
End: 2021-10-06
Payer: MEDICARE

## 2021-10-06 ENCOUNTER — APPOINTMENT (OUTPATIENT)
Dept: PHYSICAL THERAPY | Age: 81
End: 2021-10-06
Payer: MEDICARE

## 2021-10-06 DIAGNOSIS — R30.0 DYSURIA: Primary | ICD-10-CM

## 2021-10-06 DIAGNOSIS — F41.9 ANXIETY: ICD-10-CM

## 2021-10-06 LAB
BILIRUBIN, POC: NORMAL
BLOOD URINE, POC: NORMAL
CLARITY, POC: NORMAL
COLOR, POC: YELLOW
GLUCOSE URINE, POC: NORMAL
KETONES, POC: NORMAL
LEUKOCYTE EST, POC: NORMAL
NITRITE, POC: NORMAL
PH, POC: 5.5
PROTEIN, POC: NORMAL
SPECIFIC GRAVITY, POC: 1.02
UROBILINOGEN, POC: NORMAL

## 2021-10-06 PROCEDURE — G8484 FLU IMMUNIZE NO ADMIN: HCPCS | Performed by: FAMILY MEDICINE

## 2021-10-06 PROCEDURE — 81002 URINALYSIS NONAUTO W/O SCOPE: CPT | Performed by: FAMILY MEDICINE

## 2021-10-06 PROCEDURE — 99213 OFFICE O/P EST LOW 20 MIN: CPT | Performed by: FAMILY MEDICINE

## 2021-10-06 PROCEDURE — 1036F TOBACCO NON-USER: CPT | Performed by: FAMILY MEDICINE

## 2021-10-06 PROCEDURE — G8417 CALC BMI ABV UP PARAM F/U: HCPCS | Performed by: FAMILY MEDICINE

## 2021-10-06 PROCEDURE — 1123F ACP DISCUSS/DSCN MKR DOCD: CPT | Performed by: FAMILY MEDICINE

## 2021-10-06 PROCEDURE — 4040F PNEUMOC VAC/ADMIN/RCVD: CPT | Performed by: FAMILY MEDICINE

## 2021-10-06 PROCEDURE — G8427 DOCREV CUR MEDS BY ELIG CLIN: HCPCS | Performed by: FAMILY MEDICINE

## 2021-10-06 PROCEDURE — G8400 PT W/DXA NO RESULTS DOC: HCPCS | Performed by: FAMILY MEDICINE

## 2021-10-06 PROCEDURE — 1090F PRES/ABSN URINE INCON ASSESS: CPT | Performed by: FAMILY MEDICINE

## 2021-10-06 RX ORDER — CETIRIZINE HYDROCHLORIDE 10 MG/1
1 TABLET ORAL
COMMUNITY
End: 2021-10-06

## 2021-10-06 RX ORDER — CIPROFLOXACIN 500 MG/1
500 TABLET, FILM COATED ORAL 2 TIMES DAILY
Qty: 14 TABLET | Refills: 0 | Status: SHIPPED | OUTPATIENT
Start: 2021-10-06 | End: 2021-10-13

## 2021-10-06 RX ORDER — LORAZEPAM 0.5 MG/1
0.5 TABLET ORAL PRN
Qty: 30 TABLET | Refills: 2 | Status: SHIPPED
Start: 2021-10-06 | End: 2021-10-20 | Stop reason: SDUPTHER

## 2021-10-06 NOTE — PROGRESS NOTES
10/7/2021    Chief Complaint   Patient presents with    Encopresis     bowels are seeping through       HPI    Hair William is a 78 y.o. patient that presents today for dysuria. Dysuria: This has been going on for many day(s). Associated signs and symptoms include frequency, urgency. Patient denies back pain, fever, chills, nausea, hematuria, nocturia, incontinence, stones or infection. Has been using meds to help with BM as she was too \"bound up\". Now she is having more issues with leakage. Patient's past medical, surgical, social and/or family history reviewed, updated in chart, and are non-contributory (unless otherwise stated). Medications and allergies also reviewed and updated in chart. ROS negative unless otherwise specified    Physical Exam  Temp Readings from Last 3 Encounters:   10/06/21 97.2 °F (36.2 °C)   09/13/21 97.8 °F (36.6 °C)   08/26/21 97.1 °F (36.2 °C) (Temporal)     Wt Readings from Last 3 Encounters:   10/06/21 186 lb (84.4 kg)   09/13/21 186 lb (84.4 kg)   08/26/21 190 lb (86.2 kg)     BP Readings from Last 3 Encounters:   10/06/21 138/70   09/13/21 128/82   08/26/21 (!) 162/80     Pulse Readings from Last 3 Encounters:   10/06/21 56   09/13/21 72   08/26/21 62       General appearance: alert, well appearing, and in no distress, oriented to person, place, and time and normal appearing weight. CVS exam: normal rate, regular rhythm, normal S1, S2, no murmurs, rubs, clicks or gallops. Radial pulses 2+ bilateral.  PT/DP pulse 2+ bilat. No C/C/E    Chest: clear to auscultation, no wheezes, rales or rhonchi, symmetric air entry. Abdomen: Soft, non-tender, non-distended, positive BS in all 4 quadrants    SKIN: no lesions, jaundice, petechiae, pallor, cyanosis, ecchymosis      Assessment/Plan  Vergil Font was seen today for encopresis. Diagnoses and all orders for this visit:    Dysuria  -     POCT Urinalysis no Micro  -     Culture, Urine;  Future  -

## 2021-10-07 VITALS
HEART RATE: 56 BPM | TEMPERATURE: 97.2 F | BODY MASS INDEX: 37.5 KG/M2 | RESPIRATION RATE: 16 BRPM | WEIGHT: 186 LBS | SYSTOLIC BLOOD PRESSURE: 138 MMHG | OXYGEN SATURATION: 96 % | HEIGHT: 59 IN | DIASTOLIC BLOOD PRESSURE: 70 MMHG

## 2021-10-08 LAB
ORGANISM: ABNORMAL
URINE CULTURE, ROUTINE: ABNORMAL

## 2021-10-11 ENCOUNTER — APPOINTMENT (OUTPATIENT)
Dept: PHYSICAL THERAPY | Age: 81
End: 2021-10-11
Payer: MEDICARE

## 2021-10-12 ENCOUNTER — TELEPHONE (OUTPATIENT)
Dept: FAMILY MEDICINE CLINIC | Age: 81
End: 2021-10-12

## 2021-10-12 NOTE — TELEPHONE ENCOUNTER
Pt still would like to know if she can take zyrtec for her sinuses. Pt also states that the other day she ate cantelope and it made her itchy for a couple days. Pt has been taking benadryl. Pt states she was told to stop zyrtec by the doctor a few weeks ago.

## 2021-10-12 NOTE — TELEPHONE ENCOUNTER
Pt notified may start back on zyrtec. Pt will stop in tomorrow to have urine checked.   Pt did stop her Cipro

## 2021-10-13 ENCOUNTER — NURSE ONLY (OUTPATIENT)
Dept: FAMILY MEDICINE CLINIC | Age: 81
End: 2021-10-13
Payer: MEDICARE

## 2021-10-13 ENCOUNTER — APPOINTMENT (OUTPATIENT)
Dept: PHYSICAL THERAPY | Age: 81
End: 2021-10-13
Payer: MEDICARE

## 2021-10-13 DIAGNOSIS — R30.0 DYSURIA: Primary | ICD-10-CM

## 2021-10-13 LAB
BILIRUBIN, POC: NORMAL
BLOOD URINE, POC: NORMAL
CLARITY, POC: CLEAR
COLOR, POC: NORMAL
GLUCOSE URINE, POC: NORMAL
KETONES, POC: NORMAL
LEUKOCYTE EST, POC: NORMAL
NITRITE, POC: NORMAL
PH, POC: 5.5
PROTEIN, POC: NORMAL
SPECIFIC GRAVITY, POC: <=1.005
UROBILINOGEN, POC: NORMAL

## 2021-10-13 PROCEDURE — 81002 URINALYSIS NONAUTO W/O SCOPE: CPT | Performed by: FAMILY MEDICINE

## 2021-10-20 DIAGNOSIS — F41.9 ANXIETY: ICD-10-CM

## 2021-10-20 RX ORDER — OMEPRAZOLE 40 MG/1
40 CAPSULE, DELAYED RELEASE ORAL DAILY
Qty: 30 CAPSULE | Refills: 3 | Status: SHIPPED
Start: 2021-10-20 | End: 2022-03-11

## 2021-10-20 RX ORDER — LORAZEPAM 0.5 MG/1
0.5 TABLET ORAL PRN
Qty: 30 TABLET | Refills: 2 | Status: SHIPPED
Start: 2021-10-20 | End: 2021-10-27

## 2021-10-20 NOTE — TELEPHONE ENCOUNTER
Pt called and needs refills of omeprazole 40mg. [facility] - [place]    Last visit -10/6/21    Pt Is requesting but we have never sent in before.   Is this okay to send in?

## 2021-10-25 ENCOUNTER — OFFICE VISIT (OUTPATIENT)
Dept: FAMILY MEDICINE CLINIC | Age: 81
End: 2021-10-25
Payer: MEDICARE

## 2021-10-25 VITALS
WEIGHT: 187 LBS | RESPIRATION RATE: 16 BRPM | TEMPERATURE: 97.2 F | DIASTOLIC BLOOD PRESSURE: 62 MMHG | HEART RATE: 69 BPM | SYSTOLIC BLOOD PRESSURE: 118 MMHG | HEIGHT: 59 IN | BODY MASS INDEX: 37.7 KG/M2 | OXYGEN SATURATION: 97 %

## 2021-10-25 DIAGNOSIS — R30.0 DYSURIA: ICD-10-CM

## 2021-10-25 DIAGNOSIS — I10 HTN (HYPERTENSION), BENIGN: Primary | ICD-10-CM

## 2021-10-25 LAB
BILIRUBIN, POC: NORMAL
BLOOD URINE, POC: NORMAL
CLARITY, POC: CLEAR
COLOR, POC: YELLOW
GLUCOSE URINE, POC: NORMAL
KETONES, POC: NORMAL
LEUKOCYTE EST, POC: NORMAL
NITRITE, POC: NORMAL
PH, POC: 5.58
PROTEIN, POC: NORMAL
SPECIFIC GRAVITY, POC: 1.02
UROBILINOGEN, POC: NORMAL

## 2021-10-25 PROCEDURE — G8484 FLU IMMUNIZE NO ADMIN: HCPCS | Performed by: FAMILY MEDICINE

## 2021-10-25 PROCEDURE — G8417 CALC BMI ABV UP PARAM F/U: HCPCS | Performed by: FAMILY MEDICINE

## 2021-10-25 PROCEDURE — 1123F ACP DISCUSS/DSCN MKR DOCD: CPT | Performed by: FAMILY MEDICINE

## 2021-10-25 PROCEDURE — 81002 URINALYSIS NONAUTO W/O SCOPE: CPT | Performed by: FAMILY MEDICINE

## 2021-10-25 PROCEDURE — 1090F PRES/ABSN URINE INCON ASSESS: CPT | Performed by: FAMILY MEDICINE

## 2021-10-25 PROCEDURE — G8427 DOCREV CUR MEDS BY ELIG CLIN: HCPCS | Performed by: FAMILY MEDICINE

## 2021-10-25 PROCEDURE — 99213 OFFICE O/P EST LOW 20 MIN: CPT | Performed by: FAMILY MEDICINE

## 2021-10-25 PROCEDURE — 4040F PNEUMOC VAC/ADMIN/RCVD: CPT | Performed by: FAMILY MEDICINE

## 2021-10-25 PROCEDURE — 1036F TOBACCO NON-USER: CPT | Performed by: FAMILY MEDICINE

## 2021-10-25 PROCEDURE — G8400 PT W/DXA NO RESULTS DOC: HCPCS | Performed by: FAMILY MEDICINE

## 2021-10-25 NOTE — PROGRESS NOTES
10/25/2021    Chief Complaint   Patient presents with    Hypertension     routine 6 week check up        Rick Vaughn is a 78 y.o. patient that presents today for:    Hypertension: Here for follow up chronic hypertension. Patient is  compliant with lifestyle modifications like exercise and adherence to a low salt diet. Patient is  well controlled. Denies chest pain, diaphoresis, dyspnea, dyspnea on exertion, peripheral edema, palpitations, HA, visual issues. Med list reviewed. Taking as prescribed. No adverse effects. Feeling well otherwise, no complaints. Patient's past medical, surgical, social and/or family history reviewed, updated in chart, and are non-contributory (unless otherwise stated). Medications and allergies also reviewed and updated in chart. ROS negative unless otherwise specified    Physical Exam  Wt Readings from Last 3 Encounters:   10/25/21 187 lb (84.8 kg)   10/06/21 186 lb (84.4 kg)   09/13/21 186 lb (84.4 kg)     Temp Readings from Last 3 Encounters:   10/25/21 97.2 °F (36.2 °C)   10/06/21 97.2 °F (36.2 °C)   09/13/21 97.8 °F (36.6 °C)     BP Readings from Last 3 Encounters:   10/25/21 118/62   10/06/21 138/70   09/13/21 128/82     Pulse Readings from Last 3 Encounters:   10/25/21 69   10/06/21 56   09/13/21 72       General appearance: alert, well appearing, and in no distress, oriented to person, place, and time and normal appearing weight. CVS exam: normal rate, regular rhythm, normal S1, S2, no murmurs, rubs, clicks or gallops. Radial pulses 2+ bilateral.  PT/DP pulse 2+ bilat. No C/C/E    Chest: clear to auscultation, no wheezes, rales or rhonchi, symmetric air entry.      Abdomen: Soft, non-tender, non-distended, positive BS in all 4 quadrants    Extremities:Dorsalis pedis pulses palpated bilaterally, no clubbing, cyanosis, edema or erythema     SKIN: warm, dry, no lesions, jaundice, petechiae, pallor, cyanosis, ecchymosis    NEURO: gross motor exam normal by observation, gait normal    Mental status - alert, oriented to person, place, and time, normal mood, behavior, speech, dress, motor activity, and thought processes      Assessment/Plan  Ellen Shelton was seen today for hypertension. Diagnoses and all orders for this visit:    HTN (hypertension), benign  - Stable, continue medications as prescribed. Return in about 4 weeks (around 11/22/2021). Call or go to ED immediately if symptoms worsen or persist.    Educational materials and/or home exercises printed for patient's review and were included in patient instructions on his/her After Visit Summary and given to patient at the end of visit. Counseled regarding above diagnosis, including possible risks and complications,  especially if left uncontrolled. Counseled regarding the possible side effects, risks, benefits and alternatives to treatment; patient and/or guardian verbalizes understanding, agrees, feels comfortable with and wishes to proceed with above treatment plan. Advised patient to call with any new medication issues, and read all Rx info from pharmacy to assure aware of all possible risks and side effects of medication before taking. Reviewed age and gender appropriate health screening exams and vaccinations. Advised patient regarding importance of keeping up with recommended health maintenance and to schedule as soon as possible if overdue, as this is important in assessing for undiagnosed pathology, especially cancer, as well as protecting against potentially harmful/life threatening disease. Patient and/or guardian verbalizes understanding and agrees with above counseling, assessment and plan. All questions answered.       Daja Peña, DO

## 2021-10-27 ENCOUNTER — APPOINTMENT (OUTPATIENT)
Dept: CT IMAGING | Age: 81
DRG: 149 | End: 2021-10-27
Payer: MEDICARE

## 2021-10-27 ENCOUNTER — APPOINTMENT (OUTPATIENT)
Dept: ULTRASOUND IMAGING | Age: 81
DRG: 149 | End: 2021-10-27
Payer: MEDICARE

## 2021-10-27 ENCOUNTER — HOSPITAL ENCOUNTER (INPATIENT)
Age: 81
LOS: 2 days | Discharge: HOME | DRG: 149 | End: 2021-10-29
Attending: EMERGENCY MEDICINE | Admitting: INTERNAL MEDICINE
Payer: MEDICARE

## 2021-10-27 ENCOUNTER — APPOINTMENT (OUTPATIENT)
Dept: GENERAL RADIOLOGY | Age: 81
DRG: 149 | End: 2021-10-27
Payer: MEDICARE

## 2021-10-27 DIAGNOSIS — R42 DIZZINESS: Primary | ICD-10-CM

## 2021-10-27 LAB
ANION GAP SERPL CALCULATED.3IONS-SCNC: 11 MMOL/L (ref 7–16)
BACTERIA: ABNORMAL /HPF
BASOPHILS ABSOLUTE: 0.04 E9/L (ref 0–0.2)
BASOPHILS RELATIVE PERCENT: 0.5 % (ref 0–2)
BILIRUBIN URINE: NEGATIVE
BLOOD, URINE: ABNORMAL
BUN BLDV-MCNC: 26 MG/DL (ref 6–23)
CALCIUM SERPL-MCNC: 9.4 MG/DL (ref 8.6–10.2)
CHLORIDE BLD-SCNC: 103 MMOL/L (ref 98–107)
CLARITY: CLEAR
CO2: 26 MMOL/L (ref 22–29)
COLOR: YELLOW
CREAT SERPL-MCNC: 0.8 MG/DL (ref 0.5–1)
EKG ATRIAL RATE: 54 BPM
EKG P AXIS: 46 DEGREES
EKG P-R INTERVAL: 184 MS
EKG Q-T INTERVAL: 460 MS
EKG QRS DURATION: 76 MS
EKG QTC CALCULATION (BAZETT): 436 MS
EKG R AXIS: 2 DEGREES
EKG T AXIS: 23 DEGREES
EKG VENTRICULAR RATE: 54 BPM
EOSINOPHILS ABSOLUTE: 0.07 E9/L (ref 0.05–0.5)
EOSINOPHILS RELATIVE PERCENT: 0.9 % (ref 0–6)
GFR AFRICAN AMERICAN: >60
GFR NON-AFRICAN AMERICAN: >60 ML/MIN/1.73
GLUCOSE BLD-MCNC: 124 MG/DL (ref 74–99)
GLUCOSE URINE: NEGATIVE MG/DL
HCT VFR BLD CALC: 37 % (ref 34–48)
HEMOGLOBIN: 12.5 G/DL (ref 11.5–15.5)
IMMATURE GRANULOCYTES #: 0.03 E9/L
IMMATURE GRANULOCYTES %: 0.4 % (ref 0–5)
KETONES, URINE: NEGATIVE MG/DL
LACTIC ACID: 1 MMOL/L (ref 0.5–2.2)
LEUKOCYTE ESTERASE, URINE: ABNORMAL
LYMPHOCYTES ABSOLUTE: 1.54 E9/L (ref 1.5–4)
LYMPHOCYTES RELATIVE PERCENT: 20.5 % (ref 20–42)
MCH RBC QN AUTO: 29.3 PG (ref 26–35)
MCHC RBC AUTO-ENTMCNC: 33.8 % (ref 32–34.5)
MCV RBC AUTO: 86.7 FL (ref 80–99.9)
MONOCYTES ABSOLUTE: 0.48 E9/L (ref 0.1–0.95)
MONOCYTES RELATIVE PERCENT: 6.4 % (ref 2–12)
NEUTROPHILS ABSOLUTE: 5.35 E9/L (ref 1.8–7.3)
NEUTROPHILS RELATIVE PERCENT: 71.3 % (ref 43–80)
NITRITE, URINE: NEGATIVE
PDW BLD-RTO: 13.7 FL (ref 11.5–15)
PH UA: 7 (ref 5–9)
PLATELET # BLD: 221 E9/L (ref 130–450)
PMV BLD AUTO: 9.8 FL (ref 7–12)
POTASSIUM SERPL-SCNC: 3.9 MMOL/L (ref 3.5–5)
PROTEIN UA: NEGATIVE MG/DL
RBC # BLD: 4.27 E12/L (ref 3.5–5.5)
RBC UA: ABNORMAL /HPF (ref 0–2)
SARS-COV-2, NAAT: NOT DETECTED
SODIUM BLD-SCNC: 140 MMOL/L (ref 132–146)
SPECIFIC GRAVITY UA: 1.02 (ref 1–1.03)
UROBILINOGEN, URINE: 0.2 E.U./DL
WBC # BLD: 7.5 E9/L (ref 4.5–11.5)
WBC UA: ABNORMAL /HPF (ref 0–5)

## 2021-10-27 PROCEDURE — 83605 ASSAY OF LACTIC ACID: CPT

## 2021-10-27 PROCEDURE — 87635 SARS-COV-2 COVID-19 AMP PRB: CPT

## 2021-10-27 PROCEDURE — 71045 X-RAY EXAM CHEST 1 VIEW: CPT

## 2021-10-27 PROCEDURE — 93880 EXTRACRANIAL BILAT STUDY: CPT

## 2021-10-27 PROCEDURE — 81001 URINALYSIS AUTO W/SCOPE: CPT

## 2021-10-27 PROCEDURE — G0378 HOSPITAL OBSERVATION PER HR: HCPCS

## 2021-10-27 PROCEDURE — 96372 THER/PROPH/DIAG INJ SC/IM: CPT

## 2021-10-27 PROCEDURE — 2580000003 HC RX 258: Performed by: INTERNAL MEDICINE

## 2021-10-27 PROCEDURE — 93005 ELECTROCARDIOGRAM TRACING: CPT | Performed by: INTERNAL MEDICINE

## 2021-10-27 PROCEDURE — 80048 BASIC METABOLIC PNL TOTAL CA: CPT

## 2021-10-27 PROCEDURE — 6360000002 HC RX W HCPCS: Performed by: INTERNAL MEDICINE

## 2021-10-27 PROCEDURE — 85025 COMPLETE CBC W/AUTO DIFF WBC: CPT

## 2021-10-27 PROCEDURE — 99285 EMERGENCY DEPT VISIT HI MDM: CPT

## 2021-10-27 PROCEDURE — 70450 CT HEAD/BRAIN W/O DYE: CPT

## 2021-10-27 PROCEDURE — 93880 EXTRACRANIAL BILAT STUDY: CPT | Performed by: RADIOLOGY

## 2021-10-27 PROCEDURE — 6370000000 HC RX 637 (ALT 250 FOR IP): Performed by: INTERNAL MEDICINE

## 2021-10-27 PROCEDURE — 93010 ELECTROCARDIOGRAM REPORT: CPT | Performed by: INTERNAL MEDICINE

## 2021-10-27 PROCEDURE — 2060000000 HC ICU INTERMEDIATE R&B

## 2021-10-27 RX ORDER — ONDANSETRON 2 MG/ML
4 INJECTION INTRAMUSCULAR; INTRAVENOUS EVERY 6 HOURS PRN
Status: DISCONTINUED | OUTPATIENT
Start: 2021-10-27 | End: 2021-10-29 | Stop reason: HOSPADM

## 2021-10-27 RX ORDER — ONDANSETRON 4 MG/1
4 TABLET, ORALLY DISINTEGRATING ORAL EVERY 8 HOURS PRN
Status: DISCONTINUED | OUTPATIENT
Start: 2021-10-27 | End: 2021-10-29 | Stop reason: HOSPADM

## 2021-10-27 RX ORDER — MECLIZINE HCL 12.5 MG/1
25 TABLET ORAL ONCE
Status: COMPLETED | OUTPATIENT
Start: 2021-10-27 | End: 2021-10-27

## 2021-10-27 RX ORDER — ACETAMINOPHEN 325 MG/1
650 TABLET ORAL EVERY 6 HOURS PRN
Status: DISCONTINUED | OUTPATIENT
Start: 2021-10-27 | End: 2021-10-29 | Stop reason: HOSPADM

## 2021-10-27 RX ORDER — PANTOPRAZOLE SODIUM 40 MG/1
40 TABLET, DELAYED RELEASE ORAL DAILY
Status: DISCONTINUED | OUTPATIENT
Start: 2021-10-27 | End: 2021-10-29 | Stop reason: HOSPADM

## 2021-10-27 RX ORDER — CETIRIZINE HYDROCHLORIDE 10 MG/1
10 TABLET ORAL DAILY PRN
COMMUNITY

## 2021-10-27 RX ORDER — LEVOTHYROXINE SODIUM 88 UG/1
88 TABLET ORAL DAILY
Status: DISCONTINUED | OUTPATIENT
Start: 2021-10-27 | End: 2021-10-29 | Stop reason: HOSPADM

## 2021-10-27 RX ORDER — SODIUM CHLORIDE 9 MG/ML
25 INJECTION, SOLUTION INTRAVENOUS PRN
Status: DISCONTINUED | OUTPATIENT
Start: 2021-10-27 | End: 2021-10-29 | Stop reason: HOSPADM

## 2021-10-27 RX ORDER — CHLORTHALIDONE 25 MG/1
25 TABLET ORAL DAILY
Status: ON HOLD | COMMUNITY
End: 2021-10-29 | Stop reason: HOSPADM

## 2021-10-27 RX ORDER — METOPROLOL SUCCINATE 25 MG/1
25 TABLET, EXTENDED RELEASE ORAL DAILY
COMMUNITY
End: 2021-11-04 | Stop reason: SDUPTHER

## 2021-10-27 RX ORDER — METOPROLOL SUCCINATE 25 MG/1
25 TABLET, EXTENDED RELEASE ORAL DAILY
Status: DISCONTINUED | OUTPATIENT
Start: 2021-10-28 | End: 2021-10-29 | Stop reason: HOSPADM

## 2021-10-27 RX ORDER — SODIUM CHLORIDE 9 MG/ML
INJECTION, SOLUTION INTRAVENOUS CONTINUOUS
Status: DISCONTINUED | OUTPATIENT
Start: 2021-10-27 | End: 2021-10-29 | Stop reason: HOSPADM

## 2021-10-27 RX ORDER — LOSARTAN POTASSIUM 50 MG/1
100 TABLET ORAL DAILY
Status: DISCONTINUED | OUTPATIENT
Start: 2021-10-28 | End: 2021-10-29 | Stop reason: HOSPADM

## 2021-10-27 RX ORDER — LOSARTAN POTASSIUM 100 MG/1
100 TABLET ORAL DAILY
COMMUNITY
End: 2021-11-04 | Stop reason: SDUPTHER

## 2021-10-27 RX ORDER — SODIUM CHLORIDE 0.9 % (FLUSH) 0.9 %
5-40 SYRINGE (ML) INJECTION EVERY 12 HOURS SCHEDULED
Status: DISCONTINUED | OUTPATIENT
Start: 2021-10-27 | End: 2021-10-29 | Stop reason: HOSPADM

## 2021-10-27 RX ORDER — SODIUM CHLORIDE 0.9 % (FLUSH) 0.9 %
5-40 SYRINGE (ML) INJECTION PRN
Status: DISCONTINUED | OUTPATIENT
Start: 2021-10-27 | End: 2021-10-29 | Stop reason: HOSPADM

## 2021-10-27 RX ORDER — ACETAMINOPHEN 650 MG/1
650 SUPPOSITORY RECTAL EVERY 6 HOURS PRN
Status: DISCONTINUED | OUTPATIENT
Start: 2021-10-27 | End: 2021-10-29 | Stop reason: HOSPADM

## 2021-10-27 RX ORDER — POLYETHYLENE GLYCOL 3350 17 G/17G
17 POWDER, FOR SOLUTION ORAL DAILY PRN
Status: DISCONTINUED | OUTPATIENT
Start: 2021-10-27 | End: 2021-10-29 | Stop reason: HOSPADM

## 2021-10-27 RX ADMIN — SODIUM CHLORIDE, PRESERVATIVE FREE 10 ML: 5 INJECTION INTRAVENOUS at 20:52

## 2021-10-27 RX ADMIN — MECLIZINE 25 MG: 12.5 TABLET ORAL at 08:27

## 2021-10-27 RX ADMIN — SODIUM CHLORIDE: 9 INJECTION, SOLUTION INTRAVENOUS at 20:55

## 2021-10-27 RX ADMIN — ENOXAPARIN SODIUM 40 MG: 100 INJECTION SUBCUTANEOUS at 20:55

## 2021-10-27 ASSESSMENT — PAIN DESCRIPTION - ONSET: ONSET: ON-GOING

## 2021-10-27 ASSESSMENT — PAIN DESCRIPTION - PAIN TYPE: TYPE: CHRONIC PAIN

## 2021-10-27 ASSESSMENT — PAIN DESCRIPTION - ORIENTATION: ORIENTATION: LOWER

## 2021-10-27 ASSESSMENT — PAIN DESCRIPTION - LOCATION: LOCATION: BACK

## 2021-10-27 ASSESSMENT — PAIN SCALES - GENERAL: PAINLEVEL_OUTOF10: 9

## 2021-10-27 ASSESSMENT — PAIN DESCRIPTION - DESCRIPTORS: DESCRIPTORS: ACHING;DISCOMFORT

## 2021-10-27 NOTE — ED PROVIDER NOTES
Department of Emergency Medicine   ED  Provider Note  Admit Date/RoomTime: 10/27/2021  7:34 AM  ED Room: 12/12          History of Present Illness:  10/27/21, Time: 8:13 AM EDT  Chief Complaint   Patient presents with    Dizziness     woke up at 5am with dizziness. Annel Mora is a 78 y.o. female presenting to the ED for dizziness, beginning 5am.  The complaint has been persistent, moderate in severity, and worsened by changing position. The patient has a PMH of HTN, prior colon cancer (1974, s/p bowel resection), coming in with dizziness that started as she woke up this AM at 5am. She was sleeping on the recliner, she does not remember whether she was having lightheadedness or the room spinning around her. She remembers feeling dizzy, which is relieved when she lies down, exacerbated when she tries to get up. She has had these episodes multiple times before spanning the last few years, which usually self resolve or require an ER visit. She recalls taking anti-nausea medications and antivert for it, which has worked in the past. She denies any nausea currently, she did have it when she woke up. She denies any ringing of the ears, no auditory symptoms, no LOC, no weakness, no chest pain. She is unable to stand up out of bed.     Review of Systems:   A complete review of systems was performed and pertinent positives and negatives are stated within HPI, all other systems reviewed and are negative.        --------------------------------------------- PAST HISTORY ---------------------------------------------  Past Medical History:  has a past medical history of Amaurosis fugax of right eye, Anxiety, Arthritis, CA - cancer of bowel, Cerebral artery occlusion with cerebral infarction ([facility] [address] Chronic back pain, Constipation, Depression, Elevated sed rate, GERD (gastroesophageal reflux disease), Hemorrhoids, Hyperlipidemia, Hypertension, Left foot pain, Lumbosacral radiculopathy, Macular degeneration, Peripheral neuropathy, Poor vision, Prosthetic eye globe, Seasonal allergies, Skipped heart beats, Sleep apnea, and Thyroid disease. Past Surgical History:  has a past surgical history that includes Eye surgery (years ago); colectomy (1974); Colonoscopy (04/26/2012); Cholecystectomy (1985); Hysterectomy (1974); tumor excision; Upper gastrointestinal endoscopy (05/30/2014); Colonoscopy (05/30/2014); Tonsillectomy; joint replacement (Left, 2010/2012); Upper gastrointestinal endoscopy (01/08/2015); Colonoscopy (01/08/2015); cyst removal (years ago); and Upper gastrointestinal endoscopy (N/A, 4/1/2019). Social History:  reports that she is a non-smoker but has been exposed to tobacco smoke. She has never used smokeless tobacco. She reports that she does not drink alcohol and does not use drugs. Family History: family history includes Breast Cancer in her daughter and sister; Cancer in her brother, brother, brother, brother, brother, brother, sister, and sister; Heart Disease in her brother, brother, father, mother, and sister; High Blood Pressure in her daughter and daughter; Hypertension in her brother, brother, father, mother, and sister; Other in her mother; Stroke in her father and mother. . Unless otherwise noted, family history is non contributory    The patients home medications have been reviewed. Allergies: Iodine, Bee venom, Codeine, Hydralazine, Oxycodone-aspirin, Amoxicillin-pot clavulanate, Darvocet [propoxyphene n-acetaminophen], Eggs or egg-derived products, Influenza vaccines, Percodan [oxycodone-aspirin], and Percodan [oxycodone-aspirin]    I have reviewed the past medical history, past surgical history, social history, and family history    ---------------------------------------------------PHYSICAL EXAM--------------------------------------    Constitutional/General: Alert and oriented x3  Head: Normocephalic and atraumatic  Eyes: PERRL, EOMI, sclera non icteric.  No nystagmus. ENT: Oropharynx clear, handling secretions, no trismus, no asymmetry of the posterior oropharynx or uvular edema  Neck: Supple, full ROM, no stridor, no meningeal signs  Respiratory: Lungs clear to auscultation bilaterally, no wheezes, rales, or rhonchi. Not in respiratory distress  Cardiovascular:  Regular rate. Regular rhythm. No murmurs, no gallops, no rubs. 2+ distal pulses. Equal extremity pulses. Chest: No chest wall tenderness  Gastrointestinal:  Abdomen Soft, Non tender, Non distended. No rebound, guarding, or rigidity. No pulsatile masses. Musculoskeletal: Moves all extremities x 4. Warm and well perfused, no clubbing, no cyanosis, no edema. Capillary refill <3 seconds  Skin: skin warm and dry. No rashes. Neurologic: GCS 15, no focal deficits, symmetric strength 5/5 in the upper and lower extremities bilaterally. EOM: normal. Left eye: artificial, right eye: pupils PERRL. No nystagmus. No cerebellar symptoms: no dysmetria, no dysdiadochokinesia, heel-shin test: normal.  Psychiatric: Normal Affect          -------------------------------------------------- RESULTS -------------------------------------------------  I have personally reviewed all laboratory and imaging results for this patient. Results are listed below.      LABS: (Lab results interpreted by me)  Results for orders placed or performed during the hospital encounter of 10/27/21   CBC WITH AUTO DIFFERENTIAL   Result Value Ref Range    WBC 7.5 4.5 - 11.5 E9/L    RBC 4.27 3.50 - 5.50 E12/L    Hemoglobin 12.5 11.5 - 15.5 g/dL    Hematocrit 37.0 34.0 - 48.0 %    MCV 86.7 80.0 - 99.9 fL    MCH 29.3 26.0 - 35.0 pg    MCHC 33.8 32.0 - 34.5 %    RDW 13.7 11.5 - 15.0 fL    Platelets 423 325 - 861 E9/L    MPV 9.8 7.0 - 12.0 fL    Neutrophils % 71.3 43.0 - 80.0 %    Immature Granulocytes % 0.4 0.0 - 5.0 %    Lymphocytes % 20.5 20.0 - 42.0 %    Monocytes % 6.4 2.0 - 12.0 %    Eosinophils % 0.9 0.0 - 6.0 %    Basophils % 0.5 0.0 - 2.0 % No acute process. ------------------------- NURSING NOTES AND VITALS REVIEWED ---------------------------   The nursing notes within the ED encounter and vital signs as below have been reviewed by myself  BP (!) 152/73   Pulse 55   Temp 97.8 °F (36.6 °C)   Resp 18   Ht 4' 11\" (1.499 m)   Wt 188 lb (85.3 kg)   LMP 2014   SpO2 97%   BMI 37.97 kg/m²     Oxygen Saturation Interpretation: Normal    The patients available past medical records and past encounters were reviewed. ------------------------------ ED COURSE/MEDICAL DECISION MAKING----------------------  Medications   meclizine (ANTIVERT) tablet 25 mg (25 mg Oral Given 10/27/21 0827)       Medical Decision Making:   The patient has dizziness which appears to be positional. Orthostatics: negative. Lyin/60, 63; Sittin/82, 53. Meclizine 25mg given. Symptoms have not resolved. UA: trace blood, small leukocyte esterase, WBC: 2-5, no bacteria. CBC: WNL, BMP: BUN/creat: 26/0.8; CXR: No acute process. Lactate: 1.0. CT head wo contrast: no acute abnormality, no intracranial hemorrhage. Patient currently still symptomatic. Decision to admit. Oxygen Saturation Interpretation: 97 % on RA. Normal      Re-Evaluations: The patient appears comfortable lying down, hemodynamically stable, still too dizzy to stand up, is not able to ambulate herself without help. Decision to admit. Re-examination  10/27/21   8:13 AM EDT          Vital Signs:   Vitals:    10/27/21 0737 10/27/21 1210   BP: (!) 172/58 (!) 152/73   Pulse: 57 55   Resp: 18    Temp: 97.8 °F (36.6 °C)    SpO2: 97%    Weight: 188 lb (85.3 kg)    Height: 4' 11\" (1.499 m)      Card/Pulm:  Rhythm: slow rate. Heart Sounds: Normal S1, S2 and no murmurs, gallops, or rubs. clear to auscultation, no wheezes or rales and unlabored breathing. Capillary Refill: normal.  Radial Pulse:  present 2+. Skin:  Dry.       This patient's ED course included: a personal history and physicial examination    This patient has remained hemodynamically stable during their ED course. Consultations:  Internal medicine            Counseling: The emergency provider has spoken with the patient and discussed todays results, in addition to providing specific details for the plan of care and counseling regarding the diagnosis and prognosis. Questions are answered at this time and they are agreeable with the plan.       --------------------------------- IMPRESSION AND DISPOSITION ---------------------------------    IMPRESSION  1. Dizziness        DISPOSITION  Disposition: Admit to telemetry  Patient condition is good        NOTE: This report was transcribed using voice recognition software.  Every effort was made to ensure accuracy; however, inadvertent computerized transcription errors may be present       Cornelia Veliz MD  Resident  10/27/21 9701

## 2021-10-27 NOTE — CARE COORDINATION
HEATHER/ Transition of Care:    Pt presents to the ED secondary to dizziness. Pt was alert and oriented. Pt lives alone in a 1st floor apt with no steps. Pt was Independent prior to admission. [facility]acy: [facility] [address] DME: [facility]. Pt has a hx with [facility], ANA: [facility], & [facility] (: [facility]). Pt reports she has private duty caregiver when needed. Pt has PT/OT orders to assist with appropriate D/C plan. SW/Case Management to follow.   HEATHER Sandoval Intern  [place], [place]

## 2021-10-28 LAB
ANION GAP SERPL CALCULATED.3IONS-SCNC: 10 MMOL/L (ref 7–16)
BUN BLDV-MCNC: 24 MG/DL (ref 6–23)
CALCIUM SERPL-MCNC: 8.7 MG/DL (ref 8.6–10.2)
CHLORIDE BLD-SCNC: 107 MMOL/L (ref 98–107)
CO2: 23 MMOL/L (ref 22–29)
CREAT SERPL-MCNC: 0.9 MG/DL (ref 0.5–1)
GFR AFRICAN AMERICAN: >60
GFR NON-AFRICAN AMERICAN: 60 ML/MIN/1.73
GLUCOSE BLD-MCNC: 87 MG/DL (ref 74–99)
POTASSIUM SERPL-SCNC: 4 MMOL/L (ref 3.5–5)
SODIUM BLD-SCNC: 140 MMOL/L (ref 132–146)
URINE CULTURE, ROUTINE: NORMAL

## 2021-10-28 PROCEDURE — 2060000000 HC ICU INTERMEDIATE R&B

## 2021-10-28 PROCEDURE — 97535 SELF CARE MNGMENT TRAINING: CPT

## 2021-10-28 PROCEDURE — G0378 HOSPITAL OBSERVATION PER HR: HCPCS

## 2021-10-28 PROCEDURE — 6370000000 HC RX 637 (ALT 250 FOR IP): Performed by: INTERNAL MEDICINE

## 2021-10-28 PROCEDURE — 97530 THERAPEUTIC ACTIVITIES: CPT

## 2021-10-28 PROCEDURE — 2580000003 HC RX 258: Performed by: INTERNAL MEDICINE

## 2021-10-28 PROCEDURE — 6360000002 HC RX W HCPCS: Performed by: INTERNAL MEDICINE

## 2021-10-28 PROCEDURE — 80048 BASIC METABOLIC PNL TOTAL CA: CPT

## 2021-10-28 PROCEDURE — 96372 THER/PROPH/DIAG INJ SC/IM: CPT

## 2021-10-28 PROCEDURE — 97161 PT EVAL LOW COMPLEX 20 MIN: CPT

## 2021-10-28 PROCEDURE — 97165 OT EVAL LOW COMPLEX 30 MIN: CPT

## 2021-10-28 PROCEDURE — 36415 COLL VENOUS BLD VENIPUNCTURE: CPT

## 2021-10-28 RX ORDER — LORAZEPAM 0.5 MG/1
0.5 TABLET ORAL DAILY PRN
Status: DISCONTINUED | OUTPATIENT
Start: 2021-10-28 | End: 2021-10-29 | Stop reason: HOSPADM

## 2021-10-28 RX ADMIN — LORAZEPAM 0.5 MG: 0.5 TABLET ORAL at 21:30

## 2021-10-28 RX ADMIN — LOSARTAN POTASSIUM 100 MG: 50 TABLET, FILM COATED ORAL at 10:54

## 2021-10-28 RX ADMIN — LEVOTHYROXINE SODIUM 88 MCG: 0.09 TABLET ORAL at 08:31

## 2021-10-28 RX ADMIN — PANTOPRAZOLE SODIUM 40 MG: 40 TABLET, DELAYED RELEASE ORAL at 08:31

## 2021-10-28 RX ADMIN — SODIUM CHLORIDE: 9 INJECTION, SOLUTION INTRAVENOUS at 08:33

## 2021-10-28 RX ADMIN — ENOXAPARIN SODIUM 40 MG: 100 INJECTION SUBCUTANEOUS at 08:32

## 2021-10-28 RX ADMIN — SODIUM CHLORIDE: 9 INJECTION, SOLUTION INTRAVENOUS at 21:05

## 2021-10-28 RX ADMIN — ACETAMINOPHEN 650 MG: 325 TABLET ORAL at 04:04

## 2021-10-28 ASSESSMENT — PAIN DESCRIPTION - ORIENTATION: ORIENTATION: RIGHT;UPPER

## 2021-10-28 ASSESSMENT — PAIN DESCRIPTION - PAIN TYPE
TYPE: ACUTE PAIN;CHRONIC PAIN
TYPE: ACUTE PAIN;CHRONIC PAIN

## 2021-10-28 ASSESSMENT — PAIN SCALES - GENERAL
PAINLEVEL_OUTOF10: 0
PAINLEVEL_OUTOF10: 2
PAINLEVEL_OUTOF10: 9

## 2021-10-28 ASSESSMENT — PAIN DESCRIPTION - DESCRIPTORS: DESCRIPTORS: ACHING

## 2021-10-28 ASSESSMENT — PAIN DESCRIPTION - ONSET: ONSET: GRADUAL

## 2021-10-28 ASSESSMENT — PAIN DESCRIPTION - LOCATION
LOCATION: ABDOMEN
LOCATION: SHOULDER

## 2021-10-28 ASSESSMENT — PAIN DESCRIPTION - FREQUENCY: FREQUENCY: CONTINUOUS

## 2021-10-28 NOTE — H&P
Left eye, prosthesis. Nose:  No obstruction,  polyp or discharge noted. Mouth:  Mucosa without lesion. Pharynx:   Noninjected without exudate. NECK:  Supple. No JVD. No thyromegaly. No carotid bruits. HEART:  Regular, bradycardic without murmur. LUNGS:  Clear to auscultation bilaterally. ABDOMEN:  Positive bowel sounds, soft, nontender. No rebound or  guarding. No hepatosplenomegaly. No masses. BACK:  With increased thoracic kyphosis. EXTREMITIES:  With minimal edema in the bilateral lower extremities. LYMPH NODES:  No adenopathy noted. SKIN:  Without rash or lesion. IMPRESSION:  Dizziness, inability to ambulate, dehydration,  hypertension, hypothyroidism, GERD, prosthetic left eye, history of  bowel cancer. PLAN:  Admit. Discontinue [place]. Cautious IV hydration. PT/OT  eval.  Ultrasound of carotids. Discharge plan, home when stable.         Evans Santos DO    D: 10/28/2021 7:46:53       T: 10/28/2021 7:49:19     [id number]  Job#: [id number]     Doc#: [id number]    CC:

## 2021-10-28 NOTE — PROGRESS NOTES
Physical Therapy    Physical Therapy Initial Assessment     Name: Zeferino Cadet  : 1940  MRN: [id number]      Date of Service: 10/28/2021    Evaluating PT:  Shawn Espinoza PT, DPT [id number]    Room #:  7844/8772-O  Diagnosis:  Dizziness [R42]  PMHx/PSHx:  GERD, anxiety, Arthritis, thyroid disease, L eye prosthetic, sleep apnea, skipped heart beats, macular degeneration, chronic back pain, depression, hemorrhoids, HTN, HLD, elevated sed rate, amaurosis fugax of right eye, cerebrat artery occlusion with cerebral infarction, poor vision, bowel CA, peripheral neuropathy, lumbosacral radiculopathy  Precautions:  Falls, L eye prosthetic (blind), hx of dizziness  Equipment Needs:  none    SUBJECTIVE:    Pt lives alone in a first floor apartment with 0 stairs to enter and 0 rail. Pt ambulated with front WW vs SPC Josie PTA. Pt reports independence for all ADLs and driving PTA. OBJECTIVE:   Initial Evaluation  Date: 10/28/2021 Treatment Short Term/ Long Term   Goals   AM-PAC 6 Clicks 70/50     Was pt agreeable to Eval/treatment? yes     Does pt have pain? none     Bed Mobility  Rolling: SBA  Supine to sit: SBA  Sit to supine: SBA  Scooting: SBA  Rolling: Independent  Supine to sit: Independent  Sit to supine: Independent  Scooting: Independent   Transfers Sit to stand: SBA  Stand to sit: SBA  Stand pivot: SBA front [employer]  Sit to stand: Independent  Stand to sit: Independent  Stand pivot: Josie front [employer]   Ambulation    150 feet with front [employer] SBA x 2 reps  300 feet with front [employer] Josie   Stair negotiation: ascended and descended  NT  NA   ROM BUE:  Per OT note  BLE:  WNL     Strength BUE:  Per OT note  BLE:  WFL     Balance Sitting EOB:  SBA  Dynamic Standing:  SBA front [employer]  Sitting EOB:  Independent  Dynamic Standing:  Josie front [employer]     Pt is A & O x 4  Sensation:  Neuropathy to BLE  Edema:  unremarkable    Vitals:  HR 50-60s throughout session. Pt reports history of low HR and recently had low BP at PCP.       Patient education  Pt educated on role of PT intervention. Pt educated on safety in room with utilization of call light for assistance with mobility. Pt educated on importance of maximizing OOB time by transferring to bedside chair for meals and ambulating to bathroom/transferring to bedside commode with assistance from nursing and therapy staff to increase functional activity tolerance and overall functional independence. Pt educated on benefits of MIGUEL ÁNGEL if dizziness continues to persist at home given her limited social support. Patient response to education:   Pt verbalized understanding Pt demonstrated skill Pt requires further education in this area   yes yes yes     ASSESSMENT:    Conditions Requiring Skilled Therapeutic Intervention:    []Decreased strength     []Decreased ROM  [x]Decreased functional mobility  [x]Decreased balance   [x]Decreased endurance   []Decreased posture  [x]Decreased sensation  []Decreased coordination   [x]Decreased vision  []Decreased safety awareness   []Increased pain       Comments:  RN cleared pt for activity prior to session. Pt received supine in bed and agreeable to PT intervention at this time. Pt performed all functional mobility as noted above. Pt arrived to ED with complaints of dizziness and inability to ambulate at home. Currently her symptoms seem to have resolved. Pt has low resting HR and low HR with activity. Educated pt on safety concerns with returning home if dizziness continues to come and go to which she was very receptive. Pt returned to supine at end of session and left with all needs met and call light in reach. Pt requires continued skilled PT intervention for the purposes of maximizing functional mobility and independence by addressing deficits described above.       Treatment:  Patient practiced and was instructed in the following treatment:     Therapeutic Activities Completed:  o Functional mobility as noted above:   - Bed mobility: SBA for safety/vitals monitoring. No dizziness with positional change at this time. - Transfer training: SBA for safety. Min VC for proper hand placement and sequencing with [employer] for safety.   - Ambulation: 150 feet front WW SBA x 2 reps. Slow steady reciprocal gait without LOB or dizziness. o Skilled repositioning in supine with HOB elevated for comfort.   o Skilled vitals monitoring with activity. o Pt education as noted above. Pt's/ family goals   1. Return home. Prognosis is good for reaching above PT goals. Patient and or family understand(s) diagnosis, prognosis, and plan of care. yes    PHYSICAL THERAPY PLAN OF CARE:    PT POC is established based on physician order and patient diagnosis     Referring provider/PT Order:    10/27/21 1445  PT eval and treat Start: 10/27/21 1445, End: 10/27/21 1445, ONE TIME, Standing Count: 1 Occurrences, R     Order went unreviewed at transfer on Wed Oct 27, 2021  7:20 PM    Kolleen Gosselin, DO     Diagnosis:  Dizziness [R42]  Specific instructions for next treatment:  Ambulate as tolerated. LE strengthening. Dynamic sitting/standing balance activities for improved overall functional mobility.     Current Treatment Recommendations:     [x] Strengthening to improve independence with functional mobility   [x] ROM to improve independence with functional mobility   [x] Balance Training to improve static/dynamic balance and to reduce fall risk  [x] Endurance Training to improve activity tolerance during functional mobility   [x] Transfer Training to improve safety and independence with all functional transfers   [x] Gait Training to improve gait mechanics, endurance and assess need for appropriate assistive device  [] Stair Training in preparation for safe discharge home and/or into the community   [x] Positioning to prevent skin breakdown and contractures  [x] Safety and Education Training   [x] Patient/Caregiver Education   [] HEP  [] Other     PT long term treatment goals are located in above grid    Frequency of treatments: 2-5x/week x 1-2 weeks. Time in  0950  Time out  1015    Total Treatment Time  15 minutes     Evaluation Time includes thorough review of current medical information, gathering information on past medical history/social history and prior level of function, completion of standardized testing/informal observation of tasks, assessment of data and education on plan of care and goals.     CPT codes:  [x] Low Complexity PT evaluation 92968  [] Moderate Complexity PT evaluation 54294  [] High Complexity PT evaluation 89775  [] PT Re-evaluation 06125  [] Gait training 96801 0 minutes  [] Manual therapy 20723 0 minutes  [x] Therapeutic activities 91565 15 minutes  [] Therapeutic exercises 42059 0 minutes  [] Neuromuscular reeducation 79368 0 minutes     Valerie Embs, PT, DPT  [id number]

## 2021-10-28 NOTE — PROGRESS NOTES
OCCUPATIONAL THERAPY INITIAL EVALUATION    [address] [address] [address]       Date:10/28/2021                                                  Patient Name: Melissa De Souza  MRN: [id number]  : 1940  Room: [address] [place] [address]    Evaluating OT: Hai Mejia OTR/L #[id number]    Referring Provider and Specific Provider Orders/Date:      10/27/21 1445  OT eval and treat Start: 10/27/21 1445, End: 10/27/21 1445, ONE TIME, Standing Count: 1 Occurrences, R     Order went unreviewed at transfer on Wed Oct 27, 2021  7:20 PM    Kolleen Gosselin, DO     Diagnosis: Dizziness [R42]     Surgery: None     Pertinent Medical History:  has a past medical history of Amaurosis fugax of right eye, Anxiety, Arthritis, CA - cancer of bowel, Cerebral artery occlusion with cerebral infarction [facility]), Chronic back pain, Constipation, Depression, Elevated sed rate, GERD (gastroesophageal reflux disease), Hemorrhoids, Hyperlipidemia, Hypertension, Left foot pain, Lumbosacral radiculopathy, Macular degeneration, Peripheral neuropathy, Poor vision, Prosthetic eye globe, Seasonal allergies, Skipped heart beats, Sleep apnea, and Thyroid disease.        Precautions:  Fall Risk, L eye prosthetic (blind)     Assessment of current deficits   [x] Functional mobility  [x]ADLs  [x] Strength               []Cognition   [x] Functional transfers   [x] IADLs         [] Safety Awareness   [x]Endurance   [] Fine Coordination              [x] Balance      [] Vision/perception   []Sensation    []Gross Motor Coordination  [x] ROM  [] Delirium                   [] Motor Control     OT PLAN OF CARE   OT POC based on physician orders, patient diagnosis and results of clinical assessment    Frequency/Duration: 1-3 days/wk for 2 weeks PRN   Specific OT Treatment to include:   * Instruction/training on adapted ADL techniques and AE recommendations to increase functional independence within precautions       * Training on energy conservation strategies, correct breathing pattern and techniques to improve independence/tolerance for self-care routine  * Functional transfer/mobility training/DME recommendations for increased independence, safety, and fall prevention  * Patient/Family education to increase follow through with safety techniques and functional independence  * Recommendation of environmental modifications for increased safety with functional transfers/mobility and ADLs  * Therapeutic exercise to improve motor endurance, ROM, and functional strength for ADLs/functional transfers  * Therapeutic activities to facilitate/challenge dynamic balance, stand tolerance for increased safety and independence with ADLs    Recommended Adaptive Equipment: 3:1 commode     Home Living: alone; apartment, 1 story, No steps to enter   Bathroom set-up: tub/shower with grab bars        Equipment owned: cane, wheeled walker, shower chair     Prior Level of Function: Independent with ADLs , Independent with IADLs; ambulated independently with cane     Driving: Yes, occasionally   Occupation: not reported   Enjoys: not reported      Pain Level: pt denied back this date. Cognition: A&O: 4/4; Follows 2 step directions   Memory: good    Sequencing: fair    Problem solving: fair    Judgement/safety: fair     Functional Assessment:  AM-PAC Daily Activity Raw Score: 15/24   Initial Eval Status  Date: 10/28/21 Treatment Status  Date: STGs = LTGs  Time frame: 10-14 days   Feeding Supervision     Independent    Grooming Minimal Assist     Moderate [place]    UB Dressing Minimal Assist     Moderate [place]    LB Dressing Moderate Assist   With increased assist to doff/don socks. Pt doffed/donned dupens over hips with minimal assist.   Moderate [place]    Bathing Moderate Assist     Moderate [place]    Toileting Moderate Assist   For thoroughness with rear hygiene.    Moderate [place]    Bed Mobility  Supine to sit: Supervision   Sit to supine: Supervision   Supine to sit: Independent   Sit to supine: Independent    Functional Transfers Supervision from EOB to wheeled walker x2 reps. Minimal Assist for sit <> stand at [facility]. Transfer training with verbal cues for hand placement to improve safety. Moderate [place]    Functional Mobility Supervision with wheeled walker short distance EOB <> BSC, verbal cues for overall safety. Moderate [place]    Balance Sitting:     Static: fair     Dynamic: fair   Standing: fair  with walker   Sitting:     Static: good     Dynamic: good   Standing: good  with walker    Activity Tolerance fair    Increase standing tolerance >3 minutes for improved engagement with functional transfers and indep in ADLs   Visual/  Perceptual Glasses: yes    Reports changes in vision since admission: no      NA      Hand Dominance: right      AROM (PROM) Strength Additional Info:    RUE  WFL 4-/5 good  and wfl FMC/dexterity noted during ADL tasks     LUE WFL 4-/5 good  and wfl FMC/dexterity noted during ADL tasks       Hearing:  [place] [place]   Sensation:   No c/o numbness or tingling  Tone:  WFL   Edema:      Vitals:   HR at rest: 53 HR with activity: 51 HR at end of session: 66   SpO2 at rest:  SpO2 with activity:  SpO2 at end of session:    BP at rest: 157/47 BP with activity: 157/59 BP at end of session: 152/66       Comments: RN cleared patient for OT. Upon arrival patient in supine. Therapist facilitated and instructed pt on adapted  techniques & compensatory strategies to improve safety and independence with basic ADLs, bed mobility, functional transfers and mobility to allow pt to achieve highest level of independence and safely. Pt demonstrated fair plus understanding of education & follow through. At end of session, patient was in supine with call light and phone within reach, all lines and tubes intact.   Overall, patient demonstrated  decreased independence and safety during completion of ADL tasks. Pt would benefit from continued skilled OT to increase safety and independence with completion of ADL tasks and functional mobility for improved quality of life. Treatment: OT treatment provided this date includes:   · Instruction, education and training on safe facilitation and adapted techniques for completion of ADLs. These include safe functional transfer techniques and energy conservation for completion of ADLs. Education provided on hand/feet placement with walker and body mechanics for fall prevention. Cues for energy conservation and safety for in the home at DC, including modifications and DME. Extended time to complete all tasks, including skilled monitoring of patient's response during treatment session and vital signs. Prior to and at the end of session, environmental modifications / line management completed for patients safety and efficiency of treatment session. See above for further details. Rehab Potential: Good for established goals. LTG: maximize independence with ADLs to return to PLOF     Patient / Family Goal: Pt in agreement with POC       Patient and/or family were instructed on functional diagnosis, prognosis/goals and OT plan of care. Demonstrated fair plus understanding.      Eval Complexity: Low    Time In: 10:20 AM   Time Out: 10:50 AM    Total Treatment Time: 15      Min Units   OT Eval Low 97165  X  1    OT Eval Medium 77836      OT Eval High 77002      OT Re-Eval 24933            ADL/Self Care 22601 15 1   Therapeutic Activities 30717       Therapeutic Ex 91370       Orthotic Management 43537       Manual 07268     Neuro Re-Ed 11460       Non-Billable Time        Evaluation Time additionally includes thorough review of current medical information, gathering information on past medical history/social history and prior level of function, interpretation of standardized testing/informal observation of tasks, assessment of data and

## 2021-10-29 VITALS
SYSTOLIC BLOOD PRESSURE: 136 MMHG | WEIGHT: 188 LBS | TEMPERATURE: 96.7 F | DIASTOLIC BLOOD PRESSURE: 77 MMHG | BODY MASS INDEX: 37.9 KG/M2 | RESPIRATION RATE: 16 BRPM | OXYGEN SATURATION: 98 % | HEIGHT: 59 IN | HEART RATE: 72 BPM

## 2021-10-29 LAB — METER GLUCOSE: 183 MG/DL (ref 74–99)

## 2021-10-29 PROCEDURE — 82962 GLUCOSE BLOOD TEST: CPT

## 2021-10-29 PROCEDURE — 6360000002 HC RX W HCPCS: Performed by: INTERNAL MEDICINE

## 2021-10-29 PROCEDURE — G0378 HOSPITAL OBSERVATION PER HR: HCPCS

## 2021-10-29 PROCEDURE — 96372 THER/PROPH/DIAG INJ SC/IM: CPT

## 2021-10-29 PROCEDURE — 6370000000 HC RX 637 (ALT 250 FOR IP): Performed by: INTERNAL MEDICINE

## 2021-10-29 RX ORDER — MECLIZINE HYDROCHLORIDE 25 MG/1
25 TABLET ORAL PRN
Qty: 30 TABLET | Refills: 1 | Status: SHIPPED
Start: 2021-10-29 | End: 2022-10-08

## 2021-10-29 RX ADMIN — ENOXAPARIN SODIUM 40 MG: 100 INJECTION SUBCUTANEOUS at 10:06

## 2021-10-29 RX ADMIN — LOSARTAN POTASSIUM 100 MG: 50 TABLET, FILM COATED ORAL at 10:05

## 2021-10-29 RX ADMIN — PANTOPRAZOLE SODIUM 40 MG: 40 TABLET, DELAYED RELEASE ORAL at 10:05

## 2021-10-29 RX ADMIN — LEVOTHYROXINE SODIUM 88 MCG: 0.09 TABLET ORAL at 10:05

## 2021-10-29 RX ADMIN — METOPROLOL SUCCINATE 25 MG: 25 TABLET, EXTENDED RELEASE ORAL at 10:05

## 2021-10-29 NOTE — PROGRESS NOTES
Lab Results   Component Value Date     10/28/2021    K 4.0 10/28/2021    K 4.2 10/31/2020     10/28/2021    CO2 23 10/28/2021    BUN 24 10/28/2021    CREATININE 0.9 10/28/2021    GFRAA >60 10/28/2021    LABGLOM 60 10/28/2021    GLUCOSE 87 10/28/2021    GLUCOSE 109 05/04/2012    PROT 7.4 04/26/2021    LABALBU 4.3 04/26/2021    LABALBU 4.2 03/24/2012    CALCIUM 8.7 10/28/2021    BILITOT 0.3 04/26/2021    ALKPHOS 93 04/26/2021    AST 19 04/26/2021    ALT 12 04/26/2021     Warfarin PT/INR:    Lab Results   Component Value Date    INR 0.9 08/18/2020    INR 0.9 12/29/2019    INR 0.9 12/19/2019    PROTIME 10.9 08/18/2020    PROTIME 10.5 12/29/2019    PROTIME 10.4 12/19/2019       Assessment:    Active Problems:    Dizziness  Resolved Problems:    * No resolved hospital problems.  *      Plan:  Dc home off [place]gro[place] outpt f/u        Meldon Ser, DO  4:24 PM  10/29/2021

## 2021-10-29 NOTE — CARE COORDINATION
Spoke with Colette at [facility]. Patient's  is Lenox Cogan ([phone]). CM inquired about how often patient is seen and what services are provided. Left VM with Ofelia.     Krishna Xavier, RN, BSN  [facility] Case Management   Cell: [phone]

## 2021-11-01 ENCOUNTER — TELEPHONE (OUTPATIENT)
Dept: FAMILY MEDICINE CLINIC | Age: 81
End: 2021-11-01

## 2021-11-01 NOTE — TELEPHONE ENCOUNTER
[facility] Transitions Initial Follow Up Call    Outreach made within 2 business days of discharge: Yes    Patient: Annel Mora Patient : 1940   MRN: [id number]  Reason for Admission: Dizziness     Discharge Date: 10/29/21       Spoke with: Left a message for patient to return my call.         Scheduled appointment with PCP within 7-14 days    Follow Up  Future Appointments   Date Time Provider Tay Singh   2021  8:45 AM DO HOWARD Velarde [facility]   2022 12:45 PM MD Juan Herring [facility]       Swati Tucker

## 2021-11-02 ENCOUNTER — TELEPHONE (OUTPATIENT)
Dept: FAMILY MEDICINE CLINIC | Age: 81
End: 2021-11-02

## 2021-11-02 NOTE — TELEPHONE ENCOUNTER
[facility] Transitions Initial Follow Up Call    Outreach made within 2 business days of discharge: Yes    Patient: Kamille Gist Patient : 1940   MRN: [id number]  Reason for Admission: Dizziness   Discharge Date: 10/29/21       Spoke with: Hedy musa     Discharge department/facility: [facility]     TCM Interactive Patient Contact:  Was patient able to fill all prescriptions: No new prescriptions   Was patient instructed to bring all medications to the follow-up visit: Yes  Is patient taking all medications as directed in the discharge summary? Yes  Does patient understand their discharge instructions: Yes  Does patient have questions or concerns that need addressed prior to 7-14 day follow up office visit: no    Patient returned my call from yesterday for a hospital follow up. She states she no longer is dizzy but just doesn't feel like herself. Appointment scheduled with PCP.       Scheduled appointment with PCP within 7-14 days    Follow Up  Future Appointments   Date Time Provider Tay Singh   2021 11:45 AM Jeromy Diez [facility]   2021  8:45 AM DO HOWARD Gregory [facility]   2022 12:45 PM [facility]ur[facility] Juarez [facility]       Swati Liang

## 2021-11-04 ENCOUNTER — OFFICE VISIT (OUTPATIENT)
Dept: FAMILY MEDICINE CLINIC | Age: 81
End: 2021-11-04
Payer: MEDICAID

## 2021-11-04 VITALS
WEIGHT: 191 LBS | HEIGHT: 59 IN | SYSTOLIC BLOOD PRESSURE: 130 MMHG | BODY MASS INDEX: 38.51 KG/M2 | OXYGEN SATURATION: 97 % | HEART RATE: 70 BPM | DIASTOLIC BLOOD PRESSURE: 80 MMHG | RESPIRATION RATE: 16 BRPM | TEMPERATURE: 97 F

## 2021-11-04 DIAGNOSIS — R42 DIZZINESS: Primary | ICD-10-CM

## 2021-11-04 DIAGNOSIS — E66.01 SEVERE OBESITY (BMI 35.0-35.9 WITH COMORBIDITY) (HCC): ICD-10-CM

## 2021-11-04 PROCEDURE — 99214 OFFICE O/P EST MOD 30 MIN: CPT | Performed by: FAMILY MEDICINE

## 2021-11-04 PROCEDURE — 1111F DSCHRG MED/CURRENT MED MERGE: CPT | Performed by: FAMILY MEDICINE

## 2021-11-04 RX ORDER — METOPROLOL SUCCINATE 25 MG/1
25 TABLET, EXTENDED RELEASE ORAL DAILY
Qty: 30 TABLET | Refills: 5 | Status: ON HOLD
Start: 2021-11-04 | End: 2022-04-29 | Stop reason: HOSPADM

## 2021-11-04 RX ORDER — LOSARTAN POTASSIUM 100 MG/1
100 TABLET ORAL DAILY
Qty: 30 TABLET | Refills: 5 | Status: SHIPPED
Start: 2021-11-04 | End: 2022-06-20

## 2021-11-04 NOTE — PROGRESS NOTES
Post-Discharge Transitional Care Management Services or Hospital Follow Up      Yana Zeng   YOB: 1940    Date of Office Visit:  11/4/2021  Date of Hospital Admission: 10/27/21  Date of Hospital Discharge: 10/29/21  Risk of hospital readmission (high >=14%.  Medium >=10%) :Readmission Risk Score: 11.7      Care management risk score Rising risk (score 2-5) and Complex Care (Scores >=6): 12     Non face to face  following discharge, date last encounter closed (first attempt may have been earlier): 11/2/2021  8:42 AM    Call initiated 2 business days of discharge: Yes    Patient Active Problem List   Diagnosis    GERD (gastroesophageal reflux disease)    Hypothyroidism    Obstructive sleep apnea syndrome, non-compliant with CPAP therapy    Hyperlipidemia LDL goal <100    Obesity (BMI 35.0-39.9 without comorbidity)    Blindness of left eye    Vertigo    Essential hypertension    Frequent falls    Ataxia    Moderate episode of recurrent major depressive disorder (HCC)    Gait instability    Peripheral neuropathy    Lumbosacral radiculopathy    Dizziness       Allergies   Allergen Reactions    Iodine Shortness Of Breath, Swelling and Rash    Bee Venom     Codeine      Patient cannot remember reaction    Hydralazine     Oxycodone-Aspirin      unknown    Amoxicillin-Pot Clavulanate Nausea And Vomiting    Darvocet [Propoxyphene N-Acetaminophen] Nausea And Vomiting    Eggs Or Egg-Derived Products Nausea And Vomiting    Influenza Vaccines Nausea And Vomiting    Percodan [Oxycodone-Aspirin] Nausea And Vomiting and Other (See Comments)     sick    Percodan [Oxycodone-Aspirin] Nausea And Vomiting       Medications listed as ordered at the time of discharge from hospital   Valarie Mata   Home Medication Instructions DEBBIE:    Printed on:11/04/21 1226   Medication Information                      cetirizine (ZYRTEC ALLERGY) 10 MG tablet  Take 10 mg by mouth daily as needed for Allergies             levothyroxine (SYNTHROID) 88 MCG tablet  Take 1 tablet by mouth daily             linaCLOtide (LINZESS) 72 MCG CAPS capsule  Take 72 mcg by mouth daily as needed             losartan (COZAAR) 100 MG tablet  Take 1 tablet by mouth daily             meclizine (ANTIVERT) 25 MG tablet  Take 1 tablet by mouth as needed for Dizziness             metoprolol succinate (TOPROL XL) 25 MG extended release tablet  Take 1 tablet by mouth daily             Multiple Vitamins-Minerals (PRESERVISION AREDS 2) CAPS  Take 1 capsule by mouth 2 times daily              omeprazole (PRILOSEC) 40 MG delayed release capsule  Take 1 capsule by mouth daily             ondansetron (ZOFRAN) 4 MG tablet  Take 1 tablet by mouth 3 times daily as needed for Nausea or Vomiting                   Medications marked \"taking\" at this time  Outpatient Medications Marked as Taking for the 11/4/21 encounter (Office Visit) with Belkis Boyd, DO   Medication Sig Dispense Refill    metoprolol succinate (TOPROL XL) 25 MG extended release tablet Take 1 tablet by mouth daily 30 tablet 5    losartan (COZAAR) 100 MG tablet Take 1 tablet by mouth daily 30 tablet 5    meclizine (ANTIVERT) 25 MG tablet Take 1 tablet by mouth as needed for Dizziness 30 tablet 1    linaCLOtide (LINZESS) 72 MCG CAPS capsule Take 72 mcg by mouth daily as needed      cetirizine (ZYRTEC ALLERGY) 10 MG tablet Take 10 mg by mouth daily as needed for Allergies      omeprazole (PRILOSEC) 40 MG delayed release capsule Take 1 capsule by mouth daily 30 capsule 3    levothyroxine (SYNTHROID) 88 MCG tablet Take 1 tablet by mouth daily 90 tablet 1    ondansetron (ZOFRAN) 4 MG tablet Take 1 tablet by mouth 3 times daily as needed for Nausea or Vomiting 30 tablet 2    Multiple Vitamins-Minerals (PRESERVISION AREDS 2) CAPS Take 1 capsule by mouth 2 times daily           Medications patient taking as of now reconciled against medications ordered at time of hospital discharge: Yes    Chief Complaint   Patient presents with   [address] from Hospital     tcm       History of Present illness - Follow up of Hospital diagnosis(es): Dizziness    Inpatient course: Discharge summary reviewed- see chart. Interval history/Current status: Stable    A comprehensive review of systems was negative except for what was noted in the HPI. Vitals:    11/04/21 1156   BP: 130/80   Pulse: 70   Resp: 16   Temp: 97 °F (36.1 °C)   SpO2: 97%   Weight: 191 lb (86.6 kg)   Height: 4' 11\" (1.499 m)     Body mass index is 38.58 kg/m².    Wt Readings from Last 3 Encounters:   11/04/21 191 lb (86.6 kg)   10/27/21 188 lb (85.3 kg)   10/25/21 187 lb (84.8 kg)     BP Readings from Last 3 Encounters:   11/04/21 130/80   10/29/21 136/77   10/25/21 118/62        Physical Exam:  General Appearance: alert and oriented to person, place and time, well developed and well- nourished, in no acute distress  Skin: warm and dry, no rash or erythema  Head: normocephalic and atraumatic  Eyes: pupils equal, round, and reactive to light, extraocular eye movements intact, conjunctivae normal  ENT: tympanic membrane, external ear and ear canal normal bilaterally, nose without deformity, nasal mucosa and turbinates normal without polyps  Neck: supple and non-tender without mass, no thyromegaly or thyroid nodules, no cervical lymphadenopathy  Pulmonary/Chest: clear to auscultation bilaterally- no wheezes, rales or rhonchi, normal air movement, no respiratory distress  Cardiovascular: normal rate, regular rhythm, normal S1 and S2, no murmurs, rubs, clicks, or gallops, distal pulses intact, no carotid bruits  Abdomen: soft, non-tender, non-distended, normal bowel sounds, no masses or organomegaly  Extremities: no cyanosis, clubbing or edema  Musculoskeletal: normal range of motion, no joint swelling, deformity or tenderness  Neurologic: reflexes normal and symmetric, no cranial nerve deficit, gait, coordination and speech normal    Assessment/Plan:  1. Dizziness  - improved  - DC DISCHARGE MEDS RECONCILED W/ CURRENT OUTPATIENT MED LIST    2. Severe obesity (BMI 35.0-35.9 with comorbidity) (HCC)  Body mass index is 38.58 kg/m². BMI was elevated today, and weight loss plan recommended is : conventional weight loss and daily exercise regimen.           Medical Decision Making: moderate complexity

## 2021-11-26 ENCOUNTER — NURSE TRIAGE (OUTPATIENT)
Dept: OTHER | Facility: CLINIC | Age: 81
End: 2021-11-26

## 2021-11-26 NOTE — TELEPHONE ENCOUNTER
Reason for Disposition   Caller requesting a CONTROLLED substance prescription refill (e.g., narcotics, ADHD medicines)    Answer Assessment - Initial Assessment Questions  1. NAME of MEDICATION: \"What medicine are you calling about? \"      Omeprazole, ativan    2. QUESTION: Nuvia Farah is your question? \"      Needs refills, pharmacy saying too soon    3. PRESCRIBING HCP: \"Who prescribed it? \" Reason: if prescribed by specialist, call should be referred to that group. Dr. Pearce    4. SYMPTOMS: \"Do you have any symptoms? \"      Lidia Doheny yesterday- sore back and neck (denies dizziness), having some anxiety    5. SEVERITY: If symptoms are present, ask \"Are they mild, moderate or severe? \"      States \"not serious\"    6. PREGNANCY:  \"Is there any chance that you are pregnant? \" \"When was your last menstrual period? \"      N/a due to age    Protocols used: MEDICATION QUESTION CALL-ADULT-    Received call from Homer Villafana at [facility] with [employer]. Brief description of triage: No triage. Pt calling in for refills on medications. Pt mentioned falling yesterday (no serious injury per pt- sore back/neck), and elevated BP with stress (does not take losartan and metoprolol regularly). Pt declines triage. States she will call back to office on Monday. Attention Provider: Thank you for allowing me to participate in the care of your patient. The patient was connected to triage in response to information provided to the [facility]/PSC. Please do not respond through this encounter as the response is not directed to a shared pool.

## 2021-11-30 ENCOUNTER — TELEPHONE (OUTPATIENT)
Dept: FAMILY MEDICINE CLINIC | Age: 81
End: 2021-11-30

## 2021-11-30 ENCOUNTER — OFFICE VISIT (OUTPATIENT)
Dept: FAMILY MEDICINE CLINIC | Age: 81
End: 2021-11-30
Payer: MEDICARE

## 2021-11-30 VITALS
TEMPERATURE: 97.1 F | HEIGHT: 59 IN | DIASTOLIC BLOOD PRESSURE: 80 MMHG | RESPIRATION RATE: 22 BRPM | SYSTOLIC BLOOD PRESSURE: 132 MMHG | WEIGHT: 188 LBS | OXYGEN SATURATION: 96 % | BODY MASS INDEX: 37.9 KG/M2 | HEART RATE: 72 BPM

## 2021-11-30 DIAGNOSIS — Z20.822 SUSPECTED COVID-19 VIRUS INFECTION: Primary | ICD-10-CM

## 2021-11-30 DIAGNOSIS — J06.9 ACUTE UPPER RESPIRATORY INFECTION, UNSPECIFIED: ICD-10-CM

## 2021-11-30 DIAGNOSIS — F41.9 ANXIETY: ICD-10-CM

## 2021-11-30 DIAGNOSIS — R05.9 COUGH: ICD-10-CM

## 2021-11-30 DIAGNOSIS — R09.81 NASAL CONGESTION: ICD-10-CM

## 2021-11-30 LAB
Lab: NORMAL
PERFORMING INSTRUMENT: NORMAL
QC PASS/FAIL: NORMAL
SARS-COV-2, POC: NORMAL

## 2021-11-30 PROCEDURE — G8417 CALC BMI ABV UP PARAM F/U: HCPCS | Performed by: PHYSICIAN ASSISTANT

## 2021-11-30 PROCEDURE — G8484 FLU IMMUNIZE NO ADMIN: HCPCS | Performed by: PHYSICIAN ASSISTANT

## 2021-11-30 PROCEDURE — 1123F ACP DISCUSS/DSCN MKR DOCD: CPT | Performed by: PHYSICIAN ASSISTANT

## 2021-11-30 PROCEDURE — 87426 SARSCOV CORONAVIRUS AG IA: CPT | Performed by: PHYSICIAN ASSISTANT

## 2021-11-30 PROCEDURE — 1090F PRES/ABSN URINE INCON ASSESS: CPT | Performed by: PHYSICIAN ASSISTANT

## 2021-11-30 PROCEDURE — 99203 OFFICE O/P NEW LOW 30 MIN: CPT | Performed by: PHYSICIAN ASSISTANT

## 2021-11-30 PROCEDURE — G8427 DOCREV CUR MEDS BY ELIG CLIN: HCPCS | Performed by: PHYSICIAN ASSISTANT

## 2021-11-30 PROCEDURE — 4040F PNEUMOC VAC/ADMIN/RCVD: CPT | Performed by: PHYSICIAN ASSISTANT

## 2021-11-30 PROCEDURE — G8400 PT W/DXA NO RESULTS DOC: HCPCS | Performed by: PHYSICIAN ASSISTANT

## 2021-11-30 PROCEDURE — 1036F TOBACCO NON-USER: CPT | Performed by: PHYSICIAN ASSISTANT

## 2021-11-30 RX ORDER — LORAZEPAM 0.5 MG/1
0.5 TABLET ORAL PRN
Qty: 30 TABLET | Refills: 2 | Status: SHIPPED
Start: 2021-11-30 | End: 2022-02-03 | Stop reason: SDUPTHER

## 2021-11-30 RX ORDER — CHLORPHENIRAMINE MALEATE 4 MG/1
4 TABLET ORAL EVERY 6 HOURS PRN
Qty: 20 TABLET | Refills: 0 | Status: SHIPPED
Start: 2021-11-30 | End: 2022-03-11

## 2021-11-30 RX ORDER — AZITHROMYCIN 250 MG/1
250 TABLET, FILM COATED ORAL SEE ADMIN INSTRUCTIONS
Qty: 6 TABLET | Refills: 0 | Status: SHIPPED | OUTPATIENT
Start: 2021-11-30 | End: 2021-12-05

## 2021-11-30 NOTE — PROGRESS NOTES
21  Zeferino Cadet : 1940 Sex: female  Age 78 y.o. Subjective:  Chief Complaint   Patient presents with    Cough    Shortness of Breath    Sinus Problem         HPI:   Zeferino Cadet , 78 y.o. female presents to [facility] care for evaluation of cough, congestion, sinus drainage    HPI  47-year-old female presents to [facility] for evaluation of cough, congestion, shortness of breath. The patient states that some of the drainage is upsetting her stomach. The patient has had increased nasal congestion, rhinorrhea, drainage. The patient is just starting the symptoms this morning. The patient has not had any fever, chills. No myalgias. No loss of smell or taste. The patient is vaccinated. ROS:   Unless otherwise stated in this report the patient's positive and negative responses for review of systems for constitutional, eyes, ENT, cardiovascular, respiratory, gastrointestinal, neurological, , musculoskeletal, and integument systems and related systems to the presenting problem are either stated in the history of present illness or were not pertinent or were negative for the symptoms and/or complaints related to the presenting medical problem. Positives and pertinent negatives as per HPI. All others reviewed and are negative.       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, treated with surgery and chemo    Cerebral artery occlusion with cerebral infarction ([facility] [address]     possibly TIA    Chronic back pain     Constipation     Depression     Elevated sed rate 2017    hx of    GERD (gastroesophageal reflux disease)     Hemorrhoids     history of    Hyperlipidemia     diet controlled    Hypertension     Left foot pain     dropped a Kettle on foot    Lumbosacral radiculopathy 2020    Macular degeneration     Peripheral neuropathy 2020    Poor chlorpheniramine (ALLER-CHLOR) 4 MG tablet, Take 1 tablet by mouth every 6 hours as needed for Allergies, Disp: 20 tablet, Rfl: 0    LORazepam (ATIVAN) 0.5 MG tablet, Take 1 tablet by mouth as needed for Anxiety for up to 30 days. , Disp: 30 tablet, Rfl: 2    metoprolol succinate (TOPROL XL) 25 MG extended release tablet, Take 1 tablet by mouth daily, Disp: 30 tablet, Rfl: 5    losartan (COZAAR) 100 MG tablet, Take 1 tablet by mouth daily, Disp: 30 tablet, Rfl: 5    meclizine (ANTIVERT) 25 MG tablet, Take 1 tablet by mouth as needed for Dizziness, Disp: 30 tablet, Rfl: 1    linaCLOtide (LINZESS) 72 MCG CAPS capsule, Take 72 mcg by mouth daily as needed, Disp: , Rfl:     cetirizine (ZYRTEC ALLERGY) 10 MG tablet, Take 10 mg by mouth daily as needed for Allergies, Disp: , Rfl:     omeprazole (PRILOSEC) 40 MG delayed release capsule, Take 1 capsule by mouth daily, Disp: 30 capsule, Rfl: 3    levothyroxine (SYNTHROID) 88 MCG tablet, Take 1 tablet by mouth daily, Disp: 90 tablet, Rfl: 1    ondansetron (ZOFRAN) 4 MG tablet, Take 1 tablet by mouth 3 times daily as needed for Nausea or Vomiting, Disp: 30 tablet, Rfl: 2    Multiple Vitamins-Minerals (PRESERVISION AREDS 2) CAPS, Take 1 capsule by mouth 2 times daily , Disp: , Rfl:     Allergies:      Allergies   Allergen Reactions    Iodine Shortness Of Breath, Swelling and Rash    Bee Venom     Codeine      Patient cannot remember reaction    Hydralazine     Oxycodone-Aspirin      unknown    Amoxicillin-Pot Clavulanate Nausea And Vomiting    Darvocet [Propoxyphene N-Acetaminophen] Nausea And Vomiting    Eggs Or Egg-Derived Products Nausea And Vomiting    Influenza Vaccines Nausea And Vomiting    Percodan [Oxycodone-Aspirin] Nausea And Vomiting and Other (See Comments)     sick    Percodan [Oxycodone-Aspirin] Nausea And Vomiting       Social History:     Social History     Tobacco Use    Smoking status: Passive Smoke Exposure - Never Smoker    Smokeless history reviewed. Allergies reviewed. Medications reviewed. Patient on arrival does not appear to be in any apparent distress or discomfort. The patient has been seen and evaluated. The patient does not appear to be toxic or lethargic. The patient had a rapid Covid test that was negative. PCR test is pending at this time. We will treat the patient with chlorphentermine and a azithromycin. She will not use any other decongestants at this point. The patient is to follow-up with PCP. We will call the patient with the results of the Covid testing. The patient will monitor symptoms closely. The patient was educated on the proper dosage of motrin and tylenol and the appropriate intervals of each. The patient is to increase fluid intake over the next several days. The patient is to use OTC decongestant as needed. The patient is to return to express care or go directly to the emergency department should any of the signs or symptoms worsen. The patient is to followup with primary care physician in 2-3 days for repeat evaluation. The patient has no other questions or concerns at this time the patient will be discharged home. Clinical Impression:   Genoveva Curran was seen today for cough, shortness of breath and sinus problem. Diagnoses and all orders for this visit:    Suspected COVID-19 virus infection    Cough  -     POCT COVID-19, Antigen  -     COVID-19 Ambulatory; Future  -     azithromycin (ZITHROMAX) 250 MG tablet; Take 1 tablet by mouth See Admin Instructions for 5 days 500mg on day 1 followed by 250mg on days 2 - 5    Acute upper respiratory infection, unspecified    Nasal congestion    Other orders  -     chlorpheniramine (ALLER-CHLOR) 4 MG tablet; Take 1 tablet by mouth every 6 hours as needed for Allergies        The patient is to call for any concerns or return if any of the signs or symptoms worsen.  The patient is to follow-up with PCP in the next 2-3 days for repeat evaluation repeat assessment or go directly to the emergency department.      SIGNATURE: Luan May III, AUG[place]

## 2021-12-01 DIAGNOSIS — R05.9 COUGH: ICD-10-CM

## 2021-12-02 ENCOUNTER — TELEPHONE (OUTPATIENT)
Dept: FAMILY MEDICINE CLINIC | Age: 81
End: 2021-12-02

## 2021-12-02 LAB
SARS-COV-2: NOT DETECTED
SOURCE: NORMAL

## 2021-12-02 NOTE — TELEPHONE ENCOUNTER
----- Message from Mikaela Love sent at 12/1/2021  4:01 PM EST -----  Subject: Message to Provider    QUESTIONS  Information for Provider? patient was seen at [facility]) in [facility]   yesterday- wanted dr to know. Wants to know if there are pills for her   stomach. was on Omeprazole and not helping when she did take it. Is there   something better she could take?  ---------------------------------------------------------------------------  --------------  CALL BACK INFO  What is the best way for the office to contact you? Do not leave any   message, patient will call back for answer  Preferred Call Back Phone Number? [phone]  ---------------------------------------------------------------------------  --------------  SCRIPT ANSWERS  Relationship to Patient?  Self

## 2021-12-05 NOTE — DISCHARGE SUMMARY
[address]                  [address] [facility],  [facility]                               DISCHARGE SUMMARY    PATIENT NAME: Jericho Ji                    :        1940  MED REC NO:   [id number]                            ROOM:       [id number]  ACCOUNT NO:   [de-identified]                           ADMIT DATE: 10/27/2021  PROVIDER:     Brandt Mcclain DO                  DISCHARGE DATE:  10/29/2021    DISCHARGE DIAGNOSES:  1. Dizziness. 2.  Dehydration. 3.  Inability to ambulate. 4.  Hypertension. 5.  GERD. 6.  Hypothyroidism. 7.  History of bowel cancer. HOSPITAL COURSE:  The patient is an 43-year-old  female who  presented to the emergency room complaining of several-day history of  dizziness, inability to ambulate, nausea. She was admitted for further  evaluation and treatment. She was treated with IV fluids. Her status  improved and she was discharged to home in stable condition on  10/29/2021. DISCHARGE MEDICATIONS:  As per discharge med rec which include,  1. Liothyronine. 2.  Linzess. 3.  Omeprazole. 4.  Zofran p.r.n.  5.  PreserVision vitamin. 6.  Zyrtec p.r.n. DISCHARGE INSTRUCTIONS:  The patient instructed to discontinue  chlorthalidone. The patient instructed to follow up with Dr. Nikolay Stacy, call office for appointment.         Laverne Gomez DO    D: 2021 10:42:28       T: 2021 10:44:50     [email or web address]  Job#: [id number]     Doc#: [id number]    CC: Cigarettes

## 2021-12-07 ENCOUNTER — OFFICE VISIT (OUTPATIENT)
Dept: FAMILY MEDICINE CLINIC | Age: 81
End: 2021-12-07
Payer: MEDICAID

## 2021-12-07 VITALS
BODY MASS INDEX: 37.5 KG/M2 | TEMPERATURE: 97.6 F | DIASTOLIC BLOOD PRESSURE: 64 MMHG | HEIGHT: 59 IN | RESPIRATION RATE: 16 BRPM | WEIGHT: 186 LBS | OXYGEN SATURATION: 95 % | SYSTOLIC BLOOD PRESSURE: 118 MMHG | HEART RATE: 66 BPM

## 2021-12-07 DIAGNOSIS — F41.9 ANXIETY: ICD-10-CM

## 2021-12-07 DIAGNOSIS — Z00.00 ROUTINE GENERAL MEDICAL EXAMINATION AT A HEALTH CARE FACILITY: Primary | ICD-10-CM

## 2021-12-07 DIAGNOSIS — E03.9 HYPOTHYROIDISM, UNSPECIFIED TYPE: ICD-10-CM

## 2021-12-07 DIAGNOSIS — R30.0 DYSURIA: ICD-10-CM

## 2021-12-07 DIAGNOSIS — I10 ESSENTIAL HYPERTENSION: ICD-10-CM

## 2021-12-07 LAB
BILIRUBIN, POC: NORMAL
BLOOD URINE, POC: NORMAL
CLARITY, POC: CLEAR
COLOR, POC: YELLOW
GLUCOSE URINE, POC: NORMAL
KETONES, POC: NORMAL
LEUKOCYTE EST, POC: NORMAL
NITRITE, POC: NORMAL
PH, POC: 6
PROTEIN, POC: NORMAL
SPECIFIC GRAVITY, POC: 1.01
UROBILINOGEN, POC: NORMAL

## 2021-12-07 PROCEDURE — 1123F ACP DISCUSS/DSCN MKR DOCD: CPT | Performed by: FAMILY MEDICINE

## 2021-12-07 PROCEDURE — 4040F PNEUMOC VAC/ADMIN/RCVD: CPT | Performed by: FAMILY MEDICINE

## 2021-12-07 PROCEDURE — G0439 PPPS, SUBSEQ VISIT: HCPCS | Performed by: FAMILY MEDICINE

## 2021-12-07 PROCEDURE — 81002 URINALYSIS NONAUTO W/O SCOPE: CPT | Performed by: FAMILY MEDICINE

## 2021-12-07 PROCEDURE — G8484 FLU IMMUNIZE NO ADMIN: HCPCS | Performed by: FAMILY MEDICINE

## 2021-12-07 ASSESSMENT — PATIENT HEALTH QUESTIONNAIRE - PHQ9
2. FEELING DOWN, DEPRESSED OR HOPELESS: 1
SUM OF ALL RESPONSES TO PHQ QUESTIONS 1-9: 1
SUM OF ALL RESPONSES TO PHQ9 QUESTIONS 1 & 2: 1
1. LITTLE INTEREST OR PLEASURE IN DOING THINGS: 0

## 2021-12-07 ASSESSMENT — LIFESTYLE VARIABLES: HOW OFTEN DO YOU HAVE A DRINK CONTAINING ALCOHOL: 0

## 2021-12-07 NOTE — TELEPHONE ENCOUNTER
Referral received from PCP to meet with pt today re: anxiety. Pt receptive to meeting and SW introduced [facility] role and integrated behavioral health services. She notes that she has been struggling with the recent loss of a friend and several family dynamic issues. Provided support and encouragement as pt discussed a summary of these concerns. Pt was receptive to support but also states that she has a daughter who is a therapist. She is mildly receptive to additional [facility] services at this time and was provided with clinician contact information. She states that she will follow up with [facility] if she would like to meet again in future.  Shilpa Martinez, MSW, LISW-S, OSW-C

## 2021-12-07 NOTE — PATIENT INSTRUCTIONS
Personalized Preventive Plan for Marya Fam - 12/7/2021  Medicare offers a range of preventive health benefits. Some of the tests and screenings are paid in full while other may be subject to a deductible, co-insurance, and/or copay. Some of these benefits include a comprehensive review of your medical history including lifestyle, illnesses that may run in your family, and various assessments and screenings as appropriate. After reviewing your medical record and screening and assessments performed today your provider may have ordered immunizations, labs, imaging, and/or referrals for you. A list of these orders (if applicable) as well as your Preventive Care list are included within your After Visit Summary for your review. Other Preventive Recommendations:    · A preventive eye exam performed by an eye specialist is recommended every 1-2 years to screen for glaucoma; cataracts, macular degeneration, and other eye disorders. · A preventive dental visit is recommended every 6 months. · Try to get at least 150 minutes of exercise per week or 10,000 steps per day on a pedometer . · Order or download the FREE \"Exercise & Physical Activity: Your Everyday Guide\" from [employer]. Call 5-743.458.1036 or search [employer] online. · You need 7389-1580 mg of calcium and 2184-6007 IU of vitamin D per day. It is possible to meet your calcium requirement with diet alone, but a vitamin D supplement is usually necessary to meet this goal.  · When exposed to the sun, use a sunscreen that protects against both UVA and UVB radiation with an SPF of 30 or greater. Reapply every 2 to 3 hours or after sweating, drying off with a towel, or swimming. · Always wear a seat belt when traveling in a car. Always wear a helmet when riding a bicycle or motorcycle.

## 2021-12-07 NOTE — PROGRESS NOTES
Medicare Annual Wellness Visit  Name: Mirna Le Date: 2021   MRN: [id number] Sex: Female   Age: 78 y.o. Ethnicity: Non- / Non    : 1940 Race: White (non-)      Denys Owusu is here for Medicare AWV and Abdominal Pain (stopped taking medication )    Screenings for behavioral, psychosocial and functional/safety risks, and cognitive dysfunction are all negative except as indicated below. These results, as well as other patient data from the [address] form, are documented in Flowsheets linked to this Encounter. Allergies   Allergen Reactions    Iodine Shortness Of Breath, Swelling and Rash    Bee Venom     Codeine      Patient cannot remember reaction    Hydralazine     Oxycodone-Aspirin      unknown    Amoxicillin-Pot Clavulanate Nausea And Vomiting    Darvocet [Propoxyphene N-Acetaminophen] Nausea And Vomiting    Eggs Or Egg-Derived Products Nausea And Vomiting    Influenza Vaccines Nausea And Vomiting    Percodan [Oxycodone-Aspirin] Nausea And Vomiting and Other (See Comments)     sick    Percodan [Oxycodone-Aspirin] Nausea And Vomiting         Prior to Visit Medications    Medication Sig Taking? Authorizing Provider   LORazepam (ATIVAN) 0.5 MG tablet Take 1 tablet by mouth as needed for Anxiety for up to 30 days.  Yes Daja Boyd, DO   chlorpheniramine (ALLER-CHLOR) 4 MG tablet Take 1 tablet by mouth every 6 hours as needed for Allergies Yes YUDITH Cisneros III   metoprolol succinate (TOPROL XL) 25 MG extended release tablet Take 1 tablet by mouth daily Yes Daja Boyd DO   losartan (COZAAR) 100 MG tablet Take 1 tablet by mouth daily Yes Daja Boyd DO   meclizine (ANTIVERT) 25 MG tablet Take 1 tablet by mouth as needed for Dizziness Yes Daja Boyd, DO   linaCLOtide (LINZESS) 72 MCG CAPS capsule Take 72 mcg by mouth daily as needed Yes Historical Provider, MD   cetirizine (ZYRTEC ALLERGY) 10 MG tablet Take 10 mg by mouth daily as needed for Allergies Yes Historical Provider, MD   levothyroxine (SYNTHROID) 88 MCG tablet Take 1 tablet by mouth daily Yes Daja Boyd DO   ondansetron (ZOFRAN) 4 MG tablet Take 1 tablet by mouth 3 times daily as needed for Nausea or Vomiting Yes Daja Boyd DO   Multiple Vitamins-Minerals (PRESERVISION AREDS 2) CAPS Take 1 capsule by mouth 2 times daily  Yes Historical Provider, MD   omeprazole (PRILOSEC) 40 MG delayed release capsule Take 1 capsule by mouth daily  Patient not taking: Reported on 12/7/2021  Charlie Boyd DO         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, treated with surgery and chemo    Cerebral artery occlusion with cerebral infarction ([facility] [address]     possibly TIA    Chronic back pain     Constipation     Depression     Elevated sed rate 12/11/2017    hx of    GERD (gastroesophageal reflux disease)     Hemorrhoids     history of    Hyperlipidemia     diet controlled    Hypertension     Left foot pain     dropped a Kettle on foot    Lumbosacral radiculopathy 8/6/2020    Macular degeneration     Peripheral neuropathy 8/6/2020    Poor vision     right eye / had bleeding behind eye, is receiving \"shots\" at this time  / 3/22/2019    Prosthetic eye globe     left eye implant;    Seasonal allergies     Skipped heart beats     on metoprolol; managed by Dr. Romana Rios    Sleep apnea     uses cpap at times     Thyroid disease        Past Surgical History:   Procedure Laterality Date   [address]    open?     COLECTOMY  1974    COLONOSCOPY  04/26/2012    and egd    COLONOSCOPY  05/30/2014    COLONOSCOPY  01/08/2015    CYST REMOVAL  years ago    upper gum    EYE SURGERY  years ago    left eye removal,  has artificial eye    HYSTERECTOMY  1974    JOINT REPLACEMENT Left 2010/2012    x 2-knee    TONSILLECTOMY      TUMOR EXCISION      from arm, fatty    UPPER GASTROINTESTINAL ENDOSCOPY  05/30/2014    with biopsy    UPPER GASTROINTESTINAL ENDOSCOPY  01/08/2015    UPPER GASTROINTESTINAL ENDOSCOPY N/A 4/1/2019    EGD ESOPHAGOGASTRODUODENOSCOPY  ++IODINE ALLERGY++ and bx performed by Solitario Arteaga MD at [place] [address]. History   Problem Relation Age of Onset   [facility] Stroke Father     Hypertension Father     Heart Disease Father     Stroke Mother     Hypertension Mother     Other Mother         aneurysm    Heart Disease Mother     Hypertension Brother     Cancer Brother     Breast Cancer Sister     Hypertension Sister     Cancer Sister         breast ca    Heart Disease Sister     Hypertension Brother         parkinson    Heart Disease Brother     Cancer Brother     Cancer Brother     Cancer Brother     Cancer Brother     Heart Disease Brother     Cancer Brother     Cancer Sister     High Blood Pressure Daughter     Breast Cancer Daughter     High Blood Pressure Daughter        CareTeam (Including outside providers/suppliers regularly involved in providing care):   Patient Care Team:  Rosalinda Boyd DO as PCP - General (Family Medicine)  Rosalinda Boyd DO as PCP - [facility] Shiv Flaherty Provider  Rhonda Frank MD as Consulting Physician (Electrophysiology)    Wt Readings from Last 3 Encounters:   12/07/21 186 lb (84.4 kg)   11/30/21 188 lb (85.3 kg)   11/04/21 191 lb (86.6 kg)     Vitals:    12/07/21 0934   BP: 118/64   Pulse: 66   Resp: 16   Temp: 97.6 °F (36.4 °C)   SpO2: 95%   Weight: 186 lb (84.4 kg)   Height: 4' 11\" (1.499 m)     Body mass index is 37.57 kg/m². Based upon direct observation of the patient, evaluation of cognition reveals recent and remote memory intact.     General Appearance: alert and oriented to person, place and time, well developed and well- nourished, in no acute distress  Skin: warm and dry, no rash or erythema  Head: normocephalic and atraumatic  Eyes: pupils equal, round, and reactive to light, extraocular eye movements intact, conjunctivae normal  ENT: tympanic membrane, external ear and ear canal normal bilaterally, nose without deformity, nasal mucosa and turbinates normal without polyps  Neck: supple and non-tender without mass, no thyromegaly or thyroid nodules, no cervical lymphadenopathy  Pulmonary/Chest: clear to auscultation bilaterally- no wheezes, rales or rhonchi, normal air movement, no respiratory distress  Cardiovascular: normal rate, regular rhythm, normal S1 and S2, no murmurs, rubs, clicks, or gallops, distal pulses intact, no carotid bruits  Abdomen: soft, non-tender, non-distended, normal bowel sounds, no masses or organomegaly  Extremities: no cyanosis, clubbing or edema  Musculoskeletal: normal range of motion, no joint swelling, deformity or tenderness  Neurologic: reflexes normal and symmetric, no cranial nerve deficit, gait, coordination and speech normal    Patient's complete Health Risk Assessment and screening values have been reviewed and are found in Flowsheets. The following problems were reviewed today and where indicated follow up appointments were made and/or referrals ordered. Positive Risk Factor Screenings with Interventions:     Fall Risk:  2 or more falls in past year?: (!) yes  Fall with injury in past year?: no  Fall Risk Interventions:    · Home safety tips provided    Cognitive: Words recalled: 1 Word Recalled  Total Score Interpretation: Positive Mini-Cog  Cognitive Impairment Interventions:  · Patient declines any further evaluation/treatment for cognitive impairment         General Health and ACP:  General  In general, how would you say your health is?: (!) Poor  In the past 7 days, have you experienced any of the following?  New or Increased Pain, New or Increased Fatigue, Loneliness, Social Isolation, Stress or Anger?: (!) Stress  Do you get the social and emotional support that you need?: (!) No  Do you have a Living Will?: (!) No  Advance Directives     Power of  Living Will ACP-Advance Directive ACP-Power of     Not on File Filed on 10/21/13 Filed Not on File      General Health Risk Interventions:  · to see [facility] Habits/Nutrition:  Health Habits/Nutrition  Do you exercise for at least 20 minutes 2-3 times per week?: (!) No  Have you lost any weight without trying in the past 3 months?: No  Do you eat only one meal per day?: (!) Yes  Have you seen the dentist within the past year?: Yes  Body mass index: (!) 37.56  Health Habits/Nutrition Interventions:  · no issues    Hearing/Vision:  No exam data present  Hearing/Vision  Do you or your family notice any trouble with your hearing that hasn't been managed with hearing aids?: No  Do you have difficulty driving, watching TV, or doing any of your daily activities because of your eyesight?: (!) Yes  Have you had an eye exam within the past year?: Yes  Hearing/Vision Interventions:  · Hearing concerns:  patient declines any further evaluation/treatment for hearing issues    Safety:  Safety  Do you have working smoke detectors?: Yes  Have all throw rugs been removed or fastened?: Yes  Do you have non-slip mats or surfaces in all bathtubs/showers?: Yes  Do all of your stairways have a railing or banister?: Yes  Are your doorways, halls and stairs free of clutter?: Yes  Do you always fasten your seatbelt when you are in a car?: (!) No  Safety Interventions:  · Home safety tips provided    ADL:  ADLs  In the past 7 days, did you need help from others to perform any of the following everyday activities? Eating, dressing, grooming, bathing, toileting, or walking/balance?: (!) Walking/Balance  In the past 7 days, did you need help from others to take care of any of the following?  Laundry, housekeeping, banking/finances, shopping, telephone use, food preparation, transportation, or taking medications?: None  ADL Interventions:  · Patient declines any

## 2021-12-08 ENCOUNTER — TELEPHONE (OUTPATIENT)
Dept: FAMILY MEDICINE CLINIC | Age: 81
End: 2021-12-08

## 2021-12-08 DIAGNOSIS — H92.09 OTALGIA, UNSPECIFIED LATERALITY: Primary | ICD-10-CM

## 2021-12-08 RX ORDER — AZITHROMYCIN 250 MG/1
250 TABLET, FILM COATED ORAL SEE ADMIN INSTRUCTIONS
Qty: 6 TABLET | Refills: 0 | Status: SHIPPED | OUTPATIENT
Start: 2021-12-08 | End: 2021-12-13

## 2021-12-13 ENCOUNTER — TELEPHONE (OUTPATIENT)
Dept: FAMILY MEDICINE CLINIC | Age: 81
End: 2021-12-13

## 2021-12-13 DIAGNOSIS — K62.89 RECTAL PAIN: Primary | ICD-10-CM

## 2021-12-13 RX ORDER — LIDOCAINE 50 MG/G
OINTMENT TOPICAL
Qty: 50 G | Refills: 0 | Status: SHIPPED
Start: 2021-12-13 | End: 2022-04-18

## 2021-12-13 NOTE — TELEPHONE ENCOUNTER
sHe doesn't see a [de-identified] . she is seeing a surgeon next week, for her stomach and bowel, but she has not seen him since last year and that was for her stomach

## 2021-12-13 NOTE — TELEPHONE ENCOUNTER
She asked if there is a cream for her rectum.  It is irritated especially when trying to have a movement, internal or external cream

## 2022-01-07 NOTE — CONSULTS
Inpatient Cardiology Consultation      Reason for Consult:  Dizziness, bradycardia    Consulting Physician: Dr. Jett Casillas    Requesting Physician:  Dr. Andra Hernandez    Date of Consultation: 5/21/2020    HISTORY OF PRESENT ILLNESS:     This 24-year-old female denies any cardiac history or association with [facility] cardiology. She does have a history of vertigo and frequent falls. She has a poor memory following her CVA. She cannot tell me how frequently she is  falling or how often she requires vertigo medication. She stopped taking aspirin due to her family's recommendation. Yesterday she was in the parking lot at a grocery store. She did eat breakfast.  She felt well while doing her shopping and was getting in her truck to leave. She felt as if her feet were sticking to the pavement and she fell flat on her face. She did not lose consciousness. In the emergency room an EKG showed sinus bradycardia with a heart rate of 58 no acute ST-T wave change. CT of the head showed a hematoma . She received fentanyl. . She had no injuries but was unable to stand and walk. It was thought that this side effects of the fentanyl were not allowing her to walk so she was admitted. This morning as she was getting up for physical therapy she suddenly became dizzy. She is currently not monitored but she felt better when they put her back to bed. Her blood pressure remains elevated since admission at around 180/72      PAST MEDICAL HISTORY  1. Non-smoker  2. Hypertension  3. Hyperlipidemia  4. Obstructive sleep apnea  5. TIA  6. Unable to tolerate beta-blockers due to bradycardia  7. 2D echocardiogram December 2019     Normal left ventricle size and systolic function. Ejection fraction is visually estimated at 60-65%. Grade II diastolic dysfunction. No regional wall motion abnormalities seen. Normal left ventricular wall thickness. Mildly enlarged right ventricle.    Right ventricle global systolic function is mg, Oral, Daily  losartan (COZAAR) tablet 25 mg, 25 mg, Oral, Daily  levothyroxine (SYNTHROID) tablet 88 mcg, 88 mcg, Oral, Daily  LORazepam (ATIVAN) tablet 0.5 mg, 0.5 mg, Oral, Daily PRN  magnesium oxide (MAG-OX) tablet 400 mg, 400 mg, Oral, Daily  miconazole (MICOTIN) 2 % powder, , Topical, BID PRN  pantoprazole (PROTONIX) tablet 40 mg, 40 mg, Oral, QAM AC  sucralfate (CARAFATE) tablet 1 g, 1 g, Oral, 4x Daily  ketorolac (TORADOL) injection 15 mg, 15 mg, Intravenous, Q6H PRN  hydrALAZINE (APRESOLINE) injection 10 mg, 10 mg, Intravenous, Q6H PRN  melatonin tablet 5 mg, 5 mg, Oral, Nightly    Allergies:  Iodine; Codeine; Oxycodone-aspirin; Amoxicillin-pot clavulanate; Darvocet [propoxyphene n-acetaminophen]; Eggs or egg-derived products; Influenza vaccines; Percodan [oxycodone-aspirin]; and Percodan [oxycodone-aspirin]    Social History:  nonsmoker and nondrinker and lives alone      Family History: Noncontributory      REVIEW OF SYSTEMS:     · Constitutional:  admits to fatigue but no, fevers, chills or night sweats  · Eyes: Denies visual changes or drainage  · ENT: Denies headaches or hearing loss. No mouth sores or sore throat. No epistaxis   · Cardiovascular: Denies chest pain, pressure or palpitations. No lower extremity swelling. · Respiratory: Denies PLUMMER, cough, orthopnea or PND. No hemoptysis   · Gastrointestinal: Denies hematemesis or anorexia. No hematochezia or melena    · Genitourinary: Denies urgency, dysuria or hematuria. · Musculoskeletal: Denies gait disturbance, weakness or joint complaints  · Integumentary: Denies rash, hives or pruritis   · Neurological: Denies dizziness, headaches or seizures. No numbness or tingling but admits to weakness in the lower extremities especially on the left  · Psychiatric: Denies anxiety or depression. · Endocrine: Denies temperature intolerance. No recent weight change. .  · Hematologic/Lymphatic: Denies abnormal bruising or bleeding.  No swollen lymph - - -

## 2022-02-01 DIAGNOSIS — F41.9 ANXIETY: ICD-10-CM

## 2022-02-03 RX ORDER — LORAZEPAM 0.5 MG/1
0.5 TABLET ORAL PRN
Qty: 30 TABLET | Refills: 2 | Status: SHIPPED | OUTPATIENT
Start: 2022-02-03 | End: 2022-03-05

## 2022-02-09 ENCOUNTER — TELEPHONE (OUTPATIENT)
Dept: FAMILY MEDICINE CLINIC | Age: 82
End: 2022-02-09

## 2022-02-09 NOTE — TELEPHONE ENCOUNTER
Spoke with patient. Explained that we reviewed her chart and do not see an IV med that she had a reaction to back in October 2020. Explained that the only thing noted was chlorthalidone was discontinued upon discharge on her hospital stay.

## 2022-02-10 ENCOUNTER — HOSPITAL ENCOUNTER (OUTPATIENT)
Age: 82
Discharge: HOME | End: 2022-02-12

## 2022-02-10 PROCEDURE — 88305 TISSUE EXAM BY PATHOLOGIST: CPT

## 2022-02-21 ENCOUNTER — NURSE ONLY (OUTPATIENT)
Dept: FAMILY MEDICINE CLINIC | Age: 82
End: 2022-02-21
Payer: MEDICAID

## 2022-02-21 DIAGNOSIS — R30.0 DYSURIA: Primary | ICD-10-CM

## 2022-02-21 DIAGNOSIS — R30.0 DYSURIA: ICD-10-CM

## 2022-02-21 LAB
BILIRUBIN, POC: NORMAL
BLOOD URINE, POC: NORMAL
CLARITY, POC: CLEAR
COLOR, POC: YELLOW
GLUCOSE URINE, POC: NORMAL
KETONES, POC: NORMAL
LEUKOCYTE EST, POC: NORMAL
NITRITE, POC: NORMAL
PH, POC: 5.5
PROTEIN, POC: NORMAL
SPECIFIC GRAVITY, POC: 1.02
UROBILINOGEN, POC: NORMAL

## 2022-02-21 PROCEDURE — 81002 URINALYSIS NONAUTO W/O SCOPE: CPT | Performed by: FAMILY MEDICINE

## 2022-02-24 ENCOUNTER — TELEPHONE (OUTPATIENT)
Dept: FAMILY MEDICINE CLINIC | Age: 82
End: 2022-02-24

## 2022-02-24 LAB — URINE CULTURE, ROUTINE: NORMAL

## 2022-02-28 ENCOUNTER — TELEPHONE (OUTPATIENT)
Dept: FAMILY MEDICINE CLINIC | Age: 82
End: 2022-02-28

## 2022-02-28 NOTE — TELEPHONE ENCOUNTER
Pt called in this morning with housing questions related to her landlord and requesting a 1 vs 2 bedroom apartment. She is frustrated that the building she would like to rent from has policies in place where she has to speak with staff first vs the owner. She is wondering if anyone can help her advocate for this. Support provided and provided contact information for [facility] [employer], which pt is familiar with as she has worked with them before. She will contact them directly and notify our office if additional assistance is needed.  Mauri Murray, ALLYSSA, LISW-S, OSW-C

## 2022-03-09 ENCOUNTER — OFFICE VISIT (OUTPATIENT)
Dept: FAMILY MEDICINE CLINIC | Age: 82
End: 2022-03-09
Payer: COMMERCIAL

## 2022-03-09 ENCOUNTER — TELEPHONE (OUTPATIENT)
Dept: FAMILY MEDICINE CLINIC | Age: 82
End: 2022-03-09

## 2022-03-09 VITALS
SYSTOLIC BLOOD PRESSURE: 156 MMHG | HEART RATE: 66 BPM | TEMPERATURE: 97.5 F | DIASTOLIC BLOOD PRESSURE: 74 MMHG | RESPIRATION RATE: 18 BRPM | OXYGEN SATURATION: 98 % | HEIGHT: 59 IN | WEIGHT: 190 LBS | BODY MASS INDEX: 38.3 KG/M2

## 2022-03-09 DIAGNOSIS — Z91.81 AT HIGH RISK FOR FALLS: ICD-10-CM

## 2022-03-09 DIAGNOSIS — J34.89 SINUS PRESSURE: ICD-10-CM

## 2022-03-09 DIAGNOSIS — H92.03 OTALGIA OF BOTH EARS: Primary | ICD-10-CM

## 2022-03-09 DIAGNOSIS — H92.03 OTALGIA OF BOTH EARS: ICD-10-CM

## 2022-03-09 DIAGNOSIS — E66.01 SEVERE OBESITY (BMI 35.0-39.9) WITH COMORBIDITY (HCC): ICD-10-CM

## 2022-03-09 LAB
INFLUENZA A ANTIBODY: NORMAL
INFLUENZA B ANTIBODY: NORMAL
Lab: NORMAL
PERFORMING INSTRUMENT: NORMAL
QC PASS/FAIL: NORMAL
S PYO AG THROAT QL: NORMAL
SARS-COV-2, POC: NORMAL

## 2022-03-09 PROCEDURE — 1123F ACP DISCUSS/DSCN MKR DOCD: CPT | Performed by: FAMILY MEDICINE

## 2022-03-09 PROCEDURE — G8400 PT W/DXA NO RESULTS DOC: HCPCS | Performed by: FAMILY MEDICINE

## 2022-03-09 PROCEDURE — 1090F PRES/ABSN URINE INCON ASSESS: CPT | Performed by: FAMILY MEDICINE

## 2022-03-09 PROCEDURE — 87880 STREP A ASSAY W/OPTIC: CPT | Performed by: FAMILY MEDICINE

## 2022-03-09 PROCEDURE — G8417 CALC BMI ABV UP PARAM F/U: HCPCS | Performed by: FAMILY MEDICINE

## 2022-03-09 PROCEDURE — 87426 SARSCOV CORONAVIRUS AG IA: CPT | Performed by: FAMILY MEDICINE

## 2022-03-09 PROCEDURE — G8427 DOCREV CUR MEDS BY ELIG CLIN: HCPCS | Performed by: FAMILY MEDICINE

## 2022-03-09 PROCEDURE — 87804 INFLUENZA ASSAY W/OPTIC: CPT | Performed by: FAMILY MEDICINE

## 2022-03-09 PROCEDURE — 4040F PNEUMOC VAC/ADMIN/RCVD: CPT | Performed by: FAMILY MEDICINE

## 2022-03-09 PROCEDURE — 1036F TOBACCO NON-USER: CPT | Performed by: FAMILY MEDICINE

## 2022-03-09 PROCEDURE — 99213 OFFICE O/P EST LOW 20 MIN: CPT | Performed by: FAMILY MEDICINE

## 2022-03-09 PROCEDURE — G8484 FLU IMMUNIZE NO ADMIN: HCPCS | Performed by: FAMILY MEDICINE

## 2022-03-09 RX ORDER — OMEPRAZOLE 20 MG/1
CAPSULE, DELAYED RELEASE ORAL
COMMUNITY
Start: 2022-02-01 | End: 2022-06-16 | Stop reason: ALTCHOICE

## 2022-03-09 RX ORDER — DOCUSATE SODIUM 100 MG/1
CAPSULE, LIQUID FILLED ORAL
COMMUNITY
Start: 2022-02-14 | End: 2022-07-07 | Stop reason: SDUPTHER

## 2022-03-09 NOTE — PROGRESS NOTES
3/9/2022    Chief Complaint   Patient presents with    Sinusitis     runny nose, ear pain in right ear    Pharyngitis     Started a couple of days ago       HPI    Ruy Martinez is a 78 y.o. patient that presents today with cold symptoms. Upper Respiratory Infection:  Patient complains of bilateral ear pain and congestion. Onset of symptoms was many days ago, gradually worsening since that time. Patient's past medical, surgical, social and/or family history reviewed, updated in chart, and are non-contributory (unless otherwise stated). Medications and allergies also reviewed and updated in chart. ROS negative unless otherwise specified    Physical Exam  BP (!) 156/74   Pulse 66   Temp 97.5 °F (36.4 °C) (Temporal)   Resp 18   Ht 4' 11\" (1.499 m)   Wt 190 lb (86.2 kg)   LMP 07/24/2014   SpO2 98%   BMI 38.38 kg/m²     Wt Readings from Last 3 Encounters:   03/09/22 190 lb (86.2 kg)   12/07/21 186 lb (84.4 kg)   11/30/21 188 lb (85.3 kg)     BP Readings from Last 3 Encounters:   03/09/22 (!) 156/74   12/07/21 118/64   11/30/21 132/80         General appearance: alert, well appearing, and in no distress, oriented to person, place, and time and normal appearing weight. HEENT:  HEAD: Atraumatic, normocephalic  EYES: PERRL  EOM intact  EARS: bilateral TM's and external ear canals normal  NOSE: nasal mucosa, septum, turbinates normal bilaterally  MOUTH: mucous membranes moist and normal tonsils  NECK: supple, full range of motion, no mass, normal lymphadenopathy, no thyromegaly    CVS exam: normal rate, regular rhythm, normal S1, S2, no murmurs, rubs, clicks or gallops. Radial pulses 2+ bilateral.  PT/DP pulse 2+ bilat. No C/C/E    Chest: clear to auscultation, no wheezes, rales or rhonchi, symmetric air entry. SKIN: no lesions, jaundice, petechiae, pallor, cyanosis, ecchymosis        Assessment/Selina Gamoba was seen today for sinusitis and pharyngitis.     Diagnoses and all orders for this visit:    Otalgia of both ears  -     POCT Influenza A/B  -     POCT COVID-19, Antigen  -     POCT rapid strep A  -     COVID-19 Ambulatory; Future  -     neomycin-polymyxin-hydrocortisone (CORTISPORIN) 3.5-40667-8 otic solution; Place 4 drops in ear(s) 3 times daily for 10 days Instill into affected ear    Sinus pressure  -     POCT Influenza A/B  -     POCT COVID-19, Antigen  -     POCT rapid strep A  -     COVID-19 Ambulatory; Future    Severe obesity (BMI 35.0-39. 9) with comorbidity ([facility] [address]  - Body mass index is 38.38 kg/m². BMI was elevated today, and weight loss plan recommended is : conventional weight loss and daily exercise regimen. At high risk for falls          No follow-ups on file. Daja Peña, DO    Call or go to ED immediately if symptoms worsen or persist.    Educational materials and/or home exercises printed for patient's review and were included in patient instructions on his/her After Visit Summary and given to patient at the end of visit. Counseled regarding above diagnosis, including possible risks and complications,  especially if left uncontrolled. Counseled regarding the possible side effects, risks, benefits and alternatives to treatment; patient and/or guardian verbalizes understanding, agrees, feels comfortable with and wishes to proceed with above treatment plan. Advised patient to call with any new medication issues, and read all Rx info from pharmacy to assure aware of all possible risks and side effects of medication before taking. Reviewed age and gender appropriate health screening exams and vaccinations. Advised patient regarding importance of keeping up with recommended health maintenance and to schedule as soon as possible if overdue, as this is important in assessing for undiagnosed pathology, especially cancer, as well as protecting against potentially harmful/life threatening disease.       Patient and/or guardian verbalizes understanding and agrees with above counseling, assessment and plan. All questions answered. On the basis of positive falls risk screening, assessment and plan is as follows: home safety tips provided.

## 2022-03-11 ENCOUNTER — OFFICE VISIT (OUTPATIENT)
Dept: FAMILY MEDICINE CLINIC | Age: 82
End: 2022-03-11
Payer: COMMERCIAL

## 2022-03-11 VITALS
SYSTOLIC BLOOD PRESSURE: 152 MMHG | HEART RATE: 52 BPM | OXYGEN SATURATION: 98 % | DIASTOLIC BLOOD PRESSURE: 64 MMHG | BODY MASS INDEX: 37.77 KG/M2 | TEMPERATURE: 97.8 F | WEIGHT: 187 LBS

## 2022-03-11 DIAGNOSIS — R09.82 POSTNASAL DRIP: ICD-10-CM

## 2022-03-11 DIAGNOSIS — R07.0 PAIN IN THROAT: ICD-10-CM

## 2022-03-11 DIAGNOSIS — H66.91 RIGHT OTITIS MEDIA, UNSPECIFIED OTITIS MEDIA TYPE: ICD-10-CM

## 2022-03-11 DIAGNOSIS — J01.90 ACUTE NON-RECURRENT SINUSITIS, UNSPECIFIED LOCATION: Primary | ICD-10-CM

## 2022-03-11 LAB
SARS-COV-2: NOT DETECTED
SOURCE: NORMAL

## 2022-03-11 PROCEDURE — 1090F PRES/ABSN URINE INCON ASSESS: CPT | Performed by: PHYSICIAN ASSISTANT

## 2022-03-11 PROCEDURE — 1123F ACP DISCUSS/DSCN MKR DOCD: CPT | Performed by: PHYSICIAN ASSISTANT

## 2022-03-11 PROCEDURE — 1036F TOBACCO NON-USER: CPT | Performed by: PHYSICIAN ASSISTANT

## 2022-03-11 PROCEDURE — G8417 CALC BMI ABV UP PARAM F/U: HCPCS | Performed by: PHYSICIAN ASSISTANT

## 2022-03-11 PROCEDURE — G8427 DOCREV CUR MEDS BY ELIG CLIN: HCPCS | Performed by: PHYSICIAN ASSISTANT

## 2022-03-11 PROCEDURE — G8484 FLU IMMUNIZE NO ADMIN: HCPCS | Performed by: PHYSICIAN ASSISTANT

## 2022-03-11 PROCEDURE — 99213 OFFICE O/P EST LOW 20 MIN: CPT | Performed by: PHYSICIAN ASSISTANT

## 2022-03-11 PROCEDURE — G8400 PT W/DXA NO RESULTS DOC: HCPCS | Performed by: PHYSICIAN ASSISTANT

## 2022-03-11 PROCEDURE — 4040F PNEUMOC VAC/ADMIN/RCVD: CPT | Performed by: PHYSICIAN ASSISTANT

## 2022-03-11 RX ORDER — CHLORPHENIRAMINE MALEATE 4 MG/1
4 TABLET ORAL EVERY 6 HOURS PRN
Qty: 20 TABLET | Refills: 0 | Status: SHIPPED
Start: 2022-03-11 | End: 2022-03-21

## 2022-03-11 RX ORDER — METHYLPREDNISOLONE 4 MG/1
TABLET ORAL
Qty: 1 KIT | Refills: 0 | Status: ON HOLD
Start: 2022-03-11 | End: 2022-04-29 | Stop reason: HOSPADM

## 2022-03-11 RX ORDER — DOXYCYCLINE HYCLATE 100 MG
100 TABLET ORAL 2 TIMES DAILY
Qty: 20 TABLET | Refills: 0 | Status: SHIPPED | OUTPATIENT
Start: 2022-03-11 | End: 2022-03-21

## 2022-03-11 NOTE — PROGRESS NOTES
3/11/22  Isaac Lomeli : 1940 Sex: female  Age 78 y.o. Subjective:  Chief Complaint   Patient presents with    Otalgia     given drops by pcp R side w little result/sx x2w     Pharyngitis     strep -          HPI:   Isaac Lomeli , 78 y.o. female presents to [facility] for evaluation of congestion, drainage, sore throat, right ear pain    HPI  78-year-old female presents to [facility] for evaluation of right ear pain, congestion, drainage, sore throat. The patient has had the symptoms ongoing for about 2 weeks now. The patient is not have any fevers or chills. Followed up with PCP and was given some drops. The drops do not seem to be helping at this point. The patient denies any back pain, flank pain. The patient is not having any dizziness or lightheadedness. The patient states that her throat is also bothering her. She not drooling. ROS:   Unless otherwise stated in this report the patient's positive and negative responses for review of systems for constitutional, eyes, ENT, cardiovascular, respiratory, gastrointestinal, neurological, , musculoskeletal, and integument systems and related systems to the presenting problem are either stated in the history of present illness or were not pertinent or were negative for the symptoms and/or complaints related to the presenting medical problem. Positives and pertinent negatives as per HPI. All others reviewed and are negative.       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, treated with surgery and chemo    Cerebral artery occlusion with cerebral infarction ([facility] [address]     possibly TIA    Chronic back pain     Constipation     Depression     Elevated sed rate 2017    hx of    GERD (gastroesophageal reflux disease)     Hemorrhoids     history of    Hyperlipidemia     diet controlled    Hypertension     Left foot pain     dropped a Kettle on foot    Lumbosacral radiculopathy 8/6/2020    Macular degeneration     Peripheral neuropathy 8/6/2020    Poor vision     right eye / had bleeding behind eye, is receiving \"shots\" at this time  / 3/22/2019    Prosthetic eye globe     left eye implant;    Seasonal allergies     Skipped heart beats     on metoprolol; managed by Dr. Caio Aldrich Sleep apnea     uses cpap at times     Thyroid disease        Past Surgical History:   Procedure Laterality Date   [address]    open?     COLECTOMY  1974    COLONOSCOPY  04/26/2012    and egd    COLONOSCOPY  05/30/2014    COLONOSCOPY  01/08/2015    CYST REMOVAL  years ago    upper gum    EYE SURGERY  years ago    left eye removal,  has artificial eye    HYSTERECTOMY  1974    JOINT REPLACEMENT Left 2010/2012    x 2-knee    TONSILLECTOMY      TUMOR EXCISION      from arm, fatty    UPPER GASTROINTESTINAL ENDOSCOPY  05/30/2014    with biopsy    UPPER GASTROINTESTINAL ENDOSCOPY  01/08/2015    UPPER GASTROINTESTINAL ENDOSCOPY N/A 4/1/2019    EGD ESOPHAGOGASTRODUODENOSCOPY  ++IODINE ALLERGY++ and bx performed by Chencho Ceja MD at [address] [facility] History   Problem Relation Age of Onset   [place]oya Stroke Father     Hypertension Father     Heart Disease Father     Stroke Mother     Hypertension Mother     Other Mother         aneurysm    Heart Disease Mother     Hypertension Brother     Cancer Brother     Breast Cancer Sister     Hypertension Sister     Cancer Sister         breast ca    Heart Disease Sister     Hypertension Brother         parkinson    Heart Disease Brother     Cancer Brother     Cancer Brother     Cancer Brother     Cancer Brother     Heart Disease Brother     Cancer Brother     Cancer Sister     High Blood Pressure Daughter     Breast Cancer Daughter     High Blood Pressure Daughter        Medications:     Current Outpatient Medications:     doxycycline hyclate (VIBRA-TABS) 100 MG tablet, Take 1 tablet by mouth 2 times daily for 10 days, Disp: 20 tablet, Rfl: 0    methylPREDNISolone (MEDROL DOSEPACK) 4 MG tablet, Take by mouth., Disp: 1 kit, Rfl: 0    chlorpheniramine (ALLER-CHLOR) 4 MG tablet, Take 1 tablet by mouth every 6 hours as needed for Allergies, Disp: 20 tablet, Rfl: 0    omeprazole (PRILOSEC) 20 MG delayed release capsule, TAKE ONE CAPSULE BY MOUTH EVERY DAY, Disp: , Rfl:     docusate sodium (COLACE) 100 MG capsule, TAKE ONE CAPSULE BY MOUTH TWO TIMES A DAY, Disp: , Rfl:     neomycin-polymyxin-hydrocortisone (CORTISPORIN) 3.5-09896-3 otic solution, Place 4 drops in ear(s) 3 times daily for 10 days Instill into affected ear, Disp: 10 mL, Rfl: 0    lidocaine (XYLOCAINE) 5 % ointment, Apply topically as needed. , Disp: 50 g, Rfl: 0    linaCLOtide (LINZESS) 72 MCG CAPS capsule, Take 1 capsule by mouth every morning (before breakfast), Disp: 90 capsule, Rfl: 1    metoprolol succinate (TOPROL XL) 25 MG extended release tablet, Take 1 tablet by mouth daily, Disp: 30 tablet, Rfl: 5    losartan (COZAAR) 100 MG tablet, Take 1 tablet by mouth daily, Disp: 30 tablet, Rfl: 5    meclizine (ANTIVERT) 25 MG tablet, Take 1 tablet by mouth as needed for Dizziness, Disp: 30 tablet, Rfl: 1    cetirizine (ZYRTEC ALLERGY) 10 MG tablet, Take 10 mg by mouth daily as needed for Allergies, Disp: , Rfl:     levothyroxine (SYNTHROID) 88 MCG tablet, Take 1 tablet by mouth daily, Disp: 90 tablet, Rfl: 1    ondansetron (ZOFRAN) 4 MG tablet, Take 1 tablet by mouth 3 times daily as needed for Nausea or Vomiting, Disp: 30 tablet, Rfl: 2    Multiple Vitamins-Minerals (PRESERVISION AREDS 2) CAPS, Take 1 capsule by mouth 2 times daily , Disp: , Rfl:     Allergies:      Allergies   Allergen Reactions    Iodine Shortness Of Breath, Swelling and Rash    Bee Venom     Codeine      Patient cannot remember reaction    Hydralazine     Oxycodone-Aspirin      unknown    Amoxicillin-Pot Clavulanate Nausea And Vomiting    Darvocet [Propoxyphene N-Acetaminophen] Nausea And Vomiting    Eggs Or Egg-Derived Products Nausea And Vomiting    Influenza Vaccines Nausea And Vomiting    Percodan [Oxycodone-Aspirin] Nausea And Vomiting and Other (See Comments)     sick    Percodan [Oxycodone-Aspirin] Nausea And Vomiting       Social History:     Social History     Tobacco Use    Smoking status: Passive Smoke Exposure - Never Smoker    Smokeless tobacco: Never Used    Tobacco comment:  had smoked,  in    Vaping Use    Vaping Use: Never used   Substance Use Topics    Alcohol use: No    Drug use: Never       Patient lives at home. Physical Exam:     Vitals:    22 1532   BP: (!) 152/64   Pulse: 52   Temp: 97.8 °F (36.6 °C)   SpO2: 98%   Weight: 187 lb (84.8 kg)       Exam:  Physical Exam  Nurse's notes and vital signs reviewed. The patient is not hypoxic. ? General: Alert, no acute distress, patient resting comfortably Patient is not toxic or lethargic. Skin: Warm, intact, no pallor noted. There is no evidence of rash at this time. Head: Normocephalic, atraumatic  Eye: Normal conjunctiva  Ears, Nose, Throat: Right tympanic membrane erythematous, no bulging, no signs of perforation, left tympanic membrane clear. No drainage or discharge noted. No pre- or post-auricular tenderness, erythema, or swelling noted. Nasal congestion, rhinorrhea. Posterior oropharynx shows erythema but no evidence of tonsillar hypertrophy, or exudate. the uvula is midline. No trismus or drooling is noted. Moist mucous membranes. Neck: No anterior/posterior lymphadenopathy noted. No erythema, no masses, no fluctuance or induration noted. No meningeal signs. Cardiovascular: Regular Rate and Rhythm  Respiratory: No acute distress, no rhonchi, wheezing or crackles noted. No stridor or retractions are noted.   Neurological: A&O x4, normal speech  Psychiatric: Cooperative         Testing: Medical Decision Making:     Vital signs reviewed    Past medical history reviewed. Allergies reviewed. Medications reviewed. Patient on arrival does not appear to be in any apparent distress or discomfort. The patient has been seen and evaluated. The patient does not appear to be toxic or lethargic. The patient will be treated with chlorphentermine, doxycycline and Medrol Dosepak. Patient may continue with the eardrops. The patient was educated on the proper dosage of motrin and tylenol and the appropriate intervals of each. The patient is to increase fluid intake over the next several days. The patient is to use OTC decongestant as needed. The patient is to return to express care or go directly to the emergency department should any of the signs or symptoms worsen. The patient is to followup with primary care physician in 2-3 days for repeat evaluation. The patient has no other questions or concerns at this time the patient will be discharged home. Clinical Impression:   Hedy musa was seen today for otalgia and pharyngitis. Diagnoses and all orders for this visit:    Acute non-recurrent sinusitis, unspecified location    Postnasal drip    Pain in throat    Right otitis media, unspecified otitis media type    Other orders  -     doxycycline hyclate (VIBRA-TABS) 100 MG tablet; Take 1 tablet by mouth 2 times daily for 10 days  -     methylPREDNISolone (MEDROL DOSEPACK) 4 MG tablet; Take by mouth.  -     chlorpheniramine (ALLER-CHLOR) 4 MG tablet; Take 1 tablet by mouth every 6 hours as needed for Allergies        The patient is to call for any concerns or return if any of the signs or symptoms worsen. The patient is to follow-up with PCP in the next 2-3 days for repeat evaluation repeat assessment or go directly to the emergency department.      SIGNATURE: Riccardo Walsh III, PA-C

## 2022-03-13 ENCOUNTER — APPOINTMENT (OUTPATIENT)
Dept: CT IMAGING | Age: 82
End: 2022-03-13
Payer: COMMERCIAL

## 2022-03-13 ENCOUNTER — HOSPITAL ENCOUNTER (EMERGENCY)
Age: 82
Discharge: HOME | End: 2022-03-13
Attending: EMERGENCY MEDICINE
Payer: COMMERCIAL

## 2022-03-13 VITALS
DIASTOLIC BLOOD PRESSURE: 69 MMHG | WEIGHT: 187 LBS | OXYGEN SATURATION: 95 % | HEIGHT: 59 IN | RESPIRATION RATE: 16 BRPM | TEMPERATURE: 97.2 F | BODY MASS INDEX: 37.7 KG/M2 | SYSTOLIC BLOOD PRESSURE: 174 MMHG | HEART RATE: 58 BPM

## 2022-03-13 DIAGNOSIS — I10 ESSENTIAL HYPERTENSION: Primary | ICD-10-CM

## 2022-03-13 LAB
ANION GAP SERPL CALCULATED.3IONS-SCNC: 10 MMOL/L (ref 7–16)
BASOPHILS ABSOLUTE: 0.04 E9/L (ref 0–0.2)
BASOPHILS RELATIVE PERCENT: 0.4 % (ref 0–2)
BUN BLDV-MCNC: 30 MG/DL (ref 6–23)
CALCIUM SERPL-MCNC: 9.5 MG/DL (ref 8.6–10.2)
CHLORIDE BLD-SCNC: 106 MMOL/L (ref 98–107)
CO2: 23 MMOL/L (ref 22–29)
CREAT SERPL-MCNC: 0.9 MG/DL (ref 0.5–1)
EOSINOPHILS ABSOLUTE: 0.05 E9/L (ref 0.05–0.5)
EOSINOPHILS RELATIVE PERCENT: 0.5 % (ref 0–6)
GFR AFRICAN AMERICAN: >60
GFR NON-AFRICAN AMERICAN: 60 ML/MIN/1.73
GLUCOSE BLD-MCNC: 101 MG/DL (ref 74–99)
HCT VFR BLD CALC: 37.6 % (ref 34–48)
HEMOGLOBIN: 12.3 G/DL (ref 11.5–15.5)
IMMATURE GRANULOCYTES #: 0.06 E9/L
IMMATURE GRANULOCYTES %: 0.6 % (ref 0–5)
LYMPHOCYTES ABSOLUTE: 3.35 E9/L (ref 1.5–4)
LYMPHOCYTES RELATIVE PERCENT: 32 % (ref 20–42)
MCH RBC QN AUTO: 28.5 PG (ref 26–35)
MCHC RBC AUTO-ENTMCNC: 32.7 % (ref 32–34.5)
MCV RBC AUTO: 87.2 FL (ref 80–99.9)
MONOCYTES ABSOLUTE: 0.78 E9/L (ref 0.1–0.95)
MONOCYTES RELATIVE PERCENT: 7.4 % (ref 2–12)
NEUTROPHILS ABSOLUTE: 6.19 E9/L (ref 1.8–7.3)
NEUTROPHILS RELATIVE PERCENT: 59.1 % (ref 43–80)
PDW BLD-RTO: 13.3 FL (ref 11.5–15)
PLATELET # BLD: 241 E9/L (ref 130–450)
PMV BLD AUTO: 9.8 FL (ref 7–12)
POTASSIUM SERPL-SCNC: 4.1 MMOL/L (ref 3.5–5)
RBC # BLD: 4.31 E12/L (ref 3.5–5.5)
SODIUM BLD-SCNC: 139 MMOL/L (ref 132–146)
WBC # BLD: 10.5 E9/L (ref 4.5–11.5)

## 2022-03-13 PROCEDURE — 99283 EMERGENCY DEPT VISIT LOW MDM: CPT

## 2022-03-13 PROCEDURE — 70450 CT HEAD/BRAIN W/O DYE: CPT

## 2022-03-13 PROCEDURE — 85025 COMPLETE CBC W/AUTO DIFF WBC: CPT

## 2022-03-13 PROCEDURE — 80048 BASIC METABOLIC PNL TOTAL CA: CPT

## 2022-03-13 ASSESSMENT — ENCOUNTER SYMPTOMS
BACK PAIN: 0
SORE THROAT: 0
ABDOMINAL PAIN: 0
NAUSEA: 0
BLURRED VISION: 0
COUGH: 0
EYE DISCHARGE: 0
DIARRHEA: 0
VOMITING: 0
ABDOMINAL DISTENTION: 0
SINUS PRESSURE: 0
EYE REDNESS: 0
SHORTNESS OF BREATH: 0
WHEEZING: 0
EYE PAIN: 0

## 2022-03-13 NOTE — ED PROVIDER NOTES
68-year-old female presents to the emergency department with high blood pressure. She states a mild headache and left-sided earache which is currently being treated for a infection of her left ear. Is currently 10 doxycycline and a Z-Andrea. Patient states that her blood pressure been rising her last few days and this prompted her to come in. Initial blood pressure by staff was 198/82. She states her blood pressure normally runs in the 305 systolic. Patient states no nausea vomiting diarrhea abdominal.  States no chest pain or shortness of breath vision changes or leg swelling    The history is provided by the patient. Hypertension  Severity:  Mild  Onset quality:  Gradual  Duration:  2 days  Timing:  Intermittent  Progression:  Unchanged  Chronicity:  New  Notable PTA blood pressures:  200s at pharmacy  Context: medication change    Relieved by:  Nothing  Worsened by:  Nothing  Ineffective treatments:  None tried  Associated symptoms: ear pain and headaches    Associated symptoms: no abdominal pain, no anxiety, no blurred vision, no chest pain, no confusion, no dizziness, no epistaxis, no fever, no loss of consciousness, no nausea, no neck pain, no peripheral edema, no shortness of breath, no syncope, no tinnitus, not vomiting and no weakness         Review of Systems   Constitutional: Negative for chills and fever. HENT: Positive for ear pain. Negative for nosebleeds, sinus pressure, sore throat and tinnitus. Eyes: Negative for blurred vision, pain, discharge and redness. Respiratory: Negative for cough, shortness of breath and wheezing. Cardiovascular: Negative for chest pain and syncope. Gastrointestinal: Negative for abdominal distention, abdominal pain, diarrhea, nausea and vomiting. Genitourinary: Negative for dysuria and frequency. Musculoskeletal: Negative for arthralgias, back pain and neck pain. Skin: Negative for rash and wound. Neurological: Positive for headaches.  Negative for dizziness, loss of consciousness and weakness. Hematological: Negative for adenopathy. Psychiatric/Behavioral: Negative for confusion. The patient is not nervous/anxious. All other systems reviewed and are negative. Physical Exam  Constitutional:       Appearance: Normal appearance. She is obese. HENT:      Head: Normocephalic and atraumatic. Right Ear: Tympanic membrane is bulging. Left Ear: Tympanic membrane normal.      Nose: Nose normal.   Eyes:      Extraocular Movements: Extraocular movements intact. Pupils: Pupils are equal, round, and reactive to light. Cardiovascular:      Rate and Rhythm: Normal rate and regular rhythm. Pulses: Normal pulses. Heart sounds: Normal heart sounds. Pulmonary:      Effort: Pulmonary effort is normal.      Breath sounds: Normal breath sounds. Abdominal:      General: Abdomen is flat. Bowel sounds are normal. There is no distension. Palpations: Abdomen is soft. Tenderness: There is no abdominal tenderness. There is no guarding. Musculoskeletal:         General: Normal range of motion. Skin:     General: Skin is warm. Capillary Refill: Capillary refill takes less than 2 seconds. Neurological:      General: No focal deficit present. Mental Status: She is alert and oriented to person, place, and time. Procedures     MDM  Number of Diagnoses or Management Options  Essential hypertension  Diagnosis management comments: Patient seen and examined. Labs were evaluated CT head evaluated and found to be negative. Patient blood pressure improved without intervention.   Patient already on antibiotics for otitis media patient to continue antibiotics and follow-up with primary care physician for adjustments BP meds            --------------------------------------------- PAST HISTORY ---------------------------------------------  Past Medical History:  has a past medical history of Amaurosis fugax of right eye, Anxiety, Arthritis, CA - cancer of bowel, Cerebral artery occlusion with cerebral infarction ([facility] [address] Chronic back pain, Constipation, Depression, Elevated sed rate, GERD (gastroesophageal reflux disease), Hemorrhoids, Hyperlipidemia, Hypertension, Left foot pain, Lumbosacral radiculopathy, Macular degeneration, Peripheral neuropathy, Poor vision, Prosthetic eye globe, Seasonal allergies, Skipped heart beats, Sleep apnea, and Thyroid disease. Past Surgical History:  has a past surgical history that includes Eye surgery (years ago); colectomy (1974); Colonoscopy (04/26/2012); Cholecystectomy (1985); Hysterectomy (1974); tumor excision; Upper gastrointestinal endoscopy (05/30/2014); Colonoscopy (05/30/2014); Tonsillectomy; joint replacement (Left, 2010/2012); Upper gastrointestinal endoscopy (01/08/2015); Colonoscopy (01/08/2015); cyst removal (years ago); and Upper gastrointestinal endoscopy (N/A, 4/1/2019). Social History:  reports that she is a non-smoker but has been exposed to tobacco smoke. She has never used smokeless tobacco. She reports that she does not drink alcohol and does not use drugs. Family History: family history includes Breast Cancer in her daughter and sister; Cancer in her brother, brother, brother, brother, brother, brother, sister, and sister; Heart Disease in her brother, brother, father, mother, and sister; High Blood Pressure in her daughter and daughter; Hypertension in her brother, brother, father, mother, and sister; Other in her mother; Stroke in her father and mother. The patients home medications have been reviewed.     Allergies: Iodine, Bee venom, Codeine, Hydralazine, Oxycodone-aspirin, Amoxicillin-pot clavulanate, Darvocet [propoxyphene n-acetaminophen], Eggs or egg-derived products, Influenza vaccines, Percodan [oxycodone-aspirin], and Percodan [oxycodone-aspirin]    -------------------------------------------------- RESULTS -------------------------------------------------  Labs:  Results for orders placed or performed during the hospital encounter of 03/13/22   CBC with Auto Differential   Result Value Ref Range    WBC 10.5 4.5 - 11.5 E9/L    RBC 4.31 3.50 - 5.50 E12/L    Hemoglobin 12.3 11.5 - 15.5 g/dL    Hematocrit 37.6 34.0 - 48.0 %    MCV 87.2 80.0 - 99.9 fL    MCH 28.5 26.0 - 35.0 pg    MCHC 32.7 32.0 - 34.5 %    RDW 13.3 11.5 - 15.0 fL    Platelets 735 799 - 172 E9/L    MPV 9.8 7.0 - 12.0 fL    Neutrophils % 59.1 43.0 - 80.0 %    Immature Granulocytes % 0.6 0.0 - 5.0 %    Lymphocytes % 32.0 20.0 - 42.0 %    Monocytes % 7.4 2.0 - 12.0 %    Eosinophils % 0.5 0.0 - 6.0 %    Basophils % 0.4 0.0 - 2.0 %    Neutrophils Absolute 6.19 1.80 - 7.30 E9/L    Immature Granulocytes # 0.06 E9/L    Lymphocytes Absolute 3.35 1.50 - 4.00 E9/L    Monocytes Absolute 0.78 0.10 - 0.95 E9/L    Eosinophils Absolute 0.05 0.05 - 0.50 E9/L    Basophils Absolute 0.04 0.00 - 0.20 B2/K   Basic Metabolic Panel   Result Value Ref Range    Sodium 139 132 - 146 mmol/L    Potassium 4.1 3.5 - 5.0 mmol/L    Chloride 106 98 - 107 mmol/L    CO2 23 22 - 29 mmol/L    Anion Gap 10 7 - 16 mmol/L    Glucose 101 (H) 74 - 99 mg/dL    BUN 30 (H) 6 - 23 mg/dL    CREATININE 0.9 0.5 - 1.0 mg/dL    GFR Non-African American 60 >=60 mL/min/1.73    GFR African American >60     Calcium 9.5 8.6 - 10.2 mg/dL       Radiology:  CT HEAD WO CONTRAST   Final Result   No acute intracranial abnormality. Cortical atrophy and periventricular leukomalacia. Redemonstration of postsurgical changes in the left orbit/globe.             ------------------------- NURSING NOTES AND VITALS REVIEWED ---------------------------  Date / Time Roomed:  3/13/2022  4:24 PM  ED Bed Assignment:  REJI/REJI    The nursing notes within the ED encounter and vital signs as below have been reviewed.    BP (!) 174/69   Pulse 58   Temp 97.2 °F (36.2 °C) (Oral)   Resp 16   Ht 4' 11\" (1.499 m)   Wt 187 lb (84.8 kg)   LMP 07/24/2014   SpO2 95%   BMI 37.77 kg/m²   Oxygen Saturation Interpretation: Normal      ------------------------------------------ PROGRESS NOTES ------------------------------------------  I have spoken with the patient and discussed todays results, in addition to providing specific details for the plan of care and counseling regarding the diagnosis and prognosis. Their questions are answered at this time and they are agreeable with the plan. I discussed at length with them reasons for immediate return here for re evaluation. They will followup with their primary care physician by calling their office on Monday.      --------------------------------- ADDITIONAL PROVIDER NOTES ---------------------------------  At this time the patient is without objective evidence of an acute process requiring hospitalization or inpatient management. They have remained hemodynamically stable throughout their entire ED visit and are stable for discharge with outpatient follow-up. The plan has been discussed in detail and they are aware of the specific conditions for emergent return, as well as the importance of follow-up. Discharge Medication List as of 3/13/2022  6:37 PM          Diagnosis:  1. Essential hypertension        Disposition:  Patient's disposition: Discharge to home  Patient's condition is stable.        Rigoberto Franco DO  03/13/22 2101

## 2022-03-14 ENCOUNTER — NURSE ONLY (OUTPATIENT)
Dept: FAMILY MEDICINE CLINIC | Age: 82
End: 2022-03-14

## 2022-03-14 ENCOUNTER — TELEPHONE (OUTPATIENT)
Dept: FAMILY MEDICINE CLINIC | Age: 82
End: 2022-03-14

## 2022-03-14 VITALS
HEART RATE: 86 BPM | DIASTOLIC BLOOD PRESSURE: 76 MMHG | HEIGHT: 59 IN | OXYGEN SATURATION: 94 % | SYSTOLIC BLOOD PRESSURE: 182 MMHG | WEIGHT: 184.9 LBS | BODY MASS INDEX: 37.28 KG/M2 | TEMPERATURE: 97.4 F

## 2022-03-14 DIAGNOSIS — F41.9 ANXIETY: ICD-10-CM

## 2022-03-14 RX ORDER — LORAZEPAM 0.5 MG/1
0.5 TABLET ORAL PRN
Qty: 30 TABLET | Refills: 2 | Status: CANCELLED | OUTPATIENT
Start: 2022-03-14 | End: 2022-04-13

## 2022-03-14 RX ORDER — CLONIDINE HYDROCHLORIDE 0.1 MG/1
0.1 TABLET ORAL ONCE
Status: COMPLETED | OUTPATIENT
Start: 2022-03-14 | End: 2022-03-14

## 2022-03-14 RX ADMIN — CLONIDINE HYDROCHLORIDE 0.1 MG: 0.1 TABLET ORAL at 10:21

## 2022-03-14 NOTE — TELEPHONE ENCOUNTER
She was in yesterday for bp. She said she thinks one of the medications is making her throw up.  She is coming in today for a nuse bp check also

## 2022-03-14 NOTE — PROGRESS NOTES
Pt states she has had vomiting since 3am this morning. Pt feels is related to her abx - doxycycline. Pt has started on 3/11/22. Pt threw up all meds this morning after she took them. Per Dr. Fiorella Miller, Clonidine . 1mg given to patient. Will moniter here inoffice and retake pressure. Per Dr. Fiorella Miller, patient instructed to d/c her abx and steriod pack. Pt to take BP meds when she goes home and also get refill of her ativan. Pt to not take BP today and take her BP tomorrow. Pt will call in with BP readings. Pt understood and agreed.

## 2022-03-15 ENCOUNTER — TELEPHONE (OUTPATIENT)
Dept: FAMILY MEDICINE CLINIC | Age: 82
End: 2022-03-15

## 2022-03-21 ENCOUNTER — OFFICE VISIT (OUTPATIENT)
Dept: GERIATRIC MEDICINE | Age: 82
End: 2022-03-21
Payer: COMMERCIAL

## 2022-03-21 VITALS
HEART RATE: 68 BPM | BODY MASS INDEX: 37.16 KG/M2 | DIASTOLIC BLOOD PRESSURE: 62 MMHG | HEIGHT: 59 IN | SYSTOLIC BLOOD PRESSURE: 142 MMHG | RESPIRATION RATE: 22 BRPM | WEIGHT: 184.3 LBS | TEMPERATURE: 97.4 F

## 2022-03-21 DIAGNOSIS — R41.0 CONFUSION: Primary | ICD-10-CM

## 2022-03-21 DIAGNOSIS — G31.84 MCI (MILD COGNITIVE IMPAIRMENT): ICD-10-CM

## 2022-03-21 DIAGNOSIS — G30.9 ALZHEIMER'S DISEASE, UNSPECIFIED (CODE) (HCC): ICD-10-CM

## 2022-03-21 DIAGNOSIS — F33.1 MODERATE EPISODE OF RECURRENT MAJOR DEPRESSIVE DISORDER (HCC): ICD-10-CM

## 2022-03-21 PROCEDURE — G8484 FLU IMMUNIZE NO ADMIN: HCPCS | Performed by: INTERNAL MEDICINE

## 2022-03-21 PROCEDURE — 99212 OFFICE O/P EST SF 10 MIN: CPT | Performed by: INTERNAL MEDICINE

## 2022-03-21 PROCEDURE — 1090F PRES/ABSN URINE INCON ASSESS: CPT | Performed by: INTERNAL MEDICINE

## 2022-03-21 PROCEDURE — 4040F PNEUMOC VAC/ADMIN/RCVD: CPT | Performed by: INTERNAL MEDICINE

## 2022-03-21 PROCEDURE — 1036F TOBACCO NON-USER: CPT | Performed by: INTERNAL MEDICINE

## 2022-03-21 PROCEDURE — G8400 PT W/DXA NO RESULTS DOC: HCPCS | Performed by: INTERNAL MEDICINE

## 2022-03-21 PROCEDURE — G8417 CALC BMI ABV UP PARAM F/U: HCPCS | Performed by: INTERNAL MEDICINE

## 2022-03-21 PROCEDURE — 1123F ACP DISCUSS/DSCN MKR DOCD: CPT | Performed by: INTERNAL MEDICINE

## 2022-03-21 PROCEDURE — G8427 DOCREV CUR MEDS BY ELIG CLIN: HCPCS | Performed by: INTERNAL MEDICINE

## 2022-03-21 RX ORDER — DONEPEZIL HYDROCHLORIDE 5 MG/1
2.5 TABLET, FILM COATED ORAL
Qty: 30 TABLET | Refills: 3 | Status: SHIPPED
Start: 2022-03-21 | End: 2022-06-16
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
## 2022-03-21 NOTE — PROGRESS NOTES
Re: B12 level -- pt left the office without having lab drawn. Spoke with her by phone - states [facility] & will advise them that the order is in the computer.

## 2022-03-24 ENCOUNTER — HOSPITAL ENCOUNTER (OUTPATIENT)
Age: 82
Discharge: HOME | End: 2022-03-24
Payer: COMMERCIAL

## 2022-03-24 DIAGNOSIS — R41.0 CONFUSION: ICD-10-CM

## 2022-03-24 LAB — VITAMIN B-12: 351 PG/ML (ref 211–946)

## 2022-03-24 PROCEDURE — 36415 COLL VENOUS BLD VENIPUNCTURE: CPT

## 2022-03-24 PROCEDURE — 82607 VITAMIN B-12: CPT

## 2022-03-25 ENCOUNTER — TELEPHONE (OUTPATIENT)
Dept: GERIATRIC MEDICINE | Age: 82
End: 2022-03-25

## 2022-03-25 NOTE — TELEPHONE ENCOUNTER
Spoke with her regarding her stresses she has had recently in her life. And how it has affected her memory.

## 2022-03-25 NOTE — TELEPHONE ENCOUNTER
Last appt was on 3/21/22. Pt would like DR to call her -- she has questions about meds, and discuss yesterday's B12 level result.

## 2022-04-15 DIAGNOSIS — K62.89 RECTAL PAIN: ICD-10-CM

## 2022-04-18 ENCOUNTER — TELEPHONE (OUTPATIENT)
Dept: FAMILY MEDICINE CLINIC | Age: 82
End: 2022-04-18

## 2022-04-18 RX ORDER — LIDOCAINE 50 MG/G
OINTMENT TOPICAL
Qty: 50 G | Refills: 0 | Status: ON HOLD
Start: 2022-04-18 | End: 2022-04-29 | Stop reason: HOSPADM
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
## 2022-04-19 ENCOUNTER — TELEPHONE (OUTPATIENT)
Dept: FAMILY MEDICINE CLINIC | Age: 82
End: 2022-04-19

## 2022-04-19 RX ORDER — LIDOCAINE 50 MG/G
1 PATCH TOPICAL DAILY
Qty: 30 PATCH | Refills: 0 | Status: SHIPPED | OUTPATIENT
Start: 2022-04-19 | End: 2022-05-19

## 2022-04-19 NOTE — TELEPHONE ENCOUNTER
She said you prescribed cream yesterday which is fine but she is wanting an order for patches for her back [facility] phone is [phone]

## 2022-04-22 NOTE — TELEPHONE ENCOUNTER
Spoke to patient and advised OTC lidocaine patch, verbalized understanding and states she will be leaving to come home today and will schedule a follow up when she gets back in town

## 2022-04-24 ENCOUNTER — APPOINTMENT (OUTPATIENT)
Dept: MRI IMAGING | Age: 82
DRG: 066 | End: 2022-04-24
Payer: MEDICARE

## 2022-04-24 ENCOUNTER — APPOINTMENT (OUTPATIENT)
Dept: GENERAL RADIOLOGY | Age: 82
DRG: 066 | End: 2022-04-24
Payer: MEDICARE

## 2022-04-24 ENCOUNTER — APPOINTMENT (OUTPATIENT)
Dept: CT IMAGING | Age: 82
DRG: 066 | End: 2022-04-24
Payer: MEDICARE

## 2022-04-24 ENCOUNTER — HOSPITAL ENCOUNTER (INPATIENT)
Age: 82
LOS: 5 days | Discharge: NURSING, REHAB OR LONG-TERM CARE | DRG: 066 | End: 2022-04-29
Attending: EMERGENCY MEDICINE | Admitting: INTERNAL MEDICINE
Payer: MEDICARE

## 2022-04-24 DIAGNOSIS — I60.9 SUBARACHNOID BLEED (HCC): Primary | ICD-10-CM

## 2022-04-24 DIAGNOSIS — I63.9 ACUTE CVA (CEREBROVASCULAR ACCIDENT) (HCC): ICD-10-CM

## 2022-04-24 LAB
ALBUMIN SERPL-MCNC: 4.2 G/DL (ref 3.5–5.2)
ALP BLD-CCNC: 70 U/L (ref 35–104)
ALT SERPL-CCNC: 13 U/L (ref 0–32)
ANION GAP SERPL CALCULATED.3IONS-SCNC: 9 MMOL/L (ref 7–16)
AST SERPL-CCNC: 21 U/L (ref 0–31)
BACTERIA: NORMAL /HPF
BASOPHILS ABSOLUTE: 0.04 E9/L (ref 0–0.2)
BASOPHILS RELATIVE PERCENT: 0.6 % (ref 0–2)
BILIRUB SERPL-MCNC: 0.5 MG/DL (ref 0–1.2)
BILIRUBIN URINE: NEGATIVE
BLOOD, URINE: NEGATIVE
BUN BLDV-MCNC: 26 MG/DL (ref 6–23)
CALCIUM SERPL-MCNC: 9.3 MG/DL (ref 8.6–10.2)
CHLORIDE BLD-SCNC: 105 MMOL/L (ref 98–107)
CLARITY: CLEAR
CO2: 24 MMOL/L (ref 22–29)
COLOR: YELLOW
CREAT SERPL-MCNC: 0.8 MG/DL (ref 0.5–1)
EOSINOPHILS ABSOLUTE: 0.14 E9/L (ref 0.05–0.5)
EOSINOPHILS RELATIVE PERCENT: 2 % (ref 0–6)
GFR AFRICAN AMERICAN: >60
GFR NON-AFRICAN AMERICAN: >60 ML/MIN/1.73
GLUCOSE BLD-MCNC: 99 MG/DL (ref 74–99)
GLUCOSE URINE: NEGATIVE MG/DL
HCT VFR BLD CALC: 38.5 % (ref 34–48)
HEMOGLOBIN: 12.5 G/DL (ref 11.5–15.5)
IMMATURE GRANULOCYTES #: 0.02 E9/L
IMMATURE GRANULOCYTES %: 0.3 % (ref 0–5)
KETONES, URINE: NEGATIVE MG/DL
LEUKOCYTE ESTERASE, URINE: NEGATIVE
LYMPHOCYTES ABSOLUTE: 2.37 E9/L (ref 1.5–4)
LYMPHOCYTES RELATIVE PERCENT: 34.6 % (ref 20–42)
MCH RBC QN AUTO: 28.5 PG (ref 26–35)
MCHC RBC AUTO-ENTMCNC: 32.5 % (ref 32–34.5)
MCV RBC AUTO: 87.7 FL (ref 80–99.9)
MONOCYTES ABSOLUTE: 0.61 E9/L (ref 0.1–0.95)
MONOCYTES RELATIVE PERCENT: 8.9 % (ref 2–12)
NEUTROPHILS ABSOLUTE: 3.66 E9/L (ref 1.8–7.3)
NEUTROPHILS RELATIVE PERCENT: 53.6 % (ref 43–80)
NITRITE, URINE: NEGATIVE
PDW BLD-RTO: 13.7 FL (ref 11.5–15)
PH UA: 7 (ref 5–9)
PLATELET # BLD: 230 E9/L (ref 130–450)
PMV BLD AUTO: 9.6 FL (ref 7–12)
POTASSIUM REFLEX MAGNESIUM: 4.5 MMOL/L (ref 3.5–5)
PROTEIN UA: NEGATIVE MG/DL
RBC # BLD: 4.39 E12/L (ref 3.5–5.5)
RBC UA: NORMAL /HPF (ref 0–2)
SODIUM BLD-SCNC: 138 MMOL/L (ref 132–146)
SPECIFIC GRAVITY UA: <=1.005 (ref 1–1.03)
TOTAL PROTEIN: 7.1 G/DL (ref 6.4–8.3)
TROPONIN, HIGH SENSITIVITY: 13 NG/L (ref 0–9)
TROPONIN, HIGH SENSITIVITY: 14 NG/L (ref 0–9)
UROBILINOGEN, URINE: 0.2 E.U./DL
WBC # BLD: 6.8 E9/L (ref 4.5–11.5)
WBC UA: NORMAL /HPF (ref 0–5)

## 2022-04-24 PROCEDURE — 71045 X-RAY EXAM CHEST 1 VIEW: CPT

## 2022-04-24 PROCEDURE — 2060000000 HC ICU INTERMEDIATE R&B

## 2022-04-24 PROCEDURE — 99285 EMERGENCY DEPT VISIT HI MDM: CPT

## 2022-04-24 PROCEDURE — 81001 URINALYSIS AUTO W/SCOPE: CPT

## 2022-04-24 PROCEDURE — 2580000003 HC RX 258: Performed by: EMERGENCY MEDICINE

## 2022-04-24 PROCEDURE — 84484 ASSAY OF TROPONIN QUANT: CPT

## 2022-04-24 PROCEDURE — 70551 MRI BRAIN STEM W/O DYE: CPT

## 2022-04-24 PROCEDURE — 70544 MR ANGIOGRAPHY HEAD W/O DYE: CPT

## 2022-04-24 PROCEDURE — 6370000000 HC RX 637 (ALT 250 FOR IP): Performed by: EMERGENCY MEDICINE

## 2022-04-24 PROCEDURE — 85025 COMPLETE CBC W/AUTO DIFF WBC: CPT

## 2022-04-24 PROCEDURE — 93005 ELECTROCARDIOGRAM TRACING: CPT | Performed by: EMERGENCY MEDICINE

## 2022-04-24 PROCEDURE — 36415 COLL VENOUS BLD VENIPUNCTURE: CPT

## 2022-04-24 PROCEDURE — 70450 CT HEAD/BRAIN W/O DYE: CPT

## 2022-04-24 PROCEDURE — 80053 COMPREHEN METABOLIC PANEL: CPT

## 2022-04-24 PROCEDURE — 6360000002 HC RX W HCPCS: Performed by: EMERGENCY MEDICINE

## 2022-04-24 PROCEDURE — 96374 THER/PROPH/DIAG INJ IV PUSH: CPT

## 2022-04-24 RX ORDER — 0.9 % SODIUM CHLORIDE 0.9 %
1000 INTRAVENOUS SOLUTION INTRAVENOUS ONCE
Status: COMPLETED | OUTPATIENT
Start: 2022-04-24 | End: 2022-04-24

## 2022-04-24 RX ORDER — LEVETIRACETAM 10 MG/ML
1000 INJECTION INTRAVASCULAR ONCE
Status: COMPLETED | OUTPATIENT
Start: 2022-04-24 | End: 2022-04-24

## 2022-04-24 RX ORDER — MECLIZINE HCL 12.5 MG/1
25 TABLET ORAL ONCE
Status: COMPLETED | OUTPATIENT
Start: 2022-04-24 | End: 2022-04-24

## 2022-04-24 RX ADMIN — MECLIZINE 25 MG: 12.5 TABLET ORAL at 08:20

## 2022-04-24 RX ADMIN — LEVETIRACETAM 1000 MG: 10 INJECTION INTRAVASCULAR at 15:12

## 2022-04-24 RX ADMIN — SODIUM CHLORIDE 1000 ML: 9 INJECTION, SOLUTION INTRAVENOUS at 08:20

## 2022-04-24 ASSESSMENT — ENCOUNTER SYMPTOMS
EYE PAIN: 0
BACK PAIN: 0
VOMITING: 0
NAUSEA: 0
SHORTNESS OF BREATH: 0
COUGH: 0
SORE THROAT: 0
DIARRHEA: 0
ABDOMINAL PAIN: 0

## 2022-04-24 ASSESSMENT — PAIN - FUNCTIONAL ASSESSMENT: PAIN_FUNCTIONAL_ASSESSMENT: NONE - DENIES PAIN

## 2022-04-24 NOTE — LETTER
[address] Medicaid  CERTIFICATION OF NECESSITY  FOR TRANSPORTATION   BY WHEELCHAIR VAN     Individual Information   1. Name: Beatriz Mata 2. [place] Medicaid Billing Number:    3. Address: [address] [place] [address]      Transportation Provider Information   4. Provider Name:    5. [place] Provider Number:  National Provider Identifier (NPI):      Certification  7. Criteria:  By signing this document, the practitioner certifies that two statements are true:  A. This individual must be accompanied by a mobility-related assistive device from the point of pick-up to the point of drop-off. B. Transport of this individual by standard passenger vehicle or common carrier is precluded or contraindicated. 8. Period Beginning Date: 04/28/2022   9. Length  [x] Not more than 5 day(s)  [] One Year     Additional Information Relevant to Certification   10. Comments or Explanations, If Necessary or Appropriate     Acute stroke, anxiety, Fall risk, blind in L eye     Certifying Practitioner Information   11. Name of Practitioner:  Dr Lovette Schwab   12. [place] Medicaid Provider Number, If Applicable:  [address] Provider Identifier (NPI):      Signature Information   14. Date of Signature: 04/28/2022 15. Name of Person Signing: Wyatt Kulkarni RN.   16. Signature and Professional Designation:  Electronically signed by Waytt Kulkarni RN on 4/28/22 at 3:19 PM EDT       OD [id number]  Rev. 7/2015    [facility] Encounter Date/Time: 4/24/2022 [address] [place] [address] Account: [de-identified]    MRN: [id number]    Patient: Shivani Weinstein    Contact Serial #: [id number]      ENCOUNTER          Patient Class: I Private Enc? No Unit RM BD: [facility] 8502/8502-A   Hospital Service:  INM   Encounter DX: Subarachnoid hemorrhage *   ADM Provider: Harley Arrieta DO   Procedure:     ATT Provider: Harley Arrieta DO   REF Provider:        Admission DX: Subarachnoid hemorrhage ([facility] [address] Subarachnoid Physical Therapy  Visit Type: initial evaluation  Precautions:  Medical precautions:  fall risk;.   Lines:     Basic: capped IV      Lines in chart and on patient reviewed, precautions maintained throughout session.  Safety Measures: bed alarm    SUBJECTIVE  Patient agreed to participate in therapy this date.  Readily participatory. Queries multiple times if she'll be discharged today.Patient has not been hospitalized, in a skilled nursing facility, or seen by home health in the last 30 days.  Patient / Family Goal: return home   Support of patient's significant other, Rohith.     Pain     At onset of session (out of 10): 0     OBJECTIVE     Oriented to person     Disoriented to place, time and situation    Arousal alertness: appropriate responses to stimuli  Patient activity tolerance: 1 to 1 activity to rest  Functional Communication/Cognition    Overall status:  Impaired    Attention span:     Attention Span Impairment: reduced memory    Safety judgement: decreased awareness of need for safety.  Range of Motion (measured in degrees unless otherwise noted, active unless indicated)  WFL: RLE, LLE  Strength (out of 5 unless otherwise indicated)   WFL: LLE, RLE  Balance    Sitting: Static: independent    Standing - Firm Surface - Eyes Open: Static: supervision    Bed Mobility:        Supine to sit: modified independent    Sit to supine: modified independent  Transfers:    Assistive devices: none    Sit to stand: supervision and with verbal cues    Stand to sit: supervision and with verbal cues  Training completed:    Tasks: sit to stand and stand to sit  Gait/Ambulation:     Assistance: supervision and with verbal cues    Distance (ft): 200    Type: decreased clyde    Swing phase: Left: decreased step length; Right: decreased step length  Training Completed:    Tasks: gait training on level surfaces    Wayfinding: unable to locate hospital room, recall room number despite repetition of the same cue \"walk to the end  bleed ([facility] [address] Acute CVA (cerebrovascular accident) ([facility] [address] and DX codes: I60.9, I60.9, I63.9      PATIENT                 Name: Vaughn Rossi : 1940 (81 yrs)   Address: [address] [place] [address] [address] Sex: Female   [facility] [address]         Marital Status:    Employer: RETIRED         Faith: Gnosticism   Primary Care Provider: Spenser Boyd DO         Primary Phone: [phone]   EMERGENCY CONTACT   Contact Name Legal Guardian? Relationship to Patient Home Phone Work Phone   1. Terrell Hilliard  2. Roseanne Potts    Brother/Sister  Child [phone]              GUARANTOR            Guarantor: Vaughn Rossi     : 1940   Address: [address] [place] [address] [address] Sex: Female   Betty Garg [address]     Relation to Patient: Self       Home Phone: [phone]   Guarantor ID: [id number]       Work Phone:     Guarantor Employer: RETIRED         Status: RETIRED      COVERAGE        PRIMARY INSURANCE   Payor: [facility] MEDICARE Plan: [employer]*   Payor Address: ,          Group Number: Elvis Valle Insurance Type: INDEMNITY   Subscriber Name: Richard Rivera : 1940   Subscriber ID: [id number] Pat. Rel. to Sub: Self   SECONDARY INSURANCE   Payor:   Plan:     Payor Address:  ,           Group Number:   Insurance Type:     Subscriber Name:   Subscriber :     Subscriber ID:   Pat.  Rel. to Sub:             CSN: [id number] of the [address]\"      Interventions     Training provided: safety training and functional ambulation    Skilled input: Verbal instruction/cues  Verbal Consent: Writer verbally educated and received verbal consent for hand placement, positioning of patient, and techniques to be performed today from patient for clothing adjustments for techniques and therapist position for techniques as described above and how they are pertinent to the patient's plan of care.       ASSESSMENT       Discharge Recommendations  Recommendation for Discharge: PT WI: 24 Hour assist, Home         PT/OT Mobility Equipment for Discharge: none      PT Identified Barriers to Discharge: cognitive     Skilled therapy is not required due to patient having returned to her functional mobility baseline of independent. Defer to SLP re: recs related to cognitive status impacting ability to perform ALDs independently    Pain at end of session:  0/10  Predicted patient presentation: Low (stable) - Patient comorbidities and complexities, as defined above, will have little effect on progress for prescribed plan of care.    End of Session:   Location: in bed  Safety measures: alarm system in place/re-engaged  Handoff to: nurse      Documented in the chart in the following areas: Prior Level of Function. Assessment.      Admitting diagnosis: Left leg weakness (R29.898)    Co-morbidities and problem list:   Patient Active Problem List:   Headache(784.0)   Other and unspecified hyperlipidemia   Nonspecific abnormal results of liver function study   PERS HX COLONIC POLYPS   Nonspecific (abnormal) findings on radiological and other examination of lung field   Asthma   Adrenal abnormality (CMS/HCC)   Osteopenia   Alkaline phosphatase elevation   Hypothyroidism   Rhinitis sicca   Chronic myeloid leukemia (CMS/HCC)   Encounter for chemotherapy management   Chronic bilateral low back pain without sciatica   Chronic cough   H/O actinic keratosis    Chest pain   Chronic gout of foot        Therapy procedure time and total treatment time can be found documented on the Time Entry flowsheet

## 2022-04-25 LAB
CHOLESTEROL, FASTING: 220 MG/DL (ref 0–199)
EKG ATRIAL RATE: 55 BPM
EKG P AXIS: 75 DEGREES
EKG P-R INTERVAL: 180 MS
EKG Q-T INTERVAL: 456 MS
EKG QRS DURATION: 76 MS
EKG QTC CALCULATION (BAZETT): 436 MS
EKG R AXIS: 19 DEGREES
EKG T AXIS: 53 DEGREES
EKG VENTRICULAR RATE: 55 BPM
HBA1C MFR BLD: 5.7 % (ref 4–5.6)
HDLC SERPL-MCNC: 64 MG/DL
LDL CHOLESTEROL CALCULATED: 136 MG/DL (ref 0–99)
TRIGLYCERIDE, FASTING: 102 MG/DL (ref 0–149)
VLDLC SERPL CALC-MCNC: 20 MG/DL

## 2022-04-25 PROCEDURE — 6370000000 HC RX 637 (ALT 250 FOR IP): Performed by: INTERNAL MEDICINE

## 2022-04-25 PROCEDURE — 99223 1ST HOSP IP/OBS HIGH 75: CPT | Performed by: PSYCHIATRY & NEUROLOGY

## 2022-04-25 PROCEDURE — 2500000003 HC RX 250 WO HCPCS: Performed by: PSYCHIATRY & NEUROLOGY

## 2022-04-25 PROCEDURE — 93010 ELECTROCARDIOGRAM REPORT: CPT | Performed by: INTERNAL MEDICINE

## 2022-04-25 PROCEDURE — 83036 HEMOGLOBIN GLYCOSYLATED A1C: CPT

## 2022-04-25 PROCEDURE — 2060000000 HC ICU INTERMEDIATE R&B

## 2022-04-25 PROCEDURE — 6370000000 HC RX 637 (ALT 250 FOR IP): Performed by: PSYCHIATRY & NEUROLOGY

## 2022-04-25 PROCEDURE — 80061 LIPID PANEL: CPT

## 2022-04-25 PROCEDURE — 36415 COLL VENOUS BLD VENIPUNCTURE: CPT

## 2022-04-25 PROCEDURE — 97165 OT EVAL LOW COMPLEX 30 MIN: CPT

## 2022-04-25 RX ORDER — LIDOCAINE 50 MG/G
OINTMENT TOPICAL DAILY PRN
Status: DISCONTINUED | OUTPATIENT
Start: 2022-04-25 | End: 2022-04-29 | Stop reason: HOSPADM

## 2022-04-25 RX ORDER — AMLODIPINE BESYLATE 5 MG/1
5 TABLET ORAL DAILY
Status: DISCONTINUED | OUTPATIENT
Start: 2022-04-25 | End: 2022-04-25

## 2022-04-25 RX ORDER — CLONIDINE HYDROCHLORIDE 0.1 MG/1
0.1 TABLET ORAL EVERY 6 HOURS PRN
Status: DISCONTINUED | OUTPATIENT
Start: 2022-04-25 | End: 2022-04-29 | Stop reason: HOSPADM

## 2022-04-25 RX ORDER — LOSARTAN POTASSIUM 50 MG/1
100 TABLET ORAL DAILY
Status: DISCONTINUED | OUTPATIENT
Start: 2022-04-25 | End: 2022-04-27

## 2022-04-25 RX ORDER — CETIRIZINE HYDROCHLORIDE 10 MG/1
5 TABLET ORAL DAILY PRN
Status: DISCONTINUED | OUTPATIENT
Start: 2022-04-25 | End: 2022-04-29 | Stop reason: HOSPADM

## 2022-04-25 RX ORDER — PANTOPRAZOLE SODIUM 40 MG/1
40 TABLET, DELAYED RELEASE ORAL
Status: DISCONTINUED | OUTPATIENT
Start: 2022-04-25 | End: 2022-04-29 | Stop reason: HOSPADM

## 2022-04-25 RX ORDER — ATORVASTATIN CALCIUM 10 MG/1
20 TABLET, FILM COATED ORAL NIGHTLY
Status: DISCONTINUED | OUTPATIENT
Start: 2022-04-25 | End: 2022-04-29 | Stop reason: HOSPADM

## 2022-04-25 RX ORDER — VIT C/E/ZN/COPPR/LUTEIN/ZEAXAN 60 MG-6 MG
1 CAPSULE ORAL DAILY
Status: DISCONTINUED | OUTPATIENT
Start: 2022-04-25 | End: 2022-04-29 | Stop reason: HOSPADM

## 2022-04-25 RX ORDER — MECLIZINE HCL 12.5 MG/1
25 TABLET ORAL PRN
Status: DISCONTINUED | OUTPATIENT
Start: 2022-04-25 | End: 2022-04-29 | Stop reason: HOSPADM

## 2022-04-25 RX ORDER — LEVOTHYROXINE SODIUM 88 UG/1
88 TABLET ORAL
Status: DISCONTINUED | OUTPATIENT
Start: 2022-04-25 | End: 2022-04-29 | Stop reason: HOSPADM

## 2022-04-25 RX ORDER — ONDANSETRON 4 MG/1
4 TABLET, FILM COATED ORAL 3 TIMES DAILY PRN
Status: DISCONTINUED | OUTPATIENT
Start: 2022-04-25 | End: 2022-04-29 | Stop reason: HOSPADM

## 2022-04-25 RX ORDER — DONEPEZIL HYDROCHLORIDE 5 MG/1
2.5 TABLET, FILM COATED ORAL
Status: DISCONTINUED | OUTPATIENT
Start: 2022-04-25 | End: 2022-04-29 | Stop reason: HOSPADM

## 2022-04-25 RX ORDER — METOPROLOL SUCCINATE 50 MG/1
50 TABLET, EXTENDED RELEASE ORAL DAILY
Status: DISCONTINUED | OUTPATIENT
Start: 2022-04-26 | End: 2022-04-26

## 2022-04-25 RX ORDER — METOPROLOL SUCCINATE 25 MG/1
25 TABLET, EXTENDED RELEASE ORAL DAILY
Status: DISCONTINUED | OUTPATIENT
Start: 2022-04-25 | End: 2022-04-25

## 2022-04-25 RX ORDER — DOCUSATE SODIUM 100 MG/1
100 CAPSULE, LIQUID FILLED ORAL 2 TIMES DAILY
Status: DISCONTINUED | OUTPATIENT
Start: 2022-04-25 | End: 2022-04-29 | Stop reason: HOSPADM

## 2022-04-25 RX ORDER — LABETALOL HYDROCHLORIDE 5 MG/ML
10 INJECTION, SOLUTION INTRAVENOUS
Status: DISCONTINUED | OUTPATIENT
Start: 2022-04-25 | End: 2022-04-26

## 2022-04-25 RX ADMIN — DOCUSATE SODIUM 100 MG: 100 CAPSULE, LIQUID FILLED ORAL at 09:21

## 2022-04-25 RX ADMIN — METOPROLOL SUCCINATE 25 MG: 25 TABLET, EXTENDED RELEASE ORAL at 09:21

## 2022-04-25 RX ADMIN — ATORVASTATIN CALCIUM 20 MG: 10 TABLET, FILM COATED ORAL at 21:16

## 2022-04-25 RX ADMIN — DONEPEZIL HYDROCHLORIDE 2.5 MG: 5 TABLET, FILM COATED ORAL at 10:31

## 2022-04-25 RX ADMIN — CLONIDINE HYDROCHLORIDE 0.1 MG: 0.1 TABLET ORAL at 11:11

## 2022-04-25 RX ADMIN — LABETALOL HYDROCHLORIDE 10 MG: 5 INJECTION, SOLUTION INTRAVENOUS at 11:51

## 2022-04-25 RX ADMIN — Medication 1 CAPSULE: at 09:20

## 2022-04-25 RX ADMIN — AMLODIPINE BESYLATE 5 MG: 5 TABLET ORAL at 07:44

## 2022-04-25 RX ADMIN — LOSARTAN POTASSIUM 100 MG: 50 TABLET, FILM COATED ORAL at 09:20

## 2022-04-25 RX ADMIN — DOCUSATE SODIUM 100 MG: 100 CAPSULE, LIQUID FILLED ORAL at 21:16

## 2022-04-25 RX ADMIN — PANTOPRAZOLE SODIUM 40 MG: 40 TABLET, DELAYED RELEASE ORAL at 07:44

## 2022-04-25 ASSESSMENT — PAIN - FUNCTIONAL ASSESSMENT: PAIN_FUNCTIONAL_ASSESSMENT: PREVENTS OR INTERFERES SOME ACTIVE ACTIVITIES AND ADLS

## 2022-04-25 ASSESSMENT — PAIN SCALES - GENERAL
PAINLEVEL_OUTOF10: 7
PAINLEVEL_OUTOF10: 0
PAINLEVEL_OUTOF10: 0

## 2022-04-25 ASSESSMENT — PAIN DESCRIPTION - DESCRIPTORS: DESCRIPTORS: ACHING;DISCOMFORT;TENDER

## 2022-04-25 ASSESSMENT — PAIN DESCRIPTION - LOCATION: LOCATION: GENERALIZED
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
## 2022-04-25 NOTE — PROGRESS NOTES
BP of 187/50 after given BP meds is communicated to Dr Patric Cole, along with recommendation of PRN Blood pressure medications. Doctor responded \" repeat BP in 2 hours and lets see what's its doing. \"    No other orders at this time.
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
## 2022-04-25 NOTE — H&P
[address]                  [address] [facility],  [facility]                              HISTORY AND PHYSICAL    PATIENT NAME: Umu Page                    :        1940  MED REC NO:   [id number]                            ROOM:       8502  ACCOUNT NO:   [de-identified]                           ADMIT DATE: 2022  PROVIDER:     Dmitriy Ramirez DO    CHIEF COMPLAINT:  Dizziness. HISTORY OF PRESENT ILLNESS:  The patient is an 78-year-old   female who presented to the emergency room complaining of a several-day  history of dizziness. Diagnostic evaluation in the emergency room  revealed subarachnoid hemorrhage and CVA. She was admitted for further  evaluation and treatment. PAST MEDICAL HISTORY:  Macular degeneration, right eye, receiving  injections in the past; prosthetic left eye; hypertension; hypothyroid;  GERD; history of CVA; bowel cancer, status post surgery and chemo. PAST SURGICAL HISTORY:  Left eye removal, colectomy, cholecystectomy,  hysterectomy, tonsillectomy, left total knee replacement x2, right eye  cataract surgery. PRIMARY CARE PHYSICIAN:  Rodrigo Boyd DO    SOCIAL HISTORY:  No tobacco, no alcohol. REVIEW OF SYSTEMS:  Remarkable for above-stated chief complaint. ALLERGIES:  Multiple allergies including BEE VENOM, CODEINE,  HYDRALAZINE, OXYCODONE/ASPIRIN, AUGMENTIN, DARVOCET, EGGS OR EGG-DERIVED  PRODUCTS, INFLUENZA VACCINE, PERCODAN. SOCIAL HISTORY:  No tobacco, no alcohol. MEDICATIONS PRIOR TO ADMISSION:  Lidoderm, Xylocaine ointment, Aricept,  Medrol, omeprazole, Colace, Toprol XL, Cozaar, Antivert, Zyrtec,  Synthroid, Zofran. PHYSICAL EXAMINATION:  GENERAL APPEARANCE:  Physical exam reveals an 78-year-old   female who is alert, cooperative and a fair historian. VITAL SIGNS:  On admission, temperature 98.7, pulse 50, respirations 18,  blood pressure 201/60.   HEENT:  Head: Normocephalic, atraumatic. Eyes:  Left eye prosthesis. Right eye pupil round and reactive to light. Nose:  No obstruction,  polyp or discharge noted. Mouth mucosa without lesion. Pharynx  noninjected without exudate. NECK:  Supple. No JVD. No thyromegaly. No carotid bruits. HEART:  Regular, bradycardic without murmur. LUNGS:  Clear to auscultation bilaterally. ABDOMEN:  Positive bowel sounds, soft, nontender. No rebound or  guarding. No hepatosplenomegaly. No masses. BACK:  With increased thoracic kyphosis. EXTREMITIES:  With minimal edema in the bilateral lower extremities. LYMPH NODES:  No adenopathy noted. SKIN:  Without rash or lesion. IMPRESSION:  Dizziness, abnormal MRI of the brain, subarachnoid  hemorrhage, CVA, uncontrolled hypertension, hypothyroidism, GERD,  prosthetic left eye, history of bowel cancer, history of CVA. PLAN:  Admit. Neurology and Neurosurgery to see. PT, OT, social  services to see. Blood pressure control. Discharge plan to rehab when  stable.         Jeannine Henry DO    D: 04/25/2022 7:14:09       T: 04/25/2022 7:16:34     [id number]  Job#: [id number]     Doc#: [id number]    CC:

## 2022-04-25 NOTE — ED NOTES
Patient yelling out and demanding to speak to supervisor. Voicing concern that she's being lied to about no available bed on admitting floor. Repeatedly ringing call bell despite multiple nursing staff attempting to calm and redirect.         Nissa Dimas RN  04/25/22 4427

## 2022-04-25 NOTE — PROGRESS NOTES
[address], [address]                                                  Patient Name: Guy Olivera  MRN: [id number]  : 1940  Room: [address] [place] [address]    Evaluating OT: Ganga Pritchard, [place] #[id number]    Referring Provider[de-identified] Lea Maurice DO     Specific Provider Orders/Date: OT evaluation and treatment 22    Diagnosis:  Subarachnoid hemorrhage ([facility] [address] [address] [I60.9]  Subarachnoid bleed ([facility] [address] [I60.9]  Acute CVA (cerebrovascular accident) [facility]) [I63.9]      Pertinent Medical History:  has a past medical history of Amaurosis fugax of right eye, Anxiety, Arthritis, CA - cancer of bowel, Cerebral artery occlusion with cerebral infarction ([facility] [address] Chronic back pain, Constipation, Depression, Elevated sed rate, GERD (gastroesophageal reflux disease), Hemorrhoids, Hyperlipidemia, Hypertension, Left foot pain, Lumbosacral radiculopathy, Macular degeneration, Peripheral neuropathy, Poor vision, Prosthetic eye globe, Seasonal allergies, Skipped heart beats, Sleep apnea, and Thyroid disease.        Precautions:  Fall Risk, Bed alarm, Blind L eye (prosthetic eye)    Assessment of current deficits   [x] Functional mobility   [x]ADLs  [x] Strength               []Cognition   [x] Functional transfers   [] IADLs         [x] Safety Awareness   [x]Endurance   [] Fine Coordination              [x] Balance      [x] Vision/perception   []Sensation    []Gross Motor Coordination  [x] ROM  [] Delirium                   [] Motor Control     OT PLAN OF CARE   OT POC based on physician orders, patient diagnosis and results of clinical assessment    Frequency/Duration 1-4 days/wk for 2 weeks PRN   Specific OT Treatment to include:   * Instruction/training on adapted ADL techniques and AE recommendations to increase functional independence within precautions       * Training on energy conservation strategies, correct breathing pattern and techniques to improve independence/tolerance for self-care routine  * Functional transfer/mobility training/DME recommendations for increased independence, safety, and fall prevention  * Patient/Family education to increase follow through with safety techniques and functional independence  * Therapeutic exercise to improve motor endurance, ROM, and functional strength for ADLs/functional transfers  * Therapeutic activities to facilitate/challenge dynamic balance, stand tolerance for increased safety and independence with ADLs  * Positioning to improve skin integrity, interaction with environment and functional independence    Recommended Adaptive Equipment: ww-  TBD     Home Living:  Pt lives alone in a 1st floor apt with 1 threshhold step(s) to enter    Bathroom setup: tub shower with shower chair   Equipment owned: shower chair, [facility]    Prior Level of Function: Independent  with ADLs , Independent  with IADLs; ambulated with cane as needed  Driving: no  Occupation/leisure: not reported    Pain Level: none this session    Cognition: A&O: 4/4;   Follows multi step directions: good    Memory:  good    Sequencing:  good    Problem solving:  fair    Judgement/safety:  fair     Functional Assessment:   AM-PAC Daily Activity Raw Score: 17/24     Initial Eval Status  Date: 4/25/22 Treatment Status  Date: STG=LTG  Time frame: 10-14 days   Feeding SBA  Independent    Grooming Stand by Assist   Independent    UB Dressing Minimal Assist   Modified Tracy    LB Dressing Minimal Assist   To marietta brief, socks  Modified Tracy    Bathing Moderate Assist  For safety and balance  Modified Tracy    Toileting Minimal Assist   Modified Tracy    Bed Mobility  Supine to sit: Stand by Assist   Sit to supine: NT   Supine to sit:  Independent   Sit to supine: Independent    Functional Transfers Sit to stand: Minimal Assist   Stand to sit: Minimal Assist   Stand pivot: Minimal Assist   Modified Foster    Functional Mobility Min A   Short distance in room (bed to bathroom)  Hand in hand A for safety/balance,   Pt reaching for furniture, mild unsteadiness with slow gait speed  Pt may benefit from a ww - TBD  Mod indep with AAD  Functional household distance   Balance Sitting: indep     Standing: min A  Sitting: indep       Standing: mod indep   Activity Tolerance Fair-  good   Visual/  Perceptual Glasses: no, Blind L eye (prosthetic eye)          BUE  ROM/Strength/  Fine motor Coordination Hand dominance: R    RUE: ROM limited shoulder but generally WFL     Strength: grossly 4-/5      Strength:  WFL     Coordination:   WFL    LUE: ROM limited shoulder ROM but generally WFL     Strength: grossly 4-/5      Strength:  WFL     Coordination: WFL       Hearing: WFL   Sensation:  No c/o numbness or tingling   Tone:  WFL   Edema:  None noted    Comments/treatment:   RN cleared patient for OT. Upon arrival, patient in bed. .  Therapist facilitated and instructed pt on adapted  techniques & compensatory strategies to improve safety and independence with basic ADLs, bed mobility,  functional transfers & functional mobility  to allow pt to achieve highest level of independence and safely. Patient presents with   decreased  ADLs,  bed mobility,  functional transfers,  functional mobility   At end of session, patient in bedside chair with call light and phone within reach, all lines and tubes intact. Pt would benefit from continued skilled OT to increase safety and independence with completion of ADL tasks and functional mobility for improved quality of life.        Rehab Potential: Good  for established goals     Patient / Family Goal: get more rehab before going home    Patient and/or family were instructed on functional diagnosis, prognosis/goals and OT plan of care. Demonstrated good understanding.      Eval Complexity: Low    Time In: 11:29  Time Out: 11:50  Total Treatment Time: 5 minutes    Min Units   OT Eval Low 09903  X     OT Eval Medium 88282      OT Eval High [id number]       OT Re-Eval [id number]       Therapeutic Ex [id number]       Therapeutic Activities 13530      ADL/Self Care 64379      Orthotic Management 06701       Neuro Re-Ed 83590       Non-Billable Time          Evaluation Time includes thorough review of current medical information, gathering information on past medical history/social history and prior level of function, completion of standardized testing/informal observation of tasks, assessment of data and education on plan of care and goals.             [place], [place] #[id number]

## 2022-04-25 NOTE — ED NOTES
Spent time in patient room listening to her concerns. Offered reassurance and emotional support. Plan of care reviewed with her. She is calm and cooperative at this time.      Sonja William RN  04/25/22 5181

## 2022-04-25 NOTE — ED NOTES
Dr Reese Wright notified of bp >160 as ordered. Requested anxiety medication and stool softener. States \"I'll deal with it in the morning\" No orders received.      Veronica Mata RN  04/25/22 1935

## 2022-04-25 NOTE — CARE COORDINATION
Care Coordination   The patient was admitted from home due to dizziness and  was found to have a subarachnoid hemorrhage and and old punctate recent infarct in the left parietal lobe. Neurology and neurosurgery consulted. Therapies have been ordered. Her blood pressure was elevated on admission 201/60 and the patient was started on po norvasc 5 mg  X 1 and cozaar 1000 mg po. She also started on po catapress 0.1 mg po q6 prn. Per Dr Savana Casey no  surgical intervention. Spoke to the patients daughter Haleigh Russo @ [phone]. She told me her mom lives in a 1st floor apartment building. With 1 step to enter. Dme she has tub bench, wheeled walker and a cane. The patient has a past history of [facility] home care and she has been to [facility] in the past. She has a  from [facility] and she has a private duty caregiver. Her primary care giver is Hilaria Louis her pharmacy is [facility]. Per the daughter she would like for her mom to return home . Await therapy charly.

## 2022-04-25 NOTE — ED NOTES
In to see patient, patient expressed that she was upset that hasn't ate in a while and has been asking for food and no one has brought her anything to eat. Patient also states that she has been asking all day for a phone to use. Patient was given a meal tray for dinner and was given a [employer] phone to use to call her family.       Millie Daugherty  04/24/22 1930       Madiha Oakes  04/24/22 9371

## 2022-04-26 PROBLEM — I10 HYPERTENSION: Status: ACTIVE | Noted: 2019-12-29

## 2022-04-26 PROBLEM — I63.9 ACUTE CVA (CEREBROVASCULAR ACCIDENT) (HCC): Status: ACTIVE | Noted: 2022-04-26

## 2022-04-26 LAB
BACTERIA: ABNORMAL /HPF
BILIRUBIN URINE: NEGATIVE
BLOOD, URINE: NEGATIVE
CLARITY: CLEAR
COLOR: YELLOW
EPITHELIAL CELLS, UA: ABNORMAL /HPF
GLUCOSE URINE: NEGATIVE MG/DL
KETONES, URINE: NEGATIVE MG/DL
LEUKOCYTE ESTERASE, URINE: ABNORMAL
NITRITE, URINE: NEGATIVE
PH UA: 6 (ref 5–9)
PROTEIN UA: NEGATIVE MG/DL
RBC UA: ABNORMAL /HPF (ref 0–2)
SPECIFIC GRAVITY UA: 1.02 (ref 1–1.03)
UROBILINOGEN, URINE: 0.2 E.U./DL
WBC UA: ABNORMAL /HPF (ref 0–5)

## 2022-04-26 PROCEDURE — 81001 URINALYSIS AUTO W/SCOPE: CPT

## 2022-04-26 PROCEDURE — 6370000000 HC RX 637 (ALT 250 FOR IP): Performed by: INTERNAL MEDICINE

## 2022-04-26 PROCEDURE — 51700 IRRIGATION OF BLADDER: CPT

## 2022-04-26 PROCEDURE — 51798 US URINE CAPACITY MEASURE: CPT

## 2022-04-26 PROCEDURE — 97161 PT EVAL LOW COMPLEX 20 MIN: CPT

## 2022-04-26 PROCEDURE — 92523 SPEECH SOUND LANG COMPREHEN: CPT | Performed by: SPEECH-LANGUAGE PATHOLOGIST

## 2022-04-26 PROCEDURE — 2060000000 HC ICU INTERMEDIATE R&B

## 2022-04-26 PROCEDURE — 6370000000 HC RX 637 (ALT 250 FOR IP): Performed by: PSYCHIATRY & NEUROLOGY

## 2022-04-26 PROCEDURE — 97129 THER IVNTJ 1ST 15 MIN: CPT | Performed by: SPEECH-LANGUAGE PATHOLOGIST

## 2022-04-26 PROCEDURE — 99232 SBSQ HOSP IP/OBS MODERATE 35: CPT | Performed by: NURSE PRACTITIONER

## 2022-04-26 RX ORDER — FUROSEMIDE 10 MG/ML
10 INJECTION INTRAMUSCULAR; INTRAVENOUS ONCE
Status: COMPLETED | OUTPATIENT
Start: 2022-04-27 | End: 2022-04-27

## 2022-04-26 RX ORDER — FUROSEMIDE 10 MG/ML
10 INJECTION INTRAMUSCULAR; INTRAVENOUS ONCE
Status: DISCONTINUED | OUTPATIENT
Start: 2022-04-26 | End: 2022-04-26

## 2022-04-26 RX ORDER — METOPROLOL SUCCINATE 25 MG/1
25 TABLET, EXTENDED RELEASE ORAL DAILY
Status: DISCONTINUED | OUTPATIENT
Start: 2022-04-26 | End: 2022-04-27

## 2022-04-26 RX ADMIN — PANTOPRAZOLE SODIUM 40 MG: 40 TABLET, DELAYED RELEASE ORAL at 06:38

## 2022-04-26 RX ADMIN — DONEPEZIL HYDROCHLORIDE 2.5 MG: 5 TABLET, FILM COATED ORAL at 08:50

## 2022-04-26 RX ADMIN — DOCUSATE SODIUM 100 MG: 100 CAPSULE, LIQUID FILLED ORAL at 20:43

## 2022-04-26 RX ADMIN — LEVOTHYROXINE SODIUM 88 MCG: 0.09 TABLET ORAL at 06:38

## 2022-04-26 RX ADMIN — METOPROLOL SUCCINATE 25 MG: 25 TABLET, EXTENDED RELEASE ORAL at 08:51

## 2022-04-26 RX ADMIN — LOSARTAN POTASSIUM 100 MG: 50 TABLET, FILM COATED ORAL at 08:51

## 2022-04-26 RX ADMIN — ATORVASTATIN CALCIUM 20 MG: 10 TABLET, FILM COATED ORAL at 20:43

## 2022-04-26 RX ADMIN — LIDOCAINE: 50 OINTMENT TOPICAL at 20:43

## 2022-04-26 RX ADMIN — LIDOCAINE: 50 OINTMENT TOPICAL at 04:31

## 2022-04-26 RX ADMIN — Medication 1 CAPSULE: at 08:51

## 2022-04-26 RX ADMIN — DOCUSATE SODIUM 100 MG: 100 CAPSULE, LIQUID FILLED ORAL at 08:51

## 2022-04-26 ASSESSMENT — PAIN SCALES - GENERAL
PAINLEVEL_OUTOF10: 0
PAINLEVEL_OUTOF10: 0

## 2022-04-26 NOTE — PLAN OF CARE
Problem: Pain  Goal: Verbalizes/displays adequate comfort level or baseline comfort level  4/26/2022 0441 by Nita Favre, RN  Outcome: Progressing  4/25/2022 2231 by Madison Marquez RN  Outcome: Progressing     Problem: Skin/Tissue Integrity  Goal: Absence of new skin breakdown  4/26/2022 0441 by Nita Favre, RN  Outcome: Progressing  4/25/2022 2231 by Madison Marquez RN  Outcome: Progressing     Problem: Safety - Adult  Goal: Free from fall injury  4/26/2022 0441 by Nita Favre, RN  Outcome: Progressing  4/25/2022 2231 by Madison Marquez RN  Outcome: Progressing

## 2022-04-26 NOTE — PROGRESS NOTES
SPEECH/LANGUAGE PATHOLOGY  SPEECH/LANGUAGE/COGNITIVE EVALUATION   and PLAN OF CARE      PATIENT NAME:  Milan Tesfaye  (female)     MRN:  [id number]    :  1940  (78 y.o.)  STATUS:  Inpatient: Room 8502/8502-A    TODAY'S DATE:  22   SLP eval and treat Start: 22, End: 22, ONE TIME, Standing Count: 1 Occurrences, R    Raphael Garcia MD  REASON FOR REFERRAL:  Assessment of cognitive linguistic function  EVALUATING THERAPIST: Carline Aguilar    ADMITTING DIAGNOSIS: Subarachnoid hemorrhage ([address] [I60.9]  Subarachnoid bleed ([address] [I60.9]  Acute CVA (cerebrovascular accident) ([address] [I63.9]    VISIT DIAGNOSIS:   Visit Diagnoses         Codes    Subarachnoid bleed ([address]    -  Primary I60.9               SPEECH THERAPY  PLAN OF CARE   The speech therapy  POC is established based on physician order, speech pathology diagnosis and results of clinical assessment     SPEECH PATHOLOGY DIAGNOSIS:    Within Functional Limits for premorbid age/functioning    Patient does have Alzheimer's diagnosis at baseline. States that she takes medication for her memory everyday. Pt states she feels \"normal\" and that she is at her baseline function. Speech Pathology intervention is not warranted at this time. Conditions Requiring Skilled Therapeutic Intervention for speech, language and/or cognition    Not applicable     Specific Speech Therapy Interventions to Include:   Not applicable    Specific instructions for next treatment:     not applicable    SHORT/LONG TERM GOALS  Not applicable. Therapy is not recommended    Patient goals: Patient/family involved in developing goals and treatment plan:   Treatment goals discussed with Patient    The Patient understand(s) the diagnosis, prognosis and plan of care   The patient/family Agreed with above,     This plan may be re-evaluated and revised as warranted.         Rehabilitation Potential/Prognosis: fair                CLINICAL ASSESSMENT:  MOTOR SPEECH       Oral Peripheral Examination   Adequate lingual/labial strength     Parameters of Speech Production  Respiration:  Adequate for speech production  Articulation:  Within functional limits  Resonance:  Within functional limits  Quality:   Within functional limits  Pitch: Within functional limits  Intensity: Within functional limits  Fluency:  Intact  Prosody Intact    RECEPTIVE LANGUAGE    Comprehension of Yes/No Questions: Within functional limits    Process  Simple Verbal Commands:   Within functional limits  Process Intermediate Verbal Commands:   Within functional limits  Process Complex Verbal Commands:     Within functional limits    Comprehension of Conversation:      Within functional limits      EXPRESSIVE LANGUAGE     Serials: Functional    Imitation:  Words   Functional   Sentences Functional    Naming:  (Modality used:  Verbal)  Confrontation Naming  Functional  Divergent Naming  To be assessed  Response Naming: Functional    Conversation:      Conversation was within functional limits    COGNITION     Attention/Orientation  Attention: Sustained attention   Orientation:  Oriented to Person, Place, Date, Reason for hospitalization    Memory   Immediate Recall: Repeated 3/3    Delayed Recall:   Recalled 1/3; 2/3 with semantic cues    Long Term Recall:   Recalled Address, Birthdate, Age and Family    Organization/Problem Solving/Reasoning   Verbal Sequencing:   Functional        Verbal Problem solving:   Functional          CLINICAL OBSERVATIONS NOTED DURING THE EVALUATION  Within functional limits                  EDUCATION:   The Speech Language Pathologist (SLP) completed education regarding results of evaluation and that intervention is not warranted at this time.   Learner: Patient  Education: Reviewed results and recommendations of this evaluation  Evaluation of Education:  Verbalizes understanding    Evaluation Time includes thorough review of current medical information, gathering information on past medical history/social history and prior level of function, completion of standardized testing/informal observation of tasks, assessment of data and education on plan of care and goals. CPT code:    86747  eval speech sound lang comprehension      INTERVENTION  15525  therapeutic interventions that focus on cognitive function , initial  15 min      SLP provided semantic cues for delayed recall tasks. Cues were helpful for pt to elicit words she was asked to recall. The admitting diagnosis and active problem list, as listed below have been reviewed prior to initiation of this evaluation. ACTIVE PROBLEM LIST:   Patient Active Problem List   Diagnosis    GERD (gastroesophageal reflux disease)    Hypothyroidism    Obstructive sleep apnea syndrome, non-compliant with CPAP therapy    Hyperlipidemia LDL goal <100    Obesity (BMI 35.0-39.9 without comorbidity)    Blindness of left eye    Vertigo    Hypertension    Frequent falls    Ataxia    Moderate episode of recurrent major depressive disorder (HCC)    Gait instability    Peripheral neuropathy    Lumbosacral radiculopathy    Dizziness    Alzheimer's disease, unspecified    Subarachnoid hemorrhage ([address]    Acute CVA (cerebrovascular accident) ([address]       Jeramy Montanez.   Speech Language Pathology

## 2022-04-26 NOTE — PROGRESS NOTES
Turner Murphy is a 78 y.o. right handed female     Neurology following for stroke    PMH of stroke, hypertension, colon cancer, hyperlipidemia, vertigo, and peripheral neuropathy    Presented with a several day history of dizziness. BP was 201/60 on admission. The patient has a recent history of falls with unknown head injury. MRI showed incidental infarct most likely due to vascular disease in L parietal lobe along with subacute to chronic subarachnoid hemorrhage in R frontal lobe, and  chronic subarachnoid hemorrhage in L occipital lobe. History of falls most likely secondary to peripheral vertigo and possibly neuropathy. Patient sitting up in bed with no complaints. Discussed MRI brain and addressed questions and concerns. No family at bedside. She has a hx of left shoulder fx. No chest pain or palpitations  No coughing or wheezing    No vertigo, lightheadedness or loss of consciousness  No incontinence of bowels or bladder  No itching or bruising appreciated      ROS otherwise negative      Objective:     BP (!) 174/61   Pulse 53   Temp 97 °F (36.1 °C) (Temporal)   Resp 18   Ht 4' 11\" (1.499 m)   Wt 184 lb (83.5 kg)   LMP 07/24/2014   SpO2 96%   BMI 37.16 kg/m²     General appearance: alert, appears stated age, cooperative and no distress  Head: normocephalic, without obvious abnormality, atraumatic  Eyes: conjunctivae/corneas clear  Neck: supple, symmetrical, trachea midline  Lungs: respirations non labored   Extremities:  normal, atraumatic, no cyanosis or edema  Skin: color, texture, turgor normal---no rashes or lesions      Mental Status: Alert and oriented to self, time and place. Follows commands.       Appropriate attention/concentration  Intact fundus of knowledge  Repetition intact  Intact memories      Speech: no dysarthria  Language: no aphasias     Cranial Nerves:  I: smell NA   II: visual acuity  NA   II: visual fields Full to confrontation   II: pupils PAYTON   III,VII: ptosis None   III,IV,VI: extraocular muscles  Right eye normal with ROM and left is artificial with limited movement    V: mastication Normal   V: facial light touch sensation  Normal   V,VII: corneal reflex     VII: facial muscle function - upper  Normal   VII: facial muscle function - lower Normal   VIII: hearing Normal   IX: soft palate elevation  Normal   IX,X: gag reflex    XI: trapezius strength  5/5   XI: sternocleidomastoid strength 5/5   XI: neck extension strength  5/5   XII: tongue strength  Normal     Motor:  5/5 throughout  Normal bulk and tone  No drift   No abnormal movements    Sensory:  LT normal    Coordination:   FN, FFM and ZORA normal  HS normal    DTR:   2+ throughout     Laboratory/Radiology:     CBC with Differential:    Lab Results   Component Value Date    WBC 6.8 04/24/2022    RBC 4.39 04/24/2022    HGB 12.5 04/24/2022    HCT 38.5 04/24/2022     04/24/2022    MCV 87.7 04/24/2022    MCH 28.5 04/24/2022    MCHC 32.5 04/24/2022    RDW 13.7 04/24/2022    SEGSPCT 58 03/11/2014    LYMPHOPCT 34.6 04/24/2022    MONOPCT 8.9 04/24/2022    EOSPCT 1 10/07/2010    BASOPCT 0.6 04/24/2022    MONOSABS 0.61 04/24/2022    LYMPHSABS 2.37 04/24/2022    EOSABS 0.14 04/24/2022    BASOSABS 0.04 04/24/2022     CMP:    Lab Results   Component Value Date     04/24/2022    K 4.5 04/24/2022     04/24/2022    CO2 24 04/24/2022    BUN 26 04/24/2022    CREATININE 0.8 04/24/2022    GFRAA >60 04/24/2022    LABGLOM >60 04/24/2022    GLUCOSE 99 04/24/2022    GLUCOSE 109 05/04/2012    PROT 7.1 04/24/2022    LABALBU 4.2 04/24/2022    LABALBU 4.2 03/24/2012    CALCIUM 9.3 04/24/2022    BILITOT 0.5 04/24/2022    ALKPHOS 70 04/24/2022    AST 21 04/24/2022    ALT 13 04/24/2022     Hepatic Function Panel:    Lab Results   Component Value Date    ALKPHOS 70 04/24/2022    ALT 13 04/24/2022    AST 21 04/24/2022    PROT 7.1 04/24/2022    BILITOT 0.5 04/24/2022    BILIDIR <0.2 12/24/2019    IBILI see below 12/24/2019 LABALBU 4.2 04/24/2022    LABALBU 4.2 03/24/2012     HgBA1c:    Lab Results   Component Value Date    LABA1C 5.7 04/25/2022     FLP:    Lab Results   Component Value Date    TRIG 61 06/03/2021    HDL 64 04/25/2022    LDLCALC 136 04/25/2022    LABVLDL 20 04/25/2022     CTH: negative for acute findings    MRI brain: Punctate recent infarct in the left parietal lobe. Right frontal lobe contusion with small amount of likely subacute to chronic  subarachnoid hemorrhage in the right frontal lobe.  There is also chronic  subarachnoid hemorrhage in the left occipital lobe.  No mass effect.         All labs and imaging studies reviewed independently today    Assessment:     Right frontal SAH with acute left parietal lobe infarct s/p fall  ---  and A1c 5.7  --- stroke risk factors include: HTN, HLD, previous strokes   --- no hx of seizures     Hx of falls secondary to peripheral vertigo and possibly BLE neuropathy     Plan:     Continue Keppra for seizure prophylaxis  Consider AP in couple of weeks  Continue statin  PT/OT/SLP  Stroke education  Meclizine prn  Recommend OP VNG for vertigo   Neurology to sign off       BETINA Rodriguez CNP  1:56 PM  4/26/2022
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
## 2022-04-26 NOTE — PROGRESS NOTES
Consult received. Dr. Jessica Arango to evaluate patient's appropriateness for ARU. Patient needs a PT evaluation.     Dez Vasquez RN, Pre-screener for Acute Rehab  P: [phone]

## 2022-04-26 NOTE — PROGRESS NOTES
Hospital Medicine    Subjective:  Pt alert conversive c/o l flank pain requests to see urology who she was sched with in may      Current Facility-Administered Medications:     metoprolol succinate (TOPROL XL) extended release tablet 25 mg, 25 mg, Oral, Daily, Olga Gibson DO    ocuvite-lutein multivitamin 1 capsule, 1 capsule, Oral, Daily, Mark Greene MD, 1 capsule at 04/25/22 0920    ondansetron (ZOFRAN) tablet 4 mg, 4 mg, Oral, TID PRN, Mark Greene MD    levothyroxine (SYNTHROID) tablet 88 mcg, 88 mcg, Oral, QAM AC, Mark Greene MD, 88 mcg at 04/26/22 4836    cetirizine (ZYRTEC) tablet 5 mg, 5 mg, Oral, Daily PRN, Mark Greene MD    meclizine (ANTIVERT) tablet 25 mg, 25 mg, Oral, PRN, Mark Greene MD    losartan (COZAAR) tablet 100 mg, 100 mg, Oral, Daily, Mark Greene MD, 100 mg at 04/25/22 0920    pantoprazole (PROTONIX) tablet 40 mg, 40 mg, Oral, QAM AC, Mark Greene MD, 40 mg at 04/26/22 2643    donepezil (ARICEPT) tablet 2.5 mg, 2.5 mg, Oral, Daily with breakfast, Mark Greene MD, 2.5 mg at 04/25/22 1031    docusate sodium (COLACE) capsule 100 mg, 100 mg, Oral, BID, Olga Gibson DO, 100 mg at 04/25/22 2116    lidocaine (XYLOCAINE) 5 % ointment, , Topical, Daily PRN, Olga Gibson DO, Given at 04/26/22 0431    cloNIDine (CATAPRES) tablet 0.1 mg, 0.1 mg, Oral, Q6H PRN, Olga Gibson DO, 0.1 mg at 04/25/22 1111    atorvastatin (LIPITOR) tablet 20 mg, 20 mg, Oral, Nightly, Viannye Watkins MD, 20 mg at 04/25/22 2116    Objective:    BP (!) 118/46   Pulse (!) 46   Temp 97.7 °F (36.5 °C) (Temporal)   Resp 16   Ht 4' 11\" (1.499 m)   Wt 184 lb (83.5 kg)   LMP 07/24/2014   SpO2 97%   BMI 37.16 kg/m²     Heart:  reg  Lungs:  ctab  Abd: + bs soft nontender  Extrem:  Edema legs    CBC with Differential:    Lab Results   Component Value Date    WBC 6.8 04/24/2022    RBC 4.39 04/24/2022    HGB 12.5 04/24/2022 HCT 38.5 04/24/2022     04/24/2022    MCV 87.7 04/24/2022    MCH 28.5 04/24/2022    MCHC 32.5 04/24/2022    RDW 13.7 04/24/2022    SEGSPCT 58 03/11/2014    LYMPHOPCT 34.6 04/24/2022    MONOPCT 8.9 04/24/2022    EOSPCT 1 10/07/2010    BASOPCT 0.6 04/24/2022    MONOSABS 0.61 04/24/2022    LYMPHSABS 2.37 04/24/2022    EOSABS 0.14 04/24/2022    BASOSABS 0.04 04/24/2022     CMP:    Lab Results   Component Value Date     04/24/2022    K 4.5 04/24/2022     04/24/2022    CO2 24 04/24/2022    BUN 26 04/24/2022    CREATININE 0.8 04/24/2022    GFRAA >60 04/24/2022    LABGLOM >60 04/24/2022    GLUCOSE 99 04/24/2022    GLUCOSE 109 05/04/2012    PROT 7.1 04/24/2022    LABALBU 4.2 04/24/2022    LABALBU 4.2 03/24/2012    CALCIUM 9.3 04/24/2022    BILITOT 0.5 04/24/2022    ALKPHOS 70 04/24/2022    AST 21 04/24/2022    ALT 13 04/24/2022     Warfarin PT/INR:    Lab Results   Component Value Date    INR 0.9 08/18/2020    INR 0.9 12/29/2019    INR 0.9 12/19/2019    PROTIME 10.9 08/18/2020    PROTIME 10.5 12/29/2019    PROTIME 10.4 12/19/2019       Assessment:    Principal Problem:    Subarachnoid hemorrhage (HCC)  Resolved Problems:    * No resolved hospital problems.  *  acute cva    Plan:  Pm&r to see dc planning pt/ot to home vs rehab        Ronald Farrell DO  7:55 AM  4/26/2022

## 2022-04-26 NOTE — PROGRESS NOTES
Physical Therapy Initial Assessment     Name: Milan Tesfaye  : 1940  MRN: [id number]      Date of Service: 2022    Evaluating PT:  Johanavinash Hahn, PT, DPT [id number]    Room #:  8975/8525-R  Diagnosis:  [facility] [I60.9]  Subarachnoid bleed ([facility] [I60.9]  Acute CVA (cerebrovascular accident) [facility]) [I63.9]  PMHx/PSHx:    Past Medical History:   Diagnosis Date    Amaurosis fugax of right eye 2017    Anxiety     Arthritis     CA - cancer of bowel     Colon, treated with surgery and chemo    Cerebral artery occlusion with cerebral infarction ([facility]     possibly TIA    Chronic back pain     Constipation     Depression     Elevated sed rate 2017    hx of    GERD (gastroesophageal reflux disease)     Hemorrhoids     history of    Hyperlipidemia     diet controlled    Hypertension     Left foot pain     dropped a Kettle on foot    Lumbosacral radiculopathy 2020    Macular degeneration     Peripheral neuropathy 2020    Poor vision     right eye / had bleeding behind eye, is receiving \"shots\" at this time  / 3/22/2019    Prosthetic eye globe     left eye implant;    Seasonal allergies     Skipped heart beats     on metoprolol; managed by Dr. Cavazos Russian    Sleep apnea     uses cpap at times     Thyroid disease      Procedure/Surgery:  None this admission   Reason for admission: CVA  Precautions:  Fall risk, blind in L eye  Equipment Needs:  FWW    SUBJECTIVE:  Pt lives alone in first floor apartment in with [address]. Pt has [employer], cane and uses cane for ambulation PTA, is independent with all mobility. OBJECTIVE:   Initial Evaluation  Date: 2022 Treatment Short Term/ Long Term   Goals   AM-PAC 6 Clicks      Was pt agreeable to Eval/treatment? Yes     Does pt have pain?  In back (chronic)     Bed Mobility  Rolling: NT  Supine to sit: NT  Sit to supine: NT  Scooting: SBA  Rolling: ind  Supine to sit: ind  Sit to supine: ind  Scooting: ind   Transfers Sit to stand: SBA  Stand to sit: SBA  Stand pivot: Nt  Sit to stand: ind  Stand to sit: ind  Stand pivot: ind   Ambulation    75 feet with FWW min a  150 feet with FWW ind   Stair negotiation: ascended and descended  NT  2 steps with hand rail ind   ROM BUE:  Refer to OT eval   BLE:  WNL     Strength BUE:  Refer to OT eval   BLE:  4/5, pain in L knee with testing   WNL   Balance Sitting EOB:  SBA  Dynamic Standing:  Min a FWW  Sitting EOB:  ind  Dynamic Standing:  ind FWW     Pt is A & O x 4  Sensation:  Pt denies numbness and tingling to extremities  Edema:  L lower LE    Vitals:  Blood Pressure at rest -- Blood Pressure post session --   Heart Rate at rest -- Heart Rate post session --   SPO2 at rest - SPO2 post session --     Therapeutic Exercises:  none performed this visit    Patient education  Pt educated on home safety, benefits of rehab after DC    Patient response to education:   Pt verbalized understanding Pt demonstrated skill Pt requires further education in this area   x x      ASSESSMENT:    Conditions Requiring Skilled Therapeutic Intervention:  [x]Decreased strength     []Decreased ROM  [x]Decreased functional mobility  [x]Decreased balance   [x]Decreased endurance   [x]Decreased posture  []Decreased sensation  []Decreased coordination   [x]Decreased vision  []Decreased safety awareness   [x]Increased pain     Comments:  Pt in chair on arrival and agreeable to PT eval. Pt with questions regarding PT/rehab after DC; benefits and options explained to pt and pt showed good understanding. Pt requested to wear shoes to ambulate in hallway and was able to don with min a seated in chair. Pt requested to use walker to ambulate in hallway today and was able to ambulate x75 ft with slow gait speed, small steps. Pt required frequent standing rest breaks d/t back pain and noted ambulation distance was limited by this pain today.  Pt educated throughout session regarding use of AD, different types of walkers and their benefits, and walker management; pt showed good understanding  Of all information provided. Pt requested to stay in chair at end of session and was left with all needs met and call light in reach. Treatment:  Patient practiced and was instructed in the following treatment:     Bed mobility: performed with cues for safety awareness and proper hand placement to promote improved functional independence.  Transfer Training: performed to assess functional strength, independence, safety    Gait training: with FWW with cues provided for walker management, safety     Pt's/ family goals   1. Return home safely. Prognosis is good for reaching above PT goals. Patient and or family understand(s) diagnosis, prognosis, and plan of care.   yes    PHYSICAL THERAPY PLAN OF CARE:    PT POC is established based on physician order and patient diagnosis     Referring provider/PT Order:     PT eval and treat  Start:  04/25/22 0715,   End:  04/25/22 0715,   ONE TIME,   Standing Count:  1 Occurrences,   R         Louisa Gibson,      Diagnosis:  Subarachnoid hemorrhage ([address] [I60.9]  Subarachnoid bleed ([address] [I60.9]  Acute CVA (cerebrovascular accident) ([address] [I63.9]  Specific instructions for next treatment:  Increase ambulation distance     Current Treatment Recommendations:   [x] Strengthening to improve independence with functional mobility   [] ROM to improve independence with functional mobility   [x] Balance Training to improve static/dynamic balance and to reduce fall risk  [x] Endurance Training to improve activity tolerance during functional mobility   [x] Transfer Training to improve safety and independence with all functional transfers   [x] Gait Training to improve gait mechanics, endurance and assess need for appropriate assistive device  [x] Stair Training in preparation for safe discharge home and/or into the community   [x] Positioning to prevent skin breakdown and contractures  [x] Safety and Education Training   [] Patient/Caregiver Education   [] HEP  [] Other     PT long term treatment goals are located in above grid    Frequency of treatments: 2-5x/week x 1-2 weeks. Time in  1057  Time out  1123    Total Treatment Time  10 minutes     Evaluation Time includes thorough review of current medical information, gathering information on past medical history/social history and prior level of function, completion of standardized testing/informal observation of tasks, assessment of data and education on plan of care and goals.     CPT codes:  [x] Low Complexity PT evaluation 28874  [] Moderate Complexity PT evaluation 98172  [] High Complexity PT evaluation 53600  [] PT Re-evaluation 83166  [x] Gait training 04886 10 minutes  [] Manual therapy [id number] -- minutes  [] Therapeutic activities 06009 -- minutes  [] Therapeutic exercises 81632 -- minutes  [] Neuromuscular reeducation 94580 -- minutes     Talisha Dear, PT, DPT  [id number]

## 2022-04-26 NOTE — PLAN OF CARE
Problem: Chronic Conditions and Co-morbidities  Goal: Patient's chronic conditions and co-morbidity symptoms are monitored and maintained or improved  4/25/2022 2231 by Jami Osorio RN  Outcome: Progressing     Problem: Pain  Goal: Verbalizes/displays adequate comfort level or baseline comfort level  4/25/2022 2231 by Jami Osorio RN  Outcome: Progressing     Problem: Skin/Tissue Integrity  Goal: Absence of new skin breakdown  Description: 1. Monitor for areas of redness and/or skin breakdown  2. Assess vascular access sites hourly  3. Every 4-6 hours minimum:  Change oxygen saturation probe site  4. Every 4-6 hours:  If on nasal continuous positive airway pressure, respiratory therapy assess nares and determine need for appliance change or resting period.   Outcome: Progressing     Problem: Safety - Adult  Goal: Free from fall injury  Outcome: Progressing     Problem: ABCDS Injury Assessment  Goal: Absence of physical injury  Outcome: Progressing

## 2022-04-26 NOTE — CONSULTS
[address]                  [address] [place],  [facility]                                  CONSULTATION    PATIENT NAME: Shelly Whitt                    :        1940  MED REC NO:   [id number]                            ROOM:       [facility]  ACCOUNT NO:   [de-identified]                           ADMIT DATE: 2022  PROVIDER:     Alisa Mendoza MD    CONSULT DATE:  2022    CHIEF COMPLAINT:  Urinary incontinence. HISTORY OF PRESENT ILLNESS: This is an 78-year-old female who is  terribly bothered by frequency, urgency and urgency incontinence. She  wears Depend pads on a daily basis. She does not recall ever being  treated with any medications for this. She denies any gross hematuria. The patient did have a CAT scan two years ago which did not show any  renal abnormality. The patient is in the hospital at this time for  dizziness apparently and was thought to have a CVA and a subarachnoid  hemorrhage. This has been stable. PAST MEDICAL HISTORY:  The patient does have past history of macular  degeneration being treated with injections in her right eye. She had  her left eye removed. She also has been treated for hypertension,  history of CVA, bowel cancer. PAST SURGICAL HISTORY:  Left eye was removed, colectomy,  cholecystectomy, hysterectomy, tonsillectomy, left total knee  replacement. SOCIAL HISTORY:  Her   many years ago. She is not a  cigarette smoker. She no longer drives an automobile. MEDICATIONS:  Aricept, Medrol, Compazine, Colace, Toprol, Cozaar,  Antivert, Zyrtec, Synthroid, Zofran. PHYSICAL EXAMINATION:  GENERAL:  The patient is a very alert and oriented female, somewhat  obese, who has fairly good mobility. ABDOMEN:  Soft. There are no palpable organs or masses present. Urinalysis does show some red blood cells as well as white blood cells.     IMPRESSION:  Urinary incontinence, most likely secondary to overactive  bladder. PLAN:  To make sure her upper tracts are without hydronephrosis. We  also will check her postvoid residual.  The patient will be seen post  discharge in the office and may be a candidate for Botox injection.         Olivia Smith MD    D: 04/26/2022 11:12:10       T: 04/26/2022 11:15:35     [id number]  Job#: [id number]     Doc#: [id number]    CC:

## 2022-04-26 NOTE — PLAN OF CARE
Problem: Chronic Conditions and Co-morbidities  Goal: Patient's chronic conditions and co-morbidity symptoms are monitored and maintained or improved  Outcome: Progressing     Problem: Discharge Planning  Goal: Discharge to home or other facility with appropriate resources  Outcome: Progressing     Problem: Pain  Goal: Verbalizes/displays adequate comfort level or baseline comfort level  4/26/2022 1738 by Michelle Maguire  Outcome: Progressing  4/26/2022 0441 by Rita Norton RN  Outcome: Progressing     Problem: Skin/Tissue Integrity  Goal: Absence of new skin breakdown  Description: 1. Monitor for areas of redness and/or skin breakdown  2. Assess vascular access sites hourly  3. Every 4-6 hours minimum:  Change oxygen saturation probe site  4. Every 4-6 hours:  If on nasal continuous positive airway pressure, respiratory therapy assess nares and determine need for appliance change or resting period.   4/26/2022 1738 by Michelle Maguire  Outcome: Progressing  4/26/2022 0441 by Rita Norton RN  Outcome: Progressing     Problem: Safety - Adult  Goal: Free from fall injury  4/26/2022 1738 by Michelle Maguire  Outcome: Progressing  4/26/2022 0441 by Rita Norton RN  Outcome: Progressing     Problem: ABCDS Injury Assessment  Goal: Absence of physical injury  Outcome: Progressing

## 2022-04-26 NOTE — PROGRESS NOTES
IMPRESSION:  1. Subarachnoid hemorrhage, probably old, right frontal lobe and  multiple punctate CVAs. 2.  Multiple medical comorbidities. Doing better every day. Neurology consult noted. Nothing new to add from neurosurgical stand point at this time.

## 2022-04-27 ENCOUNTER — APPOINTMENT (OUTPATIENT)
Dept: NUCLEAR MEDICINE | Age: 82
DRG: 066 | End: 2022-04-27
Payer: MEDICARE

## 2022-04-27 PROCEDURE — 2060000000 HC ICU INTERMEDIATE R&B

## 2022-04-27 PROCEDURE — 6360000002 HC RX W HCPCS: Performed by: UROLOGY

## 2022-04-27 PROCEDURE — 78708 K FLOW/FUNCT IMAGE W/DRUG: CPT | Performed by: RADIOLOGY

## 2022-04-27 PROCEDURE — 97530 THERAPEUTIC ACTIVITIES: CPT

## 2022-04-27 PROCEDURE — 6370000000 HC RX 637 (ALT 250 FOR IP): Performed by: INTERNAL MEDICINE

## 2022-04-27 PROCEDURE — 97535 SELF CARE MNGMENT TRAINING: CPT

## 2022-04-27 PROCEDURE — 6370000000 HC RX 637 (ALT 250 FOR IP): Performed by: PSYCHIATRY & NEUROLOGY

## 2022-04-27 PROCEDURE — 87088 URINE BACTERIA CULTURE: CPT

## 2022-04-27 PROCEDURE — A9562 TC99M MERTIATIDE: HCPCS | Performed by: RADIOLOGY

## 2022-04-27 PROCEDURE — 97116 GAIT TRAINING THERAPY: CPT

## 2022-04-27 PROCEDURE — 78708 K FLOW/FUNCT IMAGE W/DRUG: CPT

## 2022-04-27 PROCEDURE — 3430000000 HC RX DIAGNOSTIC RADIOPHARMACEUTICAL: Performed by: RADIOLOGY

## 2022-04-27 RX ORDER — POLYETHYLENE GLYCOL 3350 17 G/17G
17 POWDER, FOR SOLUTION ORAL DAILY PRN
Status: DISCONTINUED | OUTPATIENT
Start: 2022-04-27 | End: 2022-04-29 | Stop reason: HOSPADM

## 2022-04-27 RX ORDER — ALPRAZOLAM 0.25 MG/1
0.25 TABLET ORAL 3 TIMES DAILY PRN
Status: DISCONTINUED | OUTPATIENT
Start: 2022-04-27 | End: 2022-04-29 | Stop reason: HOSPADM

## 2022-04-27 RX ORDER — LOSARTAN POTASSIUM 50 MG/1
50 TABLET ORAL DAILY
Status: DISCONTINUED | OUTPATIENT
Start: 2022-04-27 | End: 2022-04-28

## 2022-04-27 RX ORDER — METOPROLOL SUCCINATE 25 MG/1
12.5 TABLET, EXTENDED RELEASE ORAL DAILY
Status: DISCONTINUED | OUTPATIENT
Start: 2022-04-27 | End: 2022-04-29 | Stop reason: HOSPADM

## 2022-04-27 RX ORDER — GUAIFENESIN/DEXTROMETHORPHAN 100-10MG/5
10 SYRUP ORAL EVERY 6 HOURS PRN
Status: DISCONTINUED | OUTPATIENT
Start: 2022-04-27 | End: 2022-04-29 | Stop reason: HOSPADM

## 2022-04-27 RX ADMIN — Medication 9 MILLICURIE: at 09:52

## 2022-04-27 RX ADMIN — LOSARTAN POTASSIUM 50 MG: 50 TABLET, FILM COATED ORAL at 08:40

## 2022-04-27 RX ADMIN — METOPROLOL SUCCINATE 12.5 MG: 25 TABLET, EXTENDED RELEASE ORAL at 08:40

## 2022-04-27 RX ADMIN — LIDOCAINE: 50 OINTMENT TOPICAL at 20:54

## 2022-04-27 RX ADMIN — FUROSEMIDE 10 MG: 10 INJECTION, SOLUTION INTRAMUSCULAR; INTRAVENOUS at 09:59

## 2022-04-27 RX ADMIN — DONEPEZIL HYDROCHLORIDE 2.5 MG: 5 TABLET, FILM COATED ORAL at 08:40

## 2022-04-27 RX ADMIN — ALPRAZOLAM 0.25 MG: 0.25 TABLET ORAL at 22:39

## 2022-04-27 RX ADMIN — DOCUSATE SODIUM 100 MG: 100 CAPSULE, LIQUID FILLED ORAL at 08:40

## 2022-04-27 RX ADMIN — ATORVASTATIN CALCIUM 20 MG: 10 TABLET, FILM COATED ORAL at 20:48

## 2022-04-27 RX ADMIN — PANTOPRAZOLE SODIUM 40 MG: 40 TABLET, DELAYED RELEASE ORAL at 06:45

## 2022-04-27 RX ADMIN — LEVOTHYROXINE SODIUM 88 MCG: 0.09 TABLET ORAL at 06:45

## 2022-04-27 RX ADMIN — Medication 1 CAPSULE: at 08:40

## 2022-04-27 RX ADMIN — CLONIDINE HYDROCHLORIDE 0.1 MG: 0.1 TABLET ORAL at 20:53

## 2022-04-27 RX ADMIN — DOCUSATE SODIUM 100 MG: 100 CAPSULE, LIQUID FILLED ORAL at 20:48

## 2022-04-27 ASSESSMENT — PAIN SCALES - GENERAL
PAINLEVEL_OUTOF10: 0
PAINLEVEL_OUTOF10: 0

## 2022-04-27 NOTE — PROGRESS NOTES
Occupational Therapy  OT BEDSIDE TREATMENT NOTE   [facility] [address]  [address] [place] [address]       Date:2022  Patient Name: Vaughn Rossi  MRN: [id number]  : 1940  Room: [address] [place] [address]-A     Per OT Eval:    Evaluating OT: Herbert Bright, OTR/L #[id number]     Referring Provider[de-identified] Samuel Guthrie DO                                      Specific Provider Orders/Date: OT evaluation and treatment 22     Diagnosis:  Subarachnoid hemorrhage ([address] [I60.9]  Subarachnoid bleed ([address] [I60.9]  Acute CVA (cerebrovascular accident) ([address] [I63.9]       Pertinent Medical History:  has a past medical history of Amaurosis fugax of right eye, Anxiety, Arthritis, CA - cancer of bowel, Cerebral artery occlusion with cerebral infarction ([address] Chronic back pain, Constipation, Depression, Elevated sed rate, GERD (gastroesophageal reflux disease), Hemorrhoids, Hyperlipidemia, Hypertension, Left foot pain, Lumbosacral radiculopathy, Macular degeneration, Peripheral neuropathy, Poor vision, Prosthetic eye globe, Seasonal allergies, Skipped heart beats, Sleep apnea, and Thyroid disease.         Precautions:  Fall Risk, Bed alarm, Blind L eye (prosthetic eye)     Assessment of current deficits   [x]? Functional mobility             [x]?ADLs           [x]? Strength                  []?Cognition   [x]? Functional transfers           []? IADLs         [x]? Safety Awareness   [x]? Endurance   []? Fine Coordination              [x]? Balance      [x]? Vision/perception   []? Sensation     []? Gross Motor Coordination  [x]? ROM           []?  Delirium                   []? Motor Control      OT PLAN OF CARE   OT POC based on physician orders, patient diagnosis and results of clinical assessment     Frequency/Duration 1-4 days/wk for 2 weeks PRN   Specific OT Treatment to include:   * Instruction/training on adapted ADL techniques and AE recommendations to increase functional independence within precautions       * Training on energy conservation strategies, correct breathing pattern and techniques to improve independence/tolerance for self-care routine  * Functional transfer/mobility training/DME recommendations for increased independence, safety, and fall prevention  * Patient/Family education to increase follow through with safety techniques and functional independence  * Therapeutic exercise to improve motor endurance, ROM, and functional strength for ADLs/functional transfers  * Therapeutic activities to facilitate/challenge dynamic balance, stand tolerance for increased safety and independence with ADLs  * Positioning to improve skin integrity, interaction with environment and functional independence     Recommended Adaptive Equipment: ww-  TBD      Home Living:  Pt lives alone in a 1st floor apt with 1 threshhold step(s) to enter    Bathroom setup: tub shower with shower chair   Equipment owned: shower chair, SPC     Prior Level of Function: Independent  with ADLs , Independent  with IADLs; ambulated with cane as needed  Driving: no  Occupation/leisure: not reported     Pain Level: denies pain, reports fatigue     Cognition: A&O: 4/4; pleasant & cooperative, motivated for rehab  Follows multi step directions: good               Memory:  good               Sequencing:  good               Problem solving:  fair               Judgement/safety:  fair      Functional Assessment:   AM-PAC Daily Activity Raw Score: 18/24       Initial Eval Status  Date: 4/25/22 Treatment Status  Date:  4/27/22 STG=LTG  Time frame: 10-14 days   Feeding SBA  Indep Independent    Grooming Stand by Assist  CGA  Standing at sink Independent    UB Dressing Minimal Assist   SBA doff/marietta gown seated  Min A marietta 2nd gown  standing as a robe, limited L UE ROM, chronic  Modified [place]    LB Dressing Minimal Assist   To marietta brief, socks  Min A  Increased time & effort to doff/marietta brief, assist to thread legs into pants, with pt then able to stand & pull over hips   Min A with socks as well, pt able to marietta, assist to marietta  Modified [place]    Bathing Moderate Assist  For safety and balance UB: SBA  Assist to wash back  LB: Min A  For buttock area & feet  Sponge bathing   Modified [place]    Toileting Minimal Assist   SBA  Pt able to complete hygiene & clothing management  Modified [place]    Bed Mobility  Supine to sit: Stand by Assist   Sit to supine: NT  SBA  Supine to sit  Supine to sit: Independent   Sit to supine: Independent    Functional Transfers Sit to stand: Minimal Assist   Stand to sit: Minimal Assist   Stand pivot: Minimal Assist  Min A  Cues for hand placement & technique   Modified [place]    Functional Mobility Min A   Short distance in room (bed to bathroom)  Hand in hand A for safety/balance,   Pt reaching for furniture, mild unsteadiness with slow gait speed  Pt may benefit from a ww - TBD Min A  Hand held assist in room distances   Mod indep with AAD  Functional household distance   Balance Sitting: indep     Standing: min A  Sitting: Indep   Standing: Min A Sitting: indep       Standing: mod indep   Activity Tolerance Fair-  Fair  Moderate tasks good   Visual/  Perceptual Glasses: no, Blind L eye (prosthetic eye)             BUE  ROM/Strength/  Fine motor Coordination Hand dominance: R     RUE: ROM limited shoulder but generally WFL     Strength: grossly 4-/5      Strength:  WFL     Coordination:   WFL     LUE: ROM limited shoulder ROM but generally WFL     Strength: grossly 4-/5      Strength:  WFL     Coordination: WFL          Education:  Pt was educated through out treatment regarding proper technique & safety with bed mobility, functional transfers & mobility, energy conservation techniques & ADL compensatory strategies to ease tasks, improve safety & prevent falls to return home safely. Comments: Upon arrival pt was in bed & agreeable for therapy.  At end of session pt was seated in chair, all lines and tubes intact, call light within reach. · Pt has made Good progress towards set goals. · Continue with current plan of care      Treatment Time In: 10:50            Treatment Time Out: 11:30           Treatment Charges: Mins Units   Ther Ex  32211     Manual Therapy 63042     Thera Activities 96848 10 1   ADL/Home Mgt 26341 30 2   Neuro Re-ed 24830     Group Therapy      Orthotic manage/training  42728     Non-Billable Time     Total Timed Treatment 40 3       Kathy RIVERA  [address] [place] [address], [address] [place] [address]

## 2022-04-27 NOTE — CARE COORDINATION
HEATHER spoke with patient in her room. She confirms plan is still to go to acute rehab on discharge, they did start precert, we are awaiting auth. Patient requested an assisted living facility list, she states she plans to go home from rehab, but thinks within the next year or so she will be wanting to transition to AL and her niece explained that she should start looking into it now because of the waiting lists. She does have a , she states Ulises Sanchez through [facility], and HEATHER explained she will have to also talk to Ulises Sanchez at [facility] to see if they can get her on a waiver program for AL as well. She states understanding,  Have provided her with AL list of facilities.

## 2022-04-27 NOTE — PROGRESS NOTES
Physical Therapy Initial Assessment     Name: Cheyenne Pang  : 1940  MRN: [id number]      Date of Service: 2022    Evaluating PT:  Wilma Augustine PT, DPT [id number]    Room #:  3502/3492-Q  Diagnosis:  Subarachnoid hemorrhage ([facility] [I60.9]  Subarachnoid bleed ([facility] [I60.9]  Acute CVA (cerebrovascular accident) [facility]) [I63.9]  PMHx/PSHx:    Past Medical History:   Diagnosis Date    Amaurosis fugax of right eye 2017    Anxiety     Arthritis     CA - cancer of bowel     Colon, treated with surgery and chemo    Cerebral artery occlusion with cerebral infarction ([facility]     possibly TIA    Chronic back pain     Constipation     Depression     Elevated sed rate 2017    hx of    GERD (gastroesophageal reflux disease)     Hemorrhoids     history of    Hyperlipidemia     diet controlled    Hypertension     Left foot pain     dropped a Kettle on foot    Lumbosacral radiculopathy 2020    Macular degeneration     Peripheral neuropathy 2020    Poor vision     right eye / had bleeding behind eye, is receiving \"shots\" at this time  / 3/22/2019    Prosthetic eye globe     left eye implant;    Seasonal allergies     Skipped heart beats     on metoprolol; managed by Dr. Carie Ramos    Sleep apnea     uses cpap at times     Thyroid disease      Procedure/Surgery:  None this admission   Reason for admission: CVA  Precautions:  Fall risk, blind in L eye  Equipment Needs:  FWW    SUBJECTIVE:  Pt lives alone in first floor apartment in with 1 MARY. Pt has [employer], cane and uses cane for ambulation PTA, is independent with all mobility. OBJECTIVE:   Initial Evaluation  Date: 22 Treatment  22 Short Term/ Long Term   Goals   AM-PAC 6 Clicks     Was pt agreeable to Eval/treatment? Yes yes    Does pt have pain?  In back (chronic) no    Bed Mobility  Rolling: NT  Supine to sit: NT  Sit to supine: NT  Scooting: SBA Rolling: NT  Supine to sit: NT  Sit to supine: NT  Scooting: SBA Rolling: ind  Supine to sit: ind  Sit to supine: ind  Scooting: ind   Transfers Sit to stand: SBA  Stand to sit: SBA  Stand pivot: Nt Sit to stand: SBA  Stand to sit: SBA  Stand pivot: Nt Sit to stand: ind  Stand to sit: ind  Stand pivot: ind   Ambulation    75 feet with FWW min a 200ft no AD min a 150 feet with FWW ind   Stair negotiation: ascended and descended  NT  2 steps with hand rail ind   ROM BUE:  Refer to OT eval   BLE:  WNL     Strength BUE:  Refer to OT eval   BLE:  4/5, pain in L knee with testing   WNL   Balance Sitting EOB:  SBA  Dynamic Standing:  Min a FWW  Sitting EOB:  ind  Dynamic Standing:  ind FWW     Pt is A & O x 4  Sensation:  Pt denies numbness and tingling to extremities  Edema:  L lower LE    Vitals:  Blood Pressure at rest -- Blood Pressure post session --   Heart Rate at rest -- Heart Rate post session --   SPO2 at rest - SPO2 post session --     Therapeutic Exercises:  none performed this visit    Patient education  Pt educated on home safety, benefits of rehab after DC    Patient response to education:   Pt verbalized understanding Pt demonstrated skill Pt requires further education in this area   x x      ASSESSMENT:    Conditions Requiring Skilled Therapeutic Intervention:  [x]Decreased strength     []Decreased ROM  [x]Decreased functional mobility  [x]Decreased balance   [x]Decreased endurance   [x]Decreased posture  []Decreased sensation  []Decreased coordination   [x]Decreased vision  []Decreased safety awareness   [x]Increased pain     Comments:  Pt in chair on arrival and agreeable to PT tx. Pt with more questions specific to acute rehab today and therapy in rehab - all questions addressed and pt with good understanding of information provided. Pt requested to ambulate in hallway without AD today; pt educated that ambulating with [address] will improve safety in home but gait training can be performed without AD for strengthening and to perform balance.  Pt ambulated x200ft today in hallway with min A without AD with multiple standing rest breaks d/t PLUMMER. Pt with shortened step length today requiring cuing to increase step length. Pt requested to stay in chair at end of session, left with call light in reach and all needs met. Treatment:  Patient practiced and was instructed in the following treatment:     Bed mobility: performed with cues for safety awareness and proper hand placement to promote improved functional independence.  Transfer Training: performed to assess functional strength, independence, safety    Gait training: with FWW with cues provided for walker management, safety     Pt's/ family goals   1. Return home safely. Prognosis is good for reaching above PT goals. Patient and or family understand(s) diagnosis, prognosis, and plan of care.   yes    PHYSICAL THERAPY PLAN OF CARE:    PT POC is established based on physician order and patient diagnosis     Referring provider/PT Order:     PT eval and treat  Start:  04/25/22 0715,   End:  04/25/22 0715,   ONE TIME,   Standing Count:  1 Occurrences,   R         Garrett Gibson DO     Diagnosis:  Subarachnoid hemorrhage ([address] [I60.9]  Subarachnoid bleed ([address] [I60.9]  Acute CVA (cerebrovascular accident) ([address] [I63.9]  Specific instructions for next treatment:  Increase ambulation distance     Current Treatment Recommendations:   [x] Strengthening to improve independence with functional mobility   [] ROM to improve independence with functional mobility   [x] Balance Training to improve static/dynamic balance and to reduce fall risk  [x] Endurance Training to improve activity tolerance during functional mobility   [x] Transfer Training to improve safety and independence with all functional transfers   [x] Gait Training to improve gait mechanics, endurance and assess need for appropriate assistive device  [x] Stair Training in preparation for safe discharge home and/or into the community   [x] Positioning to prevent skin breakdown and contractures  [x] Safety and Education Training   [] Patient/Caregiver Education   [] HEP  [] Other     PT long term treatment goals are located in above grid    Frequency of treatments: 2-5x/week x 1-2 weeks. Time in  1131  Time out  1145    Total Treatment Time  14 minutes     Evaluation Time includes thorough review of current medical information, gathering information on past medical history/social history and prior level of function, completion of standardized testing/informal observation of tasks, assessment of data and education on plan of care and goals.     CPT codes:  [] Low Complexity PT evaluation 59808  [] Moderate Complexity PT evaluation 81526  [] High Complexity PT evaluation 16440  [] PT Re-evaluation 85450  [x] Gait training 31892 14 minutes  [] Manual therapy [address] [facility] -- minutes  [] Therapeutic activities 19744 -- minutes  [] Therapeutic exercises 93896 -- minutes  [] Neuromuscular reeducation 32324 -- minutes     Kolton Frederick, PT, DPT  [id number]

## 2022-04-27 NOTE — PROGRESS NOTES
Hospital Medicine    Subjective:  Pt c/o dry cough      Current Facility-Administered Medications:     metoprolol succinate (TOPROL XL) extended release tablet 12.5 mg, 12.5 mg, Oral, Daily, Jesus Gibson DO    losartan (COZAAR) tablet 50 mg, 50 mg, Oral, Daily, Jesus Gibson DO    furosemide (LASIX) injection 10 mg, 10 mg, IntraVENous, Once, Geetha Elder MD    ocuvite-lutein multivitamin 1 capsule, 1 capsule, Oral, Daily, Milagro Steven MD, 1 capsule at 04/26/22 0851    ondansetron (ZOFRAN) tablet 4 mg, 4 mg, Oral, TID PRN, Milagro Steven MD    levothyroxine (SYNTHROID) tablet 88 mcg, 88 mcg, Oral, QAM AC, Milagro Steven MD, 88 mcg at 04/27/22 0645    cetirizine (ZYRTEC) tablet 5 mg, 5 mg, Oral, Daily PRN, Milagro Steven MD    meclizine (ANTIVERT) tablet 25 mg, 25 mg, Oral, PRN, Milagro Steven MD    pantoprazole (PROTONIX) tablet 40 mg, 40 mg, Oral, QAM ACMilagro MD, 40 mg at 04/27/22 0645    donepezil (ARICEPT) tablet 2.5 mg, 2.5 mg, Oral, Daily with breakfast, Milagro Steven MD, 2.5 mg at 04/26/22 0850    docusate sodium (COLACE) capsule 100 mg, 100 mg, Oral, BID, Jesus Gibson DO, 100 mg at 04/26/22 2043    lidocaine (XYLOCAINE) 5 % ointment, , Topical, Daily PRN, Jesus Gibson DO, Given at 04/26/22 2043    cloNIDine (CATAPRES) tablet 0.1 mg, 0.1 mg, Oral, Q6H PRN, Jesus Gibson DO, 0.1 mg at 04/25/22 1111    atorvastatin (LIPITOR) tablet 20 mg, 20 mg, Oral, Nightly, Santos Ding MD, 20 mg at 04/26/22 2043    Objective:    BP (!) 100/41   Pulse 56   Temp 97.2 °F (36.2 °C) (Temporal)   Resp 14   Ht 4' 11\" (1.499 m)   Wt 184 lb (83.5 kg)   LMP 07/24/2014   SpO2 96%   BMI 37.16 kg/m²     Heart:  Reg eloy  Lungs:  ctab  Abd: + bs soft nontender  Extrem:  Min edema legs    CBC with Differential:    Lab Results   Component Value Date    WBC 6.8 04/24/2022    RBC 4.39 04/24/2022    HGB 12.5 04/24/2022    HCT 38.5 04/24/2022     04/24/2022    MCV 87.7 04/24/2022    MCH 28.5 04/24/2022    MCHC 32.5 04/24/2022    RDW 13.7 04/24/2022    SEGSPCT 58 03/11/2014    LYMPHOPCT 34.6 04/24/2022    MONOPCT 8.9 04/24/2022    EOSPCT 1 10/07/2010    BASOPCT 0.6 04/24/2022    MONOSABS 0.61 04/24/2022    LYMPHSABS 2.37 04/24/2022    EOSABS 0.14 04/24/2022    BASOSABS 0.04 04/24/2022     CMP:    Lab Results   Component Value Date     04/24/2022    K 4.5 04/24/2022     04/24/2022    CO2 24 04/24/2022    BUN 26 04/24/2022    CREATININE 0.8 04/24/2022    GFRAA >60 04/24/2022    LABGLOM >60 04/24/2022    GLUCOSE 99 04/24/2022    GLUCOSE 109 05/04/2012    PROT 7.1 04/24/2022    LABALBU 4.2 04/24/2022    LABALBU 4.2 03/24/2012    CALCIUM 9.3 04/24/2022    BILITOT 0.5 04/24/2022    ALKPHOS 70 04/24/2022    AST 21 04/24/2022    ALT 13 04/24/2022     Warfarin PT/INR:    Lab Results   Component Value Date    INR 0.9 08/18/2020    INR 0.9 12/29/2019    INR 0.9 12/19/2019    PROTIME 10.9 08/18/2020    PROTIME 10.5 12/29/2019    PROTIME 10.4 12/19/2019       Assessment:    Principal Problem:    Subarachnoid hemorrhage (HCC)  Active Problems:    Acute CVA (cerebrovascular accident) ([address]    Hypothyroidism    Hyperlipidemia LDL goal <100    Obesity (BMI 35.0-39.9 without comorbidity)    Hypertension  Resolved Problems:    * No resolved hospital problems.  *      Plan:  Decrease metoprolol decrease losartan increase activity dc planning to rehab        [address], DO  7:00 AM  4/27/2022

## 2022-04-27 NOTE — PLAN OF CARE
Problem: Chronic Conditions and Co-morbidities  Goal: Patient's chronic conditions and co-morbidity symptoms are monitored and maintained or improved  4/27/2022 0443 by Adria Quiroz RN  Outcome: Progressing  4/26/2022 1738 by Arun Jaimes  Outcome: Progressing     Problem: Discharge Planning  Goal: Discharge to home or other facility with appropriate resources  4/27/2022 0443 by Adria Quiroz RN  Outcome: Progressing  4/26/2022 1738 by Arun Jaimes  Outcome: Progressing     Problem: Pain  Goal: Verbalizes/displays adequate comfort level or baseline comfort level  4/27/2022 0443 by Adria Quiroz RN  Outcome: Progressing  4/26/2022 1738 by Arun Jaimes  Outcome: Progressing     Problem: Skin/Tissue Integrity  Goal: Absence of new skin breakdown  Description: 1. Monitor for areas of redness and/or skin breakdown  2. Assess vascular access sites hourly  3. Every 4-6 hours minimum:  Change oxygen saturation probe site  4. Every 4-6 hours:  If on nasal continuous positive airway pressure, respiratory therapy assess nares and determine need for appliance change or resting period.   4/27/2022 0443 by Adria Quiroz RN  Outcome: Progressing  4/26/2022 1738 by Arun Jaimes  Outcome: Progressing     Problem: Safety - Adult  Goal: Free from fall injury  4/27/2022 0443 by Adria Quiroz RN  Outcome: Progressing  4/26/2022 1738 by Arun Jaimes  Outcome: Progressing     Problem: ABCDS Injury Assessment  Goal: Absence of physical injury  4/27/2022 0443 by Adria Quiroz RN  Outcome: Progressing  4/26/2022 1738 by Arun Jaimes  Outcome: Progressing

## 2022-04-27 NOTE — PROGRESS NOTES
4/27/2022 1:14 PM  Service: Urology  Group: [facility] (Ryan/Safia/Tawny)    Turner Murphy  [id number]    Subjective:    She returned from renal lasix scan   She has no fevers  PVR 0ml  Still with some incontinence   No pain     Review of Systems  Constitutional: No fever or chills   Respiratory: negative for cough and hemoptysis  Cardiovascular: negative for chest pain and dyspnea  Gastrointestinal: negative for abdominal pain, diarrhea, nausea and vomiting   : See above  Derm: negative for rash and skin lesion(s)  Neurological: negative for seizures and tremors  Musculoskeletal: Negative    Psychiatric: Negative   All other reviews are negative      Scheduled Meds:   metoprolol succinate  12.5 mg Oral Daily    losartan  50 mg Oral Daily    ocuvite-lutein  1 capsule Oral Daily    levothyroxine  88 mcg Oral QAM AC    pantoprazole  40 mg Oral QAM AC    donepezil  2.5 mg Oral Daily with breakfast    docusate sodium  100 mg Oral BID    atorvastatin  20 mg Oral Nightly       Objective:  Vitals:    04/27/22 0745   BP: (!) 189/59   Pulse: 55   Resp: 14   Temp: 97.9 °F (36.6 °C)   SpO2: 96%         Allergies: Iodine, Bee venom, Codeine, Hydralazine, Oxycodone-aspirin, Amoxicillin-pot clavulanate, Darvocet [propoxyphene n-acetaminophen], Eggs or egg-derived products, Influenza vaccines, Percodan [oxycodone-aspirin], and Percodan [oxycodone-aspirin]    General Appearance: alert and oriented to person, place and time and in no acute distress  Skin: no rash or erythema  Head: normocephalic and atraumatic  Pulmonary/Chest: normal air movement, no respiratory distress  Abdomen: soft, non-tender, non-distended  Genitourinary: no langford   Extremities: no cyanosis, clubbing or edema         Labs:     No results for input(s): NA, K, CL, CO2, BUN, CREATININE, GLUCOSE, CALCIUM in the last 72 hours.     Lab Results   Component Value Date    HGB 12.5 04/24/2022    HCT 38.5 04/24/2022       No results found for: PSA    Narrative   Patient MRN: [id number]   : 1940   Age:  78 years   Gender: Female   Order Date: 2022 9:08 AM   Exam: NM RENAL WITH LASIX   Number of Images: 2 views   Indication:   RULE OUT OBSTRUCTION    MAG-3 LASIX RENAL SCAN.  PLEASE CALCULATE T1/2. RULE OUT OBSTRUCTION   What reading provider will be dictating this exam?->[facility]   Comparison: None.       Findings:       The patient was injected with 9 mCi of technetium MAG3 intravenously       The patient was injected with 10 mg grams of Lasix IV at 30 minutes       Split uptake on the left was 47  percent, time to peak was 2.68, one   half clearance time was from time to peak 13 minutes       Split uptake on the right was 53 percent, time to peak was 2.68, one   half clearance time was time to peak 11 minutes       Renal curves demonstrate perfusion to be unremarkable. Extraction was unremarkable. Excretion was unremarkable.    There is improvement with Lasix administration           Impression   No evidence to suggest obstruction       Assessment/Plan:  Urinary incontinence     UA unremarkable   Creatinine stable  MAG3 without evidence of obstruction at this time   PVR 0ml  No langford needed  May benefit from Botox in the future as an outpatient   Will need additional outpatient follow up     Hilda Cota, APRN - [facility]  Urology

## 2022-04-27 NOTE — CARE COORDINATION
Spoke with Sara, liaison with ARU at [facility]. Patient is appropriate and accepted to ARU for transition of care. They will initiate authorization today with the patient's insurance once updated OT note is received for transition of care. OT aware of need. PT note in from yesterday. Patient currently off the floor for NM renal scan. Await final testing results and authorization for transition of care. Will continue to follow.      Hakeem Christian RN.  [facility]  [phone]

## 2022-04-27 NOTE — PROGRESS NOTES
Attempted to meet with patient in her room to discuss ARU and discharge planning. Patient is currently off the unit for testing. Will attempt to see patient again at a later time. Called patient's daughter Garrett Kohler. Per Garrett Kohler, patient lives alone, but has a  from [employer]. Patient plans on utilizing [facility] for transportation to and from appointments. Garrett Kohler states patient's sister lives nearby and would be available to assist as needed. Will plan on initiating precert for [facility] today.     Gisela Marinelli RN, Pre-screener for Acute Rehab  P: [phone]

## 2022-04-28 PROCEDURE — 6370000000 HC RX 637 (ALT 250 FOR IP): Performed by: INTERNAL MEDICINE

## 2022-04-28 PROCEDURE — 6370000000 HC RX 637 (ALT 250 FOR IP): Performed by: PSYCHIATRY & NEUROLOGY

## 2022-04-28 PROCEDURE — 2060000000 HC ICU INTERMEDIATE R&B

## 2022-04-28 RX ORDER — LOSARTAN POTASSIUM 50 MG/1
50 TABLET ORAL ONCE
Status: COMPLETED | OUTPATIENT
Start: 2022-04-28 | End: 2022-04-28

## 2022-04-28 RX ORDER — LOSARTAN POTASSIUM 50 MG/1
100 TABLET ORAL DAILY
Status: DISCONTINUED | OUTPATIENT
Start: 2022-04-29 | End: 2022-04-29 | Stop reason: HOSPADM

## 2022-04-28 RX ADMIN — ALPRAZOLAM 0.25 MG: 0.25 TABLET ORAL at 15:24

## 2022-04-28 RX ADMIN — DONEPEZIL HYDROCHLORIDE 2.5 MG: 5 TABLET, FILM COATED ORAL at 07:50

## 2022-04-28 RX ADMIN — METOPROLOL SUCCINATE 12.5 MG: 25 TABLET, EXTENDED RELEASE ORAL at 07:50

## 2022-04-28 RX ADMIN — LEVOTHYROXINE SODIUM 88 MCG: 0.09 TABLET ORAL at 06:32

## 2022-04-28 RX ADMIN — LOSARTAN POTASSIUM 50 MG: 50 TABLET, FILM COATED ORAL at 15:22

## 2022-04-28 RX ADMIN — ATORVASTATIN CALCIUM 20 MG: 10 TABLET, FILM COATED ORAL at 20:43

## 2022-04-28 RX ADMIN — DOCUSATE SODIUM 100 MG: 100 CAPSULE, LIQUID FILLED ORAL at 07:50

## 2022-04-28 RX ADMIN — LOSARTAN POTASSIUM 50 MG: 50 TABLET, FILM COATED ORAL at 07:50

## 2022-04-28 RX ADMIN — Medication 1 CAPSULE: at 07:50

## 2022-04-28 RX ADMIN — DOCUSATE SODIUM 100 MG: 100 CAPSULE, LIQUID FILLED ORAL at 20:43

## 2022-04-28 RX ADMIN — PANTOPRAZOLE SODIUM 40 MG: 40 TABLET, DELAYED RELEASE ORAL at 06:32

## 2022-04-28 ASSESSMENT — PAIN SCALES - GENERAL: PAINLEVEL_OUTOF10: 0

## 2022-04-28 NOTE — PROGRESS NOTES
Occupational Therapy     Date:2022  Patient Name: Heath Salter  MRN: [id number]  : 1940  Room: [address] [place] [address]-A     Completed chart review & attempted to see pt, but pt politely declined, reporting she is upset about not being able to go to ARU. Provided reassurance & comfort to pt and encouraged pt to work with this therapist with no results. Will attempt to see pt at another time. Reggie Carlsons.  [address], [address] [place] [address]

## 2022-04-28 NOTE — PROGRESS NOTES
[facility] Medicine    Subjective:  Pt seen this am sitting up in chair      Current Facility-Administered Medications:     metoprolol succinate (TOPROL XL) extended release tablet 12.5 mg, 12.5 mg, Oral, Daily, Jennifer Fender, DO, 12.5 mg at 04/28/22 0750    losartan (COZAAR) tablet 50 mg, 50 mg, Oral, Daily, Shyla Tereza Malmer, DO, 50 mg at 04/28/22 0750    guaiFENesin-dextromethorphan (ROBITUSSIN DM) 100-10 MG/5ML syrup 10 mL, 10 mL, Oral, Q6H PRN, Shyla Starks Malmer, DO    polyethylene glycol (GLYCOLAX) packet 17 g, 17 g, Oral, Daily PRN, Rosette Silva MD    ALPRAZolam Henrietta Gather) tablet 0.25 mg, 0.25 mg, Oral, TID PRN, Shyla Persaudmer, DO, 0.25 mg at 04/27/22 1049    ocuvite-lutein multivitamin 1 capsule, 1 capsule, Oral, Daily, Latanya Molina MD, 1 capsule at 04/28/22 0750    ondansetron (ZOFRAN) tablet 4 mg, 4 mg, Oral, TID PRN, Latanya Molina MD    levothyroxine (SYNTHROID) tablet 88 mcg, 88 mcg, Oral, Latanya OG MD, 88 mcg at 04/28/22 8018    cetirizine (ZYRTEC) tablet 5 mg, 5 mg, Oral, Daily PRN, Latanya Molina MD    meclizine (ANTIVERT) tablet 25 mg, 25 mg, Oral, PRN, Latanya Molina MD    pantoprazole (PROTONIX) tablet 40 mg, 40 mg, Oral, QAM Latanya RODRIGUEZ MD, 40 mg at 04/28/22 9663    donepezil (ARICEPT) tablet 2.5 mg, 2.5 mg, Oral, Daily with breakfast, Latanya Molina MD, 2.5 mg at 04/28/22 0750    docusate sodium (COLACE) capsule 100 mg, 100 mg, Oral, BID, Shyla Persaudmer, DO, 100 mg at 04/28/22 0750    lidocaine (XYLOCAINE) 5 % ointment, , Topical, Daily PRN, Shyla Gibson DO, Given at 04/27/22 2054    cloNIDine (CATAPRES) tablet 0.1 mg, 0.1 mg, Oral, Q6H PRN, Shyla Gibson DO, 0.1 mg at 04/27/22 2053    atorvastatin (LIPITOR) tablet 20 mg, 20 mg, Oral, Nightly, Mir Hernandez MD, 20 mg at 04/27/22 2048    Objective:    BP (!) 170/63   Pulse 58   Temp 97.4 °F (36.3 °C) (Temporal)   Resp 14   Ht 4' 11\" (1.499 m) Wt 184 lb (83.5 kg)   LMP 07/24/2014   SpO2 96%   BMI 37.16 kg/m²     Heart:  Reg  Lungs:  ctab  Abd: + bs soft nontender  Extrem:  Min edema legs    CBC with Differential:    Lab Results   Component Value Date    WBC 6.8 04/24/2022    RBC 4.39 04/24/2022    HGB 12.5 04/24/2022    HCT 38.5 04/24/2022     04/24/2022    MCV 87.7 04/24/2022    MCH 28.5 04/24/2022    MCHC 32.5 04/24/2022    RDW 13.7 04/24/2022    SEGSPCT 58 03/11/2014    LYMPHOPCT 34.6 04/24/2022    MONOPCT 8.9 04/24/2022    EOSPCT 1 10/07/2010    BASOPCT 0.6 04/24/2022    MONOSABS 0.61 04/24/2022    LYMPHSABS 2.37 04/24/2022    EOSABS 0.14 04/24/2022    BASOSABS 0.04 04/24/2022     CMP:    Lab Results   Component Value Date     04/24/2022    K 4.5 04/24/2022     04/24/2022    CO2 24 04/24/2022    BUN 26 04/24/2022    CREATININE 0.8 04/24/2022    GFRAA >60 04/24/2022    LABGLOM >60 04/24/2022    GLUCOSE 99 04/24/2022    GLUCOSE 109 05/04/2012    PROT 7.1 04/24/2022    LABALBU 4.2 04/24/2022    LABALBU 4.2 03/24/2012    CALCIUM 9.3 04/24/2022    BILITOT 0.5 04/24/2022    ALKPHOS 70 04/24/2022    AST 21 04/24/2022    ALT 13 04/24/2022     Warfarin PT/INR:    Lab Results   Component Value Date    INR 0.9 08/18/2020    INR 0.9 12/29/2019    INR 0.9 12/19/2019    PROTIME 10.9 08/18/2020    PROTIME 10.5 12/29/2019    PROTIME 10.4 12/19/2019       Assessment:    Principal Problem:    Subarachnoid hemorrhage (HCC)  Active Problems:    Acute CVA (cerebrovascular accident) ([facility] [address]    Hypothyroidism    Hyperlipidemia LDL goal <100    Obesity (BMI 35.0-39.9 without comorbidity)    Hypertension  Resolved Problems:    * No resolved hospital problems.  *      Plan:  Dc planning home vs rehab increase losartan        1665 Akil Mcrae, DO  2:03 PM  4/28/2022

## 2022-04-28 NOTE — PLAN OF CARE
Problem: Chronic Conditions and Co-morbidities  Goal: Patient's chronic conditions and co-morbidity symptoms are monitored and maintained or improved  Outcome: Progressing     Problem: Skin/Tissue Integrity  Goal: Absence of new skin breakdown  Outcome: Progressing     Problem: Safety - Adult  Goal: Free from fall injury  Outcome: Progressing

## 2022-04-28 NOTE — DISCHARGE INSTR - COC
Continuity of Care Form    Patient Name: Alec Weinstein   :  1940  MRN:  [id number]    Admit date:  2022  Discharge date:  22    Code Status Order: Full Code   Advance Directives:      Admitting Physician:  Esmer Cason DO  PCP: Johnnie Serna DO    Discharging Nurse: [facility] Unit/Room#: 6304/7998-V  Discharging Unit Phone Number: [phone]    Emergency Contact:   Extended Emergency Contact Information  Primary Emergency Contact: Tar Heel Spells [address] Phone: [phone]  Mobile Phone: [phone]  Relation: Brother/Sister  Secondary Emergency Contact: Anibal Victoria  Address: [address], [address] Phone: [phone]  Mobile Phone: [phone]  Relation: Child    Past Surgical History:  Past Surgical History:   Procedure Laterality Date    CHOLECYSTECTOMY  1985    open?     COLECTOMY  1974    COLONOSCOPY  2012    and egd    COLONOSCOPY  2014    COLONOSCOPY  2015    CYST REMOVAL  years ago    upper gum    EYE SURGERY  years ago    left eye removal,  has artificial eye    HYSTERECTOMY  1974    JOINT REPLACEMENT Left 2010/2012    x 2-knee    TONSILLECTOMY      TUMOR EXCISION      from arm, fatty    UPPER GASTROINTESTINAL ENDOSCOPY  2014    with biopsy    UPPER GASTROINTESTINAL ENDOSCOPY  2015    UPPER GASTROINTESTINAL ENDOSCOPY N/A 2019    EGD ESOPHAGOGASTRODUODENOSCOPY  ++IODINE ALLERGY++ and bx performed by Yudith Luevano MD at [address]       Immunization History:   Immunization History   Administered Date(s) Administered    COVID-19, Pfizer Purple top, DILUTE for use, 12+ yrs, 30mcg/0.3mL dose 2021, 2021    Pneumococcal Conjugate 13-valent ([id number]) 2016    Tetanus 2019       Active Problems:  Patient Active Problem List   Diagnosis Code    GERD (gastroesophageal reflux disease) K21.9    Hypothyroidism E03.9    Obstructive sleep apnea syndrome, non-compliant with CPAP therapy G47.33    Hyperlipidemia LDL goal <100 E78.5    Obesity (BMI 35.0-39.9 without comorbidity) E66.9    Blindness of left eye H54.40    Vertigo R42    Hypertension I10    Frequent falls R29.6    Ataxia R27.0    Moderate episode of recurrent major depressive disorder (HCC) F33.1    Gait instability R26.81    Peripheral neuropathy G62.9    Lumbosacral radiculopathy M54.17    Dizziness R42    Alzheimer's disease, unspecified G30.9    Subarachnoid hemorrhage (HCC) I60.9    Acute CVA (cerebrovascular accident) ([facility] [address] I63.9       Isolation/Infection:   Isolation            No Isolation          Patient Infection Status       Infection Onset Added Last Indicated Last Indicated By Review Planned Expiration Resolved Resolved By    None active    Resolved    COVID-19 (Rule Out) 03/09/22 03/09/22 03/09/22 COVID-19 Ambulatory (Ordered)   03/11/22 Rule-Out Test Resulted    COVID-19 (Rule Out) 12/01/21 12/01/21 11/30/21 COVID-19 Ambulatory (Ordered)   12/02/21 Rule-Out Test Resulted    COVID-19 (Rule Out) 08/18/20 08/18/20 08/18/20 Covid-19 Ambulatory (Ordered)   08/19/20 Rule-Out Test Resulted    COVID-19 (Rule Out) 05/22/20 05/22/20 05/22/20 COVID-19 (Ordered)   05/22/20 Rule-Out Test Resulted    Not detected 5/22/2020    C-diff Rule Out  12/24/19 12/26/19 Clostridium Difficile Toxin/Antigen (Ordered)   12/27/19 Rule-Out Test Resulted            Nurse Assessment:  Last Vital Signs: BP (!) 176/65   Pulse 61   Temp 97.2 °F (36.2 °C) (Temporal)   Resp 14   Ht 4' 11\" (1.499 m)   Wt 184 lb (83.5 kg)   LMP 07/24/2014   SpO2 97%   BMI 37.16 kg/m²     Last documented pain score (0-10 scale): Pain Level: 0  Last Weight:   Wt Readings from Last 1 Encounters:   04/24/22 184 lb (83.5 kg)     Mental Status:  oriented, alert, coherent, logical, thought processes intact, and able to concentrate and follow conversation    IV Access:  - None    Nursing Mobility/ADLs:  Walking Assisted  Transfer  Assisted  Bathing  Assisted  Dressing  Independent  Toileting  Assisted  Feeding  [address]  Independent  Med Delivery   whole    Wound Care Documentation and Therapy:        Elimination:  Continence: Bowel: Yes  Bladder: Yes  Urinary Catheter: None   Colostomy/Ileostomy/Ileal Conduit: No       Date of Last BM: 4/28/22    Intake/Output Summary (Last 24 hours) at 4/28/2022 1508  Last data filed at 4/28/2022 1330  Gross per 24 hour   Intake 640 ml   Output --   Net 640 ml     I/O last 3 completed shifts: In: 0 [P.O.:590]  Out: 200 [Urine:200]    Safety Concerns: At Risk for Falls    Impairments/Disabilities:      None    Nutrition Therapy:  Current Nutrition Therapy:   - Oral Diet:  General    Routes of Feeding: Oral  Liquids: No Restrictions  Daily Fluid Restriction: no  Last Modified Barium Swallow with Video (Video Swallowing Test): not done    Treatments at the Time of Hospital Discharge:   Respiratory Treatments: N/A  Oxygen Therapy:  is not on home oxygen therapy. Ventilator:    - No ventilator support    Rehab Therapies: Physical Therapy and Occupational Therapy  Weight Bearing Status/Restrictions: No weight bearing restrictions  Other Medical Equipment (for information only, NOT a DME order):  walker and hospital bed  Other Treatments: N/A    Patient's personal belongings (please select all that are sent with patient):  Cell phone, clothes and     RN SIGNATURE:  Electronically signed by Chaim Mendez RN on 4/29/22 at 12:22 PM EDT    CASE MANAGEMENT/SOCIAL WORK SECTION    Inpatient Status Date: ***    Readmission Risk Assessment Score:  Readmission Risk              Risk of Unplanned Readmission:  14           Discharging to Facility/ Agency   [facility] [address]  Phone:  [phone]  Fax:    [phone]    Dialysis Facility (if applicable)   Name:  Address:  Dialysis Schedule:  Phone:  Fax:    / signature: Electronically signed by Mauricio Soulier, RN on 4/28/22 at 3:09 PM EDT    PHYSICIAN SECTION    Prognosis: Good    Condition at Discharge: Stable    Rehab Potential (if transferring to Rehab): Good    Recommended Labs or Other Treatments After Discharge: ***    Physician Certification: I certify the above information and transfer of Ashley Adorno  is necessary for the continuing treatment of the diagnosis listed and that she requires [place] for less 30 days.      Update Admission H&P: {CHP DME Changes in OXCKF:[id number]}    PHYSICIAN SIGNATURE:  {Esignature:[id number]}Electronically signed by Joe Whitten DO on 4/29/2022 at 11:59 AM

## 2022-04-28 NOTE — CARE COORDINATION
Patient denied ARU at [facility] and Peer to Peer upheld the denial.  Call placed to the patient's daughter Goldie Morales and explained above. Goldie Morales would prefer ANA, but would like HC set up if patient has to go home. Patient with history of [facility], no preference if they cannot accept. Call placed to [facility], no Availability until Tuesday. Call placed to Yecenia Sanchez, liaison with [facility] and referral made. She will follow for discharge. Met with the patient at the bedside to discuss above. Patient upset she cannot stay at [facility] for rehab and stated that she would prefer ANA if possible to home with Jessie Ocasio at this time. ANA list for the [facility] area reviewed with the patient and she would like 1.) [facility], 2.) [facility], 3.) [facility]. Call placed to Traci, liaison with [facility] and referral made. She can accept and will submit pre-cert with the patient's insurance today. HENS 7000 started and will need to be completed with discharge orders. Envelope and transfer paperwork completed. Await authorization. Will continue to follow.     Donna Jones RN.  Suze Saenz  [phone]

## 2022-04-28 NOTE — PLAN OF CARE
Problem: Pain  Goal: Verbalizes/displays adequate comfort level or baseline comfort level  Outcome: Progressing     Problem: Skin/Tissue Integrity  Goal: Absence of new skin breakdown  Description: 1. Monitor for areas of redness and/or skin breakdown  2. Assess vascular access sites hourly  3. Every 4-6 hours minimum:  Change oxygen saturation probe site  4. Every 4-6 hours:  If on nasal continuous positive airway pressure, respiratory therapy assess nares and determine need for appliance change or resting period.   4/28/2022 1609 by Marianna Lal RN  Outcome: Progressing     Problem: Safety - Adult  Goal: Free from fall injury  4/28/2022 1609 by Marianna Lal RN  Outcome: Progressing     Problem: ABCDS Injury Assessment  Goal: Absence of physical injury  Outcome: Progressing

## 2022-04-29 VITALS
SYSTOLIC BLOOD PRESSURE: 163 MMHG | OXYGEN SATURATION: 97 % | BODY MASS INDEX: 37.09 KG/M2 | DIASTOLIC BLOOD PRESSURE: 67 MMHG | HEIGHT: 59 IN | HEART RATE: 56 BPM | TEMPERATURE: 97.2 F | RESPIRATION RATE: 14 BRPM | WEIGHT: 184 LBS

## 2022-04-29 LAB
SARS-COV-2, NAAT: NOT DETECTED
URINE CULTURE, ROUTINE: NORMAL

## 2022-04-29 PROCEDURE — 6370000000 HC RX 637 (ALT 250 FOR IP): Performed by: INTERNAL MEDICINE

## 2022-04-29 PROCEDURE — 87635 SARS-COV-2 COVID-19 AMP PRB: CPT

## 2022-04-29 RX ORDER — ATORVASTATIN CALCIUM 20 MG/1
20 TABLET, FILM COATED ORAL NIGHTLY
Qty: 30 TABLET | Refills: 0
Start: 2022-04-29 | End: 2022-06-01 | Stop reason: SDUPTHER

## 2022-04-29 RX ORDER — GUAIFENESIN/DEXTROMETHORPHAN 100-10MG/5
10 SYRUP ORAL EVERY 6 HOURS PRN
Qty: 120 ML | Refills: 0 | COMMUNITY
Start: 2022-04-29 | End: 2022-05-09

## 2022-04-29 RX ORDER — POLYETHYLENE GLYCOL 3350 17 G/17G
17 POWDER, FOR SOLUTION ORAL DAILY PRN
Qty: 527 G | Refills: 1 | COMMUNITY
Start: 2022-04-29 | End: 2022-05-29

## 2022-04-29 RX ORDER — METOPROLOL SUCCINATE 25 MG/1
12.5 TABLET, EXTENDED RELEASE ORAL DAILY
Qty: 30 TABLET | Refills: 0
Start: 2022-04-30 | End: 2022-07-14

## 2022-04-29 RX ADMIN — PANTOPRAZOLE SODIUM 40 MG: 40 TABLET, DELAYED RELEASE ORAL at 06:40

## 2022-04-29 RX ADMIN — METOPROLOL SUCCINATE 12.5 MG: 25 TABLET, EXTENDED RELEASE ORAL at 08:18

## 2022-04-29 RX ADMIN — LOSARTAN POTASSIUM 100 MG: 50 TABLET, FILM COATED ORAL at 08:17

## 2022-04-29 RX ADMIN — Medication 1 CAPSULE: at 08:18

## 2022-04-29 RX ADMIN — LEVOTHYROXINE SODIUM 88 MCG: 0.09 TABLET ORAL at 06:40

## 2022-04-29 RX ADMIN — DOCUSATE SODIUM 100 MG: 100 CAPSULE, LIQUID FILLED ORAL at 08:18

## 2022-04-29 RX ADMIN — DONEPEZIL HYDROCHLORIDE 2.5 MG: 5 TABLET, FILM COATED ORAL at 08:18

## 2022-04-29 NOTE — PROGRESS NOTES
IMPRESSION:  1.  Subarachnoid hemorrhage, probably old, right frontal lobe and  multiple punctate CVAs. 2.  Multiple medical comorbidities. Doing better every day. No indication for neurosurgical intervention at this time. Nothing new to add from neurosurgical stand point at this time. The patient may be discharged from neurosurgical standpoint. Follow up in the office in 3-4 weeks.  [phone]

## 2022-04-29 NOTE — CARE COORDINATION
Call received from Edwin La, liaison with [facility]. Authorization received for the patient to their facility for rehab today. Call placed to Dr Stacy Barajas via answering service/perfect service for discharge. Return call received from Dr Stacy Barajas and her will place the orders. Call placed to [facility] to arrange transport for the patient to [facility] with [address]. [facility] transport arranged for 2:30-3:00 pick-up time. Call placed to the patient's daughter Melonie Yuen to notify of above. She is in agreement with transition of care. COVID test ordered and provided to nursing to complete. HENS completed. Envelope and transfer paperwork completed. Traci with [facility] notified of transportation arrangements and Charge nurse Мария Hernandez notified as well. Will continue to follow.      Jose Vance RN.  Linda Miller April  [phone]

## 2022-04-29 NOTE — PROGRESS NOTES
Hospital Medicine    Subjective:  Pt alert conversive denied for acute rehab      Current Facility-Administered Medications:     losartan (COZAAR) tablet 100 mg, 100 mg, Oral, Daily, Esta  Malmer, DO, 100 mg at 04/29/22 6974    metoprolol succinate (TOPROL XL) extended release tablet 12.5 mg, 12.5 mg, Oral, Daily, [place] Clamp, DO, 12.5 mg at 04/29/22 0818    guaiFENesin-dextromethorphan (ROBITUSSIN DM) 100-10 MG/5ML syrup 10 mL, 10 mL, Oral, Q6H PRN, Esta  Malmer, DO    polyethylene glycol (GLYCOLAX) packet 17 g, 17 g, Oral, Daily PRN, Amauri Elaine MD    ALPRAZolam Rhoderick Mullet) tablet 0.25 mg, 0.25 mg, Oral, TID PRN, Estjailene Brooks Malmer, DO, 0.25 mg at 04/28/22 1524    ocuvite-lutein multivitamin 1 capsule, 1 capsule, Oral, Daily, Cristine Morrow MD, 1 capsule at 04/29/22 0818    ondansetron (ZOFRAN) tablet 4 mg, 4 mg, Oral, TID PRN, Cristine Morrow MD    levothyroxine (SYNTHROID) tablet 88 mcg, 88 mcg, Oral, [facility] Cristine RODRIGUEZ MD, 88 mcg at 04/29/22 0640    cetirizine (ZYRTEC) tablet 5 mg, 5 mg, Oral, Daily PRN, Cristine Morrow MD    meclizine (ANTIVERT) tablet 25 mg, 25 mg, Oral, PRN, Cristine Morrow MD    pantoprazole (PROTONIX) tablet 40 mg, 40 mg, Oral, [facility] HospitalCristine MD, 40 mg at 04/29/22 0640    donepezil (ARICEPT) tablet 2.5 mg, 2.5 mg, Oral, Daily with breakfast, Cristine Morrow MD, 2.5 mg at 04/29/22 0818    docusate sodium (COLACE) capsule 100 mg, 100 mg, Oral, BID, Esta Do Persaudmer, DO, 100 mg at 04/29/22 0818    lidocaine (XYLOCAINE) 5 % ointment, , Topical, Daily PRN, Esta Do Malmer, DO, Given at 04/27/22 2054    cloNIDine (CATAPRES) tablet 0.1 mg, 0.1 mg, Oral, Q6H PRN, Deniz Wood DO, 0.1 mg at 04/27/22 2053    atorvastatin (LIPITOR) tablet 20 mg, 20 mg, Oral, Nightly, Marcin Connolly MD, 20 mg at 04/28/22 2043    Objective:    BP (!) 163/67   Pulse 56   Temp 97.2 °F (36.2 °C) (Temporal)   Resp 14   Ht 4' 11\" (1.499 m)   Wt 184 lb (83.5 kg)   LMP 07/24/2014   SpO2 97%   BMI 37.16 kg/m²     Heart:  reg  Lungs:  ctab  Abd: + bs soft nontender  Extrem:  Edema legs    CBC with Differential:    Lab Results   Component Value Date    WBC 6.8 04/24/2022    RBC 4.39 04/24/2022    HGB 12.5 04/24/2022    HCT 38.5 04/24/2022     04/24/2022    MCV 87.7 04/24/2022    MCH 28.5 04/24/2022    MCHC 32.5 04/24/2022    RDW 13.7 04/24/2022    SEGSPCT 58 03/11/2014    LYMPHOPCT 34.6 04/24/2022    MONOPCT 8.9 04/24/2022    EOSPCT 1 10/07/2010    BASOPCT 0.6 04/24/2022    MONOSABS 0.61 04/24/2022    LYMPHSABS 2.37 04/24/2022    EOSABS 0.14 04/24/2022    BASOSABS 0.04 04/24/2022     CMP:    Lab Results   Component Value Date     04/24/2022    K 4.5 04/24/2022     04/24/2022    CO2 24 04/24/2022    BUN 26 04/24/2022    CREATININE 0.8 04/24/2022    GFRAA >60 04/24/2022    LABGLOM >60 04/24/2022    GLUCOSE 99 04/24/2022    GLUCOSE 109 05/04/2012    PROT 7.1 04/24/2022    LABALBU 4.2 04/24/2022    LABALBU 4.2 03/24/2012    CALCIUM 9.3 04/24/2022    BILITOT 0.5 04/24/2022    ALKPHOS 70 04/24/2022    AST 21 04/24/2022    ALT 13 04/24/2022     Warfarin PT/INR:    Lab Results   Component Value Date    INR 0.9 08/18/2020    INR 0.9 12/29/2019    INR 0.9 12/19/2019    PROTIME 10.9 08/18/2020    PROTIME 10.5 12/29/2019    PROTIME 10.4 12/19/2019       Assessment:    Principal Problem:    Subarachnoid hemorrhage (HCC)  Active Problems:    Acute CVA (cerebrovascular accident) ([facility] [address]    Hypothyroidism    Hyperlipidemia LDL goal <100    Obesity (BMI 35.0-39.9 without comorbidity)    Hypertension  Resolved Problems:    * No resolved hospital problems.  *      Plan:  Dc planning home vs forrest Abrams DO  8:32 AM  4/29/2022

## 2022-04-30 NOTE — DISCHARGE SUMMARY
[address]                  [address] [facility],  [facility]                               DISCHARGE SUMMARY    PATIENT NAME: Fernie Ross                    :        1940  MED REC NO:   [id number]                            ROOM:       8502  ACCOUNT NO:   [de-identified]                           ADMIT DATE: 2022  PROVIDER:     Moisés Contreras DO                  DISCHARGE DATE:  2022    DISCHARGE DIAGNOSES:  1. Acute CVA. 2.  Subarachnoid hemorrhage. 3.  Hypothyroidism. 4.  GERD. 5.  Hypertension. 6.  Urinary incontinence. HOSPITAL COURSE:  The patient is an 40-year-old  female who  presented to the emergency room complaining several-day history of  dizziness. Diagnostic evaluation in the emergency room revealed  subarachnoid hemorrhage and CVA. She was admitted to the hospital.  She  was seen by Physical Therapy and Occupational Therapy. She was seen by  Neurosurgery, Neurology, Physical Medicine and Rehab, and Urology. Her  status stabilized and she was discharged to rehab in stable condition on  2022. DISCHARGE MEDICATIONS:  As per discharge med rec which include,  1. Atorvastatin 20 mg daily. 2.  Robitussin-DM p.r.n.  3.  GlycoLax p.r.n.  4.  Lidoderm patch daily. 5.  Metoprolol succinate 25 mg 0.5 tablet daily. 6.  Colace 100 mg b.i.d.  7.  Aricept 5 mg 0.5 tablet daily. 8.  Levothyroxine 88 mcg daily. 9.  Losartan 100 mg daily. 10.  Meclizine 25 mg t.i.d. p.r.n. 11.  Prilosec 20 mg daily. 12.  Zofran 4 mg t.i.d. p.r.n.  13.  PreserVision vitamin one tablet b.i.d. 14.  Zyrtec 10 mg daily p.r.n.         Karen Mi DO    D: 2022 15:57:54       T: 2022 16:01:36     [email or web address]  Job#: [id number]     Doc#: [id number]    CC:

## 2022-05-06 LAB
ANION GAP SERPL CALCULATED.3IONS-SCNC: 8 MMOL/L (ref 7–16)
BASOPHILS ABSOLUTE: 0.04 E9/L (ref 0–0.2)
BASOPHILS RELATIVE PERCENT: 0.6 % (ref 0–2)
BUN BLDV-MCNC: 20 MG/DL (ref 6–23)
CALCIUM SERPL-MCNC: 8.6 MG/DL (ref 8.6–10.2)
CHLORIDE BLD-SCNC: 104 MMOL/L (ref 98–107)
CO2: 26 MMOL/L (ref 22–29)
CREAT SERPL-MCNC: 0.9 MG/DL (ref 0.5–1)
EOSINOPHILS ABSOLUTE: 0.17 E9/L (ref 0.05–0.5)
EOSINOPHILS RELATIVE PERCENT: 2.4 % (ref 0–6)
GFR AFRICAN AMERICAN: >60
GFR NON-AFRICAN AMERICAN: 60 ML/MIN/1.73
GLUCOSE BLD-MCNC: 82 MG/DL (ref 74–99)
HCT VFR BLD CALC: 33.4 % (ref 34–48)
HEMOGLOBIN: 10.7 G/DL (ref 11.5–15.5)
IMMATURE GRANULOCYTES #: 0.01 E9/L
IMMATURE GRANULOCYTES %: 0.1 % (ref 0–5)
LYMPHOCYTES ABSOLUTE: 2.85 E9/L (ref 1.5–4)
LYMPHOCYTES RELATIVE PERCENT: 40.5 % (ref 20–42)
MCH RBC QN AUTO: 28.5 PG (ref 26–35)
MCHC RBC AUTO-ENTMCNC: 32 % (ref 32–34.5)
MCV RBC AUTO: 88.8 FL (ref 80–99.9)
MONOCYTES ABSOLUTE: 0.58 E9/L (ref 0.1–0.95)
MONOCYTES RELATIVE PERCENT: 8.3 % (ref 2–12)
NEUTROPHILS ABSOLUTE: 3.38 E9/L (ref 1.8–7.3)
NEUTROPHILS RELATIVE PERCENT: 48.1 % (ref 43–80)
PDW BLD-RTO: 13.5 FL (ref 11.5–15)
PLATELET # BLD: 200 E9/L (ref 130–450)
PMV BLD AUTO: 10.7 FL (ref 7–12)
POTASSIUM SERPL-SCNC: 4 MMOL/L (ref 3.5–5)
RBC # BLD: 3.76 E12/L (ref 3.5–5.5)
SODIUM BLD-SCNC: 138 MMOL/L (ref 132–146)
WBC # BLD: 7 E9/L (ref 4.5–11.5)

## 2022-05-10 LAB
HCT VFR BLD CALC: 36.5 % (ref 34–48)
HEMOGLOBIN: 11.9 G/DL (ref 11.5–15.5)
MCH RBC QN AUTO: 28.7 PG (ref 26–35)
MCHC RBC AUTO-ENTMCNC: 32.6 % (ref 32–34.5)
MCV RBC AUTO: 88.2 FL (ref 80–99.9)
PDW BLD-RTO: 13.4 FL (ref 11.5–15)
PLATELET # BLD: 212 E9/L (ref 130–450)
PMV BLD AUTO: 10.6 FL (ref 7–12)
RBC # BLD: 4.14 E12/L (ref 3.5–5.5)
WBC # BLD: 6.7 E9/L (ref 4.5–11.5)

## 2022-05-13 LAB
ALBUMIN SERPL-MCNC: 3.5 G/DL (ref 3.5–5.2)
ALP BLD-CCNC: 72 U/L (ref 35–104)
ALT SERPL-CCNC: 9 U/L (ref 0–32)
ANION GAP SERPL CALCULATED.3IONS-SCNC: 10 MMOL/L (ref 7–16)
AST SERPL-CCNC: 13 U/L (ref 0–31)
BASOPHILS ABSOLUTE: 0.03 E9/L (ref 0–0.2)
BASOPHILS RELATIVE PERCENT: 0.4 % (ref 0–2)
BILIRUB SERPL-MCNC: 0.2 MG/DL (ref 0–1.2)
BUN BLDV-MCNC: 23 MG/DL (ref 6–23)
CALCIUM SERPL-MCNC: 8.9 MG/DL (ref 8.6–10.2)
CHLORIDE BLD-SCNC: 109 MMOL/L (ref 98–107)
CO2: 26 MMOL/L (ref 22–29)
CREAT SERPL-MCNC: 0.9 MG/DL (ref 0.5–1)
EOSINOPHILS ABSOLUTE: 0.18 E9/L (ref 0.05–0.5)
EOSINOPHILS RELATIVE PERCENT: 2.7 % (ref 0–6)
GFR AFRICAN AMERICAN: >60
GFR NON-AFRICAN AMERICAN: 60 ML/MIN/1.73
GLUCOSE BLD-MCNC: 80 MG/DL (ref 74–99)
HCT VFR BLD CALC: 32.9 % (ref 34–48)
HEMOGLOBIN: 10.8 G/DL (ref 11.5–15.5)
IMMATURE GRANULOCYTES #: 0.01 E9/L
IMMATURE GRANULOCYTES %: 0.1 % (ref 0–5)
LYMPHOCYTES ABSOLUTE: 3.06 E9/L (ref 1.5–4)
LYMPHOCYTES RELATIVE PERCENT: 45.5 % (ref 20–42)
MCH RBC QN AUTO: 29.2 PG (ref 26–35)
MCHC RBC AUTO-ENTMCNC: 32.8 % (ref 32–34.5)
MCV RBC AUTO: 88.9 FL (ref 80–99.9)
MONOCYTES ABSOLUTE: 0.7 E9/L (ref 0.1–0.95)
MONOCYTES RELATIVE PERCENT: 10.4 % (ref 2–12)
NEUTROPHILS ABSOLUTE: 2.75 E9/L (ref 1.8–7.3)
NEUTROPHILS RELATIVE PERCENT: 40.9 % (ref 43–80)
PDW BLD-RTO: 13.3 FL (ref 11.5–15)
PLATELET # BLD: 184 E9/L (ref 130–450)
PMV BLD AUTO: 10.4 FL (ref 7–12)
POTASSIUM SERPL-SCNC: 4.1 MMOL/L (ref 3.5–5)
RBC # BLD: 3.7 E12/L (ref 3.5–5.5)
SODIUM BLD-SCNC: 145 MMOL/L (ref 132–146)
TOTAL PROTEIN: 6.1 G/DL (ref 6.4–8.3)
WBC # BLD: 6.7 E9/L (ref 4.5–11.5)

## 2022-06-01 ENCOUNTER — TELEPHONE (OUTPATIENT)
Dept: FAMILY MEDICINE CLINIC | Age: 82
End: 2022-06-01

## 2022-06-01 RX ORDER — ATORVASTATIN CALCIUM 20 MG/1
20 TABLET, FILM COATED ORAL NIGHTLY
Qty: 30 TABLET | Refills: 0 | Status: SHIPPED
Start: 2022-06-01 | End: 2022-06-02 | Stop reason: SDUPTHER

## 2022-06-02 ENCOUNTER — OFFICE VISIT (OUTPATIENT)
Dept: FAMILY MEDICINE CLINIC | Age: 82
End: 2022-06-02
Payer: MEDICARE

## 2022-06-02 VITALS
DIASTOLIC BLOOD PRESSURE: 60 MMHG | BODY MASS INDEX: 37.7 KG/M2 | WEIGHT: 187 LBS | HEIGHT: 59 IN | SYSTOLIC BLOOD PRESSURE: 124 MMHG | TEMPERATURE: 97.2 F | OXYGEN SATURATION: 98 % | HEART RATE: 66 BPM | RESPIRATION RATE: 16 BRPM

## 2022-06-02 DIAGNOSIS — I63.9 ACUTE CVA (CEREBROVASCULAR ACCIDENT) (HCC): ICD-10-CM

## 2022-06-02 DIAGNOSIS — E78.5 HYPERLIPIDEMIA LDL GOAL <100: ICD-10-CM

## 2022-06-02 DIAGNOSIS — R60.9 PERIPHERAL EDEMA: ICD-10-CM

## 2022-06-02 DIAGNOSIS — I10 ESSENTIAL HYPERTENSION: Primary | ICD-10-CM

## 2022-06-02 DIAGNOSIS — I60.9 SUBARACHNOID HEMORRHAGE (HCC): ICD-10-CM

## 2022-06-02 PROBLEM — H01.029 SQUAMOUS BLEPHARITIS: Status: ACTIVE | Noted: 2022-06-02

## 2022-06-02 PROBLEM — G47.33 OSA (OBSTRUCTIVE SLEEP APNEA): Status: ACTIVE | Noted: 2022-06-02

## 2022-06-02 PROCEDURE — 1036F TOBACCO NON-USER: CPT | Performed by: FAMILY MEDICINE

## 2022-06-02 PROCEDURE — G8400 PT W/DXA NO RESULTS DOC: HCPCS | Performed by: FAMILY MEDICINE

## 2022-06-02 PROCEDURE — 99214 OFFICE O/P EST MOD 30 MIN: CPT | Performed by: FAMILY MEDICINE

## 2022-06-02 PROCEDURE — G8417 CALC BMI ABV UP PARAM F/U: HCPCS | Performed by: FAMILY MEDICINE

## 2022-06-02 PROCEDURE — 1123F ACP DISCUSS/DSCN MKR DOCD: CPT | Performed by: FAMILY MEDICINE

## 2022-06-02 PROCEDURE — G8427 DOCREV CUR MEDS BY ELIG CLIN: HCPCS | Performed by: FAMILY MEDICINE

## 2022-06-02 PROCEDURE — 1090F PRES/ABSN URINE INCON ASSESS: CPT | Performed by: FAMILY MEDICINE

## 2022-06-02 RX ORDER — PANTOPRAZOLE SODIUM 40 MG/1
TABLET, DELAYED RELEASE ORAL
COMMUNITY
Start: 2022-05-13 | End: 2022-07-07 | Stop reason: SDUPTHER

## 2022-06-02 RX ORDER — FUROSEMIDE 20 MG/1
20 TABLET ORAL DAILY
Qty: 30 TABLET | Refills: 0 | Status: SHIPPED
Start: 2022-06-02 | End: 2022-07-07 | Stop reason: SDUPTHER

## 2022-06-02 RX ORDER — ZINC SULFATE 50(220)MG
CAPSULE ORAL
COMMUNITY
Start: 2022-05-13 | End: 2022-06-16 | Stop reason: ALTCHOICE

## 2022-06-02 RX ORDER — MULTIVIT-MIN/IRON/FOLIC ACID/K 18-600-40
1000 CAPSULE ORAL DAILY
COMMUNITY
Start: 2022-05-13

## 2022-06-02 RX ORDER — LORAZEPAM 0.5 MG/1
TABLET ORAL
COMMUNITY
Start: 2022-03-14 | End: 2022-06-16

## 2022-06-02 RX ORDER — ASCORBIC ACID 500 MG
500 TABLET ORAL DAILY
COMMUNITY
Start: 2022-05-13

## 2022-06-02 RX ORDER — LIDOCAINE 4 G/100G
1 PATCH TOPICAL DAILY
COMMUNITY
Start: 2022-05-13

## 2022-06-02 RX ORDER — ATORVASTATIN CALCIUM 20 MG/1
20 TABLET, FILM COATED ORAL NIGHTLY
Qty: 90 TABLET | Refills: 1 | Status: SHIPPED
Start: 2022-06-02 | End: 2022-06-16 | Stop reason: ALTCHOICE

## 2022-06-02 ASSESSMENT — PATIENT HEALTH QUESTIONNAIRE - PHQ9
2. FEELING DOWN, DEPRESSED OR HOPELESS: 1
SUM OF ALL RESPONSES TO PHQ QUESTIONS 1-9: 1
1. LITTLE INTEREST OR PLEASURE IN DOING THINGS: 0
SUM OF ALL RESPONSES TO PHQ QUESTIONS 1-9: 1
SUM OF ALL RESPONSES TO PHQ9 QUESTIONS 1 & 2: 1

## 2022-06-02 ASSESSMENT — LIFESTYLE VARIABLES: HOW OFTEN DO YOU HAVE A DRINK CONTAINING ALCOHOL: NEVER

## 2022-06-02 NOTE — PROGRESS NOTES
6/2/2022    Chief Complaint   Patient presents with    Hypertension     follow up after stroke and nursing  home      Peripheral Edema: Patient complains of peripheral edema. The edema has been moderate. Onset of symptoms was several days ago. The swelling has been aggravated by dependency of involved area. No CP, diaphoresis, SOB, palp, HA, visual issues. Hypertension: Patient is here for follow up chronic hypertension. Patient is  compliant with lifestyle modifications like exercise and adherence to a low salt diet. Patient is  well controlled. Patient denies chest pain, diaphoresis, dyspnea, dyspnea on exertion, peripheral edema, palpitations, HA, visual issues. Med list reviewed. Taking as prescribed. No adverse effects. Hyperlipidemia:  Patient presents with hyperlipidemia. Is asymptomatic. This is a chronic problem. Patient is  well controlled, as reviewed and seen on most recent labs. Compliance with treatment thus far has been adequate. No adverse effects. Stroke/TIA: Patient presents for follow up of a stroke, subarachnoid hemorrhage. Event occurred several months ago. Has residual symptoms of balance disturbance. Denies inability to speak, slurred speech, cognitive impairment. Overall, feels condition is improved. Stroke risk factors include diabetes mellitus, hyperlipidemia and hypertension. Denies chest pain, palpitations, seizures and shortness of breath. EKG  yes, ECHO no, MRI/CT yes, Swallow study no. Anticoagulation: no.     Impaired glucose tolerance:  Patient is here to follow-up regarding elevated glucose levels. Recent interventions diet and exercise. Co-morbidities include advanced age (older than 54 for men, 72 for women), dyslipidemia, hypertension, obesity (BMI >= 30 kg/m2) and sedentary lifestyle. Patient does not have polyuria, polydipsia and/or polyphagia.    Lab Results   Component Value Date    LABA1C 5.7 (H) 04/25/2022     Patient's past medical, surgical, social and/or family history reviewed, updated in chart, and are non-contributory (unless otherwise stated). Medications and allergies also reviewed and updated in chart. ROS negative unless otherwise specified    Physical Exam  Wt Readings from Last 3 Encounters:   06/02/22 187 lb (84.8 kg)   04/24/22 184 lb (83.5 kg)   04/24/22 184 lb (83.5 kg)     Temp Readings from Last 3 Encounters:   06/02/22 97.2 °F (36.2 °C)   04/29/22 97.2 °F (36.2 °C) (Temporal)   03/21/22 97.4 °F (36.3 °C) (Temporal)     BP Readings from Last 3 Encounters:   06/02/22 124/60   04/29/22 (!) 163/67   03/21/22 (!) 142/62     Pulse Readings from Last 3 Encounters:   06/02/22 66   04/29/22 56   03/21/22 68       General appearance: alert, well appearing, and in no distress, oriented to person, place, and time and normal appearing weight. CVS exam: normal rate, regular rhythm, normal S1, S2, no murmurs, rubs, clicks or gallops. Radial pulses 2+ bilateral.  PT/DP pulse 2+ bilat. No C/C/E    Chest: clear to auscultation, no wheezes, rales or rhonchi, symmetric air entry. Abdomen: Soft, non-tender, non-distended, positive BS in all 4 quadrants    Extremities:Dorsalis pedis pulses palpated bilaterally, no clubbing, cyanosis, edema or erythema, Sensory exam of the foot is normal, tested with the monofilament. Good pulses, no lesions or ulcers, good peripheral pulses. SKIN: warm, dry, no lesions, jaundice, petechiae, pallor, cyanosis, ecchymosis    NEURO: gross motor exam normal by observation, gait normal    Mental status - alert, oriented to person, place, and time, normal mood, behavior, speech, dress, motor activity, and thought processes      Assessment/Plan  Akila Bettencourt was seen today for hypertension. Diagnoses and all orders for this visit:    Essential hypertension    Hyperlipidemia LDL goal <100  -     atorvastatin (LIPITOR) 20 MG tablet;  Take 1 tablet by mouth nightly    Acute CVA (cerebrovascular accident) ([address]  Subarachnoid hemorrhage ([facility] [address]  - Per Neurology  - No appreciable deficits     Peripheral edema  -     furosemide (LASIX) 20 MG tablet; Take 1 tablet by mouth daily        Return in about 5 days (around 6/7/2022). Call or go to ED immediately if symptoms worsen or persist.    Educational materials and/or home exercises printed for patient's review and were included in patient instructions on his/her After Visit Summary and given to patient at the end of visit. Counseled regarding above diagnosis, including possible risks and complications,  especially if left uncontrolled. Counseled regarding the possible side effects, risks, benefits and alternatives to treatment; patient and/or guardian verbalizes understanding, agrees, feels comfortable with and wishes to proceed with above treatment plan. Advised patient to call with any new medication issues, and read all Rx info from pharmacy to assure aware of all possible risks and side effects of medication before taking. Reviewed age and gender appropriate health screening exams and vaccinations. Advised patient regarding importance of keeping up with recommended health maintenance and to schedule as soon as possible if overdue, as this is important in assessing for undiagnosed pathology, especially cancer, as well as protecting against potentially harmful/life threatening disease. Patient and/or guardian verbalizes understanding and agrees with above counseling, assessment and plan. All questions answered.     Daja Peña, DO

## 2022-06-03 ENCOUNTER — HOSPITAL ENCOUNTER (OUTPATIENT)
Age: 82
Discharge: HOME | End: 2022-06-03
Payer: MEDICARE

## 2022-06-03 DIAGNOSIS — I10 ESSENTIAL HYPERTENSION: ICD-10-CM

## 2022-06-03 DIAGNOSIS — E03.9 HYPOTHYROIDISM, UNSPECIFIED TYPE: ICD-10-CM

## 2022-06-03 LAB
ANION GAP SERPL CALCULATED.3IONS-SCNC: 11 MMOL/L (ref 7–16)
BUN BLDV-MCNC: 22 MG/DL (ref 6–23)
CALCIUM SERPL-MCNC: 9.1 MG/DL (ref 8.6–10.2)
CHLORIDE BLD-SCNC: 105 MMOL/L (ref 98–107)
CHOLESTEROL, TOTAL: 204 MG/DL (ref 0–199)
CO2: 24 MMOL/L (ref 22–29)
CREAT SERPL-MCNC: 0.9 MG/DL (ref 0.5–1)
GFR AFRICAN AMERICAN: >60
GFR NON-AFRICAN AMERICAN: 60 ML/MIN/1.73
GLUCOSE BLD-MCNC: 91 MG/DL (ref 74–99)
HCT VFR BLD CALC: 35.5 % (ref 34–48)
HDLC SERPL-MCNC: 62 MG/DL
HEMOGLOBIN: 11.7 G/DL (ref 11.5–15.5)
LDL CHOLESTEROL CALCULATED: 117 MG/DL (ref 0–99)
MCH RBC QN AUTO: 28.9 PG (ref 26–35)
MCHC RBC AUTO-ENTMCNC: 33 % (ref 32–34.5)
MCV RBC AUTO: 87.7 FL (ref 80–99.9)
PDW BLD-RTO: 13.3 FL (ref 11.5–15)
PLATELET # BLD: 223 E9/L (ref 130–450)
PMV BLD AUTO: 10 FL (ref 7–12)
POTASSIUM SERPL-SCNC: 4.1 MMOL/L (ref 3.5–5)
RBC # BLD: 4.05 E12/L (ref 3.5–5.5)
SODIUM BLD-SCNC: 140 MMOL/L (ref 132–146)
TRIGL SERPL-MCNC: 123 MG/DL (ref 0–149)
TSH SERPL DL<=0.05 MIU/L-ACNC: 0.41 UIU/ML (ref 0.27–4.2)
VLDLC SERPL CALC-MCNC: 25 MG/DL
WBC # BLD: 7.3 E9/L (ref 4.5–11.5)

## 2022-06-03 PROCEDURE — 85027 COMPLETE CBC AUTOMATED: CPT

## 2022-06-03 PROCEDURE — 80061 LIPID PANEL: CPT

## 2022-06-03 PROCEDURE — 36415 COLL VENOUS BLD VENIPUNCTURE: CPT

## 2022-06-03 PROCEDURE — 80048 BASIC METABOLIC PNL TOTAL CA: CPT

## 2022-06-03 PROCEDURE — 84443 ASSAY THYROID STIM HORMONE: CPT

## 2022-06-06 DIAGNOSIS — E78.5 HYPERLIPIDEMIA LDL GOAL <100: Primary | ICD-10-CM

## 2022-06-07 ENCOUNTER — HOSPITAL ENCOUNTER (OUTPATIENT)
Age: 82
Discharge: HOME | End: 2022-06-07
Payer: MEDICARE

## 2022-06-07 DIAGNOSIS — E78.5 HYPERLIPIDEMIA LDL GOAL <100: ICD-10-CM

## 2022-06-07 LAB
CHOLESTEROL, TOTAL: 227 MG/DL (ref 0–199)
HDLC SERPL-MCNC: 64 MG/DL
LDL CHOLESTEROL CALCULATED: 145 MG/DL (ref 0–99)
TRIGL SERPL-MCNC: 90 MG/DL (ref 0–149)
VLDLC SERPL CALC-MCNC: 18 MG/DL

## 2022-06-07 PROCEDURE — 80061 LIPID PANEL: CPT

## 2022-06-07 PROCEDURE — 36415 COLL VENOUS BLD VENIPUNCTURE: CPT

## 2022-06-08 ENCOUNTER — OFFICE VISIT (OUTPATIENT)
Dept: FAMILY MEDICINE CLINIC | Age: 82
End: 2022-06-08
Payer: MEDICARE

## 2022-06-08 VITALS
OXYGEN SATURATION: 97 % | WEIGHT: 186 LBS | HEART RATE: 68 BPM | DIASTOLIC BLOOD PRESSURE: 60 MMHG | TEMPERATURE: 97.1 F | RESPIRATION RATE: 16 BRPM | SYSTOLIC BLOOD PRESSURE: 112 MMHG | BODY MASS INDEX: 37.5 KG/M2 | HEIGHT: 59 IN

## 2022-06-08 DIAGNOSIS — F41.9 ANXIETY: ICD-10-CM

## 2022-06-08 DIAGNOSIS — R60.9 PERIPHERAL EDEMA: Primary | ICD-10-CM

## 2022-06-08 PROCEDURE — 1090F PRES/ABSN URINE INCON ASSESS: CPT | Performed by: FAMILY MEDICINE

## 2022-06-08 PROCEDURE — 1036F TOBACCO NON-USER: CPT | Performed by: FAMILY MEDICINE

## 2022-06-08 PROCEDURE — 99214 OFFICE O/P EST MOD 30 MIN: CPT | Performed by: FAMILY MEDICINE

## 2022-06-08 PROCEDURE — G8417 CALC BMI ABV UP PARAM F/U: HCPCS | Performed by: FAMILY MEDICINE

## 2022-06-08 PROCEDURE — G8427 DOCREV CUR MEDS BY ELIG CLIN: HCPCS | Performed by: FAMILY MEDICINE

## 2022-06-08 PROCEDURE — 1123F ACP DISCUSS/DSCN MKR DOCD: CPT | Performed by: FAMILY MEDICINE

## 2022-06-08 PROCEDURE — G8400 PT W/DXA NO RESULTS DOC: HCPCS | Performed by: FAMILY MEDICINE

## 2022-06-08 RX ORDER — CITALOPRAM 10 MG/1
10 TABLET ORAL DAILY
Qty: 30 TABLET | Refills: 2 | Status: SHIPPED
Start: 2022-06-08 | End: 2022-10-08

## 2022-06-08 NOTE — PROGRESS NOTES
6/8/2022    Chief Complaint   Patient presents with    Hypertension     5 day recheck review labs        Claudette Cote is a 78 y.o. patient that presents today for:    Peripheral Edema: Patient complains of peripheral edema. The edema has been moderate. Onset of symptoms was several days ago. The swelling has been aggravated by dependency of involved area. No CP, diaphoresis, SOB, palp, HA, visual issues. Did not take Lasix today. Has been elevating legs when she lies down. Hypertension: Here for follow up chronic hypertension. Patient is  compliant with lifestyle modifications like exercise and adherence to a low salt diet. Patient is  well controlled. Denies chest pain, diaphoresis, dyspnea, dyspnea on exertion, peripheral edema, palpitations, HA, visual issues. Med list reviewed. Taking as prescribed. No adverse effects. Anxiety and/or Depression:  Patient is here with complaints of anxiety and/or depression. Patient does not have suicidal or homicidal ideation. Has little interest in doing activities. Patient is  having issues falling or staying asleep. Patient is not having associated panic attacks. Patient does not use alcohol and/or drugs. Would like to restart meds for anxiety. Feeling well otherwise, no complaints. Patient's past medical, surgical, social and/or family history reviewed, updated in chart, and are non-contributory (unless otherwise stated). Medications and allergies also reviewed and updated in chart.     ROS negative unless otherwise specified    Physical Exam  Wt Readings from Last 3 Encounters:   06/08/22 186 lb (84.4 kg)   06/02/22 187 lb (84.8 kg)   04/24/22 184 lb (83.5 kg)     Temp Readings from Last 3 Encounters:   06/08/22 97.1 °F (36.2 °C)   06/02/22 97.2 °F (36.2 °C)   04/29/22 97.2 °F (36.2 °C) (Temporal)     BP Readings from Last 3 Encounters:   06/08/22 112/60   06/02/22 124/60   04/29/22 (!) 163/67     Pulse Readings from Last 3 Encounters:   06/08/22 68 06/02/22 66   04/29/22 56       General appearance: alert, well appearing, and in no distress, oriented to person, place, and time and normal appearing weight. CVS exam: normal rate, regular rhythm, normal S1, S2, no murmurs, rubs, clicks or gallops. Radial pulses 2+ bilateral.  PT/DP pulse 2+ bilat. No C/C/E    Chest: clear to auscultation, no wheezes, rales or rhonchi, symmetric air entry. Abdomen: Soft, non-tender, non-distended, positive BS in all 4 quadrants    Extremities:Dorsalis pedis pulses palpated bilaterally, no clubbing, cyanosis, edema or erythema     SKIN: warm, dry, no lesions, jaundice, petechiae, pallor, cyanosis, ecchymosis    NEURO: gross motor exam normal by observation, gait normal    Mental status - alert, oriented to person, place, and time, normal mood, behavior, speech, dress, motor activity, and thought processes      Assessment/Plan  Yohana Yeager was seen today for hypertension. Diagnoses and all orders for this visit:    Peripheral edema    Anxiety  -     citalopram (CELEXA) 10 MG tablet; Take 1 tablet by mouth daily        Return in about 4 weeks (around 7/6/2022), or if symptoms worsen or fail to improve. Call or go to ED immediately if symptoms worsen or persist.    Educational materials and/or home exercises printed for patient's review and were included in patient instructions on his/her After Visit Summary and given to patient at the end of visit. Counseled regarding above diagnosis, including possible risks and complications,  especially if left uncontrolled. Counseled regarding the possible side effects, risks, benefits and alternatives to treatment; patient and/or guardian verbalizes understanding, agrees, feels comfortable with and wishes to proceed with above treatment plan.     Advised patient to call with any new medication issues, and read all Rx info from pharmacy to assure aware of all possible risks and side effects of medication before taking. Reviewed age and gender appropriate health screening exams and vaccinations. Advised patient regarding importance of keeping up with recommended health maintenance and to schedule as soon as possible if overdue, as this is important in assessing for undiagnosed pathology, especially cancer, as well as protecting against potentially harmful/life threatening disease. Patient and/or guardian verbalizes understanding and agrees with above counseling, assessment and plan. All questions answered.       Daja Peña, DO

## 2022-06-15 ENCOUNTER — TELEPHONE (OUTPATIENT)
Dept: FAMILY MEDICINE CLINIC | Age: 82
End: 2022-06-15

## 2022-06-15 NOTE — TELEPHONE ENCOUNTER
----- Message from Xochitl Villareal sent at 6/15/2022  9:14 AM EDT -----  Subject: Message to Provider    QUESTIONS  Information for Provider? Patient wants to know if she can do the \"Dry   Needle\" for her back pain. please advise  ---------------------------------------------------------------------------  --------------  CALL BACK INFO  What is the best way for the office to contact you? Do not leave any   message, patient will call back for answer  Preferred Call Back Phone Number? [phone]  ---------------------------------------------------------------------------  --------------  SCRIPT ANSWERS  Relationship to Patient?  Self

## 2022-06-15 NOTE — TELEPHONE ENCOUNTER
----- Message from Watson Kuhn sent at 6/15/2022  9:14 AM EDT -----  Subject: Message to Provider    QUESTIONS  Information for Provider? Patient wants to know if she can do the \"Dry   Needle\" for her back pain. please advise  ---------------------------------------------------------------------------  --------------  CALL BACK INFO  What is the best way for the office to contact you? Do not leave any   message, patient will call back for answer  Preferred Call Back Phone Number? [phone]  ---------------------------------------------------------------------------  --------------  SCRIPT ANSWERS  Relationship to Patient?  Self

## 2022-06-15 NOTE — TELEPHONE ENCOUNTER
----- Message from Naun Viveros sent at 6/15/2022  9:14 AM EDT -----  Subject: Message to Provider    QUESTIONS  Information for Provider? Patient wants to know if she can do the \"Dry   Needle\" for her back pain. please advise  ---------------------------------------------------------------------------  --------------  CALL BACK INFO  What is the best way for the office to contact you? Do not leave any   message, patient will call back for answer  Preferred Call Back Phone Number? [phone]  ---------------------------------------------------------------------------  --------------  SCRIPT ANSWERS  Relationship to Patient?  Self

## 2022-06-16 ENCOUNTER — OFFICE VISIT (OUTPATIENT)
Dept: NEUROLOGY | Age: 82
End: 2022-06-16
Payer: MEDICARE

## 2022-06-16 VITALS
TEMPERATURE: 97 F | WEIGHT: 184 LBS | OXYGEN SATURATION: 95 % | HEIGHT: 59 IN | DIASTOLIC BLOOD PRESSURE: 58 MMHG | HEART RATE: 58 BPM | SYSTOLIC BLOOD PRESSURE: 143 MMHG | BODY MASS INDEX: 37.09 KG/M2

## 2022-06-16 DIAGNOSIS — I60.9 SUBARACHNOID HEMORRHAGE (HCC): Primary | ICD-10-CM

## 2022-06-16 DIAGNOSIS — I63.9 ISCHEMIC STROKE (HCC): ICD-10-CM

## 2022-06-16 PROCEDURE — 1123F ACP DISCUSS/DSCN MKR DOCD: CPT | Performed by: NURSE PRACTITIONER

## 2022-06-16 PROCEDURE — G8417 CALC BMI ABV UP PARAM F/U: HCPCS | Performed by: NURSE PRACTITIONER

## 2022-06-16 PROCEDURE — 1036F TOBACCO NON-USER: CPT | Performed by: NURSE PRACTITIONER

## 2022-06-16 PROCEDURE — G8427 DOCREV CUR MEDS BY ELIG CLIN: HCPCS | Performed by: NURSE PRACTITIONER

## 2022-06-16 PROCEDURE — G8400 PT W/DXA NO RESULTS DOC: HCPCS | Performed by: NURSE PRACTITIONER

## 2022-06-16 PROCEDURE — 99214 OFFICE O/P EST MOD 30 MIN: CPT | Performed by: NURSE PRACTITIONER

## 2022-06-16 PROCEDURE — 1090F PRES/ABSN URINE INCON ASSESS: CPT | Performed by: NURSE PRACTITIONER

## 2022-06-16 RX ORDER — ATORVASTATIN CALCIUM 40 MG/1
40 TABLET, FILM COATED ORAL DAILY
Qty: 30 TABLET | Refills: 0 | Status: SHIPPED
Start: 2022-06-16 | End: 2022-08-22 | Stop reason: SDUPTHER
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
## 2022-06-18 ENCOUNTER — HOSPITAL ENCOUNTER (EMERGENCY)
Age: 82
Discharge: HOME | End: 2022-06-18
Attending: EMERGENCY MEDICINE
Payer: MEDICARE

## 2022-06-18 ENCOUNTER — APPOINTMENT (OUTPATIENT)
Dept: CT IMAGING | Age: 82
End: 2022-06-18
Payer: MEDICARE

## 2022-06-18 VITALS
HEART RATE: 59 BPM | WEIGHT: 185 LBS | OXYGEN SATURATION: 96 % | RESPIRATION RATE: 16 BRPM | BODY MASS INDEX: 37.37 KG/M2 | DIASTOLIC BLOOD PRESSURE: 77 MMHG | TEMPERATURE: 98.1 F | SYSTOLIC BLOOD PRESSURE: 193 MMHG

## 2022-06-18 DIAGNOSIS — L03.213 PRESEPTAL CELLULITIS OF LEFT EYE: Primary | ICD-10-CM

## 2022-06-18 LAB
ALBUMIN SERPL-MCNC: 4.1 G/DL (ref 3.5–5.2)
ALP BLD-CCNC: 75 U/L (ref 35–104)
ALT SERPL-CCNC: 8 U/L (ref 0–32)
ANION GAP SERPL CALCULATED.3IONS-SCNC: 12 MMOL/L (ref 7–16)
AST SERPL-CCNC: 13 U/L (ref 0–31)
BASOPHILS ABSOLUTE: 0.04 E9/L (ref 0–0.2)
BASOPHILS RELATIVE PERCENT: 0.5 % (ref 0–2)
BILIRUB SERPL-MCNC: 0.5 MG/DL (ref 0–1.2)
BUN BLDV-MCNC: 18 MG/DL (ref 6–23)
CALCIUM SERPL-MCNC: 8.7 MG/DL (ref 8.6–10.2)
CHLORIDE BLD-SCNC: 102 MMOL/L (ref 98–107)
CO2: 23 MMOL/L (ref 22–29)
CREAT SERPL-MCNC: 1 MG/DL (ref 0.5–1)
EOSINOPHILS ABSOLUTE: 0.14 E9/L (ref 0.05–0.5)
EOSINOPHILS RELATIVE PERCENT: 1.7 % (ref 0–6)
GFR AFRICAN AMERICAN: >60
GFR NON-AFRICAN AMERICAN: 53 ML/MIN/1.73
GLUCOSE BLD-MCNC: 111 MG/DL (ref 74–99)
HCT VFR BLD CALC: 36 % (ref 34–48)
HEMOGLOBIN: 11.8 G/DL (ref 11.5–15.5)
IMMATURE GRANULOCYTES #: 0.04 E9/L
IMMATURE GRANULOCYTES %: 0.5 % (ref 0–5)
LACTIC ACID: 0.8 MMOL/L (ref 0.5–2.2)
LYMPHOCYTES ABSOLUTE: 2.25 E9/L (ref 1.5–4)
LYMPHOCYTES RELATIVE PERCENT: 26.8 % (ref 20–42)
MCH RBC QN AUTO: 28.6 PG (ref 26–35)
MCHC RBC AUTO-ENTMCNC: 32.8 % (ref 32–34.5)
MCV RBC AUTO: 87.2 FL (ref 80–99.9)
MONOCYTES ABSOLUTE: 0.81 E9/L (ref 0.1–0.95)
MONOCYTES RELATIVE PERCENT: 9.7 % (ref 2–12)
NEUTROPHILS ABSOLUTE: 5.11 E9/L (ref 1.8–7.3)
NEUTROPHILS RELATIVE PERCENT: 60.8 % (ref 43–80)
PDW BLD-RTO: 12.9 FL (ref 11.5–15)
PLATELET # BLD: 208 E9/L (ref 130–450)
PMV BLD AUTO: 9.4 FL (ref 7–12)
POTASSIUM REFLEX MAGNESIUM: 3.8 MMOL/L (ref 3.5–5)
RBC # BLD: 4.13 E12/L (ref 3.5–5.5)
SODIUM BLD-SCNC: 137 MMOL/L (ref 132–146)
TOTAL PROTEIN: 7 G/DL (ref 6.4–8.3)
WBC # BLD: 8.4 E9/L (ref 4.5–11.5)

## 2022-06-18 PROCEDURE — 80053 COMPREHEN METABOLIC PANEL: CPT

## 2022-06-18 PROCEDURE — 83605 ASSAY OF LACTIC ACID: CPT

## 2022-06-18 PROCEDURE — 6370000000 HC RX 637 (ALT 250 FOR IP): Performed by: PHYSICIAN ASSISTANT

## 2022-06-18 PROCEDURE — 70486 CT MAXILLOFACIAL W/O DYE: CPT

## 2022-06-18 PROCEDURE — 85025 COMPLETE CBC W/AUTO DIFF WBC: CPT

## 2022-06-18 PROCEDURE — 99284 EMERGENCY DEPT VISIT MOD MDM: CPT

## 2022-06-18 PROCEDURE — 70450 CT HEAD/BRAIN W/O DYE: CPT

## 2022-06-18 PROCEDURE — 6370000000 HC RX 637 (ALT 250 FOR IP): Performed by: EMERGENCY MEDICINE

## 2022-06-18 RX ORDER — DOXYCYCLINE HYCLATE 100 MG/1
100 CAPSULE ORAL ONCE
Status: COMPLETED | OUTPATIENT
Start: 2022-06-18 | End: 2022-06-18

## 2022-06-18 RX ORDER — TOBRAMYCIN AND DEXAMETHASONE 3; 1 MG/ML; MG/ML
1 SUSPENSION/ DROPS OPHTHALMIC
Qty: 10 ML | Refills: 0 | Status: SHIPPED | OUTPATIENT
Start: 2022-06-18 | End: 2022-06-28

## 2022-06-18 RX ORDER — CEFDINIR 300 MG/1
300 CAPSULE ORAL ONCE
Status: COMPLETED | OUTPATIENT
Start: 2022-06-18 | End: 2022-06-18

## 2022-06-18 RX ORDER — CEFDINIR 300 MG/1
300 CAPSULE ORAL 2 TIMES DAILY
Qty: 14 CAPSULE | Refills: 0 | Status: SHIPPED | OUTPATIENT
Start: 2022-06-18 | End: 2022-06-25

## 2022-06-18 RX ORDER — TETRACAINE HYDROCHLORIDE 5 MG/ML
2 SOLUTION OPHTHALMIC ONCE
Status: COMPLETED | OUTPATIENT
Start: 2022-06-18 | End: 2022-06-18

## 2022-06-18 RX ORDER — DOXYCYCLINE HYCLATE 100 MG/1
100 CAPSULE ORAL 2 TIMES DAILY
Qty: 14 CAPSULE | Refills: 0 | Status: SHIPPED | OUTPATIENT
Start: 2022-06-18 | End: 2022-06-25

## 2022-06-18 RX ORDER — ACETAMINOPHEN 325 MG/1
650 TABLET ORAL ONCE
Status: COMPLETED | OUTPATIENT
Start: 2022-06-18 | End: 2022-06-18

## 2022-06-18 RX ORDER — CEFDINIR 300 MG/1
300 CAPSULE ORAL EVERY 12 HOURS SCHEDULED
Status: DISCONTINUED | OUTPATIENT
Start: 2022-06-18 | End: 2022-06-18

## 2022-06-18 RX ADMIN — CEFDINIR 300 MG: 300 CAPSULE ORAL at 04:49

## 2022-06-18 RX ADMIN — ACETAMINOPHEN 650 MG: 325 TABLET ORAL at 04:49

## 2022-06-18 RX ADMIN — DOXYCYCLINE HYCLATE 100 MG: 100 CAPSULE ORAL at 04:49

## 2022-06-18 RX ADMIN — TETRACAINE HYDROCHLORIDE 2 DROP: 5 SOLUTION OPHTHALMIC at 05:10

## 2022-06-18 ASSESSMENT — PAIN SCALES - GENERAL: PAINLEVEL_OUTOF10: 5

## 2022-06-18 NOTE — ED NOTES
Left eye cleansed with normal saline with sterile gauze. Large amounts of matting remove, pt able to open eye.       Mateo Tolbert RN  06/18/22 [id number]

## 2022-06-18 NOTE — ED NOTES
Left prosthetic eye with redness and swelling and unable to take out for cleaning. Matting noted to eye.      Tracy Ybarra RN  06/18/22 [id number]

## 2022-06-18 NOTE — ED PROVIDER NOTES
ED Attending shared visit  CC: No  HPI:  6/18/22, Time: 1:28 AM EDT         Heath Salter is a 78 y.o. female presenting to the ED for left eye lid erythema swelling with purulent discharge beginning yesterday  . The complaint has been persistent, moderate in severity, and worsened by nothing. Patient comes in with complaint of erythema of the left eye lid with swelling and some purulent drainage. Patient has an eye prosthesis that she has been unable to remove this evening. .  She complains of pain behind the left eye . Patient has had recent sinus congestion. She denies fever. She denies sore throat cough body aches. Patient denies any  numbness tingling or weakness of her extremities. She had a hemorrhagic cva 1 month ago   Review of Systems:   A complete review of systems was performed and pertinent positives and negatives are stated within HPI, all other systems reviewed and are negative.          --------------------------------------------- PAST HISTORY ---------------------------------------------  Past Medical History:  has a past medical history of Amaurosis fugax of right eye, Anxiety, Arthritis, CA - cancer of bowel, Cerebral artery occlusion with cerebral infarction ([facility] [address] Chronic back pain, Constipation, Depression, Elevated sed rate, GERD (gastroesophageal reflux disease), Hemorrhoids, Hyperlipidemia, Hypertension, Left foot pain, Lumbosacral radiculopathy, Macular degeneration, Peripheral neuropathy, Poor vision, Prosthetic eye globe, Seasonal allergies, Skipped heart beats, Sleep apnea, and Thyroid disease. Past Surgical History:  has a past surgical history that includes Eye surgery (years ago); colectomy (1974); Colonoscopy (04/26/2012); Cholecystectomy (1985); Hysterectomy (1974); tumor excision; Upper gastrointestinal endoscopy (05/30/2014); Colonoscopy (05/30/2014); Tonsillectomy; joint replacement (Left, 2010/2012); Upper gastrointestinal endoscopy (01/08/2015);  Colonoscopy (01/08/2015); cyst removal (years ago); and Upper gastrointestinal endoscopy (N/A, 4/1/2019). Social History:  reports that she is a non-smoker but has been exposed to tobacco smoke. She has never used smokeless tobacco. She reports that she does not drink alcohol and does not use drugs. Family History: family history includes Breast Cancer in her daughter and sister; Cancer in her brother, brother, brother, brother, brother, brother, sister, and sister; Heart Disease in her brother, brother, father, mother, and sister; High Blood Pressure in her daughter and daughter; Hypertension in her brother, brother, father, mother, and sister; Other in her mother; Stroke in her father and mother. The patients home medications have been reviewed.     Allergies: Iodine, Bee pollen, Bee venom, Codeine, Hydralazine, Oxycodone-aspirin, Sulfa antibiotics, Amoxicillin-pot clavulanate, Aspirin, Darvocet [propoxyphene n-acetaminophen], Eggs or egg-derived products, Influenza vaccines, Percodan [oxycodone-aspirin], and Percodan [oxycodone-aspirin]    -------------------------------------------------- RESULTS -------------------------------------------------  All laboratory and radiology results have been personally reviewed by myself   LABS:  Results for orders placed or performed during the hospital encounter of 06/18/22   CBC with Auto Differential   Result Value Ref Range    WBC 8.4 4.5 - 11.5 E9/L    RBC 4.13 3.50 - 5.50 E12/L    Hemoglobin 11.8 11.5 - 15.5 g/dL    Hematocrit 36.0 34.0 - 48.0 %    MCV 87.2 80.0 - 99.9 fL    MCH 28.6 26.0 - 35.0 pg    MCHC 32.8 32.0 - 34.5 %    RDW 12.9 11.5 - 15.0 fL    Platelets 810 750 - 374 E9/L    MPV 9.4 7.0 - 12.0 fL    Neutrophils % 60.8 43.0 - 80.0 %    Immature Granulocytes % 0.5 0.0 - 5.0 %    Lymphocytes % 26.8 20.0 - 42.0 %    Monocytes % 9.7 2.0 - 12.0 %    Eosinophils % 1.7 0.0 - 6.0 %    Basophils % 0.5 0.0 - 2.0 %    Neutrophils Absolute 5.11 1.80 - 7.30 E9/L    Immature Granulocytes # 0.04 E9/L    Lymphocytes Absolute 2.25 1.50 - 4.00 E9/L    Monocytes Absolute 0.81 0.10 - 0.95 E9/L    Eosinophils Absolute 0.14 0.05 - 0.50 E9/L    Basophils Absolute 0.04 0.00 - 0.20 E9/L   Comprehensive Metabolic Panel w/ Reflex to MG   Result Value Ref Range    Sodium 137 132 - 146 mmol/L    Potassium reflex Magnesium 3.8 3.5 - 5.0 mmol/L    Chloride 102 98 - 107 mmol/L    CO2 23 22 - 29 mmol/L    Anion Gap 12 7 - 16 mmol/L    Glucose 111 (H) 74 - 99 mg/dL    BUN 18 6 - 23 mg/dL    CREATININE 1.0 0.5 - 1.0 mg/dL    GFR Non-African American 53 >=60 mL/min/1.73    GFR African American >60     Calcium 8.7 8.6 - 10.2 mg/dL    Total Protein 7.0 6.4 - 8.3 g/dL    Albumin 4.1 3.5 - 5.2 g/dL    Total Bilirubin 0.5 0.0 - 1.2 mg/dL    Alkaline Phosphatase 75 35 - 104 U/L    ALT 8 0 - 32 U/L    AST 13 0 - 31 U/L   Lactic Acid   Result Value Ref Range    Lactic Acid 0.8 0.5 - 2.2 mmol/L       RADIOLOGY:  Interpreted by Radiologist.  CT HEAD WO CONTRAST   Final Result   No acute intracranial abnormality. No acute traumatic injury of the facial bones. Findings suggestive of left preseptal cellulitis. No noncontrast evidence of   postseptal extension. CT FACIAL BONES WO CONTRAST   Final Result   No acute intracranial abnormality. No acute traumatic injury of the facial bones. Findings suggestive of left preseptal cellulitis. No noncontrast evidence of   postseptal extension.             ------------------------- NURSING NOTES AND VITALS REVIEWED ---------------------------   The nursing notes within the ED encounter and vital signs as below have been reviewed.    BP (!) 193/77   Pulse 59   Temp 98.1 °F (36.7 °C)   Resp 16   Wt 185 lb (83.9 kg)   LMP 07/24/2014   SpO2 96%   BMI 37.37 kg/m²   Oxygen Saturation Interpretation: Normal      ---------------------------------------------------PHYSICAL EXAM--------------------------------------      Constitutional/General: Alert and oriented x3, well appearing, non toxic in NAD  Head: Normocephalic and atraumatic  Eyes: PERRL on the right, EOMI on the right left eye with erythema swelling left upper eyelid. There  is mild periorbital erythema with tenderness. Patient has a small yellow purulent discharge,matting of the eyelids noted. Mouth: Oropharynx clear, handling secretions, no trismus  Neck: Supple, full ROM,   Pulmonary: Lungs clear to auscultation bilaterally, no wheezes, rales, or rhonchi. Not in respiratory distress  Cardiovascular:  Regular rate and rhythm, no murmurs, gallops, or rubs. 2+ distal pulses  Abdomen: Soft, non tender, non distended,   Extremities: Moves all extremities x 4. Warm and well perfused  Skin: warm and dry without rash  Neurologic: GCS 15,  Psych: Normal Affect      ------------------------------ ED COURSE/MEDICAL DECISION MAKING----------------------  Medications   acetaminophen (TYLENOL) tablet 650 mg (has no administration in time range)   cefdinir (OMNICEF) capsule 300 mg (has no administration in time range)   doxycycline hyclate (VIBRAMYCIN) capsule 100 mg (has no administration in time range)         ED COURSE:     1600 Patient updated       Medical Decision Making:    Pt cellulitis around prosthetic eye, unable to remove at home, results noted, discussed with optho, rec attempt removal with instrutments. Topical anesthetic applied, prosthetic removed using hemostats. Dc on topical and po abx with optho fu    Counseling: The emergency provider has spoken with the patient and discussed todays results, in addition to providing specific details for the plan of care and counseling regarding the diagnosis and prognosis. Questions are answered at this time and they are agreeable with the plan.      --------------------------------- IMPRESSION AND DISPOSITION ---------------------------------    IMPRESSION  1.  Preseptal cellulitis of left eye        DISPOSITION  Disposition: Discharge to home  Patient condition is stable      NOTE: This report was transcribed using voice recognition software. Every effort was made to ensure accuracy; however, inadvertent computerized transcription errors may be present    ATTENDING PROVIDER ATTESTATION:     Orestes Timmons presented to the emergency department for evaluation of Other (starting today, eye stuck in socket. ....(artificial, cant remove to clean it.))    I have reviewed and discussed the case, including pertinent history (medical, surgical, family and social) and exam findings with the Midlevel and the Nurse assigned to Orestes Timmons. I have personally performed and/or participated in the history, exam, medical decision making, and procedures and agree with all pertinent clinical information. Hx: left eye red/purulent drainage, unable to remove prosthetic eye    Mdm:  optho consult, eye removed, dc on abx with outpt fu  I have reviewed my findings and recommendations with Orestes Timmons and members of family present at the time of disposition. My findings/plan: The encounter diagnosis was Preseptal cellulitis of left eye.   Discharge Medication List as of 6/18/2022  5:12 AM      START taking these medications    Details   cefdinir (OMNICEF) 300 MG capsule Take 1 capsule by mouth 2 times daily for 7 days, Disp-14 capsule, R-0Print      doxycycline hyclate (VIBRAMYCIN) 100 MG capsule Take 1 capsule by mouth 2 times daily for 7 days, Disp-14 capsule, R-0Print      tobramycin-dexamethasone (TOBRADEX) 0.3-0.1 % ophthalmic suspension Place 1 drop into the left eye every 4 hours (while awake) for 10 days, Disp-10 mL, R-0Normal           Jack Schmidt MD    Critical Care:  No         Jack Schmidt MD  06/18/22 [id number]

## 2022-06-20 RX ORDER — LOSARTAN POTASSIUM 100 MG/1
TABLET ORAL
Qty: 30 TABLET | Refills: 0 | Status: SHIPPED
Start: 2022-06-20 | End: 2022-09-29 | Stop reason: SDUPTHER

## 2022-06-21 ENCOUNTER — TELEPHONE (OUTPATIENT)
Dept: NEUROLOGY | Age: 82
End: 2022-06-21

## 2022-06-29 RX ORDER — PANTOPRAZOLE SODIUM 40 MG/1
TABLET, DELAYED RELEASE ORAL
Qty: 14 TABLET | Refills: 0 | OUTPATIENT
Start: 2022-06-29

## 2022-07-07 ENCOUNTER — OFFICE VISIT (OUTPATIENT)
Dept: FAMILY MEDICINE CLINIC | Age: 82
End: 2022-07-07
Payer: MEDICARE

## 2022-07-07 VITALS
DIASTOLIC BLOOD PRESSURE: 67 MMHG | TEMPERATURE: 97.3 F | BODY MASS INDEX: 36.85 KG/M2 | WEIGHT: 182.8 LBS | HEIGHT: 59 IN | HEART RATE: 51 BPM | SYSTOLIC BLOOD PRESSURE: 167 MMHG | OXYGEN SATURATION: 98 % | RESPIRATION RATE: 17 BRPM

## 2022-07-07 DIAGNOSIS — R60.9 PERIPHERAL EDEMA: ICD-10-CM

## 2022-07-07 PROBLEM — H35.3210 EXUDATIVE AGE-RELATED MACULAR DEGENERATION OF RIGHT EYE (HCC): Status: ACTIVE | Noted: 2022-01-12

## 2022-07-07 PROCEDURE — 1090F PRES/ABSN URINE INCON ASSESS: CPT | Performed by: FAMILY MEDICINE

## 2022-07-07 PROCEDURE — 99214 OFFICE O/P EST MOD 30 MIN: CPT | Performed by: FAMILY MEDICINE

## 2022-07-07 PROCEDURE — 1123F ACP DISCUSS/DSCN MKR DOCD: CPT | Performed by: FAMILY MEDICINE

## 2022-07-07 PROCEDURE — G8417 CALC BMI ABV UP PARAM F/U: HCPCS | Performed by: FAMILY MEDICINE

## 2022-07-07 PROCEDURE — G8427 DOCREV CUR MEDS BY ELIG CLIN: HCPCS | Performed by: FAMILY MEDICINE

## 2022-07-07 PROCEDURE — G8400 PT W/DXA NO RESULTS DOC: HCPCS | Performed by: FAMILY MEDICINE

## 2022-07-07 PROCEDURE — 1036F TOBACCO NON-USER: CPT | Performed by: FAMILY MEDICINE

## 2022-07-07 RX ORDER — PANTOPRAZOLE SODIUM 40 MG/1
TABLET, DELAYED RELEASE ORAL
Qty: 90 TABLET | Refills: 1 | Status: SHIPPED
Start: 2022-07-07 | End: 2022-10-08

## 2022-07-07 RX ORDER — POTASSIUM CHLORIDE 20 MEQ/1
20 TABLET, EXTENDED RELEASE ORAL DAILY
Qty: 90 TABLET | Refills: 1 | Status: SHIPPED
Start: 2022-07-07 | End: 2022-10-08

## 2022-07-07 RX ORDER — DOCUSATE SODIUM 100 MG/1
CAPSULE, LIQUID FILLED ORAL
Qty: 60 CAPSULE | Refills: 0 | Status: SHIPPED
Start: 2022-07-07 | End: 2022-10-08

## 2022-07-07 RX ORDER — FUROSEMIDE 20 MG/1
40 TABLET ORAL DAILY
Qty: 180 TABLET | Refills: 1 | Status: SHIPPED
Start: 2022-07-07 | End: 2022-10-08

## 2022-07-07 SDOH — ECONOMIC STABILITY: FOOD INSECURITY: WITHIN THE PAST 12 MONTHS, YOU WORRIED THAT YOUR FOOD WOULD RUN OUT BEFORE YOU GOT MONEY TO BUY MORE.: NEVER TRUE

## 2022-07-07 SDOH — ECONOMIC STABILITY: FOOD INSECURITY: WITHIN THE PAST 12 MONTHS, THE FOOD YOU BOUGHT JUST DIDN'T LAST AND YOU DIDN'T HAVE MONEY TO GET MORE.: NEVER TRUE

## 2022-07-07 ASSESSMENT — SOCIAL DETERMINANTS OF HEALTH (SDOH): HOW HARD IS IT FOR YOU TO PAY FOR THE VERY BASICS LIKE FOOD, HOUSING, MEDICAL CARE, AND HEATING?: NOT VERY HARD

## 2022-07-07 NOTE — PROGRESS NOTES
jaundice, petechiae, pallor, cyanosis, ecchymosis    NEURO: gross motor exam normal by observation, gait normal    Mental status - alert, oriented to person, place, and time, normal mood, behavior, speech, dress, motor activity, and thought processes      Assessment/Plan  Yomaira Wilder was seen today for leg swelling. Diagnoses and all orders for this visit:    Peripheral edema  -     INCREASE: furosemide (LASIX) 20 MG tablet; Take 2 tablets by mouth daily STOP prior prescription. New prescription reflects dose increase  -     START:potassium chloride (KLOR-CON M) 20 MEQ extended release tablet; Take 1 tablet by mouth daily  - Check BMP in 1 week    Other orders  -     docusate sodium (COLACE) 100 MG capsule; TAKE ONE CAPSULE BY MOUTH TWO TIMES A DAY  -     pantoprazole (PROTONIX) 40 MG tablet; TAKE ONE TABLET BY MOUTH DAILY          Return in about 1 week (around 7/14/2022), or if symptoms worsen or fail to improve. Daja Peña, DO    Call or go to ED immediately if symptoms worsen or persist.    Educational materials and/or home exercises printed for patient's review and were included in patient instructions on his/her After Visit Summary and given to patient at the end of visit. Counseled regarding above diagnosis, including possible risks and complications,  especially if left uncontrolled. Counseled regarding the possible side effects, risks, benefits and alternatives to treatment; patient and/or guardian verbalizes understanding, agrees, feels comfortable with and wishes to proceed with above treatment plan. Advised patient to call with any new medication issues, and read all Rx info from pharmacy to assure aware of all possible risks and side effects of medication before taking. Reviewed age and gender appropriate health screening exams and vaccinations.   Advised patient regarding importance of keeping up with recommended health maintenance and to schedule as soon as possible if overdue, as this is important in assessing for undiagnosed pathology, especially cancer, as well as protecting against potentially harmful/life threatening disease. Patient and/or guardian verbalizes understanding and agrees with above counseling, assessment and plan. All questions answered.

## 2022-07-11 ENCOUNTER — TELEPHONE (OUTPATIENT)
Dept: GERIATRIC MEDICINE | Age: 82
End: 2022-07-11

## 2022-07-11 NOTE — TELEPHONE ENCOUNTER
As FYI -- Pt was unable to keep her 7/5/22 OV appt. Called her to attempt to reschedule. States she has difficulty getting a ride, but will call back when she has transportation. Offered a virtual appt - states she is unable to do that. Pt  will call back to reschedule at a later time.

## 2022-07-12 ENCOUNTER — OFFICE VISIT (OUTPATIENT)
Dept: FAMILY MEDICINE CLINIC | Age: 82
End: 2022-07-12
Payer: MEDICARE

## 2022-07-12 VITALS
OXYGEN SATURATION: 97 % | BODY MASS INDEX: 37.9 KG/M2 | HEART RATE: 55 BPM | DIASTOLIC BLOOD PRESSURE: 81 MMHG | HEIGHT: 59 IN | WEIGHT: 188 LBS | TEMPERATURE: 98 F | SYSTOLIC BLOOD PRESSURE: 131 MMHG

## 2022-07-12 DIAGNOSIS — N18.31 STAGE 3A CHRONIC KIDNEY DISEASE (HCC): ICD-10-CM

## 2022-07-12 DIAGNOSIS — R60.9 PERIPHERAL EDEMA: ICD-10-CM

## 2022-07-12 DIAGNOSIS — H35.3210 EXUDATIVE AGE-RELATED MACULAR DEGENERATION OF RIGHT EYE, UNSPECIFIED STAGE (HCC): ICD-10-CM

## 2022-07-12 DIAGNOSIS — I63.9 ACUTE CVA (CEREBROVASCULAR ACCIDENT) (HCC): ICD-10-CM

## 2022-07-12 DIAGNOSIS — R27.0 ATAXIA: ICD-10-CM

## 2022-07-12 DIAGNOSIS — G62.89 OTHER POLYNEUROPATHY: Primary | ICD-10-CM

## 2022-07-12 PROBLEM — N18.30 CHRONIC RENAL DISEASE, STAGE III (HCC): Status: ACTIVE | Noted: 2022-07-12

## 2022-07-12 LAB
ANION GAP SERPL CALCULATED.3IONS-SCNC: 14 MMOL/L (ref 7–16)
BUN BLDV-MCNC: 22 MG/DL (ref 6–23)
CALCIUM SERPL-MCNC: 9 MG/DL (ref 8.6–10.2)
CHLORIDE BLD-SCNC: 105 MMOL/L (ref 98–107)
CO2: 22 MMOL/L (ref 22–29)
CREAT SERPL-MCNC: 0.9 MG/DL (ref 0.5–1)
GFR AFRICAN AMERICAN: >60
GFR NON-AFRICAN AMERICAN: 60 ML/MIN/1.73
GLUCOSE BLD-MCNC: 91 MG/DL (ref 74–99)
POTASSIUM SERPL-SCNC: 4.5 MMOL/L (ref 3.5–5)
SODIUM BLD-SCNC: 141 MMOL/L (ref 132–146)

## 2022-07-12 PROCEDURE — G8427 DOCREV CUR MEDS BY ELIG CLIN: HCPCS | Performed by: FAMILY MEDICINE

## 2022-07-12 PROCEDURE — G8400 PT W/DXA NO RESULTS DOC: HCPCS | Performed by: FAMILY MEDICINE

## 2022-07-12 PROCEDURE — 1036F TOBACCO NON-USER: CPT | Performed by: FAMILY MEDICINE

## 2022-07-12 PROCEDURE — G8417 CALC BMI ABV UP PARAM F/U: HCPCS | Performed by: FAMILY MEDICINE

## 2022-07-12 PROCEDURE — 99213 OFFICE O/P EST LOW 20 MIN: CPT | Performed by: FAMILY MEDICINE

## 2022-07-12 PROCEDURE — 1123F ACP DISCUSS/DSCN MKR DOCD: CPT | Performed by: FAMILY MEDICINE

## 2022-07-12 PROCEDURE — 1090F PRES/ABSN URINE INCON ASSESS: CPT | Performed by: FAMILY MEDICINE

## 2022-07-12 NOTE — PROGRESS NOTES
7/12/2022    Chief Complaint   Patient presents with    Edema     2 week f/u       HPI    Lisset Page is a 78 y.o. patient that presents today for:    Edema is stable. No CP, diaphoresis, SOB, palp, HA, visual issues. BP is improved. Chronic Kidney Disease:  Has known CKD Stage 3. Current creatinine/GFR noted on recent labs. Baseline creatinine/GFR reviewed. This is a chronic problem. Blood pressure is/is not controlled. Patient does see a Nephrologist.        Macular degeneration. Sees ophtho. Patient's past medical, surgical, social and/or family history reviewed, updated in chart, and are non-contributory (unless otherwise stated). Medications and allergies also reviewed and updated in chart. ROS negative unless otherwise specified    Physical Exam  Temp Readings from Last 3 Encounters:   07/12/22 98 °F (36.7 °C)   07/07/22 97.3 °F (36.3 °C)   06/18/22 98.1 °F (36.7 °C)     Wt Readings from Last 3 Encounters:   07/12/22 188 lb (85.3 kg)   07/07/22 182 lb 12.8 oz (82.9 kg)   06/18/22 185 lb (83.9 kg)     BP Readings from Last 3 Encounters:   07/12/22 131/81   07/07/22 (!) 167/67   06/18/22 (!) 193/77     Pulse Readings from Last 3 Encounters:   07/12/22 55   07/07/22 51   06/18/22 59       General appearance: alert, well appearing, and in no distress, oriented to person, place, and time and normal appearing weight. CVS exam: normal rate, regular rhythm, normal S1, S2, no murmurs, rubs, clicks or gallops. Radial pulses 2+ bilateral.  PT/DP pulse 2+ bilat. No C/C/E    Chest: clear to auscultation, no wheezes, rales or rhonchi, symmetric air entry.      Abdomen: Soft, non-tender, non-distended, positive BS in all 4 quadrants    Extremities:Dorsalis pedis pulses palpated bilaterally, no clubbing, cyanosis, edema or erythema,     SKIN: no lesions, jaundice, petechiae, pallor, cyanosis, ecchymosis    NEURO: gross motor exam normal by observation, gait normal    Mental status - alert, oriented to person, place, and time, normal mood, behavior, speech, dress, motor activity, and thought processes      Assessment/Plan  Lynn Manning was seen today for edema. Diagnoses and all orders for this visit:    Other polyneuropathy  -     Elastic Bandages & Supports ([address]) MISC; 1 each by Does not apply route daily    Exudative age-related macular degeneration of right eye, unspecified stage ([facility] [address]  - Per ophtho    Ataxia  -     Elastic Bandages & Supports ([address]) [address] [facility]; 1 each by Does not apply route daily    Peripheral edema  -     Elastic Bandages & Supports ([address]) MISC; 1 each by Does not apply route daily    Acute CVA (cerebrovascular accident) ([facility] [address]  -     Elastic Bandages & Supports ([address]) MISC; 1 each by Does not apply route daily    Stage 3a chronic kidney disease ([facility] [address]  - Referral placed        Return in about 6 weeks (around 8/23/2022), or if symptoms worsen or fail to improve. Daja Peña, DO    Call or go to ED immediately if symptoms worsen or persist.    Educational materials and/or home exercises printed for patient's review and were included in patient instructions on his/her After Visit Summary and given to patient at the end of visit. Counseled regarding above diagnosis, including possible risks and complications,  especially if left uncontrolled. Counseled regarding the possible side effects, risks, benefits and alternatives to treatment; patient and/or guardian verbalizes understanding, agrees, feels comfortable with and wishes to proceed with above treatment plan. Advised patient to call with any new medication issues, and read all Rx info from pharmacy to assure aware of all possible risks and side effects of medication before taking. Reviewed age and gender appropriate health screening exams and vaccinations.   Advised patient regarding importance of keeping up with recommended health maintenance and to schedule as soon as possible if overdue, as this is important in assessing for undiagnosed pathology, especially cancer, as well as protecting against potentially harmful/life threatening disease. Patient and/or guardian verbalizes understanding and agrees with above counseling, assessment and plan. All questions answered.

## 2022-07-14 RX ORDER — METOPROLOL SUCCINATE 25 MG/1
TABLET, EXTENDED RELEASE ORAL
Qty: 30 TABLET | Refills: 0 | Status: SHIPPED
Start: 2022-07-14 | End: 2022-09-12

## 2022-07-27 ENCOUNTER — TELEPHONE (OUTPATIENT)
Dept: FAMILY MEDICINE CLINIC | Age: 82
End: 2022-07-27

## 2022-07-27 NOTE — TELEPHONE ENCOUNTER
Could be from a multitude of reasons.   If it recurs, take some Tylenol and use some heat or ice for 20 minutes on 20 minutes off

## 2022-07-27 NOTE — TELEPHONE ENCOUNTER
Yesterday she had a pinching pain middle of breast, not today, she said she had it in the past..could it be from bp meds?

## 2022-08-06 ENCOUNTER — TELEPHONE (OUTPATIENT)
Dept: FAMILY MEDICINE CLINIC | Age: 82
End: 2022-08-06

## 2022-08-09 ENCOUNTER — OFFICE VISIT (OUTPATIENT)
Dept: FAMILY MEDICINE CLINIC | Age: 82
End: 2022-08-09
Payer: MEDICARE

## 2022-08-09 ENCOUNTER — APPOINTMENT (OUTPATIENT)
Dept: CT IMAGING | Age: 82
DRG: 064 | End: 2022-08-09
Payer: MEDICARE

## 2022-08-09 ENCOUNTER — APPOINTMENT (OUTPATIENT)
Dept: GENERAL RADIOLOGY | Age: 82
DRG: 064 | End: 2022-08-09
Payer: MEDICARE

## 2022-08-09 ENCOUNTER — HOSPITAL ENCOUNTER (INPATIENT)
Age: 82
LOS: 3 days | Discharge: HOME | DRG: 064 | End: 2022-08-12
Attending: EMERGENCY MEDICINE | Admitting: INTERNAL MEDICINE
Payer: MEDICARE

## 2022-08-09 VITALS
BODY MASS INDEX: 37.29 KG/M2 | HEART RATE: 62 BPM | DIASTOLIC BLOOD PRESSURE: 70 MMHG | HEIGHT: 59 IN | RESPIRATION RATE: 20 BRPM | TEMPERATURE: 97.6 F | OXYGEN SATURATION: 96 % | WEIGHT: 185 LBS | SYSTOLIC BLOOD PRESSURE: 208 MMHG

## 2022-08-09 DIAGNOSIS — R51.9 NONINTRACTABLE HEADACHE, UNSPECIFIED CHRONICITY PATTERN, UNSPECIFIED HEADACHE TYPE: ICD-10-CM

## 2022-08-09 DIAGNOSIS — U07.1 COVID-19: ICD-10-CM

## 2022-08-09 DIAGNOSIS — I61.9 CEREBRAL HEMORRHAGE (HCC): Primary | ICD-10-CM

## 2022-08-09 DIAGNOSIS — I16.0 HYPERTENSIVE URGENCY: Primary | ICD-10-CM

## 2022-08-09 DIAGNOSIS — I16.1 HYPERTENSIVE EMERGENCY: ICD-10-CM

## 2022-08-09 DIAGNOSIS — F41.9 ANXIETY: ICD-10-CM

## 2022-08-09 PROBLEM — R06.03 ACUTE RESPIRATORY DISTRESS: Status: ACTIVE | Noted: 2022-08-09

## 2022-08-09 PROBLEM — I16.9 HYPERTENSIVE CRISIS, UNSPECIFIED: Status: ACTIVE | Noted: 2022-08-09

## 2022-08-09 LAB
ALBUMIN SERPL-MCNC: 4.1 G/DL (ref 3.5–5.2)
ALP BLD-CCNC: 69 U/L (ref 35–104)
ALT SERPL-CCNC: 11 U/L (ref 0–32)
ANION GAP SERPL CALCULATED.3IONS-SCNC: 11 MMOL/L (ref 7–16)
AST SERPL-CCNC: 16 U/L (ref 0–31)
BASOPHILS ABSOLUTE: 0.01 E9/L (ref 0–0.2)
BASOPHILS ABSOLUTE: 0.02 E9/L (ref 0–0.2)
BASOPHILS RELATIVE PERCENT: 0.2 % (ref 0–2)
BASOPHILS RELATIVE PERCENT: 0.3 % (ref 0–2)
BILIRUB SERPL-MCNC: 0.4 MG/DL (ref 0–1.2)
BUN BLDV-MCNC: 15 MG/DL (ref 6–23)
C-REACTIVE PROTEIN: 0.3 MG/DL (ref 0–0.4)
CALCIUM SERPL-MCNC: 9 MG/DL (ref 8.6–10.2)
CHLORIDE BLD-SCNC: 107 MMOL/L (ref 98–107)
CO2: 22 MMOL/L (ref 22–29)
CREAT SERPL-MCNC: 0.9 MG/DL (ref 0.5–1)
EKG ATRIAL RATE: 62 BPM
EKG P AXIS: 70 DEGREES
EKG P-R INTERVAL: 164 MS
EKG Q-T INTERVAL: 420 MS
EKG QRS DURATION: 74 MS
EKG QTC CALCULATION (BAZETT): 426 MS
EKG R AXIS: -3 DEGREES
EKG T AXIS: 47 DEGREES
EKG VENTRICULAR RATE: 62 BPM
EOSINOPHILS ABSOLUTE: 0.08 E9/L (ref 0.05–0.5)
EOSINOPHILS ABSOLUTE: 0.09 E9/L (ref 0.05–0.5)
EOSINOPHILS RELATIVE PERCENT: 1.3 % (ref 0–6)
EOSINOPHILS RELATIVE PERCENT: 1.4 % (ref 0–6)
GFR AFRICAN AMERICAN: >60
GFR NON-AFRICAN AMERICAN: 60 ML/MIN/1.73
GLUCOSE BLD-MCNC: 88 MG/DL (ref 74–99)
HCT VFR BLD CALC: 36.8 % (ref 34–48)
HCT VFR BLD CALC: 38.2 % (ref 34–48)
HEMOGLOBIN: 12.2 G/DL (ref 11.5–15.5)
HEMOGLOBIN: 12.7 G/DL (ref 11.5–15.5)
IMMATURE GRANULOCYTES #: 0.02 E9/L
IMMATURE GRANULOCYTES #: 0.02 E9/L
IMMATURE GRANULOCYTES %: 0.3 % (ref 0–5)
IMMATURE GRANULOCYTES %: 0.3 % (ref 0–5)
LACTIC ACID: 1.3 MMOL/L (ref 0.5–2.2)
LYMPHOCYTES ABSOLUTE: 2.78 E9/L (ref 1.5–4)
LYMPHOCYTES ABSOLUTE: 2.97 E9/L (ref 1.5–4)
LYMPHOCYTES RELATIVE PERCENT: 44.6 % (ref 20–42)
LYMPHOCYTES RELATIVE PERCENT: 47.3 % (ref 20–42)
MCH RBC QN AUTO: 28.9 PG (ref 26–35)
MCH RBC QN AUTO: 29.2 PG (ref 26–35)
MCHC RBC AUTO-ENTMCNC: 33.2 % (ref 32–34.5)
MCHC RBC AUTO-ENTMCNC: 33.2 % (ref 32–34.5)
MCV RBC AUTO: 87.2 FL (ref 80–99.9)
MCV RBC AUTO: 87.8 FL (ref 80–99.9)
MONOCYTES ABSOLUTE: 0.51 E9/L (ref 0.1–0.95)
MONOCYTES ABSOLUTE: 0.59 E9/L (ref 0.1–0.95)
MONOCYTES RELATIVE PERCENT: 8.1 % (ref 2–12)
MONOCYTES RELATIVE PERCENT: 9.5 % (ref 2–12)
NEUTROPHILS ABSOLUTE: 2.68 E9/L (ref 1.8–7.3)
NEUTROPHILS ABSOLUTE: 2.74 E9/L (ref 1.8–7.3)
NEUTROPHILS RELATIVE PERCENT: 42.7 % (ref 43–80)
NEUTROPHILS RELATIVE PERCENT: 44 % (ref 43–80)
PDW BLD-RTO: 13.2 FL (ref 11.5–15)
PDW BLD-RTO: 13.2 FL (ref 11.5–15)
PLATELET # BLD: 173 E9/L (ref 130–450)
PLATELET # BLD: 176 E9/L (ref 130–450)
PMV BLD AUTO: 10.1 FL (ref 7–12)
PMV BLD AUTO: 9.8 FL (ref 7–12)
POTASSIUM SERPL-SCNC: 4.4 MMOL/L (ref 3.5–5)
PRO-BNP: 515 PG/ML (ref 0–450)
RBC # BLD: 4.22 E12/L (ref 3.5–5.5)
RBC # BLD: 4.35 E12/L (ref 3.5–5.5)
REASON FOR REJECTION: NORMAL
REJECTED TEST: NORMAL
SODIUM BLD-SCNC: 140 MMOL/L (ref 132–146)
TOTAL PROTEIN: 7.3 G/DL (ref 6.4–8.3)
TROPONIN, HIGH SENSITIVITY: 14 NG/L (ref 0–9)
WBC # BLD: 6.2 E9/L (ref 4.5–11.5)
WBC # BLD: 6.3 E9/L (ref 4.5–11.5)

## 2022-08-09 PROCEDURE — 1090F PRES/ABSN URINE INCON ASSESS: CPT | Performed by: PHYSICIAN ASSISTANT

## 2022-08-09 PROCEDURE — 82728 ASSAY OF FERRITIN: CPT

## 2022-08-09 PROCEDURE — 70450 CT HEAD/BRAIN W/O DYE: CPT

## 2022-08-09 PROCEDURE — 84484 ASSAY OF TROPONIN QUANT: CPT

## 2022-08-09 PROCEDURE — 6360000002 HC RX W HCPCS: Performed by: NURSE PRACTITIONER

## 2022-08-09 PROCEDURE — 71045 X-RAY EXAM CHEST 1 VIEW: CPT

## 2022-08-09 PROCEDURE — G8400 PT W/DXA NO RESULTS DOC: HCPCS | Performed by: PHYSICIAN ASSISTANT

## 2022-08-09 PROCEDURE — 85025 COMPLETE CBC W/AUTO DIFF WBC: CPT

## 2022-08-09 PROCEDURE — 83615 LACTATE (LD) (LDH) ENZYME: CPT

## 2022-08-09 PROCEDURE — 1123F ACP DISCUSS/DSCN MKR DOCD: CPT | Performed by: PHYSICIAN ASSISTANT

## 2022-08-09 PROCEDURE — 6370000000 HC RX 637 (ALT 250 FOR IP): Performed by: INTERNAL MEDICINE

## 2022-08-09 PROCEDURE — 6360000002 HC RX W HCPCS: Performed by: EMERGENCY MEDICINE

## 2022-08-09 PROCEDURE — 83605 ASSAY OF LACTIC ACID: CPT

## 2022-08-09 PROCEDURE — 99285 EMERGENCY DEPT VISIT HI MDM: CPT

## 2022-08-09 PROCEDURE — 87081 CULTURE SCREEN ONLY: CPT

## 2022-08-09 PROCEDURE — 2580000003 HC RX 258: Performed by: INTERNAL MEDICINE

## 2022-08-09 PROCEDURE — 36415 COLL VENOUS BLD VENIPUNCTURE: CPT

## 2022-08-09 PROCEDURE — 0202U NFCT DS 22 TRGT SARS-COV-2: CPT

## 2022-08-09 PROCEDURE — 1036F TOBACCO NON-USER: CPT | Performed by: PHYSICIAN ASSISTANT

## 2022-08-09 PROCEDURE — G8427 DOCREV CUR MEDS BY ELIG CLIN: HCPCS | Performed by: PHYSICIAN ASSISTANT

## 2022-08-09 PROCEDURE — 84145 PROCALCITONIN (PCT): CPT

## 2022-08-09 PROCEDURE — 2500000003 HC RX 250 WO HCPCS: Performed by: EMERGENCY MEDICINE

## 2022-08-09 PROCEDURE — 2580000003 HC RX 258: Performed by: EMERGENCY MEDICINE

## 2022-08-09 PROCEDURE — 83880 ASSAY OF NATRIURETIC PEPTIDE: CPT

## 2022-08-09 PROCEDURE — 80053 COMPREHEN METABOLIC PANEL: CPT

## 2022-08-09 PROCEDURE — 99222 1ST HOSP IP/OBS MODERATE 55: CPT | Performed by: NEUROLOGICAL SURGERY

## 2022-08-09 PROCEDURE — 85378 FIBRIN DEGRADE SEMIQUANT: CPT

## 2022-08-09 PROCEDURE — 99214 OFFICE O/P EST MOD 30 MIN: CPT | Performed by: PHYSICIAN ASSISTANT

## 2022-08-09 PROCEDURE — 93005 ELECTROCARDIOGRAM TRACING: CPT | Performed by: NURSE PRACTITIONER

## 2022-08-09 PROCEDURE — 96367 TX/PROPH/DG ADDL SEQ IV INF: CPT

## 2022-08-09 PROCEDURE — G8417 CALC BMI ABV UP PARAM F/U: HCPCS | Performed by: PHYSICIAN ASSISTANT

## 2022-08-09 PROCEDURE — 86140 C-REACTIVE PROTEIN: CPT

## 2022-08-09 PROCEDURE — 2000000000 HC ICU R&B

## 2022-08-09 PROCEDURE — 8E0ZXY6 ISOLATION: ICD-10-PCS | Performed by: INTERNAL MEDICINE

## 2022-08-09 PROCEDURE — 96365 THER/PROPH/DIAG IV INF INIT: CPT

## 2022-08-09 RX ORDER — SODIUM CHLORIDE 0.9 % (FLUSH) 0.9 %
5-40 SYRINGE (ML) INJECTION EVERY 12 HOURS SCHEDULED
Status: DISCONTINUED | OUTPATIENT
Start: 2022-08-09 | End: 2022-08-09

## 2022-08-09 RX ORDER — METOPROLOL SUCCINATE 25 MG/1
25 TABLET, EXTENDED RELEASE ORAL DAILY
Status: DISCONTINUED | OUTPATIENT
Start: 2022-08-09 | End: 2022-08-12 | Stop reason: HOSPADM

## 2022-08-09 RX ORDER — ASCORBIC ACID 500 MG
500 TABLET ORAL DAILY
Status: DISCONTINUED | OUTPATIENT
Start: 2022-08-10 | End: 2022-08-12 | Stop reason: HOSPADM

## 2022-08-09 RX ORDER — ACETAMINOPHEN 650 MG/1
650 SUPPOSITORY RECTAL EVERY 6 HOURS PRN
Status: DISCONTINUED | OUTPATIENT
Start: 2022-08-09 | End: 2022-08-09 | Stop reason: SDUPTHER

## 2022-08-09 RX ORDER — SODIUM CHLORIDE 0.9 % (FLUSH) 0.9 %
5-40 SYRINGE (ML) INJECTION PRN
Status: DISCONTINUED | OUTPATIENT
Start: 2022-08-09 | End: 2022-08-12 | Stop reason: HOSPADM

## 2022-08-09 RX ORDER — ACETAMINOPHEN 325 MG/1
650 TABLET ORAL EVERY 6 HOURS PRN
Status: DISCONTINUED | OUTPATIENT
Start: 2022-08-09 | End: 2022-08-12 | Stop reason: HOSPADM

## 2022-08-09 RX ORDER — LEVETIRACETAM 10 MG/ML
1000 INJECTION INTRAVASCULAR ONCE
Status: COMPLETED | OUTPATIENT
Start: 2022-08-09 | End: 2022-08-09

## 2022-08-09 RX ORDER — ACETAMINOPHEN 650 MG/1
650 SUPPOSITORY RECTAL EVERY 6 HOURS PRN
Status: DISCONTINUED | OUTPATIENT
Start: 2022-08-09 | End: 2022-08-12 | Stop reason: HOSPADM

## 2022-08-09 RX ORDER — LEVETIRACETAM 500 MG/1
500 TABLET ORAL 2 TIMES DAILY
Status: DISCONTINUED | OUTPATIENT
Start: 2022-08-09 | End: 2022-08-12 | Stop reason: HOSPADM

## 2022-08-09 RX ORDER — SODIUM CHLORIDE 9 MG/ML
INJECTION, SOLUTION INTRAVENOUS PRN
Status: DISCONTINUED | OUTPATIENT
Start: 2022-08-09 | End: 2022-08-09

## 2022-08-09 RX ORDER — LORAZEPAM 0.5 MG/1
TABLET ORAL
Qty: 30 TABLET | Refills: 0 | OUTPATIENT
Start: 2022-08-09 | End: 2022-09-08

## 2022-08-09 RX ORDER — ONDANSETRON 4 MG/1
4 TABLET, ORALLY DISINTEGRATING ORAL EVERY 8 HOURS PRN
Status: DISCONTINUED | OUTPATIENT
Start: 2022-08-09 | End: 2022-08-12 | Stop reason: HOSPADM

## 2022-08-09 RX ORDER — GUAIFENESIN/DEXTROMETHORPHAN 100-10MG/5
5 SYRUP ORAL EVERY 4 HOURS PRN
Status: DISCONTINUED | OUTPATIENT
Start: 2022-08-09 | End: 2022-08-12 | Stop reason: HOSPADM

## 2022-08-09 RX ORDER — DEXAMETHASONE 4 MG/1
6 TABLET ORAL DAILY
Status: DISCONTINUED | OUTPATIENT
Start: 2022-08-09 | End: 2022-08-12 | Stop reason: HOSPADM

## 2022-08-09 RX ORDER — LEVOTHYROXINE SODIUM 88 UG/1
88 TABLET ORAL DAILY
Status: DISCONTINUED | OUTPATIENT
Start: 2022-08-09 | End: 2022-08-12 | Stop reason: HOSPADM

## 2022-08-09 RX ORDER — POLYETHYLENE GLYCOL 3350 17 G/17G
17 POWDER, FOR SOLUTION ORAL DAILY PRN
Status: DISCONTINUED | OUTPATIENT
Start: 2022-08-09 | End: 2022-08-12 | Stop reason: HOSPADM

## 2022-08-09 RX ORDER — ONDANSETRON 2 MG/ML
4 INJECTION INTRAMUSCULAR; INTRAVENOUS EVERY 6 HOURS PRN
Status: DISCONTINUED | OUTPATIENT
Start: 2022-08-09 | End: 2022-08-12 | Stop reason: HOSPADM

## 2022-08-09 RX ORDER — SODIUM CHLORIDE 0.9 % (FLUSH) 0.9 %
5-40 SYRINGE (ML) INJECTION EVERY 12 HOURS SCHEDULED
Status: DISCONTINUED | OUTPATIENT
Start: 2022-08-09 | End: 2022-08-12 | Stop reason: HOSPADM

## 2022-08-09 RX ORDER — SODIUM CHLORIDE 9 MG/ML
INJECTION, SOLUTION INTRAVENOUS PRN
Status: DISCONTINUED | OUTPATIENT
Start: 2022-08-09 | End: 2022-08-12 | Stop reason: HOSPADM

## 2022-08-09 RX ORDER — ZINC SULFATE 50(220)MG
50 CAPSULE ORAL DAILY
Status: DISCONTINUED | OUTPATIENT
Start: 2022-08-10 | End: 2022-08-12 | Stop reason: HOSPADM

## 2022-08-09 RX ORDER — LABETALOL HYDROCHLORIDE 5 MG/ML
20 INJECTION, SOLUTION INTRAVENOUS EVERY 10 MIN PRN
Status: DISCONTINUED | OUTPATIENT
Start: 2022-08-09 | End: 2022-08-10

## 2022-08-09 RX ORDER — CHOLECALCIFEROL (VITAMIN D3) 50 MCG
2000 TABLET ORAL DAILY
Status: DISCONTINUED | OUTPATIENT
Start: 2022-08-10 | End: 2022-08-12 | Stop reason: HOSPADM

## 2022-08-09 RX ORDER — ACETAMINOPHEN 325 MG/1
650 TABLET ORAL EVERY 6 HOURS PRN
Status: DISCONTINUED | OUTPATIENT
Start: 2022-08-09 | End: 2022-08-09 | Stop reason: SDUPTHER

## 2022-08-09 RX ORDER — SODIUM CHLORIDE 0.9 % (FLUSH) 0.9 %
5-40 SYRINGE (ML) INJECTION PRN
Status: DISCONTINUED | OUTPATIENT
Start: 2022-08-09 | End: 2022-08-09

## 2022-08-09 RX ORDER — LOSARTAN POTASSIUM 25 MG/1
100 TABLET ORAL DAILY
Status: DISCONTINUED | OUTPATIENT
Start: 2022-08-09 | End: 2022-08-12 | Stop reason: HOSPADM

## 2022-08-09 RX ADMIN — LEVETIRACETAM 500 MG: 500 TABLET, FILM COATED ORAL at 22:57

## 2022-08-09 RX ADMIN — SODIUM CHLORIDE, PRESERVATIVE FREE 10 ML: 5 INJECTION INTRAVENOUS at 22:43

## 2022-08-09 RX ADMIN — SODIUM CHLORIDE 5 MG/HR: 9 INJECTION, SOLUTION INTRAVENOUS at 15:42

## 2022-08-09 RX ADMIN — LEVETIRACETAM 1000 MG: 10 INJECTION INTRAVENOUS at 15:13

## 2022-08-09 RX ADMIN — METOPROLOL SUCCINATE 25 MG: 25 TABLET, EXTENDED RELEASE ORAL at 17:20

## 2022-08-09 RX ADMIN — DEXAMETHASONE 6 MG: 4 TABLET ORAL at 22:42

## 2022-08-09 RX ADMIN — LOSARTAN POTASSIUM 100 MG: 25 TABLET, FILM COATED ORAL at 17:20

## 2022-08-09 ASSESSMENT — ENCOUNTER SYMPTOMS
TROUBLE SWALLOWING: 0
EYE REDNESS: 0
SHORTNESS OF BREATH: 0
COUGH: 0
DIARRHEA: 0
PHOTOPHOBIA: 0
VOICE CHANGE: 0
VOMITING: 0
ABDOMINAL PAIN: 0
WHEEZING: 0
EYE DISCHARGE: 0
NAUSEA: 0

## 2022-08-09 NOTE — ED NOTES
Patients BP dropped, [facility]ene on hold for now MD notified. Tysonck placed.      262 Ankit Aquino RN  08/09/22 [address]  08/09/22 [id number]

## 2022-08-09 NOTE — ED NOTES
Patient arrives via wheelchair from triage. Patient states that she had had a headache the last few days and she went to the doctors today who sent her here for elevated BP. Patient undressed in hospital gown. Monitor shows SR. Dania Diallo RN  08/09/22 [id number]

## 2022-08-09 NOTE — CONSULTS
Chief Complaint:   Chief Complaint   Patient presents with    Hypertension     Covid+ test on 08/05, covid symptoms started on 08/01,  Sent in from urgent care for elevated BP, was taking paxlovid for Covid and told it may interfere with BP, hx of recent CVA 2-3 months ago        HPI:     I had the pleasure of seeing Yazmin Lama today in house. This 64-year-old, right-handed, , mother tested positive for COVID on 5 August after symptoms began on 1 August.  She is being evaluated at urgent care today and transferred to our institution secondary to elevated blood pressure. She did have a history of a CVA approximately 3 months ago. Against that background she endorses no numbness weakness or tingling. There is been no seizures. She denies any headache. She denies any new visual changes. Past Medical History:   Diagnosis Date    Amaurosis fugax of right eye 12/11/2017    Anxiety     Arthritis     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, treated with surgery and chemo    Cerebral artery occlusion with cerebral infarction ([address]     possibly TIA    Chronic back pain     Constipation     Depression     Elevated sed rate 12/11/2017    hx of    GERD (gastroesophageal reflux disease)     Hemorrhoids     history of    Hyperlipidemia     Hypertension     Left foot pain     dropped a Kettle on foot    Lumbosacral radiculopathy 08/06/2020    Macular degeneration     Peripheral neuropathy 08/06/2020    Poor vision     right eye / had bleeding behind eye, is receiving \"shots\" at this time  / 3/22/2019    Prosthetic eye globe     left eye implant;    Seasonal allergies     Skipped heart beats     on metoprolol; managed by Dr. Greta Bearden    Sleep apnea     uses cpap at times     Thyroid disease      Past Surgical History:   Procedure Laterality Date    CHOLECYSTECTOMY  1985    open?     COLECTOMY  1974    COLONOSCOPY  04/26/2012    and egd    COLONOSCOPY  05/30/2014    COLONOSCOPY  01/08/2015    CYST REMOVAL years ago    upper gum    EYE SURGERY  years ago    left eye removal,  has artificial eye    HYSTERECTOMY (CERVIX STATUS UNKNOWN)  1974    JOINT REPLACEMENT Left 2010/2012    x 2-knee    TONSILLECTOMY      TUMOR EXCISION      from arm, fatty    UPPER GASTROINTESTINAL ENDOSCOPY  2014    with biopsy    UPPER GASTROINTESTINAL ENDOSCOPY  2015    UPPER GASTROINTESTINAL ENDOSCOPY N/A 2019    EGD ESOPHAGOGASTRODUODENOSCOPY  ++IODINE ALLERGY++ and bx performed by Heri Pak MD at [address] [place] [address] History   Problem Relation Age of Onset    Stroke Father     Hypertension Father     Heart Disease Father     Stroke Mother     Hypertension Mother     Other Mother         aneurysm    Heart Disease Mother     Hypertension Brother     Cancer Brother     Breast Cancer Sister     Hypertension Sister     Cancer Sister         breast ca    Heart Disease Sister     Hypertension Brother         parkinson    Heart Disease Brother     Cancer Brother     Cancer Brother     Cancer Brother     Cancer Brother     Heart Disease Brother     Cancer Brother     Cancer Sister     High Blood Pressure Daughter     Breast Cancer Daughter     High Blood Pressure Daughter       Social History     Socioeconomic History    Marital status:      Spouse name: Not on file    Number of children: 3    Years of education: 12    Highest education level: High school graduate   Occupational History    Not on file   Tobacco Use    Smoking status: Passive Smoke Exposure - Never Smoker    Smokeless tobacco: Never    Tobacco comments:      had smoked,  in 12   Vaping Use    Vaping Use: Never used   Substance and Sexual Activity    Alcohol use: No    Drug use: Never    Sexual activity: Not Currently     Partners: Male   Other Topics Concern    Not on file   Social History Narrative    9-3-19 [facility] spoke with Tomas William. She reports s/s of MDD and openly admits to having depression. Crying during the call.   States she doesn't sleep well unless she takes her anti-anxiety medication. Reports that she has financial strain, food strain, and high stress. Often running out of money and food before the month is up. Reports having to borrow from family to get by at times. She does have social connections with family/friends and Yarsani so she is not feeling isolated. She reports feeling \"bullied\" at her old apartment and isn't happy where she has been living now for the past 1 year. She wants to move again to help lower her bills but is on a waiting list.      She has valid transportation with the use of her brother's truck and [facility]. [facility] is agreeable to [facility] helping with outpatient counseling for the depression and open to taking medication if the PCP prescribes. Had a script in the past and didn't feel it worked so stopped taking it. Counseled on taking medication and not stopping it without PCP request to do so. She verbalized agreement. [facility] made a call to PCP to update on above depression status. [facility] will assist with SELENE application when [facility] returns from her vacation. 10-8-18[facility] spoke with [facility]. She is planning to call [facility] for outpatient counseling. [facility] spoke with the Senior [address] Doherty and she will work on getting the SELENE application/assessment completed. Rhonda updated and agreeable to this. Both are to keep [facility] updated on when that application/home assessment is scheduled. [facility]  for [facility]/u to services for in the community to follow with [facility]. Response showed that the [facility] advocate, Rivka Hoang, did reach out to [facility] and sent the letter with the counseling centers listed. [facility] does not recall talking with Rivka Hoang. [facility] is getting 3 meals a week from [facility] but states that does not help with her shortage of food. She also has the ERS in place. Shanika Bradley from [facility] updated LSW that she will request a passport referral for review Sig Dispense Refill    nirmatrelvir/ritonavir (PAXLOVID) 20 x 150 MG & 10 x 100MG Take 3 tablets (two 150 mg nirmatrelvir and one 100 mg ritonavir tablets) by mouth every 12 hours for 5 days. 30 tablet 0    nirmatrelvir/ritonavir (PAXLOVID) 20 x 150 MG & 10 x 100MG Take 3 tablets (two 150 mg nirmatrelvir and one 100 mg ritonavir tablets) by mouth every 12 hours for 5 days. 30 tablet 0    metoprolol succinate (TOPROL XL) 25 MG extended release tablet TAKE ONE TABLET BY MOUTH DAILY 30 tablet 0    Elastic Bandages & Supports (MEDICAL COMPRESSION STOCKINGS) MISC 1 each by Does not apply route daily 2 each 2    docusate sodium (COLACE) 100 MG capsule TAKE ONE CAPSULE BY MOUTH TWO TIMES A DAY 60 capsule 0    furosemide (LASIX) 20 MG tablet Take 2 tablets by mouth daily STOP prior prescription.  New prescription reflects dose increase 180 tablet 1    pantoprazole (PROTONIX) 40 MG tablet TAKE ONE TABLET BY MOUTH DAILY 90 tablet 1    potassium chloride (KLOR-CON M) 20 MEQ extended release tablet Take 1 tablet by mouth daily 90 tablet 1    losartan (COZAAR) 100 MG tablet TAKE ONE TABLET BY MOUTH DAILY 30 tablet 0    atorvastatin (LIPITOR) 40 MG tablet Take 1 tablet by mouth daily 30 tablet 0    citalopram (CELEXA) 10 MG tablet Take 1 tablet by mouth daily 30 tablet 2    LIDOCAINE PAIN RELIEF 4 % external patch APPLY ONE PATCH DAILY      vitamin C (ASCORBIC ACID) 500 MG tablet TAKE ONE TABLET BY MOUTH THREE TIMES A DAY      Vitamin D, Cholecalciferol, 25 MCG (1000 UT) TABS TAKE ONE TABLET BY MOUTH DAILY      meclizine (ANTIVERT) 25 MG tablet Take 1 tablet by mouth as needed for Dizziness 30 tablet 1    cetirizine (ZYRTEC ALLERGY) 10 MG tablet Take 10 mg by mouth daily as needed for Allergies      levothyroxine (SYNTHROID) 88 MCG tablet Take 1 tablet by mouth daily 90 tablet 1    ondansetron (ZOFRAN) 4 MG tablet Take 1 tablet by mouth 3 times daily as needed for Nausea or Vomiting 30 tablet 2    Multiple Vitamins-Minerals (PRESERVISION AREDS 2) CAPS Take 1 capsule by mouth 2 times daily           Allergies:    Iodine, Bee pollen, Bee venom, Codeine, Hydralazine, Oxycodone-aspirin, Sulfa antibiotics, Amoxicillin-pot clavulanate, Aspirin, Darvocet [propoxyphene n-acetaminophen], Eggs or egg-derived products, Influenza vaccines, Percodan [oxycodone-aspirin], and Percodan [oxycodone-aspirin]       Review of Systems:    Denies any chest pain, headache,  recent weight loss, fevers, chills or night sweats. Physical Examination:    BP (!) 152/60   Pulse 61   Temp 97.8 °F (36.6 °C) (Oral)   Resp 22   LMP 07/24/2014   SpO2 93%      On focused neurological examination, she  is awake alert oriented and rationally conversant. Speech is clear and fluent. 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 Her  face is symmetric and grossly intact to fine touch. Uvula and tongue are both midline. Shoulder shrug is symmetric and strong. Motor examination reveals preserved power in the upper and lower extremities at 5 out of 5 throughout. Reflexes are symmetric and brisk. Plantar responses are downgoing. There is no clonus. Patient is intact to fine touch in all dermatomes throughout. ASSESSMENT:    I personally reviewed Nakita Mata's radiographic images, particularly her CT head which shows a small right occipital ICH without MDL shift or mass effect        MEDICAL DECISION MAKING & PLAN:    No Nsx intervention is required at this time. Recommend FU CT head in 6 hrs per protocol, strict BP control with SBP<140 with Cardene, Keppra x 7 days and ICU admission for neurochecks and bp control. Thank you so much for allowing us to participate in the care of this patient. Electronically signed by Bindu Solomon MD on 8/9/2022 at 4:21 PM       NOTE: This report was transcribed using voice recognition software.  Every effort was made to ensure accuracy; however, inadvertent computerized transcription errors may be present

## 2022-08-09 NOTE — TELEPHONE ENCOUNTER
Pt called stating her blood pressure 196/86. States she had COVID and was taken off of her \"stroke medicine\". Patient was advised to go to urgent care or the ER. Patient states she has no way of going there and does not want to go. Suggested patient call her insurance to get a ride to hospital and she states her insurance will not pick her up. Advised her to also contact family and see if they can take her. Patient states the hospital will not see her for COVID. I advised pt that if she does not go to urgent care or the ER that her blood pressure could continue to raise and with her not being on her \"stroke medication\" that she could have another stroke. Patient was agitated and hung up the phone.

## 2022-08-09 NOTE — ED NOTES
Patients oxygenation dropped. 4l o2 per NC initiated.      Jesse [facility] [address]  08/09/22 [id number]

## 2022-08-09 NOTE — ED PROVIDER NOTES
78y.o. year old female presenting to the emergency room with concerns of hypertension beginning today. Patient reports that symptom's onset a few days prior with elevated BP noted at urgent care today. Worsen with nothing. Improves with nothing. Severity of moderate, with no radiation . Symptoms are constant in timing. Symptoms described as elevated BP noted at urgent car. Patient  reports  associated symptoms of COVID+ on 8/5. Patient in  no acute distress. Patient on paxvolid, reports that she was told may interfere with BP. History of previous vision loss on left     Chief Complaint   Patient presents with    Hypertension     Covid+ test on 08/05, covid symptoms started on 08/01,  Sent in from urgent care for elevated BP, was taking paxlovid for Covid and told it may interfere with BP, hx of recent CVA 2-3 months ago        Review of Systems   Constitutional:  Negative for chills, fatigue and fever. HENT:  Negative for trouble swallowing and voice change. Eyes:  Negative for photophobia, discharge, redness and visual disturbance. Respiratory:  Negative for cough, shortness of breath and wheezing. Cardiovascular:  Negative for chest pain. Gastrointestinal:  Negative for abdominal pain, diarrhea, nausea and vomiting. Musculoskeletal:  Negative for arthralgias. Skin:  Negative for wound. Neurological:  Positive for headaches. Negative for dizziness, syncope, weakness and numbness. Psychiatric/Behavioral:  Negative for behavioral problems and confusion. Physical Exam  Vitals reviewed. Constitutional:       General: She is not in acute distress. Appearance: Normal appearance. HENT:      Head: Normocephalic. Right Ear: External ear normal.      Left Ear: External ear normal.      Nose: Nose normal.      Mouth/Throat:      Mouth: Mucous membranes are moist.   Eyes:      General:         Right eye: No discharge. Left eye: No discharge.       Conjunctiva/sclera: Conjunctivae normal.      Pupils:      Left eye: Pupil is not reactive. Cardiovascular:      Rate and Rhythm: Regular rhythm. Bradycardia present. Pulses: Normal pulses. Heart sounds: No friction rub. No gallop. Pulmonary:      Effort: Pulmonary effort is normal. No respiratory distress. Breath sounds: No stridor. No rales. Abdominal:      General: There is no distension. Palpations: Abdomen is soft. Tenderness: There is no abdominal tenderness. There is no guarding or rebound. Musculoskeletal:         General: No tenderness or deformity. Cervical back: Normal range of motion and neck supple. No rigidity or tenderness. Skin:     General: Skin is warm. Coloration: Skin is not jaundiced. Neurological:      Mental Status: She is alert and oriented to person, place, and time. Sensory: No sensory deficit. Motor: No weakness. Psychiatric:         Mood and Affect: Mood normal.         Behavior: Behavior normal.        Procedures     CT HEAD WO CONTRAST   Final Result   1. No acute intracranial abnormality. 2. Stable small right occipital lobe hematoma with mild surrounding vasogenic   edema. CT CHEST WO CONTRAST   Final Result   Subsegmental dependent atelectasis in the lung bases of bibasilar atelectatic   changes without separate consolidation or ground-glass density to suggest   acute bronchopneumonia or airspace disease separate. No pleural effusion. CT HEAD WO CONTRAST   Final Result   1. Stable right occipital parenchymal hematoma. 2. No new intracranial hemorrhage or edema. XR CHEST PORTABLE   Final Result   No airspace opacity or pleural effusion. CT HEAD WO CONTRAST   Final Result   1. Small hemorrhage within the right occipital lobe, as described above. 2.  Mild to moderate, diffuse, cerebral atrophy again seen, when compared to   the previous study performed 06/18/2022.   Mild, diffuse, cerebellar atrophy again noted. 3.  Microvascular white matter ischemic changes again identified. 4.  Mild interval increase in ethmoid sinusitis. The findings were discussed with Hector Sheikh NP at 2:19 p.m. MDM  Number of Diagnoses or Management Options  Cerebral hemorrhage ([facility]  COVID-19  Hypertensive emergency  Diagnosis management comments: 68-year-old female presenting to the emergency department complaints of elevated blood pressure sent in from urgent care. Patient was positive for COVID on 8-5 with symptoms that began on 8-1. Was seen at urgent care today for having fatigue, headache and was noted to have elevated blood pressure and sent to the ER. Patient has been on Paxil lid and was told previously and I interfere with the blood pressure medications. Patient has previous history of stroke with hemorrhage. Patient was noted on CTA to have hemorrhage within the occipital lobe. Spoke to neurosurgery discussed patient started on Keppra and Cardene drip. With repeat CT head without contrast ordered in 6 hours. Recommended ICU. Spoke to Dr. Lyla Rowe, discussed patient accepted patient for admission. Spoke to intensivist, discussed patient recommended the patient be admitted to neuro ICU unless beds were not available. Patient accepted to ICU after neuro ICU with no availability at this time.                     --------------------------------------------- PAST HISTORY ---------------------------------------------  Past Medical History:  has a past medical history of Amaurosis fugax of right eye, Anxiety, Arthritis, CA - cancer of bowel, Cerebral artery occlusion with cerebral infarction ([facility] [address] Chronic back pain, Constipation, Depression, Elevated sed rate, GERD (gastroesophageal reflux disease), Hemorrhoids, Hyperlipidemia, Hypertension, Left foot pain, Lumbosacral radiculopathy, Macular degeneration, Peripheral neuropathy, Poor vision, Prosthetic eye globe, Seasonal allergies, Skipped Bordetella pertussis by PCR Not Detected Not Detected    Chlamydophilia pneumoniae by PCR Not Detected Not Detected    Coronavirus 229E by PCR Not Detected Not Detected    Coronavirus HKU1 by PCR Not Detected Not Detected    Coronavirus NL63 by PCR Not Detected Not Detected    Coronavirus OC43 by PCR Not Detected Not Detected    SARS-CoV-2, PCR DETECTED (A) Not Detected    Human Metapneumovirus by PCR Not Detected Not Detected    Human Rhinovirus/Enterovirus by PCR Not Detected Not Detected    Influenza A by PCR Not Detected Not Detected    Influenza B by PCR Not Detected Not Detected    Mycoplasma pneumoniae by PCR Not Detected Not Detected    Parainfluenza Virus 1 by PCR Not Detected Not Detected    Parainfluenza Virus 2 by PCR Not Detected Not Detected    Parainfluenza Virus 3 by PCR Not Detected Not Detected    Parainfluenza Virus 4 by PCR Not Detected Not Detected    Respiratory Syncytial Virus by PCR Not Detected Not Detected   Culture, MRSA, Screening    Specimen: Nares   Result Value Ref Range    MRSA Culture Only Methicillin resistant Staph aureus not isolated    CBC with Auto Differential   Result Value Ref Range    WBC 6.3 4.5 - 11.5 E9/L    RBC 4.35 3.50 - 5.50 E12/L    Hemoglobin 12.7 11.5 - 15.5 g/dL    Hematocrit 38.2 34.0 - 48.0 %    MCV 87.8 80.0 - 99.9 fL    MCH 29.2 26.0 - 35.0 pg    MCHC 33.2 32.0 - 34.5 %    RDW 13.2 11.5 - 15.0 fL    Platelets 884 022 - 691 E9/L    MPV 9.8 7.0 - 12.0 fL    Neutrophils % 42.7 (L) 43.0 - 80.0 %    Immature Granulocytes % 0.3 0.0 - 5.0 %    Lymphocytes % 47.3 (H) 20.0 - 42.0 %    Monocytes % 8.1 2.0 - 12.0 %    Eosinophils % 1.3 0.0 - 6.0 %    Basophils % 0.3 0.0 - 2.0 %    Neutrophils Absolute 2.68 1.80 - 7.30 E9/L    Immature Granulocytes # 0.02 E9/L    Lymphocytes Absolute 2.97 1.50 - 4.00 E9/L    Monocytes Absolute 0.51 0.10 - 0.95 E9/L    Eosinophils Absolute 0.08 0.05 - 0.50 E9/L    Basophils Absolute 0.02 0.00 - 0.20 E9/L   Comprehensive Metabolic Panel Result Value Ref Range    Sodium 140 132 - 146 mmol/L    Potassium 4.4 3.5 - 5.0 mmol/L    Chloride 107 98 - 107 mmol/L    CO2 22 22 - 29 mmol/L    Anion Gap 11 7 - 16 mmol/L    Glucose 88 74 - 99 mg/dL    BUN 15 6 - 23 mg/dL    Creatinine 0.9 0.5 - 1.0 mg/dL    GFR Non-African American 60 >=60 mL/min/1.73    GFR African American >60     Calcium 9.0 8.6 - 10.2 mg/dL    Total Protein 7.3 6.4 - 8.3 g/dL    Albumin 4.1 3.5 - 5.2 g/dL    Total Bilirubin 0.4 0.0 - 1.2 mg/dL    Alkaline Phosphatase 69 35 - 104 U/L    ALT 11 0 - 32 U/L    AST 16 0 - 31 U/L   Troponin   Result Value Ref Range    Troponin, High Sensitivity 14 (H) 0 - 9 ng/L   Brain Natriuretic Peptide   Result Value Ref Range    Pro- (H) 0 - 450 pg/mL   C-Reactive Protein   Result Value Ref Range    CRP 0.3 0.0 - 0.4 mg/dL   Vitamin D 25 Hydroxy   Result Value Ref Range    Vit D, 25-Hydroxy 20 (L) 30 - 100 ng/mL   CBC with Auto Differential   Result Value Ref Range    WBC 6.2 4.5 - 11.5 E9/L    RBC 4.22 3.50 - 5.50 E12/L    Hemoglobin 12.2 11.5 - 15.5 g/dL    Hematocrit 36.8 34.0 - 48.0 %    MCV 87.2 80.0 - 99.9 fL    MCH 28.9 26.0 - 35.0 pg    MCHC 33.2 32.0 - 34.5 %    RDW 13.2 11.5 - 15.0 fL    Platelets 829 589 - 396 E9/L    MPV 10.1 7.0 - 12.0 fL    Neutrophils % 44.0 43.0 - 80.0 %    Immature Granulocytes % 0.3 0.0 - 5.0 %    Lymphocytes % 44.6 (H) 20.0 - 42.0 %    Monocytes % 9.5 2.0 - 12.0 %    Eosinophils % 1.4 0.0 - 6.0 %    Basophils % 0.2 0.0 - 2.0 %    Neutrophils Absolute 2.74 1.80 - 7.30 E9/L    Immature Granulocytes # 0.02 E9/L    Lymphocytes Absolute 2.78 1.50 - 4.00 E9/L    Monocytes Absolute 0.59 0.10 - 0.95 E9/L    Eosinophils Absolute 0.09 0.05 - 0.50 E9/L    Basophils Absolute 0.01 0.00 - 0.20 E9/L   CBC with Auto Differential   Result Value Ref Range    WBC 5.3 4.5 - 11.5 E9/L    RBC 4.37 3.50 - 5.50 E12/L    Hemoglobin 12.6 11.5 - 15.5 g/dL    Hematocrit 37.7 34.0 - 48.0 %    MCV 86.3 80.0 - 99.9 fL    MCH 28.8 26.0 - 35.0 pg MCHC 33.4 32.0 - 34.5 %    RDW 13.1 11.5 - 15.0 fL    Platelets 401 162 - 308 E9/L    MPV 10.1 7.0 - 12.0 fL    Neutrophils % 74.1 43.0 - 80.0 %    Immature Granulocytes % 0.4 0.0 - 5.0 %    Lymphocytes % 22.4 20.0 - 42.0 %    Monocytes % 2.7 2.0 - 12.0 %    Eosinophils % 0.2 0.0 - 6.0 %    Basophils % 0.2 0.0 - 2.0 %    Neutrophils Absolute 3.90 1.80 - 7.30 E9/L    Immature Granulocytes # 0.02 E9/L    Lymphocytes Absolute 1.18 (L) 1.50 - 4.00 E9/L    Monocytes Absolute 0.14 0.10 - 0.95 E9/L    Eosinophils Absolute 0.01 (L) 0.05 - 0.50 E9/L    Basophils Absolute 0.01 0.00 - 0.20 E9/L   Comprehensive Metabolic Panel w/ Reflex to MG   Result Value Ref Range    Sodium 140 132 - 146 mmol/L    Potassium reflex Magnesium 4.3 3.5 - 5.0 mmol/L    Chloride 104 98 - 107 mmol/L    CO2 23 22 - 29 mmol/L    Anion Gap 13 7 - 16 mmol/L    Glucose 104 (H) 74 - 99 mg/dL    BUN 17 6 - 23 mg/dL    Creatinine 1.2 (H) 0.5 - 1.0 mg/dL    GFR Non-African American 43 >=60 mL/min/1.73    GFR African American 52     Calcium 8.9 8.6 - 10.2 mg/dL    Total Protein 7.0 6.4 - 8.3 g/dL    Albumin 3.8 3.5 - 5.2 g/dL    Total Bilirubin 0.3 0.0 - 1.2 mg/dL    Alkaline Phosphatase 61 35 - 104 U/L    ALT 13 0 - 32 U/L    AST 24 0 - 31 U/L   Basic Metabolic Panel w/ Reflex to MG   Result Value Ref Range    Sodium 139 132 - 146 mmol/L    Potassium reflex Magnesium 4.4 3.5 - 5.0 mmol/L    Chloride 106 98 - 107 mmol/L    CO2 21 (L) 22 - 29 mmol/L    Anion Gap 12 7 - 16 mmol/L    Glucose 147 (H) 74 - 99 mg/dL    BUN 20 6 - 23 mg/dL    Creatinine 0.9 0.5 - 1.0 mg/dL    GFR Non-African American 60 >=60 mL/min/1.73    GFR African American >60     Calcium 8.8 8.6 - 10.2 mg/dL   Protime-INR   Result Value Ref Range    Protime 10.2 9.3 - 12.4 sec    INR 0.9    APTT   Result Value Ref Range    aPTT 30.9 24.5 - 35.1 sec   Fibrinogen   Result Value Ref Range    Fibrinogen 386 200 - 400 mg/dL   Procalcitonin   Result Value Ref Range    Procalcitonin 0.04 0.00 - 0.08 ng/mL   C-Reactive Protein   Result Value Ref Range    CRP 0.3 0.0 - 0.4 mg/dL   Lactate Dehydrogenase   Result Value Ref Range     (H) 135 - 214 U/L   Ferritin   Result Value Ref Range    Ferritin 91 ng/mL   D-Dimer, Quantitative   Result Value Ref Range    D-Dimer, Quant 2235 ng/mL DDU   Troponin   Result Value Ref Range    Troponin, High Sensitivity 14 (H) 0 - 9 ng/L   Lactic Acid   Result Value Ref Range    Lactic Acid 1.3 0.5 - 2.2 mmol/L   Lipid panel - fasting   Result Value Ref Range    Cholesterol, Total 207 (H) 0 - 199 mg/dL    Triglycerides 122 0 - 149 mg/dL    HDL 46 >40 mg/dL    LDL Calculated 137 (H) 0 - 99 mg/dL    VLDL Cholesterol Calculated 24 mg/dL   SPECIMEN REJECTION   Result Value Ref Range    Rejected Test DIMER     Reason for Rejection see below    D-Dimer, Quantitative   Result Value Ref Range    D-Dimer, Quant 1910 ng/mL DDU   Creatinine, urine, random - (Necessary for FENa calculation)   Result Value Ref Range    Creatinine, Ur 131 29 - 226 mg/dL   Protein / creatinine ratio, urine   Result Value Ref Range    Protein, Ur 10 0 - 12 mg/dL    Creatinine, Ur 135 29 - 226 mg/dL    Protein/Creat Ratio 0.1 0.0 - 0.2    Protein/Creat Ratio 0.1    CBC with Auto Differential   Result Value Ref Range    WBC 10.0 4.5 - 11.5 E9/L    RBC 4.53 3.50 - 5.50 E12/L    Hemoglobin 13.1 11.5 - 15.5 g/dL    Hematocrit 38.1 34.0 - 48.0 %    MCV 84.1 80.0 - 99.9 fL    MCH 28.9 26.0 - 35.0 pg    MCHC 34.4 32.0 - 34.5 %    RDW 13.1 11.5 - 15.0 fL    Platelets 366 870 - 000 E9/L    MPV 10.2 7.0 - 12.0 fL    Neutrophils % 82.4 (H) 43.0 - 80.0 %    Immature Granulocytes % 1.0 0.0 - 5.0 %    Lymphocytes % 15.0 (L) 20.0 - 42.0 %    Monocytes % 1.5 (L) 2.0 - 12.0 %    Eosinophils % 0.0 0.0 - 6.0 %    Basophils % 0.1 0.0 - 2.0 %    Neutrophils Absolute 8.20 (H) 1.80 - 7.30 E9/L    Immature Granulocytes # 0.10 E9/L    Lymphocytes Absolute 1.49 (L) 1.50 - 4.00 E9/L    Monocytes Absolute 0.15 0.10 - 0.95 E9/L Eosinophils Absolute 0.00 (L) 0.05 - 0.50 E9/L    Basophils Absolute 0.01 0.00 - 0.20 S1/T   Basic Metabolic Panel w/ Reflex to MG   Result Value Ref Range    Sodium 138 132 - 146 mmol/L    Potassium reflex Magnesium 4.0 3.5 - 5.0 mmol/L    Chloride 104 98 - 107 mmol/L    CO2 20 (L) 22 - 29 mmol/L    Anion Gap 14 7 - 16 mmol/L    Glucose 152 (H) 74 - 99 mg/dL    BUN 27 (H) 6 - 23 mg/dL    Creatinine 0.8 0.5 - 1.0 mg/dL    GFR Non-African American >60 >=60 mL/min/1.73    GFR African American >60     Calcium 9.2 8.6 - 10.2 mg/dL   C-Reactive Protein   Result Value Ref Range    CRP 0.3 0.0 - 0.4 mg/dL   D-Dimer, Quantitative   Result Value Ref Range    D-Dimer, Quant 1853 ng/mL DDU   EKG 12 Lead   Result Value Ref Range    Ventricular Rate 62 BPM    Atrial Rate 62 BPM    P-R Interval 164 ms    QRS Duration 74 ms    Q-T Interval 420 ms    QTc Calculation (Bazett) 426 ms    P Axis 70 degrees    R Axis -3 degrees    T Axis 47 degrees       RADIOLOGY:  CT HEAD WO CONTRAST   Final Result   1. No acute intracranial abnormality. 2. Stable small right occipital lobe hematoma with mild surrounding vasogenic   edema. CT CHEST WO CONTRAST   Final Result   Subsegmental dependent atelectasis in the lung bases of bibasilar atelectatic   changes without separate consolidation or ground-glass density to suggest   acute bronchopneumonia or airspace disease separate. No pleural effusion. CT HEAD WO CONTRAST   Final Result   1. Stable right occipital parenchymal hematoma. 2. No new intracranial hemorrhage or edema. XR CHEST PORTABLE   Final Result   No airspace opacity or pleural effusion. CT HEAD WO CONTRAST   Final Result   1. Small hemorrhage within the right occipital lobe, as described above. 2.  Mild to moderate, diffuse, cerebral atrophy again seen, when compared to   the previous study performed 06/18/2022. Mild, diffuse, cerebellar atrophy   again noted.       3.  Microvascular white matter ischemic changes again identified. 4.  Mild interval increase in ethmoid sinusitis. The findings were discussed with Olu Tan NP at 2:19 p.m.                 ------------------------- NURSING NOTES AND VITALS REVIEWED ---------------------------  Date / Time Roomed:  8/9/2022  2:11 PM  ED Bed Assignment:  [id number]    The nursing notes within the ED encounter and vital signs as below have been reviewed.      Patient Vitals for the past 24 hrs:   BP Temp Temp src Pulse Resp SpO2 Weight   08/11/22 1800 (!) 141/51 -- -- 54 19 96 % --   08/11/22 1700 120/66 -- -- 50 18 92 % --   08/11/22 1600 (!) 112/41 97.5 °F (36.4 °C) Oral 51 18 92 % --   08/11/22 1500 (!) 124/49 -- -- (!) 49 19 94 % --   08/11/22 1400 (!) 111/40 -- -- (!) 43 17 97 % --   08/11/22 1200 (!) 107/57 97.4 °F (36.3 °C) Oral 52 19 99 % --   08/11/22 1100 (!) 122/48 -- -- 54 20 99 % --   08/11/22 1000 126/62 -- -- 57 19 97 % --   08/11/22 0956 (!) 123/52 -- -- 59 -- -- --   08/11/22 0906 (!) 132/52 -- -- 56 20 98 % --   08/11/22 0815 (!) 129/55 -- -- 55 -- -- --   08/11/22 0800 (!) 129/55 97.1 °F (36.2 °C) Oral 59 19 95 % --   08/11/22 0748 -- -- -- 54 -- 94 % --   08/11/22 0700 (!) 158/61 -- -- 60 19 94 % --   08/11/22 0630 (!) 164/66 -- -- 61 15 95 % --   08/11/22 0600 (!) 136/52 -- -- 64 17 98 % --   08/11/22 0530 (!) 138/57 -- -- 54 15 97 % --   08/11/22 0500 (!) 151/57 -- -- 62 17 97 % --   08/11/22 0400 (!) 107/44 (!) 96.1 °F (35.6 °C) Axillary (!) 49 16 97 % --   08/11/22 0330 110/63 -- -- 52 19 98 % --   08/11/22 0300 133/69 -- -- 52 22 96 % --   08/11/22 0230 -- -- -- 51 19 95 % --   08/11/22 0200 (!) 156/60 -- -- (!) 49 16 94 % --   08/11/22 0130 (!) 170/90 -- -- 57 15 97 % --   08/11/22 0100 (!) 175/65 -- -- 53 19 98 % --   08/11/22 0030 (!) 177/75 -- -- 55 18 94 % --   08/11/22 0013 (!) 173/68 -- -- -- -- -- --   08/11/22 0000 (!) 173/68 -- -- 58 18 96 % --   08/10/22 2339 -- (!) 95.5 °F (35.3 °C) Axillary -- -- -- 190 lb (86.2 kg)   08/10/22 2330 (!) 170/69 -- -- 57 24 97 % --   08/10/22 2200 (!) 155/88 -- -- 51 19 96 % --   08/10/22 2100 (!) 164/61 -- -- 53 21 94 % --   08/10/22 2000 (!) 163/57 98.4 °F (36.9 °C) Oral 52 18 97 % --       Oxygen Saturation Interpretation: Normal    ------------------------------------------ PROGRESS NOTES ------------------------------------------  Re-evaluation(s):    Patients symptoms show no change  Repeat physical examination is not changed    Counseling:  I have spoken with the patient and discussed todays results, in addition to providing specific details for the plan of care and counseling regarding the diagnosis and prognosis. Their questions are answered at this time and they are agreeable with the plan of admission.    --------------------------------- ADDITIONAL PROVIDER NOTES ---------------------------------  Consultations: Spoke with Dr. Lyla Rowe. Discussed case. They will admit the patient. This patient's ED course included: a personal history and physicial examination, re-evaluation prior to disposition, multiple bedside re-evaluations, IV medications, cardiac monitoring, and continuous pulse oximetry    This patient has remained hemodynamically stable during their ED course. Please note that the withdrawal or failure to initiate urgent interventions for this patient would likely result in a life threatening deterioration or permanent disability. Accordingly this patient received 34 minutes of critical care time, excluding separately billable procedures. Diagnosis:  1. Cerebral hemorrhage ([address]    2. Hypertensive emergency    3. COVID-19        Disposition:  Patient's disposition: Admit to CCU/ICU  Patient's condition is serious. Attending was present and available throughout encounter including all critical portions;  See Attending Note/Attestation for Final Plan       Johnathon Lucero DO  Resident  08/09/22 [id number]       ATTENDING PROVIDER ATTESTATION:     Dr. MAGDALENA Benja Fox am the primary physician of record for this patient. Juan Rubio presented to the emergency department for evaluation of Hypertension (Covid+ test on 08/05, covid symptoms started on 08/01,  Sent in from urgent care for elevated BP, was taking paxlovid for Covid and told it may interfere with BP, hx of recent CVA 2-3 months ago )   and was initially evaluated by the Medical Resident. See Original ED Note for H&P and ED course above. I have reviewed and discussed the case, including pertinent history (medical, surgical, family and social) and exam findings with the Medical Resident assigned to Juan Rubio. I have personally performed and/or participated in the history, exam, medical decision making, and procedures and agree with all pertinent clinical information. Please note that the withdrawal or failure to initiate urgent interventions for this patient would likely result in a life threatening deterioration or permanent disability. Accordingly this patient received 34 minutes of critical care time, excluding separately billable procedures. I have reviewed my findings and recommendations with the assigned Medical Resident, Juan Ramiro and members of family present at the time of disposition. My findings/plan: The primary encounter diagnosis was Cerebral hemorrhage ([facility] [address] Diagnoses of Hypertensive emergency and COVID-19 were also pertinent to this visit.   Current Discharge Medication List        Wilbur Murphy, [address], DO  08/11/22 2003

## 2022-08-09 NOTE — PROGRESS NOTES
22  Yazmin Lama : 1940 Sex: female  Age 78 y.o. Subjective:  Chief Complaint   Patient presents with    Hypertension     Getting over COVID         HPI:   Yazmin Lama , 78 y.o. female presents to express care for evaluation of elevated blood pressure    HPI  78year-old female presents to express care for evaluation of elevated blood pressure. The patient states that her blood pressure was not 196/86. The patient is recently being treated for COVID with antiviral therapy, Paxlovid. The patient states that they did take her off one of her medications which seems to be her cholesterol medication. The patient has not taken her medications today at this point. She did have a stroke back in  of this year. The patient is having a headache that she attributes more to COVID but is having head pain. The patient is not having any chest pain, shortness of breath. He denies any dizziness currently. ROS:   Unless otherwise stated in this report the patient's positive and negative responses for review of systems for constitutional, eyes, ENT, cardiovascular, respiratory, gastrointestinal, neurological, , musculoskeletal, and integument systems and related systems to the presenting problem are either stated in the history of present illness or were not pertinent or were negative for the symptoms and/or complaints related to the presenting medical problem. Positives and pertinent negatives as per HPI. All others reviewed and are negative.       PMH:     Past Medical History:   Diagnosis Date    Amaurosis fugax of right eye 2017    Anxiety     Arthritis     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, treated with surgery and chemo    Cerebral artery occlusion with cerebral infarction ([facility] [address]     possibly TIA    Chronic back pain     Constipation     Depression     Elevated sed rate 2017    hx of    GERD (gastroesophageal reflux disease)     Hemorrhoids     history of Hyperlipidemia     Hypertension     Left foot pain     dropped a Kettle on foot    Lumbosacral radiculopathy 08/06/2020    Macular degeneration     Peripheral neuropathy 08/06/2020    Poor vision     right eye / had bleeding behind eye, is receiving \"shots\" at this time  / 3/22/2019    Prosthetic eye globe     left eye implant;    Seasonal allergies     Skipped heart beats     on metoprolol; managed by Dr. Yael Morrissey    Sleep apnea     uses cpap at times     Thyroid disease        Past Surgical History:   Procedure Laterality Date    CHOLECYSTECTOMY  1985    open?     COLECTOMY  1974    COLONOSCOPY  04/26/2012    and egd    COLONOSCOPY  05/30/2014    COLONOSCOPY  01/08/2015    CYST REMOVAL  years ago    upper gum    EYE SURGERY  years ago    left eye removal,  has artificial eye    HYSTERECTOMY (CERVIX STATUS UNKNOWN)  1974    JOINT REPLACEMENT Left 2010/2012    x 2-knee    TONSILLECTOMY      TUMOR EXCISION      from arm, fatty    UPPER GASTROINTESTINAL ENDOSCOPY  05/30/2014    with biopsy    UPPER GASTROINTESTINAL ENDOSCOPY  01/08/2015    UPPER GASTROINTESTINAL ENDOSCOPY N/A 4/1/2019    EGD ESOPHAGOGASTRODUODENOSCOPY  ++IODINE ALLERGY++ and bx performed by Maricarmen Vásquez MD at [address] [place] [address],[address] History   Problem Relation Age of Onset    Stroke Father     Hypertension Father     Heart Disease Father     Stroke Mother     Hypertension Mother     Other Mother         aneurysm    Heart Disease Mother     Hypertension Brother     Cancer Brother     Breast Cancer Sister     Hypertension Sister     Cancer Sister         breast ca    Heart Disease Sister     Hypertension Brother         parkinson    Heart Disease Brother     Cancer Brother     Cancer Brother     Cancer Brother     Cancer Brother     Heart Disease Brother     Cancer Brother     Cancer Sister     High Blood Pressure Daughter     Breast Cancer Daughter     High Blood Pressure Daughter        Medications:     Current Outpatient Medications: nirmatrelvir/ritonavir (PAXLOVID) 20 x 150 MG & 10 x 100MG, Take 3 tablets (two 150 mg nirmatrelvir and one 100 mg ritonavir tablets) by mouth every 12 hours for 5 days. , Disp: 30 tablet, Rfl: 0    nirmatrelvir/ritonavir (PAXLOVID) 20 x 150 MG & 10 x 100MG, Take 3 tablets (two 150 mg nirmatrelvir and one 100 mg ritonavir tablets) by mouth every 12 hours for 5 days. , Disp: 30 tablet, Rfl: 0    metoprolol succinate (TOPROL XL) 25 MG extended release tablet, TAKE ONE TABLET BY MOUTH DAILY, Disp: 30 tablet, Rfl: 0    Elastic Bandages & Supports (MEDICAL COMPRESSION STOCKINGS) MISC, 1 each by Does not apply route daily, Disp: 2 each, Rfl: 2    docusate sodium (COLACE) 100 MG capsule, TAKE ONE CAPSULE BY MOUTH TWO TIMES A DAY, Disp: 60 capsule, Rfl: 0    furosemide (LASIX) 20 MG tablet, Take 2 tablets by mouth daily STOP prior prescription.  New prescription reflects dose increase, Disp: 180 tablet, Rfl: 1    pantoprazole (PROTONIX) 40 MG tablet, TAKE ONE TABLET BY MOUTH DAILY, Disp: 90 tablet, Rfl: 1    potassium chloride (KLOR-CON M) 20 MEQ extended release tablet, Take 1 tablet by mouth daily, Disp: 90 tablet, Rfl: 1    losartan (COZAAR) 100 MG tablet, TAKE ONE TABLET BY MOUTH DAILY, Disp: 30 tablet, Rfl: 0    atorvastatin (LIPITOR) 40 MG tablet, Take 1 tablet by mouth daily, Disp: 30 tablet, Rfl: 0    citalopram (CELEXA) 10 MG tablet, Take 1 tablet by mouth daily, Disp: 30 tablet, Rfl: 2    LIDOCAINE PAIN RELIEF 4 % external patch, APPLY ONE PATCH DAILY, Disp: , Rfl:     vitamin C (ASCORBIC ACID) 500 MG tablet, TAKE ONE TABLET BY MOUTH THREE TIMES A DAY, Disp: , Rfl:     Vitamin D, Cholecalciferol, 25 MCG (1000 UT) TABS, TAKE ONE TABLET BY MOUTH DAILY, Disp: , Rfl:     meclizine (ANTIVERT) 25 MG tablet, Take 1 tablet by mouth as needed for Dizziness, Disp: 30 tablet, Rfl: 1    cetirizine (ZYRTEC ALLERGY) 10 MG tablet, Take 10 mg by mouth daily as needed for Allergies, Disp: , Rfl:     levothyroxine (SYNTHROID) 88 MCG tablet, Take 1 tablet by mouth daily, Disp: 90 tablet, Rfl: 1    ondansetron (ZOFRAN) 4 MG tablet, Take 1 tablet by mouth 3 times daily as needed for Nausea or Vomiting, Disp: 30 tablet, Rfl: 2    Multiple Vitamins-Minerals (PRESERVISION AREDS 2) CAPS, Take 1 capsule by mouth 2 times daily , Disp: , Rfl:     Allergies: Allergies   Allergen Reactions    Iodine Shortness Of Breath, Swelling and Rash    Bee Pollen Swelling    Bee Venom     Codeine      Patient cannot remember reaction    Hydralazine     Oxycodone-Aspirin      unknown    Sulfa Antibiotics Other (See Comments) and Swelling    Amoxicillin-Pot Clavulanate Nausea And Vomiting    Aspirin Nausea And Vomiting    Darvocet [Propoxyphene N-Acetaminophen] Nausea And Vomiting    Eggs Or Egg-Derived Products Nausea And Vomiting    Influenza Vaccines Nausea And Vomiting    Percodan [Oxycodone-Aspirin] Nausea And Vomiting and Other (See Comments)     sick    Percodan [Oxycodone-Aspirin] Nausea And Vomiting       Social History:     Social History     Tobacco Use    Smoking status: Passive Smoke Exposure - Never Smoker    Smokeless tobacco: Never    Tobacco comments:      had smoked,  in    Vaping Use    Vaping Use: Never used   Substance Use Topics    Alcohol use: No    Drug use: Never       Patient lives at home. Physical Exam:     Vitals:    22 1147   BP: (!) 208/70   Site: Right Upper Arm   Position: Sitting   Cuff Size: Medium Adult   Pulse: 62   Resp: 20   Temp: 97.6 °F (36.4 °C)   TempSrc: Temporal   SpO2: 96%   Weight: 185 lb (83.9 kg)   Height: 4' 11\" (1.499 m)       Exam:  Physical Exam  Nurses note and vital signs reviewed and patient is not hypoxic. General: The patient appears well and in no apparent distress. Patient is resting comfortably on cart. Skin: Warm, dry, no pallor noted. There is no rash noted. Head: Normocephalic, atraumatic.   Eye: Normal conjunctiva  Ears, Nose, Mouth, and Throat: Oral mucosa is

## 2022-08-10 PROBLEM — N17.9 ACUTE KIDNEY INJURY (HCC): Status: ACTIVE | Noted: 2022-08-10

## 2022-08-10 LAB
ADENOVIRUS BY PCR: NOT DETECTED
ALBUMIN SERPL-MCNC: 3.8 G/DL (ref 3.5–5.2)
ALP BLD-CCNC: 61 U/L (ref 35–104)
ALT SERPL-CCNC: 13 U/L (ref 0–32)
ANION GAP SERPL CALCULATED.3IONS-SCNC: 12 MMOL/L (ref 7–16)
ANION GAP SERPL CALCULATED.3IONS-SCNC: 13 MMOL/L (ref 7–16)
APTT: 30.9 SEC (ref 24.5–35.1)
AST SERPL-CCNC: 24 U/L (ref 0–31)
BASOPHILS ABSOLUTE: 0.01 E9/L (ref 0–0.2)
BASOPHILS RELATIVE PERCENT: 0.2 % (ref 0–2)
BILIRUB SERPL-MCNC: 0.3 MG/DL (ref 0–1.2)
BORDETELLA PARAPERTUSSIS BY PCR: NOT DETECTED
BORDETELLA PERTUSSIS BY PCR: NOT DETECTED
BUN BLDV-MCNC: 17 MG/DL (ref 6–23)
BUN BLDV-MCNC: 20 MG/DL (ref 6–23)
C-REACTIVE PROTEIN: 0.3 MG/DL (ref 0–0.4)
CALCIUM SERPL-MCNC: 8.8 MG/DL (ref 8.6–10.2)
CALCIUM SERPL-MCNC: 8.9 MG/DL (ref 8.6–10.2)
CHLAMYDOPHILIA PNEUMONIAE BY PCR: NOT DETECTED
CHLORIDE BLD-SCNC: 104 MMOL/L (ref 98–107)
CHLORIDE BLD-SCNC: 106 MMOL/L (ref 98–107)
CHOLESTEROL, TOTAL: 207 MG/DL (ref 0–199)
CO2: 21 MMOL/L (ref 22–29)
CO2: 23 MMOL/L (ref 22–29)
CORONAVIRUS 229E BY PCR: NOT DETECTED
CORONAVIRUS HKU1 BY PCR: NOT DETECTED
CORONAVIRUS NL63 BY PCR: NOT DETECTED
CORONAVIRUS OC43 BY PCR: NOT DETECTED
CREAT SERPL-MCNC: 0.9 MG/DL (ref 0.5–1)
CREAT SERPL-MCNC: 1.2 MG/DL (ref 0.5–1)
CREATININE URINE: 131 MG/DL (ref 29–226)
CREATININE URINE: 135 MG/DL (ref 29–226)
D DIMER: 1910 NG/ML DDU
D DIMER: 2235 NG/ML DDU
EOSINOPHILS ABSOLUTE: 0.01 E9/L (ref 0.05–0.5)
EOSINOPHILS RELATIVE PERCENT: 0.2 % (ref 0–6)
FERRITIN: 91 NG/ML
FIBRINOGEN: 386 MG/DL (ref 200–400)
GFR AFRICAN AMERICAN: 52
GFR AFRICAN AMERICAN: >60
GFR NON-AFRICAN AMERICAN: 43 ML/MIN/1.73
GFR NON-AFRICAN AMERICAN: 60 ML/MIN/1.73
GLUCOSE BLD-MCNC: 104 MG/DL (ref 74–99)
GLUCOSE BLD-MCNC: 147 MG/DL (ref 74–99)
HCT VFR BLD CALC: 37.7 % (ref 34–48)
HDLC SERPL-MCNC: 46 MG/DL
HEMOGLOBIN: 12.6 G/DL (ref 11.5–15.5)
HUMAN METAPNEUMOVIRUS BY PCR: NOT DETECTED
HUMAN RHINOVIRUS/ENTEROVIRUS BY PCR: NOT DETECTED
IMMATURE GRANULOCYTES #: 0.02 E9/L
IMMATURE GRANULOCYTES %: 0.4 % (ref 0–5)
INFLUENZA A BY PCR: NOT DETECTED
INFLUENZA B BY PCR: NOT DETECTED
INR BLD: 0.9
LACTATE DEHYDROGENASE: 304 U/L (ref 135–214)
LDL CHOLESTEROL CALCULATED: 137 MG/DL (ref 0–99)
LYMPHOCYTES ABSOLUTE: 1.18 E9/L (ref 1.5–4)
LYMPHOCYTES RELATIVE PERCENT: 22.4 % (ref 20–42)
MCH RBC QN AUTO: 28.8 PG (ref 26–35)
MCHC RBC AUTO-ENTMCNC: 33.4 % (ref 32–34.5)
MCV RBC AUTO: 86.3 FL (ref 80–99.9)
MONOCYTES ABSOLUTE: 0.14 E9/L (ref 0.1–0.95)
MONOCYTES RELATIVE PERCENT: 2.7 % (ref 2–12)
MYCOPLASMA PNEUMONIAE BY PCR: NOT DETECTED
NEUTROPHILS ABSOLUTE: 3.9 E9/L (ref 1.8–7.3)
NEUTROPHILS RELATIVE PERCENT: 74.1 % (ref 43–80)
PARAINFLUENZA VIRUS 1 BY PCR: NOT DETECTED
PARAINFLUENZA VIRUS 2 BY PCR: NOT DETECTED
PARAINFLUENZA VIRUS 3 BY PCR: NOT DETECTED
PARAINFLUENZA VIRUS 4 BY PCR: NOT DETECTED
PDW BLD-RTO: 13.1 FL (ref 11.5–15)
PLATELET # BLD: 172 E9/L (ref 130–450)
PMV BLD AUTO: 10.1 FL (ref 7–12)
POTASSIUM REFLEX MAGNESIUM: 4.3 MMOL/L (ref 3.5–5)
POTASSIUM REFLEX MAGNESIUM: 4.4 MMOL/L (ref 3.5–5)
PROCALCITONIN: 0.04 NG/ML (ref 0–0.08)
PROTEIN PROTEIN: 10 MG/DL (ref 0–12)
PROTEIN/CREAT RATIO: 0.1
PROTEIN/CREAT RATIO: 0.1 (ref 0–0.2)
PROTHROMBIN TIME: 10.2 SEC (ref 9.3–12.4)
RBC # BLD: 4.37 E12/L (ref 3.5–5.5)
RESPIRATORY SYNCYTIAL VIRUS BY PCR: NOT DETECTED
SARS-COV-2, PCR: DETECTED
SODIUM BLD-SCNC: 139 MMOL/L (ref 132–146)
SODIUM BLD-SCNC: 140 MMOL/L (ref 132–146)
TOTAL PROTEIN: 7 G/DL (ref 6.4–8.3)
TRIGL SERPL-MCNC: 122 MG/DL (ref 0–149)
TROPONIN, HIGH SENSITIVITY: 14 NG/L (ref 0–9)
VITAMIN D 25-HYDROXY: 20 NG/ML (ref 30–100)
VLDLC SERPL CALC-MCNC: 24 MG/DL
WBC # BLD: 5.3 E9/L (ref 4.5–11.5)

## 2022-08-10 PROCEDURE — 2500000003 HC RX 250 WO HCPCS: Performed by: NURSE PRACTITIONER

## 2022-08-10 PROCEDURE — 85730 THROMBOPLASTIN TIME PARTIAL: CPT

## 2022-08-10 PROCEDURE — 80048 BASIC METABOLIC PNL TOTAL CA: CPT

## 2022-08-10 PROCEDURE — 80061 LIPID PANEL: CPT

## 2022-08-10 PROCEDURE — 86140 C-REACTIVE PROTEIN: CPT

## 2022-08-10 PROCEDURE — 85384 FIBRINOGEN ACTIVITY: CPT

## 2022-08-10 PROCEDURE — 6370000000 HC RX 637 (ALT 250 FOR IP): Performed by: INTERNAL MEDICINE

## 2022-08-10 PROCEDURE — 6370000000 HC RX 637 (ALT 250 FOR IP): Performed by: NURSE PRACTITIONER

## 2022-08-10 PROCEDURE — 84156 ASSAY OF PROTEIN URINE: CPT

## 2022-08-10 PROCEDURE — 6360000002 HC RX W HCPCS: Performed by: NURSE PRACTITIONER

## 2022-08-10 PROCEDURE — 2580000003 HC RX 258: Performed by: INTERNAL MEDICINE

## 2022-08-10 PROCEDURE — 2700000000 HC OXYGEN THERAPY PER DAY

## 2022-08-10 PROCEDURE — 82570 ASSAY OF URINE CREATININE: CPT

## 2022-08-10 PROCEDURE — 97530 THERAPEUTIC ACTIVITIES: CPT

## 2022-08-10 PROCEDURE — 85378 FIBRIN DEGRADE SEMIQUANT: CPT

## 2022-08-10 PROCEDURE — 82306 VITAMIN D 25 HYDROXY: CPT

## 2022-08-10 PROCEDURE — 97161 PT EVAL LOW COMPLEX 20 MIN: CPT

## 2022-08-10 PROCEDURE — 2000000000 HC ICU R&B

## 2022-08-10 PROCEDURE — 99232 SBSQ HOSP IP/OBS MODERATE 35: CPT | Performed by: NEUROLOGICAL SURGERY

## 2022-08-10 PROCEDURE — 85610 PROTHROMBIN TIME: CPT

## 2022-08-10 PROCEDURE — 85025 COMPLETE CBC W/AUTO DIFF WBC: CPT

## 2022-08-10 PROCEDURE — 97166 OT EVAL MOD COMPLEX 45 MIN: CPT

## 2022-08-10 PROCEDURE — 97535 SELF CARE MNGMENT TRAINING: CPT

## 2022-08-10 PROCEDURE — 2580000003 HC RX 258: Performed by: NURSE PRACTITIONER

## 2022-08-10 RX ORDER — VIT C/E/ZN/COPPR/LUTEIN/ZEAXAN 60 MG-6 MG
2 CAPSULE ORAL 2 TIMES DAILY
Status: DISCONTINUED | OUTPATIENT
Start: 2022-08-10 | End: 2022-08-12 | Stop reason: HOSPADM

## 2022-08-10 RX ORDER — POTASSIUM CHLORIDE 20 MEQ/1
20 TABLET, EXTENDED RELEASE ORAL DAILY
Status: DISCONTINUED | OUTPATIENT
Start: 2022-08-10 | End: 2022-08-12 | Stop reason: HOSPADM

## 2022-08-10 RX ORDER — AMLODIPINE BESYLATE 5 MG/1
5 TABLET ORAL DAILY
Status: DISCONTINUED | OUTPATIENT
Start: 2022-08-10 | End: 2022-08-10

## 2022-08-10 RX ORDER — CITALOPRAM 10 MG/1
10 TABLET ORAL DAILY
Status: DISCONTINUED | OUTPATIENT
Start: 2022-08-10 | End: 2022-08-12 | Stop reason: HOSPADM

## 2022-08-10 RX ORDER — AMLODIPINE BESYLATE 10 MG/1
10 TABLET ORAL DAILY
Status: DISCONTINUED | OUTPATIENT
Start: 2022-08-11 | End: 2022-08-12 | Stop reason: HOSPADM

## 2022-08-10 RX ORDER — PANTOPRAZOLE SODIUM 40 MG/1
40 TABLET, DELAYED RELEASE ORAL
Status: DISCONTINUED | OUTPATIENT
Start: 2022-08-10 | End: 2022-08-12 | Stop reason: HOSPADM

## 2022-08-10 RX ORDER — CETIRIZINE HYDROCHLORIDE 10 MG/1
10 TABLET ORAL DAILY PRN
Status: DISCONTINUED | OUTPATIENT
Start: 2022-08-10 | End: 2022-08-12 | Stop reason: HOSPADM

## 2022-08-10 RX ORDER — FUROSEMIDE 40 MG/1
40 TABLET ORAL DAILY
Status: DISCONTINUED | OUTPATIENT
Start: 2022-08-10 | End: 2022-08-12 | Stop reason: HOSPADM

## 2022-08-10 RX ORDER — PANTOPRAZOLE SODIUM 40 MG/1
1 TABLET, DELAYED RELEASE ORAL DAILY
Status: DISCONTINUED | OUTPATIENT
Start: 2022-08-10 | End: 2022-08-10 | Stop reason: SDUPTHER

## 2022-08-10 RX ORDER — LISINOPRIL 5 MG/1
5 TABLET ORAL ONCE
Status: COMPLETED | OUTPATIENT
Start: 2022-08-11 | End: 2022-08-11

## 2022-08-10 RX ORDER — DOCUSATE SODIUM 100 MG/1
100 CAPSULE, LIQUID FILLED ORAL 2 TIMES DAILY
Status: DISCONTINUED | OUTPATIENT
Start: 2022-08-10 | End: 2022-08-12 | Stop reason: HOSPADM

## 2022-08-10 RX ORDER — ATORVASTATIN CALCIUM 40 MG/1
40 TABLET, FILM COATED ORAL NIGHTLY
Status: DISCONTINUED | OUTPATIENT
Start: 2022-08-10 | End: 2022-08-12 | Stop reason: HOSPADM

## 2022-08-10 RX ADMIN — POTASSIUM CHLORIDE 20 MEQ: 1500 TABLET, EXTENDED RELEASE ORAL at 11:37

## 2022-08-10 RX ADMIN — OXYCODONE HYDROCHLORIDE AND ACETAMINOPHEN 500 MG: 500 TABLET ORAL at 08:38

## 2022-08-10 RX ADMIN — NICARDIPINE HYDROCHLORIDE 2.5 MG/HR: 2.5 INJECTION, SOLUTION INTRAVENOUS at 09:20

## 2022-08-10 RX ADMIN — LEVETIRACETAM 500 MG: 500 TABLET, FILM COATED ORAL at 19:59

## 2022-08-10 RX ADMIN — FUROSEMIDE 40 MG: 40 TABLET ORAL at 11:37

## 2022-08-10 RX ADMIN — CETIRIZINE HYDROCHLORIDE 10 MG: 10 TABLET, FILM COATED ORAL at 13:38

## 2022-08-10 RX ADMIN — SODIUM CHLORIDE, PRESERVATIVE FREE 10 ML: 5 INJECTION INTRAVENOUS at 19:59

## 2022-08-10 RX ADMIN — Medication 2 CAPSULE: at 13:58

## 2022-08-10 RX ADMIN — LEVETIRACETAM 500 MG: 500 TABLET, FILM COATED ORAL at 08:38

## 2022-08-10 RX ADMIN — LOSARTAN POTASSIUM 100 MG: 25 TABLET, FILM COATED ORAL at 08:38

## 2022-08-10 RX ADMIN — ACETAMINOPHEN 650 MG: 325 TABLET, FILM COATED ORAL at 00:19

## 2022-08-10 RX ADMIN — SODIUM CHLORIDE, PRESERVATIVE FREE 10 ML: 5 INJECTION INTRAVENOUS at 08:40

## 2022-08-10 RX ADMIN — LEVOTHYROXINE SODIUM 88 MCG: 0.09 TABLET ORAL at 08:39

## 2022-08-10 RX ADMIN — ATORVASTATIN CALCIUM 40 MG: 40 TABLET, FILM COATED ORAL at 19:59

## 2022-08-10 RX ADMIN — Medication 50 MG: at 08:38

## 2022-08-10 RX ADMIN — DOCUSATE SODIUM 100 MG: 100 CAPSULE, LIQUID FILLED ORAL at 19:59

## 2022-08-10 RX ADMIN — DOCUSATE SODIUM 100 MG: 100 CAPSULE, LIQUID FILLED ORAL at 11:37

## 2022-08-10 RX ADMIN — CITALOPRAM HYDROBROMIDE 10 MG: 10 TABLET ORAL at 13:58

## 2022-08-10 RX ADMIN — Medication 2000 UNITS: at 08:39

## 2022-08-10 RX ADMIN — AMLODIPINE BESYLATE 5 MG: 5 TABLET ORAL at 10:31

## 2022-08-10 RX ADMIN — DEXAMETHASONE 6 MG: 4 TABLET ORAL at 08:38

## 2022-08-10 RX ADMIN — PANTOPRAZOLE SODIUM 40 MG: 40 TABLET, DELAYED RELEASE ORAL at 08:38

## 2022-08-10 ASSESSMENT — PAIN SCALES - GENERAL
PAINLEVEL_OUTOF10: 3
PAINLEVEL_OUTOF10: 0

## 2022-08-10 ASSESSMENT — PAIN DESCRIPTION - LOCATION: LOCATION: HEAD

## 2022-08-10 ASSESSMENT — PAIN - FUNCTIONAL ASSESSMENT: PAIN_FUNCTIONAL_ASSESSMENT: ACTIVITIES ARE NOT PREVENTED

## 2022-08-10 ASSESSMENT — PAIN DESCRIPTION - DESCRIPTORS: DESCRIPTORS: ACHING

## 2022-08-10 NOTE — PLAN OF CARE
Problem: Chronic Conditions and Co-morbidities  Goal: Patient's chronic conditions and co-morbidity symptoms are monitored and maintained or improved  Outcome: Progressing  Flowsheets (Taken 8/10/2022 0800)  Care Plan - Patient's Chronic Conditions and Co-Morbidity Symptoms are Monitored and Maintained or Improved:   Monitor and assess patient's chronic conditions and comorbid symptoms for stability, deterioration, or improvement   Collaborate with multidisciplinary team to address chronic and comorbid conditions and prevent exacerbation or deterioration   Update acute care plan with appropriate goals if chronic or comorbid symptoms are exacerbated and prevent overall improvement and discharge     Problem: Discharge Planning  Goal: Discharge to home or other facility with appropriate resources  Outcome: Progressing  Flowsheets (Taken 8/10/2022 0800)  Discharge to home or other facility with appropriate resources:   Identify barriers to discharge with patient and caregiver   Arrange for needed discharge resources and transportation as appropriate     Problem: Safety - Adult  Goal: Free from fall injury  Outcome: Progressing

## 2022-08-10 NOTE — CONSULTS
[address] [facility]  Department of Critical Care Medicine   MICU Consult Note    Patient:  Stephanie Maki /Age/Sex: 1940  (78 y.o. female)   MRN & CSN:  [id number] & [id number] Admission Date/Time: 2022  2:11 PM   Allergy: Iodine, Bee pollen, Bee venom, Codeine, Hydralazine, Oxycodone-aspirin, Sulfa antibiotics, Amoxicillin-pot clavulanate, Aspirin, Darvocet [propoxyphene n-acetaminophen], Eggs or egg-derived products, Influenza vaccines, Percodan [oxycodone-aspirin], and Percodan [oxycodone-aspirin] Date of Service: 2022       Hospital Day: 1    Supervising/Attending Physician: Dr. Cata Rivas      Chief Complaint: Hypertension (Covid+ test on , covid symptoms started on ,  Sent in from urgent care for elevated BP, was taking paxlovid for Covid and told it may interfere with BP, hx of recent CVA 2-3 months ago )    History of Present Illness:   Principal Problem: Hypertensive crisis, unspecified  Stephanie Maki is a 78 y.o. female with past medical history of hyperlipidemia, hypertension, amaurosis fugax of right eye (), anxiety, depression, arthritis, colon cancer (), TIA, chronic back pain, GERD, Skipped heart beats, on metoprolol managed by Dr. Sumi Cintron, Sleep apnea, uses cpap at times, thyroid disease, and peripheral neuropathy was presented to the ED for further evaluation of elevated blood pressure. Patient reports that she was tested positive for COVID last Friday () and was recently being treated with antiviral therapy, Paxlovid. Stated that her only COVID symptoms is intermittent non-productive coughing that started also . Patient also reported that she has not taken her medications today including her blood pressure medications that could be the cause of severe elevation of her blood pressure. Reports that she did have a stroke back in  of this year. According chart review, patient had acute CVA, subarachnoid hemorrhage back in .  Patient was complaining of headache on ED arrival that she attributes more to Willis. Patient denies chest pain, palpitation, fever, chills or diaphoresis, cough, SOB or difficulty breathing, and any dizziness. Patient was found to have BP of 234/75 in ED, and Cardene drip was started. Patient got transferred and admitted to MICU for Cardene drip management and close hemodynamic monitoring. On MICU arrival, patient is awake, alert, oriented x4, follow commands, and able to provide adequate history of present illness. No acute respiratory distress or labored breathing noted while on 4 L oxygen support via nasal cannula with pulse oximetry of 98%. Patient currently denies any other symptoms including headache, paresthesias, abdominal pain, nausea, vomiting, changes in bowels, dysuria, hematuria, frequency or urgency, and weakness. Upon interview, the patient provided the history as above. ROS: Ten point ROS reviewed and negative, unless as noted above per HPI.     Past Medical History:     Past Medical History:   Diagnosis Date    Amaurosis fugax of right eye 12/11/2017    Anxiety     Arthritis     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, treated with surgery and chemo    Cerebral artery occlusion with cerebral infarction ([facility] [address]     possibly TIA    Chronic back pain     Constipation     Depression     Elevated sed rate 12/11/2017    hx of    GERD (gastroesophageal reflux disease)     Hemorrhoids     history of    Hyperlipidemia     Hypertension     Left foot pain     dropped a Kettle on foot    Lumbosacral radiculopathy 08/06/2020    Macular degeneration     Peripheral neuropathy 08/06/2020    Poor vision     right eye / had bleeding behind eye, is receiving \"shots\" at this time  / 3/22/2019    Prosthetic eye globe     left eye implant;    Seasonal allergies     Skipped heart beats     on metoprolol; managed by Dr. Kiki Bamberger    Sleep apnea     uses cpap at times     Thyroid disease      Past Surgery History:  Patient  has a past surgical history that includes Eye surgery (years ago); colectomy (007); Colonoscopy (2012); Cholecystectomy (); Hysterectomy (); tumor excision; Upper gastrointestinal endoscopy (2014); Colonoscopy (2014); Tonsillectomy; joint replacement (Left, ); Upper gastrointestinal endoscopy (2015); Colonoscopy (2015); cyst removal (years ago); and Upper gastrointestinal endoscopy (N/A, 2019). Social History:    FAM HX: Assessed: family history includes Breast Cancer in her daughter and sister; Cancer in her brother, brother, brother, brother, brother, brother, sister, and sister; Heart Disease in her brother, brother, father, mother, and sister; High Blood Pressure in her daughter and daughter; Hypertension in her brother, brother, father, mother, and sister; Other in her mother; Stroke in her father and mother. Soc HX:   Social History     Socioeconomic History    Marital status:      Spouse name: None    Number of children: 3    Years of education: 12    Highest education level: High school graduate   Tobacco Use    Smoking status: Passive Smoke Exposure - Never Smoker    Smokeless tobacco: Never    Tobacco comments:      had smoked,  in    Vaping Use    Vaping Use: Never used   Substance and Sexual Activity    Alcohol use: No    Drug use: Never    Sexual activity: Not Currently     Partners: Male   Social History Narrative    9-3-19 [facility] spoke with Janusz Medeiros. She reports s/s of MDD and openly admits to having depression. Crying during the call. States she doesn't sleep well unless she takes her anti-anxiety medication. Reports that she has financial strain, food strain, and high stress. Often running out of money and food before the month is up. Reports having to borrow from family to get by at times. She does have social connections with family/friends and Islam so she is not feeling isolated.   She reports feeling \"bullied\" at her old she is a non-smoker but has been exposed to tobacco smoke. She has never used smokeless tobacco.  ETOH:   reports no history of alcohol use. Drugs:  reports no history of drug use. Subjective:   Ruth Ann Price is a 78 y.o. female with past medical history of hyperlipidemia, hypertension, amaurosis fugax of right eye (2017), anxiety, depression, arthritis, colon cancer (1974), TIA, chronic back pain, GERD, Skipped heart beats, on metoprolol managed by Dr. Diego Love, Sleep apnea, uses cpap at times, thyroid disease, and peripheral neuropathy was presented to the ED for further evaluation of elevated blood pressure. Patient reports that she was tested positive for COVID last Friday (08/05) and was recently being treated with antiviral therapy, Paxlovid. Stated that her only COVID symptoms is intermittent non-productive coughing that started also 08/05. Patient also reported that she has not taken her medications today including her blood pressure medications that could be the cause of severe elevation of her blood pressure. Reports that she did have a stroke back in June of this year. Patient denies chest pain, palpitation, fever, chills or diaphoresis, cough, SOB or difficulty breathing, and any dizziness. She was found to have BP of 234/75 in ED, and Cardene drip was started. Patient currently denies any other symptoms including headache, paresthesias, abdominal pain, nausea, vomiting, changes in bowels, dysuria, hematuria, frequency or urgency, and weakness.      Objective:     No intake or output data in the 24 hours ending 08/09/22 2210    Vital Signs:   Vitals:    08/09/22 2200 08/09/22 2300 08/10/22 0000 08/10/22 0100   BP: (!) 147/61  (!) 154/55 (!) 145/56   Pulse: (!) 48 50 (!) 46 (!) 45   Resp: 21 13 21 18   Temp: 97.8 °F (36.6 °C)  97.9 °F (36.6 °C)    TempSrc: Oral  Oral    SpO2: 98% 94% 94% (!) 89%   Weight:          BP (!) 145/56   Pulse (!) 45   Temp 97.9 °F (36.6 °C) (Oral)   Resp 18   Wt 182 lb 3.2 oz (82.6 kg)   LMP 07/24/2014   SpO2 (!) 89%   BMI 36.80 kg/m²     I & O - 24hr: No intake or output data in the 24 hours ending 08/09/22 2210    Physical Exam: 08/09/22  GEN  -Awake, alert, appearing Frail, elderly female, laying in bed in no apparent distress, cooperative, able to give adequate history. Appears given age. EYES -Pupils are equally round. No vision changes. No scleral erythema, discharge, or conjunctivitis. HENT -MM are moist. Oral pharynx without exudates, no evidence of thrush. NECK -Supple, no apparent thyromegaly or masses. RESP -LS Clear diminished air exchange TA equal bilat, no wheezes, no rales or rhonchi. Symmetric chest movement. No respiratory distress or labored breathing noted while on room air. C/V  -S1/S2 auscultated. Bradycardia without appreciable M/R/G. No JVD or carotid bruits. Peripheral pulses equal bilaterally and palpable. Peripheral pulses equal bilaterally and palpable. No peripheral edema. No reproducible chest wall tenderness. GI  -Abdomen is soft, round, non-distended, no significant tenderness. No masses or guarding. + BS in all quadrants. Rectal exam deferred.   -Gillespie catheter is not present. LYMPH  -No palpable cervical lymphadenopathy and no hepatosplenomegaly. No petechiae or ecchymoses. MS  -B/L extremities moderate muscles strength. Full movements. No gross joint deformities. No swelling, intact sensation symmetrical.   SKIN  -Normal coloration, warm, dry. No open wounds or ulcers. NEURO  -CN 2-12 appear grossly intact, normal speech, no lateralizing weakness. PSYC  -Awake, alert, oriented x 4. Appropriate affect.      Lines:     site day   Peripheral IV Lt FA, Rt hand 08/09/22     Oxygen:   Room air    ABG:     Lab Results   Component Value Date/Time    PH 7.466 06/14/2012 12:29 PM    PCO2 33.7 06/14/2012 12:29 PM    PO2 126.5 06/14/2012 12:29 PM    HCO3 23.8 06/14/2012 12:29 PM    BE 0.6 06/14/2012 12:29 PM    THB 13.3 06/14/2012 12:29 PM [id number] 98.4 06/14/2012 12:29 PM        Medications:   Medications:    sodium chloride flush  5-40 mL IntraVENous 2 times per day    metoprolol succinate  25 mg Oral Daily    losartan  100 mg Oral Daily    levothyroxine  88 mcg Oral Daily    levETIRAcetam  500 mg Oral BID    dexamethasone  6 mg Oral Daily    [START ON 8/10/2022] zinc sulfate  50 mg Oral Daily    [START ON 8/10/2022] vitamin D  2,000 Units Oral Daily    [START ON 8/10/2022] ascorbic acid  500 mg Oral Daily      Infusions:    niCARdipine Stopped (08/09/22 2109)    sodium chloride       PRN Meds: sodium chloride flush, 5-40 mL, PRN  sodium chloride, , PRN  ondansetron, 4 mg, Q8H PRN   Or  ondansetron, 4 mg, Q6H PRN  polyethylene glycol, 17 g, Daily PRN  acetaminophen, 650 mg, Q6H PRN   Or  acetaminophen, 650 mg, Q6H PRN  guaiFENesin-dextromethorphan, 5 mL, Q4H PRN  labetalol, 20 mg, Q10 Min PRN      Current Facility-Administered Medications   Medication Dose Route Frequency Provider Last Rate Last Admin    niCARdipine (CARDENE) 50 mg in sodium chloride 0.9 % 250 mL infusion  2.5-15 mg/hr IntraVENous Continuous Cecile Ware DO   Stopped at 08/09/22 2109    sodium chloride flush 0.9 % injection 5-40 mL  5-40 mL IntraVENous 2 times per day Irving Beltran DO        sodium chloride flush 0.9 % injection 5-40 mL  5-40 mL IntraVENous PRN Sarath Gibson DO        0.9 % sodium chloride infusion   IntraVENous PRN Sarath Gibson, DO        ondansetron (ZOFRAN-ODT) disintegrating tablet 4 mg  4 mg Oral Q8H PRN Sarath Gibson DO        Or    ond[facility]ron [facility] injection 4 mg  4 mg IntraVENous Q6H PRN Sarath Gibson, DO        polyethylene glycol (GLYCOLAX) packet 17 g  17 g Oral Daily PRN Sarath Gibson, DO        metoprolol succinate (TOPROL XL) extended release tablet 25 mg  25 mg Oral Daily BETINA Munguia - CNP   25 mg at 08/09/22 1720    losartan (COZAAR) tablet 100 mg  100 mg Oral Daily Sarath Gibson DO   100 mg at 08/09/22 5232 levothyroxine (SYNTHROID) tablet 88 mcg  88 mcg Oral Daily Candace Gibson, DO        levETIRAcetam (KEPPRA) tablet 500 mg  500 mg Oral BID Candace Gibson, DO        acetaminophen (TYLENOL) tablet 650 mg  650 mg Oral Q6H PRN Mati Matters, APRN - CNP        Or    acetaminophen (TYLENOL) suppository 650 mg  650 mg Rectal Q6H PRN Suyapa Manzanares, APRN - CNP        guaiFENesin-dextromethorphan (ROBITUSSIN DM) 100-10 MG/5ML syrup 5 mL  5 mL Oral Q4H PRN Suyapa Manzanares, APRN - CNP        dexamethasone (DECADRON) tablet 6 mg  6 mg Oral Daily Suyapa ManzanaresDAISY eddyN - CNP        [START ON 8/10/2022] zinc sulfate (ZINCATE) capsule 50 mg  50 mg Oral Daily Suyapa Manzanares, APRN - CNP        [START ON 8/10/2022] vitamin D (CHOLECALCIFEROL) tablet 2,000 Units  2,000 Units Oral Daily Suyapa Manzanares, APRN - CNP        [START ON 8/10/2022] ascorbic acid (VITAMIN C) tablet 500 mg  500 mg Oral Daily Suyapa Manzanares, APRN - CNP        labetalol (NORMODYNE;TRANDATE) injection 20 mg  20 mg IntraVENous Q10 Min PRN Mati Swift, BETINA - CNP          Prior to Admission Meds:  Prior to Admission medications    Medication Sig Start Date End Date Taking? Authorizing Provider   nirmatrelvir/ritonavir (PAXLOVID) 20 x 150 MG & 10 x 100MG Take 3 tablets (two 150 mg nirmatrelvir and one 100 mg ritonavir tablets) by mouth every 12 hours for 5 days. 8/6/22   Daja Boyd, DO   nirmatrelvir/ritonavir (PAXLOVID) 20 x 150 MG & 10 x 100MG Take 3 tablets (two 150 mg nirmatrelvir and one 100 mg ritonavir tablets) by mouth every 12 hours for 5 days.  8/6/22   Daja Boyd, DO   metoprolol succinate (TOPROL XL) 25 MG extended release tablet TAKE ONE TABLET BY MOUTH DAILY 7/14/22   Dale Boyd, DO   Elastic Bandages & Supports (MEDICAL COMPRESSION STOCKINGS) MISC 1 each by Does not apply route daily 7/12/22   Dale Boyd DO   docusate sodium (COLACE) 100 MG capsule TAKE ONE CAPSULE BY MOUTH TWO TIMES A DAY 7/7/22   Dale Del Toro Deb, DO   furosemide (LASIX) 20 MG tablet Take 2 tablets by mouth daily STOP prior prescription.  New prescription reflects dose increase 7/7/22   Lyndsay Boyd DO   pantoprazole (PROTONIX) 40 MG tablet TAKE ONE TABLET BY MOUTH DAILY 7/7/22   Lyndsay Boyd, DO   potassium chloride (KLOR-CON M) 20 MEQ extended release tablet Take 1 tablet by mouth daily 7/7/22   Lyndsay Boyd, DO   losartan (COZAAR) 100 MG tablet TAKE ONE TABLET BY MOUTH DAILY 6/20/22   Lyndsay Boyd, DO   atorvastatin (LIPITOR) 40 MG tablet Take 1 tablet by mouth daily 6/16/22   BETINA Escobar CNP   citalopram (CELEXA) 10 MG tablet Take 1 tablet by mouth daily 6/8/22   Lyndsay Boyd DO   LIDOCAINE PAIN RELIEF 4 % external patch APPLY ONE PATCH DAILY 5/13/22   Historical Provider, MD   vitamin C (ASCORBIC ACID) 500 MG tablet TAKE ONE TABLET BY MOUTH THREE TIMES A DAY 5/13/22   Historical Provider, MD   Vitamin D, Cholecalciferol, 25 MCG (1000 UT) TABS TAKE ONE TABLET BY MOUTH DAILY 5/13/22   Historical Provider, MD   meclizine (ANTIVERT) 25 MG tablet Take 1 tablet by mouth as needed for Dizziness 10/29/21   Lyndsay Boyd DO   cetirizine (ZYRTEC ALLERGY) 10 MG tablet Take 10 mg by mouth daily as needed for Allergies    Historical Provider, MD   levothyroxine (SYNTHROID) 88 MCG tablet Take 1 tablet by mouth daily 9/21/21   Lyndsay Boyd DO   ondansetron (ZOFRAN) 4 MG tablet Take 1 tablet by mouth 3 times daily as needed for Nausea or Vomiting 7/15/21   Lyndsay Boyd DO   Multiple Vitamins-Minerals (PRESERVISION AREDS 2) CAPS Take 1 capsule by mouth 2 times daily     Historical Provider, MD       Infusions:  None    Nutrition:  Diet: Regular    ATB:   Antibiotics  Start Date   none      Skin issues: none  Patient currently has   Isolation  DVT prophylaxis/ GI prophylaxis   PT/OT eval and treat    Labs:     Recent Labs     08/09/22  1258 08/09/22  2231   WBC 6.3 6.2   HGB 12.7 12.2   HCT 38.2 36.8  173     Recent Labs     08/09/22  1258 08/09/22  2231    140   K 4.4 4.3    104   CO2 22 23   BUN 15 17   CREATININE 0.9 1.2*     Recent Labs     08/09/22  1258 08/09/22  2231   AST 16 24   ALT 11 13   BILITOT 0.4 0.3   ALKPHOS 69 61     No results for input(s): INR in the last 72 hours. No results for input(s): CKTOTAL, CKMB, CKMBINDEX in the last 72 hours. Invalid input(s): [employer] input(s): PRO-BNP      Radiology/Imaging Studies:  CT HEAD WO CONTRAST    Result Date: 8/9/2022  EXAMINATION: CT OF THE HEAD WITHOUT CONTRAST  8/9/2022 7:34 pm TECHNIQUE: CT of the head was performed without the administration of intravenous contrast. Automated exposure control, iterative reconstruction, and/or weight based adjustment of the mA/kV was utilized to reduce the radiation dose to as low as reasonably achievable. COMPARISON: August 9, 2022 HISTORY: ORDERING SYSTEM PROVIDED HISTORY: monitor hemorrhage TECHNOLOGIST PROVIDED HISTORY: Reason for exam:->monitor hemorrhage Has a \"code stroke\" or \"stroke alert\" been called? ->No What reading provider will be dictating this exam?->CRC FINDINGS: Redemonstration of right occipital parenchymal hematoma appearing similar in size with surrounding edema. Hematoma measures approximately 14 x 11 mm. No new intracranial hemorrhage. No midline shift. Basilar cisterns are patent. Stable areas of hypoattenuation in white matter of both hemispheres suggesting chronic microvascular ischemia. Postsurgical changes associated with the left globe. 1. Stable right occipital parenchymal hematoma. 2. No new intracranial hemorrhage or edema.      CT HEAD WO CONTRAST    Result Date: 8/9/2022  EXAMINATION: CT OF THE HEAD WITHOUT CONTRAST  8/9/2022 1:51 pm TECHNIQUE: CT of the head was performed without the administration of intravenous contrast. Automated exposure control, iterative reconstruction, and/or weight based adjustment of the mA/kV was utilized to reduce the radiation dose to as low as reasonably achievable. COMPARISON: The previous study performed 06/18/2022. HISTORY: ORDERING SYSTEM PROVIDED HISTORY: HEAD TRAUMA, CLOSED, MILD, GCS >= 13, NO RISK FACTORS, NEURO EXAM NORMAL TECHNOLOGIST PROVIDED HISTORY: Has a \"code stroke\" or \"stroke alert\" been called? ->No Reason for exam:->headache, HTN Decision Support Exception - unselect if not a suspected or confirmed emergency medical condition->Emergency Medical Condition (MA) What reading provider will be dictating this exam?->CRC FINDINGS: BRAIN/VENTRICLES: There is a small, rounded hemorrhage within the right occipital lobe, measuring 1.5 x 1.2 cm in size. A small amount of surrounding edema is noted. There is mild surrounding mass effect. No midline shift is seen. No other focus of hemorrhage or contusion is identified. The ventricular system is unremarkable. Mild to moderate, diffuse, cerebral atrophy is again noted. Mild, diffuse, cerebellar atrophy is again seen. Microvascular white matter ischemic changes are again appreciated. No other abnormal focus of decreased attenuation is seen within the brain parenchyma. Calcifications are again noted involving the cavernous carotid arteries. ORBITS: The patient is again status post left globe enucleation with prosthesis in place. Left optic nerve calcifications are again present. The patient is again status post right globe lens replacement surgery. SINUSES: There has been a mild interval increase in ethmoid sinusitis. The mastoid air cells are clear. SOFT TISSUES/SKULL:  No acute abnormality of the visualized skull or soft tissues. Hyperostosis frontalis interna is again appreciated. 1.  Small hemorrhage within the right occipital lobe, as described above. 2.  Mild to moderate, diffuse, cerebral atrophy again seen, when compared to the previous study performed 06/18/2022. Mild, diffuse, cerebellar atrophy again noted.  3.  Microvascular white matter ischemic changes again identified. 4.  Mild interval increase in ethmoid sinusitis. The findings were discussed with Camille Strickland NP at 2:19 p.m. XR CHEST PORTABLE    Result Date: 8/9/2022  EXAMINATION: ONE XRAY VIEW OF THE CHEST 8/9/2022 4:48 pm COMPARISON: April 24, 2022 HISTORY: ORDERING SYSTEM PROVIDED HISTORY: hypertensive emergency TECHNOLOGIST PROVIDED HISTORY: Reason for exam:->hypertensive emergency What reading provider will be dictating this exam?->CRC FINDINGS: No obvious airspace opacity or pleural effusion. There are low lung volumes accentuating the interstitial lung markings. The heart is normal in size. No pneumothorax. No airspace opacity or pleural effusion. CXR:       NP's Assessment and Plan:   Lazarus Lia is a 78 y.o. female who was presented to the ED for further evaluation of elevated blood pressure/hypertension Covid+ test on 08/05, covid symptoms started on 08/01,  Sent in from urgent care for elevated BP, was taking paxlovid for Covid and told it may interfere with BP, hx of recent CVA 2-3 months ago). Patient has past medical history of hyperlipidemia, hypertension, amaurosis fugax of right eye (2017), anxiety, depression, arthritis, colon cancer (1974), TIA, chronic back pain, GERD, Skipped heart beats, on metoprolol managed by Dr. Velia Duong, Sleep apnea, uses cpap at times, thyroid disease, and peripheral neuropathy. Patient reports that she was tested positive for COVID last Friday (08/05) and was recently being treated with antiviral therapy, Paxlovid. Stated that her only COVID symptoms is intermittent non-productive coughing that started also 08/05. Patient also reported that she has not taken her medications today including her blood pressure medications that could be the cause of severe elevation of her blood pressure. Reports that she did have a stroke back in June of this year.  According chart review, patient had acute CVA, subarachnoid hemorrhage back in April, 2022. Patient was complaining of headache on ED arrival that she attributes more to Willis. Patient denies chest pain, palpitation, fever, chills or diaphoresis, cough, SOB or difficulty breathing, and any dizziness. Patient was found to have BP of 234/75 in ED, and Cardene drip was started. Patient got transferred and admitted to MICU for Cardene drip management and close hemodynamic monitoring. On MICU arrival, patient is awake, alert, oriented x4, follow commands, and able to provide adequate history of present illness. No acute respiratory distress or labored breathing noted while on 4 L oxygen support via nasal cannula with pulse oximetry of 98%. ASSESSMENT:  Present on Admission:   Hypertensive urgency -initial /75 in ED, known h/o hypertension   Non-intractable headache -denied HA on MICU arrival, CT head shows No new intracranial hemorrhage or edema   Asymptomatic Bradycardia - denied chest pain, awake, alert, and oriented, last ECHO (12/19/21) with EF 60-65%   Acute respiratory distress, likely 2/2 COVID - appear resolved, now on room air with O2 sat of 98%   SARS-CoV-2 positive - Resp molecular PCR panel shows Detected COVID but pt asymptomatic, CXR No airspace opacity or pleural effusion.    Acute kidney injury, could be drug induce - reports was on Paxlovid for COVID, Losartan    PLAN:   pt was placed on Cardene gtt but already stopped on MICU arrival   SBP stable and controlled at 140s on MICU arrival   hold Cardene gtt for now, may re-start if needed   on prn Labetalol with BP parameters   requiring oxygen support with improvement in ED, now on room air   has been tested positive for Covid as an outpatient since 8/5   was treated of Paxlovid, will defer Remdesivir to dayshift    cont oxygen therapy protocol    start droplet plus isolation            on Decadron, prn Robitussin DM   on Vitamin C, Vit D, and Zinc   continuous pulse ox checks    on prn Tylenol for headache   cont Neuro checks   on Toprol XL with SBP and HR parameters   strict I/O monitoring   hold Losartan   avoid nephrotoxic agents, adjust meds for GFR if needed   monitor Crea level, CMP in AM   pending protein/crea ratio, urine crea, MRSA, inflammatory markers, ProCal   consider nephro consult if there is no improvement    Chronic Illnesses:    TIA/CVA -April, 2022   Hyperlipidemia   Amaurosis fugax of right eye (2017)   Anxiety, Depression   Colon cancer (1974)   Chronic back pain   GERD -on PPI   Sleep apnea -uses cpap at times   Thyroid disease -on Synthroid   Peripheral neuropathy      Patient's assessment and plan was discussed with supervising physician on call - Dr. Mitra Goldman     Current Treatment Team:  Treatment Team: Attending Provider: Floresita Morales DO; Consulting Physician: Kelechi Boss MD; Consulting Physician: Floresita Morales DO; Consulting Physician: Deepthi Topete DO; Registered Nurse: Vivian Kerns RN    DIET: ADULT DIET;  Regular   DVT prophylaxis: on Lovenox  GI prophylaxis: oral Protonix  CODE STATUS: Full Code     JOSE L Rodríguez   Critical Care  8/9/2022 10:10 PM      Electronically signed by BETINA Rodríguez CNP on 8/9/2022 at 10:10 PM

## 2022-08-10 NOTE — H&P
[address]                  [address] [facility]  [facility]                              HISTORY AND PHYSICAL    PATIENT NAME: Cipriano Diaz                    :        1940  MED REC NO:   [id number]                            ROOM:       [id number]  ACCOUNT NO:   [de-identified]                           ADMIT DATE: 2022  PROVIDER:     Paolo Gallagher DO    CHIEF COMPLAINT:  Elevated blood pressure. HISTORY OF PRESENT ILLNESS:  The patient is an 57-year-old   female who presented to the emergency room for evaluation of elevated  blood pressure. She was seen at an urgent care. Her blood pressure was  markedly elevated. She was sent to the hospital emergency room. She  had recent diagnosis of COVID infection on 2022. She was on  Paxlovid. She did not take her blood pressure medication the morning of  admission. Diagnostic evaluation in the emergency room revealed a small  hemorrhage, right occipital lobe. The patient was admitted to the  intensive care unit. She was started on a Cardene drip. PAST MEDICAL HISTORY:  Macular degeneration; prosthetic left eye;  hypertension; hypothyroidism; GERD; history of CVA; bowel cancer, status  post surgery and chemo; recent COVID infection diagnosis 2022. PAST SURGICAL HISTORY:  Left eye removal, colectomy, cholecystectomy,  hysterectomy, tonsillectomy, left total knee replacement x2, right eye  cataract surgery. PRIMARY CARE PHYSICIAN:  Brittany Boyd DO    SOCIAL HISTORY:  No tobacco, no alcohol. REVIEW OF SYSTEMS:  Remarkable for above-stated chief complaint. ALLERGIES:  MULTIPLE ALLERGIES INCLUDING BEE VENOM, CODEINE,  HYDRALAZINE, OXYCODONE/ASPIRIN, AUGMENTIN, DARVOCET, EGGS OR EGG-DERIVED  PRODUCTS, INFLUENZA VACCINE, PERCODAN. SOCIAL HISTORY:  No tobacco, no alcohol.     MEDICATIONS PRIOR TO ADMISSION:  Paxlovid, Toprol XL, Colace, Lasix,  Protonix, potassium chloride, Cozaar, Lipitor, Celexa, lidocaine patch,  vitamin C, vitamin D, Antivert, Zyrtec, Synthroid, Zofran. PHYSICAL EXAMINATION:  GENERAL APPEARANCE:  Physical exam reveals an 20-year-old   female who is seen in the medical intensive care unit. She is alert,  cooperative and a good historian. VITAL SIGNS:  On admission, temperature 97.8, pulse 56, respirations 18,  blood pressure 234/75. HEENT:  Head:  Normocephalic, atraumatic. Eyes:  Left eye prosthesis. Right eye:  Pupil round and reactive to light. Nose:  No obstruction,  polyp or discharge noted. Mouth:  Mucosa without lesion. Pharynx  noninjected without exudate. NECK:  Supple. No JVD. No thyromegaly. No carotid bruits. HEART:  Regular, bradycardic without murmur. LUNGS:  Clear to auscultation bilaterally. ABDOMEN:  Positive bowel sounds, soft, nontender. No rebound or  guarding. No hepatosplenomegaly. No masses. BACK:  With increased thoracic kyphosis. EXTREMITIES:  With minimal edema in the bilateral lower extremities. LYMPH NODES:  No adenopathy noted. SKIN:  Without rash or lesion. IMPRESSION:  Hypertensive emergency, intracerebral bleed, COVID-19  infection, hypothyroidism, GERD, prosthetic left eye, history of bowel  cancer, history of CVA. PLAN:  Admit. Blood pressure control. Neurosurgery to see. Discharge  plan home when stable.         Vanessa Wiley DO    D: 08/10/2022 8:13:01       T: 08/10/2022 8:15:19     [id number]  Job#: [id number]     Doc#: [id number]    CC:

## 2022-08-10 NOTE — PROGRESS NOTES
Department of Neurosurgery  Progress Note    CHIEF COMPLAINT: ICH    SUBJECTIVE:  No ill events ON. Denies HA. REVIEW OF SYSTEMS :  Constitutional: Negative for chills and fever. Neurological: Negative for dizziness, tremors and speech change.      OBJECTIVE:   VITALS:  BP (!) 160/58   Pulse (!) 43   Temp 97.9 °F (36.6 °C) (Oral)   Resp 18   Wt 182 lb 3.2 oz (82.6 kg)   LMP 07/24/2014   SpO2 93%   BMI 36.80 kg/m²     PHYSICAL:  AAO x3 with some confusion  Speech clear  Face symmetric GI/FT  Tongue MDL  SS symm and strong  HEDRICK-C, preserved power  No pronator drift        DATA:  CBC:   Lab Results   Component Value Date/Time    WBC 5.3 08/10/2022 04:29 AM    RBC 4.37 08/10/2022 04:29 AM    HGB 12.6 08/10/2022 04:29 AM    HCT 37.7 08/10/2022 04:29 AM    MCV 86.3 08/10/2022 04:29 AM    MCH 28.8 08/10/2022 04:29 AM    MCHC 33.4 08/10/2022 04:29 AM    RDW 13.1 08/10/2022 04:29 AM     08/10/2022 04:29 AM    MPV 10.1 08/10/2022 04:29 AM     BMP:    Lab Results   Component Value Date/Time     08/10/2022 04:29 AM    K 4.4 08/10/2022 04:29 AM     08/10/2022 04:29 AM    CO2 21 08/10/2022 04:29 AM    BUN 20 08/10/2022 04:29 AM    LABALBU 3.8 08/09/2022 10:31 PM    LABALBU 4.2 03/24/2012 11:45 PM    CREATININE 0.9 08/10/2022 04:29 AM    CALCIUM 8.8 08/10/2022 04:29 AM    GFRAA >60 08/10/2022 04:29 AM    LABGLOM 60 08/10/2022 04:29 AM    GLUCOSE 147 08/10/2022 04:29 AM    GLUCOSE 109 05/04/2012 06:17 AM     PT/INR:    Lab Results   Component Value Date/Time    PROTIME 10.2 08/10/2022 04:29 AM    PROTIME 10.8 03/24/2012 11:45 PM    INR 0.9 08/10/2022 04:29 AM     PTT:    Lab Results   Component Value Date/Time    APTT 30.9 08/10/2022 04:29 AM   [APTT}    Current Inpatient Medications  Current Facility-Administered Medications: pantoprazole (PROTONIX) tablet 40 mg, 40 mg, Oral, QAM AC  sodium chloride flush 0.9 % injection 5-40 mL, 5-40 mL, IntraVENous, 2 times per day  sodium chloride flush 0.9 % injection 5-40 mL, 5-40 mL, IntraVENous, PRN  0.9 % sodium chloride infusion, , IntraVENous, PRN  ondansetron (ZOFRAN-ODT) disintegrating tablet 4 mg, 4 mg, Oral, Q8H PRN **OR** ondansetron (ZOFRAN) injection 4 mg, 4 mg, IntraVENous, Q6H PRN  polyethylene glycol (GLYCOLAX) packet 17 g, 17 g, Oral, Daily PRN  metoprolol succinate (TOPROL XL) extended release tablet 25 mg, 25 mg, Oral, Daily  [Held by provider] losartan (COZAAR) tablet 100 mg, 100 mg, Oral, Daily  levothyroxine (SYNTHROID) tablet 88 mcg, 88 mcg, Oral, Daily  levETIRAcetam (KEPPRA) tablet 500 mg, 500 mg, Oral, BID  acetaminophen (TYLENOL) tablet 650 mg, 650 mg, Oral, Q6H PRN **OR** acetaminophen (TYLENOL) suppository 650 mg, 650 mg, Rectal, Q6H PRN  guaiFENesin-dextromethorphan (ROBITUSSIN DM) 100-10 MG/5ML syrup 5 mL, 5 mL, Oral, Q4H PRN  dexamethasone (DECADRON) tablet 6 mg, 6 mg, Oral, Daily  zinc sulfate (ZINCATE) capsule 50 mg, 50 mg, Oral, Daily  vitamin D (CHOLECALCIFEROL) tablet 2,000 Units, 2,000 Units, Oral, Daily  ascorbic acid (VITAMIN C) tablet 500 mg, 500 mg, Oral, Daily  labetalol (NORMODYNE;TRANDATE) injection 20 mg, 20 mg, IntraVENous, Q10 Min PRN    ASSESSMENT:   CT head reviewed which shows stable right occipital ICH    PLAN:  No new Nsx recommendations at this time. Continue BP control  Keppra for sz px x 7 days  FU in Nsx clinic with CT head in 4 weeks    Thank you so much for allowing us to participate in the care of this patient.       Electronically signed by Vianney Arias MD on 8/10/2022 at 7:59 AM

## 2022-08-10 NOTE — CARE COORDINATION
8/10:  Transition of care:  Pt presented to the ER after being referred from Urgent care for elevated BP & + Covid test on 8/5, covid symptoms started on 8/1. Pt has PMH of stroke in 4/2022. Pt was started on Cardene gtt & transferred to MICU. Pt is on room air at 93%. Pt is ISO/Droplet Plus for Covid + on 8/9. Cm spoke with her daughter Tri Leigh at [phone]. Pt's PCP is Dr. Freddy Burdick uses the [facility] in [place] [address]. Pt lives alone in a 1st floor apartment with 1 step to enter. The bed/bathroom are on the 1st level. Pt has a cane at home at she uses when she travels outside the house. Pt has a Cpap that she normally doesn't wear but no other DME. Pt has no hx of DME/ HHC/SNF & no preferences. Per daughter dc plan is hopefully home will see what PT/OT recommendation. Needs are unclear. Pt's family has concerns about transportation due to her Covid dx. But daughter states her mother has transportation via her insurance. Pt has [employer]. Pt maybe active with [facility],  CM left messages for Heidi Rodríguez at [phone] or Esmer Lemus [phone], pending a call back. Sw/MONIKA will continue to follow for dc planning.     Electronically signed by Leena Lujan RN on 8/10/2022 at 11:32 AM

## 2022-08-10 NOTE — PROGRESS NOTES
[address] [facility]   Department of Internal Medicine   MICU Progress Note    Patient:  Kulwinder Bond 78 y.o. female   MRN: [id number]       Date of Service: 8/10/2022    Allergy: Iodine, Bee pollen, Bee venom, Codeine, Hydralazine, Oxycodone-aspirin, Sulfa antibiotics, Amoxicillin-pot clavulanate, Aspirin, Darvocet [propoxyphene n-acetaminophen], Eggs or egg-derived products, Influenza vaccines, Percodan [oxycodone-aspirin], and Percodan [oxycodone-aspirin]  CC Headache and hypertension   Subjective    Awake, alert and oriented x3, following commands  Denies of headache, fever, chills, sob, chest pain, n,v,d or abd pain  C/o headache on admission  Nicardipine gtt off, but b/p remains elevated, therefore restarted  COVID19 positive on isolation  No temps  No overnight event   Objective     TEMPERATURE:  Current - Temp: 98.1 °F (36.7 °C); Max - Temp  Av.9 °F (36.6 °C)  Min: 97.6 °F (36.4 °C)  Max: 98.1 °F (36.7 °C)    RESPIRATIONS RANGE: Resp  Av.1  Min: 13  Max: 22    PULSE RANGE: Pulse  Av.9  Min: 43  Max: 63    BLOOD PRESSURE RANGE:  Systolic (49IPT), DJJ:996 , Min:82 , HZ   ; Diastolic (84NCY), SWA:48, Min:31, Max:120      PULSE OXIMETRY RANGE: SpO2  Av.1 %  Min: 89 %  Max: 99 %    I & O - 24hr:    Intake/Output Summary (Last 24 hours) at 8/10/2022 0924  Last data filed at 8/10/2022 0840  Gross per 24 hour   Intake 10 ml   Output --   Net 10 ml     No intake/output data recorded. I/O this shift:  In: 10 [I.V.:10]  Out: -    Weight change:     Physical Exam:  General Appearance: alert, appears stated age, cooperative, and no distress  HEENT:  Head: Normocephalic, no lesions, without obvious abnormality. Eye: Normal external eye, conjunctiva, lids cornea, PAYTON. Nose: Normal external nose, mucus membranes and septum. Neck / Thyroid: Supple, no masses, nodes, nodules or enlargement.   Neck: no adenopathy, no carotid bruit, no JVD, supple, symmetrical, trachea midline, and thyroid not enlarged, symmetric, no tenderness/mass/nodules  Lung: clear to auscultation bilaterally  Heart: regular rate and rhythm, S1, S2 normal, no murmur, click, rub or gallop and normal apical impulse  Abdomen: soft, non-tender; bowel sounds normal; no masses,  no organomegaly  Extremities:  extremities normal, atraumatic, no cyanosis or edema  Musculokeletal: No joint swelling, no muscle tenderness. ROM normal in all joints of extremities.    Neurologic: Mental status: Alert, oriented, thought content appropriate, CN 2-12 intact       Medications     Continuous Infusions:   niCARdipine 2.5 mg/hr (08/10/22 0920)    sodium chloride       Scheduled Meds:   pantoprazole  40 mg Oral QAM AC    amLODIPine  5 mg Oral Daily    sodium chloride flush  5-40 mL IntraVENous 2 times per day    metoprolol succinate  25 mg Oral Daily    losartan  100 mg Oral Daily    levothyroxine  88 mcg Oral Daily    levETIRAcetam  500 mg Oral BID    dexamethasone  6 mg Oral Daily    zinc sulfate  50 mg Oral Daily    vitamin D  2,000 Units Oral Daily    ascorbic acid  500 mg Oral Daily     PRN Meds: sodium chloride flush, sodium chloride, ondansetron **OR** ondansetron, polyethylene glycol, acetaminophen **OR** acetaminophen, guaiFENesin-dextromethorphan  Nutrition:   Diet     Labs and Imaging Studies     CBC:   Recent Labs     08/09/22  1258 08/09/22  2231 08/10/22  0429   WBC 6.3 6.2 5.3   RBC 4.35 4.22 4.37   HGB 12.7 12.2 12.6   HCT 38.2 36.8 37.7   MCV 87.8 87.2 86.3   MCH 29.2 28.9 28.8   MCHC 33.2 33.2 33.4   RDW 13.2 13.2 13.1    173 172   MPV 9.8 10.1 10.1       BMP:    Recent Labs     08/09/22  1258 08/09/22  2231 08/10/22  0429    140 139   K 4.4 4.3 4.4    104 106   CO2 22 23 21*   BUN 15 17 20   CREATININE 0.9 1.2* 0.9   GLUCOSE 88 104* 147*   CALCIUM 9.0 8.9 8.8   PROT 7.3 7.0  --    LABALBU 4.1 3.8  --    BILITOT 0.4 0.3  --    ALKPHOS 69 61  --    AST 16 24  --    ALT 11 13  --        LIVER PROFILE:   Recent Labs 08/09/22  1258 08/09/22  2231   AST 16 24   ALT 11 13   BILITOT 0.4 0.3   ALKPHOS 69 61       PT/INR:   Recent Labs     08/10/22  0429   PROTIME 10.2   INR 0.9       APTT:   Recent Labs     08/10/22  0429   APTT 30.9       Fasting Lipid Panel:    Lab Results   Component Value Date/Time    CHOL 207 08/10/2022 04:29 AM    TRIG 122 08/10/2022 04:29 AM    HDL 46 08/10/2022 04:29 AM       Cardiac Enzymes:    Lab Results   Component Value Date    CKTOTAL 92 05/21/2020    CKTOTAL 111 12/19/2019    CKTOTAL 207 (H) 04/25/2015    CKMB 3.8 04/25/2015    CKMB 4.5 (H) 04/25/2015    CKMB 2.4 07/25/2014    TROPONINI <0.01 01/29/2021    TROPONINI <0.01 08/18/2020    TROPONINI <0.01 01/02/2020       Notable Cultures:      Blood cultures   Blood Culture, Routine   Date Value Ref Range Status   12/27/2018 5 Days- no growth  Final     Respiratory cultures No results found for: RESPCULTURE No results found for: LABGRAM  Urine   Urine Culture, Routine   Date Value Ref Range Status   04/27/2022 <10,000 CFU/mL  Gram positive organism    Final     Legionella No results found for: LABLEGI  C Diff PCR No results found for: CDIFPCR  Wound culture/abscess: No results for input(s): WNDABS in the last 72 hours. Tip culture:No results for input(s): CXCATHTIP in the last 72 hours. Antibiotic  Days  Day started                                Oxygen: Additional Respiratory Assessments  Heart Rate: (!) 46  Resp: 17  SpO2: 94 %     Nasal cannula L/min     Face mask %     Reservoirs mask %       ABG     PH     PCO2     PO2     HCO3     Sat%     FIO2     DATES        Lines:  Site  Day  Date inserted     TLC              PICC              Arterial line              Peripheral line              HD cath                 Imaging Studies:    CT HEAD WO CONTRAST   Final Result   1. Stable right occipital parenchymal hematoma. 2. No new intracranial hemorrhage or edema.          XR CHEST PORTABLE   Final Result   No airspace opacity or pleural effusion. CT HEAD WO CONTRAST   Final Result   1. Small hemorrhage within the right occipital lobe, as described above. 2.  Mild to moderate, diffuse, cerebral atrophy again seen, when compared to   the previous study performed 06/18/2022. Mild, diffuse, cerebellar atrophy   again noted. 3.  Microvascular white matter ischemic changes again identified. 4.  Mild interval increase in ethmoid sinusitis. The findings were discussed with Jay Hines NP at 2:19 p.m. APRN- CNP Assessment and PLan   In summary,  78 y.o. female with past medical history of hyperlipidemia, hypertension, amaurosis fugax of right eye (2017), anxiety, depression, arthritis, colon cancer (1974), TIA, chronic back pain, GERD, Skipped heart beats, on metoprolol managed by Dr. Stephanie Durant, Sleep apnea, uses cpap at times, thyroid disease, and peripheral neuropathy was presented to the ED for further evaluation of elevated blood pressure.       Assessment:  Small Right occipital ICH without MDL shift of mass affect  Hypertensive Urgency  COVID 19 infection with possible pneumonitis   Headache  Asymptomatic bradycardia  MEG   Hx of TIA  Hx of hypertension  Hx of GERD  Hx of hyperlipidemia  Hx of depression    Plan:  - currently in RA  - PRN bronchodilator  - CT head showed small right occipital ICH without MDL shift or mass affect, repeat CT head in 6 hour showed stable ICH  - NSG consulted, input appreciated  - keep sbp less than 140mMHg  - nicardipine gtt , added amlodipine 5mg; resume home cozaar  - repeat head CT in AM  - CT chest to r/o COVID pneumonitis   - decadron 6mg x10 days  - CRP normal, procal normal- does not meet criteria for Remdesivir, Toci, per pharmacy  - No anticoagulation 2/2 ICH  - Keppra 500 BIG  - labs in AM  -F/E/N  None/replace lytes/ regular diet  - DVT/GI scds/no anticoagulation 2/2 ICH/protonix  - Code status: full code       BETINA Moore-ZOIE   Critical Care     Discussed case with Attending Physician: Dr. Clay Babrour

## 2022-08-10 NOTE — PROGRESS NOTES
[address]       Date:8/10/2022                                                               Patient Name: Zeferino Cadet  MRN: [id number]  : 1940  Room: [address] [place] [address]    Evaluating OT: RUBIN Jackson,  OTR/L; [id number]    Referring Provider: Ye Gtz DO   Specific Provider Orders/Date: OT eval and treat (8/10/22)       Diagnosis: Hypertensive emergency [I16.1]  Hypertensive crisis, unspecified [I16.9]     Reason for admission: Pt admitted with hypertensive crisis, hx of stroke in 2022. Surgery/Procedures: None this admission     Pertinent Medical History:     has a past medical history of Amaurosis fugax of right eye, Anxiety, Arthritis, CA - cancer of bowel, Cerebral artery occlusion with cerebral infarction ([facility] [address] Chronic back pain, Constipation, Depression, Elevated sed rate, GERD (gastroesophageal reflux disease), Hemorrhoids, Hyperlipidemia, Hypertension, Left foot pain, Lumbosacral radiculopathy, Macular degeneration, Peripheral neuropathy, Poor vision, Prosthetic eye globe, Seasonal allergies, Skipped heart beats, Sleep apnea, and Thyroid disease. *Precautions:  Fall Risk, O2, Droplet +, COVID+    Assessment of current deficits   [x] Functional mobility  [x]ADLs  [x] Strength               []Cognition   [x] Functional transfers   [x] IADLs         [x] Safety Awareness   [x]Endurance   [] Fine Coordination        [x] ROM     [x] Vision/perception   []Sensation    []Gross Motor Coordination [x] Balance   [] Delirium                  []Motor Control     [] Communication    OT PLAN OF CARE   OT POC based on physician orders, patient diagnosis and results of clinical assessment.        Frequency/Duration: 1-3 days/wk for 1-2 weeks PRN    Specific OT Treatment Interventions to include:   * Instruction/training on adapted ADL techniques and AE recommendations to increase functional independence within precautions       * Training on energy conservation strategies, correct breathing pattern and techniques to improve independence/tolerance for self-care routine  * Functional transfer/mobility training/DME recommendations for increased independence, safety, and fall prevention  * Patient/Family education to increase follow through with safety techniques and functional independence  * Recommendation of environmental modifications for increased safety with functional transfers/mobility and ADLs  * Cognitive retraining/development of therapeutic activities to improve problem solving, judgement, memory, and attention for increased safety/participation in ADL/IADL tasks  * Sensory re-education to improve body/limb awareness, maintain/improve skin integrity, and improve hand/UE motor function  * Visual-perceptual training to improve environmental scanning, visual attention/focus, and oculomotor skills for increased safety/independence with functional transfers/mobility and ADLs  * Splinting/positioning for increased function, prevention of contractures, and improve skin integrity  * Therapeutic exercise to improve motor endurance, ROM, and functional strength for ADLs/functional transfers  * Therapeutic activities to facilitate/challenge dynamic balance, stand tolerance for increased safety and independence with ADLs  * Therapeutic activities to facilitate gross/fine motor skills for increased independence with ADLs  * Neuro-muscular re-education: facilitation of righting/equilibrium reactions, midline orientation, scapular stability/mobility, normalization of muscle tone, and facilitation of volitional active controled movement  * Positioning to improve skin integrity, interaction with environment and functional independence  * Delirium prevention/treatment  * Manual techniques for edema management    Recommended Adaptive Equipment: LB AE, Grab bars, TBD     Home Living: Pt lives alone in a single floor apartment with 0 step(s) to enter; bed/bath on main level. Bathroom setup: [address] owned: [facility], [address], shower chair x2 in and out of tub for doing hair. Prior Level of Function: Ind with ADLs; Ind with IADLs. Cane/FWW for functional mobility. Driving: No; kids assist  Occupation: None stated    Pain Level: pt c/o 0/10 pain this session    Cognition: A&O: 4/4; Follows 2 step commands. Memory: G   Comprehension: G   Problem solving: G-   Judgement/safety: G               Communication skills: WFL           Vision: Prosthetic L eye, impaired vision in R eye from hx of stroke. Glasses: Unknown                                                   Hearing: WFL     RASS: 0  CAM-ICU: (NT) Delirium    UE Assessment:  Hand Dominance: Right [x]  Left []     ROM Strength STM goal: PRN   RUE  WFL 5/5  : WFL  FMC: WFL  GMC: WFL NA     LUE WFL 5/5  : WFL  FMC: WFL  GMC: WFL NA       Sensation: C/o numbness/tingling in bilateral feet extremities (R wosre). Tone: WNL  Edema: Unremarkable     Functional Assessment:  AM-PAC Daily Activity Raw Score: 16/24   Initial Eval Status  Date: 8/10/22 Treatment Status  Date:  STGs = LTGs  Time frame: 7-14 days   Feeding S; Set-up  Visual impairement                             Grooming Min A  Impaired vision for opening/closing containers. Mod I        UB dressing/bathing SBA                      Ind       LB dressing/bathing Mod A  Pt with limited ROM to bilateral feet, assist to thread legs through brief. Mod I  LB AE PRN        Toileting Min A  Pt incontinent of bladder, brief donned, anterior hygiene completed with SBA with FWW in standing. Mod I     Bed Mobility  Supine to sit:   Min A    Sit to supine:   NT    Pt seated in chair upon exit.                         Ind     Functional Transfers Sit to stand:   SBA from bed  Min A from chair    Stand to sit:   SBA    Stand Pivot:   Min A                      Mod I     Functional Mobility Min A FWW  For short distance in room                      Mod I        Balance Sitting:     Static: S    Dynamic:SBA  Standing: SBA<>Min A  Sitting:     Static:  Ind    Dynamic:Ind  Standing: Mod I                   Endurance/Activity Tolerance   fair tolerance with light activity. WFL  For full ADL/IADL tasks. Visual/  Perceptual Impaired: Prosthetic L eye. R eye impaired vision from hx of stroke. Pt with difficulty explaining vision impairment. Vitals:   HR at rest: 58 bpm HR at end of session: 55 bpm   Spo2 at rest: 93% Spo2 at end of session 96%   BP at rest: 130/95 mmHg BP at end of session 126/80 mmHg       Treatment: OT treatment provided this date includes:     Instruction/training on safety and adapted techniques for completion of ADLs: Pt educated on LB AE to increase independence and safety in self-care. Instruction/training on safe bed mobility/functional mobility/transfer techniques: with focus on safety and body mechanics. Skilled Monitoring of Vitals: to include BP, spO2, and HR throughout session to maximize safety. Therapeutic activity: to challenge dynamic sitting/standing balance and endurance to promote safety during ADL tasks and functional transfers and mobility. Delirium Prevention: Environmental and sensory modifications assessed and implemented to decrease ICU acquired delirium and to improve overall orientation, mentation and pt interaction with family/staff. Line management and environmental modifications made prior to and end of session to ensure patient safety and to increase efficiency of session. Skilled monitoring of HR, O2 saturation, blood pressure and patient's response to activity performed throughout session. Comments: Pt case discussed in rounds, OK from RN to see patient. Upon arrival, patient supine in bed with no family present.  Pt pleasant and agreeable to participate in therapy session. Pt demo good tolerance with good understanding of education/techniques. Pt cooperative and highly aware of impairments. At end of session, patient seated upright in chair with call light within reach, all lines and tubes intact. Pt instructed on use of call light for assistance and fall prevention. Nursing notified of patient positioning. Patient presents with decreased ROM/strength, activity tolerance, dynamic balance, functional mobility limiting completion of ADLs and safety. Pt can benefit from continued skilled OT services to increase safety, functional independence and quality of life. Rehab Potential: Good for established goals    Patient / Family Goal: to return to PLOF    Patient and/or family were instructed/educated on diagnosis, prognosis/goals and plan of care. Patient demonstrated good understanding. Evaluation Complexity: Moderate     History: Expanded chart review of consults, imaging, and psychosocial history related to current functional performance. Exam: 5+ performance deficits identified limiting functional independence and safe return home   Assistance/Modification: Min/mod assistance or modifications required to perform tasks. May have comorbidities that affect occupational performance. [] Malnutrition indicators have been identified and nursing has been notified to ensure a dietitian consult is ordered.       Time In:2:16p             Time Out: 3:00         Total Treatment time: 44 min   Min Units   OT Eval Low 41347     OT Eval Medium 97166 X 1   OT Eval High 60050     OT Re-Eval [id number]     Therapeutic Ex 28875     Therapeutic Activities 87295 28 2   ADL/Self Care 29523 16 1   Orthotic Management 07551     Neuro Re-Ed 56763     Non-Billable Time        Evaluation time includes thorough review of current medical information, gathering information on past medical history/social history and prior level of function, completion of standardized testing/informal observation of tasks, assessment of data and development of POC/Goals.      Ralf Pacheco, OTD,  OTR/L; [id number]

## 2022-08-10 NOTE — PROGRESS NOTES
Physical Therapy    Physical Therapy Initial Assessment     Name: Merritt Márquez  : 1940  MRN: [id number]      Date of Service: 8/10/2022    Evaluating PT:  Bernie Victorino PT, DPT  [id number]     Room #:  7307/2221-C  Diagnosis:  Hypertensive emergency [I16.1]  Hypertensive crisis, unspecified [I16.9]  PMHx/PSHx:   has a past medical history of Amaurosis fugax of right eye, Anxiety, Arthritis, CA - cancer of bowel, Cerebral artery occlusion with cerebral infarction [facility]), Chronic back pain, Constipation, Depression, Elevated sed rate, GERD (gastroesophageal reflux disease), Hemorrhoids, Hyperlipidemia, Hypertension, Left foot pain, Lumbosacral radiculopathy, Macular degeneration, Peripheral neuropathy, Poor vision, Prosthetic eye globe, Seasonal allergies, Skipped heart beats, Sleep apnea, and Thyroid disease. Procedure/Surgery:  None  Precautions:  Falls, Droplet Plus isolation, COVID-19, O2, Urinary incontinence, L eye prosthesis    Equipment Needs:  TBD    SUBJECTIVE:    Pt lives alone in a 1st floor apartment with no steps to enter. Pt ambulates with SPC PTA but also has FWW PRN. Pt has children and friends that assist as needed but reports independence with ADLs. OBJECTIVE:   Initial Evaluation  Date: 8/10/22 Treatment Short Term/ Long Term   Goals   AM-PAC 6 Clicks      Was pt agreeable to Eval/treatment? Yes      Does pt have pain? No c/o pain      Bed Mobility  Rolling: SBA  Supine to sit: Min A  Sit to supine: NT  Scooting: SBA  Rolling: Independent   Supine to sit: Independent   Sit to supine: Independent   Scooting: Independent    Transfers Sit to stand: SBA from bed;  Min A from chair   Stand to sit: SBA  Stand pivot: Min A with FWW  Sit to stand: Independent   Stand to sit:  Independent   Stand pivot: Modified Independent with FWW   Ambulation    40 feet with FWW Min A  >150 feet with FWW Modified Independent     Stair negotiation: ascended and descended  NT  >4 steps with 1 rail Modified Independent     ROM BUE:  Per OT eval   BLE:  WFL     Strength BUE:  Per OT eval   BLE:  WFL     Balance Sitting EOB:  SBA  Dynamic Standing:  Min A with FWW   Sitting EOB:  Independent   Dynamic Standing:  Modified Independent       Pt is A & O x 4  RASS:  0  CAM-ICU:  NT  Sensation:  Pt denies numbness and tingling to extremities  Edema:  Unremarkable    Vitals:  Blood Pressure at rest 130/95 mmHg  Blood Pressure post session -- mmHg   Heart Rate at rest 58 bpm  Heart Rate post session 64 bpm    SPO2 at rest 93% on 2 L SPO2 post session 97% on 2 L         Therapeutic Exercises:    BLE AROM  STS x3 reps    Patient education  Pt educated on PT role, safety during functional mobility, up to chair as tolerated     Patient response to education:   Pt verbalized understanding Pt demonstrated skill Pt requires further education in this area   Yes  Yes  Yes      ASSESSMENT:    Conditions Requiring Skilled Therapeutic Intervention:    [x]Decreased strength     []Decreased ROM  [x]Decreased functional mobility  [x]Decreased balance   [x]Decreased endurance   []Decreased posture  []Decreased sensation  []Decreased coordination   []Decreased vision  []Decreased safety awareness   []Increased pain       Comments:  Pt received supine and agreeable to PT evaluation with OT collaboration. Pt cleared for participation by RN prior to session. Vitals monitored during session. Pt pleasant and motivated. Assisted with trunk during supine>Sit. Pt was incontinent of urine during transfer to EOB so completed pivot transfer to chair to sit on bed pan. Pt assisted with standing from chair and hygiene. Completed STS from chair again and ambulation around room using FWW. Demonstrates fair gait speed and balance using FWW. Returned to chair after ambulating and encouraged to sit up as tolerated. Pt left with call button in reach, lines attached, and needs met.     Treatment:  Patient practiced and was instructed in the following treatment:    Bed mobility training - pt given verbal and tactile cues to facilitate proper sequencing and safety during rolling and supine>sit as well as provided with physical assistance to complete task    STS and pivot transfer training - pt educated on proper hand and foot placement, safety and sequencing, and use of FWW to safely complete sit<>stand and pivot transfers with hands on assistance to complete task safely   Gait training- pt was given verbal and tactile cues to facilitate safety/balance during ambulation as well as provided with physical assistance. Pt's/ family goals   1. Home     Prognosis is good for reaching above PT goals. Patient and or family understand(s) diagnosis, prognosis, and plan of care.   yes    PHYSICAL THERAPY PLAN OF CARE:    PT POC is established based on physician order and patient diagnosis     Referring provider/PT Order:    08/10/22 0830  PT eval and treat  Start:  08/10/22 0830,   End:  08/10/22 0830,   ONE TIME,   Standing Count:  1 Occurrences,   R         Maximus Gibson,      Diagnosis:  Hypertensive emergency [I16.1]  Hypertensive crisis, unspecified [I16.9]  Specific instructions for next treatment:  progress gait and transfer training     Current Treatment Recommendations:     [x] Strengthening to improve independence with functional mobility   [] ROM to improve independence with functional mobility   [x] Balance Training to improve static/dynamic balance and to reduce fall risk  [x] Endurance Training to improve activity tolerance during functional mobility   [x] Transfer Training to improve safety and independence with all functional transfers   [x] Gait Training to improve gait mechanics, endurance and asses need for appropriate assistive device  [x] Stair Training in preparation for safe discharge home and/or into the community   [] Positioning to prevent skin breakdown and contractures  [x] Safety and Education Training   [x] Patient/Caregiver Education   [] HEP  [] Other     PT long term treatment goals are located in above grid    Frequency of treatments: 2-5x/week x 1-2 weeks. Time in  1420  Time out  1500    Total Treatment Time  23 minutes     Evaluation Time includes thorough review of current medical information, gathering information on past medical history/social history and prior level of function, completion of standardized testing/informal observation of tasks, assessment of data and education on plan of care and goals.     CPT codes:  [x] Low Complexity PT evaluation 84525  [] Moderate Complexity PT evaluation 28912  [] High Complexity PT evaluation 49211  [] PT Re-evaluation 17953  [] Gait training 86776 -- minutes  [] Manual therapy [id number] -- minutes  [x] Therapeutic activities 99014 23 minutes  [] Therapeutic exercises 62684 - minutes  [] Neuromuscular reeducation 43640 -- minutes     Tomas Lovell, PT, DPT  [id number]

## 2022-08-10 NOTE — PLAN OF CARE
Problem: Chronic Conditions and Co-morbidities  Goal: Patient's chronic conditions and co-morbidity symptoms are monitored and maintained or improved  Outcome: Progressing  Flowsheets (Taken 8/9/2022 2200)  Care Plan - Patient's Chronic Conditions and Co-Morbidity Symptoms are Monitored and Maintained or Improved:   Monitor and assess patient's chronic conditions and comorbid symptoms for stability, deterioration, or improvement   Collaborate with multidisciplinary team to address chronic and comorbid conditions and prevent exacerbation or deterioration   Update acute care plan with appropriate goals if chronic or comorbid symptoms are exacerbated and prevent overall improvement and discharge     Problem: Discharge Planning  Goal: Discharge to home or other facility with appropriate resources  Outcome: Progressing  Flowsheets (Taken 8/9/2022 2340)  Discharge to home or other facility with appropriate resources:   Identify barriers to discharge with patient and caregiver   Identify discharge learning needs (meds, wound care, etc)     Problem: Safety - Adult  Goal: Free from fall injury  Outcome: Progressing

## 2022-08-11 ENCOUNTER — APPOINTMENT (OUTPATIENT)
Dept: CT IMAGING | Age: 82
DRG: 064 | End: 2022-08-11
Payer: MEDICARE

## 2022-08-11 DIAGNOSIS — I60.9 SUBARACHNOID HEMORRHAGE (HCC): Primary | ICD-10-CM

## 2022-08-11 LAB
ANION GAP SERPL CALCULATED.3IONS-SCNC: 14 MMOL/L (ref 7–16)
BASOPHILS ABSOLUTE: 0.01 E9/L (ref 0–0.2)
BASOPHILS RELATIVE PERCENT: 0.1 % (ref 0–2)
BUN BLDV-MCNC: 27 MG/DL (ref 6–23)
C-REACTIVE PROTEIN: 0.3 MG/DL (ref 0–0.4)
CALCIUM SERPL-MCNC: 9.2 MG/DL (ref 8.6–10.2)
CHLORIDE BLD-SCNC: 104 MMOL/L (ref 98–107)
CO2: 20 MMOL/L (ref 22–29)
CREAT SERPL-MCNC: 0.8 MG/DL (ref 0.5–1)
D DIMER: 1853 NG/ML DDU
EOSINOPHILS ABSOLUTE: 0 E9/L (ref 0.05–0.5)
EOSINOPHILS RELATIVE PERCENT: 0 % (ref 0–6)
GFR AFRICAN AMERICAN: >60
GFR NON-AFRICAN AMERICAN: >60 ML/MIN/1.73
GLUCOSE BLD-MCNC: 152 MG/DL (ref 74–99)
HCT VFR BLD CALC: 38.1 % (ref 34–48)
HEMOGLOBIN: 13.1 G/DL (ref 11.5–15.5)
IMMATURE GRANULOCYTES #: 0.1 E9/L
IMMATURE GRANULOCYTES %: 1 % (ref 0–5)
LYMPHOCYTES ABSOLUTE: 1.49 E9/L (ref 1.5–4)
LYMPHOCYTES RELATIVE PERCENT: 15 % (ref 20–42)
MCH RBC QN AUTO: 28.9 PG (ref 26–35)
MCHC RBC AUTO-ENTMCNC: 34.4 % (ref 32–34.5)
MCV RBC AUTO: 84.1 FL (ref 80–99.9)
MONOCYTES ABSOLUTE: 0.15 E9/L (ref 0.1–0.95)
MONOCYTES RELATIVE PERCENT: 1.5 % (ref 2–12)
MRSA CULTURE ONLY: NORMAL
NEUTROPHILS ABSOLUTE: 8.2 E9/L (ref 1.8–7.3)
NEUTROPHILS RELATIVE PERCENT: 82.4 % (ref 43–80)
PDW BLD-RTO: 13.1 FL (ref 11.5–15)
PLATELET # BLD: 215 E9/L (ref 130–450)
PMV BLD AUTO: 10.2 FL (ref 7–12)
POTASSIUM REFLEX MAGNESIUM: 4 MMOL/L (ref 3.5–5)
RBC # BLD: 4.53 E12/L (ref 3.5–5.5)
SODIUM BLD-SCNC: 138 MMOL/L (ref 132–146)
WBC # BLD: 10 E9/L (ref 4.5–11.5)

## 2022-08-11 PROCEDURE — 86140 C-REACTIVE PROTEIN: CPT

## 2022-08-11 PROCEDURE — 6370000000 HC RX 637 (ALT 250 FOR IP): Performed by: NURSE PRACTITIONER

## 2022-08-11 PROCEDURE — 2060000000 HC ICU INTERMEDIATE R&B

## 2022-08-11 PROCEDURE — 6360000002 HC RX W HCPCS: Performed by: NURSE PRACTITIONER

## 2022-08-11 PROCEDURE — 80048 BASIC METABOLIC PNL TOTAL CA: CPT

## 2022-08-11 PROCEDURE — 97530 THERAPEUTIC ACTIVITIES: CPT

## 2022-08-11 PROCEDURE — 85378 FIBRIN DEGRADE SEMIQUANT: CPT

## 2022-08-11 PROCEDURE — 2580000003 HC RX 258: Performed by: INTERNAL MEDICINE

## 2022-08-11 PROCEDURE — 2580000003 HC RX 258: Performed by: NURSE PRACTITIONER

## 2022-08-11 PROCEDURE — 70450 CT HEAD/BRAIN W/O DYE: CPT

## 2022-08-11 PROCEDURE — 6370000000 HC RX 637 (ALT 250 FOR IP): Performed by: INTERNAL MEDICINE

## 2022-08-11 PROCEDURE — 2500000003 HC RX 250 WO HCPCS: Performed by: NURSE PRACTITIONER

## 2022-08-11 PROCEDURE — 85025 COMPLETE CBC W/AUTO DIFF WBC: CPT

## 2022-08-11 PROCEDURE — 71250 CT THORAX DX C-: CPT

## 2022-08-11 RX ADMIN — LEVETIRACETAM 500 MG: 500 TABLET, FILM COATED ORAL at 08:07

## 2022-08-11 RX ADMIN — METOPROLOL SUCCINATE 25 MG: 25 TABLET, EXTENDED RELEASE ORAL at 09:56

## 2022-08-11 RX ADMIN — SODIUM CHLORIDE: 9 INJECTION, SOLUTION INTRAVENOUS at 21:48

## 2022-08-11 RX ADMIN — AMLODIPINE BESYLATE 10 MG: 10 TABLET ORAL at 08:07

## 2022-08-11 RX ADMIN — LEVETIRACETAM 500 MG: 500 TABLET, FILM COATED ORAL at 21:44

## 2022-08-11 RX ADMIN — DOCUSATE SODIUM 100 MG: 100 CAPSULE, LIQUID FILLED ORAL at 08:08

## 2022-08-11 RX ADMIN — LEVOTHYROXINE SODIUM 88 MCG: 0.09 TABLET ORAL at 08:08

## 2022-08-11 RX ADMIN — Medication 2 CAPSULE: at 21:44

## 2022-08-11 RX ADMIN — PANTOPRAZOLE SODIUM 40 MG: 40 TABLET, DELAYED RELEASE ORAL at 06:28

## 2022-08-11 RX ADMIN — Medication 2000 UNITS: at 08:08

## 2022-08-11 RX ADMIN — SODIUM CHLORIDE, PRESERVATIVE FREE 10 ML: 5 INJECTION INTRAVENOUS at 21:44

## 2022-08-11 RX ADMIN — NICARDIPINE HYDROCHLORIDE 2.5 MG/HR: 2.5 INJECTION, SOLUTION INTRAVENOUS at 02:50

## 2022-08-11 RX ADMIN — FUROSEMIDE 40 MG: 40 TABLET ORAL at 08:07

## 2022-08-11 RX ADMIN — Medication 50 MG: at 08:07

## 2022-08-11 RX ADMIN — POTASSIUM CHLORIDE 20 MEQ: 1500 TABLET, EXTENDED RELEASE ORAL at 08:07

## 2022-08-11 RX ADMIN — Medication 2 CAPSULE: at 08:07

## 2022-08-11 RX ADMIN — LOSARTAN POTASSIUM 100 MG: 25 TABLET, FILM COATED ORAL at 08:07

## 2022-08-11 RX ADMIN — ONDANSETRON 4 MG: 4 TABLET, ORALLY DISINTEGRATING ORAL at 09:00

## 2022-08-11 RX ADMIN — CITALOPRAM HYDROBROMIDE 10 MG: 10 TABLET ORAL at 08:07

## 2022-08-11 RX ADMIN — OXYCODONE HYDROCHLORIDE AND ACETAMINOPHEN 500 MG: 500 TABLET ORAL at 08:08

## 2022-08-11 RX ADMIN — ATORVASTATIN CALCIUM 40 MG: 40 TABLET, FILM COATED ORAL at 21:44

## 2022-08-11 RX ADMIN — LISINOPRIL 5 MG: 5 TABLET ORAL at 00:13

## 2022-08-11 RX ADMIN — DOCUSATE SODIUM 100 MG: 100 CAPSULE, LIQUID FILLED ORAL at 21:44

## 2022-08-11 RX ADMIN — DEXAMETHASONE 6 MG: 4 TABLET ORAL at 08:08

## 2022-08-11 RX ADMIN — SODIUM CHLORIDE, PRESERVATIVE FREE 10 ML: 5 INJECTION INTRAVENOUS at 08:08

## 2022-08-11 ASSESSMENT — PAIN SCALES - GENERAL
PAINLEVEL_OUTOF10: 0

## 2022-08-11 NOTE — PLAN OF CARE
Problem: Chronic Conditions and Co-morbidities  Goal: Patient's chronic conditions and co-morbidity symptoms are monitored and maintained or improved  8/10/2022 2109 by Erasmo Garcia RN  Outcome: Progressing  Flowsheets (Taken 8/10/2022 2000)  Care Plan - Patient's Chronic Conditions and Co-Morbidity Symptoms are Monitored and Maintained or Improved: Monitor and assess patient's chronic conditions and comorbid symptoms for stability, deterioration, or improvement    Problem: Discharge Planning  Goal: Discharge to home or other facility with appropriate resources  8/10/2022 2109 by Erasmo Garcia RN  Outcome: Progressing  Flowsheets (Taken 8/10/2022 2000)  Discharge to home or other facility with appropriate resources: Identify barriers to discharge with patient and caregiver       Problem: Safety - Adult  Goal: Free from fall injury  8/10/2022 2109 by Erasmo Garcia RN  Outcome: Progressing  Flowsheets (Taken 8/10/2022 2000)  Free From Fall Injury: Instruct family/caregiver on patient safety Hide Additional Notes?: No Detail Level: Simple

## 2022-08-11 NOTE — PROGRESS NOTES
[facility] Medicine    Subjective:  pt seen in micu / Marlon Matters gtt has been stopped      Current Facility-Administered Medications:     perflutren lipid microspheres (DEFINITY) injection 1.65 mg, 1.5 mL, IntraVENous, ONCE PRN, BETINA Moore CNP    atorvastatin (LIPITOR) tablet 40 mg, 40 mg, Oral, Nightly, Candace Persaudmer, DO, 40 mg at 08/10/22 1959    citalopram (CELEXA) tablet 10 mg, 10 mg, Oral, Daily, Candace Spence Malmer, DO, 10 mg at 08/11/22 8514    cetirizine (ZYRTEC) tablet 10 mg, 10 mg, Oral, Daily PRN, Candace Gibson, DO, 10 mg at 08/10/22 1338    docusate sodium (COLACE) capsule 100 mg, 100 mg, Oral, BID, Elen Esteban, DO, 100 mg at 08/11/22 2800    furosemide (LASIX) tablet 40 mg, 40 mg, Oral, Daily, Candace Persaudmer, DO, 40 mg at 08/11/22 9742    ocuvite-lutein multivitamin 2 capsule, 2 capsule, Oral, BID, Candace Persaudmer, DO, 2 capsule at 08/11/22 6564    potassium chloride (KLOR-CON M) extended release tablet 20 mEq, 20 mEq, Oral, Daily, Candace Gibson, DO, 20 mEq at 08/11/22 0807    pantoprazole (PROTONIX) tablet 40 mg, 40 mg, Oral, QAM AC, BETINA Munguia - CNP, 40 mg at 08/11/22 6930    amLODIPine (NORVASC) tablet 10 mg, 10 mg, Oral, Daily, BETINA Munguia - CNP, 10 mg at 08/11/22 6063    sodium chloride flush 0.9 % injection 5-40 mL, 5-40 mL, IntraVENous, 2 times per day, Elen Esteban, DO, 10 mL at 08/11/22 4261    sodium chloride flush 0.9 % injection 5-40 mL, 5-40 mL, IntraVENous, PRN, Candace Spence Malmer, DO    0.9 % sodium chloride infusion, , IntraVENous, PRN, Candace Persaudmer, DO    ondansetron (ZOFRAN-ODT) disintegrating tablet 4 mg, 4 mg, Oral, Q8H PRN, 4 mg at 08/11/22 0900 **OR** ondansetron (ZOFRAN) injection 4 mg, 4 mg, IntraVENous, Q6H PRN, Candace Gibson,     polyethylene glycol (GLYCOLAX) packet 17 g, 17 g, Oral, Daily PRN, Candace Gibson,     metoprolol succinate (TOPROL XL) extended release tablet 25 mg, 25 mg, Oral, Daily, Smitha Wiggins, APRN - CNP, 25 mg at 08/11/22 0956    losartan (COZAAR) tablet 100 mg, 100 mg, Oral, Daily, Alfonzo Punch Malmer, DO, 100 mg at 08/11/22 0128    levothyroxine (SYNTHROID) tablet 88 mcg, 88 mcg, Oral, Daily, Alfonzo Punch Malmer, DO, 88 mcg at 08/11/22 6654    levETIRAcetam (KEPPRA) tablet 500 mg, 500 mg, Oral, BID, Deer Punch Malmer, DO, 500 mg at 08/11/22 1854    acetaminophen (TYLENOL) tablet 650 mg, 650 mg, Oral, Q6H PRN, 650 mg at 08/10/22 0019 **OR** acetaminophen (TYLENOL) suppository 650 mg, 650 mg, Rectal, Q6H PRN, Suyapa Manzanares APRN - CNP    guaiFENesin-dextromethorphan (ROBITUSSIN DM) 100-10 MG/5ML syrup 5 mL, 5 mL, Oral, Q4H PRN, Suyapa Manzanares APRN - CNP    dexamethasone (DECADRON) tablet 6 mg, 6 mg, Oral, Daily, Suyapa Manzanares APRN - CNP, 6 mg at 08/11/22 0808    zinc sulfate (ZINCATE) capsule 50 mg, 50 mg, Oral, Daily, Suyapa Manzanares APRN - CNP, 50 mg at 08/11/22 0807    vitamin D (CHOLECALCIFEROL) tablet 2,000 Units, 2,000 Units, Oral, Daily, Suyapa Manzanares APRN - CNP, 2,000 Units at 08/11/22 0808    ascorbic acid (VITAMIN C) tablet 500 mg, 500 mg, Oral, Daily, Melvin Penn APRN - CNP, 500 mg at 08/11/22 4112    Objective:    /62   Pulse 57   Temp 97.1 °F (36.2 °C) (Oral)   Resp 19   Ht 4' 11\" (1.499 m)   Wt 190 lb (86.2 kg)   LMP 07/24/2014   SpO2 97%   BMI 38.38 kg/m²     Heart:  reg  Lungs:  ctab  Abd: + bs soft nontender  Extrem:  min edema legs    CBC with Differential:    Lab Results   Component Value Date/Time    WBC 10.0 08/11/2022 04:53 AM    RBC 4.53 08/11/2022 04:53 AM    HGB 13.1 08/11/2022 04:53 AM    HCT 38.1 08/11/2022 04:53 AM     08/11/2022 04:53 AM    MCV 84.1 08/11/2022 04:53 AM    MCH 28.9 08/11/2022 04:53 AM    MCHC 34.4 08/11/2022 04:53 AM    RDW 13.1 08/11/2022 04:53 AM    SEGSPCT 58 03/11/2014 08:46 AM    LYMPHOPCT 15.0 08/11/2022 04:53 AM    MONOPCT 1.5 08/11/2022 04:53 AM    EOSPCT 1 10/07/2010 01:14 PM    BASOPCT 0.1 08/11/2022 04:53 AM    MONOSABS 0.15 08/11/2022 04:53 AM    LYMPHSABS 1.49 08/11/2022 04:53 AM    EOSABS 0.00 08/11/2022 04:53 AM    BASOSABS 0.01 08/11/2022 04:53 AM     CMP:    Lab Results   Component Value Date/Time     08/11/2022 04:53 AM    K 4.0 08/11/2022 04:53 AM     08/11/2022 04:53 AM    CO2 20 08/11/2022 04:53 AM    BUN 27 08/11/2022 04:53 AM    CREATININE 0.8 08/11/2022 04:53 AM    GFRAA >60 08/11/2022 04:53 AM    LABGLOM >60 08/11/2022 04:53 AM    GLUCOSE 152 08/11/2022 04:53 AM    GLUCOSE 109 05/04/2012 06:17 AM    PROT 7.0 08/09/2022 10:31 PM    LABALBU 3.8 08/09/2022 10:31 PM    LABALBU 4.2 03/24/2012 11:45 PM    CALCIUM 9.2 08/11/2022 04:53 AM    BILITOT 0.3 08/09/2022 10:31 PM    ALKPHOS 61 08/09/2022 10:31 PM    AST 24 08/09/2022 10:31 PM    ALT 13 08/09/2022 10:31 PM     Warfarin PT/INR:    Lab Results   Component Value Date    INR 0.9 08/10/2022    INR 0.9 08/18/2020    INR 0.9 12/29/2019    PROTIME 10.2 08/10/2022    PROTIME 10.9 08/18/2020    PROTIME 10.5 12/29/2019       Assessment:    Principal Problem:    Hypertensive crisis, unspecified  Active Problems:    Hypertensive emergency    Acute respiratory distress    SARS-CoV-2 positive    Acute kidney injury ([facility] [address]  Resolved Problems:    * No resolved hospital problems.  *      Plan:  Bp control increase activity ct brain chest pt/ot eval        Billy Cheese, DO  10:38 AM  8/11/2022

## 2022-08-11 NOTE — PROGRESS NOTES
[address] [facility]   Department of Internal Medicine   MICU Progress Note    Patient:  Mari Black 78 y.o. female   MRN: [id number]       Date of Service: 2022    Allergy: Iodine, Bee pollen, Bee venom, Codeine, Hydralazine, Oxycodone-aspirin, Sulfa antibiotics, Amoxicillin-pot clavulanate, Aspirin, Darvocet [propoxyphene n-acetaminophen], Eggs or egg-derived products, Influenza vaccines, Percodan [oxycodone-aspirin], and Percodan [oxycodone-aspirin]  CC Headache and hypertension   Subjective   Awake, alert and oriented x3, following commands  Denies of headache, fever, chills, sob, chest pain, n,v,d or abd pain  Restarted on nicardipine gtt - will wean off  No temps  No overnight event   PT/OT   Objective     TEMPERATURE:  Current - Temp: (!) 96.1 °F (35.6 °C); Max - Temp  Av.3 °F (36.3 °C)  Min: 95.5 °F (35.3 °C)  Max: 98.4 °F (36.9 °C)    RESPIRATIONS RANGE: Resp  Av.1  Min: 14  Max: 27    PULSE RANGE: Pulse  Av.4  Min: 46  Max: 64    BLOOD PRESSURE RANGE:  Systolic (85YSO), FEQ:770 , Min:107 , ECT:923   ; Diastolic (57DJV), QMZ:99, Min:44, Max:95      PULSE OXIMETRY RANGE: SpO2  Av.2 %  Min: 90 %  Max: 99 %    I & O - 24hr:    Intake/Output Summary (Last 24 hours) at 2022 0831  Last data filed at 2022 2403  Gross per 24 hour   Intake 779.8 ml   Output 1001 ml   Net -221.2 ml     I/O last 3 completed shifts: In: 769.8 [P.O.:600; I.V.:169.8]  Out: 1001 [Urine:1001] I/O this shift:  In: 10 [I.V.:10]  Out: -    Weight change: 0 lb (0 kg)    Physical Exam:  General Appearance: alert, appears stated age, cooperative, and no distress  HEENT:  Head: Normocephalic, no lesions, without obvious abnormality. Eye: Normal external eye, conjunctiva, lids cornea, PAYTON. Nose: Normal external nose, mucus membranes and septum. Neck / Thyroid: Supple, no masses, nodes, nodules or enlargement.   Neck: no adenopathy, no carotid bruit, no JVD, supple, symmetrical, trachea midline, and thyroid not enlarged, symmetric, no tenderness/mass/nodules  Lung: clear to auscultation bilaterally  Heart: regular rate and rhythm, S1, S2 normal, no murmur, click, rub or gallop and normal apical impulse  Abdomen: soft, non-tender; bowel sounds normal; no masses,  no organomegaly  Extremities:  extremities normal, atraumatic, no cyanosis or edema  Musculokeletal: No joint swelling, no muscle tenderness. ROM normal in all joints of extremities.    Neurologic: Mental status: Alert, oriented, thought content appropriate, CN 2-12 intact       Medications     Continuous Infusions:   niCARdipine 2.5 mg/hr (08/11/22 0826)    sodium chloride       Scheduled Meds:   atorvastatin  40 mg Oral Nightly    citalopram  10 mg Oral Daily    docusate sodium  100 mg Oral BID    furosemide  40 mg Oral Daily    ocuvite-lutein  2 capsule Oral BID    potassium chloride  20 mEq Oral Daily    pantoprazole  40 mg Oral QAM AC    amLODIPine  10 mg Oral Daily    sodium chloride flush  5-40 mL IntraVENous 2 times per day    metoprolol succinate  25 mg Oral Daily    losartan  100 mg Oral Daily    levothyroxine  88 mcg Oral Daily    levETIRAcetam  500 mg Oral BID    dexamethasone  6 mg Oral Daily    zinc sulfate  50 mg Oral Daily    vitamin D  2,000 Units Oral Daily    ascorbic acid  500 mg Oral Daily     PRN Meds: cetirizine, sodium chloride flush, sodium chloride, ondansetron **OR** ondansetron, polyethylene glycol, acetaminophen **OR** acetaminophen, guaiFENesin-dextromethorphan  Nutrition:   Diet     Labs and Imaging Studies     CBC:   Recent Labs     08/09/22  2231 08/10/22  0429 08/11/22  0453   WBC 6.2 5.3 10.0   RBC 4.22 4.37 4.53   HGB 12.2 12.6 13.1   HCT 36.8 37.7 38.1   MCV 87.2 86.3 84.1   MCH 28.9 28.8 28.9   MCHC 33.2 33.4 34.4   RDW 13.2 13.1 13.1    172 215   MPV 10.1 10.1 10.2       BMP:    Recent Labs     08/09/22  1258 08/09/22  2231 08/10/22  0429 08/11/22  0453    140 139 138   K 4.4 4.3 4.4 4.0    104 106 104   CO2 22 23 21* 20*   BUN 15 17 20 27*   CREATININE 0.9 1.2* 0.9 0.8   GLUCOSE 88 104* 147* 152*   CALCIUM 9.0 8.9 8.8 9.2   PROT 7.3 7.0  --   --    LABALBU 4.1 3.8  --   --    BILITOT 0.4 0.3  --   --    ALKPHOS 69 61  --   --    AST 16 24  --   --    ALT 11 13  --   --        LIVER PROFILE:   Recent Labs     08/09/22  1258 08/09/22  2231   AST 16 24   ALT 11 13   BILITOT 0.4 0.3   ALKPHOS 69 61       PT/INR:   Recent Labs     08/10/22  0429   PROTIME 10.2   INR 0.9       APTT:   Recent Labs     08/10/22  0429   APTT 30.9       Fasting Lipid Panel:    Lab Results   Component Value Date/Time    CHOL 207 08/10/2022 04:29 AM    TRIG 122 08/10/2022 04:29 AM    HDL 46 08/10/2022 04:29 AM       Cardiac Enzymes:    Lab Results   Component Value Date    CKTOTAL 92 05/21/2020    CKTOTAL 111 12/19/2019    CKTOTAL 207 (H) 04/25/2015    CKMB 3.8 04/25/2015    CKMB 4.5 (H) 04/25/2015    CKMB 2.4 07/25/2014    TROPONINI <0.01 01/29/2021    TROPONINI <0.01 08/18/2020    TROPONINI <0.01 01/02/2020       Notable Cultures:      Blood cultures   Blood Culture, Routine   Date Value Ref Range Status   12/27/2018 5 Days- no growth  Final     Respiratory cultures No results found for: RESPCULTURE No results found for: LABGRAM  Urine   Urine Culture, Routine   Date Value Ref Range Status   04/27/2022 <10,000 CFU/mL  Gram positive organism    Final     Legionella No results found for: LABLEGI  C Diff PCR No results found for: CDIFPCR  Wound culture/abscess: No results for input(s): WNDABS in the last 72 hours. Tip culture:No results for input(s): CXCATHTIP in the last 72 hours. Antibiotic  Days  Day started                                Oxygen:           Additional Respiratory Assessments  Heart Rate: 55  Resp: 19  SpO2: 94 %     Nasal cannula L/min     Face mask %     Reservoirs mask %       ABG     PH     PCO2     PO2     HCO3     Sat%     FIO2     DATES        Lines:  Site  Day  Date inserted     TLC              PICC Arterial line              Peripheral line              HD cath            External Urinary Catheter-Output (mL): 400 mL    Imaging Studies:    CT HEAD WO CONTRAST   Final Result   1. Stable right occipital parenchymal hematoma. 2. No new intracranial hemorrhage or edema. XR CHEST PORTABLE   Final Result   No airspace opacity or pleural effusion. CT HEAD WO CONTRAST   Final Result   1. Small hemorrhage within the right occipital lobe, as described above. 2.  Mild to moderate, diffuse, cerebral atrophy again seen, when compared to   the previous study performed 06/18/2022. Mild, diffuse, cerebellar atrophy   again noted. 3.  Microvascular white matter ischemic changes again identified. 4.  Mild interval increase in ethmoid sinusitis. The findings were discussed with Scott Flynn NP at 2:19 p.m.         CT HEAD WO CONTRAST    (Results Pending)   CT CHEST WO CONTRAST    (Results Pending)          APRN- CNP Assessment and PLan   In summary,  78 y.o. female with past medical history of hyperlipidemia, hypertension, amaurosis fugax of right eye (2017), anxiety, depression, arthritis, colon cancer (1974), TIA, chronic back pain, GERD, Skipped heart beats, on metoprolol managed by Dr. Octavia Moreno, Sleep apnea, uses cpap at times, thyroid disease, and peripheral neuropathy was presented to the ED for further evaluation of elevated blood pressure.       Assessment:  Small Right occipital ICH without MDL shift of mass affect  Hypertensive Urgency  COVID 19 infection with possible pneumonitis   Headache  Asymptomatic bradycardia  MEG   Hx of TIA  Hx of hypertension  Hx of GERD  Hx of hyperlipidemia  Hx of depression    Plan:  - currently in RA  - PRN bronchodilator  - CT head showed small right occipital ICH without MDL shift or mass affect, repeat CT head in 6 hour showed stable ICH  - NSG consulted, input appreciated  - keep sbp less than 140mMHg  - nicardipine gtt , added amlodipine 10 mg; resume home cozaar  - repeat head CT in AM  - CT chest to r/o COVID pneumonitis   - decadron 6mg x10 days  - CRP normal, procal normal- does not meet criteria for Remdesivir, Toci, per pharmacy  - No anticoagulation 2/2 ICH  - Keppra 500 BIG  - labs in AM  -F/E/N  None/replace lytes/ regular diet  - DVT/GI scds/no anticoagulation 2/2 ICH/protonix  - Code status: full code   - PT/OT       Jefe Cortés, APRN-CNP   Critical Care     Discussed case with Attending Physician: Dr. Hayes Carrillo

## 2022-08-11 NOTE — PROGRESS NOTES
FWW   Ambulation    40 feet with FWW Min A 60 feet x2 reps with FWW SBA  >150 feet with FWW Modified Independent     Stair negotiation: ascended and descended  NT NT >4 steps with 1 rail Modified Independent     ROM BUE:  Per OT eval   BLE:  WFL     Strength BUE:  Per OT eval   BLE:  WFL     Balance Sitting EOB:  SBA  Dynamic Standing:  Min A with FWW  Sitting EOB:  SBA  Dynamic Standing:  SBA with FWW  Sitting EOB:  Independent   Dynamic Standing:  Modified Independent       Pt is A & O x 4  RASS:  0  CAM-ICU:  NT  Sensation:  Pt denies numbness and tingling to extremities  Edema:  Unremarkable    Vitals:.  SpO2 95-97% on 4 L during ambulation     Therapeutic Exercises:    BLE AROM  STS x8 reps    Patient education  Pt educated on PT role, safety during functional mobility, up to chair as tolerated     Patient response to education:   Pt verbalized understanding Pt demonstrated skill Pt requires further education in this area   Yes  Yes  Yes      ASSESSMENT:    Conditions Requiring Skilled Therapeutic Intervention:    [x]Decreased strength     []Decreased ROM  [x]Decreased functional mobility  [x]Decreased balance   [x]Decreased endurance   []Decreased posture  []Decreased sensation  []Decreased coordination   []Decreased vision  []Decreased safety awareness   []Increased pain       Comments:  Pt received supine and agreeable to PT treatment. Pt cleared for participation by RN prior to session. Vitals monitored during session. Pt pleasant and motivated. Demonstrates improved mobility and endurance this date. Completed multiple laps around the room using FWW with fair balance and gait speed. Completed 5x STS for strengthening and endurance. Completed more laps around the room using FWW. Assisted with gown and brief change d/t urinary incontinence. Pt left with call button in reach, lines attached, and needs met.     Treatment:  Patient practiced and was instructed in the following treatment:    Bed mobility training - pt given verbal and tactile cues to facilitate proper sequencing and safety during rolling and supine>sit as well as provided with physical assistance to complete task    STS and pivot transfer training - pt educated on proper hand and foot placement, safety and sequencing, and use of FWW to safely complete sit<>stand and pivot transfers with hands on assistance to complete task safely   Gait training- pt was given verbal and tactile cues to facilitate safety/balance during ambulation as well as provided with physical assistance. PHYSICAL THERAPY PLAN OF CARE:  Pt is making good progress towards established goals. Continue PT POC.      Specific instructions for next treatment:  progress gait and transfer training     Time in  1115  Time out  1140    Total Treatment Time  25  minutes     CPT codes:  [] Low Complexity PT evaluation 59249  [] Moderate Complexity PT evaluation 13148  [] High Complexity PT evaluation 86569  [] PT Re-evaluation 50806  [] Gait training 02775 -- minutes  [] Manual therapy 34098 -- minutes  [x] Therapeutic activities 41089 25 minutes  [] Therapeutic exercises 68832 - minutes  [] Neuromuscular reeducation 04456 -- minutes     Rodrigo Pena, PT, DPT  [id number]

## 2022-08-11 NOTE — PLAN OF CARE
Problem: Safety - Adult  Goal: Free from fall injury  8/11/2022 0026 by Rick Jordan RN  Outcome: Progressing  Flowsheets (Taken 8/11/2022 0023)  Free From Fall Injury: Instruct family/caregiver on patient safety

## 2022-08-12 VITALS
WEIGHT: 190 LBS | HEIGHT: 59 IN | BODY MASS INDEX: 38.3 KG/M2 | SYSTOLIC BLOOD PRESSURE: 128 MMHG | OXYGEN SATURATION: 96 % | RESPIRATION RATE: 16 BRPM | DIASTOLIC BLOOD PRESSURE: 64 MMHG | HEART RATE: 54 BPM | TEMPERATURE: 97.8 F

## 2022-08-12 LAB
ANION GAP SERPL CALCULATED.3IONS-SCNC: 10 MMOL/L (ref 7–16)
BASOPHILS ABSOLUTE: 0.01 E9/L (ref 0–0.2)
BASOPHILS RELATIVE PERCENT: 0.1 % (ref 0–2)
BUN BLDV-MCNC: 32 MG/DL (ref 6–23)
C-REACTIVE PROTEIN: 0.3 MG/DL (ref 0–0.4)
CALCIUM SERPL-MCNC: 8.7 MG/DL (ref 8.6–10.2)
CHLORIDE BLD-SCNC: 105 MMOL/L (ref 98–107)
CO2: 24 MMOL/L (ref 22–29)
CREAT SERPL-MCNC: 0.8 MG/DL (ref 0.5–1)
EOSINOPHILS ABSOLUTE: 0 E9/L (ref 0.05–0.5)
EOSINOPHILS RELATIVE PERCENT: 0 % (ref 0–6)
GFR AFRICAN AMERICAN: >60
GFR NON-AFRICAN AMERICAN: >60 ML/MIN/1.73
GLUCOSE BLD-MCNC: 123 MG/DL (ref 74–99)
HCT VFR BLD CALC: 36.7 % (ref 34–48)
HEMOGLOBIN: 12.2 G/DL (ref 11.5–15.5)
IMMATURE GRANULOCYTES #: 0.18 E9/L
IMMATURE GRANULOCYTES %: 1.2 % (ref 0–5)
LYMPHOCYTES ABSOLUTE: 1.52 E9/L (ref 1.5–4)
LYMPHOCYTES RELATIVE PERCENT: 10.4 % (ref 20–42)
MCH RBC QN AUTO: 28.6 PG (ref 26–35)
MCHC RBC AUTO-ENTMCNC: 33.2 % (ref 32–34.5)
MCV RBC AUTO: 85.9 FL (ref 80–99.9)
MONOCYTES ABSOLUTE: 0.44 E9/L (ref 0.1–0.95)
MONOCYTES RELATIVE PERCENT: 3 % (ref 2–12)
NEUTROPHILS ABSOLUTE: 12.48 E9/L (ref 1.8–7.3)
NEUTROPHILS RELATIVE PERCENT: 85.3 % (ref 43–80)
PDW BLD-RTO: 13.3 FL (ref 11.5–15)
PLATELET # BLD: 244 E9/L (ref 130–450)
PMV BLD AUTO: 10.5 FL (ref 7–12)
POTASSIUM REFLEX MAGNESIUM: 4.3 MMOL/L (ref 3.5–5)
RBC # BLD: 4.27 E12/L (ref 3.5–5.5)
SODIUM BLD-SCNC: 139 MMOL/L (ref 132–146)
WBC # BLD: 14.6 E9/L (ref 4.5–11.5)

## 2022-08-12 PROCEDURE — 2580000003 HC RX 258: Performed by: INTERNAL MEDICINE

## 2022-08-12 PROCEDURE — 6370000000 HC RX 637 (ALT 250 FOR IP): Performed by: NURSE PRACTITIONER

## 2022-08-12 PROCEDURE — 80048 BASIC METABOLIC PNL TOTAL CA: CPT

## 2022-08-12 PROCEDURE — 6360000002 HC RX W HCPCS: Performed by: NURSE PRACTITIONER

## 2022-08-12 PROCEDURE — 6370000000 HC RX 637 (ALT 250 FOR IP): Performed by: INTERNAL MEDICINE

## 2022-08-12 PROCEDURE — 86140 C-REACTIVE PROTEIN: CPT

## 2022-08-12 PROCEDURE — 36415 COLL VENOUS BLD VENIPUNCTURE: CPT

## 2022-08-12 PROCEDURE — 85025 COMPLETE CBC W/AUTO DIFF WBC: CPT

## 2022-08-12 RX ORDER — DEXAMETHASONE 6 MG/1
6 TABLET ORAL DAILY
Qty: 6 TABLET | Refills: 0 | Status: SHIPPED | OUTPATIENT
Start: 2022-08-13 | End: 2022-08-19

## 2022-08-12 RX ORDER — LEVETIRACETAM 500 MG/1
500 TABLET ORAL 2 TIMES DAILY
Qty: 8 TABLET | Refills: 0 | Status: SHIPPED | OUTPATIENT
Start: 2022-08-12 | End: 2022-09-09

## 2022-08-12 RX ORDER — AMLODIPINE BESYLATE 10 MG/1
10 TABLET ORAL DAILY
Qty: 30 TABLET | Refills: 0 | Status: SHIPPED | OUTPATIENT
Start: 2022-08-13 | End: 2022-08-22 | Stop reason: SDUPTHER

## 2022-08-12 RX ORDER — ZINC SULFATE 50(220)MG
50 CAPSULE ORAL DAILY
Qty: 30 CAPSULE | Refills: 0 | COMMUNITY
Start: 2022-08-13 | End: 2022-09-29

## 2022-08-12 RX ADMIN — LEVETIRACETAM 500 MG: 500 TABLET, FILM COATED ORAL at 08:32

## 2022-08-12 RX ADMIN — LEVOTHYROXINE SODIUM 88 MCG: 0.09 TABLET ORAL at 10:30

## 2022-08-12 RX ADMIN — FUROSEMIDE 40 MG: 40 TABLET ORAL at 08:32

## 2022-08-12 RX ADMIN — Medication 2000 UNITS: at 08:32

## 2022-08-12 RX ADMIN — Medication 50 MG: at 08:32

## 2022-08-12 RX ADMIN — DEXAMETHASONE 6 MG: 4 TABLET ORAL at 08:32

## 2022-08-12 RX ADMIN — ACETAMINOPHEN 650 MG: 325 TABLET, FILM COATED ORAL at 13:29

## 2022-08-12 RX ADMIN — METOPROLOL SUCCINATE 25 MG: 25 TABLET, EXTENDED RELEASE ORAL at 08:32

## 2022-08-12 RX ADMIN — LOSARTAN POTASSIUM 100 MG: 25 TABLET, FILM COATED ORAL at 08:44

## 2022-08-12 RX ADMIN — Medication 2 CAPSULE: at 10:30

## 2022-08-12 RX ADMIN — OXYCODONE HYDROCHLORIDE AND ACETAMINOPHEN 500 MG: 500 TABLET ORAL at 08:32

## 2022-08-12 RX ADMIN — CITALOPRAM HYDROBROMIDE 10 MG: 10 TABLET ORAL at 08:32

## 2022-08-12 RX ADMIN — SODIUM CHLORIDE, PRESERVATIVE FREE 10 ML: 5 INJECTION INTRAVENOUS at 08:32

## 2022-08-12 RX ADMIN — POTASSIUM CHLORIDE 20 MEQ: 1500 TABLET, EXTENDED RELEASE ORAL at 08:32

## 2022-08-12 RX ADMIN — DOCUSATE SODIUM 100 MG: 100 CAPSULE, LIQUID FILLED ORAL at 08:32

## 2022-08-12 RX ADMIN — AMLODIPINE BESYLATE 10 MG: 10 TABLET ORAL at 08:32

## 2022-08-12 RX ADMIN — PANTOPRAZOLE SODIUM 40 MG: 40 TABLET, DELAYED RELEASE ORAL at 06:07

## 2022-08-12 ASSESSMENT — PAIN DESCRIPTION - LOCATION: LOCATION: HEAD;EYE

## 2022-08-12 ASSESSMENT — PAIN SCALES - GENERAL
PAINLEVEL_OUTOF10: 0
PAINLEVEL_OUTOF10: 0
PAINLEVEL_OUTOF10: 10

## 2022-08-12 NOTE — CARE COORDINATION
8/11:  Update Cm Note:  Pt presented to the ER after being referred from Urgent care for elevated BP & + Covid test on 8/5, covid symptoms started on 8/1. Pt has PMH of stroke in 4/2022. Pt was started on Cardene gtt & transferred to MICU. Pt is on 2L/Nc at 98%. Pt is ISO/Droplet Plus for Covid + on 8/9. Pt has a left eye prosthesis. Cm spoke with her daughter Amanda Ramey at [phone]. Pt's PCP is Dr. Chidi Kincaid uses the [facility] [place]. Pt lives alone in a 1st floor apartment with 1 step to enter. The bed/bathroom are on the 1st level. Pt has a cane at home at she uses when she travels outside the house. Pt has a Cpap that she normally doesn't wear but no other DME. Pt has no hx of DME/ HHC/SNF & no preferences. Per daughter dc plan is hopefully home will see what PT 18/24 OT eval from the 10th 16/24. Pt's family has concerns about transportation due to her Covid dx. But daughter states her mother has transportation via her insurance. Pt has [address] Pt maybe active with [facility],  CM left messages for Juan Zuluaga at [phone] or Tatyana Mcdowell [phone], pending a call back. Pt doesn't have 02 at home will need 02 testing. Sw/CM will continue to follow for dc planning.   Electronically signed by Yi Araya RN on 8/12/2022 at 7:20 AM

## 2022-08-12 NOTE — PROGRESS NOTES
Follow up appointments added to AVS. Electronically signed by Quang Rivers RN on 8/12/2022 at 12:13 PM

## 2022-08-12 NOTE — PROGRESS NOTES
CLINICAL PHARMACY NOTE: MEDS TO BEDS    Total # of Prescriptions Filled: 3   The following medications were delivered to the patient:  Levetiracetam 500 mg  Amlodipine besylate 10 mg  Dexamethasone 6 mg  Delivered to RN @ 2:57p    Additional Documentation:

## 2022-08-12 NOTE — PROGRESS NOTES
[facility]    Subjective:  pt alert conversive did well with pt/ot      Current Facility-Administered Medications:     perflutren lipid microspheres (DEFINITY) injection 1.65 mg, 1.5 mL, IntraVENous, ONCE PRN, BETINA Ramirez CNP    atorvastatin (LIPITOR) tablet 40 mg, 40 mg, Oral, Nightly, Paresh Jordan Malmer, DO, 40 mg at 08/11/22 2144    citalopram (CELEXA) tablet 10 mg, 10 mg, Oral, Daily, Paresh Jordan Malmer, DO, 10 mg at 08/12/22 9378    cetirizine (ZYRTEC) tablet 10 mg, 10 mg, Oral, Daily PRN, Paresh Persaudmer, DO, 10 mg at 08/10/22 1338    docusate sodium (COLACE) capsule 100 mg, 100 mg, Oral, BID, Paresh Jordan Malmer, DO, 100 mg at 08/12/22 2421    furosemide (LASIX) tablet 40 mg, 40 mg, Oral, Daily, Paresh Jordan Malmer, DO, 40 mg at 08/12/22 4504    ocuvite-lutein multivitamin 2 capsule, 2 capsule, Oral, BID, Paresh Jordan Malmer, DO, 2 capsule at 08/12/22 1030    potassium chloride (KLOR-CON M) extended release tablet 20 mEq, 20 mEq, Oral, Daily, Paresh Jordan Malmer, DO, 20 mEq at 08/12/22 9271    pantoprazole (PROTONIX) tablet 40 mg, 40 mg, Oral, QAM AC, BETINA Munguia - CNP, 40 mg at 08/12/22 6634    amLODIPine (NORVASC) tablet 10 mg, 10 mg, Oral, Daily, Suyapa Manzanares APRN - CNP, 10 mg at 08/12/22 7733    sodium chloride flush 0.9 % injection 5-40 mL, 5-40 mL, IntraVENous, 2 times per day, Ginny Noble, DO, 10 mL at 08/12/22 7321    sodium chloride flush 0.9 % injection 5-40 mL, 5-40 mL, IntraVENous, PRN, Paresh Persaudmer, DO    0.9 % sodium chloride infusion, , IntraVENous, PRN, Terrea Julian Gibson DO, Last Rate: 10 mL/hr at 08/11/22 2200, Rate Verify at 08/11/22 2200    ondansetron (ZOFRAN-ODT) disintegrating tablet 4 mg, 4 mg, Oral, Q8H PRN, 4 mg at 08/11/22 0900 **OR** ondansetron (ZOFRAN) injection 4 mg, 4 mg, IntraVENous, Q6H PRN, Paresh Gibson DO    polyethylene glycol (GLYCOLAX) packet 17 g, 17 g, Oral, Daily PRN, Paresh Gibson DO    metoprolol succinate (TOPROL XL) extended release tablet 25 mg, 25 mg, Oral, Daily, Kaleb Alvarez, APRN - CNP, 25 mg at 08/12/22 7342    losartan (COZAAR) tablet 100 mg, 100 mg, Oral, Daily, Otilia Silvius Malmer, DO, 100 mg at 08/12/22 0844    levothyroxine (SYNTHROID) tablet 88 mcg, 88 mcg, Oral, Daily, Otilia Silvius Malmer, DO, 88 mcg at 08/12/22 1030    levETIRAcetam (KEPPRA) tablet 500 mg, 500 mg, Oral, BID, Otilia Silvius Malmer, DO, 500 mg at 08/12/22 4666    acetaminophen (TYLENOL) tablet 650 mg, 650 mg, Oral, Q6H PRN, 650 mg at 08/10/22 0019 **OR** acetaminophen (TYLENOL) suppository 650 mg, 650 mg, Rectal, Q6H PRN, Suyapa Manzanares, APRN - CNP    guaiFENesin-dextromethorphan (ROBITUSSIN DM) 100-10 MG/5ML syrup 5 mL, 5 mL, Oral, Q4H PRN, Suyapa Manzanares APRN - CNP    dexamethasone (DECADRON) tablet 6 mg, 6 mg, Oral, Daily, SuyapaBETINA Adair - CNP, 6 mg at 08/12/22 0065    zinc sulfate (ZINCATE) capsule 50 mg, 50 mg, Oral, Daily, BETINA Munguia - CNP, 50 mg at 08/12/22 4672    vitamin D (CHOLECALCIFEROL) tablet 2,000 Units, 2,000 Units, Oral, Daily, BETINA Munguia - CNP, 2,000 Units at 08/12/22 5268    ascorbic acid (VITAMIN C) tablet 500 mg, 500 mg, Oral, Daily, Kristenzahra Heart APRN - CNP, 500 mg at 08/12/22 9209    Objective:    /77   Pulse 53   Temp 97.8 °F (36.6 °C) (Oral)   Resp 16   Ht 4' 11\" (1.499 m)   Wt 190 lb (86.2 kg)   LMP 07/24/2014   SpO2 95%   BMI 38.38 kg/m²     Heart:  reg  Lungs:  ctab  Abd: + bs soft nontender  Extrem:  min edema legs    CBC with Differential:    Lab Results   Component Value Date/Time    WBC 14.6 08/12/2022 05:01 AM    RBC 4.27 08/12/2022 05:01 AM    HGB 12.2 08/12/2022 05:01 AM    HCT 36.7 08/12/2022 05:01 AM     08/12/2022 05:01 AM    MCV 85.9 08/12/2022 05:01 AM    MCH 28.6 08/12/2022 05:01 AM    MCHC 33.2 08/12/2022 05:01 AM    RDW 13.3 08/12/2022 05:01 AM    SEGSPCT 58 03/11/2014 08:46 AM    LYMPHOPCT 10.4 08/12/2022 05:01 AM    MONOPCT 3.0 08/12/2022 05:01 AM    EOSPCT 1 10/07/2010 01:14 PM BASOPCT 0.1 08/12/2022 05:01 AM    MONOSABS 0.44 08/12/2022 05:01 AM    LYMPHSABS 1.52 08/12/2022 05:01 AM    EOSABS 0.00 08/12/2022 05:01 AM    BASOSABS 0.01 08/12/2022 05:01 AM     CMP:    Lab Results   Component Value Date/Time     08/12/2022 05:01 AM    K 4.3 08/12/2022 05:01 AM     08/12/2022 05:01 AM    CO2 24 08/12/2022 05:01 AM    BUN 32 08/12/2022 05:01 AM    CREATININE 0.8 08/12/2022 05:01 AM    GFRAA >60 08/12/2022 05:01 AM    LABGLOM >60 08/12/2022 05:01 AM    GLUCOSE 123 08/12/2022 05:01 AM    GLUCOSE 109 05/04/2012 06:17 AM    PROT 7.0 08/09/2022 10:31 PM    LABALBU 3.8 08/09/2022 10:31 PM    LABALBU 4.2 03/24/2012 11:45 PM    CALCIUM 8.7 08/12/2022 05:01 AM    BILITOT 0.3 08/09/2022 10:31 PM    ALKPHOS 61 08/09/2022 10:31 PM    AST 24 08/09/2022 10:31 PM    ALT 13 08/09/2022 10:31 PM     Warfarin PT/INR:    Lab Results   Component Value Date    INR 0.9 08/10/2022    INR 0.9 08/18/2020    INR 0.9 12/29/2019    PROTIME 10.2 08/10/2022    PROTIME 10.9 08/18/2020    PROTIME 10.5 12/29/2019       Assessment:    Principal Problem:    Hypertensive crisis, unspecified  Active Problems:    Hypertensive emergency    Acute respiratory distress    SARS-CoV-2 positive    Acute kidney injury ([facility] [address]  Resolved Problems:    * No resolved hospital problems.  *      Plan:  Dc home once arrangements made        Glenn Graff DO  12:38 PM  8/12/2022

## 2022-08-12 NOTE — CARE COORDINATION
Social Work Discharge Planning:  Patient leaving today returning home. HEATHER arranged transportation through [facility] # [id number] at 2 PM. HEATHER notified the patient and patient's daughter.     Electronically signed by BELINDA Munoz on 8/12/2022 at 12:29 PM

## 2022-08-15 ENCOUNTER — TELEPHONE (OUTPATIENT)
Dept: FAMILY MEDICINE CLINIC | Age: 82
End: 2022-08-15

## 2022-08-15 ENCOUNTER — CARE COORDINATION (OUTPATIENT)
Dept: CARE COORDINATION | Age: 82
End: 2022-08-15

## 2022-08-15 DIAGNOSIS — I10 HYPERTENSION, UNSPECIFIED TYPE: Primary | ICD-10-CM

## 2022-08-15 PROCEDURE — 1111F DSCHRG MED/CURRENT MED MERGE: CPT | Performed by: FAMILY MEDICINE

## 2022-08-15 NOTE — CARE COORDINATION
[facility] Transitions Initial Follow Up Call    Call within 2 business days of discharge: Yes    Patient: Ruth Ann Price Patient : 1940   MRN: [id number]  Reason for Admission: Hypertensive emergency  Discharge Date: 22 RARS: Readmission Risk Score: 17.4      Last Discharge [facility]       Date Complaint Diagnosis Description Type Department Provider    22 Hypertension Cerebral hemorrhage ([facility] [address] . .. ED to Hosp-Admission (Discharged) (ADMITTED) [facility] 4S PICU Wanda Ovalle DO; Dariusz SIDDIQUI Tho... Spoke with: patient. Shelly Sheehan stated she is doing okay since discharge. She stated that she is asymptomatic regarding Covid symptoms. Denies shortness of breath, chest pain, fevers, chills, lightheadedness, dizziness, cough, n/v/d. She does have a home blood pressure monitor and stated that today's blood pressure was 137/ but could not remember the bottom number. Denies any headache, numbness, tingling, aphasia. She did state that she is blind in one eye and her vision is not the best in the other eye. All medications written on discharge have been filled and patient is taking them as written. Medications were reviewed. 1111f completed. She stated that she was interested in having home health to help monitor her BP as well as assess her neurologic status. CTN placed a call to PCP office to gets orders for Home Health. Spoke with Marie Diggs at PCP office who notified that PCP was out of the office this week and will check with covering physician for orders and let the CTN know  Patient also stated she has an appointment with [address] [place] [address] on Wednesday for eye shots for her macular degeneration. She wasn't sure if she should keep the appointment or not. CTN offered to place a call to the office which she accepted. CTN notified [facility] of patient's question and spoke with Kaykay who is going to check with Dr. Mraixa Bailey and will contact CTN for further advisement.   CTN will notify patient. Appetite: States appetite is good. Staying hydration. Bowels/bladder:  She stated she is constipation and has been taking Colace and Miralax. She will continue to monitor. Follow up appointments:  HFU with PP 22. Care Transitions explained to patient who agrees to follow up calls. Contact information given. Patient has no other concerns or needs at this time. Will follow. Facility: [facility]    Non-face-to-face services provided:  Obtained and reviewed discharge summary and/or continuity of care documents    Transitions of Care Initial Call    Was this an external facility discharge? No Discharge Facility: [facility]      Challenges to be reviewed by the provider   Additional needs identified to be addressed with provider: No  none             Method of communication with provider : none    Advance Care Planning:   Does patient have an Advance Directive: reviewed and current. Care Transition Nurse contacted the patient by telephone to perform post hospital discharge assessment. Verified name and  with patient as identifiers. Provided introduction to self, and explanation of the CTN role. CTN reviewed discharge instructions, medical action plan and red flags with patient who verbalized understanding. Patient given an opportunity to ask questions and does not have any further questions or concerns at this time. Were discharge instructions available to patient? Yes. Reviewed appropriate site of care based on symptoms and resources available to patient including: PCP  Specialist  When to call 911. The patient agrees to contact the PCP office for questions related to their healthcare. Medication reconciliation was performed with patient, who verbalizes understanding of administration of home medications. Advised obtaining a 90-day supply of all daily and as-needed medications. Was patient discharged with a pulse oximeter? no    CTN provided contact information.  Plan for

## 2022-08-15 NOTE — TELEPHONE ENCOUNTER
Pt called stating she was recently discharged from the hospital d/t a slight brain bleed. She called asking for clarification on her hypertensive medications. I read them with the patient and verified dosage. Pt then asked if she was to continue taking them. I told the patient I was unsure and that there is no doctor here today and advised to call back tomorrow. Pt states she was told at the hospital to resume all normal medications. Pt was agitated and told me \"how do I know what to take if you don't even know\"? I told the patient that I am only a nurse and I am unable to prescribe or advise pt when to stop taking medications and that she would need to wait for the doctor to be in office. Pt stated, \"Oh my God, just forget it\". Pt hung up the phone.

## 2022-08-16 ENCOUNTER — TELEPHONE (OUTPATIENT)
Dept: FAMILY MEDICINE CLINIC | Age: 82
End: 2022-08-16

## 2022-08-16 DIAGNOSIS — R06.03 ACUTE RESPIRATORY DISTRESS: ICD-10-CM

## 2022-08-16 DIAGNOSIS — U07.1 SARS-COV-2 POSITIVE: Primary | ICD-10-CM

## 2022-08-16 NOTE — TELEPHONE ENCOUNTER
[facility] called in stating that they can not go to pts house to fill meds for her in her med dispenser. The lady stated that when she called pt to let her know, pt became very irate and upset.      I apologized to the lady and told her that I will forward this info to

## 2022-08-17 ENCOUNTER — TELEPHONE (OUTPATIENT)
Dept: FAMILY MEDICINE CLINIC | Age: 82
End: 2022-08-17

## 2022-08-17 NOTE — TELEPHONE ENCOUNTER
LSW initiated call to patient at 12:43 pm.  LSW inquired about pts medications. Pt stated that she cannot see and needs someone to come fill up her medications. LSW informed pt that [id number] [facility] does not provide that service. Pt state that someone from [facility]) was coming in on Thursday, August 18, to fill up medications. LSW called [facility] and inquired about services being provided to pt. [facility] stated that they are providing pt with Physical Therapy, Occupational Therapy, and Nursing. LSW inquired if nursing included putting the medications in her pill dispenser. [facility] denied and stated that they provided BP, HR, RR, weight, and temperature. LSW explained that [facility] will ensure that pt understands her medications, but not packaged them for her. Pt got upset. LSW called pt back and explained this to pt and LSW let pt know that LSW could have the doctor send her medications into [employer]. Pt became very upset and irate stating that LSW was lying. LSW explained [employer] further to pt and that they are prepacked and delivered to her door. She continue to state that she does not believe LSW. Pt stated that she is not interested in this. LSW offered to provide pt with [facility] phone number to call and talk to them directly. Pt denied wanting phone number. LSW encouraged pt to discuss with nursing staff further when they come in for her visit next week.

## 2022-08-22 ENCOUNTER — TELEPHONE (OUTPATIENT)
Dept: FAMILY MEDICINE CLINIC | Age: 82
End: 2022-08-22

## 2022-08-22 ENCOUNTER — OFFICE VISIT (OUTPATIENT)
Dept: FAMILY MEDICINE CLINIC | Age: 82
End: 2022-08-22
Payer: MEDICARE

## 2022-08-22 VITALS
TEMPERATURE: 97 F | HEIGHT: 59 IN | SYSTOLIC BLOOD PRESSURE: 110 MMHG | WEIGHT: 190 LBS | DIASTOLIC BLOOD PRESSURE: 60 MMHG | OXYGEN SATURATION: 98 % | BODY MASS INDEX: 38.3 KG/M2 | HEART RATE: 50 BPM

## 2022-08-22 DIAGNOSIS — R35.0 URINARY FREQUENCY: ICD-10-CM

## 2022-08-22 DIAGNOSIS — I10 PRIMARY HYPERTENSION: Primary | ICD-10-CM

## 2022-08-22 LAB
BILIRUBIN, POC: NORMAL
BLOOD URINE, POC: NORMAL
CLARITY, POC: CLEAR
COLOR, POC: YELLOW
GLUCOSE URINE, POC: NORMAL
KETONES, POC: NORMAL
LEUKOCYTE EST, POC: NORMAL
NITRITE, POC: NORMAL
PH, POC: 5.5
PROTEIN, POC: NORMAL
SPECIFIC GRAVITY, POC: 1.02
UROBILINOGEN, POC: 0.2

## 2022-08-22 PROCEDURE — 1124F ACP DISCUSS-NO DSCNMKR DOCD: CPT | Performed by: FAMILY MEDICINE

## 2022-08-22 PROCEDURE — 1036F TOBACCO NON-USER: CPT | Performed by: FAMILY MEDICINE

## 2022-08-22 PROCEDURE — G8400 PT W/DXA NO RESULTS DOC: HCPCS | Performed by: FAMILY MEDICINE

## 2022-08-22 PROCEDURE — 1111F DSCHRG MED/CURRENT MED MERGE: CPT | Performed by: FAMILY MEDICINE

## 2022-08-22 PROCEDURE — 1090F PRES/ABSN URINE INCON ASSESS: CPT | Performed by: FAMILY MEDICINE

## 2022-08-22 PROCEDURE — G8427 DOCREV CUR MEDS BY ELIG CLIN: HCPCS | Performed by: FAMILY MEDICINE

## 2022-08-22 PROCEDURE — G8417 CALC BMI ABV UP PARAM F/U: HCPCS | Performed by: FAMILY MEDICINE

## 2022-08-22 PROCEDURE — 99213 OFFICE O/P EST LOW 20 MIN: CPT | Performed by: FAMILY MEDICINE

## 2022-08-22 PROCEDURE — 81002 URINALYSIS NONAUTO W/O SCOPE: CPT | Performed by: FAMILY MEDICINE

## 2022-08-22 RX ORDER — ATORVASTATIN CALCIUM 40 MG/1
40 TABLET, FILM COATED ORAL DAILY
Qty: 90 TABLET | Refills: 3 | Status: SHIPPED
Start: 2022-08-22 | End: 2022-10-08

## 2022-08-22 RX ORDER — AMLODIPINE BESYLATE 10 MG/1
10 TABLET ORAL DAILY
Qty: 90 TABLET | Refills: 3 | Status: SHIPPED | OUTPATIENT
Start: 2022-08-22

## 2022-08-22 NOTE — TELEPHONE ENCOUNTER
Spoke with patient. States she would be interested but is worried she may not be able to read the packages to ensure correct date and time.

## 2022-08-22 NOTE — PROGRESS NOTES
8/22/2022    Chief Complaint   Patient presents with    Follow-up    Hypertension    Urinary Frequency     And pressure         Ivis Arms is a 78 y.o. patient that presents today for:    Hypertension: Here for follow up chronic hypertension. Patient is  compliant with lifestyle modifications like exercise and adherence to a low salt diet. Patient is  well controlled. Denies chest pain, diaphoresis, dyspnea, dyspnea on exertion, peripheral edema, palpitations, HA, visual issues. Med list reviewed. Taking as prescribed. No adverse effects. Feeling well otherwise, no complaints. Patient's past medical, surgical, social and/or family history reviewed, updated in chart, and are non-contributory (unless otherwise stated). Medications and allergies also reviewed and updated in chart. ROS negative unless otherwise specified    Physical Exam  Wt Readings from Last 3 Encounters:   08/22/22 190 lb (86.2 kg)   08/10/22 190 lb (86.2 kg)   08/09/22 185 lb (83.9 kg)     Temp Readings from Last 3 Encounters:   08/22/22 97 °F (36.1 °C)   08/12/22 97.8 °F (36.6 °C) (Oral)   08/09/22 97.6 °F (36.4 °C) (Temporal)     BP Readings from Last 3 Encounters:   08/22/22 110/60   08/12/22 128/64   08/09/22 (!) 208/70     Pulse Readings from Last 3 Encounters:   08/22/22 50   08/12/22 54   08/09/22 62       General appearance: alert, well appearing, and in no distress, oriented to person, place, and time and normal appearing weight. CVS exam: normal rate, regular rhythm, normal S1, S2, no murmurs, rubs, clicks or gallops. Radial pulses 2+ bilateral.  PT/DP pulse 2+ bilat. No C/C/E    Chest: clear to auscultation, no wheezes, rales or rhonchi, symmetric air entry.      Abdomen: Soft, non-tender, non-distended, positive BS in all 4 quadrants    Extremities:Dorsalis pedis pulses palpated bilaterally, no clubbing, cyanosis, edema or erythema     SKIN: warm, dry, no lesions, jaundice, petechiae, pallor, cyanosis, ecchymosis    NEURO: gross motor exam normal by observation, gait normal    Mental status - alert, oriented to person, place, and time, normal mood, behavior, speech, dress, motor activity, and thought processes      Assessment/Plan  Ina Prader was seen today for follow-up, hypertension and urinary frequency. Diagnoses and all orders for this visit:    Urinary frequency  -     POCT Urinalysis no Micro    Primary hypertension  -     amLODIPine (NORVASC) 10 MG tablet; Take 1 tablet by mouth daily      No follow-ups on file. Call or go to ED immediately if symptoms worsen or persist.    Educational materials and/or home exercises printed for patient's review and were included in patient instructions on his/her After Visit Summary and given to patient at the end of visit. Counseled regarding above diagnosis, including possible risks and complications,  especially if left uncontrolled. Counseled regarding the possible side effects, risks, benefits and alternatives to treatment; patient and/or guardian verbalizes understanding, agrees, feels comfortable with and wishes to proceed with above treatment plan. Advised patient to call with any new medication issues, and read all Rx info from pharmacy to assure aware of all possible risks and side effects of medication before taking. Reviewed age and gender appropriate health screening exams and vaccinations. Advised patient regarding importance of keeping up with recommended health maintenance and to schedule as soon as possible if overdue, as this is important in assessing for undiagnosed pathology, especially cancer, as well as protecting against potentially harmful/life threatening disease. Patient and/or guardian verbalizes understanding and agrees with above counseling, assessment and plan. All questions answered.       Daja Peña, DO

## 2022-08-24 ENCOUNTER — CARE COORDINATION (OUTPATIENT)
Dept: CARE COORDINATION | Age: 82
End: 2022-08-24

## 2022-08-24 NOTE — CARE COORDINATION
Care Transitions Outreach Attempt    Call within 2 business days of discharge: No   Attempted to reach patient for transitions of care follow up. Unable to reach patient. There was no answer. Unable to leave a message. Will attempt to reach patient again. Patient: Kris Seip Patient : 1940 MRN: [id number]    Last Discharge [facility]       Date Complaint Diagnosis Description Type Department Provider    22 Hypertension Cerebral hemorrhage ([facility] [address] . .. ED to Hosp-Admission (Discharged) (ADMITTED) [facility] 4S PICU Kai Ponce DO; Piper SIDDIQUI Tho... Was this an external facility discharge?  No Discharge Facility: [facility]      Noted following upcoming appointments from discharge chart review:   [facility] follow up appointment(s):   Future Appointments   Date Time Provider Tay Singh   2022 10:00 AM [facility] CT SCAN 2 [facility] CT [facility] Radiolo   2022 11:30 AM YUDITH Mobley [facility] NSURG [facility]   2022  9:00 AM BETINA Mo - CNS [place] NEURO [facility]   2022 10:35 AM DO Elvis [facility]     Non-[facility] follow up appointment(s):

## 2022-08-25 LAB — URINE CULTURE, ROUTINE: NORMAL

## 2022-09-01 ENCOUNTER — CARE COORDINATION (OUTPATIENT)
Dept: CASE MANAGEMENT | Age: 82
End: 2022-09-01

## 2022-09-01 NOTE — CARE COORDINATION
[facility] Transitions Follow Up Call    2022    Patient: Lesvia Gold  Patient : 1940   MRN: [id number]  Reason for Admission: Hypertensive Emergency  Discharge Date: 22 RARS: Readmission Risk Score: 17.4         Spoke briefly with: Patient, Orestes Sevilla  CTN introduced self and explained role/reason for phone call.   -Patient reports this is not a good time d/t there was a death in the family. Emotional support provided; will continue to follow.        Follow Up  Future Appointments   Date Time Provider Tay Singh   2022 10:00 AM [facility] CT SCAN 2 [facility] CT [facility] Radiolo   [facility]   2022  9:00 AM BETINA Fink - CNS [facility]   2022 10:35 AM DO Elvis Schulte [facility]       Jose Eduardo Molina RN

## 2022-09-08 ENCOUNTER — CARE COORDINATION (OUTPATIENT)
Dept: CARE COORDINATION | Age: 82
End: 2022-09-08

## 2022-09-08 ENCOUNTER — TELEPHONE (OUTPATIENT)
Dept: FAMILY MEDICINE CLINIC | Age: 82
End: 2022-09-08

## 2022-09-08 NOTE — CARE COORDINATION
Care Transitions Outreach Attempt    Call within 2 business days of discharge: No   Attempted to reach patient for transitions of care follow up. No answer. Unable to leave a message. Will attempt to contact patient again. Patient: Nancy Kennedy Patient : 1940 MRN: [id number]    Last Discharge [facility]       Date Complaint Diagnosis Description Type Department Provider    22 Hypertension Cerebral hemorrhage ([address] . .. ED to Hosp-Admission (Discharged) (ADMITTED) [facility] 4S PICU [facility] Was [facility] an external [facility] discharge?  No Discharge Facility: [facility]      Noted following upcoming appointments from discharge chart review:   [facility] follow up appointment(s):   Future Appointments   Date Time Provider Tay Singh   2022 10:00 AM [facility] CT SCAN 2 [facility] CT [facility] Radiolo   2022 11:30 AM YUDITH Coronado [facility] NSURG [facility]   2022  9:00 AM BETINA Chatterjee - CNS [facility] NEURO [facility]   2022 10:35 AM DO Elvis [facility]     Non-[facility] follow up appointment(s):

## 2022-09-08 NOTE — TELEPHONE ENCOUNTER
Home health care called wanted to start home care early on the 23 rd instead of the 25 th, home care wants to know if she can put this on the treatment order and if its okay to send over for you to sign.      Contact for home care:   Phone: [phone]   Libra Baer RN

## 2022-09-09 ENCOUNTER — HOSPITAL ENCOUNTER (OUTPATIENT)
Dept: CT IMAGING | Age: 82
Discharge: HOME | End: 2022-09-11
Payer: MEDICARE

## 2022-09-09 ENCOUNTER — OFFICE VISIT (OUTPATIENT)
Dept: NEUROSURGERY | Age: 82
End: 2022-09-09
Payer: MEDICARE

## 2022-09-09 VITALS
RESPIRATION RATE: 20 BRPM | BODY MASS INDEX: 38.3 KG/M2 | DIASTOLIC BLOOD PRESSURE: 69 MMHG | SYSTOLIC BLOOD PRESSURE: 119 MMHG | TEMPERATURE: 98.1 F | OXYGEN SATURATION: 94 % | HEART RATE: 58 BPM | HEIGHT: 59 IN | WEIGHT: 190 LBS

## 2022-09-09 DIAGNOSIS — I60.9 SUBARACHNOID HEMORRHAGE (HCC): ICD-10-CM

## 2022-09-09 DIAGNOSIS — I61.8 OTHER RIGHT-SIDED NONTRAUMATIC INTRACEREBRAL HEMORRHAGE (HCC): Primary | ICD-10-CM

## 2022-09-09 PROCEDURE — 1036F TOBACCO NON-USER: CPT | Performed by: STUDENT IN AN ORGANIZED HEALTH CARE EDUCATION/TRAINING PROGRAM

## 2022-09-09 PROCEDURE — G8400 PT W/DXA NO RESULTS DOC: HCPCS | Performed by: STUDENT IN AN ORGANIZED HEALTH CARE EDUCATION/TRAINING PROGRAM

## 2022-09-09 PROCEDURE — 1124F ACP DISCUSS-NO DSCNMKR DOCD: CPT | Performed by: STUDENT IN AN ORGANIZED HEALTH CARE EDUCATION/TRAINING PROGRAM

## 2022-09-09 PROCEDURE — G8417 CALC BMI ABV UP PARAM F/U: HCPCS | Performed by: STUDENT IN AN ORGANIZED HEALTH CARE EDUCATION/TRAINING PROGRAM

## 2022-09-09 PROCEDURE — 70450 CT HEAD/BRAIN W/O DYE: CPT

## 2022-09-09 PROCEDURE — 99213 OFFICE O/P EST LOW 20 MIN: CPT | Performed by: STUDENT IN AN ORGANIZED HEALTH CARE EDUCATION/TRAINING PROGRAM

## 2022-09-09 PROCEDURE — 99212 OFFICE O/P EST SF 10 MIN: CPT

## 2022-09-09 PROCEDURE — 1090F PRES/ABSN URINE INCON ASSESS: CPT | Performed by: STUDENT IN AN ORGANIZED HEALTH CARE EDUCATION/TRAINING PROGRAM

## 2022-09-09 PROCEDURE — 1111F DSCHRG MED/CURRENT MED MERGE: CPT | Performed by: STUDENT IN AN ORGANIZED HEALTH CARE EDUCATION/TRAINING PROGRAM

## 2022-09-09 PROCEDURE — G8427 DOCREV CUR MEDS BY ELIG CLIN: HCPCS | Performed by: STUDENT IN AN ORGANIZED HEALTH CARE EDUCATION/TRAINING PROGRAM

## 2022-09-09 NOTE — PROGRESS NOTES
Hospital Follow-up     This is a 78year old female who presents to the office for a 1 month follow-up s/p ICH. Subjective: Patient states she is doing well. Denies any significant HA, dizziness or visual disturbances. No numbness or weakness. No new complaints. CT Head reviewed with patient. Physical Exam:              WDWN, no apparent distress              Non-labored breathing               Vitals Stable              Alert and oriented x3              CN 3-12 intact              PERRL              EOMI              HEDRICK well              Motor strength symmetric              Sensation to LT intact bilaterally   (-)drift                   Imagin2022 CT Head   Impression   The previously noted right occipital focus of intraparenchymal hemorrhage on   2022 is not present. Residual focus of edema versus changes of   encephalomalacia are noted in this region. Assessment: This is a 78 y.o.  female presenting for a 1 month follow-up s/p ICH. Plan:  -Pain control and expectations discussed  -Reviewed CT Head with patient in detail  -No restrictions, con resume and AP/AC medications  -OARRS report reviewed   -Follow-up in neurosurgery clinic prn  -Call or return to neurosurgery office sooner if symptoms worsen or if new issues arise in the interim.     Electronically signed by Ernst Tran PA-C on 2022 at 6:10 PM

## 2022-09-12 DIAGNOSIS — F33.1 MODERATE EPISODE OF RECURRENT MAJOR DEPRESSIVE DISORDER (HCC): ICD-10-CM

## 2022-09-12 RX ORDER — LEVOTHYROXINE SODIUM 88 UG/1
TABLET ORAL
Qty: 14 TABLET | Refills: 0 | Status: SHIPPED
Start: 2022-09-12 | End: 2022-09-29 | Stop reason: SDUPTHER

## 2022-09-12 RX ORDER — METOPROLOL SUCCINATE 25 MG/1
TABLET, EXTENDED RELEASE ORAL
Qty: 30 TABLET | Refills: 0 | Status: SHIPPED
Start: 2022-09-12 | End: 2022-09-29 | Stop reason: SDUPTHER

## 2022-09-15 NOTE — DISCHARGE SUMMARY
[address]                  [address] [facility],  [facility]                               DISCHARGE SUMMARY    PATIENT NAME: Katie Schmidt                    :        1940  MED REC NO:   [id number]                            ROOM:       55  ACCOUNT NO:   [de-identified]                           ADMIT DATE: 2022  PROVIDER:     Blanco Storey DO                  DISCHARGE DATE:  2022    DISCHARGE DIAGNOSES:  1. Hypertensive emergency. 2.  Intracerebral bleed. 3.  COVID-19 infection. 4.  History of CVA. 5.  GERD. 6.  Hypothyroidism. 7.  Prosthetic left eye. HOSPITAL COURSE:  The patient is an 47-year-old  female who  presented to the emergency room for evaluation of elevated blood  pressure. Diagnostic evaluation in the emergency room revealed a small  hemorrhage in right occipital lobe. The patient was admitted to the  intensive care unit. She was started on Cardene drip. She was seen by  Neurosurgery who recommended no neurosurgical intervention. She was  transitioned to oral blood pressure medication and discharged to home in  stable condition on 2022. DISCHARGE MEDICATIONS:  As per discharge med rec which include,  1. Zinc.  2.  Zyrtec. 3.  Celexa. 4.  Colace. 5.  Furosemide. 6.  Lidocaine patch. 7.  Losartan. 8.  Meclizine p.r.n.  9.  Zofran p.r.n.  10.  Protonix. 11.  Potassium chloride. 12.  PreserVision vitamin. 13.  Vitamin C.  14.  Vitamin D. 15.  Decadron. DISCHARGE INSTRUCTIONS:  The patient was instructed to follow up with  Dr. Rhonda Pirce, call office for appointment. Follow up with  Neurosurgery, call office for appointment.         Samuel Khalil DO    D: 2022 14:46:05       T: 2022 14:49:24     [id number]  Job#: [id number]     Doc#: [id number]    CC:

## 2022-09-22 ENCOUNTER — TELEPHONE (OUTPATIENT)
Dept: FAMILY MEDICINE CLINIC | Age: 82
End: 2022-09-22

## 2022-09-22 ENCOUNTER — OFFICE VISIT (OUTPATIENT)
Dept: FAMILY MEDICINE CLINIC | Age: 82
End: 2022-09-22
Payer: MEDICARE

## 2022-09-22 VITALS
DIASTOLIC BLOOD PRESSURE: 68 MMHG | TEMPERATURE: 98.1 F | BODY MASS INDEX: 38.9 KG/M2 | SYSTOLIC BLOOD PRESSURE: 122 MMHG | OXYGEN SATURATION: 98 % | HEART RATE: 63 BPM | WEIGHT: 192.6 LBS

## 2022-09-22 DIAGNOSIS — R21 RASH AND NONSPECIFIC SKIN ERUPTION: ICD-10-CM
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
## 2022-09-22 DIAGNOSIS — L29.9 PRURITUS: ICD-10-CM

## 2022-09-22 PROCEDURE — 96372 THER/PROPH/DIAG INJ SC/IM: CPT | Performed by: PHYSICIAN ASSISTANT

## 2022-09-22 PROCEDURE — G8417 CALC BMI ABV UP PARAM F/U: HCPCS | Performed by: PHYSICIAN ASSISTANT

## 2022-09-22 PROCEDURE — 1036F TOBACCO NON-USER: CPT | Performed by: PHYSICIAN ASSISTANT

## 2022-09-22 PROCEDURE — G8427 DOCREV CUR MEDS BY ELIG CLIN: HCPCS | Performed by: PHYSICIAN ASSISTANT

## 2022-09-22 PROCEDURE — 1090F PRES/ABSN URINE INCON ASSESS: CPT | Performed by: PHYSICIAN ASSISTANT

## 2022-09-22 PROCEDURE — 1124F ACP DISCUSS-NO DSCNMKR DOCD: CPT | Performed by: PHYSICIAN ASSISTANT

## 2022-09-22 PROCEDURE — 99214 OFFICE O/P EST MOD 30 MIN: CPT | Performed by: PHYSICIAN ASSISTANT

## 2022-09-22 PROCEDURE — G8400 PT W/DXA NO RESULTS DOC: HCPCS | Performed by: PHYSICIAN ASSISTANT

## 2022-09-22 RX ORDER — METHYLPREDNISOLONE ACETATE 40 MG/ML
40 INJECTION, SUSPENSION INTRA-ARTICULAR; INTRALESIONAL; INTRAMUSCULAR; SOFT TISSUE ONCE
Status: COMPLETED | OUTPATIENT
Start: 2022-09-22 | End: 2022-09-22

## 2022-09-22 RX ORDER — CEPHALEXIN 500 MG/1
500 CAPSULE ORAL 2 TIMES DAILY
Qty: 14 CAPSULE | Refills: 0 | Status: SHIPPED
Start: 2022-09-22 | End: 2022-09-29 | Stop reason: ALTCHOICE

## 2022-09-22 RX ORDER — METHYLPREDNISOLONE 4 MG/1
TABLET ORAL
Qty: 1 KIT | Refills: 0 | Status: SHIPPED
Start: 2022-09-22 | End: 2022-09-29 | Stop reason: ALTCHOICE

## 2022-09-22 RX ADMIN — METHYLPREDNISOLONE ACETATE 40 MG: 40 INJECTION, SUSPENSION INTRA-ARTICULAR; INTRALESIONAL; INTRAMUSCULAR; SOFT TISSUE at 11:02

## 2022-09-22 NOTE — TELEPHONE ENCOUNTER
She said  for the past few dats she is itchy, she took benadryl but it doesn't help. . She said  her back and her head . Leandro Chen

## 2022-09-22 NOTE — PROGRESS NOTES
22  Jackie Burgos : 1940 Sex: female  Age 78 y.o. Subjective:  Chief Complaint   Patient presents with    Insect Bite     Insect bite earlier in the week, now itchy all over body, has also had a lot of stress and anxiety         HPI:   Jackie Burgos , 78 y.o. female presents to express care for evaluation of insect bite    HPI  61-year-old female presents to express care for evaluation of a insect bite and rash and itching. The patient's had the symptoms ongoing for about a week now. The patient states that she was bit by something on the right breast area. They have been placing a topical Benadryl cream and seems to help the area but the patient has itching all throughout the body. The patient is not any fever, chills. No lightheadedness, dizziness. The patient is eating and drinking normally. No lip or tongue swelling. The patient denies any other complaints at this time. ROS:   Unless otherwise stated in this report the patient's positive and negative responses for review of systems for constitutional, eyes, ENT, cardiovascular, respiratory, gastrointestinal, neurological, , musculoskeletal, and integument systems and related systems to the presenting problem are either stated in the history of present illness or were not pertinent or were negative for the symptoms and/or complaints related to the presenting medical problem. Positives and pertinent negatives as per HPI. All others reviewed and are negative.       PMH:     Past Medical History:   Diagnosis Date    Amaurosis fugax of right eye 2017    Anxiety     Arthritis     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, treated with surgery and chemo    Cerebral artery occlusion with cerebral infarction ([address]     possibly TIA    Chronic back pain     Constipation     Depression     Elevated sed rate 2017    hx of    GERD (gastroesophageal reflux disease)     Hemorrhoids     history of    Hyperlipidemia     Hypertension Left foot pain     dropped a Kettle on foot    Lumbosacral radiculopathy 08/06/2020    Macular degeneration     Peripheral neuropathy 08/06/2020    Poor vision     right eye / had bleeding behind eye, is receiving \"shots\" at this time  / 3/22/2019    Prosthetic eye globe     left eye implant;    Seasonal allergies     Skipped heart beats     on metoprolol; managed by Dr. Fay Back    Sleep apnea     uses cpap at times     Thyroid disease        Past Surgical History:   Procedure Laterality Date    CHOLECYSTECTOMY  1985    open?     COLECTOMY  1974    COLONOSCOPY  04/26/2012    and egd    COLONOSCOPY  05/30/2014    COLONOSCOPY  01/08/2015    CYST REMOVAL  years ago    upper gum    EYE SURGERY  years ago    left eye removal,  has artificial eye    HYSTERECTOMY (CERVIX STATUS UNKNOWN)  1974    JOINT REPLACEMENT Left 2010/2012    x 2-knee    TONSILLECTOMY      TUMOR EXCISION      from arm, fatty    UPPER GASTROINTESTINAL ENDOSCOPY  05/30/2014    with biopsy    UPPER GASTROINTESTINAL ENDOSCOPY  01/08/2015    UPPER GASTROINTESTINAL ENDOSCOPY N/A 4/1/2019    EGD ESOPHAGOGASTRODUODENOSCOPY  ++IODINE ALLERGY++ and bx performed by Aparna Trejo MD at [address] [place] [address],[address] History   Problem Relation Age of Onset    Stroke Father     Hypertension Father     Heart Disease Father     Stroke Mother     Hypertension Mother     Other Mother         aneurysm    Heart Disease Mother     Hypertension Brother     Cancer Brother     Breast Cancer Sister     Hypertension Sister     Cancer Sister         breast ca    Heart Disease Sister     Hypertension Brother         parkinson    Heart Disease Brother     Cancer Brother     Cancer Brother     Cancer Brother     Cancer Brother     Heart Disease Brother     Cancer Brother     Cancer Sister     High Blood Pressure Daughter     Breast Cancer Daughter     High Blood Pressure Daughter        Medications:     Current Outpatient Medications:     methylPREDNISolone (MEDROL DOSEPACK) 4 MG for Allergies, Disp: , Rfl:     ondansetron (ZOFRAN) 4 MG tablet, Take 1 tablet by mouth 3 times daily as needed for Nausea or Vomiting, Disp: 30 tablet, Rfl: 2    Multiple Vitamins-Minerals (PRESERVISION AREDS 2) CAPS, Take 1 capsule by mouth 2 times daily, Disp: , Rfl:     Allergies: Allergies   Allergen Reactions    Iodine Shortness Of Breath, Swelling and Rash    Bee Pollen Swelling    Bee Venom     Codeine      Patient cannot remember reaction    Hydralazine     Oxycodone-Aspirin      unknown    Sulfa Antibiotics Other (See Comments) and Swelling    Amoxicillin-Pot Clavulanate Nausea And Vomiting    Aspirin Nausea And Vomiting    Darvocet [Propoxyphene N-Acetaminophen] Nausea And Vomiting    Eggs Or Egg-Derived Products Nausea And Vomiting    Influenza Vaccines Nausea And Vomiting    Percodan [Oxycodone-Aspirin] Nausea And Vomiting and Other (See Comments)     sick    Percodan [Oxycodone-Aspirin] Nausea And Vomiting       Social History:     Social History     Tobacco Use    Smoking status: Passive Smoke Exposure - Never Smoker    Smokeless tobacco: Never    Tobacco comments:      had smoked,  in    Vaping Use    Vaping Use: Never used   Substance Use Topics    Alcohol use: No    Drug use: Never       Patient lives at home. Physical Exam:     Vitals:    22 1036   BP: 122/68   Site: Right Upper Arm   Position: Sitting   Pulse: 63   Temp: 98.1 °F (36.7 °C)   TempSrc: Temporal   SpO2: 98%   Weight: 192 lb 9.6 oz (87.4 kg)       Exam:  Physical Exam  Nurses note and vital signs reviewed and patient is not hypoxic. General: The patient appears well and in no apparent distress. Patient is resting comfortably on cart. Skin: Warm, dry, no pallor noted. The patient has evidence of a maculopapular area noted to the superior portion of the right breast as well as 3 areas on the right scapular area. There is no rash to the remainder of the body. There is no petechiae or purpura.   There is encounter    Rash and nonspecific skin eruption    Pruritus    Other orders  -     methylPREDNISolone acetate (DEPO-MEDROL) injection 40 mg  -     methylPREDNISolone (MEDROL DOSEPACK) 4 MG tablet; Take by mouth. -     cephALEXin (KEFLEX) 500 MG capsule; Take 1 capsule by mouth 2 times daily for 7 days      The patient is to call for any concerns or return if any of the signs or symptoms worsen. The patient is to follow-up with PCP in the next 2-3 days for repeat evaluation repeat assessment or go directly to the emergency department.      SIGNATURE: Asia Vargas III, AUGUSTA

## 2022-09-23 ENCOUNTER — OFFICE VISIT (OUTPATIENT)
Dept: NEUROLOGY | Age: 82
End: 2022-09-23
Payer: MEDICARE

## 2022-09-23 VITALS
TEMPERATURE: 97.2 F | SYSTOLIC BLOOD PRESSURE: 162 MMHG | HEART RATE: 68 BPM | DIASTOLIC BLOOD PRESSURE: 78 MMHG | WEIGHT: 192 LBS | BODY MASS INDEX: 38.78 KG/M2 | OXYGEN SATURATION: 97 %

## 2022-09-23 DIAGNOSIS — I60.9 SUBARACHNOID HEMORRHAGE (HCC): Primary | ICD-10-CM

## 2022-09-23 DIAGNOSIS — I63.9 ISCHEMIC STROKE (HCC): ICD-10-CM

## 2022-09-23 PROCEDURE — 99215 OFFICE O/P EST HI 40 MIN: CPT | Performed by: CLINICAL NURSE SPECIALIST

## 2022-09-23 PROCEDURE — G8427 DOCREV CUR MEDS BY ELIG CLIN: HCPCS | Performed by: CLINICAL NURSE SPECIALIST

## 2022-09-23 PROCEDURE — 1090F PRES/ABSN URINE INCON ASSESS: CPT | Performed by: CLINICAL NURSE SPECIALIST

## 2022-09-23 PROCEDURE — 1036F TOBACCO NON-USER: CPT | Performed by: CLINICAL NURSE SPECIALIST

## 2022-09-23 PROCEDURE — G8417 CALC BMI ABV UP PARAM F/U: HCPCS | Performed by: CLINICAL NURSE SPECIALIST

## 2022-09-23 PROCEDURE — 1124F ACP DISCUSS-NO DSCNMKR DOCD: CPT | Performed by: CLINICAL NURSE SPECIALIST

## 2022-09-23 PROCEDURE — G8400 PT W/DXA NO RESULTS DOC: HCPCS | Performed by: CLINICAL NURSE SPECIALIST

## 2022-09-23 NOTE — Clinical Note
She does not really think the celexa is helping.  Told her I would reach out if we could switch to Cymbalta -- may also help her neuropathy complaints I presume
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
## 2022-09-29 ENCOUNTER — OFFICE VISIT (OUTPATIENT)
Dept: FAMILY MEDICINE CLINIC | Age: 82
End: 2022-09-29
Payer: MEDICARE

## 2022-09-29 VITALS
SYSTOLIC BLOOD PRESSURE: 136 MMHG | HEART RATE: 52 BPM | TEMPERATURE: 97 F | BODY MASS INDEX: 38.97 KG/M2 | DIASTOLIC BLOOD PRESSURE: 78 MMHG | HEIGHT: 59 IN | OXYGEN SATURATION: 98 % | WEIGHT: 193.3 LBS

## 2022-09-29 DIAGNOSIS — E03.9 ACQUIRED HYPOTHYROIDISM: Chronic | ICD-10-CM

## 2022-09-29 DIAGNOSIS — F33.1 MODERATE EPISODE OF RECURRENT MAJOR DEPRESSIVE DISORDER (HCC): ICD-10-CM

## 2022-09-29 DIAGNOSIS — I10 PRIMARY HYPERTENSION: Primary | Chronic | ICD-10-CM

## 2022-09-29 PROCEDURE — G8400 PT W/DXA NO RESULTS DOC: HCPCS | Performed by: FAMILY MEDICINE

## 2022-09-29 PROCEDURE — 99214 OFFICE O/P EST MOD 30 MIN: CPT | Performed by: FAMILY MEDICINE

## 2022-09-29 PROCEDURE — G8427 DOCREV CUR MEDS BY ELIG CLIN: HCPCS | Performed by: FAMILY MEDICINE

## 2022-09-29 PROCEDURE — G8417 CALC BMI ABV UP PARAM F/U: HCPCS | Performed by: FAMILY MEDICINE

## 2022-09-29 PROCEDURE — 1036F TOBACCO NON-USER: CPT | Performed by: FAMILY MEDICINE

## 2022-09-29 PROCEDURE — 1090F PRES/ABSN URINE INCON ASSESS: CPT | Performed by: FAMILY MEDICINE

## 2022-09-29 PROCEDURE — 1124F ACP DISCUSS-NO DSCNMKR DOCD: CPT | Performed by: FAMILY MEDICINE

## 2022-09-29 RX ORDER — DULOXETIN HYDROCHLORIDE 30 MG/1
30 CAPSULE, DELAYED RELEASE ORAL DAILY
Qty: 30 CAPSULE | Refills: 2 | Status: SHIPPED | OUTPATIENT
Start: 2022-09-29

## 2022-09-29 RX ORDER — METOPROLOL SUCCINATE 25 MG/1
TABLET, EXTENDED RELEASE ORAL
Qty: 90 TABLET | Refills: 1 | Status: SHIPPED
Start: 2022-09-29 | End: 2022-10-08

## 2022-09-29 RX ORDER — LEVOTHYROXINE SODIUM 88 UG/1
TABLET ORAL
Qty: 90 TABLET | Refills: 1 | Status: SHIPPED
Start: 2022-09-29 | End: 2022-10-08

## 2022-09-29 RX ORDER — LOSARTAN POTASSIUM 100 MG/1
TABLET ORAL
Qty: 90 TABLET | Refills: 1 | Status: SHIPPED
Start: 2022-09-29 | End: 2022-10-08

## 2022-09-29 NOTE — PATIENT INSTRUCTIONS
Take 1/2 tablet daily for 5 days. Then, 1/2 tablet every other day for 5 days then stop. You can start DULOXETINE the following day.

## 2022-09-29 NOTE — PROGRESS NOTES
9/29/2022    Chief Complaint   Patient presents with    Hypertension     1 month f/u    Sinus Problem     X1 week     Anxiety     Needs something to help her sleep says Lindsay Dorantes was going to speak with you about it       Abhinav Velasquez is a 78 y.o. patient that presents today for:    Hypertension: Here for follow up chronic hypertension. Patient is  compliant with lifestyle modifications like exercise and adherence to a low salt diet. Patient is  well controlled. Denies chest pain, diaphoresis, dyspnea, dyspnea on exertion, peripheral edema, palpitations, HA, visual issues. Med list reviewed. Taking as prescribed. No adverse effects. Neuropathy/depression: on citalopram, would like to try duloxetine. Spoke to Neuro about it. Feeling well otherwise, no complaints. Patient's past medical, surgical, social and/or family history reviewed, updated in chart, and are non-contributory (unless otherwise stated). Medications and allergies also reviewed and updated in chart. ROS negative unless otherwise specified    Physical Exam  Wt Readings from Last 3 Encounters:   09/29/22 193 lb 4.8 oz (87.7 kg)   09/23/22 192 lb (87.1 kg)   09/22/22 192 lb 9.6 oz (87.4 kg)     Temp Readings from Last 3 Encounters:   09/29/22 97 °F (36.1 °C)   09/23/22 97.2 °F (36.2 °C)   09/22/22 98.1 °F (36.7 °C) (Temporal)     BP Readings from Last 3 Encounters:   09/29/22 136/78   09/23/22 (!) 162/78   09/22/22 122/68     Pulse Readings from Last 3 Encounters:   09/29/22 52   09/23/22 68   09/22/22 63       General appearance: alert, well appearing, and in no distress, oriented to person, place, and time and normal appearing weight. CVS exam: normal rate, regular rhythm, normal S1, S2, no murmurs, rubs, clicks or gallops. Radial pulses 2+ bilateral.  PT/DP pulse 2+ bilat. No C/C/E    Chest: clear to auscultation, no wheezes, rales or rhonchi, symmetric air entry.      Abdomen: Soft, non-tender, non-distended, positive BS in all 4 quadrants    Extremities:Dorsalis pedis pulses palpated bilaterally, no clubbing, cyanosis, edema or erythema     SKIN: warm, dry, no lesions, jaundice, petechiae, pallor, cyanosis, ecchymosis    NEURO: gross motor exam normal by observation, gait normal    Mental status - alert, oriented to person, place, and time, normal mood, behavior, speech, dress, motor activity, and thought processes      Assessment/Plan  Kathy Mourning was seen today for hypertension, sinus problem and anxiety. Diagnoses and all orders for this visit:    Primary hypertension  -     losartan (COZAAR) 100 MG tablet; TAKE ONE TABLET BY MOUTH DAILY  -     metoprolol succinate (TOPROL XL) 25 MG extended release tablet; TAKE ONE TABLET BY MOUTH DAILY    Moderate episode of recurrent major depressive disorder (HCC)  -     START:DULoxetine (CYMBALTA) 30 MG extended release capsule; Take 1 capsule by mouth daily  - STOP: citalopram  - Take 1/2 tablet daily for 5 days. Then, 1/2 tablet every other day for 5 days then stop. You can start DULOXETINE the following day. Acquired hypothyroidism  -     levothyroxine (SYNTHROID) 88 MCG tablet; TAKE ONE TABLET BY MOUTH DAILY      Return in about 6 weeks (around 11/10/2022), or if symptoms worsen or fail to improve. Call or go to ED immediately if symptoms worsen or persist.    Educational materials and/or home exercises printed for patient's review and were included in patient instructions on his/her After Visit Summary and given to patient at the end of visit. Counseled regarding above diagnosis, including possible risks and complications,  especially if left uncontrolled. Counseled regarding the possible side effects, risks, benefits and alternatives to treatment; patient and/or guardian verbalizes understanding, agrees, feels comfortable with and wishes to proceed with above treatment plan.     Advised patient to call with any new medication issues, and read all Rx info from pharmacy to assure aware of all possible risks and side effects of medication before taking. Reviewed age and gender appropriate health screening exams and vaccinations. Advised patient regarding importance of keeping up with recommended health maintenance and to schedule as soon as possible if overdue, as this is important in assessing for undiagnosed pathology, especially cancer, as well as protecting against potentially harmful/life threatening disease. Patient and/or guardian verbalizes understanding and agrees with above counseling, assessment and plan. All questions answered.       Daja Peña, DO

## 2022-10-07 ENCOUNTER — HOSPITAL ENCOUNTER (INPATIENT)
Age: 82
LOS: 6 days | Discharge: HOME HEALTH | DRG: 545 | End: 2022-10-14
Attending: EMERGENCY MEDICINE | Admitting: INTERNAL MEDICINE
Payer: MEDICARE

## 2022-10-07 DIAGNOSIS — R41.0 DELIRIUM: ICD-10-CM

## 2022-10-07 DIAGNOSIS — R42 DIZZINESS: Primary | ICD-10-CM

## 2022-10-07 DIAGNOSIS — R60.9 PERIPHERAL EDEMA: ICD-10-CM

## 2022-10-07 PROCEDURE — 99285 EMERGENCY DEPT VISIT HI MDM: CPT

## 2022-10-07 PROCEDURE — 93005 ELECTROCARDIOGRAM TRACING: CPT | Performed by: EMERGENCY MEDICINE

## 2022-10-07 PROCEDURE — 2580000003 HC RX 258: Performed by: EMERGENCY MEDICINE

## 2022-10-07 PROCEDURE — 80048 BASIC METABOLIC PNL TOTAL CA: CPT

## 2022-10-07 PROCEDURE — 85025 COMPLETE CBC W/AUTO DIFF WBC: CPT

## 2022-10-07 PROCEDURE — 84484 ASSAY OF TROPONIN QUANT: CPT

## 2022-10-07 PROCEDURE — 81001 URINALYSIS AUTO W/SCOPE: CPT

## 2022-10-07 RX ORDER — 0.9 % SODIUM CHLORIDE 0.9 %
500 INTRAVENOUS SOLUTION INTRAVENOUS ONCE
Status: COMPLETED | OUTPATIENT
Start: 2022-10-08 | End: 2022-10-08

## 2022-10-07 RX ADMIN — SODIUM CHLORIDE 500 ML: 9 INJECTION, SOLUTION INTRAVENOUS at 23:55

## 2022-10-07 ASSESSMENT — PAIN - FUNCTIONAL ASSESSMENT: PAIN_FUNCTIONAL_ASSESSMENT: 0-10

## 2022-10-07 ASSESSMENT — PAIN DESCRIPTION - LOCATION: LOCATION: ABDOMEN

## 2022-10-07 ASSESSMENT — PAIN DESCRIPTION - FREQUENCY: FREQUENCY: CONTINUOUS

## 2022-10-08 ENCOUNTER — APPOINTMENT (OUTPATIENT)
Dept: GENERAL RADIOLOGY | Age: 82
DRG: 545 | End: 2022-10-08
Payer: MEDICARE

## 2022-10-08 ENCOUNTER — APPOINTMENT (OUTPATIENT)
Dept: CT IMAGING | Age: 82
DRG: 545 | End: 2022-10-08
Payer: MEDICARE

## 2022-10-08 PROBLEM — R41.0 DELIRIUM: Status: ACTIVE | Noted: 2022-10-08

## 2022-10-08 PROBLEM — U07.1 SARS-COV-2 POSITIVE: Status: RESOLVED | Noted: 2022-08-09 | Resolved: 2022-10-08

## 2022-10-08 LAB
AMPHETAMINE SCREEN, URINE: NOT DETECTED
ANION GAP SERPL CALCULATED.3IONS-SCNC: 14 MMOL/L (ref 7–16)
BACTERIA: ABNORMAL /HPF
BARBITURATE SCREEN URINE: NOT DETECTED
BASOPHILS ABSOLUTE: 0.04 E9/L (ref 0–0.2)
BASOPHILS RELATIVE PERCENT: 0.4 % (ref 0–2)
BENZODIAZEPINE SCREEN, URINE: NOT DETECTED
BILIRUBIN URINE: NEGATIVE
BLOOD, URINE: NEGATIVE
BUN BLDV-MCNC: 25 MG/DL (ref 6–23)
CALCIUM SERPL-MCNC: 9.2 MG/DL (ref 8.6–10.2)
CANNABINOID SCREEN URINE: NOT DETECTED
CHLORIDE BLD-SCNC: 100 MMOL/L (ref 98–107)
CLARITY: CLEAR
CO2: 21 MMOL/L (ref 22–29)
COCAINE METABOLITE SCREEN URINE: NOT DETECTED
COLOR: YELLOW
CREAT SERPL-MCNC: 1.1 MG/DL (ref 0.5–1)
EOSINOPHILS ABSOLUTE: 0.24 E9/L (ref 0.05–0.5)
EOSINOPHILS RELATIVE PERCENT: 2.5 % (ref 0–6)
FENTANYL SCREEN, URINE: NOT DETECTED
GFR AFRICAN AMERICAN: 57
GFR NON-AFRICAN AMERICAN: 47 ML/MIN/1.73
GLUCOSE BLD-MCNC: 113 MG/DL (ref 74–99)
GLUCOSE URINE: NEGATIVE MG/DL
HCT VFR BLD CALC: 35.7 % (ref 34–48)
HEMOGLOBIN: 11.9 G/DL (ref 11.5–15.5)
IMMATURE GRANULOCYTES #: 0.02 E9/L
IMMATURE GRANULOCYTES %: 0.2 % (ref 0–5)
KETONES, URINE: NEGATIVE MG/DL
LEUKOCYTE ESTERASE, URINE: ABNORMAL
LYMPHOCYTES ABSOLUTE: 3.09 E9/L (ref 1.5–4)
LYMPHOCYTES RELATIVE PERCENT: 32.7 % (ref 20–42)
Lab: NORMAL
MCH RBC QN AUTO: 29 PG (ref 26–35)
MCHC RBC AUTO-ENTMCNC: 33.3 % (ref 32–34.5)
MCV RBC AUTO: 87.1 FL (ref 80–99.9)
METHADONE SCREEN, URINE: NOT DETECTED
MONOCYTES ABSOLUTE: 0.83 E9/L (ref 0.1–0.95)
MONOCYTES RELATIVE PERCENT: 8.8 % (ref 2–12)
NEUTROPHILS ABSOLUTE: 5.24 E9/L (ref 1.8–7.3)
NEUTROPHILS RELATIVE PERCENT: 55.4 % (ref 43–80)
NITRITE, URINE: NEGATIVE
OPIATE SCREEN URINE: NOT DETECTED
OXYCODONE URINE: NOT DETECTED
PDW BLD-RTO: 14.2 FL (ref 11.5–15)
PH UA: 6.5 (ref 5–9)
PHENCYCLIDINE SCREEN URINE: NOT DETECTED
PLATELET # BLD: 208 E9/L (ref 130–450)
PMV BLD AUTO: 9.8 FL (ref 7–12)
POTASSIUM REFLEX MAGNESIUM: 4.3 MMOL/L (ref 3.5–5)
PROTEIN UA: NEGATIVE MG/DL
RBC # BLD: 4.1 E12/L (ref 3.5–5.5)
RBC UA: ABNORMAL /HPF (ref 0–2)
SODIUM BLD-SCNC: 135 MMOL/L (ref 132–146)
SPECIFIC GRAVITY UA: 1.01 (ref 1–1.03)
TROPONIN, HIGH SENSITIVITY: 11 NG/L (ref 0–9)
TROPONIN, HIGH SENSITIVITY: 13 NG/L (ref 0–9)
TSH SERPL DL<=0.05 MIU/L-ACNC: 0.76 UIU/ML (ref 0.27–4.2)
UROBILINOGEN, URINE: 0.2 E.U./DL
WBC # BLD: 9.5 E9/L (ref 4.5–11.5)
WBC UA: ABNORMAL /HPF (ref 0–5)

## 2022-10-08 PROCEDURE — 6360000002 HC RX W HCPCS: Performed by: EMERGENCY MEDICINE

## 2022-10-08 PROCEDURE — 6360000002 HC RX W HCPCS

## 2022-10-08 PROCEDURE — 71045 X-RAY EXAM CHEST 1 VIEW: CPT

## 2022-10-08 PROCEDURE — 84443 ASSAY THYROID STIM HORMONE: CPT

## 2022-10-08 PROCEDURE — 6370000000 HC RX 637 (ALT 250 FOR IP): Performed by: INTERNAL MEDICINE

## 2022-10-08 PROCEDURE — 70450 CT HEAD/BRAIN W/O DYE: CPT

## 2022-10-08 PROCEDURE — 6370000000 HC RX 637 (ALT 250 FOR IP)

## 2022-10-08 PROCEDURE — 2060000000 HC ICU INTERMEDIATE R&B

## 2022-10-08 PROCEDURE — 80307 DRUG TEST PRSMV CHEM ANLYZR: CPT

## 2022-10-08 PROCEDURE — 36415 COLL VENOUS BLD VENIPUNCTURE: CPT

## 2022-10-08 PROCEDURE — 84484 ASSAY OF TROPONIN QUANT: CPT

## 2022-10-08 PROCEDURE — 96374 THER/PROPH/DIAG INJ IV PUSH: CPT

## 2022-10-08 PROCEDURE — 96372 THER/PROPH/DIAG INJ SC/IM: CPT

## 2022-10-08 RX ORDER — ANTIOX #8/OM3/DHA/EPA/LUT/ZEAX 250-2.5 MG
1 CAPSULE ORAL 2 TIMES DAILY
Status: DISCONTINUED | OUTPATIENT
Start: 2022-10-08 | End: 2022-10-08

## 2022-10-08 RX ORDER — DIPHENHYDRAMINE HYDROCHLORIDE 50 MG/ML
INJECTION INTRAMUSCULAR; INTRAVENOUS
Status: COMPLETED
Start: 2022-10-08 | End: 2022-10-08

## 2022-10-08 RX ORDER — POTASSIUM CHLORIDE 20 MEQ/1
20 TABLET, EXTENDED RELEASE ORAL DAILY
Status: DISCONTINUED | OUTPATIENT
Start: 2022-10-08 | End: 2022-10-14 | Stop reason: HOSPADM

## 2022-10-08 RX ORDER — LOSARTAN POTASSIUM 100 MG/1
100 TABLET ORAL DAILY
COMMUNITY

## 2022-10-08 RX ORDER — ACETAMINOPHEN 325 MG/1
TABLET ORAL
Status: COMPLETED
Start: 2022-10-08 | End: 2022-10-08

## 2022-10-08 RX ORDER — DIPHENHYDRAMINE HYDROCHLORIDE 50 MG/ML
25 INJECTION INTRAMUSCULAR; INTRAVENOUS ONCE
Status: COMPLETED | OUTPATIENT
Start: 2022-10-08 | End: 2022-10-08

## 2022-10-08 RX ORDER — FUROSEMIDE 40 MG/1
40 TABLET ORAL DAILY
Status: DISCONTINUED | OUTPATIENT
Start: 2022-10-08 | End: 2022-10-12

## 2022-10-08 RX ORDER — CETIRIZINE HYDROCHLORIDE 10 MG/1
10 TABLET ORAL DAILY PRN
Status: DISCONTINUED | OUTPATIENT
Start: 2022-10-08 | End: 2022-10-14 | Stop reason: HOSPADM

## 2022-10-08 RX ORDER — ONDANSETRON 4 MG/1
4 TABLET, FILM COATED ORAL 3 TIMES DAILY PRN
Status: DISCONTINUED | OUTPATIENT
Start: 2022-10-08 | End: 2022-10-14 | Stop reason: HOSPADM

## 2022-10-08 RX ORDER — METOPROLOL SUCCINATE 25 MG/1
25 TABLET, EXTENDED RELEASE ORAL DAILY
COMMUNITY

## 2022-10-08 RX ORDER — PROMETHAZINE HYDROCHLORIDE 25 MG/ML
25 INJECTION, SOLUTION INTRAMUSCULAR; INTRAVENOUS ONCE
Status: COMPLETED | OUTPATIENT
Start: 2022-10-08 | End: 2022-10-08

## 2022-10-08 RX ORDER — LEVOTHYROXINE SODIUM 88 UG/1
88 TABLET ORAL DAILY
COMMUNITY

## 2022-10-08 RX ORDER — METOPROLOL SUCCINATE 25 MG/1
25 TABLET, EXTENDED RELEASE ORAL DAILY
Status: DISCONTINUED | OUTPATIENT
Start: 2022-10-08 | End: 2022-10-14 | Stop reason: HOSPADM

## 2022-10-08 RX ORDER — PANTOPRAZOLE SODIUM 40 MG/1
40 TABLET, DELAYED RELEASE ORAL DAILY
COMMUNITY

## 2022-10-08 RX ORDER — LOSARTAN POTASSIUM 50 MG/1
100 TABLET ORAL DAILY
Status: DISCONTINUED | OUTPATIENT
Start: 2022-10-08 | End: 2022-10-14 | Stop reason: HOSPADM

## 2022-10-08 RX ORDER — VITAMIN B COMPLEX
1 TABLET ORAL DAILY
Status: DISCONTINUED | OUTPATIENT
Start: 2022-10-08 | End: 2022-10-14 | Stop reason: HOSPADM

## 2022-10-08 RX ORDER — MECLIZINE HCL 12.5 MG/1
25 TABLET ORAL PRN
Status: DISCONTINUED | OUTPATIENT
Start: 2022-10-08 | End: 2022-10-14 | Stop reason: HOSPADM

## 2022-10-08 RX ORDER — DOCUSATE SODIUM 100 MG/1
100 CAPSULE, LIQUID FILLED ORAL DAILY
Status: DISCONTINUED | OUTPATIENT
Start: 2022-10-08 | End: 2022-10-10

## 2022-10-08 RX ORDER — HALOPERIDOL 5 MG/ML
INJECTION INTRAMUSCULAR
Status: COMPLETED
Start: 2022-10-08 | End: 2022-10-08

## 2022-10-08 RX ORDER — ATORVASTATIN CALCIUM 40 MG/1
40 TABLET, FILM COATED ORAL NIGHTLY
COMMUNITY

## 2022-10-08 RX ORDER — DULOXETIN HYDROCHLORIDE 30 MG/1
30 CAPSULE, DELAYED RELEASE ORAL DAILY
Status: DISCONTINUED | OUTPATIENT
Start: 2022-10-08 | End: 2022-10-14 | Stop reason: HOSPADM

## 2022-10-08 RX ORDER — CITALOPRAM 20 MG/1
10 TABLET ORAL DAILY
Status: DISCONTINUED | OUTPATIENT
Start: 2022-10-08 | End: 2022-10-12

## 2022-10-08 RX ORDER — LEVOTHYROXINE SODIUM 88 UG/1
88 TABLET ORAL DAILY
Status: DISCONTINUED | OUTPATIENT
Start: 2022-10-08 | End: 2022-10-14 | Stop reason: HOSPADM

## 2022-10-08 RX ORDER — HALOPERIDOL 5 MG/ML
2 INJECTION INTRAMUSCULAR EVERY 6 HOURS PRN
Status: DISCONTINUED | OUTPATIENT
Start: 2022-10-08 | End: 2022-10-10

## 2022-10-08 RX ORDER — ATORVASTATIN CALCIUM 40 MG/1
40 TABLET, FILM COATED ORAL DAILY
Status: DISCONTINUED | OUTPATIENT
Start: 2022-10-08 | End: 2022-10-14 | Stop reason: HOSPADM

## 2022-10-08 RX ORDER — DOCUSATE SODIUM 100 MG/1
100 CAPSULE, LIQUID FILLED ORAL 2 TIMES DAILY
COMMUNITY

## 2022-10-08 RX ORDER — AMLODIPINE BESYLATE 10 MG/1
10 TABLET ORAL DAILY
Status: DISCONTINUED | OUTPATIENT
Start: 2022-10-08 | End: 2022-10-14 | Stop reason: HOSPADM

## 2022-10-08 RX ORDER — FUROSEMIDE 20 MG/1
20 TABLET ORAL DAILY
COMMUNITY

## 2022-10-08 RX ORDER — ACETAMINOPHEN 325 MG/1
650 TABLET ORAL EVERY 6 HOURS PRN
Status: DISCONTINUED | OUTPATIENT
Start: 2022-10-08 | End: 2022-10-14 | Stop reason: HOSPADM

## 2022-10-08 RX ORDER — PANTOPRAZOLE SODIUM 40 MG/1
40 TABLET, DELAYED RELEASE ORAL DAILY
Status: DISCONTINUED | OUTPATIENT
Start: 2022-10-08 | End: 2022-10-14 | Stop reason: HOSPADM

## 2022-10-08 RX ADMIN — PROMETHAZINE HYDROCHLORIDE 25 MG: 25 INJECTION INTRAMUSCULAR; INTRAVENOUS at 02:37

## 2022-10-08 RX ADMIN — AMLODIPINE BESYLATE 10 MG: 10 TABLET ORAL at 15:25

## 2022-10-08 RX ADMIN — CITALOPRAM HYDROBROMIDE 10 MG: 20 TABLET ORAL at 13:45

## 2022-10-08 RX ADMIN — FUROSEMIDE 40 MG: 20 TABLET ORAL at 13:45

## 2022-10-08 RX ADMIN — POTASSIUM CHLORIDE 20 MEQ: 1500 TABLET, EXTENDED RELEASE ORAL at 13:45

## 2022-10-08 RX ADMIN — DIPHENHYDRAMINE HYDROCHLORIDE 25 MG: 50 INJECTION INTRAMUSCULAR; INTRAVENOUS at 03:16

## 2022-10-08 RX ADMIN — METOPROLOL SUCCINATE 25 MG: 25 TABLET, EXTENDED RELEASE ORAL at 13:06

## 2022-10-08 RX ADMIN — ATORVASTATIN CALCIUM 40 MG: 40 TABLET, FILM COATED ORAL at 13:46

## 2022-10-08 RX ADMIN — ACETAMINOPHEN 650 MG: 325 TABLET ORAL at 13:07

## 2022-10-08 RX ADMIN — DULOXETINE HYDROCHLORIDE 30 MG: 30 CAPSULE, DELAYED RELEASE ORAL at 13:44

## 2022-10-08 RX ADMIN — ACETAMINOPHEN 650 MG: 325 TABLET, FILM COATED ORAL at 13:07

## 2022-10-08 RX ADMIN — DIPHENHYDRAMINE HYDROCHLORIDE 25 MG: 50 INJECTION, SOLUTION INTRAMUSCULAR; INTRAVENOUS at 03:16

## 2022-10-08 RX ADMIN — PANTOPRAZOLE SODIUM 40 MG: 40 TABLET, DELAYED RELEASE ORAL at 13:06

## 2022-10-08 RX ADMIN — HALOPERIDOL LACTATE: 5 INJECTION INTRAMUSCULAR at 04:05

## 2022-10-08 RX ADMIN — LEVOTHYROXINE SODIUM 88 MCG: 0.09 TABLET ORAL at 13:46

## 2022-10-08 RX ADMIN — Medication 1000 UNITS: at 13:45

## 2022-10-08 RX ADMIN — LOSARTAN POTASSIUM 100 MG: 50 TABLET, FILM COATED ORAL at 13:08

## 2022-10-08 ASSESSMENT — PAIN DESCRIPTION - ORIENTATION: ORIENTATION: RIGHT;LEFT

## 2022-10-08 ASSESSMENT — PAIN SCALES - GENERAL: PAINLEVEL_OUTOF10: 6

## 2022-10-08 ASSESSMENT — PAIN DESCRIPTION - LOCATION: LOCATION: LEG

## 2022-10-08 ASSESSMENT — PAIN DESCRIPTION - DESCRIPTORS: DESCRIPTORS: ACHING;CRAMPING;DISCOMFORT

## 2022-10-08 NOTE — ED NOTES
Call placed to pharmacist,  states medication will be released once medication list is verified     Beronica Fatima RN  10/08/22 [id number]

## 2022-10-08 NOTE — ED NOTES
Patient sister called for medication list verification. States she is unable to retrieve list due to being at work. Will get list when she gets off work.       Yuliana Ivy RN  10/08/22 [id number]

## 2022-10-08 NOTE — ED NOTES
Handoff report given to [facility]. SBAR faxed.       Siddharth Morgan RN  10/08/22 [id number]

## 2022-10-08 NOTE — ED NOTES
continues to thrash in bed with confusion. DR. Lee at bedside, haldol IM given      Iliana Rosen RN  10/08/22 0050

## 2022-10-08 NOTE — ED PROVIDER NOTES
Department of Emergency Medicine   ED  Provider Note  Admit Date/RoomTime: 10/7/2022 11:26 PM  ED Room: [address] [place] [address]          History of Present Illness:  10/7/22, Time: 11:33 PM EDT  Chief Complaint   Patient presents with    Dizziness     Started this evening. Also complains of nausea since the dizziness began                   Brien Marino is a 78 y.o. female presenting to the ED for dizziness, beginning earlier this evening approximately 3 hours ago. The complaint has been persistent, mild in severity, and worsened by changing position. Patient states that throughout the evening tonight she has been feeling dizzy. She states that when she tries to get up to move the dizziness worsens. She denies any headache, no vision changes. She denies any weakness to her extremities. No recent fall or head trauma. No recent fevers. She denies any neck or back pain, no chest pain. She states she has nauseous but has had no vomiting, denies any abdominal pain, no diarrhea or dysuria. Review of Systems:   Pertinent positives and negatives are stated within HPI, all other systems reviewed and are negative.        --------------------------------------------- PAST HISTORY ---------------------------------------------  Past Medical History:  has a past medical history of Amaurosis fugax of right eye, Anxiety, Arthritis, CA - cancer of bowel, Cerebral artery occlusion with cerebral infarction ([facility] [address] Chronic back pain, Constipation, Depression, Elevated sed rate, GERD (gastroesophageal reflux disease), Hemorrhoids, Hyperlipidemia, Hypertension, Left foot pain, Lumbosacral radiculopathy, Macular degeneration, Peripheral neuropathy, Poor vision, Prosthetic eye globe, Seasonal allergies, Skipped heart beats, Sleep apnea, and Thyroid disease. Past Surgical History:  has a past surgical history that includes Eye surgery (years ago); colectomy (1974); Colonoscopy (04/26/2012); Cholecystectomy (1985);  Hysterectomy (1974); tumor excision; Upper gastrointestinal endoscopy (05/30/2014); Colonoscopy (05/30/2014); Tonsillectomy; joint replacement (Left, 2010/2012); Upper gastrointestinal endoscopy (01/08/2015); Colonoscopy (01/08/2015); cyst removal (years ago); and Upper gastrointestinal endoscopy (N/A, 4/1/2019). Social History:  reports that she has never smoked. She has been exposed to tobacco smoke. She has never used smokeless tobacco. She reports that she does not drink alcohol and does not use drugs. Family History: family history includes Breast Cancer in her daughter and sister; Cancer in her brother, brother, brother, brother, brother, brother, sister, and sister; Heart Disease in her brother, brother, father, mother, and sister; High Blood Pressure in her daughter and daughter; Hypertension in her brother, brother, father, mother, and sister; Other in her mother; Stroke in her father and mother. . Unless otherwise noted, family history is non contributory    The patients home medications have been reviewed. Allergies: Iodine, Bee pollen, Bee venom, Codeine, Hydralazine, Oxycodone-aspirin, Sulfa antibiotics, Amoxicillin-pot clavulanate, Aspirin, Darvocet [propoxyphene n-acetaminophen], Eggs or egg-derived products, Influenza vaccines, Percodan [oxycodone-aspirin], and Percodan [oxycodone-aspirin]        ---------------------------------------------------PHYSICAL EXAM--------------------------------------    Constitutional/General: Alert and oriented x3  Head: Normocephalic and atraumatic  Eyes: PERRL, EOMI, sclera non icteric  Mouth: Oropharynx clear, handling secretions, no trismus, no asymmetry of the posterior oropharynx or uvular edema  Neck: Supple, full ROM, no stridor, no meningeal signs  Respiratory: Lungs clear to auscultation bilaterally, no wheezes, rales, or rhonchi. Not in respiratory distress  Cardiovascular:  Regular rate. Regular rhythm. No murmurs, no aortic murmurs, no gallops, or rubs.  2+ distal pulses. Equal extremity pulses. Chest: No chest wall tenderness  GI:  Abdomen Soft, Non tender, Non distended. No rebound, guarding, or rigidity. No pulsatile masses. Musculoskeletal: Moves all extremities x 4. Warm and well perfused, no clubbing, cyanosis, or edema. Capillary refill <3 seconds  Integument: skin warm and dry. No rashes. Neurologic: GCS 15, no focal deficits, symmetric strength 5/5 in the upper and lower extremities bilaterally  Psychiatric: Normal Affect          -------------------------------------------------- RESULTS -------------------------------------------------  I have personally reviewed all laboratory and imaging results for this patient. Results are listed below.      LABS: (Lab results interpreted by me)  Results for orders placed or performed during the hospital encounter of 10/07/22   CBC with Auto Differential   Result Value Ref Range    WBC 9.5 4.5 - 11.5 E9/L    RBC 4.10 3.50 - 5.50 E12/L    Hemoglobin 11.9 11.5 - 15.5 g/dL    Hematocrit 35.7 34.0 - 48.0 %    MCV 87.1 80.0 - 99.9 fL    MCH 29.0 26.0 - 35.0 pg    MCHC 33.3 32.0 - 34.5 %    RDW 14.2 11.5 - 15.0 fL    Platelets 364 427 - 960 E9/L    MPV 9.8 7.0 - 12.0 fL    Neutrophils % 55.4 43.0 - 80.0 %    Immature Granulocytes % 0.2 0.0 - 5.0 %    Lymphocytes % 32.7 20.0 - 42.0 %    Monocytes % 8.8 2.0 - 12.0 %    Eosinophils % 2.5 0.0 - 6.0 %    Basophils % 0.4 0.0 - 2.0 %    Neutrophils Absolute 5.24 1.80 - 7.30 E9/L    Immature Granulocytes # 0.02 E9/L    Lymphocytes Absolute 3.09 1.50 - 4.00 E9/L    Monocytes Absolute 0.83 0.10 - 0.95 E9/L    Eosinophils Absolute 0.24 0.05 - 0.50 E9/L    Basophils Absolute 0.04 0.00 - 0.20 C7/G   Basic Metabolic Panel w/ Reflex to MG   Result Value Ref Range    Sodium 135 132 - 146 mmol/L    Potassium reflex Magnesium 4.3 3.5 - 5.0 mmol/L    Chloride 100 98 - 107 mmol/L    CO2 21 (L) 22 - 29 mmol/L    Anion Gap 14 7 - 16 mmol/L    Glucose 113 (H) 74 - 99 mg/dL    BUN 25 (H) 6 - 23 mg/dL Creatinine 1.1 (H) 0.5 - 1.0 mg/dL    GFR Non-African American 47 >=60 mL/min/1.73    GFR African American 57     Calcium 9.2 8.6 - 10.2 mg/dL   Troponin   Result Value Ref Range    Troponin, High Sensitivity 13 (H) 0 - 9 ng/L   Urinalysis with Microscopic   Result Value Ref Range    Color, UA Yellow Straw/Yellow    Clarity, UA Clear Clear    Glucose, Ur Negative Negative mg/dL    Bilirubin Urine Negative Negative    Ketones, Urine Negative Negative mg/dL    Specific Gravity, UA 1.010 1.005 - 1.030    Blood, Urine Negative Negative    pH, UA 6.5 5.0 - 9.0    Protein, UA Negative Negative mg/dL    Urobilinogen, Urine 0.2 <2.0 E.U./dL    Nitrite, Urine Negative Negative    Leukocyte Esterase, Urine MODERATE (A) Negative    WBC, UA 2-5 0 - 5 /HPF    RBC, UA NONE 0 - 2 /HPF    Bacteria, UA RARE (A) None Seen /HPF   Troponin   Result Value Ref Range    Troponin, High Sensitivity 11 (H) 0 - 9 ng/L   ,       RADIOLOGY:  Interpreted by Radiologist unless otherwise specified  CT Head WO Contrast   Final Result   No acute intracranial abnormality. XR CHEST PORTABLE   Final Result   No acute process.                EKG Interpretation  Interpreted by emergency department physician, Dr. Jeff Giron    Date of EKG: 10/7/22  Time: 2340    Rhythm: normal sinus   Rate: 61  Axis: normal  Conduction: normal  ST Segments: no acute change  T Waves: no acute change    Clinical Impression: No findings suggestive of acute ischemia or injury  Comparison to prior EKG: stable as compared to patient's most recent EKG      ------------------------- NURSING NOTES AND VITALS REVIEWED ---------------------------   The nursing notes within the ED encounter and vital signs as below have been reviewed by myself  /76   Pulse 91   Temp 97.7 °F (36.5 °C) (Oral)   Resp 20   Ht 4' 11\" (1.499 m)   Wt 193 lb (87.5 kg)   LMP 07/24/2014   SpO2 96%   BMI 38.98 kg/m²     Oxygen Saturation Interpretation: Normal    The patients available past medical records and past encounters were reviewed. ------------------------------ ED COURSE/MEDICAL DECISION MAKING----------------------  Medications   0.9 % sodium chloride bolus (0 mLs IntraVENous Stopped 10/8/22 0153)   promethazine (PHENERGAN) injection 25 mg (25 mg IntraMUSCular Given 10/8/22 0237)   diphenhydrAMINE (BENADRYL) injection 25 mg (25 mg IntraVENous Given 10/8/22 0316)   haloperidol lactate (HALDOL) 5 MG/ML injection (  Given 10/8/22 0405)           The cardiac monitor revealed sinus rhythm with a heart rate in the 60s as interpreted by me. The cardiac monitor was ordered secondary to the patient's chest pain and to monitor for patient for dysrhythmia. CPT [id number]           Medical Decision Making:     I, Dr. Azra Gamboa, am the primary provider of record    69-year-old female presenting with dizziness. Her exam is unremarkable, arrives hemodynamically stable, no increased work of breathing or hypoxia. EKG shows no findings suggestive of acute ischemia or injury. With patient's nausea and dizziness she was given Phenergan as it is helpful for both complaints. CT head is unremarkable. No leukocytosis or anemia. Metabolic panel is within acceptable limits. Troponin stable at 13 and 11. Urinalysis shows no suspicion for infection. After Phenergan patient became very restless. She was given Benadryl with no significant improvement. Patient then appeared to have more of an acute delirium. May be related to the medication given, but unclear as the delirium is progressing despite 2 doses of Benadryl. She was given Haldol with some improvement. She remained hemodynamically stable and normotensive during the onset and during the acute delirium. She was admitted for further management, did have some improvement in the restlessness but remained delirious.       Re-Evaluations:  ED Course as of 10/08/22 0419   Sat Oct 08, 2022   0327 Patient appears to be having side effect of Phenergan, cannot feel that she can sit still. She was given Benadryl. She states that the Phenergan did help her dizziness however. [PB]   0408 After Benadryl patient is still somewhat restless, appears to now have more of an acute delirium, speaking to her son who is not here, becoming slightly agitated. Patient was given Haldol. Unclear if patient's delirium is from the Phenergan, however this was the only medication given and she arrives oriented and neurologically intact. This was the only change, CT head is unremarkable, your urinalysis shows no signs of infection. [PB]      ED Course User Index  [PB] Chandrika Horner DO             This patient's ED course included: a personal history and physicial examination, re-evaluation prior to disposition, multiple bedside re-evaluations, IV medications, cardiac monitoring, and continuous pulse oximetry    This patient has remained hemodynamically stable and been closely monitored during their ED course. Counseling: The emergency provider has spoken with the patient and discussed todays results, in addition to providing specific details for the plan of care and counseling regarding the diagnosis and prognosis. Questions are answered at this time and they are agreeable with the plan.       --------------------------------- IMPRESSION AND DISPOSITION ---------------------------------    IMPRESSION  1. Dizziness    2. Delirium        DISPOSITION  Disposition: Admit to telemetry  Patient condition is stable        NOTE: This report was transcribed using voice recognition software.  Every effort was made to ensure accuracy; however, inadvertent computerized transcription errors may be present        Chandrika Horner DO  10/08/22 0421

## 2022-10-08 NOTE — H&P
[facility]  History and Physical      CHIEF COMPLAINT: Dizziness, nausea    Reason for Admission: As above, confusion    History Obtained From: Electronic medical record    PCP :  [address] / [facility] [id number]      HISTORY OF PRESENT ILLNESS:      The patient is a 78 y.o. female presented to the emergency room with dizziness. Apparently patient was given Phenergan. After this patient became more confused. She was then admitted for further evaluation treatment. She does have a prior history of stroke. She also has chronic left lower extremity weakness from back issues and neuropathy. At the time of questioning patient is giving decent history. She is confused pleasantly without agitation. She also has a history of hypertension, depression, hypothyroidism. Past Medical History:        Diagnosis Date    Amaurosis fugax of right eye 12/11/2017    Anxiety     Arthritis     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, treated with surgery and chemo    Cerebral artery occlusion with cerebral infarction ([facility] [address]     possibly TIA    Chronic back pain     Constipation     Depression     Elevated sed rate 12/11/2017    hx of    GERD (gastroesophageal reflux disease)     Hemorrhoids     history of    Hyperlipidemia     Hypertension     Left foot pain     dropped a Kettle on foot    Lumbosacral radiculopathy 08/06/2020    Macular degeneration     Peripheral neuropathy 08/06/2020    Poor vision     right eye / had bleeding behind eye, is receiving \"shots\" at this time  / 3/22/2019    Prosthetic eye globe     left eye implant;    Seasonal allergies     Skipped heart beats     on metoprolol; managed by Dr. Karly Kwan    Sleep apnea     uses cpap at times     Thyroid disease      Past Surgical History:        Procedure Laterality Date    CHOLECYSTECTOMY  1985    open?     COLECTOMY  1974    COLONOSCOPY  04/26/2012    and egd    COLONOSCOPY  05/30/2014    COLONOSCOPY  01/08/2015 CYST REMOVAL  years ago    upper gum    EYE SURGERY  years ago    left eye removal,  has artificial eye    HYSTERECTOMY (CERVIX STATUS UNKNOWN)  1974    JOINT REPLACEMENT Left 2010/2012    x 2-knee    TONSILLECTOMY      TUMOR EXCISION      from arm, fatty    UPPER GASTROINTESTINAL ENDOSCOPY  2014    with biopsy    UPPER GASTROINTESTINAL ENDOSCOPY  2015    UPPER GASTROINTESTINAL ENDOSCOPY N/A 2019    EGD ESOPHAGOGASTRODUODENOSCOPY  ++IODINE ALLERGY++ and bx performed by Meredith Peña MD at [facility] ENDOSCOPY         Medications Prior to Admission:    Not in a hospital admission. Allergies:  Iodine, Bee pollen, Bee venom, Codeine, Hydralazine, Oxycodone-aspirin, Phenergan [promethazine hcl], Sulfa antibiotics, Amoxicillin-pot clavulanate, Aspirin, Darvocet [propoxyphene n-acetaminophen], Eggs or egg-derived products, Influenza vaccines, Percodan [oxycodone-aspirin], and Percodan [oxycodone-aspirin]    Social History:   Social History     Socioeconomic History    Marital status:      Spouse name: Not on file    Number of children: 3    Years of education: 12    Highest education level: High school graduate   Occupational History    Not on file   Tobacco Use    Smoking status: Never     Passive exposure: Yes    Smokeless tobacco: Never    Tobacco comments:      had smoked,  in    Vaping Use    Vaping Use: Never used   Substance and Sexual Activity    Alcohol use: No    Drug use: Never    Sexual activity: Not Currently     Partners: Male   Other Topics Concern    Not on file   Social History Narrative    9-3-19 [facility] spoke with Hunter Lopez. She reports s/s of MDD and openly admits to having depression. Crying during the call. States she doesn't sleep well unless she takes her anti-anxiety medication. Reports that she has financial strain, food strain, and high stress. Often running out of money and food before the month is up. Reports having to borrow from family to get by at times. She does have social connections with family/friends and Zoroastrianism so she is not feeling isolated. She reports feeling \"bullied\" at her old apartment and isn't happy where she has been living now for the past 1 year. She wants to move again to help lower her bills but is on a waiting list.      She has valid transportation with the use of her brother's truck and [employer]. Marci Cruz is agreeable to [facility] helping with outpatient counseling for the depression and open to taking medication if the PCP prescribes. Had a script in the past and didn't feel it worked so stopped taking it. Counseled on taking medication and not stopping it without PCP request to do so. She verbalized agreement. [facility] made a call to PCP to update on above depression status. [facility] will assist with SELENE application when Marci Cruz returns from her vacation. 10-8-18[facility] spoke with Marci Cruz. She is planning to call [facility] for outpatient counseling. [facility] spoke with the Senior [facility] Shai Doherty and she will work on getting the SELENE application/assessment completed. Rhonda updated and agreeable to this. Both are to keep [facility] updated on when that application/home assessment is scheduled. [facility] sent 2nd e-mail to payor Tashi for f/u to services for in the community to follow with Marci Cruz. Response showed that the [facility] advocate, Jean Pierresalvatore Helms, did reach out to Marci Cruz and sent the letter with the counseling centers listed. Marci Cruz does not recall talking with Jean Pierre Helms. Marci Cruz is getting 3 meals a week from [employer] but states that does not help with her shortage of food. She also has the ERS in place. Kobe Casanova from [employer] updated LSW that she will request a passport referral for review of needs and SELENE application. States Marci Cruz refused to meet this upcoming week.      Social Determinants of Health     Financial Resource Strain: Low Risk     Difficulty of Paying Living Expenses: Not very hard   Food Insecurity: No Food Insecurity Worried About [employer] [employer] Log[employer] in the Last Year: Never true    Ran Out of Food in the Last Year: Never true   Transportation Needs: Not on file   Physical Activity: Not on file   Stress: Not on file   Social Connections: Not on file   Intimate Partner Violence: Not on file   Housing Stability: Not on file         Family History:       Problem Relation Age of Onset    Stroke Father     Hypertension Father     Heart Disease Father     Stroke Mother     Hypertension Mother     Other Mother         aneurysm    Heart Disease Mother     Hypertension Brother     Cancer Brother     Breast Cancer Sister     Hypertension Sister     Cancer Sister         breast ca    Heart Disease Sister     Hypertension Brother         parkinson    Heart Disease Brother     Cancer Brother     Cancer Brother     Cancer Brother     Cancer Brother     Heart Disease Brother     Cancer Brother     Cancer Sister     High Blood Pressure Daughter     Breast Cancer Daughter     High Blood Pressure Daughter        REVIEW OF SYSTEMS:    General ROS: negative  Hematological and Lymphatic ROS: negative  Endocrine ROS: negative  Respiratory ROS: no cough,  wheezing  or shortness of breath,   Cardiovascular ROS: no chest pain or dyspnea on exertion  Gastrointestinal ROS: no abdominal pain, change in bowel habits, or black or bloody stools  Genito-Urinary ROS: no dysuria, trouble voiding, or hematuria  Neurological ROS: no TIA or stroke symptoms  negative    Vitals:  BP (!) 190/73   Pulse 72   Temp 97.7 °F (36.5 °C) (Oral)   Resp 20   Ht 4' 11\" (1.499 m)   Wt 193 lb (87.5 kg)   LMP 07/24/2014   SpO2 97%   BMI 38.98 kg/m²     PHYSICAL EXAM:  General:  Awake, alert, oriented X 3. Well developed, well nourished, well groomed. No apparent distress. HEENT:  Normocephalic, atraumatic. Pupils equal, round, reactive to light. No scleral icterus. No conjunctival injection.    Neck:  Supple, no carotid bruits  Heart:  RRR,   Lungs:  CTA bilaterally, bilat symmetrical expansion, no wheeze, rales, or rhonchi  Abdomen:   Bowel sounds present, soft, nontender, no masses, no organomegaly, no peritoneal signs  Extremities:  No clubbing, cyanosis, or edema  Skin:  Warm and dry, no open lesions or rash  Neuro:  Cranial nerves 2-12 intact, no focal deficits      DATA:     Recent Results (from the past 24 hour(s))   CBC with Auto Differential    Collection Time: 10/07/22 11:57 PM   Result Value Ref Range    WBC 9.5 4.5 - 11.5 E9/L    RBC 4.10 3.50 - 5.50 E12/L    Hemoglobin 11.9 11.5 - 15.5 g/dL    Hematocrit 35.7 34.0 - 48.0 %    MCV 87.1 80.0 - 99.9 fL    MCH 29.0 26.0 - 35.0 pg    MCHC 33.3 32.0 - 34.5 %    RDW 14.2 11.5 - 15.0 fL    Platelets 943 826 - 231 E9/L    MPV 9.8 7.0 - 12.0 fL    Neutrophils % 55.4 43.0 - 80.0 %    Immature Granulocytes % 0.2 0.0 - 5.0 %    Lymphocytes % 32.7 20.0 - 42.0 %    Monocytes % 8.8 2.0 - 12.0 %    Eosinophils % 2.5 0.0 - 6.0 %    Basophils % 0.4 0.0 - 2.0 %    Neutrophils Absolute 5.24 1.80 - 7.30 E9/L    Immature Granulocytes # 0.02 E9/L    Lymphocytes Absolute 3.09 1.50 - 4.00 E9/L    Monocytes Absolute 0.83 0.10 - 0.95 E9/L    Eosinophils Absolute 0.24 0.05 - 0.50 E9/L    Basophils Absolute 0.04 0.00 - 0.20 R3/M   Basic Metabolic Panel w/ Reflex to MG    Collection Time: 10/07/22 11:57 PM   Result Value Ref Range    Sodium 135 132 - 146 mmol/L    Potassium reflex Magnesium 4.3 3.5 - 5.0 mmol/L    Chloride 100 98 - 107 mmol/L    CO2 21 (L) 22 - 29 mmol/L    Anion Gap 14 7 - 16 mmol/L    Glucose 113 (H) 74 - 99 mg/dL    BUN 25 (H) 6 - 23 mg/dL    Creatinine 1.1 (H) 0.5 - 1.0 mg/dL    GFR Non-African American 47 >=60 mL/min/1.73    GFR African American 57     Calcium 9.2 8.6 - 10.2 mg/dL   Troponin    Collection Time: 10/07/22 11:57 PM   Result Value Ref Range    Troponin, High Sensitivity 13 (H) 0 - 9 ng/L   Urinalysis with Microscopic    Collection Time: 10/07/22 11:57 PM   Result Value Ref Range    Color, UA Yellow Straw/Yellow Clarity, UA Clear Clear    Glucose, Ur Negative Negative mg/dL    Bilirubin Urine Negative Negative    Ketones, Urine Negative Negative mg/dL    Specific Gravity, UA 1.010 1.005 - 1.030    Blood, Urine Negative Negative    pH, UA 6.5 5.0 - 9.0    Protein, UA Negative Negative mg/dL    Urobilinogen, Urine 0.2 <2.0 E.U./dL    Nitrite, Urine Negative Negative    Leukocyte Esterase, Urine MODERATE (A) Negative    WBC, UA 2-5 0 - 5 /HPF    RBC, UA NONE 0 - 2 /HPF    Bacteria, UA RARE (A) None Seen /HPF   Troponin    Collection Time: 10/08/22  2:00 AM   Result Value Ref Range    Troponin, High Sensitivity 11 (H) 0 - 9 ng/L   Urine Drug Screen    Collection Time: 10/08/22  4:30 AM   Result Value Ref Range    Amphetamine Screen, Urine NOT DETECTED Negative <1000 ng/mL    Barbiturate Screen, Ur NOT DETECTED Negative < 200 ng/mL    Benzodiazepine Screen, Urine NOT DETECTED Negative < 200 ng/mL    Cannabinoid Scrn, Ur NOT DETECTED Negative < 50ng/mL    Cocaine Metabolite Screen, Urine NOT DETECTED Negative < 300 ng/mL    Opiate Scrn, Ur NOT DETECTED Negative < 300ng/mL    PCP Screen, Urine NOT DETECTED Negative < 25 ng/mL    Methadone Screen, Urine NOT DETECTED Negative <300 ng/mL    Oxycodone Urine NOT DETECTED Negative <100 ng/mL    FENTANYL SCREEN, URINE NOT DETECTED Negative <1 ng/mL    Drug Screen Comment: see below        CT Head WO Contrast   Final Result   No acute intracranial abnormality. XR CHEST PORTABLE   Final Result   No acute process. ASSESSMENT :      Principal Problem:    Delirium  Resolved Problems:    * No resolved hospital problems.  *  Prior history of left parietal lobe infarct  Prior history of subarachnoid hemorrhage did not have any intervention  Underlying history of anxiety  Delirium/metabolic encephalopathy  Underlying history of hypothyroidism  Hyperlipidemia by history  GERD by history obstructive sleep apnea with a history  Left eye visual loss  Hypertension      Plan :    Patient reports difficulty ambulating as well as dizziness  Haldol as needed for agitation  Neurology to see  MRI of the brain  She does have chronic lumbosacral radiculopathy and peripheral neuropathy  Check TSH    Electronically signed by Amilcar Muniz MD on 10/8/2022 at 1:39 PM    NOTE: This report was transcribed using voice recognition software.  Every effort was made to ensure accuracy; however, inadvertent transcription errors may be present

## 2022-10-08 NOTE — ED NOTES
Pt assisted on and off bedpan, urine specimen obtained and sent. Pt repositioned on er cart, warm blanket provided. sr x 2 up and call light in reach.      Lisa Nelson LPN  25/85/71 [id number]

## 2022-10-09 ENCOUNTER — APPOINTMENT (OUTPATIENT)
Dept: MRI IMAGING | Age: 82
DRG: 545 | End: 2022-10-09
Payer: MEDICARE

## 2022-10-09 ENCOUNTER — APPOINTMENT (OUTPATIENT)
Dept: CT IMAGING | Age: 82
DRG: 545 | End: 2022-10-09
Payer: MEDICARE

## 2022-10-09 PROBLEM — I61.9 HEMORRHAGIC STROKE (HCC): Status: ACTIVE | Noted: 2022-10-09

## 2022-10-09 LAB
ANION GAP SERPL CALCULATED.3IONS-SCNC: 13 MMOL/L (ref 7–16)
BASOPHILS ABSOLUTE: 0.06 E9/L (ref 0–0.2)
BASOPHILS RELATIVE PERCENT: 0.6 % (ref 0–2)
BUN BLDV-MCNC: 28 MG/DL (ref 6–23)
CALCIUM SERPL-MCNC: 8.7 MG/DL (ref 8.6–10.2)
CHLORIDE BLD-SCNC: 99 MMOL/L (ref 98–107)
CO2: 21 MMOL/L (ref 22–29)
CREAT SERPL-MCNC: 1 MG/DL (ref 0.5–1)
EOSINOPHILS ABSOLUTE: 0.17 E9/L (ref 0.05–0.5)
EOSINOPHILS RELATIVE PERCENT: 1.8 % (ref 0–6)
GFR AFRICAN AMERICAN: >60
GFR NON-AFRICAN AMERICAN: 53 ML/MIN/1.73
GLUCOSE BLD-MCNC: 141 MG/DL (ref 74–99)
HCT VFR BLD CALC: 34.4 % (ref 34–48)
HEMOGLOBIN: 11.7 G/DL (ref 11.5–15.5)
IMMATURE GRANULOCYTES #: 0.04 E9/L
IMMATURE GRANULOCYTES %: 0.4 % (ref 0–5)
LYMPHOCYTES ABSOLUTE: 2.96 E9/L (ref 1.5–4)
LYMPHOCYTES RELATIVE PERCENT: 30.8 % (ref 20–42)
MAGNESIUM: 2 MG/DL (ref 1.6–2.6)
MCH RBC QN AUTO: 29.8 PG (ref 26–35)
MCHC RBC AUTO-ENTMCNC: 34 % (ref 32–34.5)
MCV RBC AUTO: 87.5 FL (ref 80–99.9)
MONOCYTES ABSOLUTE: 0.86 E9/L (ref 0.1–0.95)
MONOCYTES RELATIVE PERCENT: 8.9 % (ref 2–12)
NEUTROPHILS ABSOLUTE: 5.52 E9/L (ref 1.8–7.3)
NEUTROPHILS RELATIVE PERCENT: 57.5 % (ref 43–80)
PDW BLD-RTO: 14.3 FL (ref 11.5–15)
PHOSPHORUS: 4.2 MG/DL (ref 2.5–4.5)
PLATELET # BLD: 205 E9/L (ref 130–450)
PMV BLD AUTO: 9.6 FL (ref 7–12)
POTASSIUM SERPL-SCNC: 3.8 MMOL/L (ref 3.5–5)
RBC # BLD: 3.93 E12/L (ref 3.5–5.5)
SODIUM BLD-SCNC: 133 MMOL/L (ref 132–146)
WBC # BLD: 9.6 E9/L (ref 4.5–11.5)

## 2022-10-09 PROCEDURE — 85025 COMPLETE CBC W/AUTO DIFF WBC: CPT

## 2022-10-09 PROCEDURE — 80048 BASIC METABOLIC PNL TOTAL CA: CPT

## 2022-10-09 PROCEDURE — 36415 COLL VENOUS BLD VENIPUNCTURE: CPT

## 2022-10-09 PROCEDURE — 70450 CT HEAD/BRAIN W/O DYE: CPT

## 2022-10-09 PROCEDURE — 83735 ASSAY OF MAGNESIUM: CPT

## 2022-10-09 PROCEDURE — 6370000000 HC RX 637 (ALT 250 FOR IP): Performed by: INTERNAL MEDICINE

## 2022-10-09 PROCEDURE — 2500000003 HC RX 250 WO HCPCS: Performed by: NURSE PRACTITIONER

## 2022-10-09 PROCEDURE — 70551 MRI BRAIN STEM W/O DYE: CPT

## 2022-10-09 PROCEDURE — 99291 CRITICAL CARE FIRST HOUR: CPT | Performed by: NURSE PRACTITIONER

## 2022-10-09 PROCEDURE — 2000000000 HC ICU R&B

## 2022-10-09 PROCEDURE — 84100 ASSAY OF PHOSPHORUS: CPT

## 2022-10-09 RX ORDER — HYDRALAZINE HYDROCHLORIDE 20 MG/ML
10 INJECTION INTRAMUSCULAR; INTRAVENOUS EVERY 30 MIN PRN
Status: DISCONTINUED | OUTPATIENT
Start: 2022-10-09 | End: 2022-10-10

## 2022-10-09 RX ORDER — LABETALOL HYDROCHLORIDE 5 MG/ML
10 INJECTION, SOLUTION INTRAVENOUS EVERY 10 MIN PRN
Status: DISCONTINUED | OUTPATIENT
Start: 2022-10-09 | End: 2022-10-11

## 2022-10-09 RX ORDER — ENALAPRILAT 2.5 MG/2ML
1.25 INJECTION INTRAVENOUS EVERY 6 HOURS PRN
Status: DISCONTINUED | OUTPATIENT
Start: 2022-10-09 | End: 2022-10-14 | Stop reason: HOSPADM

## 2022-10-09 RX ORDER — POLYETHYLENE GLYCOL 3350 17 G/17G
17 POWDER, FOR SOLUTION ORAL DAILY
Status: DISCONTINUED | OUTPATIENT
Start: 2022-10-09 | End: 2022-10-14 | Stop reason: HOSPADM

## 2022-10-09 RX ORDER — BUTALBITAL, ACETAMINOPHEN AND CAFFEINE 50; 325; 40 MG/1; MG/1; MG/1
1 TABLET ORAL EVERY 4 HOURS PRN
Status: DISCONTINUED | OUTPATIENT
Start: 2022-10-09 | End: 2022-10-14 | Stop reason: HOSPADM

## 2022-10-09 RX ADMIN — FUROSEMIDE 40 MG: 20 TABLET ORAL at 08:47

## 2022-10-09 RX ADMIN — LOSARTAN POTASSIUM 100 MG: 50 TABLET, FILM COATED ORAL at 08:47

## 2022-10-09 RX ADMIN — ENALAPRILAT 1.25 MG: 1.25 INJECTION INTRAVENOUS at 20:08

## 2022-10-09 RX ADMIN — DOCUSATE SODIUM 100 MG: 100 CAPSULE, LIQUID FILLED ORAL at 08:47

## 2022-10-09 RX ADMIN — POTASSIUM CHLORIDE 20 MEQ: 1500 TABLET, EXTENDED RELEASE ORAL at 08:47

## 2022-10-09 RX ADMIN — DULOXETINE HYDROCHLORIDE 30 MG: 30 CAPSULE, DELAYED RELEASE ORAL at 08:47

## 2022-10-09 RX ADMIN — CITALOPRAM HYDROBROMIDE 10 MG: 20 TABLET ORAL at 08:48

## 2022-10-09 RX ADMIN — LEVOTHYROXINE SODIUM 88 MCG: 0.09 TABLET ORAL at 10:32

## 2022-10-09 RX ADMIN — ATORVASTATIN CALCIUM 40 MG: 40 TABLET, FILM COATED ORAL at 08:47

## 2022-10-09 RX ADMIN — ACETAMINOPHEN 650 MG: 325 TABLET, FILM COATED ORAL at 12:34

## 2022-10-09 RX ADMIN — AMLODIPINE BESYLATE 10 MG: 10 TABLET ORAL at 08:47

## 2022-10-09 RX ADMIN — Medication 1000 UNITS: at 08:47

## 2022-10-09 RX ADMIN — ACETAMINOPHEN 650 MG: 325 TABLET, FILM COATED ORAL at 02:31

## 2022-10-09 RX ADMIN — ACETAMINOPHEN 650 MG: 325 TABLET, FILM COATED ORAL at 22:05

## 2022-10-09 RX ADMIN — METOPROLOL SUCCINATE 25 MG: 25 TABLET, EXTENDED RELEASE ORAL at 08:47

## 2022-10-09 RX ADMIN — PANTOPRAZOLE SODIUM 40 MG: 40 TABLET, DELAYED RELEASE ORAL at 08:47

## 2022-10-09 RX ADMIN — LABETALOL HYDROCHLORIDE 10 MG: 5 INJECTION INTRAVENOUS at 18:17

## 2022-10-09 ASSESSMENT — PAIN DESCRIPTION - DESCRIPTORS
DESCRIPTORS: ACHING
DESCRIPTORS: THROBBING

## 2022-10-09 ASSESSMENT — PAIN SCALES - GENERAL
PAINLEVEL_OUTOF10: 7
PAINLEVEL_OUTOF10: 0
PAINLEVEL_OUTOF10: 7
PAINLEVEL_OUTOF10: 10
PAINLEVEL_OUTOF10: 0

## 2022-10-09 ASSESSMENT — PAIN DESCRIPTION - LOCATION
LOCATION: HEAD
LOCATION: HEAD

## 2022-10-09 ASSESSMENT — PAIN DESCRIPTION - ORIENTATION: ORIENTATION: RIGHT

## 2022-10-09 NOTE — PLAN OF CARE
Problem: Chronic Conditions and Co-morbidities  Goal: Patient's chronic conditions and co-morbidity symptoms are monitored and maintained or improved  Flowsheets (Taken 10/9/2022 1809)  Care Plan - Patient's Chronic Conditions and Co-Morbidity Symptoms are Monitored and Maintained or Improved:   Monitor and assess patient's chronic conditions and comorbid symptoms for stability, deterioration, or improvement   Collaborate with multidisciplinary team to address chronic and comorbid conditions and prevent exacerbation or deterioration   Update acute care plan with appropriate goals if chronic or comorbid symptoms are exacerbated and prevent overall improvement and discharge  Note: Monitor nuero status frequently report changes nihss scale frequently monitor pt for siezures adm meds as ordered     Problem: Neurosensory - Adult  Goal: Achieves stable or improved neurological status  Outcome: Progressing  Goal: Absence of seizures  Outcome: Progressing  Goal: Achieves maximal functionality and self care  Outcome: Progressing     Problem: Cardiovascular - Adult  Goal: Maintains optimal cardiac output and hemodynamic stability  Outcome: Progressing  Flowsheets (Taken 10/9/2022 1809)  Maintains optimal cardiac output and hemodynamic stability:   Monitor blood pressure and heart rate   Monitor urine output and notify Licensed Independent Practitioner for values outside of normal range   Assess for signs of decreased cardiac output   Administer fluid and/or volume expanders as ordered   Administer vasoactive medications as ordered  Note: Monitor vital signs adm meds as ordered monitor fluid balance      Problem: Respiratory - Adult  Goal: Achieves optimal ventilation and oxygenation  Outcome: Progressing  Flowsheets (Taken 10/9/2022 1809)  Achieves optimal ventilation and oxygenation:   Assess for changes in respiratory status   Assess for changes in mentation and behavior   Position to facilitate oxygenation and minimize respiratory effort   Oxygen supplementation based on oxygen saturation or arterial blood gases   Initiate smoking cessation protocol as indicated   Encourage broncho-pulmonary hygiene including cough, deep breathe, incentive spirometry   Assess the need for suctioning and aspirate as needed   Assess and instruct to report shortness of breath or any respiratory difficulty   Respiratory therapy support as indicated  Note: Monitor sao2 and cxr monitor breathe sounds

## 2022-10-09 NOTE — HOME CARE
Patient is active with [facility] for skilled nursing. Will need home care orders at discharge.  Albina Carcamo lpn
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
## 2022-10-09 NOTE — PROGRESS NOTES
[facility]  Critical Care  Daily Progress Note 10/9/2022    Date of Admission: 10/7/22  Date of Admission to ICU:  10/9/22    CC:  Critical care management of hemorrhagic stroke. HOSPITAL EVENTS  107/22 Presented to ED with cc dizziness. 10/9?22 MRI revealed Hemorrhagic stroke in right occipital lobe. Moved from [facility] to [facility]. OVERNIGHT EVENTS: T max  98.7 F. Did not received any additional doses of antihypertensive agents in the previous 24 hours. PHYSICAL EXAM:    /61   Pulse 56   Temp 97.9 °F (36.6 °C) (Oral)   Resp 17   Ht 4' 11\" (1.499 m)   Wt 238 lb 1.6 oz (108 kg)   LMP 07/24/2014   SpO2 92%   BMI 48.09 kg/m²   No intake or output data in the 24 hours ending 10/09/22 1845      General appearance:  Comfortable. NEUROLOGIC:        GCS:    4 - Opens eyes on own   6 - Follows simple motor commands  5 - Alert and oriented       Pupil size:  Left  Prosthesis  Right 3 mm R  Wiggles fingers: Left Yes Right Yes  Hand grasp:   Left present     Right present  Wiggles toes: Left Yes    Right Yes  Plantar flexion: Left present    Right present  Facial droop:   None   Speech:  Clear        CONSTITUTIONAL: No acute distress  CARDIOVASCULAR: S1 S2, regular rate, regular rhythm,Monitor:   PULMONARY:  Respirations unlabored. No rhonchi/rales/wheezes. ABDOMEN: Soft, nontender, nondistended, nontympanic, normal bowel sounds. MUSCULOSKELETAL: HEDRICK  SKIN/EXTREMITIES: No rashes/ecchymosis, no edema/clubbing, warm/dry, good capillary refill. IV ACCESS:   PIV      ASSESSMENT/PLAN:     Principal Problem:    Delirium  Active Problems:    Right occipital Hemorrhagic stroke [facility])  Resolved Problems:    * No resolved hospital problems. *        Neuro: Right occipital hemorrhagic stroke. Hx ischemic stroke 5/22 hemorrhagic stroke 8/22 monitor neuro status. Celexa. Cymbalta. CV: Hypertension SBP goal <150 mm Hg. amlodipine. Lipitor. Cozaar. Metoprolol.   As needed labetalol enalaprilat  Pulm: No acute issues monitor respiratory status  GI: No acute issues monitor bowel status  Renal: Elevated serum creatinine 1.1 monitor uop, renal function and electrolytes  ID: Afebrile  Endocrine: History of thyroid disease. Synthroid. MSK:. Deconditioning. Dizziness. PT OT. Heme: No acute issue monitor CBC        Bowel regime: Colace. GlycoLax. Pain control/Sedation:  Acetaminophen  DVT prophylaxis: SCDs. No heparin due to [address]  GI prophylaxis: Diet f  Glucose protocol: Follow labc  Mouth/Eye care: As needed  Consults:   Medicine. Neurology. Patient/Family update: Discussed status with daughters and son  Code status: Limited/no CPR cardioversion      Disposition:  NSICU. The patient is at significant risk for life-threatening deterioration and death and requires ongoing critical care management.   Critical care time exclusive of teaching and procedures  40 minutes       JOSE L Jade  10/9/2022  6:45 PM

## 2022-10-09 NOTE — PROGRESS NOTES
Brief Neurology Note  Patient presented to ED with dizziness. Became delirious after phenergan and two doses of Benadryl. Will plan to see after MRI brain is done.      Electronically signed by Kasey Penn DO on 10/9/2022 at 8:19 AM

## 2022-10-10 ENCOUNTER — APPOINTMENT (OUTPATIENT)
Dept: MRI IMAGING | Age: 82
DRG: 545 | End: 2022-10-10
Payer: MEDICARE

## 2022-10-10 LAB
ANION GAP SERPL CALCULATED.3IONS-SCNC: 10 MMOL/L (ref 7–16)
BASOPHILS ABSOLUTE: 0.05 E9/L (ref 0–0.2)
BASOPHILS RELATIVE PERCENT: 0.5 % (ref 0–2)
BUN BLDV-MCNC: 27 MG/DL (ref 6–23)
CALCIUM SERPL-MCNC: 8.6 MG/DL (ref 8.6–10.2)
CHLORIDE BLD-SCNC: 102 MMOL/L (ref 98–107)
CO2: 25 MMOL/L (ref 22–29)
CREAT SERPL-MCNC: 0.9 MG/DL (ref 0.5–1)
EKG ATRIAL RATE: 61 BPM
EKG P AXIS: 70 DEGREES
EKG P-R INTERVAL: 172 MS
EKG Q-T INTERVAL: 440 MS
EKG QRS DURATION: 78 MS
EKG QTC CALCULATION (BAZETT): 442 MS
EKG R AXIS: -17 DEGREES
EKG T AXIS: 34 DEGREES
EKG VENTRICULAR RATE: 61 BPM
EOSINOPHILS ABSOLUTE: 0.16 E9/L (ref 0.05–0.5)
EOSINOPHILS RELATIVE PERCENT: 1.7 % (ref 0–6)
GFR AFRICAN AMERICAN: >60
GFR NON-AFRICAN AMERICAN: 60 ML/MIN/1.73
GLUCOSE BLD-MCNC: 100 MG/DL (ref 74–99)
HCT VFR BLD CALC: 33.9 % (ref 34–48)
HEMOGLOBIN: 11.5 G/DL (ref 11.5–15.5)
IMMATURE GRANULOCYTES #: 0.03 E9/L
IMMATURE GRANULOCYTES %: 0.3 % (ref 0–5)
LYMPHOCYTES ABSOLUTE: 2.75 E9/L (ref 1.5–4)
LYMPHOCYTES RELATIVE PERCENT: 29.3 % (ref 20–42)
MAGNESIUM: 2 MG/DL (ref 1.6–2.6)
MCH RBC QN AUTO: 28.8 PG (ref 26–35)
MCHC RBC AUTO-ENTMCNC: 33.9 % (ref 32–34.5)
MCV RBC AUTO: 85 FL (ref 80–99.9)
METER GLUCOSE: 112 MG/DL (ref 74–99)
METER GLUCOSE: 144 MG/DL (ref 74–99)
METER GLUCOSE: 217 MG/DL (ref 74–99)
MONOCYTES ABSOLUTE: 0.85 E9/L (ref 0.1–0.95)
MONOCYTES RELATIVE PERCENT: 9.1 % (ref 2–12)
NEUTROPHILS ABSOLUTE: 5.53 E9/L (ref 1.8–7.3)
NEUTROPHILS RELATIVE PERCENT: 59.1 % (ref 43–80)
PDW BLD-RTO: 14.4 FL (ref 11.5–15)
PHOSPHORUS: 4.4 MG/DL (ref 2.5–4.5)
PLATELET # BLD: 210 E9/L (ref 130–450)
PMV BLD AUTO: 9.9 FL (ref 7–12)
POTASSIUM SERPL-SCNC: 3.9 MMOL/L (ref 3.5–5)
RBC # BLD: 3.99 E12/L (ref 3.5–5.5)
SODIUM BLD-SCNC: 137 MMOL/L (ref 132–146)
WBC # BLD: 9.4 E9/L (ref 4.5–11.5)

## 2022-10-10 PROCEDURE — 97535 SELF CARE MNGMENT TRAINING: CPT

## 2022-10-10 PROCEDURE — 92523 SPEECH SOUND LANG COMPREHEN: CPT | Performed by: SPEECH-LANGUAGE PATHOLOGIST

## 2022-10-10 PROCEDURE — 6370000000 HC RX 637 (ALT 250 FOR IP): Performed by: NURSE PRACTITIONER

## 2022-10-10 PROCEDURE — 84100 ASSAY OF PHOSPHORUS: CPT

## 2022-10-10 PROCEDURE — 6370000000 HC RX 637 (ALT 250 FOR IP): Performed by: INTERNAL MEDICINE

## 2022-10-10 PROCEDURE — 85025 COMPLETE CBC W/AUTO DIFF WBC: CPT

## 2022-10-10 PROCEDURE — A9579 GAD-BASE MR CONTRAST NOS,1ML: HCPCS | Performed by: RADIOLOGY

## 2022-10-10 PROCEDURE — 97129 THER IVNTJ 1ST 15 MIN: CPT | Performed by: SPEECH-LANGUAGE PATHOLOGIST

## 2022-10-10 PROCEDURE — 97162 PT EVAL MOD COMPLEX 30 MIN: CPT

## 2022-10-10 PROCEDURE — 80048 BASIC METABOLIC PNL TOTAL CA: CPT

## 2022-10-10 PROCEDURE — 6360000002 HC RX W HCPCS: Performed by: PSYCHIATRY & NEUROLOGY

## 2022-10-10 PROCEDURE — 2580000003 HC RX 258: Performed by: PSYCHIATRY & NEUROLOGY

## 2022-10-10 PROCEDURE — 36415 COLL VENOUS BLD VENIPUNCTURE: CPT

## 2022-10-10 PROCEDURE — 2500000003 HC RX 250 WO HCPCS: Performed by: NURSE PRACTITIONER

## 2022-10-10 PROCEDURE — 2000000000 HC ICU R&B

## 2022-10-10 PROCEDURE — 97166 OT EVAL MOD COMPLEX 45 MIN: CPT

## 2022-10-10 PROCEDURE — 83735 ASSAY OF MAGNESIUM: CPT

## 2022-10-10 PROCEDURE — 2700000000 HC OXYGEN THERAPY PER DAY

## 2022-10-10 PROCEDURE — 99232 SBSQ HOSP IP/OBS MODERATE 35: CPT

## 2022-10-10 PROCEDURE — 70552 MRI BRAIN STEM W/DYE: CPT

## 2022-10-10 PROCEDURE — 97530 THERAPEUTIC ACTIVITIES: CPT

## 2022-10-10 PROCEDURE — 82962 GLUCOSE BLOOD TEST: CPT

## 2022-10-10 PROCEDURE — 6360000004 HC RX CONTRAST MEDICATION: Performed by: RADIOLOGY

## 2022-10-10 RX ADMIN — DOCUSATE SODIUM 100 MG: 100 CAPSULE, LIQUID FILLED ORAL at 08:01

## 2022-10-10 RX ADMIN — SODIUM CHLORIDE 500 MG: 9 INJECTION, SOLUTION INTRAVENOUS at 09:13

## 2022-10-10 RX ADMIN — METOPROLOL SUCCINATE 25 MG: 25 TABLET, EXTENDED RELEASE ORAL at 08:01

## 2022-10-10 RX ADMIN — POLYETHYLENE GLYCOL 3350 17 G: 17 POWDER, FOR SOLUTION ORAL at 08:02

## 2022-10-10 RX ADMIN — Medication 1000 UNITS: at 08:00

## 2022-10-10 RX ADMIN — GADOTERIDOL 20 ML: 279.3 INJECTION, SOLUTION INTRAVENOUS at 15:49

## 2022-10-10 RX ADMIN — ATORVASTATIN CALCIUM 40 MG: 40 TABLET, FILM COATED ORAL at 08:07

## 2022-10-10 RX ADMIN — CETIRIZINE HYDROCHLORIDE 10 MG: 10 TABLET, FILM COATED ORAL at 08:00

## 2022-10-10 RX ADMIN — FUROSEMIDE 40 MG: 20 TABLET ORAL at 08:01

## 2022-10-10 RX ADMIN — POTASSIUM CHLORIDE 20 MEQ: 1500 TABLET, EXTENDED RELEASE ORAL at 08:02

## 2022-10-10 RX ADMIN — DULOXETINE HYDROCHLORIDE 30 MG: 30 CAPSULE, DELAYED RELEASE ORAL at 08:01

## 2022-10-10 RX ADMIN — LABETALOL HYDROCHLORIDE 10 MG: 5 INJECTION INTRAVENOUS at 20:05

## 2022-10-10 RX ADMIN — ENALAPRILAT 1.25 MG: 1.25 INJECTION INTRAVENOUS at 03:30

## 2022-10-10 RX ADMIN — LABETALOL HYDROCHLORIDE 10 MG: 5 INJECTION INTRAVENOUS at 22:56

## 2022-10-10 RX ADMIN — ACETAMINOPHEN 650 MG: 325 TABLET, FILM COATED ORAL at 06:10

## 2022-10-10 RX ADMIN — LOSARTAN POTASSIUM 100 MG: 50 TABLET, FILM COATED ORAL at 08:01

## 2022-10-10 RX ADMIN — PANTOPRAZOLE SODIUM 40 MG: 40 TABLET, DELAYED RELEASE ORAL at 08:01

## 2022-10-10 RX ADMIN — AMLODIPINE BESYLATE 10 MG: 10 TABLET ORAL at 08:02

## 2022-10-10 RX ADMIN — CITALOPRAM HYDROBROMIDE 10 MG: 20 TABLET ORAL at 08:00

## 2022-10-10 RX ADMIN — LABETALOL HYDROCHLORIDE 10 MG: 5 INJECTION INTRAVENOUS at 16:48

## 2022-10-10 RX ADMIN — LABETALOL HYDROCHLORIDE 10 MG: 5 INJECTION INTRAVENOUS at 23:15

## 2022-10-10 RX ADMIN — LEVOTHYROXINE SODIUM 88 MCG: 0.09 TABLET ORAL at 08:00

## 2022-10-10 ASSESSMENT — PAIN SCALES - GENERAL
PAINLEVEL_OUTOF10: 0
PAINLEVEL_OUTOF10: 2
PAINLEVEL_OUTOF10: 4
PAINLEVEL_OUTOF10: 5
PAINLEVEL_OUTOF10: 7
PAINLEVEL_OUTOF10: 0
PAINLEVEL_OUTOF10: 3
PAINLEVEL_OUTOF10: 6

## 2022-10-10 ASSESSMENT — PAIN DESCRIPTION - LOCATION: LOCATION: HEAD

## 2022-10-10 ASSESSMENT — PAIN DESCRIPTION - ORIENTATION: ORIENTATION: MID;RIGHT

## 2022-10-10 ASSESSMENT — PAIN DESCRIPTION - DESCRIPTORS: DESCRIPTORS: THROBBING

## 2022-10-10 NOTE — PROGRESS NOTES
10/9- 2045: Patient's /78 and heart rate 51. Vasotec given 30 minutes ago at dose of 1.25 mg IV at 2008. Patient has documented allergy of hydralazine, and heart rate is too low for administration of IV labetalol. Dr. Danica Allison notified of most recent vitals. Dr Danica Allison ordered hydralazine 10 mg IV, and asked RN to educated patient on allergy vs. Side effects. 2130: RN attempted to educate patient and explain the action of hydralazine as it vasodilatates. The patient responded \"you are absolutely not giving me hydralazine- I don't care what you say\". Dr. Danica Allison notified. No new orders given. 2210: RN called to patient's room for pain 10/10 headache on the right side traveling down neck. Throbbing/aching. Vitals taken- /118. HR 52, Spo2 96 on 2 liters. Tylenol given. Dr Danica Allison updated. Order for fioricet 1 tablet placed. 0600- Dr. Yanet Moulton notified of BP being out of goal even after giving Vasotec at 0330. /60, HR 57. No new orders at this time. Will continue to monitor.

## 2022-10-10 NOTE — PROGRESS NOTES
Physical Therapy  Physical Therapy Initial Assessment     Name: Bradford Crews  : 1940  MRN: [id number]      Date of Service: 10/10/2022    Evaluating PT:  Flo Rodriguez PT, DPT [id number]    Room #:  7210/7896-B  Diagnosis:  Dizziness [R42]  Delirium [R41.0]  PMHx/PSHx:    Past Medical History:   Diagnosis Date    Amaurosis fugax of right eye 2017    Anxiety     Arthritis     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, treated with surgery and chemo    Cerebral artery occlusion with cerebral infarction ([facility]     possibly TIA    Chronic back pain     Constipation     Depression     Elevated sed rate 2017    hx of    GERD (gastroesophageal reflux disease)     Hemorrhagic stroke ([facility] 2022    Hemorrhoids     history of    Hyperlipidemia     Hypertension     Ischemic stroke ([facility]     Left foot pain     dropped a Gwendel Gilberto on foot    Lumbosacral radiculopathy 2020    Macular degeneration     Peripheral neuropathy 2020    Poor vision     right eye / had bleeding behind eye, is receiving \"shots\" at this time  / 3/22/2019    Prosthetic eye globe     left eye implant;    SAH (subarachnoid hemorrhage) ([facility] 2022    Seasonal allergies     Skipped heart beats     on metoprolol; managed by Dr. Chavez Areas    Sleep apnea     uses cpap at times     Stroke [facility]) 2022    Thyroid disease      Procedure/Surgery:  None  Precautions:  Falls, L visual deficits, O2, bed alarm, SBP < 150  Equipment Needs:  TBD    SUBJECTIVE:    Pt lives alone in a 1st floor apartment with level entry. Pt ambulated with [employer] and was mostly independent PTA. Pt's sister provides transportation. OBJECTIVE:   Initial Evaluation  Date: 10/10/22 Treatment Short Term/ Long Term   Goals   AM-PAC 6 Clicks      Was pt agreeable to Eval/treatment? Yes     Does pt have pain?  No c/o pain     Bed Mobility  Rolling: NT  Supine to sit: Donald  Sit to supine: NT  Scooting: Donald  Mod Independent   Transfers Sit to stand: Donald  Stand to sit: Donald  Stand pivot: Donald with [employer] Independent with [employer]   Ambulation   70 feet with Donald with [employer]  >150 feet with Mod Independent with [employer]   Stair negotiation: ascended and descended NT  >4 steps with 1 rail Mod Independent   ROM BUE:  Defer to OT note  BLE: WFL     Strength BUE:  Defer to OT note  BLE:  4/5 grossly  Increase by 1/3 MMT grade   Balance Sitting EOB:  Donald dynamic  Dynamic Standing:  Donald with [employer]  Sitting EOB:  Independent  Dynamic Standing: Mod Independent with [employer]     Pt is A & O x 4  CAM-ICU: NT  RASS: 0  Sensation:  No reported paresthesias  Edema:  None    Vitals:  Heart Rate at rest 53 bpm Heart Rate post session 54 bpm   SpO2 at rest 97% SpO2 post session 97%   Blood Pressure at rest 161/60 mmHg Blood Pressure post session 103/50 mmHg       Functional Status Score-Intensive Care Unit (FSS-ICU)   Rolling -/7   Supine to sit transfer 4/7   Unsupported sitting  4/7   Sit to stand transfers 4/7   Ambulation 2/7   Total  14/35       Therapeutic Exercises:  NA    Patient education  Pt educated on safety    Patient response to education:   Pt verbalized understanding Pt demonstrated skill Pt requires further education in this area   x x x     ASSESSMENT:    Conditions Requiring Skilled Therapeutic Intervention:    [x]Decreased strength     []Decreased ROM  [x]Decreased functional mobility  [x]Decreased balance   [x]Decreased endurance   [x]Decreased posture  []Decreased sensation  []Decreased coordination   [x]Decreased vision  [x]Decreased safety awareness   []Increased pain       Comments:  NP reported pt was medically stable. Pt was in bed upon arrival, agreeable to initial evaluation. Slight trunk assistance provided for bed mobility. Pt stood with unsteadiness and completed shuffled steps to chair initially. Pt then ambulated into hallway with decreased speed and verbal cues for managing [employer].  Very wide turns noted. Fatigue limited activity.   Pt was left in chair with all needs met and call light in reach. All lines remained intact and alarm on. Notified MONIKA Boateng that pt would benefit from an intensive rehabilitation program at discharge. Treatment:  Patient practiced and was instructed in the following treatment:    Bed mobility training - pt given verbal and tactile cues to facilitate proper sequencing and safety during supine>sit as well as provided with physical assistance. Sitting EOB for >3 minutes for upright tolerance, postural awareness and BLE ROM  Transfer training - pt was given verbal and tactile cues to facilitate proper hand placement, technique and safety during sit to stand, stand to sit and stand pivot transfers as well as provided with physical assistance. Gait training- pt was given verbal and tactile cues to facilitate safety, balance and use of WW during ambulation as well as provided with physical assistance. Pt's/ family goals   1. Return home    Prognosis is good for reaching above PT goals. Patient and or family understand(s) diagnosis, prognosis, and plan of care.   yes    PHYSICAL THERAPY PLAN OF CARE:    PT POC is established based on physician order and patient diagnosis     Referring provider/PT Order:  BETINA Carranza - CNP /10/09/22 1930 PT eval and treat  Diagnosis:  Dizziness [R42]  Delirium [R41.0]  Specific instructions for next treatment:  Progress activity    Current Treatment Recommendations:     [x] Strengthening to improve independence with functional mobility   [] ROM to improve independence with functional mobility   [x] Balance Training to improve static/dynamic balance and to reduce fall risk  [x] Endurance Training to improve activity tolerance during functional mobility   [x] Transfer Training to improve safety and independence with all functional transfers   [x] Gait Training to improve gait mechanics, endurance and asses need for appropriate assistive device  [x] Stair Training in preparation for safe discharge home and/or into the community   [] Positioning to prevent skin breakdown and contractures  [x] Safety and Education Training   [x] Patient/Caregiver Education   [] HEP  [] Other     PT long term treatment goals are located in above grid    Frequency of treatments: 2-5x/week x 1-2 weeks. Time in  0915  Time out  0950    Total Treatment Time  20 minutes     Evaluation Time includes thorough review of current medical information, gathering information on past medical history/social history and prior level of function, completion of standardized testing/informal observation of tasks, assessment of data and education on plan of care and goals.     CPT codes:  [] Low Complexity PT evaluation 54841  [x] Moderate Complexity PT evaluation 72222  [] High Complexity PT evaluation 76792  [] PT Re-evaluation 16952  [] Gait training 15726 - minutes  [] Manual therapy 82089 - minutes  [x] Therapeutic activities 43690 20 minutes  [] Therapeutic exercises 79392 - minutes  [] Neuromuscular reeducation 73812 - minutes     Lloyd Martinez, PT, DPT  [id number]

## 2022-10-10 NOTE — PROGRESS NOTES
SPEECH/LANGUAGE PATHOLOGY  SPEECH/LANGUAGE/COGNITIVE EVALUATION   and PLAN OF CARE      PATIENT NAME:  Carmen Hernandez  (female)     MRN:  [id number]    :  1940  (78 y.o.)  STATUS:  Inpatient: Room 3816/3816-A    TODAY'S DATE:  10/10/2022  10/09/22 1930    SLP eval and treat  Start:  10/09/22 1930,   End:  10/09/22 1930,   ONE TIME,   Standing Count:  1 Occurrences,   R         Ghulam Garcia APRN - CNP   REASON FOR REFERRAL:  assess   EVALUATING THERAPIST: VIOLETA Goetz    ADMITTING DIAGNOSIS: Dizziness [R42]  Delirium [R41.0]    VISIT DIAGNOSIS:        SPEECH THERAPY  PLAN OF CARE   The speech therapy  POC is established based on physician order, speech pathology diagnosis and results of clinical assessment     SPEECH PATHOLOGY DIAGNOSIS:    Mild cognitive deficits however question baseline secondary to PMHx    Speech Pathology intervention is recommended up to 6 times per week for LOS or when goals are met with emphasis on the following:      Conditions Requiring Skilled Therapeutic Intervention for speech, language and/or cognition    Cognitive linguistic impairment    Specific Speech Therapy Interventions to Include: Therapeutic tasks for Cognition    Specific instructions for next treatment: To initiate POC    SHORT/LONG TERM GOALS  Pt will improve orientation to spatial and temporal surroundings with use of external memory aides. Pt will improve immediate, short term, recent memory during structured and unstructured tasks with 80% accuracy   Pt will improve problem solving/thought organization during structured and unstructured tasks with 80% accuracy     Patient goals: Patient/family involved in developing goals and treatment plan:   Treatment goals discussed with Patient    The Patient understand(s) the diagnosis, prognosis and plan of care   The patient/family Did not state,     This plan may be re-evaluated and revised as warranted.         Rehabilitation Potential/Prognosis: fair                CLINICAL ASSESSMENT:  MOTOR SPEECH       Oral Peripheral Examination   Adequate lingual/labial strength     Parameters of Speech Production  Respiration:  Adequate for speech production  Articulation:  Within functional limits  Resonance:  Within functional limits  Quality:   Within functional limits  Pitch: Within functional limits  Intensity: Within functional limits  Fluency:  Intact  Prosody Intact    RECEPTIVE LANGUAGE    Comprehension of Yes/No Questions: Within functional limits    Process  Simple Verbal Commands:   Within functional limits  Process Intermediate Verbal Commands:   Within functional limits  Process Complex Verbal Commands:     Inconsistent    Comprehension of Conversation:      Latent      EXPRESSIVE LANGUAGE     Serials: To be assessed    Imitation:  Words   Functional   Sentences To be assessed    Naming:  (Modality used:  Verbal)  No conversational effort     Conversation:      Conversation was within functional limits    COGNITION     Attention/Orientation  Attention: Easily Distracted  Orientation:  Oriented to Person, Place    Memory   Immediate Recall: impaired    Delayed Recall:   fair    Long Term Recall:   Recalled Address, Birthdate, Age, and Family    Organization/Problem Solving/Reasoning   Verbal Sequencing:   Impaired        Verbal Problem solving:   Functional          CLINICAL OBSERVATIONS NOTED DURING THE EVALUATION  Latent responses, Flat affect, and Reduced eye contact                  EDUCATION:   The Speech Language Pathologist (SLP) completed education regarding results of evaluation and that intervention is warranted at this time.   Learner: Patient  Education: Reviewed results and recommendations of this evaluation  Evaluation of Education:  Verbalizes understanding    Evaluation Time includes thorough review of current medical information, gathering information on past medical history/social history and prior level of function, completion of standardized testing/informal observation of tasks, assessment of data and education on plan of care and goals. CPT code:    17590  eval speech sound lang comprehension      The admitting diagnosis and active problem list, as listed below have been reviewed prior to initiation of this evaluation. ACTIVE PROBLEM LIST:   Patient Active Problem List   Diagnosis    GERD (gastroesophageal reflux disease)    Hypothyroidism    Obstructive sleep apnea syndrome, non-compliant with CPAP therapy    Hyperlipidemia LDL goal <100    Obesity (BMI 35.0-39.9 without comorbidity)    Blindness of left eye    Vertigo    Hypertension    Frequent falls    Ataxia    Moderate episode of recurrent major depressive disorder (HCC)    Gait instability    Peripheral neuropathy    Lumbosacral radiculopathy    Dizziness    Alzheimer's disease, unspecified    Subarachnoid hemorrhage (HCC)    Acute CVA (cerebrovascular accident) ([address]    Acute chemical conjunctivitis    Myogenic ptosis    Nonexudative age-related macular degeneration    Squamous blepharitis    Pain in eye    History of artificial eye lens    History of insertion of artificial eyeball    ANA MARIA (obstructive sleep apnea)    Exudative age-related macular degeneration of right eye ([address]    Chronic renal disease, stage III ([address] [395459]    Hypertensive emergency    Hypertensive crisis, unspecified    Acute respiratory distress    Acute kidney injury ([address]    Delirium    Right occipital Hemorrhagic stroke [facility])       INTERVENTION  CPT Code: 07713  therapeutic interventions that focus on cognitive function , initial  15 min    Pt seen for ongoing cognitive linguistic deficits. Pt denies changes in cognitive ability s/p admision however does admit to difficulty with temporal orientation difficulties during admission. SLP provided edu to Pt r/t increased likelihood of this secondary to being in ICU. Pt trained on use of external memory aides/ orientation aides.  Pt was pleased however was unable to recall immediately but was able to s/p 10 minutes. Will cont to follow to determine baseline.

## 2022-10-10 NOTE — CARE COORDINATION
Met with pt and sister in room to discuss rehab options. Pt lives alone in a 1st floor apt with no steps to enter. She ambulates with a ww. Discussed recommendation by therapy for acute rehab. Sister believes she was declined by [facility] acute rehab in the past since she lives alone. Both pt and sister prefer [facility] and 1st choice is [facility]. Referral made to [address] Provided them with [employer] to review for other choices if needed.

## 2022-10-10 NOTE — CONSULTS
Neurosurgery Consult Note      CHIEF COMPLAINT: Hemorrhagic stroke right occipital    HPI:Valarie Mata is a 78 y.o. right handed female presenting for evaluation of dizziness. She received phenergan and Benadryl in the ED after which she reportedly had become somewhat unresponsive. MRI brain obtained today which showed a 43 mm right occipital hemorrhage with other changes suggestive of CAA. She has a history of prior SAH in the right frontal and left occipital lobes during the summer of 2022. Evaluated in CVICU sitting up at the bedside PT OT's working with the patient she was following simple commands no acute distress blind in the left eye with a left hemifield cut in the right eye due to the stroke within the occipital lobe    Past Medical History:   Diagnosis Date    Amaurosis fugax of right eye 12/11/2017    Anxiety     Arthritis     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, treated with surgery and chemo    Cerebral artery occlusion with cerebral infarction ([address]     possibly TIA    Chronic back pain     Constipation     Depression     Elevated sed rate 12/11/2017    hx of    GERD (gastroesophageal reflux disease)     Hemorrhagic stroke ([address] 08/09/2022    Hemorrhoids     history of    Hyperlipidemia     Hypertension     Ischemic stroke ([address]     Left foot pain     dropped a Joegillian Richmondnen on foot    Lumbosacral radiculopathy 08/06/2020    Macular degeneration     Peripheral neuropathy 08/06/2020    Poor vision     right eye / had bleeding behind eye, is receiving \"shots\" at this time  / 3/22/2019    Prosthetic eye globe     left eye implant;    SAH (subarachnoid hemorrhage) ([address] 04/24/2022    Seasonal allergies     Skipped heart beats     on metoprolol; managed by Dr. Leoncio Beltran    Sleep apnea     uses cpap at times     Stroke [facility]) 04/24/2022    Thyroid disease      Past Surgical History:   Procedure Laterality Date    CHOLECYSTECTOMY  1985    open?     COLECTOMY  1974    COLONOSCOPY  04/26/2012    and egd    COLONOSCOPY daily Apply to lower back 22   Historical Provider, MD   vitamin C (ASCORBIC ACID) 500 MG tablet Take 500 mg by mouth daily 22   Historical Provider, MD   Vitamin D, Cholecalciferol, 25 MCG (1000 UT) TABS Take 1,000 Units by mouth daily 22   Historical Provider, MD   cetirizine (ZYRTEC) 10 MG tablet Take 10 mg by mouth daily as needed for Allergies    Historical Provider, MD   ondansetron (ZOFRAN) 4 MG tablet Take 1 tablet by mouth 3 times daily as needed for Nausea or Vomiting 7/15/21   Catherine [place] Deb, DO   Multiple Vitamins-Minerals (PRESERVISION AREDS 2) CAPS Take 1 capsule by mouth 2 times daily    Historical Provider, MD     Outpatient Medications Marked as Taking for the 10/7/22 encounter [facility] Encounter)   Medication Sig Dispense Refill    atorvastatin (LIPITOR) 40 MG tablet Take 40 mg by mouth nightly      docusate sodium (COLACE) 100 MG capsule Take 100 mg by mouth 2 times daily      furosemide (LASIX) 20 MG tablet Take 20 mg by mouth daily      levothyroxine (SYNTHROID) 88 MCG tablet Take 88 mcg by mouth Daily      losartan (COZAAR) 100 MG tablet Take 100 mg by mouth daily      metoprolol succinate (TOPROL XL) 25 MG extended release tablet Take 25 mg by mouth daily      pantoprazole (PROTONIX) 40 MG tablet Take 40 mg by mouth daily       Social History     Socioeconomic History    Marital status:      Spouse name: Not on file    Number of children: 3    Years of education: 12    Highest education level: High school graduate   Occupational History    Not on file   Tobacco Use    Smoking status: Never     Passive exposure: Yes    Smokeless tobacco: Never    Tobacco comments:      had smoked,  in    Vaping Use    Vaping Use: Never used   Substance and Sexual Activity    Alcohol use: No    Drug use: Never    Sexual activity: Not Currently     Partners: Male   Other Topics Concern    Not on file   Social History Narrative    9-3-19 [facility] spoke with [facility].   She reports s/s of MDD and openly admits to having depression. Crying during the call. States she doesn't sleep well unless she takes her anti-anxiety medication. Reports that she has financial strain, food strain, and high stress. Often running out of money and food before the month is up. Reports having to borrow from family to get by at times. She does have social connections with family/friends and [employer] so she is not feeling isolated. She reports feeling \"bullied\" at her old apartment and isn't happy where she has been living now for the past 1 year. She wants to move again to help lower her bills but is on a waiting list.      She has valid transportation with the use of her brother's truck and [employer]. Mariya Khan is agreeable to [facility] helping with outpatient counseling for the depression and open to taking medication if the PCP prescribes. Had a script in the past and didn't feel it worked so stopped taking it. Counseled on taking medication and not stopping it without PCP request to do so. She verbalized agreement. [facility] made a call to PCP to update on above depression status. [facility] will assist with SELENE application when Mariya Khan returns from her vacation. 10-8-18[facility] spoke with Mariya Khan. She is planning to call [facility] for outpatient counseling. [facility] spoke with the Senior [address] Shai Doherty and she will work on getting the SELENE application/assessment completed. Rhonda updated and agreeable to this. Both are to keep [facility] updated on when that application/home assessment is scheduled. [facility] sent 2nd e-mail to [facility] for f/u to services for in the community to follow with Mariya Khan. Response showed that the [facility] advocate, Shari Payne, did reach out to Mariya Khan and sent the letter with the counseling centers listed. Mariya Khan does not recall talking with Shari Payne. Mariya Khan is getting 3 meals a week from [employer] [facility] [employer] but states that does not help with her shortage of food.   She also has the ERS in place. Moe Aguilera from [employer]. Annalisa Vaughn updated LSW that she will request a passport referral for review of needs and SELENE application. [facility] refused to meet this upcoming week. Social Determinants of Health     Financial Resource Strain: Low Risk     Difficulty of Paying Living Expenses: Not very hard   Food Insecurity: No Food Insecurity    Worried About Running Out of Food in the Last Year: Never true    Ran Out of Food in the Last Year: Never true   Transportation Needs: Not on file   Physical Activity: Not on file   Stress: Not on file   Social Connections: Not on file   Intimate Partner Violence: Not on file   Housing Stability: Not on file     Family History   Problem Relation Age of Onset    Stroke Father     Hypertension Father     Heart Disease Father     Stroke Mother     Hypertension Mother     Other Mother         aneurysm    Heart Disease Mother     Hypertension Brother     Cancer Brother     Breast Cancer Sister     Hypertension Sister     Cancer Sister         breast ca    Heart Disease Sister     Hypertension Brother         parkinson    Heart Disease Brother     Cancer Brother     Cancer Brother     Cancer Brother     Cancer Brother     Heart Disease Brother     Cancer Brother     Cancer Sister     High Blood Pressure Daughter     Breast Cancer Daughter     High Blood Pressure Daughter        ROS: Complete 10 point ROS was obtained with positives in HPI, otherwise:  Pt denies fevers, denies chills. Pt denies chest pain, denies SOB, denies nausea, denies vomiting, denies headache.       VITALS/DRAINS:   VITALS:  BP (!) 140/49   Pulse 59   Temp 97.9 °F (36.6 °C) (Oral)   Resp 20   Ht 4' 11\" (1.499 m)   Wt 238 lb 1.6 oz (108 kg)   LMP 07/24/2014   SpO2 95%   BMI 48.09 kg/m²   24HR INTAKE/OUTPUT:    Intake/Output Summary (Last 24 hours) at 10/10/2022 1134  Last data filed at 10/10/2022 1000  Gross per 24 hour   Intake 200 ml   Output 250 ml   Net -50 ml       EXAMINATION:  Cranial Nerves: Motor:  Sensory:  Cerebellar:  Gait:  DTRs:    IMAGING STUDIES:  CT Head WO Contrast    Result Date: 10/8/2022  EXAMINATION: CT OF THE HEAD WITHOUT CONTRAST  10/8/2022 1:08 am TECHNIQUE: CT of the head was performed without the administration of intravenous contrast. Automated exposure control, iterative reconstruction, and/or weight based adjustment of the mA/kV was utilized to reduce the radiation dose to as low as reasonably achievable. COMPARISON: None. HISTORY: ORDERING SYSTEM PROVIDED HISTORY: dizziness TECHNOLOGIST PROVIDED HISTORY: Reason for exam:->dizziness Has a \"code stroke\" or \"stroke alert\" been called? ->No Decision Support Exception - unselect if not a suspected or confirmed emergency medical condition->Emergency Medical Condition (MA) What reading provider will be dictating this exam?->CRC FINDINGS: BRAIN/VENTRICLES: There is no acute intracranial hemorrhage, mass effect or midline shift. No abnormal extra-axial fluid collection. The gray-white differentiation is maintained without evidence of an acute infarct. There is no evidence of hydrocephalus. 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 SINUSES: The visualized paranasal sinuses and mastoid air cells demonstrate no acute abnormality. SOFT TISSUES/SKULL:  No acute abnormality of the visualized skull or soft tissues. No acute intracranial abnormality. XR CHEST PORTABLE    Result Date: 10/8/2022  EXAMINATION: ONE XRAY VIEW OF THE CHEST 10/8/2022 12:24 am COMPARISON: 08/09/2022 HISTORY: ORDERING SYSTEM PROVIDED HISTORY: dizziness TECHNOLOGIST PROVIDED HISTORY: Reason for exam:->dizziness What reading provider will be dictating this exam?->CRC FINDINGS: The lungs are without acute focal process. There is no effusion or pneumothorax. The cardiomediastinal silhouette is without acute process. The osseous structures are without acute process. No acute process.        LABS:  CBC:   Lab Results   Component Value Date/Time    WBC 9.4 10/10/2022 03:58 AM    RBC 3.99 10/10/2022 03:58 AM    HGB 11.5 10/10/2022 03:58 AM    HCT 33.9 10/10/2022 03:58 AM    MCV 85.0 10/10/2022 03:58 AM    MCH 28.8 10/10/2022 03:58 AM    MCHC 33.9 10/10/2022 03:58 AM    RDW 14.4 10/10/2022 03:58 AM     10/10/2022 03:58 AM    MPV 9.9 10/10/2022 03:58 AM     BMP:    Lab Results   Component Value Date/Time     10/10/2022 03:57 AM    K 3.9 10/10/2022 03:57 AM    K 4.3 10/07/2022 11:57 PM     10/10/2022 03:57 AM    CO2 25 10/10/2022 03:57 AM    BUN 27 10/10/2022 03:57 AM    LABALBU 3.8 08/09/2022 10:31 PM    LABALBU 4.2 03/24/2012 11:45 PM    CREATININE 0.9 10/10/2022 03:57 AM    CALCIUM 8.6 10/10/2022 03:57 AM    GFRAA >60 10/10/2022 03:57 AM    LABGLOM 60 10/10/2022 03:57 AM    GLUCOSE 100 10/10/2022 03:57 AM    GLUCOSE 109 05/04/2012 06:17 AM     PT/INR:    Lab Results   Component Value Date/Time    PROTIME 10.2 08/10/2022 04:29 AM    PROTIME 10.8 03/24/2012 11:45 PM    INR 0.9 08/10/2022 04:29 AM       IMPRESSION: Spontaneous intracerebral hemorrhage right occipital lobe stable on sequential imaging imaging consistent with amyloid angiopathy    RECOMMENDATIONS: Agree with present management full anticoagulation contraindicated till follow-up image shows complete resolution of the intracerebral hemorrhage, will follow    Thank you again for this consultation.       Natacha Garcia MD  10/10/2022

## 2022-10-10 NOTE — PROGRESS NOTES
Intensive Care Unit  Critical Care Consult  Daily Progress Note 10/10/2022    Date of Admission: 10/7    EVENTS:   107/22 Presented to ED with cc dizziness. 10/9?22 MRI revealed Hemorrhagic stroke in right occipital lobe. Moved from [facility] to [facility].   10/10 No acute events, slightly HTN OV, improved throughout the day    PHYSICAL EXAM:    BP (!) 149/64   Pulse 67   Temp 98 °F (36.7 °C) (Oral)   Resp 23   Ht 4' 11\" (1.499 m)   Wt 238 lb 1.6 oz (108 kg)   LMP 07/24/2014   SpO2 91%   BMI 48.09 kg/m²     General appearance:  Comfortable. Pain Description: none    GCS:    4 - Opens eyes on own   6 - Follows simple motor commands  5 - Alert and oriented with periods of confusion    Pupil size:  Left 3 mm  Right 3 mm  Pupil reaction: Yes  Wiggles fingers: Left Yes Right Yes  Hand grasp:   Left normal     Right normal  Wiggles toes: Left Yes    Right Yes  Plantar flexion: Left normal    Right normal    CONSTITUTIONAL: no acute distress, lying in hospital bed, alert and cooperative   NEUROLOGIC: PERRL, oriented x 4, moving all extremities without difficulty   CARDIOVASCULAR: S1 S2, regular rate, regular rhythm, no murmur/gallop/rub.  Monitor: sinus  PULMONARY: no rhonchi/rales/wheezes, no use of accessory muscles, RA  RENAL: voiding   ABDOMEN: soft, nontender, nondistended, nontympanic, normal bowel sounds   MUSCULOSKELETAL: moves all extremities purposefully, 5/5 strength   SKIN/EXTREMITIES: no rashes/ecchymosis, no edema/clubbing, warm/dry, good capillary refill       Past Medical History:   Diagnosis Date    Amaurosis fugax of right eye 12/11/2017    Anxiety     Arthritis     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, treated with surgery and chemo    Cerebral artery occlusion with cerebral infarction ([address]     possibly TIA    Chronic back pain     Constipation     Depression     Elevated sed rate 12/11/2017    hx of    GERD (gastroesophageal reflux disease)     Hemorrhagic stroke ([facility] 08/09/2022    Hemorrhoids history of    Hyperlipidemia     Hypertension     Ischemic stroke [address]     Left foot pain     dropped a [place] on foot    Lumbosacral radiculopathy 08/06/2020    Macular degeneration     Peripheral neuropathy 08/06/2020    Poor vision     right eye / had bleeding behind eye, is receiving \"shots\" at this time  / 3/22/2019    Prosthetic eye globe     left eye implant;    SAH (subarachnoid hemorrhage) ([facility] 04/24/2022    Seasonal allergies     Skipped heart beats     on metoprolol; managed by Dr. Cindy Berkowitz    Sleep apnea     uses cpap at times     Stroke [facility]) 04/24/2022    Thyroid disease        ASSESSMENT/PLAN:       Neuro:  Right occipital hemorrhagic stroke, Hx prior strokes. Monitor neuro status, Neurology following,  Solumedrol for inflammatory amyloid   CV: HTN - improving. Monitor hemodynamics. BP goal < 140. PRN hydralazine & labetalol. Statin. 2Decho. Norvasc, losartan, metoprolol   Pulm: No acute issues  Monitor RR & SpO2. Pulmonary hygiene   GI: No acute issues. Diet. Monitor bowel function. Renal: No acute issues. Monitor BUN & Cr. Monitor electrolytes & replace as needed. Monitor urine output. ID: No acute issues   Endocrine: No acute issues. Monitor BS.  MSK: No acute issues. PT/OT  Heme: No acute issues. Monitor CBC. Bowel regime: Glycolax  Pain control/Sedation: Fioricet  Tylenol  DVT prophylaxis: SCDs. No Lovenox/heparin until ok with neurosurgery.    GI prophylaxis: Protonix, Diet  Mouth/Eye care: as needed  Family update: Updated at bedside   Code status:  Full  Disposition:  ICU      Total  time: 35 minutes     Electronically signed by BETINA Astorga CNP on 10/10/2022 at 4:32 PM

## 2022-10-10 NOTE — PROGRESS NOTES
Hospital Medicine    Subjective:  pt seen this am in icu alert conversive      Current Facility-Administered Medications:     polyethylene glycol (GLYCOLAX) packet 17 g, 17 g, Oral, Daily, BETINA Parker CNP, 17 g at 10/10/22 0802    labetalol (NORMODYNE;TRANDATE) injection 10 mg, 10 mg, IntraVENous, Q10 Min PRN, BETINA Parker - CNP, 10 mg at 10/09/22 1817    enalaprilat (VASOTEC) injection 1.25 mg, 1.25 mg, IntraVENous, Q6H PRN, BETINA Parker - CNP, 1.25 mg at 10/10/22 0330    hydrALAZINE (APRESOLINE) injection 10 mg, 10 mg, IntraVENous, Q30 Min PRN, Zeferino Spring MD    methylPREDNISolone sodium (SOLU-MEDROL) 500 mg in sodium chloride 0.9 % 250 mL IVPB, 500 mg, IntraVENous, Daily, Para Linda, DO, Stopped at 10/10/22 1010    butalbital-acetaminophen-caffeine (FIORICET, ESGIC) per tablet 1 tablet, 1 tablet, Oral, Q4H PRN, Zeferino Spring MD    ondansetron (ZOFRAN) tablet 4 mg, 4 mg, Oral, TID PRN, Rae Raza MD    cetirizine (ZYRTEC) tablet 10 mg, 10 mg, Oral, Daily PRN, Rae Raza MD, 10 mg at 10/10/22 0800    meclizine (ANTIVERT) tablet 25 mg, 25 mg, Oral, PRN, Rae Raza MD    Vitamin D (CHOLECALCIFEROL) tablet 1,000 Units, 1 tablet, Oral, Daily, Rae Raza MD, 1,000 Units at 10/10/22 0800    citalopram (CELEXA) tablet 10 mg, 10 mg, Oral, Daily, Rae Raza MD, 10 mg at 10/10/22 0800    docusate sodium (COLACE) capsule 100 mg, 100 mg, Oral, Daily, Rae Raza MD, 100 mg at 10/10/22 0801    furosemide (LASIX) tablet 40 mg, 40 mg, Oral, Daily, Rae Raza MD, 40 mg at 10/10/22 0801    pantoprazole (PROTONIX) tablet 40 mg, 40 mg, Oral, Daily, Rae Raza MD, 40 mg at 10/10/22 0801    potassium chloride (KLOR-CON M) extended release tablet 20 mEq, 20 mEq, Oral, Daily, Rae Raza MD, 20 mEq at 10/10/22 0802    amLODIPine (NORVASC) tablet 10 mg, 10 mg, Oral, Daily, Jimmy SNEED Samara Payton MD, 10 mg at 10/10/22 0802    atorvastatin (LIPITOR) tablet 40 mg, 40 mg, Oral, Daily, Tristin Ruiz MD, 40 mg at 10/10/22 0807    levothyroxine (SYNTHROID) tablet 88 mcg, 88 mcg, Oral, Daily, Tristin Ruiz MD, 88 mcg at 10/10/22 0800    losartan (COZAAR) tablet 100 mg, 100 mg, Oral, Daily, Tristin Ruiz MD, 100 mg at 10/10/22 0801    metoprolol succinate (TOPROL XL) extended release tablet 25 mg, 25 mg, Oral, Daily, Tristin Ruiz MD, 25 mg at 10/10/22 0801    DULoxetine (CYMBALTA) extended release capsule 30 mg, 30 mg, Oral, Daily, Tristin Ruiz MD, 30 mg at 10/10/22 0801    haloperidol lactate (HALDOL) injection 2 mg, 2 mg, IntraMUSCular, Q6H PRN, Tristin Ruiz MD    acetaminophen (TYLENOL) tablet 650 mg, 650 mg, Oral, Q6H PRN, Tristin Ruiz MD, 650 mg at 10/10/22 0610    Objective:    BP (!) 140/49   Pulse 59   Temp 97.9 °F (36.6 °C) (Oral)   Resp 20   Ht 4' 11\" (1.499 m)   Wt 238 lb 1.6 oz (108 kg)   LMP 07/24/2014   SpO2 95%   BMI 48.09 kg/m²     Heart:  reg  Lungs:  ctab  Abd: + bs soft nontender  Extrem:  w/o edema    CBC with Differential:    Lab Results   Component Value Date/Time    WBC 9.4 10/10/2022 03:58 AM    RBC 3.99 10/10/2022 03:58 AM    HGB 11.5 10/10/2022 03:58 AM    HCT 33.9 10/10/2022 03:58 AM     10/10/2022 03:58 AM    MCV 85.0 10/10/2022 03:58 AM    MCH 28.8 10/10/2022 03:58 AM    MCHC 33.9 10/10/2022 03:58 AM    RDW 14.4 10/10/2022 03:58 AM    SEGSPCT 58 03/11/2014 08:46 AM    LYMPHOPCT 29.3 10/10/2022 03:58 AM    MONOPCT 9.1 10/10/2022 03:58 AM    EOSPCT 1 10/07/2010 01:14 PM    BASOPCT 0.5 10/10/2022 03:58 AM    MONOSABS 0.85 10/10/2022 03:58 AM    LYMPHSABS 2.75 10/10/2022 03:58 AM    EOSABS 0.16 10/10/2022 03:58 AM    BASOSABS 0.05 10/10/2022 03:58 AM     CMP:    Lab Results   Component Value Date/Time     10/10/2022 03:57 AM    K 3.9 10/10/2022 03:57 AM    K 4.3 10/07/2022 11:57 PM     10/10/2022 03:57 AM    CO2 25 10/10/2022 03:57 AM    BUN 27 10/10/2022 03:57 AM    CREATININE 0.9 10/10/2022 03:57 AM    GFRAA >60 10/10/2022 03:57 AM    LABGLOM 60 10/10/2022 03:57 AM    GLUCOSE 100 10/10/2022 03:57 AM    GLUCOSE 109 05/04/2012 06:17 AM    PROT 7.0 08/09/2022 10:31 PM    LABALBU 3.8 08/09/2022 10:31 PM    LABALBU 4.2 03/24/2012 11:45 PM    CALCIUM 8.6 10/10/2022 03:57 AM    BILITOT 0.3 08/09/2022 10:31 PM    ALKPHOS 61 08/09/2022 10:31 PM    AST 24 08/09/2022 10:31 PM    ALT 13 08/09/2022 10:31 PM     Warfarin PT/INR:    Lab Results   Component Value Date    INR 0.9 08/10/2022    INR 0.9 08/18/2020    INR 0.9 12/29/2019    PROTIME 10.2 08/10/2022    PROTIME 10.9 08/18/2020    PROTIME 10.5 12/29/2019       Assessment:    Principal Problem:    Delirium  Active Problems:    Right occipital Hemorrhagic stroke [facility])  Resolved Problems:    * No resolved hospital problems.  *      Plan:  Increase activity pt/ot dc planning cont as per neurology neurosurgery        Eddy Kendrick DO  12:22 PM  10/10/2022

## 2022-10-10 NOTE — PROGRESS NOTES
Jasmyn Kapadia was a 78 y.o. right handed female     Neurology was following for dizziness    PMH: GERD, hypothyroidism, obstructive sleep apnea, hyperlipidemia, obesity, blindness of the left eye, hypertension, neuropathy, right occipital hemorrhagic stroke, and myogenic ptosis    Patient presented to the ED on 10/7 for dizziness which worsened by changing position. She was found to have interval development of a right occipital intraparenchymal hemorrhage. Her MRI of her brain was suspicious for cerebral amyloid angiopathy, possibly inflammatory component. Her blood pressure upon admission was 200/70. She has had fluctuations in her blood pressure since admission, ranging from the 190s to 108/61. Patient stated she takes her blood pressure at home and admitted her blood pressure gets as high as the 190s to 200s. She is on 3 blood pressure medications currently, and recommended possibly a nephrology consult for hypertension management. Patient was going to speak with her niece who is a nurse practitioner and was going to let us know what they decide. She was awake, alert, and sitting up in the chair eating breakfast.  Spoke with bedside RN, she revealed patient appeared to be alert and oriented x3. However, patient had episodes of confusion by asking the nurse to get things out of her cabinet in her house. She didn't demonstrate any intermittent confusion during my interview. Her sister was by the bedside, all questions answered.       No chest pain or palpitations  No coughing or wheezing    No vertigo, lightheadedness or loss of consciousness  No falls, tripping or stumbling  No incontinence of bowels or bladder  No itching or bruising appreciated  No numbness, tingling or focal arm/leg weakness    ROS otherwise negative      Assessment:     Cerebral amyloid angiopathy, possibly inflammatory component  - MRI brain demonstrates remote amyloid angiopathy  - Severe hypertension, blood pressure in the 200/70  - Blood pressure has remained > 140 most of her admission    Right occipital hemorrhage  - MRI demonstrated right occipital hemorrhage  - Blood pressure 200/70 upon admission  - Blood pressure has remained > 140 most of her admission      Plan:     MRA brain with contrast pending  Solu-Medrol 500 mg daily x2 more days  Possibly give prednisone taper for 1 to 2 weeks if improving neurologically  Keep systolic blood pressure less than 140  Consider nephrology consult  Limit fluctuations in blood pressure  Neurology to follow  No anticoagulation or antiplatelet medications until complete resolution of intracerebral hemorrhage  Continue taking blood pressures at home      Objective:       BP (!) 140/49   Pulse 59   Temp 97.9 °F (36.6 °C) (Oral)   Resp 20   Ht 4' 11\" (1.499 m)   Wt 238 lb 1.6 oz (108 kg)   LMP 07/24/2014   SpO2 95%   BMI 48.09 kg/m²       General appearance: alert, appears stated age, cooperative and no distress  Head: normocephalic, without obvious abnormality, atraumatic  Eyes: conjunctivae/corneas clear  Neck: no adenopathy, supple, symmetrical, symmetric,    Lungs: Easy respirations on nasal cannula  Heart: Sinus rhythm on monitor  Abdomen: soft, non-tender; bowel sounds normal;   Extremities:  normal, atraumatic, no cyanosis, bilateral lower leg edema  Pulses: 2+ and symmetric  Skin: color, texture, turgor normal---no rashes or lesions      Mental Status: Alert and oriented x3.     Appropriate attention/concentration  Intact fundus of knowledge  Intact memories      Speech: Clear  Language: No aphasias    Cranial Nerves:  I: smell NA   II: visual acuity  NA   II: visual fields Blind in left eye, left homonymous hemianopsia   II: pupils PERRLA in right eye   III,VII: ptosis Left myogenic   III,IV,VI: extraocular muscles  Full ROM   V: mastication Normal   V: facial light touch sensation  Normal   V,VII: corneal reflex  Present in right eye   VII: facial muscle function - upper Normal   VII: facial muscle function - lower Normal   VIII: hearing Normal   IX: soft palate elevation  Normal   IX,X: gag reflex Present   XI: trapezius strength  5/5   XI: sternocleidomastoid strength 5/5   XI: neck extension strength  5/5   XII: tongue strength  Normal     Motor:  5/5 throughout  Normal bulk and tone  No drift   No abnormal movements    Sensory:  LT and PP normal  Vibration decreased in bilateral ankles    Coordination:   FN, FFM and ZORA normal  HS normal    Gait:  A little unsteady      DTR:   2+ throughout    No Babinskis  No Case's    No other pathological reflexes  Laboratory/Radiology:     CBC with Differential:    Lab Results   Component Value Date/Time    WBC 9.4 10/10/2022 03:58 AM    RBC 3.99 10/10/2022 03:58 AM    HGB 11.5 10/10/2022 03:58 AM    HCT 33.9 10/10/2022 03:58 AM     10/10/2022 03:58 AM    MCV 85.0 10/10/2022 03:58 AM    MCH 28.8 10/10/2022 03:58 AM    MCHC 33.9 10/10/2022 03:58 AM    RDW 14.4 10/10/2022 03:58 AM    SEGSPCT 58 03/11/2014 08:46 AM    LYMPHOPCT 29.3 10/10/2022 03:58 AM    MONOPCT 9.1 10/10/2022 03:58 AM    EOSPCT 1 10/07/2010 01:14 PM    BASOPCT 0.5 10/10/2022 03:58 AM    MONOSABS 0.85 10/10/2022 03:58 AM    LYMPHSABS 2.75 10/10/2022 03:58 AM    EOSABS 0.16 10/10/2022 03:58 AM    BASOSABS 0.05 10/10/2022 03:58 AM     BMP:    Lab Results   Component Value Date/Time     10/10/2022 03:57 AM    K 3.9 10/10/2022 03:57 AM    K 4.3 10/07/2022 11:57 PM     10/10/2022 03:57 AM    CO2 25 10/10/2022 03:57 AM    BUN 27 10/10/2022 03:57 AM    LABALBU 3.8 08/09/2022 10:31 PM    LABALBU 4.2 03/24/2012 11:45 PM    CREATININE 0.9 10/10/2022 03:57 AM    CALCIUM 8.6 10/10/2022 03:57 AM    GFRAA >60 10/10/2022 03:57 AM    LABGLOM 60 10/10/2022 03:57 AM    GLUCOSE 100 10/10/2022 03:57 AM    GLUCOSE 109 05/04/2012 06:17 AM       MRI brain 10/9/2022    New 43 mm right occipital hemorrhage.       All labs and imaging studies reviewed independently today Anneliese Gordon, BETINA - CNP  11:49 AM  10/10/2022

## 2022-10-10 NOTE — PROGRESS NOTES
[address]       Date:10/10/2022                                                               Patient Name: Mahin Beverly  MRN: [id number]  : 1940  Room: [address] [place] [address]    Evaluating OT: Cindy Meyer OTR/L [id number]    Referring Provider: BETINA Jade CNP   Specific Provider Orders/Date: OT eval and treat (10/9/22)       Diagnosis:  Dizziness      Delirium  Reason for admission:  presented for evaluation of dizziness. Pt received phenergan and Benadryl in the ED after which she reportedly had become somewhat unresponsive and delirious. 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, Anxiety, Arthritis, Ca of bowel, TIA, Chronic back pain, GERD, hemorrhagic stroke (Summer 2022), HLD, HTN, L foot plain, thyroid disease      *Precautions:  Fall Risk, L visual deficits, O2, bed alarm, SBP < 150    Assessment of current deficits   [x] Functional mobility  [x]ADLs  [x] Strength               []Cognition   [x] Functional transfers   [x] IADLs         [x] Safety Awareness   [x]Endurance   [x] Fine Coordination        [x] ROM     [x] Vision/perception   [x]Sensation    [x]Gross Motor Coordination [x] Balance   [] Delirium                  []Motor Control     [] Communication    OT PLAN OF CARE   OT POC based on physician orders, patient diagnosis and results of clinical assessment.        Frequency/Duration: 1-3 days/wk for 1-2 weeks PRN    Specific OT Treatment to include:   ADL retraining/adapted techniques and AE recommendations to increase functional independence within precautions                    Energy conservation techniques to improve tolerance for selfcare routine   Functional transfer/mobility training/DME recommendations for increased independence, safety and fall prevention         Patient/family education to increase safety and functional independence within precautions              Environmental modifications for safe mobility and completion of ADLs                           Visual/Perceptual retraining  to improve body awareness and safety during transfers and ADLs  Splinting/positioning needs to maintain joint/skin integrity and prevent contractures  Therapeutic activity to improve functional performance during ADLs/IADLs                                         Therapeutic exercise to improve tolerance and functional strength for ADLs   Balance retraining exercises/tasks for facilitation of postural control with dynamic challenges during ADLs .   Positioning to improve functional independence  Neuromuscular re-education: facilitation of righting/equilibrium reactions, normalization muscle tone/facilitation active functional movement                      Delirium prevention/treatment    Modified [place] Scale   Score     Description  0             No symptoms  1             No significant disability despite symptoms  2             Slight disability; able to look after own affairs  3             Moderate disability; able to ambulate without assist/ requires assist with ADLs  4             Moderate/Severe disability;requires assist to ambulate/assist with ADLs  5             Severe disability;bedridden/incontinent   6               Score:   4    Recommended Adaptive Equipment: TBA: WW, LB Dressing AE (sock aid, fadi hose aid, owns reacher)    Home Living: Pt lives alone  in a first floor apt with 0 step(s) to enter and 0 rail(s); bed/bath on first floor  Bathroom setup: tub/shower (2 shower seats-uses one seat out side of tub to do her hair)  Equipment owned: [facility], [address] [place] [address] Juan A, shower seats x 2, reacher    Prior Level of Function: IND with ADLs;  Mostly IND with IADLs; sister asssits as needed. SPC or [employer] for ambulation. Driving: no; sister assists with transportation    Pain Level: pt c/o 0/10  pain  this session    Cognition: A&O: 4/4    Follows 1-2 step commands appropriately. Pt requires increased cues during functional activity/mobility.     Memory: fair   Comprehension fair   Problem solving: fair   Judgement/safety: fair-               Communication skills: WFL           Vision: impaired vision: blind L; macular degeneration R               Glasses:yes                                                   Hearing: WFL     RASS: 0  CAM-ICU: (NT) Delirium    UE Assessment:  Hand Dominance: Right [x]  Left []     ROM Strength STM goal: PRN   RUE  WFL 4-/5 4/5     LUE WFL 4-/5 4/5       Sensation: No c/o numbness/tingling in extremities; chronic neuropathy in B feet  Tone: WNL   Edema: WFL     Functional Assessment:  AM-PAC Daily Activity Raw Score: 15/24   Initial Eval Status  Date: 10/10/22 Treatment Status  Date: STGs = LTGs  Time frame: 7-14 days   Feeding S; set up  seated in chair                        Grace  while seated up in chair to increase activity tolerance        Grooming Min A  Set up                        Grace   while standing sink level requiring AD for balance and demonstrating G tolerance      UB dressing/bathing Min A                        Grace       LB dressing/bathing Max A  seated up in chair:  Mod A using AE after instruction                       Grace  using AE as needed for safe reach/ energy conservation       Toileting NT                        Grace     Bed Mobility  Supine to sit:   Min A    Sit to supine:   NT                        Grace  in prep of ADL tasks & transfers   Functional Transfers Sit to stand:   Min A    Stand to sit:   Min A                        Grace  sit<>stand/functional bathroom transfers using AD/DME as needed for balance and safety   Functional Mobility Min A [employer]                       Grace   functional/bathroom mobility using AD as needed & demonstrating G safety     Balance Sitting:     Static:  SBA    Dynamic:Min A  Standing: Donald [employer]  Grace dynamic sitting balance; Grace dynamic standing balance  during ADL tasks & transfers   Endurance/  Activity Tolerance   F tolerance with light activity. G   tolerance with moderate activity/self care routine   Visual/  Perceptual Impaired: L visual field                      Vitals:   HR at rest: 54 bpm HR at end of session: 54 bpm   Spo2 at rest:97% Spo2 at end of session 97%   BP at rest:161/60 mmHg BP at end of session 103/50 mmHg       Treatment: OT treatment provided this date includes:  Bed mobility: Instruction on precautions prior to bed mobility to facilitate safe transfers and ADLS. HOB elevated to assist. No c/o dizziness EOB. Balance retraining: Performed sitting/standing balance ex's with instruction to facilitate righting reactions with postural changes during ADLS. Pt provided with [employer] for support. ADL retraining: Instruction on adapted techniques/AE (reacher, sock aid) to increase independence and safe reach during dressing/bathing activities. Pt demonstrated G understanding and can benefit from further training. Functional transfer training:  Instruction on postural cues, hand placement/sequencing, & walker safety  to assist with balance and fall prevention during self care routine/bathroom transfers. Pt demo decreased L sided awareness with cues to avoid obstacles. Pt required increased assist during turns. Energy conservation: Education on breathing techniques, pacing, work simplification strategies & recommended bathroom DME for safety and energy conservation during self care tasks and activities of daily living. Delirium Prevention: Environmental and sensory modifications assessed and implemented to decrease ICU acquired delirium and to improve overall orientation, mentation and pt interaction with family/staff.        Line management and environmental modifications made prior to and end of session to ensure patient safety and to increase efficiency of session. Skilled monitoring of HR, O2 saturation, blood pressure and patient's response to activity performed throughout session. Comments: OK from RN to see patient. Upon arrival, patient supine in bed,agreeable to session. Pt demo fair tolerance with good understanding of education/techniques. At end of session, patient left seated in chair to increase activity tolerance. Pt set up with breakfast tray. Call light within reach, all lines and tubes intact. Pt instructed on use of call light for assistance and fall prevention. .    Patient presents with decreased ROM/strength,activity tolerance, dynamic balance, functional mobility limiting completion of ADLs and safety. Pt can benefit from continued skilled OT to increase safety, functional independence and quality of life. Rehab Potential: G for established goals    Patient / Family Goal: to return to PLOF    Patient and/or family were instructed/educated on diagnosis, prognosis/goals and plan of care. Pt demonstrated G understanding. Evaluation Complexity: Moderate     History: Expanded chart review of consults, imaging, and psychosocial history related to current functional performance. Exam: 5+ performance deficits identified limiting functional independence and safe return home   Assistance/Modification: Min/mod assistance or modifications required to perform tasks. May have comorbidities that affect occupational performance. [] Malnutrition indicators have been identified and nursing has been notified to ensure a dietitian consult is ordered.       Time In:0920             Time Out: 0940         Total Treatment time: 25   Min Units   OT Eval Low 43764     OT Eval Medium 78610 X    OT Eval High [id number]     OT Re-Eval [id number]     Therapeutic Ex [id number]     Therapeutic Activities 81053 15 1   ADL/Self Care 56388 10 1   Orthotic Management 79294     Neuro Re-Ed 88523 Non-Billable Time        Evaluation time includes thorough review of current medical information, gathering information on past medical history/social history and prior level of function, completion of standardized testing/informal observation of tasks, assessment of data and development of POC/Goals.      Josey De La Torre, OTR/L [id number]
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
## 2022-10-11 LAB
ANION GAP SERPL CALCULATED.3IONS-SCNC: 15 MMOL/L (ref 7–16)
BASOPHILS ABSOLUTE: 0.01 E9/L (ref 0–0.2)
BASOPHILS RELATIVE PERCENT: 0.1 % (ref 0–2)
BUN BLDV-MCNC: 34 MG/DL (ref 6–23)
CALCIUM SERPL-MCNC: 9.1 MG/DL (ref 8.6–10.2)
CHLORIDE BLD-SCNC: 102 MMOL/L (ref 98–107)
CO2: 19 MMOL/L (ref 22–29)
CREAT SERPL-MCNC: 1 MG/DL (ref 0.5–1)
EOSINOPHILS ABSOLUTE: 0 E9/L (ref 0.05–0.5)
EOSINOPHILS RELATIVE PERCENT: 0 % (ref 0–6)
GFR AFRICAN AMERICAN: >60
GFR NON-AFRICAN AMERICAN: 53 ML/MIN/1.73
GLUCOSE BLD-MCNC: 134 MG/DL (ref 74–99)
HCT VFR BLD CALC: 35.6 % (ref 34–48)
HEMOGLOBIN: 12.2 G/DL (ref 11.5–15.5)
IMMATURE GRANULOCYTES #: 0.05 E9/L
IMMATURE GRANULOCYTES %: 0.5 % (ref 0–5)
LYMPHOCYTES ABSOLUTE: 1.29 E9/L (ref 1.5–4)
LYMPHOCYTES RELATIVE PERCENT: 13.8 % (ref 20–42)
MAGNESIUM: 2 MG/DL (ref 1.6–2.6)
MCH RBC QN AUTO: 29.4 PG (ref 26–35)
MCHC RBC AUTO-ENTMCNC: 34.3 % (ref 32–34.5)
MCV RBC AUTO: 85.8 FL (ref 80–99.9)
METER GLUCOSE: 129 MG/DL (ref 74–99)
METER GLUCOSE: 170 MG/DL (ref 74–99)
METER GLUCOSE: 174 MG/DL (ref 74–99)
METER GLUCOSE: 194 MG/DL (ref 74–99)
MONOCYTES ABSOLUTE: 0.2 E9/L (ref 0.1–0.95)
MONOCYTES RELATIVE PERCENT: 2.1 % (ref 2–12)
NEUTROPHILS ABSOLUTE: 7.77 E9/L (ref 1.8–7.3)
NEUTROPHILS RELATIVE PERCENT: 83.5 % (ref 43–80)
PDW BLD-RTO: 14.1 FL (ref 11.5–15)
PHOSPHORUS: 4.1 MG/DL (ref 2.5–4.5)
PLATELET # BLD: 245 E9/L (ref 130–450)
PMV BLD AUTO: 10.1 FL (ref 7–12)
POTASSIUM SERPL-SCNC: 4.2 MMOL/L (ref 3.5–5)
RBC # BLD: 4.15 E12/L (ref 3.5–5.5)
SODIUM BLD-SCNC: 136 MMOL/L (ref 132–146)
WBC # BLD: 9.3 E9/L (ref 4.5–11.5)

## 2022-10-11 PROCEDURE — 6370000000 HC RX 637 (ALT 250 FOR IP): Performed by: NURSE PRACTITIONER

## 2022-10-11 PROCEDURE — 2580000003 HC RX 258: Performed by: PSYCHIATRY & NEUROLOGY

## 2022-10-11 PROCEDURE — 85025 COMPLETE CBC W/AUTO DIFF WBC: CPT

## 2022-10-11 PROCEDURE — 84100 ASSAY OF PHOSPHORUS: CPT

## 2022-10-11 PROCEDURE — 2140000000 HC CCU INTERMEDIATE R&B

## 2022-10-11 PROCEDURE — 99232 SBSQ HOSP IP/OBS MODERATE 35: CPT

## 2022-10-11 PROCEDURE — 83735 ASSAY OF MAGNESIUM: CPT

## 2022-10-11 PROCEDURE — 6370000000 HC RX 637 (ALT 250 FOR IP): Performed by: INTERNAL MEDICINE

## 2022-10-11 PROCEDURE — 6360000002 HC RX W HCPCS: Performed by: PSYCHIATRY & NEUROLOGY

## 2022-10-11 PROCEDURE — 2700000000 HC OXYGEN THERAPY PER DAY

## 2022-10-11 PROCEDURE — 2500000003 HC RX 250 WO HCPCS: Performed by: NURSE PRACTITIONER

## 2022-10-11 PROCEDURE — 82962 GLUCOSE BLOOD TEST: CPT

## 2022-10-11 PROCEDURE — 80048 BASIC METABOLIC PNL TOTAL CA: CPT

## 2022-10-11 PROCEDURE — 2580000003 HC RX 258

## 2022-10-11 PROCEDURE — 97129 THER IVNTJ 1ST 15 MIN: CPT | Performed by: SPEECH-LANGUAGE PATHOLOGIST

## 2022-10-11 RX ORDER — GUAIFENESIN 400 MG/1
400 TABLET ORAL 3 TIMES DAILY PRN
Status: DISCONTINUED | OUTPATIENT
Start: 2022-10-11 | End: 2022-10-14 | Stop reason: HOSPADM

## 2022-10-11 RX ORDER — METHYLPREDNISOLONE 4 MG/1
4 TABLET ORAL NIGHTLY
Status: DISCONTINUED | OUTPATIENT
Start: 2022-10-13 | End: 2022-10-12 | Stop reason: SDUPTHER

## 2022-10-11 RX ORDER — INSULIN LISPRO 100 [IU]/ML
0-4 INJECTION, SOLUTION INTRAVENOUS; SUBCUTANEOUS NIGHTLY
Status: DISCONTINUED | OUTPATIENT
Start: 2022-10-11 | End: 2022-10-14 | Stop reason: HOSPADM

## 2022-10-11 RX ORDER — METHYLPREDNISOLONE 4 MG/1
4 TABLET ORAL
Status: DISCONTINUED | OUTPATIENT
Start: 2022-10-12 | End: 2022-10-12 | Stop reason: SDUPTHER

## 2022-10-11 RX ORDER — DEXTROSE MONOHYDRATE 100 MG/ML
INJECTION, SOLUTION INTRAVENOUS CONTINUOUS PRN
Status: DISCONTINUED | OUTPATIENT
Start: 2022-10-11 | End: 2022-10-14 | Stop reason: HOSPADM

## 2022-10-11 RX ORDER — METHYLPREDNISOLONE 4 MG/1
8 TABLET ORAL NIGHTLY
Status: DISCONTINUED | OUTPATIENT
Start: 2022-10-12 | End: 2022-10-12 | Stop reason: SDUPTHER

## 2022-10-11 RX ORDER — INSULIN LISPRO 100 [IU]/ML
0-16 INJECTION, SOLUTION INTRAVENOUS; SUBCUTANEOUS
Status: DISCONTINUED | OUTPATIENT
Start: 2022-10-11 | End: 2022-10-14 | Stop reason: HOSPADM

## 2022-10-11 RX ORDER — METHYLPREDNISOLONE 4 MG/1
24 TABLET ORAL ONCE
Status: DISCONTINUED | OUTPATIENT
Start: 2022-10-12 | End: 2022-10-12 | Stop reason: SDUPTHER

## 2022-10-11 RX ORDER — SODIUM CHLORIDE 0.9 % (FLUSH) 0.9 %
5-40 SYRINGE (ML) INJECTION PRN
Status: DISCONTINUED | OUTPATIENT
Start: 2022-10-11 | End: 2022-10-14 | Stop reason: HOSPADM

## 2022-10-11 RX ORDER — SODIUM CHLORIDE 0.9 % (FLUSH) 0.9 %
SYRINGE (ML) INJECTION
Status: COMPLETED
Start: 2022-10-11 | End: 2022-10-11

## 2022-10-11 RX ORDER — SODIUM CHLORIDE 0.9 % (FLUSH) 0.9 %
5-40 SYRINGE (ML) INJECTION 2 TIMES DAILY
Status: DISCONTINUED | OUTPATIENT
Start: 2022-10-11 | End: 2022-10-14 | Stop reason: HOSPADM

## 2022-10-11 RX ORDER — LABETALOL HYDROCHLORIDE 5 MG/ML
10 INJECTION, SOLUTION INTRAVENOUS
Status: DISCONTINUED | OUTPATIENT
Start: 2022-10-11 | End: 2022-10-13

## 2022-10-11 RX ADMIN — POLYETHYLENE GLYCOL 3350 17 G: 17 POWDER, FOR SOLUTION ORAL at 09:18

## 2022-10-11 RX ADMIN — LABETALOL HYDROCHLORIDE 10 MG: 5 INJECTION INTRAVENOUS at 00:36

## 2022-10-11 RX ADMIN — SODIUM CHLORIDE, PRESERVATIVE FREE 10 ML: 5 INJECTION INTRAVENOUS at 12:52

## 2022-10-11 RX ADMIN — LOSARTAN POTASSIUM 100 MG: 50 TABLET, FILM COATED ORAL at 09:11

## 2022-10-11 RX ADMIN — ATORVASTATIN CALCIUM 40 MG: 40 TABLET, FILM COATED ORAL at 09:12

## 2022-10-11 RX ADMIN — CITALOPRAM HYDROBROMIDE 10 MG: 20 TABLET ORAL at 09:11

## 2022-10-11 RX ADMIN — METOPROLOL SUCCINATE 25 MG: 25 TABLET, EXTENDED RELEASE ORAL at 09:16

## 2022-10-11 RX ADMIN — GUAIFENESIN 400 MG: 400 TABLET ORAL at 09:11

## 2022-10-11 RX ADMIN — AMLODIPINE BESYLATE 10 MG: 10 TABLET ORAL at 09:13

## 2022-10-11 RX ADMIN — SODIUM CHLORIDE 500 MG: 9 INJECTION, SOLUTION INTRAVENOUS at 09:21

## 2022-10-11 RX ADMIN — Medication 1000 UNITS: at 09:11

## 2022-10-11 RX ADMIN — POTASSIUM CHLORIDE 20 MEQ: 1500 TABLET, EXTENDED RELEASE ORAL at 09:11

## 2022-10-11 RX ADMIN — LEVOTHYROXINE SODIUM 88 MCG: 0.09 TABLET ORAL at 08:50

## 2022-10-11 RX ADMIN — Medication 10 ML: at 12:52

## 2022-10-11 RX ADMIN — FUROSEMIDE 40 MG: 20 TABLET ORAL at 09:12

## 2022-10-11 RX ADMIN — DULOXETINE HYDROCHLORIDE 30 MG: 30 CAPSULE, DELAYED RELEASE ORAL at 09:11

## 2022-10-11 RX ADMIN — PANTOPRAZOLE SODIUM 40 MG: 40 TABLET, DELAYED RELEASE ORAL at 09:13

## 2022-10-11 RX ADMIN — ACETAMINOPHEN 650 MG: 325 TABLET, FILM COATED ORAL at 00:49

## 2022-10-11 ASSESSMENT — PAIN DESCRIPTION - DESCRIPTORS: DESCRIPTORS: DISCOMFORT;JABBING

## 2022-10-11 ASSESSMENT — PAIN DESCRIPTION - ORIENTATION: ORIENTATION: RIGHT

## 2022-10-11 ASSESSMENT — PAIN SCALES - GENERAL
PAINLEVEL_OUTOF10: 0
PAINLEVEL_OUTOF10: 0
PAINLEVEL_OUTOF10: 4

## 2022-10-11 ASSESSMENT — PAIN DESCRIPTION - LOCATION: LOCATION: HEAD

## 2022-10-11 NOTE — PLAN OF CARE
Problem: Discharge Planning  Goal: Discharge to home or other facility with appropriate resources  10/11/2022 0730 by Kalee Walker RN  Outcome: Progressing  Flowsheets (Taken 10/11/2022 0730)  Discharge to home or other facility with appropriate resources:   Identify barriers to discharge with patient and caregiver   Identify discharge learning needs (meds, wound care, etc)  10/10/2022 2041 by Cesar Jordan RN  Outcome: Progressing     Problem: ABCDS Injury Assessment  Goal: Absence of physical injury  10/11/2022 0730 by Kalee Walker RN  Outcome: Progressing  [address] (Taken 10/10/2022 2041 by Cesar Jordan RN)  Absence of Physical Injury: Implement safety measures based on patient assessment  10/10/2022 2041 by Cesar Jordan RN  Outcome: Progressing  [address] (Taken 10/10/2022 2041)  Absence of Physical Injury: Implement safety measures based on patient assessment     Problem: Chronic Conditions and Co-morbidities  Goal: Patient's chronic conditions and co-morbidity symptoms are monitored and maintained or improved  10/11/2022 0730 by Kalee Walker RN  Outcome: Progressing  Flowsheets (Taken 10/9/2022 1809 by Wali Slade RN)  Care Plan - Patient's Chronic Conditions and Co-Morbidity Symptoms are Monitored and Maintained or Improved:   Monitor and assess patient's chronic conditions and comorbid symptoms for stability, deterioration, or improvement   Collaborate with multidisciplinary team to address chronic and comorbid conditions and prevent exacerbation or deterioration   Update acute care plan with appropriate goals if chronic or comorbid symptoms are exacerbated and prevent overall improvement and discharge  10/10/2022 2041 by Cesar Jordan RN  Outcome: Progressing     Problem: Neurosensory - Adult  Goal: Achieves stable or improved neurological status  10/10/2022 2041 by Cesar Jordan RN  Outcome: Progressing  Goal: Absence of seizures  10/11/2022 0730 by Kalee Walker RN  Outcome: Progressing  Flowsheets (Taken 10/11/2022 0730)  Absence of seizures:   Monitor for seizure activity.   If seizure occurs, document type and location of movements and any associated apnea   If seizure occurs, turn head to side and suction secretions as needed   Administer anticonvulsants as ordered   Support airway/breathing, administer oxygen as needed   Diagnostic studies as ordered  10/10/2022 2041 by Orestes Valiente RN  Outcome: Progressing  Goal: Achieves maximal functionality and self care  10/10/2022 2041 by Orestes Valiente RN  Outcome: Progressing     Problem: Cardiovascular - Adult  Goal: Maintains optimal cardiac output and hemodynamic stability  10/11/2022 0730 by Brenda Hamilton RN  Outcome: Progressing  Flowsheets (Taken 10/9/2022 1809 by Gonzalo Whitt RN)  Maintains optimal cardiac output and hemodynamic stability:   Monitor blood pressure and heart rate   Monitor urine output and notify Licensed Independent Practitioner for values outside of normal range   Assess for signs of decreased cardiac output   Administer fluid and/or volume expanders as ordered   Administer vasoactive medications as ordered  10/10/2022 2041 by Orestes Valiente RN  Outcome: Progressing  [address] (Taken 10/9/2022 1809 by Gonzalo Whitt RN)  Maintains optimal cardiac output and hemodynamic stability:   Monitor blood pressure and heart rate   Monitor urine output and notify Licensed Independent Practitioner for values outside of normal range   Assess for signs of decreased cardiac output   Administer fluid and/or volume expanders as ordered   Administer vasoactive medications as ordered     Problem: Respiratory - Adult  Goal: Achieves optimal ventilation and oxygenation  10/10/2022 2041 by Orestes Valiente RN  Outcome: Progressing     Problem: Gastrointestinal - Adult  Goal: Minimal or absence of nausea and vomiting  10/10/2022 2041 by Orestes Valiente RN  Outcome: Progressing  Goal: Maintains or returns to baseline bowel function  10/10/2022 2041 by Brian Isaac RN  Outcome: Progressing  Goal: Maintains adequate nutritional intake  10/10/2022 2041 by Brian Isaac RN  Outcome: Progressing     Problem: Genitourinary - Adult  Goal: Absence of urinary retention  10/10/2022 2041 by Brian Isaac RN  Outcome: Progressing     Problem: Metabolic/Fluid and Electrolytes - Adult  Goal: Electrolytes maintained within normal limits  10/10/2022 2041 by Brian Isaac RN  Outcome: Progressing  Goal: Hemodynamic stability and optimal renal function maintained  10/10/2022 2041 by Brian Isaac RN  Outcome: Progressing     Problem: Skin/Tissue Integrity - Adult  Goal: Skin integrity remains intact  10/10/2022 2041 by Brian Isaac RN  Outcome: Progressing  Flowsheets (Taken 10/10/2022 2041)  Skin Integrity Remains Intact:   Monitor for areas of redness and/or skin breakdown   Assess vascular access sites hourly  Goal: Oral mucous membranes remain intact  10/10/2022 2041 by Brian Isaac RN  Outcome: Progressing     Problem: Musculoskeletal - Adult  Goal: Return mobility to safest level of function  10/10/2022 2041 by Brian Isaac RN  Outcome: Progressing  Goal: Maintain proper alignment of affected body part  10/10/2022 2041 by Brian Isaac RN  Outcome: Progressing     Problem: Skin/Tissue Integrity  Goal: Absence of new skin breakdown  Description: 1. Monitor for areas of redness and/or skin breakdown  2. Assess vascular access sites hourly  3. Every 4-6 hours minimum:  Change oxygen saturation probe site  4. Every 4-6 hours:  If on nasal continuous positive airway pressure, respiratory therapy assess nares and determine need for appliance change or resting period.   10/10/2022 2041 by Brian sIaac RN  Outcome: Progressing     Problem: Safety - Adult  Goal: Free from fall injury  Recent Flowsheet Documentation  Taken 10/11/2022 0728 by Trino Oliver RN  Free From Fall Injury: Instruct

## 2022-10-11 NOTE — PROGRESS NOTES
Speech Language Pathology      NAME:  Mark Alberto  :  1940  DATE: 10/11/2022  ROOM:  3816/3816-A    Pt seen for ongoing cog-ling intervention. Pt pleasant, cooperative but slightly confused. Pt requesting to take shower, however settled for washing up with washcloth. Pt was noted to be repetitive/ perseverative on UE while washing up. Requested to remove Ivs/ poor insight to need for medical devices however was easily redirected. Suspect Pt at cognitive baseline.     Dizziness [R42]  Delirium [R41.0]    97[address]  therapeutic interventions that focus on cognitive function , initial  15 min    Marcy Martinez  Speech-Language Pathologist  [id number]  10/11/2022
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
## 2022-10-11 NOTE — PLAN OF CARE
Problem: Discharge Planning  Goal: Discharge to home or other facility with appropriate resources  Outcome: Progressing     Problem: ABCDS Injury Assessment  Goal: Absence of physical injury  Outcome: Progressing  Flowsheets (Taken 10/10/2022 2041)  Absence of Physical Injury: Implement safety measures based on patient assessment     Problem: Chronic Conditions and Co-morbidities  Goal: Patient's chronic conditions and co-morbidity symptoms are monitored and maintained or improved  Outcome: Progressing     Problem: Neurosensory - Adult  Goal: Achieves stable or improved neurological status  Outcome: Progressing  Goal: Absence of seizures  Outcome: Progressing  Goal: Achieves maximal functionality and self care  Outcome: Progressing     Problem: Cardiovascular - Adult  Goal: Maintains optimal cardiac output and hemodynamic stability  Outcome: Progressing  Flowsheets (Taken 10/9/2022 1809 by Marley Reese RN)  Maintains optimal cardiac output and hemodynamic stability:   Monitor blood pressure and heart rate   Monitor urine output and notify Licensed Independent Practitioner for values outside of normal range   Assess for signs of decreased cardiac output   Administer fluid and/or volume expanders as ordered   Administer vasoactive medications as ordered     Problem: Respiratory - Adult  Goal: Achieves optimal ventilation and oxygenation  Outcome: Progressing     Problem: Gastrointestinal - Adult  Goal: Minimal or absence of nausea and vomiting  Outcome: Progressing  Goal: Maintains or returns to baseline bowel function  Outcome: Progressing  Goal: Maintains adequate nutritional intake  Outcome: Progressing     Problem: Genitourinary - Adult  Goal: Absence of urinary retention  Outcome: Progressing     Problem: Metabolic/Fluid and Electrolytes - Adult  Goal: Electrolytes maintained within normal limits  Outcome: Progressing  Goal: Hemodynamic stability and optimal renal function maintained  Outcome: Progressing Problem: Skin/Tissue Integrity - Adult  Goal: Skin integrity remains intact  Outcome: Progressing  Flowsheets (Taken 10/10/2022 2041)  Skin Integrity Remains Intact:   Monitor for areas of redness and/or skin breakdown   Assess vascular access sites hourly  Goal: Oral mucous membranes remain intact  Outcome: Progressing     Problem: Musculoskeletal - Adult  Goal: Return mobility to safest level of function  Outcome: Progressing  Goal: Maintain proper alignment of affected body part  Outcome: Progressing     Problem: Skin/Tissue Integrity  Goal: Absence of new skin breakdown  Description: 1. Monitor for areas of redness and/or skin breakdown  2. Assess vascular access sites hourly  3. Every 4-6 hours minimum:  Change oxygen saturation probe site  4. Every 4-6 hours:  If on nasal continuous positive airway pressure, respiratory therapy assess nares and determine need for appliance change or resting period.   Outcome: Progressing     Problem: Safety - Adult  Goal: Free from fall injury  Outcome: Progressing  Flowsheets (Taken 10/10/2022 2041)  Free From Fall Injury:   Instruct family/caregiver on patient safety   Based on caregiver fall risk screen, instruct family/caregiver to ask for assistance with transferring infant if caregiver noted to have fall risk factors     Problem: Pain  Goal: Verbalizes/displays adequate comfort level or baseline comfort level  Outcome: Progressing  Flowsheets (Taken 10/10/2022 2041)  Verbalizes/displays adequate comfort level or baseline comfort level:   Encourage patient to monitor pain and request assistance   Assess pain using appropriate pain scale

## 2022-10-11 NOTE — PROGRESS NOTES
Jackie Pizano was a 78 y.o. right handed female     Neurology was following for dizziness    PMH: GERD, hypothyroidism, obstructive sleep apnea, hyperlipidemia, obesity, blindness of the left eye, hypertension, neuropathy, right occipital hemorrhagic stroke, and myogenic ptosis    Patient presented to the ED on 10/7 for dizziness which worsened by changing position. She was found to have interval development of a right occipital intraparenchymal hemorrhage. Her MRI of her brain was suspicious for cerebral amyloid angiopathy, possibly inflammatory component. Her blood pressure upon admission was 200/70.     10/10/22 She has had fluctuations in her blood pressure since admission, ranging from the 190s to 108/61. Patient stated she takes her blood pressure at home and admitted her blood pressure gets as high as the 190s to 200s. She is on 3 blood pressure medications currently, and recommended possibly a nephrology consult for hypertension management. Patient was going to speak with her niece who is a nurse practitioner and was going to let us know what they decide. She was awake, alert, and sitting up in the chair eating breakfast.  Spoke with bedside RN, she revealed patient appeared to be alert and oriented x3. However, patient had episodes of confusion by asking the nurse to get things out of her cabinet in her house. She didn't demonstrate any intermittent confusion during my interview. Her sister was by the bedside, all questions answered. 10/11/22 She was sitting up in bed eating breakfast.  She continued to be awake, alert, and oriented x3. Patient demonstrated intermittent confusion during conversation. She was looking forward to going to rehab.       No chest pain or palpitations  No coughing or wheezing    No vertigo, lightheadedness or loss of consciousness  No falls, tripping or stumbling  No incontinence of bowels or bladder  No itching or bruising appreciated  No numbness, tingling or focal arm/leg weakness    ROS otherwise negative      Assessment:     Cerebral amyloid angiopathy, possibly inflammatory component  - MRI brain demonstrates remote amyloid angiopathy  - Severe hypertension, blood pressure in the 200/70  - Blood pressure has remained > 140 most of her admission    Right occipital hemorrhage  - MRI demonstrated right occipital hemorrhage  - Blood pressure 200/70 upon admission  - Blood pressure has remained > 140 most of her admission      Plan:     Start prednisone taper tomorrow for 1 to 2 weeks   Keep systolic blood pressure less than 140  Limit fluctuations in blood pressure  Neurology to sign off call if needed  No anticoagulation or antiplatelet medications until complete resolution of intracerebral hemorrhage and cleared by neurosurgery  Continue taking blood pressures at home, use blood pressure diary      Objective:       BP (!) 136/110   Pulse 60   Temp 97.5 °F (36.4 °C) (Oral)   Resp 15   Ht 4' 11\" (1.499 m)   Wt 238 lb 1.6 oz (108 kg)   LMP 07/24/2014   SpO2 97%   BMI 48.09 kg/m²       General appearance: alert, appears stated age, cooperative and no distress  Head: normocephalic, without obvious abnormality, atraumatic  Eyes: conjunctivae/corneas clear  Neck: no adenopathy, supple, symmetrical, symmetric,    Lungs: Easy respirations on nasal cannula  Heart: Sinus rhythm on monitor  Abdomen: soft, non-tender; bowel sounds normal;   Extremities:  normal, atraumatic, no cyanosis, bilateral lower leg edema  Pulses: 2+ and symmetric  Skin: color, texture, turgor normal---no rashes or lesions      Mental Status: Alert and oriented x3.     Appropriate attention/concentration  Intact fundus of knowledge  Intact memories      Speech: Clear  Language: No aphasias    Cranial Nerves:  I: smell NA   II: visual acuity  NA   II: visual fields Blind in left eye, left homonymous hemianopsia   II: pupils PERRLA in right eye   III,VII: ptosis Left myogenic   III,IV,VI: extraocular muscles  Full ROM   V: mastication Normal   V: facial light touch sensation  Normal   V,VII: corneal reflex  Present in right eye   VII: facial muscle function - upper  Normal   VII: facial muscle function - lower Normal   VIII: hearing Normal   IX: soft palate elevation  Normal   IX,X: gag reflex Present   XI: trapezius strength  5/5   XI: sternocleidomastoid strength 5/5   XI: neck extension strength  5/5   XII: tongue strength  Normal     Motor:  5/5 throughout  Normal bulk and tone  No drift   No abnormal movements    Sensory:  LT and PP normal  Vibration decreased in bilateral ankles    Coordination:   FN, FFM and ZORA normal  HS normal    Gait:  A little unsteady      DTR:   2+ throughout    No Babinskis  No Case's    No other pathological reflexes  Laboratory/Radiology:     CBC with Differential:    Lab Results   Component Value Date/Time    WBC 9.3 10/11/2022 04:52 AM    RBC 4.15 10/11/2022 04:52 AM    HGB 12.2 10/11/2022 04:52 AM    HCT 35.6 10/11/2022 04:52 AM     10/11/2022 04:52 AM    MCV 85.8 10/11/2022 04:52 AM    MCH 29.4 10/11/2022 04:52 AM    MCHC 34.3 10/11/2022 04:52 AM    RDW 14.1 10/11/2022 04:52 AM    SEGSPCT 58 03/11/2014 08:46 AM    LYMPHOPCT 13.8 10/11/2022 04:52 AM    MONOPCT 2.1 10/11/2022 04:52 AM    EOSPCT 1 10/07/2010 01:14 PM    BASOPCT 0.1 10/11/2022 04:52 AM    MONOSABS 0.20 10/11/2022 04:52 AM    LYMPHSABS 1.29 10/11/2022 04:52 AM    EOSABS 0.00 10/11/2022 04:52 AM    BASOSABS 0.01 10/11/2022 04:52 AM     BMP:    Lab Results   Component Value Date/Time     10/11/2022 04:52 AM    K 4.2 10/11/2022 04:52 AM    K 4.3 10/07/2022 11:57 PM     10/11/2022 04:52 AM    CO2 19 10/11/2022 04:52 AM    BUN 34 10/11/2022 04:52 AM    LABALBU 3.8 08/09/2022 10:31 PM    LABALBU 4.2 03/24/2012 11:45 PM    CREATININE 1.0 10/11/2022 04:52 AM    CALCIUM 9.1 10/11/2022 04:52 AM    GFRAA >60 10/11/2022 04:52 AM    LABGLOM 53 10/11/2022 04:52 AM    GLUCOSE 134 10/11/2022 04:52 AM GLUCOSE 109 05/04/2012 06:17 AM       MRI brain 10/9/2022    New 43 mm right occipital hemorrhage.       All labs and imaging studies reviewed independently today       BETINA Burton CNP  2:26 PM  10/11/2022

## 2022-10-11 NOTE — ACP (ADVANCE CARE PLANNING)
Advance Care Planning   Healthcare Decision Maker:    Primary Decision Maker: Veto Osorio - Child - [phone]    Secondary Decision Maker: Ismael Angelucci - Child - [phone]    Supplemental (Other) Decision Maker: Enma Mario - Child - [phone]    Click here to complete Healthcare Decision Makers including selection of the Healthcare Decision Maker Relationship (ie \"Primary\").

## 2022-10-11 NOTE — PROGRESS NOTES
Hospital Medicine    Subjective:  pt seen in icu alert conversive / on hi dose solumedrol      Current Facility-Administered Medications:     glucose chewable tablet 16 g, 4 tablet, Oral, PRN, BETINA Dela Cruz - CNP    dextrose bolus 10% 125 mL, 125 mL, IntraVENous, PRN **OR** dextrose bolus 10% 250 mL, 250 mL, IntraVENous, PRN, BETINA Dela Cruz - CNP    glucagon (rDNA) injection 1 mg, 1 mg, SubCUTAneous, PRN, BETINA Dela Cruz - CNP    dextrose 10 % infusion, , IntraVENous, Continuous PRN, BETINA Dela Cruz - CNP    insulin lispro (HUMALOG) injection vial 0-16 Units, 0-16 Units, SubCUTAneous, TID WC, BETINA Dela Cruz - CNP    insulin lispro (HUMALOG) injection vial 0-4 Units, 0-4 Units, SubCUTAneous, Nightly, BETINA Dela Cruz - CNP    guaiFENesin [facility]) extended release tablet 600 mg, 600 mg, Oral, BID PRN, Jez Gibson DO    polyethylene glycol (GLYCOLAX) packet 17 g, 17 g, Oral, Daily, Drena Dear, APRN - CNP, 17 g at 10/10/22 0802    labetalol (NORMODYNE;TRANDATE) injection 10 mg, 10 mg, IntraVENous, Q10 Min PRN, Dhavalna Dear APRN - CNP, 10 mg at 10/11/22 0036    enalaprilat (VASOTEC) injection 1.25 mg, 1.25 mg, IntraVENous, Q6H PRN, BETINA Jaquez - CNP, 1.25 mg at 10/10/22 0330    methylPREDNISolone sodium (SOLU-MEDROL) 500 mg in sodium chloride 0.9 % 250 mL IVPB, 500 mg, IntraVENous, Daily, Deidre Escalona DO, Stopped at 10/10/22 1003    butalbital-acetaminophen-caffeine (FIORICET, ESGIC) per tablet 1 tablet, 1 tablet, Oral, Q4H PRN, Mamie Mcgowan MD    ondansetron (ZOFRAN) tablet 4 mg, 4 mg, Oral, TID PRN, Fozia Mullen MD    cetirizine (ZYRTEC) tablet 10 mg, 10 mg, Oral, Daily PRN, Fozia Mullen MD, 10 mg at 10/10/22 0800    meclizine (ANTIVERT) tablet 25 mg, 25 mg, Oral, PRN, Fozia Mullen MD    Vitamin D (CHOLECALCIFEROL) tablet 1,000 Units, 1 tablet, Oral, Daily, Fozia Mullen MD, 1,000 Units at 10/10/22 0800    citalopram (CELEXA) tablet 10 mg, 10 mg, Oral, Daily, Mariya Whitt MD, 10 mg at 10/10/22 0800    furosemide (LASIX) tablet 40 mg, 40 mg, Oral, Daily, Mariya Whitt MD, 40 mg at 10/10/22 0801    pantoprazole (PROTONIX) tablet 40 mg, 40 mg, Oral, Daily, Mariya Whitt MD, 40 mg at 10/10/22 0801    potassium chloride (KLOR-CON M) extended release tablet 20 mEq, 20 mEq, Oral, Daily, Mariya Whitt MD, 20 mEq at 10/10/22 0802    amLODIPine (NORVASC) tablet 10 mg, 10 mg, Oral, Daily, Mariya Whitt MD, 10 mg at 10/10/22 0802    atorvastatin (LIPITOR) tablet 40 mg, 40 mg, Oral, Daily, Mariya Whitt MD, 40 mg at 10/10/22 0807    levothyroxine (SYNTHROID) tablet 88 mcg, 88 mcg, Oral, Daily, Mariya Whitt MD, 88 mcg at 10/10/22 0800    losartan (COZAAR) tablet 100 mg, 100 mg, Oral, Daily, Mariya Whitt MD, 100 mg at 10/10/22 0801    metoprolol succinate (TOPROL XL) extended release tablet 25 mg, 25 mg, Oral, Daily, Mariya Whitt MD, 25 mg at 10/10/22 0801    DULoxetine (CYMBALTA) extended release capsule 30 mg, 30 mg, Oral, Daily, Mariya Whitt MD, 30 mg at 10/10/22 0801    acetaminophen (TYLENOL) tablet 650 mg, 650 mg, Oral, Q6H PRN, Mariya Whitt MD, 650 mg at 10/11/22 0049    Objective:    BP (!) 145/60   Pulse 61   Temp 97.9 °F (36.6 °C) (Oral)   Resp 19   Ht 4' 11\" (1.499 m)   Wt 238 lb 1.6 oz (108 kg)   LMP 07/24/2014   SpO2 95%   BMI 48.09 kg/m²     Heart:  reg  Lungs:  ctab  Abd: + bs soft nontender  Extrem:  w/o edema    CBC with Differential:    Lab Results   Component Value Date/Time    WBC 9.3 10/11/2022 04:52 AM    RBC 4.15 10/11/2022 04:52 AM    HGB 12.2 10/11/2022 04:52 AM    HCT 35.6 10/11/2022 04:52 AM     10/11/2022 04:52 AM    MCV 85.8 10/11/2022 04:52 AM    MCH 29.4 10/11/2022 04:52 AM    MCHC 34.3 10/11/2022 04:52 AM    RDW 14.1 10/11/2022 04:52 AM    SEGSPCT 58 03/11/2014 08:46 AM    LYMPHOPCT 13.8 10/11/2022 04:52 AM    MONOPCT 2.1 10/11/2022 04:52 AM    EOSPCT 1 10/07/2010 01:14 PM    BASOPCT 0.1 10/11/2022 04:52 AM    MONOSABS 0.20 10/11/2022 04:52 AM    LYMPHSABS 1.29 10/11/2022 04:52 AM    EOSABS 0.00 10/11/2022 04:52 AM    BASOSABS 0.01 10/11/2022 04:52 AM     CMP:    Lab Results   Component Value Date/Time     10/11/2022 04:52 AM    K 4.2 10/11/2022 04:52 AM    K 4.3 10/07/2022 11:57 PM     10/11/2022 04:52 AM    CO2 19 10/11/2022 04:52 AM    BUN 34 10/11/2022 04:52 AM    CREATININE 1.0 10/11/2022 04:52 AM    GFRAA >60 10/11/2022 04:52 AM    LABGLOM 53 10/11/2022 04:52 AM    GLUCOSE 134 10/11/2022 04:52 AM    GLUCOSE 109 05/04/2012 06:17 AM    PROT 7.0 08/09/2022 10:31 PM    LABALBU 3.8 08/09/2022 10:31 PM    LABALBU 4.2 03/24/2012 11:45 PM    CALCIUM 9.1 10/11/2022 04:52 AM    BILITOT 0.3 08/09/2022 10:31 PM    ALKPHOS 61 08/09/2022 10:31 PM    AST 24 08/09/2022 10:31 PM    ALT 13 08/09/2022 10:31 PM     Warfarin PT/INR:    Lab Results   Component Value Date    INR 0.9 08/10/2022    INR 0.9 08/18/2020    INR 0.9 12/29/2019    PROTIME 10.2 08/10/2022    PROTIME 10.9 08/18/2020    PROTIME 10.5 12/29/2019       Assessment:    Principal Problem:    Delirium  Active Problems:    Right occipital Hemorrhagic stroke [facility])  Resolved Problems:    * No resolved hospital problems.  *      Plan:  Cont hi dose solumedrol serial lab / pt/ot        Miracle Gutierrez DO  8:42 AM  10/11/2022

## 2022-10-11 NOTE — CARE COORDINATION
Informed by [facility] that they cannot accept. Spoke with pt. Provided list of other sars. 2nd choice is [facility]. Referral made to [facility], 3rd choice [facility] . Referral made to [facility]. They will not have a bed in the near future. Await acceptance at [facility]. The Plan for Transition of Care is related to the following treatment goals: Return to prior level of function    The Patient was provided with a choice of provider and agrees with the discharge plan. [x] Yes [] No    Freedom of choice list was provided with basic dialogue that supports the patient's individualized plan of care/goals, treatment preferences and shares the quality data associated with the providers. [x] Yes [] No    Also discussed decision makers. Pt states she does not have a Poa or Living will. She provided list of primary and secondary decision makers, which are her children.

## 2022-10-11 NOTE — PROGRESS NOTES
Intensive Care Unit  Critical Care Consult  Daily Progress Note 10/11/2022    Date of Admission: 10/7    EVENTS:   107/22 Presented to ED with cc dizziness. 10/9?22 MRI revealed Hemorrhagic stroke in right occipital lobe. Moved from [facility] to [facility].   10/10 No acute events, slightly HTN OV, improved throughout the day  10/11 BP much better controled, pt has no complaints     PHYSICAL EXAM:    BP (!) 159/65   Pulse 64   Temp 97.9 °F (36.6 °C) (Oral)   Resp 18   Ht 4' 11\" (1.499 m)   Wt 238 lb 1.6 oz (108 kg)   LMP 07/24/2014   SpO2 95%   BMI 48.09 kg/m²     General appearance:  Comfortable. Pain Description: none    GCS:    4 - Opens eyes on own   6 - Follows simple motor commands  5 - Alert and oriented with periods of confusion    Pupil size:  Left 3 mm  Right 3 mm  Pupil reaction: Yes  Wiggles fingers: Left Yes Right Yes  Hand grasp:   Left normal     Right normal  Wiggles toes: Left Yes    Right Yes  Plantar flexion: Left normal    Right normal    CONSTITUTIONAL: no acute distress, lying in hospital bed, alert and cooperative   NEUROLOGIC: PERRL, oriented x 4, moving all extremities without difficulty   CARDIOVASCULAR: S1 S2, regular rate, regular rhythm, no murmur/gallop/rub.  Monitor: sinus  PULMONARY: no rhonchi/rales/wheezes, no use of accessory muscles, RA  RENAL: voiding   ABDOMEN: soft, nontender, nondistended, nontympanic, normal bowel sounds   MUSCULOSKELETAL: moves all extremities purposefully, 5/5 strength   SKIN/EXTREMITIES: no rashes/ecchymosis, no edema/clubbing, warm/dry, good capillary refill       Past Medical History:   Diagnosis Date    Amaurosis fugax of right eye 12/11/2017    Anxiety     Arthritis     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, treated with surgery and chemo    Cerebral artery occlusion with cerebral infarction ([address]     possibly TIA    Chronic back pain     Constipation     Depression     Elevated sed rate 12/11/2017    hx of    GERD (gastroesophageal reflux disease) Hemorrhagic stroke ([facility] 08/09/2022    Hemorrhoids     history of    Hyperlipidemia     Hypertension     Ischemic stroke ([facility]     Left foot pain     dropped a Mariya Arn on foot    Lumbosacral radiculopathy 08/06/2020    Macular degeneration     Peripheral neuropathy 08/06/2020    Poor vision     right eye / had bleeding behind eye, is receiving \"shots\" at this time  / 3/22/2019    Prosthetic eye globe     left eye implant;    SAH (subarachnoid hemorrhage) ([facility] 04/24/2022    Seasonal allergies     Skipped heart beats     on metoprolol; managed by Dr. Mary Kay Chong    Sleep apnea     uses cpap at times     Stroke [facility]) 04/24/2022    Thyroid disease        ASSESSMENT/PLAN:       Neuro:  Right occipital hemorrhagic stroke, Hx prior strokes. Monitor neuro status, Neurology following,  Solumedrol for inflammatory amyloid last does toady Neurology to start Prednisone taper   CV: HTN - improving. Monitor hemodynamics. BP goal < 140. PRN hydralazine & labetalol. Statin. 2Decho. Norvasc, losartan, metoprolol   Pulm: No acute issues  Monitor RR & SpO2. Pulmonary hygiene   GI: No acute issues. Diet. Monitor bowel function. Renal: No acute issues. Monitor BUN & Cr. Monitor electrolytes & replace as needed. Monitor urine output. ID: No acute issues   Endocrine: No acute issues. Monitor BS.  MSK: No acute issues. PT/OT  Heme: No acute issues. Monitor CBC. Bowel regime: Glycolax  Pain control/Sedation: Fioricet  Tylenol  DVT prophylaxis: SCDs.    GI prophylaxis: Protonix, Diet  Mouth/Eye care: as needed  Family update: Updated at bedside   Code status:  Full  Disposition:  Transfer       Total  time: 30 minutes     Electronically signed by BETINA Humphreys CNP on 10/11/2022 at 10:12 AM

## 2022-10-11 NOTE — PROGRESS NOTES
dictating this exam?->CRC FINDINGS: The lungs are without acute focal process. There is no effusion or pneumothorax. The cardiomediastinal silhouette is without acute process. The osseous structures are without acute process. No acute process.      CBC:   Lab Results   Component Value Date/Time    WBC 9.3 10/11/2022 04:52 AM    RBC 4.15 10/11/2022 04:52 AM    HGB 12.2 10/11/2022 04:52 AM    HCT 35.6 10/11/2022 04:52 AM    MCV 85.8 10/11/2022 04:52 AM    MCH 29.4 10/11/2022 04:52 AM    MCHC 34.3 10/11/2022 04:52 AM    RDW 14.1 10/11/2022 04:52 AM     10/11/2022 04:52 AM    MPV 10.1 10/11/2022 04:52 AM     BMP:    Lab Results   Component Value Date/Time     10/11/2022 04:52 AM    K 4.2 10/11/2022 04:52 AM    K 4.3 10/07/2022 11:57 PM     10/11/2022 04:52 AM    CO2 19 10/11/2022 04:52 AM    BUN 34 10/11/2022 04:52 AM    LABALBU 3.8 08/09/2022 10:31 PM    LABALBU 4.2 03/24/2012 11:45 PM    CREATININE 1.0 10/11/2022 04:52 AM    CALCIUM 9.1 10/11/2022 04:52 AM    GFRAA >60 10/11/2022 04:52 AM    LABGLOM 53 10/11/2022 04:52 AM    GLUCOSE 134 10/11/2022 04:52 AM    GLUCOSE 109 05/04/2012 06:17 AM      insulin lispro  0-16 Units SubCUTAneous TID WC    insulin lispro  0-4 Units SubCUTAneous Nightly    [START ON 10/12/2022] methylPREDNISolone  24 mg Oral Once    [START ON 10/12/2022] methylPREDNISolone  4 mg Oral QAM AC    [START ON 10/12/2022] methylPREDNISolone  4 mg Oral Lunch    [START ON 10/12/2022] methylPREDNISolone  4 mg Oral Dinner    [START ON 10/12/2022] methylPREDNISolone  8 mg Oral Nightly    [START ON 10/13/2022] methylPREDNISolone  4 mg Oral Nightly    sodium chloride flush        sodium chloride flush  5-40 mL IntraVENous BID    polyethylene glycol  17 g Oral Daily    methylPREDNISolone  500 mg IntraVENous Daily    Vitamin D  1 tablet Oral Daily    citalopram  10 mg Oral Daily    furosemide  40 mg Oral Daily    pantoprazole  40 mg Oral Daily    potassium chloride  20 mEq Oral Daily amLODIPine  10 mg Oral Daily    atorvastatin  40 mg Oral Daily    levothyroxine  88 mcg Oral Daily    losartan  100 mg Oral Daily    metoprolol succinate  25 mg Oral Daily    DULoxetine  30 mg Oral Daily     Patient is awake and alert following commands dense homonymous hemianopsia oriented speech was fluent  Assessment:  Patient Active Problem List   Diagnosis    GERD (gastroesophageal reflux disease)    Hypothyroidism    Obstructive sleep apnea syndrome, non-compliant with CPAP therapy    Hyperlipidemia LDL goal <100    Obesity (BMI 35.0-39.9 without comorbidity)    Blindness of left eye    Vertigo    Hypertension    Frequent falls    Ataxia    Moderate episode of recurrent major depressive disorder (HCC)    Gait instability    Peripheral neuropathy    Lumbosacral radiculopathy    Dizziness    Alzheimer's disease, unspecified    Subarachnoid hemorrhage (HCC)    Acute CVA (cerebrovascular accident) ([address]    Acute chemical conjunctivitis    Myogenic ptosis    Nonexudative age-related macular degeneration    Squamous blepharitis    Pain in eye    History of artificial eye lens    History of insertion of artificial eyeball    ANA MARIA (obstructive sleep apnea)    Exudative age-related macular degeneration of right eye ([address]    Chronic renal disease, stage III ([address] [[id number]]    Hypertensive emergency    Hypertensive crisis, unspecified    Acute respiratory distress    Acute kidney injury ([address]    Delirium    Right occipital Hemorrhagic stroke ([address]     Plan: Full anticoagulation contraindicated continue current care  Rupal Dias MD M.D.

## 2022-10-12 LAB
METER GLUCOSE: 100 MG/DL (ref 74–99)
METER GLUCOSE: 117 MG/DL (ref 74–99)
METER GLUCOSE: 127 MG/DL (ref 74–99)
METER GLUCOSE: 141 MG/DL (ref 74–99)

## 2022-10-12 PROCEDURE — 6370000000 HC RX 637 (ALT 250 FOR IP): Performed by: NURSE PRACTITIONER

## 2022-10-12 PROCEDURE — 82962 GLUCOSE BLOOD TEST: CPT

## 2022-10-12 PROCEDURE — 2700000000 HC OXYGEN THERAPY PER DAY

## 2022-10-12 PROCEDURE — 2140000000 HC CCU INTERMEDIATE R&B

## 2022-10-12 PROCEDURE — 6360000002 HC RX W HCPCS: Performed by: NURSE PRACTITIONER

## 2022-10-12 PROCEDURE — 6370000000 HC RX 637 (ALT 250 FOR IP): Performed by: INTERNAL MEDICINE

## 2022-10-12 PROCEDURE — 97535 SELF CARE MNGMENT TRAINING: CPT

## 2022-10-12 PROCEDURE — 2580000003 HC RX 258: Performed by: NURSE PRACTITIONER

## 2022-10-12 RX ORDER — METHYLPREDNISOLONE 4 MG/1
4 TABLET ORAL
Status: DISCONTINUED | OUTPATIENT
Start: 2022-10-13 | End: 2022-10-14 | Stop reason: HOSPADM

## 2022-10-12 RX ORDER — METHYLPREDNISOLONE 4 MG/1
24 TABLET ORAL ONCE
Status: COMPLETED | OUTPATIENT
Start: 2022-10-12 | End: 2022-10-12

## 2022-10-12 RX ORDER — METHYLPREDNISOLONE 4 MG/1
4 TABLET ORAL NIGHTLY
Status: DISCONTINUED | OUTPATIENT
Start: 2022-10-14 | End: 2022-10-14 | Stop reason: HOSPADM

## 2022-10-12 RX ORDER — FUROSEMIDE 20 MG/1
20 TABLET ORAL DAILY
Status: DISCONTINUED | OUTPATIENT
Start: 2022-10-12 | End: 2022-10-14 | Stop reason: HOSPADM

## 2022-10-12 RX ORDER — METHYLPREDNISOLONE 4 MG/1
8 TABLET ORAL NIGHTLY
Status: COMPLETED | OUTPATIENT
Start: 2022-10-13 | End: 2022-10-13

## 2022-10-12 RX ADMIN — FUROSEMIDE 20 MG: 20 TABLET ORAL at 09:39

## 2022-10-12 RX ADMIN — POLYETHYLENE GLYCOL 3350 17 G: 17 POWDER, FOR SOLUTION ORAL at 09:39

## 2022-10-12 RX ADMIN — CITALOPRAM HYDROBROMIDE 10 MG: 20 TABLET ORAL at 09:39

## 2022-10-12 RX ADMIN — METOPROLOL SUCCINATE 25 MG: 25 TABLET, EXTENDED RELEASE ORAL at 09:38

## 2022-10-12 RX ADMIN — POTASSIUM CHLORIDE 20 MEQ: 1500 TABLET, EXTENDED RELEASE ORAL at 09:39

## 2022-10-12 RX ADMIN — Medication 10 ML: at 09:41

## 2022-10-12 RX ADMIN — AMLODIPINE BESYLATE 10 MG: 10 TABLET ORAL at 09:39

## 2022-10-12 RX ADMIN — METHYLPREDNISOLONE 24 MG: 4 TABLET ORAL at 09:37

## 2022-10-12 RX ADMIN — PANTOPRAZOLE SODIUM 40 MG: 40 TABLET, DELAYED RELEASE ORAL at 09:39

## 2022-10-12 RX ADMIN — Medication 1000 UNITS: at 09:39

## 2022-10-12 RX ADMIN — ATORVASTATIN CALCIUM 40 MG: 40 TABLET, FILM COATED ORAL at 20:29

## 2022-10-12 RX ADMIN — LOSARTAN POTASSIUM 100 MG: 50 TABLET, FILM COATED ORAL at 09:38

## 2022-10-12 RX ADMIN — DULOXETINE HYDROCHLORIDE 30 MG: 30 CAPSULE, DELAYED RELEASE ORAL at 09:39

## 2022-10-12 NOTE — PROGRESS NOTES
Occupational Therapy  OT BEDSIDE TREATMENT NOTE   [facility] [address] [facility]  [address] [place] [address]      BBXF:  Patient Name: Elder Lira  MRN: [id number]  : 1940  Room: 76094958-Q     Evaluating OT: Bing Hutchison, OTR/L [id number]     Referring Provider: BETINA Carranza CNP   Specific Provider Orders/Date: OT eval and treat (10/9/22)        Diagnosis:  Dizziness                         Delirium  Reason for admission:  presented for evaluation of dizziness. Pt received phenergan and Benadryl in the ED after which she reportedly had become somewhat unresponsive and delirious. 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, Anxiety, Arthritis, Ca of bowel, TIA, Chronic back pain, GERD, hemorrhagic stroke (Summer 2022), HLD, HTN, L foot plain, thyroid disease        *Precautions:  Fall Risk, L visual deficits, O2, bed alarm, SBP < 150     Assessment of current deficits   [x] Functional mobility          [x]ADLs           [x] Strength                  []Cognition   [x] Functional transfers        [x] IADLs         [x] Safety Awareness   [x]Endurance   [x] Fine Coordination           [x] ROM           [x] Vision/perception    [x]Sensation     [x]Gross Motor Coordination [x] Balance    [] Delirium                  []Motor Control     [] Communication     OT PLAN OF CARE   OT POC based on physician orders, patient diagnosis and results of clinical assessment.         Frequency/Duration: 1-3 days/wk for 1-2 weeks PRN    Specific OT Treatment to include:   ADL retraining/adapted techniques and AE recommendations to increase functional independence within precautions                    Energy conservation techniques to improve tolerance for selfcare routine Functional transfer/mobility training/DME recommendations for increased independence, safety and fall prevention         Patient/family education to increase safety and functional independence within precautions              Environmental modifications for safe mobility and completion of ADLs                           Visual/Perceptual retraining  to improve body awareness and safety during transfers and ADLs  Splinting/positioning needs to maintain joint/skin integrity and prevent contractures  Therapeutic activity to improve functional performance during ADLs/IADLs                                         Therapeutic exercise to improve tolerance and functional strength for ADLs   Balance retraining exercises/tasks for facilitation of postural control with dynamic challenges during ADLs .   Positioning to improve functional independence  Neuromuscular re-education: facilitation of righting/equilibrium reactions, normalization muscle tone/facilitation active functional movement                      Delirium prevention/treatment     Modified [place] Scale   Score     Description  0             No symptoms  1             No significant disability despite symptoms  2             Slight disability; able to look after own affairs  3             Moderate disability; able to ambulate without assist/ requires assist with ADLs  4             Moderate/Severe disability;requires assist to ambulate/assist with ADLs  5             Severe disability;bedridden/incontinent   6               Score:   4     Recommended Adaptive Equipment: TBA: WW, LB Dressing AE (sock aid, fadi hose aid, owns reacher)     Home Living: Pt lives alone  in a first floor apt with 0 step(s) to enter and 0 rail(s); bed/bath on first floor  Bathroom setup: tub/shower (2 shower seats-uses one seat out side of tub to do her hair)  Equipment owned: [facility], [employer], shower seats x 2, reacher     Prior Level of Function: IND with ADLs;  Mostly IND with IADLs; sister asssits as needed. SPC or [employer] for ambulation. Driving: no; sister assists with transportation     Pain Level: pt c/o 0/10  pain  this session     Cognition: A&O: 4/4    Follows 1-2 step commands appropriately. Pt requires increased cues during functional activity/mobility for safety. Memory: fair             Comprehension fair             Problem solving: fair             Judgement/safety: fair-                Communication skills: WFL             Vision: impaired vision: blind L; macular degeneration R                    Glasses:yes                                                       Hearing: WFL               RASS: 0  CAM-ICU: (NT) Delirium     UE Assessment:  Hand Dominance: Right [x]  Left []       ROM Strength STM goal: PRN   RUE  WFL 4-/5 4/5      LUE WFL 4-/5 4/5         Sensation: No c/o numbness/tingling in extremities; chronic neuropathy in B feet  Tone: WNL   Edema: WFL     Functional Assessment:  AM-PAC Daily Activity Raw Score: 16/24    Initial Eval Status  Date: 10/10/22 Treatment Status  Date: 10/12/22 STGs = LTGs  Time frame: 7-14 days   Feeding S; set up  seated in chair  set up                       Grace  while seated up in chair to increase activity tolerance         Grooming Min A  Set up CGA  To wash hands and comb hair standing at sink                        Grcae   while standing sink level requiring AD for balance and demonstrating G tolerance       UB dressing/bathing Min A  per last tx                       Grace         LB dressing/bathing Max A  seated up in chair:  Mod A using AE after instruction  Mod A  To don/doff socks seated EOB                      Grace  using AE as needed for safe reach/ energy conservation        Toileting NT Min A  Clothing management                        Grace      Bed Mobility  Supine to sit:   Min A     Sit to supine:   NT  SBA- sit>supine  Educated pt on technique to increase independence.                          Grace  in prep of ADL tasks & transfers   Functional Transfers Sit to stand:   Min A     Stand to sit:   Min A Min A- sit<->stand  Cuing for hand placement   Min A- toilet transfer                        Grace  sit<>stand/functional bathroom transfers using AD/DME as needed for balance and safety   Functional Mobility Min A [employer]  Min A no AD  To and from bathroom  CGA using w/w  Home distance                      Grace   functional/bathroom mobility using AD as needed & demonstrating G safety      Balance Sitting:     Static:  SBA    Dynamic:Min A  Standing: Donald [employer] Sitting:     Static:  ind    Dynamic: SBA  Standing: CGA  Grace dynamic sitting balance; Grace dynamic standing balance  during ADL tasks & transfers   Endurance/  Activity Tolerance    F tolerance with light activity. Fair+  G   tolerance with moderate activity/self care routine   Visual/  Perceptual Impaired: L visual field                                  Comments: Upon arrival pt seated EOB. Pt educated on techniques to increase independence and safety during ADL's, bed mobility, and functional transfers. At end of session pt left seated upright in bed, call light within reach. Pt has made fair progress towards set goals.      Continue with current plan of care    Treatment Time In: 3:30            Treatment Time Out: 3:45             Treatment Charges: Mins Units   Ther Ex  [id number]     Manual Therapy [id number]     Thera Activities [id number]     ADL/Home Mgt [id number] 15 1   Neuro Re-ed [id number]     Group Therapy      Orthotic manage/training  [id number]     Non-Billable Time     Total Timed Treatment 15 1     Matthew 162, Arabella 86

## 2022-10-12 NOTE — PROGRESS NOTES
Neurosurg progress note  VITALS:  BP (!) 151/52   Pulse 68   Temp 97.7 °F (36.5 °C) (Oral)   Resp 18   Ht 4' 11\" (1.499 m)   Wt 238 lb 1.6 oz (108 kg)   LMP 07/24/2014   SpO2 95%   BMI 48.09 kg/m²   24HR INTAKE/OUTPUT:    Intake/Output Summary (Last 24 hours) at 10/12/2022 1355  Last data filed at 10/12/2022 1237  Gross per 24 hour   Intake 710 ml   Output 800 ml   Net -90 ml     CT Head WO Contrast    Result Date: 10/8/2022  EXAMINATION: CT OF THE HEAD WITHOUT CONTRAST  10/8/2022 1:08 am TECHNIQUE: CT of the head was performed without the administration of intravenous contrast. Automated exposure control, iterative reconstruction, and/or weight based adjustment of the mA/kV was utilized to reduce the radiation dose to as low as reasonably achievable. COMPARISON: None. HISTORY: ORDERING SYSTEM PROVIDED HISTORY: dizziness TECHNOLOGIST PROVIDED HISTORY: Reason for exam:->dizziness Has a \"code stroke\" or \"stroke alert\" been called? ->No Decision Support Exception - unselect if not a suspected or confirmed emergency medical condition->Emergency Medical Condition (MA) What reading provider will be dictating this exam?->CRC FINDINGS: BRAIN/VENTRICLES: There is no acute intracranial hemorrhage, mass effect or midline shift. No abnormal extra-axial fluid collection. The gray-white differentiation is maintained without evidence of an acute infarct. There is no evidence of hydrocephalus. 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 SINUSES: The visualized paranasal sinuses and mastoid air cells demonstrate no acute abnormality. SOFT TISSUES/SKULL:  No acute abnormality of the visualized skull or soft tissues. No acute intracranial abnormality.      XR CHEST PORTABLE    Result Date: 10/8/2022  EXAMINATION: ONE XRAY VIEW OF THE CHEST 10/8/2022 12:24 am COMPARISON: 08/09/2022 HISTORY: ORDERING SYSTEM PROVIDED HISTORY: dizziness TECHNOLOGIST PROVIDED HISTORY: Reason for exam:->dizziness What reading provider will be dictating this exam?->CRC FINDINGS: The lungs are without acute focal process. There is no effusion or pneumothorax. The cardiomediastinal silhouette is without acute process. The osseous structures are without acute process. No acute process.      CBC:   Lab Results   Component Value Date/Time    WBC 9.3 10/11/2022 04:52 AM    RBC 4.15 10/11/2022 04:52 AM    HGB 12.2 10/11/2022 04:52 AM    HCT 35.6 10/11/2022 04:52 AM    MCV 85.8 10/11/2022 04:52 AM    MCH 29.4 10/11/2022 04:52 AM    MCHC 34.3 10/11/2022 04:52 AM    RDW 14.1 10/11/2022 04:52 AM     10/11/2022 04:52 AM    MPV 10.1 10/11/2022 04:52 AM     BMP:    Lab Results   Component Value Date/Time     10/11/2022 04:52 AM    K 4.2 10/11/2022 04:52 AM    K 4.3 10/07/2022 11:57 PM     10/11/2022 04:52 AM    CO2 19 10/11/2022 04:52 AM    BUN 34 10/11/2022 04:52 AM    LABALBU 3.8 08/09/2022 10:31 PM    LABALBU 4.2 03/24/2012 11:45 PM    CREATININE 1.0 10/11/2022 04:52 AM    CALCIUM 9.1 10/11/2022 04:52 AM    GFRAA >60 10/11/2022 04:52 AM    LABGLOM 53 10/11/2022 04:52 AM    GLUCOSE 134 10/11/2022 04:52 AM    GLUCOSE 109 05/04/2012 06:17 AM      [START ON 10/13/2022] methylPREDNISolone  4 mg Oral QAM AC    [START ON 10/13/2022] methylPREDNISolone  4 mg Oral Lunch    [START ON 10/13/2022] methylPREDNISolone  4 mg Oral Dinner    [START ON 10/13/2022] methylPREDNISolone  8 mg Oral Nightly    [START ON 10/14/2022] methylPREDNISolone  4 mg Oral Nightly    furosemide  20 mg Oral Daily    insulin lispro  0-16 Units SubCUTAneous TID WC    insulin lispro  0-4 Units SubCUTAneous Nightly    sodium chloride flush  5-40 mL IntraVENous BID    polyethylene glycol  17 g Oral Daily    Vitamin D  1 tablet Oral Daily    pantoprazole  40 mg Oral Daily    potassium chloride  20 mEq Oral Daily    amLODIPine  10 mg Oral Daily    atorvastatin  40 mg Oral Daily    levothyroxine  88 mcg Oral Daily    losartan  100 mg Oral Daily    metoprolol succinate  25 mg Oral Daily DULoxetine  30 mg Oral Daily     Awake and alert sitting upright in bed follows simple commands pleasantly confused dense homonymous left hemianopsia sitter at the bedside  Assessment:  Patient Active Problem List   Diagnosis    GERD (gastroesophageal reflux disease)    Hypothyroidism    Obstructive sleep apnea syndrome, non-compliant with CPAP therapy    Hyperlipidemia LDL goal <100    Obesity (BMI 35.0-39.9 without comorbidity)    Blindness of left eye    Vertigo    Hypertension    Frequent falls    Ataxia    Moderate episode of recurrent major depressive disorder (HCC)    Gait instability    Peripheral neuropathy    Lumbosacral radiculopathy    Dizziness    Alzheimer's disease, unspecified    Subarachnoid hemorrhage (HCC)    Acute CVA (cerebrovascular accident) ([address]    Acute chemical conjunctivitis    Myogenic ptosis    Nonexudative age-related macular degeneration    Squamous blepharitis    Pain in eye    History of artificial eye lens    History of insertion of artificial eyeball    ANA MARIA (obstructive sleep apnea)    Exudative age-related macular degeneration of right eye ([address]    Chronic renal disease, stage III ([address] [[id number]]    Hypertensive emergency    Hypertensive crisis, unspecified    Acute respiratory distress    Acute kidney injury ([address]    Delirium    Right occipital Hemorrhagic stroke ([address]     Plan:Continue current care  Stanford Hutchinson MD M.D.

## 2022-10-12 NOTE — PLAN OF CARE
Problem: Discharge Planning  Goal: Discharge to home or other facility with appropriate resources  Outcome: Progressing     Problem: ABCDS Injury Assessment  Goal: Absence of physical injury  Outcome: Progressing     Problem: Chronic Conditions and Co-morbidities  Goal: Patient's chronic conditions and co-morbidity symptoms are monitored and maintained or improved  Outcome: Progressing     Problem: Neurosensory - Adult  Goal: Achieves stable or improved neurological status  Outcome: Progressing  Goal: Absence of seizures  Outcome: Progressing  Goal: Remains free of injury related to seizures activity  Outcome: Progressing  Goal: Achieves maximal functionality and self care  Outcome: Progressing     Problem: Cardiovascular - Adult  Goal: Maintains optimal cardiac output and hemodynamic stability  Outcome: Progressing     Problem: Respiratory - Adult  Goal: Achieves optimal ventilation and oxygenation  Outcome: Progressing     Problem: Gastrointestinal - Adult  Goal: Minimal or absence of nausea and vomiting  Outcome: Progressing  Goal: Maintains or returns to baseline bowel function  Outcome: Progressing  Goal: Maintains adequate nutritional intake  Outcome: Progressing  Goal: Establish and maintain optimal ostomy function  Outcome: Progressing     Problem: Genitourinary - Adult  Goal: Absence of urinary retention  Outcome: Progressing     Problem: Metabolic/Fluid and Electrolytes - Adult  Goal: Electrolytes maintained within normal limits  Outcome: Progressing  Goal: Hemodynamic stability and optimal renal function maintained  Outcome: Progressing     Problem: Skin/Tissue Integrity - Adult  Goal: Skin integrity remains intact  Outcome: Progressing  Goal: Oral mucous membranes remain intact  Outcome: Progressing     Problem: Musculoskeletal - Adult  Goal: Return mobility to safest level of function  Outcome: Progressing  Goal: Maintain proper alignment of affected body part  Outcome: Progressing     Problem: Skin/Tissue Integrity  Goal: Absence of new skin breakdown  Description: 1. Monitor for areas of redness and/or skin breakdown  2. Assess vascular access sites hourly  3. Every 4-6 hours minimum:  Change oxygen saturation probe site  4. Every 4-6 hours:  If on nasal continuous positive airway pressure, respiratory therapy assess nares and determine need for appliance change or resting period.   Outcome: Progressing     Problem: Safety - Adult  Goal: Free from fall injury  Outcome: Progressing     Problem: Pain  Goal: Verbalizes/displays adequate comfort level or baseline comfort level  Outcome: Progressing

## 2022-10-12 NOTE — PROGRESS NOTES
Hospital Medicine    Subjective:  pt seen this am alert conversive      Current Facility-Administered Medications:     [START ON 10/13/2022] methylPREDNISolone (MEDROL) tablet 4 mg, 4 mg, Oral, QAM AC, Smita Jones APRN - CNP    [START ON 10/13/2022] methylPREDNISolone (MEDROL) tablet 4 mg, 4 mg, Oral, Lunch, Smita Jones, APRN - CNP    [START ON 10/13/2022] methylPREDNISolone (MEDROL) tablet 4 mg, 4 mg, Oral, Dinner, Smita Baptistel, APRN - CNP    [START ON 10/13/2022] methylPREDNISolone (MEDROL) tablet 8 mg, 8 mg, Oral, Nightly, Smita Reel, APRN - CNP    [START ON 10/14/2022] methylPREDNISolone (MEDROL) tablet 4 mg, 4 mg, Oral, Nightly, Smita Baptistel, APRN - CNP    furosemide (LASIX) tablet 20 mg, 20 mg, Oral, Daily, Mandy Gibson, DO, 20 mg at 10/12/22 0939    glucose chewable tablet 16 g, 4 tablet, Oral, PRN, Smita Jones, APRN - CNP    dextrose bolus 10% 125 mL, 125 mL, IntraVENous, PRN **OR** dextrose bolus 10% 250 mL, 250 mL, IntraVENous, PRN, Smita Jones APRN - CNP    glucagon (rDNA) injection 1 mg, 1 mg, SubCUTAneous, PRN, Smita Jones APRN - CNP    dextrose 10 % infusion, , IntraVENous, Continuous PRN, BETINA Samson - CNP    insulin lispro (HUMALOG) injection vial 0-16 Units, 0-16 Units, SubCUTAneous, TID WC, Smita Jones APRN - CNP    insulin lispro (HUMALOG) injection vial 0-4 Units, 0-4 Units, SubCUTAneous, Nightly, Smita Jones APRN - CNP    guaiFENesin tablet 400 mg, 400 mg, Oral, TID PRN, Smita Jones APRN - CNP, 400 mg at 10/11/22 0911    sodium chloride flush 0.9 % injection 5-40 mL, 5-40 mL, IntraVENous, BID, Smita Jones APRN - CNP, 10 mL at 10/12/22 0941    sodium chloride flush 0.9 % injection 5-40 mL, 5-40 mL, IntraVENous, PRN, Smita Jones APRN - CNP    labetalol (NORMODYNE;TRANDATE) injection 10 mg, 10 mg, IntraVENous, Q1H PRN, Smita Jones APRN - CNP    polyethylene glycol (GLYCOLAX) packet 17 g, 17 g, Oral, Daily, Smita Jones APRN - CNP, 17 g at 10/12/22 0939    enalaprilat (VASOTEC) injection 1.25 mg, 1.25 mg, IntraVENous, Q6H PRN, Jadiel Bue, APRN - CNP, 1.25 mg at 10/10/22 0330    butalbital-acetaminophen-caffeine (FIORICET, ESGIC) per tablet 1 tablet, 1 tablet, Oral, Q4H PRN, Jadiel Bue, APRN - CNP    ondansetron [facility]) tablet 4 mg, 4 mg, Oral, TID PRN, Jadiel Bue, APRN - CNP    cetirizine (ZYRTEC) tablet 10 mg, 10 mg, Oral, Daily PRN, Jadiel Bue, APRN - CNP, 10 mg at 10/10/22 0800    meclizine (ANTIVERT) tablet 25 mg, 25 mg, Oral, PRN, Jadiel Bue, APRN - CNP    Vitamin D (CHOLECALCIFEROL) tablet 1,000 Units, 1 tablet, Oral, Daily, Jadiel Bue, APRN - CNP, 1,000 Units at 10/12/22 0939    pantoprazole (PROTONIX) tablet 40 mg, 40 mg, Oral, Daily, Jadiel Bue, APRN - CNP, 40 mg at 10/12/22 3871    potassium chloride (KLOR-CON M) extended release tablet 20 mEq, 20 mEq, Oral, Daily, Jadiel Bue, APRN - CNP, 20 mEq at 10/12/22 0939    amLODIPine (NORVASC) tablet 10 mg, 10 mg, Oral, Daily, Jadiel Bue, APRN - CNP, 10 mg at 10/12/22 5698    atorvastatin (LIPITOR) tablet 40 mg, 40 mg, Oral, Daily, Jadiel Bue, APRN - CNP, 40 mg at 10/11/22 2768    levothyroxine (SYNTHROID) tablet 88 mcg, 88 mcg, Oral, Daily, Jadiel Bue, APRN - CNP, 88 mcg at 10/11/22 0850    losartan (COZAAR) tablet 100 mg, 100 mg, Oral, Daily, Jadiel Bue, APRN - CNP, 100 mg at 10/12/22 1466    metoprolol succinate (TOPROL XL) extended release tablet 25 mg, 25 mg, Oral, Daily, Jadiel Bue, APRN - CNP, 25 mg at 10/12/22 4699    DULoxetine (CYMBALTA) extended release capsule 30 mg, 30 mg, Oral, Daily, BETINA Arguelles CNP, 30 mg at 10/12/22 2101    acetaminophen (TYLENOL) tablet 650 mg, 650 mg, Oral, Q6H PRN, BETINA Arguelles CNP, 650 mg at 10/11/22 0049    Objective:    BP (!) 133/50   Pulse 59   Temp 97.6 °F (36.4 °C) (Oral)   Resp 18   Ht 4' 11\" (1.499 m)   Wt 238 lb 1.6 oz (108 kg)   LMP 07/24/2014   SpO2 97%   BMI 48.09 kg/m²     Heart:  reg eloy  Lungs:  ctab  Abd: + bs soft nontender  Extrem:  w/o edema    CBC with Differential: Lab Results   Component Value Date/Time    WBC 9.3 10/11/2022 04:52 AM    RBC 4.15 10/11/2022 04:52 AM    HGB 12.2 10/11/2022 04:52 AM    HCT 35.6 10/11/2022 04:52 AM     10/11/2022 04:52 AM    MCV 85.8 10/11/2022 04:52 AM    MCH 29.4 10/11/2022 04:52 AM    MCHC 34.3 10/11/2022 04:52 AM    RDW 14.1 10/11/2022 04:52 AM    SEGSPCT 58 03/11/2014 08:46 AM    LYMPHOPCT 13.8 10/11/2022 04:52 AM    MONOPCT 2.1 10/11/2022 04:52 AM    EOSPCT 1 10/07/2010 01:14 PM    BASOPCT 0.1 10/11/2022 04:52 AM    MONOSABS 0.20 10/11/2022 04:52 AM    LYMPHSABS 1.29 10/11/2022 04:52 AM    EOSABS 0.00 10/11/2022 04:52 AM    BASOSABS 0.01 10/11/2022 04:52 AM     CMP:    Lab Results   Component Value Date/Time     10/11/2022 04:52 AM    K 4.2 10/11/2022 04:52 AM    K 4.3 10/07/2022 11:57 PM     10/11/2022 04:52 AM    CO2 19 10/11/2022 04:52 AM    BUN 34 10/11/2022 04:52 AM    CREATININE 1.0 10/11/2022 04:52 AM    GFRAA >60 10/11/2022 04:52 AM    LABGLOM 53 10/11/2022 04:52 AM    GLUCOSE 134 10/11/2022 04:52 AM    GLUCOSE 109 05/04/2012 06:17 AM    PROT 7.0 08/09/2022 10:31 PM    LABALBU 3.8 08/09/2022 10:31 PM    LABALBU 4.2 03/24/2012 11:45 PM    CALCIUM 9.1 10/11/2022 04:52 AM    BILITOT 0.3 08/09/2022 10:31 PM    ALKPHOS 61 08/09/2022 10:31 PM    AST 24 08/09/2022 10:31 PM    ALT 13 08/09/2022 10:31 PM     Warfarin PT/INR:    Lab Results   Component Value Date    INR 0.9 08/10/2022    INR 0.9 08/18/2020    INR 0.9 12/29/2019    PROTIME 10.2 08/10/2022    PROTIME 10.9 08/18/2020    PROTIME 10.5 12/29/2019       Assessment:    Principal Problem:    Delirium  Active Problems:    Right occipital Hemorrhagic stroke [facility])  Resolved Problems:    * No resolved hospital problems.  *      Plan:  Dc planning to rehab        Adrianne Pacheco DO  2:21 PM  10/12/2022

## 2022-10-13 ENCOUNTER — APPOINTMENT (OUTPATIENT)
Dept: CT IMAGING | Age: 82
DRG: 545 | End: 2022-10-13
Payer: MEDICARE

## 2022-10-13 LAB
METER GLUCOSE: 104 MG/DL (ref 74–99)
METER GLUCOSE: 116 MG/DL (ref 74–99)
METER GLUCOSE: 166 MG/DL (ref 74–99)
METER GLUCOSE: 94 MG/DL (ref 74–99)

## 2022-10-13 PROCEDURE — 6370000000 HC RX 637 (ALT 250 FOR IP): Performed by: NURSE PRACTITIONER

## 2022-10-13 PROCEDURE — 70450 CT HEAD/BRAIN W/O DYE: CPT

## 2022-10-13 PROCEDURE — 6360000002 HC RX W HCPCS: Performed by: NURSE PRACTITIONER

## 2022-10-13 PROCEDURE — 82962 GLUCOSE BLOOD TEST: CPT

## 2022-10-13 PROCEDURE — 2140000000 HC CCU INTERMEDIATE R&B

## 2022-10-13 PROCEDURE — 6370000000 HC RX 637 (ALT 250 FOR IP): Performed by: INTERNAL MEDICINE

## 2022-10-13 PROCEDURE — 2580000003 HC RX 258: Performed by: NURSE PRACTITIONER

## 2022-10-13 RX ADMIN — METHYLPREDNISOLONE 8 MG: 4 TABLET ORAL at 22:46

## 2022-10-13 RX ADMIN — METOPROLOL SUCCINATE 25 MG: 25 TABLET, EXTENDED RELEASE ORAL at 10:20

## 2022-10-13 RX ADMIN — POTASSIUM CHLORIDE 20 MEQ: 1500 TABLET, EXTENDED RELEASE ORAL at 10:18

## 2022-10-13 RX ADMIN — AMLODIPINE BESYLATE 10 MG: 10 TABLET ORAL at 10:19

## 2022-10-13 RX ADMIN — ATORVASTATIN CALCIUM 40 MG: 40 TABLET, FILM COATED ORAL at 10:18

## 2022-10-13 RX ADMIN — Medication 1000 UNITS: at 10:18

## 2022-10-13 RX ADMIN — METHYLPREDNISOLONE 4 MG: 4 TABLET ORAL at 05:49

## 2022-10-13 RX ADMIN — PANTOPRAZOLE SODIUM 40 MG: 40 TABLET, DELAYED RELEASE ORAL at 10:18

## 2022-10-13 RX ADMIN — POLYETHYLENE GLYCOL 3350 17 G: 17 POWDER, FOR SOLUTION ORAL at 10:20

## 2022-10-13 RX ADMIN — Medication 10 ML: at 10:20

## 2022-10-13 RX ADMIN — FUROSEMIDE 20 MG: 20 TABLET ORAL at 10:18

## 2022-10-13 RX ADMIN — LOSARTAN POTASSIUM 100 MG: 50 TABLET, FILM COATED ORAL at 10:19

## 2022-10-13 RX ADMIN — DULOXETINE HYDROCHLORIDE 30 MG: 30 CAPSULE, DELAYED RELEASE ORAL at 10:19

## 2022-10-13 RX ADMIN — Medication 10 ML: at 22:46

## 2022-10-13 RX ADMIN — METHYLPREDNISOLONE 4 MG: 4 TABLET ORAL at 17:26

## 2022-10-13 NOTE — PROGRESS NOTES
PRN, Roselinda [place], APRN - CNP    ondansetron [facility]) tablet 4 mg, 4 mg, Oral, TID PRN, Roselinda Jeanette, APRN - CNP    cetirizine (ZYRTEC) tablet 10 mg, 10 mg, Oral, Daily PRN, Roselinda Jeanette, APRN - CNP, 10 mg at 10/10/22 0800    meclizine (ANTIVERT) tablet 25 mg, 25 mg, Oral, PRN, Roselinda Jeanette, APRN - CNP    Vitamin D (CHOLECALCIFEROL) tablet 1,000 Units, 1 tablet, Oral, Daily, Roselinda Jeanette, APRN - CNP, 1,000 Units at 10/12/22 0939    pantoprazole (PROTONIX) tablet 40 mg, 40 mg, Oral, Daily, Roselinda [place], APRN - CNP, 40 mg at 10/12/22 5459    potassium chloride (KLOR-CON M) extended release tablet 20 mEq, 20 mEq, Oral, Daily, Roselinda [place], APRN - CNP, 20 mEq at 10/12/22 0939    amLODIPine (NORVASC) tablet 10 mg, 10 mg, Oral, Daily, Roselinda Jeanette, APRN - CNP, 10 mg at 10/12/22 3219    atorvastatin (LIPITOR) tablet 40 mg, 40 mg, Oral, Daily, Roselinda [place], APRN - CNP, 40 mg at 10/12/22 2029    levothyroxine (SYNTHROID) tablet 88 mcg, 88 mcg, Oral, Daily, Roselinda [place], APRN - CNP, 88 mcg at 10/11/22 0850    losartan (COZAAR) tablet 100 mg, 100 mg, Oral, Daily, Roselinda Jeanette, APRN - CNP, 100 mg at 10/12/22 9781    metoprolol succinate (TOPROL XL) extended release tablet 25 mg, 25 mg, Oral, Daily, Roselinda Jeanette, APRN - CNP, 25 mg at 10/12/22 9127    DULoxetine (CYMBALTA) extended release capsule 30 mg, 30 mg, Oral, Daily, Roselinda [place], APRN - CNP, 30 mg at 10/12/22 0887    acetaminophen (TYLENOL) tablet 650 mg, 650 mg, Oral, Q6H PRN, Roselinda [place], APRN - CNP, 650 mg at 10/11/22 0049    Objective:    BP (!) 147/64   Pulse 55   Temp 98.6 °F (37 °C) (Oral)   Resp (!) 3   Ht 4' 11\" (1.499 m)   Wt 238 lb 1.6 oz (108 kg)   LMP 07/24/2014   SpO2 99%   BMI 48.09 kg/m²     Heart:  reg eloy  Lungs:  ctab  Abd: + bs soft nontender  Extrem:  w/o edema    CBC with Differential:    Lab Results   Component Value Date/Time    WBC 9.3 10/11/2022 04:52 AM    RBC 4.15 10/11/2022 04:52 AM    HGB 12.2 10/11/2022 04:52 AM    HCT 35.6 10/11/2022 04:52 AM     10/11/2022 04:52 AM    MCV 85.8 10/11/2022 04:52 AM    MCH 29.4 10/11/2022 04:52 AM    MCHC 34.3 10/11/2022 04:52 AM    RDW 14.1 10/11/2022 04:52 AM    SEGSPCT 58 03/11/2014 08:46 AM    LYMPHOPCT 13.8 10/11/2022 04:52 AM    MONOPCT 2.1 10/11/2022 04:52 AM    EOSPCT 1 10/07/2010 01:14 PM    BASOPCT 0.1 10/11/2022 04:52 AM    MONOSABS 0.20 10/11/2022 04:52 AM    LYMPHSABS 1.29 10/11/2022 04:52 AM    EOSABS 0.00 10/11/2022 04:52 AM    BASOSABS 0.01 10/11/2022 04:52 AM     CMP:    Lab Results   Component Value Date/Time     10/11/2022 04:52 AM    K 4.2 10/11/2022 04:52 AM    K 4.3 10/07/2022 11:57 PM     10/11/2022 04:52 AM    CO2 19 10/11/2022 04:52 AM    BUN 34 10/11/2022 04:52 AM    CREATININE 1.0 10/11/2022 04:52 AM    GFRAA >60 10/11/2022 04:52 AM    LABGLOM 53 10/11/2022 04:52 AM    GLUCOSE 134 10/11/2022 04:52 AM    GLUCOSE 109 05/04/2012 06:17 AM    PROT 7.0 08/09/2022 10:31 PM    LABALBU 3.8 08/09/2022 10:31 PM    LABALBU 4.2 03/24/2012 11:45 PM    CALCIUM 9.1 10/11/2022 04:52 AM    BILITOT 0.3 08/09/2022 10:31 PM    ALKPHOS 61 08/09/2022 10:31 PM    AST 24 08/09/2022 10:31 PM    ALT 13 08/09/2022 10:31 PM     Warfarin PT/INR:    Lab Results   Component Value Date    INR 0.9 08/10/2022    INR 0.9 08/18/2020    INR 0.9 12/29/2019    PROTIME 10.2 08/10/2022    PROTIME 10.9 08/18/2020    PROTIME 10.5 12/29/2019       Assessment:    Principal Problem:    Delirium  Active Problems:    Right occipital Hemorrhagic stroke [facility])  Resolved Problems:    * No resolved hospital problems.  *      Plan:  Repeat ct brain dc planning        Connor Lombardi DO  8:35 AM  10/13/2022

## 2022-10-13 NOTE — PROGRESS NOTES
Patient being taken down for CT of the head. Will assess patient and administer medications upon return.   Wilian Garcia RN

## 2022-10-13 NOTE — CARE COORDINATION
Received message from MONIKA yesterday that patient was accepted at [facility] and pre-cert was initiated. Spoke with liaison to check if auth obtained; still pending. Ambulance form completed, 7000 started and envelope placed on the soft chart. Per nursing rounds, patient for CT of the head today.     ALLYSSA Mccain, LSW [phone]

## 2022-10-13 NOTE — PROGRESS NOTES
Neurosurg progress note  VITALS:  BP (!) 144/56   Pulse 55   Temp 98.6 °F (37 °C) (Oral)   Resp 18   Ht 4' 11\" (1.499 m)   Wt 238 lb 1.6 oz (108 kg)   LMP 07/24/2014   SpO2 99%   BMI 48.09 kg/m²   24HR INTAKE/OUTPUT:    Intake/Output Summary (Last 24 hours) at 10/13/2022 1701  Last data filed at 10/13/2022 1034  Gross per 24 hour   Intake 480 ml   Output 400 ml   Net 80 ml     CT Head WO Contrast    Result Date: 10/8/2022  EXAMINATION: CT OF THE HEAD WITHOUT CONTRAST  10/8/2022 1:08 am TECHNIQUE: CT of the head was performed without the administration of intravenous contrast. Automated exposure control, iterative reconstruction, and/or weight based adjustment of the mA/kV was utilized to reduce the radiation dose to as low as reasonably achievable. COMPARISON: None. HISTORY: ORDERING SYSTEM PROVIDED HISTORY: dizziness TECHNOLOGIST PROVIDED HISTORY: Reason for exam:->dizziness Has a \"code stroke\" or \"stroke alert\" been called? ->No Decision Support Exception - unselect if not a suspected or confirmed emergency medical condition->Emergency Medical Condition (MA) What reading provider will be dictating this exam?->CRC FINDINGS: BRAIN/VENTRICLES: There is no acute intracranial hemorrhage, mass effect or midline shift. No abnormal extra-axial fluid collection. The gray-white differentiation is maintained without evidence of an acute infarct. There is no evidence of hydrocephalus. 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 SINUSES: The visualized paranasal sinuses and mastoid air cells demonstrate no acute abnormality. SOFT TISSUES/SKULL:  No acute abnormality of the visualized skull or soft tissues. No acute intracranial abnormality.      XR CHEST PORTABLE    Result Date: 10/8/2022  EXAMINATION: ONE XRAY VIEW OF THE CHEST 10/8/2022 12:24 am COMPARISON: 08/09/2022 HISTORY: ORDERING SYSTEM PROVIDED HISTORY: dizziness TECHNOLOGIST PROVIDED HISTORY: Reason for exam:->dizziness What reading provider will be dictating this exam?->CRC FINDINGS: The lungs are without acute focal process. There is no effusion or pneumothorax. The cardiomediastinal silhouette is without acute process. The osseous structures are without acute process. No acute process.      CBC:   Lab Results   Component Value Date/Time    WBC 9.3 10/11/2022 04:52 AM    RBC 4.15 10/11/2022 04:52 AM    HGB 12.2 10/11/2022 04:52 AM    HCT 35.6 10/11/2022 04:52 AM    MCV 85.8 10/11/2022 04:52 AM    MCH 29.4 10/11/2022 04:52 AM    MCHC 34.3 10/11/2022 04:52 AM    RDW 14.1 10/11/2022 04:52 AM     10/11/2022 04:52 AM    MPV 10.1 10/11/2022 04:52 AM     BMP:    Lab Results   Component Value Date/Time     10/11/2022 04:52 AM    K 4.2 10/11/2022 04:52 AM    K 4.3 10/07/2022 11:57 PM     10/11/2022 04:52 AM    CO2 19 10/11/2022 04:52 AM    BUN 34 10/11/2022 04:52 AM    LABALBU 3.8 08/09/2022 10:31 PM    LABALBU 4.2 03/24/2012 11:45 PM    CREATININE 1.0 10/11/2022 04:52 AM    CALCIUM 9.1 10/11/2022 04:52 AM    GFRAA >60 10/11/2022 04:52 AM    LABGLOM 53 10/11/2022 04:52 AM    GLUCOSE 134 10/11/2022 04:52 AM    GLUCOSE 109 05/04/2012 06:17 AM      methylPREDNISolone  4 mg Oral QAM AC    methylPREDNISolone  4 mg Oral Lunch    methylPREDNISolone  4 mg Oral Dinner    methylPREDNISolone  8 mg Oral Nightly    [START ON 10/14/2022] methylPREDNISolone  4 mg Oral Nightly    furosemide  20 mg Oral Daily    insulin lispro  0-16 Units SubCUTAneous TID WC    insulin lispro  0-4 Units SubCUTAneous Nightly    sodium chloride flush  5-40 mL IntraVENous BID    polyethylene glycol  17 g Oral Daily    Vitamin D  1 tablet Oral Daily    pantoprazole  40 mg Oral Daily    potassium chloride  20 mEq Oral Daily    amLODIPine  10 mg Oral Daily    atorvastatin  40 mg Oral Daily    levothyroxine  88 mcg Oral Daily    losartan  100 mg Oral Daily    metoprolol succinate  25 mg Oral Daily    DULoxetine  30 mg Oral Daily     Awake and alert follows simple commands pupils equal round reactive moving all fours equally dense homonymous left hemianopsia  Assessment:  Patient Active Problem List   Diagnosis    GERD (gastroesophageal reflux disease)    Hypothyroidism    Obstructive sleep apnea syndrome, non-compliant with CPAP therapy    Hyperlipidemia LDL goal <100    Obesity (BMI 35.0-39.9 without comorbidity)    Blindness of left eye    Vertigo    Hypertension    Frequent falls    Ataxia    Moderate episode of recurrent major depressive disorder (HCC)    Gait instability    Peripheral neuropathy    Lumbosacral radiculopathy    Dizziness    Alzheimer's disease, unspecified    Subarachnoid hemorrhage (HCC)    Acute CVA (cerebrovascular accident) ([address]    Acute chemical conjunctivitis    Myogenic ptosis    Nonexudative age-related macular degeneration    Squamous blepharitis    Pain in eye    History of artificial eye lens    History of insertion of artificial eyeball    ANA MARIA (obstructive sleep apnea)    Exudative age-related macular degeneration of right eye ([address]    Chronic renal disease, stage III ([address] [227962]    Hypertensive emergency    Hypertensive crisis, unspecified    Acute respiratory distress    Acute kidney injury ([address]    Delirium    Right occipital Hemorrhagic stroke ([address]     Plan:Continue current care  David Evangelista MD M.D.

## 2022-10-14 VITALS
BODY MASS INDEX: 48 KG/M2 | SYSTOLIC BLOOD PRESSURE: 121 MMHG | WEIGHT: 238.1 LBS | TEMPERATURE: 97.7 F | DIASTOLIC BLOOD PRESSURE: 54 MMHG | OXYGEN SATURATION: 98 % | HEIGHT: 59 IN | HEART RATE: 55 BPM | RESPIRATION RATE: 18 BRPM

## 2022-10-14 LAB
METER GLUCOSE: 103 MG/DL (ref 74–99)
METER GLUCOSE: 90 MG/DL (ref 74–99)

## 2022-10-14 PROCEDURE — 82962 GLUCOSE BLOOD TEST: CPT

## 2022-10-14 PROCEDURE — 2580000003 HC RX 258: Performed by: NURSE PRACTITIONER

## 2022-10-14 PROCEDURE — 6370000000 HC RX 637 (ALT 250 FOR IP): Performed by: NURSE PRACTITIONER

## 2022-10-14 PROCEDURE — 6360000002 HC RX W HCPCS: Performed by: NURSE PRACTITIONER

## 2022-10-14 PROCEDURE — 97530 THERAPEUTIC ACTIVITIES: CPT

## 2022-10-14 PROCEDURE — 6370000000 HC RX 637 (ALT 250 FOR IP): Performed by: INTERNAL MEDICINE

## 2022-10-14 PROCEDURE — 97535 SELF CARE MNGMENT TRAINING: CPT

## 2022-10-14 RX ORDER — ACETAMINOPHEN 325 MG/1
650 TABLET ORAL EVERY 6 HOURS PRN
Qty: 120 TABLET | Refills: 3 | COMMUNITY
Start: 2022-10-14

## 2022-10-14 RX ORDER — METHYLPREDNISOLONE 4 MG/1
TABLET ORAL
Qty: 1 KIT | Refills: 0 | Status: SHIPPED | OUTPATIENT
Start: 2022-10-15 | End: 2022-10-20 | Stop reason: DRUGHIGH

## 2022-10-14 RX ORDER — POTASSIUM CHLORIDE 20 MEQ/1
20 TABLET, EXTENDED RELEASE ORAL DAILY
Qty: 30 TABLET | Refills: 0 | Status: SHIPPED | OUTPATIENT
Start: 2022-10-14

## 2022-10-14 RX ADMIN — METOPROLOL SUCCINATE 25 MG: 25 TABLET, EXTENDED RELEASE ORAL at 09:23

## 2022-10-14 RX ADMIN — PANTOPRAZOLE SODIUM 40 MG: 40 TABLET, DELAYED RELEASE ORAL at 09:22

## 2022-10-14 RX ADMIN — Medication 10 ML: at 09:29

## 2022-10-14 RX ADMIN — AMLODIPINE BESYLATE 10 MG: 10 TABLET ORAL at 09:23

## 2022-10-14 RX ADMIN — FUROSEMIDE 20 MG: 20 TABLET ORAL at 09:23

## 2022-10-14 RX ADMIN — LOSARTAN POTASSIUM 100 MG: 50 TABLET, FILM COATED ORAL at 09:22

## 2022-10-14 RX ADMIN — Medication 1000 UNITS: at 09:23

## 2022-10-14 RX ADMIN — DULOXETINE HYDROCHLORIDE 30 MG: 30 CAPSULE, DELAYED RELEASE ORAL at 09:23

## 2022-10-14 RX ADMIN — POTASSIUM CHLORIDE 20 MEQ: 1500 TABLET, EXTENDED RELEASE ORAL at 09:22

## 2022-10-14 RX ADMIN — METHYLPREDNISOLONE 4 MG: 4 TABLET ORAL at 06:58

## 2022-10-14 NOTE — CARE COORDINATION
Patient evaluated by PT/OT today and PT [facility] score is 16/24, patient ambulated 120ft with a device. Patient no longer meets SNF criteria. Met with patient at bedside to provide update. Patient will discharge home, wants home health care for medication management and would like to use the previous home care agency she had. Patient requested her sister, Claude Crane be called. Call made to Claude Crane, provided update. She is able to  patient later today if discharged. All questions answered. Referral made to Tavia Carr at [facility]; she will check patient's benefits and following up regarding acceptance; need home care orders for skilled nursing (medication management). 12:30P  Received a call from Tavia Carr at [facility] and they are unable to accept the referral due to staffing. Call made to [facility], states patient currently active with them and need resumption of care orders. The Plan for Transition of Care is related to the following treatment goals: discharge planning    The Patient and/or patient representative Lalit Corbin was provided with a choice of provider and agrees with the discharge plan. [x] Yes [] No    Freedom of choice list was provided with basic dialogue that supports the patient's individualized plan of care/goals, treatment preferences and shares the quality data associated with the providers.  [x] Yes [] No    Lord Danielson, ALLYSSA, LSW [phone]

## 2022-10-14 NOTE — PROGRESS NOTES
Occupational Therapy  OT BEDSIDE TREATMENT NOTE   [facility] [address] [facility]  [address] [place] [address]      PXWE:  Patient Name: Jayme Mooney  MRN: [id number]  : 1940  Room: [id number]     Evaluating OT: Josey De La Torre, OTR/L [id number]     Referring Provider: BETINA Goel CNP   Specific Provider Orders/Date: OT eval and treat (10/9/22)        Diagnosis:  Dizziness                         Delirium  Reason for admission:  presented for evaluation of dizziness. Pt received phenergan and Benadryl in the ED after which she reportedly had become somewhat unresponsive and delirious. 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, Anxiety, Arthritis, Ca of bowel, TIA, Chronic back pain, GERD, hemorrhagic stroke (Summer 2022), HLD, HTN, L foot plain, thyroid disease     *Precautions:  Fall Risk, L visual deficits, O2, bed alarm, SBP < 150     Assessment of current deficits   [x] Functional mobility          [x]ADLs           [x] Strength                  []Cognition   [x] Functional transfers        [x] IADLs         [x] Safety Awareness   [x]Endurance   [x] Fine Coordination           [x] ROM           [x] Vision/perception    [x]Sensation     [x]Gross Motor Coordination [x] Balance    [] Delirium                  []Motor Control     [] Communication     OT PLAN OF CARE   OT POC based on physician orders, patient diagnosis and results of clinical assessment.         Frequency/Duration: 1-3 days/wk for 1-2 weeks PRN    Specific OT Treatment to include:   ADL retraining/adapted techniques and AE recommendations to increase functional independence within precautions                    Energy conservation techniques to improve tolerance for selfcare routine   Functional transfer/mobility training/DME recommendations for increased independence, safety and fall prevention         Patient/family education to increase safety and functional independence within precautions              Environmental modifications for safe mobility and completion of ADLs                           Visual/Perceptual retraining  to improve body awareness and safety during transfers and ADLs  Splinting/positioning needs to maintain joint/skin integrity and prevent contractures  Therapeutic activity to improve functional performance during ADLs/IADLs                                         Therapeutic exercise to improve tolerance and functional strength for ADLs   Balance retraining exercises/tasks for facilitation of postural control with dynamic challenges during ADLs .   Positioning to improve functional independence  Neuromuscular re-education: facilitation of righting/equilibrium reactions, normalization muscle tone/facilitation active functional movement                      Delirium prevention/treatment     Modified [place] Scale   Score     Description  0             No symptoms  1             No significant disability despite symptoms  2             Slight disability; able to look after own affairs  3             Moderate disability; able to ambulate without assist/ requires assist with ADLs  4             Moderate/Severe disability;requires assist to ambulate/assist with ADLs  5             Severe disability;bedridden/incontinent   6               Score:   4     Recommended Adaptive Equipment: TBA: WW, LB Dressing AE (sock aid, fadi hose aid, owns reacher)     Home Living: Pt lives alone  in a first floor apt with 0 step(s) to enter and 0 rail(s); bed/bath on first floor  Bathroom setup: tub/shower (2 shower seats-uses one seat out side of tub to do her hair)  Equipment owned: [facility], [employer], shower seats x 2, reacher     Prior Level of Function: IND with ADLs;  Mostly IND with IADLs; sister asssits as needed. SPC or [employer] for ambulation.    Driving: no; sister assists with transportation     Pain Level: pt c/o 0/10  pain  this session     Cognition: A&O: 4/4    Follows 1-2 step commands, talkative, can be distracted off task              Memory: fair             Comprehension fair             Problem solving: fair             Judgement/safety: fair-                Communication skills: WFL             Vision: impaired vision: blind L; macular degeneration R                    Glasses:yes                                                       Hearing: WFL    UE Assessment:  Hand Dominance: Right [x]  Left []       ROM Strength STM goal: PRN   RUE  WFL 4-/5 4/5      LUE WF 4-/5 4/5         Sensation: No c/o numbness/tingling in extremities; chronic neuropathy in B feet  Tone: WNL   Edema: WFL     Functional Assessment:  AM-PAC Daily Activity Raw Score: 19/24    Initial Eval Status  Date: 10/10/22 Treatment Status  Date: 10/14/22 STGs = LTGs  Time frame: 7-14 days   Feeding S; set up  seated in chair Mod Indep                         Grace  while seated up in chair to increase activity tolerance         Grooming Min A  Set up SBA  Standing at sink to wash hands & brush hair                        Grace   while standing sink level requiring AD for balance and demonstrating G tolerance       UB dressing/bathing Min A SBA                         Grace         LB dressing/bathing Max A  seated up in chair:  Mod A using AE after instruction Min A   To donning socks seated, toenails getting snagged on L foot                       Grace  using AE as needed for safe reach/ energy conservation        Toileting NT SBA  Simulated, declined need                       Grace      Bed Mobility  Supine to sit:   Min A     Sit to supine:   NT NT  Seated in chair                          Grace  in prep of ADL tasks & transfers   Functional Transfers Sit to stand:   Min A     Stand to sit:   Min A SBA  Sit < >sit Grace  sit<>stand/functional bathroom transfers using AD/DME as needed for balance and safety   Functional Mobility Min A WW SBA then CGA  Due to c/o back pain during mobility                      Grace   functional/bathroom mobility using AD as needed & demonstrating G safety      Balance Sitting:     Static:  SBA    Dynamic:Min A  Standing: Donald [employer] Sitting: Indep  Standing: SBA/CGA Grace dynamic sitting balance; Grace dynamic standing balance  during ADL tasks & transfers   Endurance/  Activity Tolerance    F tolerance with light activity. Fair+  G   tolerance with moderate activity/self care routine   Visual/  Perceptual Impaired: L visual field                                  Comments: Upon arrival pt seated in chair & agreeable for therapy. Pt educated on techniques to increase independence and safety during ADL's and functional transfers. At end of session pt left seated in chair, call light within reach. Pt has made Good progress towards set goals. Continue with current plan of care    Treatment Time In: 11:40            Treatment Time Out: 12:03             Treatment Charges: Mins Units   Ther Ex  [id number]     Manual Therapy [id number]     Thera Activities 44471 10 1   ADL/Home Mgt 23929 13 1   Neuro Re-ed 04601     Group Therapy      Orthotic manage/training  [id number]     Non-Billable Time     Total Timed Treatment 23 2     Kathy RIVERA  [address] [place] [address], [address] [place] [address]

## 2022-10-14 NOTE — PROGRESS NOTES
[facility] Medicine    Subjective:  pt seen this am sitting up in chair erika diet      Current Facility-Administered Medications:     methylPREDNISolone (MEDROL) tablet 4 mg, 4 mg, Oral, QAM AC, Hovland Bane, APRN - CNP, 4 mg at 10/14/22 1376    methylPREDNISolone (MEDROL) tablet 4 mg, 4 mg, Oral, Lunch, Skylra Bane, APRN - CNP    methylPREDNISolone (MEDROL) tablet 4 mg, 4 mg, Oral, Dinner, Skylar Bane, APRN - CNP, 4 mg at 10/13/22 1726    methylPREDNISolone (MEDROL) tablet 4 mg, 4 mg, Oral, Nightly, Skylar Bane, APRN - CNP    furosemide (LASIX) tablet 20 mg, 20 mg, Oral, Daily, Jyotsna Danny Malmer, DO, 20 mg at 10/14/22 9855    glucose chewable tablet 16 g, 4 tablet, Oral, PRN, Skylar Bane, APRN - CNP    dextrose bolus 10% 125 mL, 125 mL, IntraVENous, PRN **OR** dextrose bolus 10% 250 mL, 250 mL, IntraVENous, PRN, Skylar Bane, APRN - CNP    glucagon (rDNA) injection 1 mg, 1 mg, SubCUTAneous, PRN, Skylar Bane, APRN - CNP    dextrose 10 % infusion, , IntraVENous, Continuous PRN, Skylar Bane, APRN - CNP    insulin lispro (HUMALOG) injection vial 0-16 Units, 0-16 Units, SubCUTAneous, TID WC, Skylar Bane, APRN - CNP    insulin lispro (HUMALOG) injection vial 0-4 Units, 0-4 Units, SubCUTAneous, Nightly, Skylar Chahale, APRN - CNP    guaiFENesin tablet 400 mg, 400 mg, Oral, TID PRN, Hovland Bane, APRN - CNP, 400 mg at 10/11/22 0911    sodium chloride flush 0.9 % injection 5-40 mL, 5-40 mL, IntraVENous, BID, Skylar Bane, APRN - CNP, 10 mL at 10/14/22 0929    sodium chloride flush 0.9 % injection 5-40 mL, 5-40 mL, IntraVENous, PRN, Skylar Bane, APRN - CNP    polyethylene glycol (GLYCOLAX) packet 17 g, 17 g, Oral, Daily, Hovland Bane, APRN - CNP, 17 g at 10/13/22 1020    enalaprilat (VASOTEC) injection 1.25 mg, 1.25 mg, IntraVENous, Q6H PRN, BETINA Chanel CNP, 1.25 mg at 10/10/22 0330    butalbital-acetaminophen-caffeine (FIORICET, ESGIC) per tablet 1 tablet, 1 tablet, Oral, Q4H PRN, BETINA Chanel CNP    ondansetron [facility]) tablet 4 mg, 4 mg, Oral, TID PRN, Jadiel Bue, APRN - CNP    cetirizine (ZYRTEC) tablet 10 mg, 10 mg, Oral, Daily PRN, Jadiel Bue, APRN - CNP, 10 mg at 10/10/22 0800    meclizine (ANTIVERT) tablet 25 mg, 25 mg, Oral, PRN, Jadiel Bue, APRN - CNP    Vitamin D (CHOLECALCIFEROL) tablet 1,000 Units, 1 tablet, Oral, Daily, Jadiel Bue, APRN - CNP, 1,000 Units at 10/14/22 7554    pantoprazole (PROTONIX) tablet 40 mg, 40 mg, Oral, Daily, Jadiel Bue, APRN - CNP, 40 mg at 10/14/22 2108    potassium chloride (KLOR-CON M) extended release tablet 20 mEq, 20 mEq, Oral, Daily, Jadiel Bue, APRN - CNP, 20 mEq at 10/14/22 1895    amLODIPine (NORVASC) tablet 10 mg, 10 mg, Oral, Daily, Jadiel Bue, APRN - CNP, 10 mg at 10/14/22 5374    atorvastatin (LIPITOR) tablet 40 mg, 40 mg, Oral, Daily, Jadiel Bue, APRN - CNP, 40 mg at 10/13/22 1018    levothyroxine (SYNTHROID) tablet 88 mcg, 88 mcg, Oral, Daily, Jadiel Bue, APRN - CNP, 88 mcg at 10/11/22 0850    losartan (COZAAR) tablet 100 mg, 100 mg, Oral, Daily, Jadiel Bue, APRN - CNP, 100 mg at 10/14/22 3449    metoprolol succinate (TOPROL XL) extended release tablet 25 mg, 25 mg, Oral, Daily, Jadiel Bue, APRN - CNP, 25 mg at 10/14/22 0155    DULoxetine (CYMBALTA) extended release capsule 30 mg, 30 mg, Oral, Daily, Jadiel Bue, APRN - CNP, 30 mg at 10/14/22 5326    acetaminophen (TYLENOL) tablet 650 mg, 650 mg, Oral, Q6H PRN, Jadiel Bue, APRN - CNP, 650 mg at 10/11/22 0049    Objective:    /73   Pulse 57   Temp 97.7 °F (36.5 °C) (Oral)   Resp 18   Ht 4' 11\" (1.499 m)   Wt 238 lb 1.6 oz (108 kg)   LMP 07/24/2014   SpO2 97%   BMI 48.09 kg/m²     Heart:  reg eloy  Lungs:  ctab  Abd: + bs soft nontender  Extrem:  w/o edema    CBC with Differential:    Lab Results   Component Value Date/Time    WBC 9.3 10/11/2022 04:52 AM    RBC 4.15 10/11/2022 04:52 AM    HGB 12.2 10/11/2022 04:52 AM    HCT 35.6 10/11/2022 04:52 AM     10/11/2022 04:52 AM    MCV 85.8 10/11/2022 04:52 AM    MCH 29.4 10/11/2022 04:52 AM    MCHC 34.3 10/11/2022 04:52 AM    RDW 14.1 10/11/2022 04:52 AM    SEGSPCT 58 03/11/2014 08:46 AM    LYMPHOPCT 13.8 10/11/2022 04:52 AM    MONOPCT 2.1 10/11/2022 04:52 AM    EOSPCT 1 10/07/2010 01:14 PM    BASOPCT 0.1 10/11/2022 04:52 AM    MONOSABS 0.20 10/11/2022 04:52 AM    LYMPHSABS 1.29 10/11/2022 04:52 AM    EOSABS 0.00 10/11/2022 04:52 AM    BASOSABS 0.01 10/11/2022 04:52 AM     CMP:    Lab Results   Component Value Date/Time     10/11/2022 04:52 AM    K 4.2 10/11/2022 04:52 AM    K 4.3 10/07/2022 11:57 PM     10/11/2022 04:52 AM    CO2 19 10/11/2022 04:52 AM    BUN 34 10/11/2022 04:52 AM    CREATININE 1.0 10/11/2022 04:52 AM    GFRAA >60 10/11/2022 04:52 AM    LABGLOM 53 10/11/2022 04:52 AM    GLUCOSE 134 10/11/2022 04:52 AM    GLUCOSE 109 05/04/2012 06:17 AM    PROT 7.0 08/09/2022 10:31 PM    LABALBU 3.8 08/09/2022 10:31 PM    LABALBU 4.2 03/24/2012 11:45 PM    CALCIUM 9.1 10/11/2022 04:52 AM    BILITOT 0.3 08/09/2022 10:31 PM    ALKPHOS 61 08/09/2022 10:31 PM    AST 24 08/09/2022 10:31 PM    ALT 13 08/09/2022 10:31 PM     Warfarin PT/INR:    Lab Results   Component Value Date    INR 0.9 08/10/2022    INR 0.9 08/18/2020    INR 0.9 12/29/2019    PROTIME 10.2 08/10/2022    PROTIME 10.9 08/18/2020    PROTIME 10.5 12/29/2019       Assessment:    Principal Problem:    Delirium  Active Problems:    Right occipital Hemorrhagic stroke [facility])  Resolved Problems:    * No resolved hospital problems.  *      Plan:  Dc home outpt f/u        Dayron Gagnon DO  12:53 PM  10/14/2022

## 2022-10-14 NOTE — PROGRESS NOTES
Physical Therapy  Physical Therapy Treatment Note    Name: Romana Escobar  : 1940  MRN: [id number]      Date of Service: 10/14/2022    Evaluating PT:  Bonny Cadet PT, DPT [id number]      Room #:  7810/9687-B  Diagnosis:  Dizziness [R42]  Delirium [R41.0], Right Posterior Temporal Hemorrhage   PMHx/PSHx:    Past Medical History:   Diagnosis Date    Amaurosis fugax of right eye 2017    Anxiety     Arthritis     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, treated with surgery and chemo    Cerebral artery occlusion with cerebral infarction [facility])     possibly TIA    Chronic back pain     Constipation     Depression     Elevated sed rate 2017    hx of    GERD (gastroesophageal reflux disease)     Hemorrhagic stroke ([facility] 2022    Hemorrhoids     history of    Hyperlipidemia     Hypertension     Ischemic stroke ([facility]     Left foot pain     dropped a Judy Jesus on foot    Lumbosacral radiculopathy 2020    Macular degeneration     Peripheral neuropathy 2020    Poor vision     right eye / had bleeding behind eye, is receiving \"shots\" at this time  / 3/22/2019    Prosthetic eye globe     left eye implant;    SAH (subarachnoid hemorrhage) ([facility] 2022    Seasonal allergies     Skipped heart beats     on metoprolol; managed by Dr. Easton Hamilton    Sleep apnea     uses cpap at times     Stroke [facility]) 2022    Thyroid disease      Procedure/Surgery:  None  Precautions:  Falls  Equipment Needs:  ww    SUBJECTIVE:    Pt lives alone in a 1st floor apartment with level entry. Pt ambulated with [employer] and was mostly independent PTA. Pt's sister provides transportation. OBJECTIVE:   Initial Evaluation  Date: 10/10/22 Treatment  10/14/22 Short Term/ Long Term   Goals   AM-PAC 6 Clicks 02/10 57/73    Was pt agreeable to Eval/treatment? Yes Yes    Does pt have pain?  No c/o pain No c/o pain     Bed Mobility  Rolling: NT  Supine to sit: Donald  Sit to supine: NT  Scooting: Donald Rolling: SBA  Supine to sit: SBA  Sit to supine: SBA  Scooting: SBA Mod Independent   Transfers Sit to stand: Donald  Stand to sit: Donald  Stand pivot: Donald with [employer] Sit to stand: Donald  Stand to sit: Donald  Stand pivot: Donald with no AD Mod Independent with [employer]   Ambulation   70 feet with Donald with [employer] 120 feet with no AD Min A >150 feet with Mod Independent with [employer]   Stair negotiation: ascended and descended NT  >4 steps with 1 rail Mod Independent   ROM BUE:  Defer to OT note  BLE: WFL     Strength BUE:  Defer to OT note  BLE:  4/5 grossly  Increase by 1/3 MMT grade   Balance Sitting EOB:  Donald dynamic  Dynamic Standing:  Donald with [employer] Sitting EOB:  Supervision Static, SBA Dynamic  Dynamic Standing:  Donald with no AD Sitting EOB:  Independent  Dynamic Standing: Mod Independent with [employer]     Pt is A & O x 4  Edema:  B LE edema present     Vitals:  Heart Rate at rest 57 bpm Heart Rate post session 59 bpm   SpO2 at rest 97% on room air  SpO2 post session 97% on room air      Therapeutic Exercises:  Sit <> stand 1 x 10, Seated:  Marches, LAQ, Ankle pumps 1 x 10    Patient education  Pt educated on importance of increasing mobility in the hospital with assistance. Pt educated on performing seated exercise to tolerance. Patient response to education:   Pt verbalized understanding Pt demonstrated skill Pt requires further education in this area   x x x     ASSESSMENT:    Conditions Requiring Skilled Therapeutic Intervention:    [x]Decreased strength     []Decreased ROM  [x]Decreased functional mobility  [x]Decreased balance   [x]Decreased endurance   [x]Decreased posture  []Decreased sensation  []Decreased coordination   [x]Decreased vision  [x]Decreased safety awareness   []Increased pain       Comments:  Patient was found sitting EOB with nursing present upon arrival.  Patient denied pain, lt headedness, SOB and blurry vision. She has not been on O2 for a while per patient report. O2 sat was 97% on room air at rest and with activity.   Patient ambulated with decreased gait speed, increased unsteadiness, especially as she fatigued, and she reached for lovell rail and other environmental objects. Discussed using an assistive device as safest option. Patient is identified as a fall risk and would benefit from continued rehab to improve balance, gait, endurance and overall independence with functional mobility. Patient was left sitting in bedside chair after session. Treatment:  Patient practiced and was instructed in the following treatment:    VC/s for proper breathing techniques during functional mobility  VC's for hand placement during transfers  Patient educated on energy conservation, activity modification and safety while performing functional tasks    PHYSICAL THERAPY PLAN OF CARE:    Frequency of treatments: Continue with POC 2-5x/ week as able. Will add gait team for additional ambulation.       Time in  0925  Time out  0955    Total Treatment Time  30 minutes     CPT codes:  [] Low Complexity PT evaluation 02278  [] Moderate Complexity PT evaluation 88166  [] High Complexity PT evaluation 19701  [] PT Re-evaluation 88223  [] Gait training 46252 - minutes  [] Manual therapy 14490 - minutes  [x] Therapeutic activities 36752 30 minutes  [] Therapeutic exercises 57842 - minutes  [] Neuromuscular reeducation 05368 - minutes     Gricelda Osorio, PT, DPT  [id number]

## 2022-10-14 NOTE — PLAN OF CARE
Problem: Discharge Planning  Goal: Discharge to home or other facility with appropriate resources  Outcome: Adequate for Discharge     Problem: ABCDS Injury Assessment  Goal: Absence of physical injury  Outcome: Adequate for Discharge     Problem: Chronic Conditions and Co-morbidities  Goal: Patient's chronic conditions and co-morbidity symptoms are monitored and maintained or improved  Outcome: Adequate for Discharge     Problem: Neurosensory - Adult  Goal: Achieves stable or improved neurological status  Outcome: Adequate for Discharge  Goal: Absence of seizures  Outcome: Adequate for Discharge  Goal: Remains free of injury related to seizures activity  Outcome: Adequate for Discharge  Goal: Achieves maximal functionality and self care  Outcome: Adequate for Discharge     Problem: Cardiovascular - Adult  Goal: Maintains optimal cardiac output and hemodynamic stability  Outcome: Adequate for Discharge     Problem: Respiratory - Adult  Goal: Achieves optimal ventilation and oxygenation  Outcome: Adequate for Discharge     Problem: Gastrointestinal - Adult  Goal: Minimal or absence of nausea and vomiting  Outcome: Adequate for Discharge  Goal: Maintains or returns to baseline bowel function  Outcome: Adequate for Discharge  Goal: Maintains adequate nutritional intake  Outcome: Adequate for Discharge  Goal: Establish and maintain optimal ostomy function  Outcome: Adequate for Discharge     Problem: Genitourinary - Adult  Goal: Absence of urinary retention  Outcome: Adequate for Discharge     Problem: Metabolic/Fluid and Electrolytes - Adult  Goal: Electrolytes maintained within normal limits  Outcome: Adequate for Discharge  Goal: Hemodynamic stability and optimal renal function maintained  Outcome: Adequate for Discharge     Problem: Skin/Tissue Integrity - Adult  Goal: Skin integrity remains intact  Outcome: Adequate for Discharge  Goal: Oral mucous membranes remain intact  Outcome: Adequate for Discharge Problem: Musculoskeletal - Adult  Goal: Return mobility to safest level of function  Outcome: Adequate for Discharge  Goal: Maintain proper alignment of affected body part  Outcome: Adequate for Discharge     Problem: Skin/Tissue Integrity  Goal: Absence of new skin breakdown  Description: 1. Monitor for areas of redness and/or skin breakdown  2. Assess vascular access sites hourly  3. Every 4-6 hours minimum:  Change oxygen saturation probe site  4. Every 4-6 hours:  If on nasal continuous positive airway pressure, respiratory therapy assess nares and determine need for appliance change or resting period.   Outcome: Adequate for Discharge     Problem: Safety - Adult  Goal: Free from fall injury  Outcome: Adequate for Discharge     Problem: Pain  Goal: Verbalizes/displays adequate comfort level or baseline comfort level  Outcome: Adequate for Discharge

## 2022-10-17 ENCOUNTER — CARE COORDINATION (OUTPATIENT)
Dept: CARE COORDINATION | Age: 82
End: 2022-10-17

## 2022-10-17 DIAGNOSIS — R11.0 NAUSEA: ICD-10-CM

## 2022-10-17 DIAGNOSIS — R41.0 DELIRIUM: Primary | ICD-10-CM

## 2022-10-17 PROCEDURE — 1111F DSCHRG MED/CURRENT MED MERGE: CPT | Performed by: FAMILY MEDICINE

## 2022-10-17 RX ORDER — ONDANSETRON 4 MG/1
4 TABLET, FILM COATED ORAL 3 TIMES DAILY PRN
Qty: 30 TABLET | Refills: 2 | Status: SHIPPED | OUTPATIENT
Start: 2022-10-17

## 2022-10-17 NOTE — CARE COORDINATION
[facility][1] Care Transitions Initial Follow Up Call    Call within 2 business days of discharge: Yes    Care Transition Nurse contacted the patient by telephone to perform post hospital discharge assessment. Verified name and  with patient as identifiers. Provided introduction to self, and explanation of the Care Transition Nurse role. Patient: Brien Marino Patient : 1940   MRN: [id number]  Reason for Admission: [facility] 10/7-10/14/222 DELIRIUM  Discharge Date: 10/14/22 RARS: Readmission Risk Score: 17.3      Last Discharge [address]       Date Complaint Diagnosis Description Type Department Provider    10/7/22 Dizziness Dizziness . .. ED to Hosp-Admission (Discharged) (ADMITTED) [facility] 6WE [facility], DO; Bina Balling. .. Was this an external facility discharge? No Discharge Facility: [facility] [place]    Challenges to be reviewed by the provider   Additional needs identified to be addressed with provider: No  none               Method of communication with provider: none. Spoke with pt. States she is doing well. Denies cp, sob or fever. States no dizziness or confusion. States she does feel nauseous and is taking zofran. States she is able to move around on own using a cane. Is eating and drinking normal no bowel or bladder issues. Reviewed meds and 1111f order placed. Pt is to see pcp on 10/20. Denies home med or transportation needs at this time. Ctn info given and will follow up with pt in one week      Care Transition Nurse reviewed discharge instructions with patient who verbalized understanding. The patient was given an opportunity to ask questions and does not have any further questions or concerns at this time. Were discharge instructions available to patient? Yes. Reviewed appropriate site of care based on symptoms and resources available to patient including: PCP  Home health  When to call 911. The patient agrees to contact the PCP office for questions related to their healthcare. Advance Care Planning:   Does patient have an Advance Directive:  NOT REVIEWED . Medication reconciliation was performed with patient, who verbalizes understanding of administration of home medications. Medications reviewed, 1111F entered: yes    Was patient discharged with a pulse oximeter? no    Non-face-to-face services provided:  Obtained and reviewed discharge summary and/or continuity of care documents    Offered patient enrollment in the Remote Patient Monitoring (RPM) program for in-home monitoring: NA.    Care Transitions 24 Hour Call    Do you have a copy of your discharge instructions?: Yes  Do you have all of your prescriptions and are they filled?: Yes  Have you been contacted by a [address]?: No  Have you scheduled your follow up appointment?: Yes  How are you going to get to your appointment?: Car - family or friend to transport  [address]: [address] (Comment: [facility])  Patient DME: Straight cane  Patient Home Equipment: CPAP  Do you have support at home?: Alone  Do you feel like you have everything you need to keep you well at home?: Yes  Are you an active caregiver in your home?: No  Care Transitions Interventions         Follow Up  Future Appointments   Date Time Provider Tay Singh   10/20/2022  9:45 AM DO HOWARD Cueto [facility]   11/16/2022  8:40 AM BETINA Collins - CNS [facility]       Care Transition Nurse provided contact information. Plan for follow-up call in 5-7 days based on severity of symptoms and risk factors.   Plan for next call: symptom management-NAUSEA,   follow-up appointment-PCP    Eloina Kumar LPN

## 2022-10-20 ENCOUNTER — OFFICE VISIT (OUTPATIENT)
Dept: FAMILY MEDICINE CLINIC | Age: 82
End: 2022-10-20
Payer: MEDICARE

## 2022-10-20 VITALS
TEMPERATURE: 96.9 F | BODY MASS INDEX: 36.29 KG/M2 | DIASTOLIC BLOOD PRESSURE: 70 MMHG | SYSTOLIC BLOOD PRESSURE: 130 MMHG | WEIGHT: 180 LBS | HEART RATE: 50 BPM | OXYGEN SATURATION: 97 % | HEIGHT: 59 IN

## 2022-10-20 DIAGNOSIS — Z09 HOSPITAL DISCHARGE FOLLOW-UP: Primary | ICD-10-CM

## 2022-10-20 PROCEDURE — 1111F DSCHRG MED/CURRENT MED MERGE: CPT | Performed by: FAMILY MEDICINE

## 2022-10-20 PROCEDURE — 99496 TRANSJ CARE MGMT HIGH F2F 7D: CPT | Performed by: FAMILY MEDICINE

## 2022-10-20 RX ORDER — LIDOCAINE 50 MG/G
1 PATCH TOPICAL DAILY
Qty: 30 PATCH | Refills: 5 | Status: SHIPPED | OUTPATIENT
Start: 2022-10-20 | End: 2022-11-19

## 2022-10-20 RX ORDER — LEVETIRACETAM 500 MG/1
TABLET ORAL
COMMUNITY
Start: 2022-08-24

## 2022-10-20 RX ORDER — DONEPEZIL HYDROCHLORIDE 5 MG/1
TABLET, FILM COATED ORAL
COMMUNITY
Start: 2022-09-30

## 2022-10-20 RX ORDER — LIDOCAINE 4 G/100G
1 PATCH TOPICAL DAILY
Status: CANCELLED | OUTPATIENT
Start: 2022-10-20

## 2022-10-20 NOTE — PROGRESS NOTES
Post-Discharge Transitional Care Follow Up      Amber Norwood   YOB: 1940    Date of Office Visit:  10/20/2022  Date of Hospital Admission: 10/7/22  Date of Hospital Discharge: 10/14/22  Readmission Risk Score (high >=14%. Medium >=10%):Readmission Risk Score: 17.3      Care management risk score Rising risk (score 2-5) and Complex Care (Scores >=6): No Risk Score On File     Non face to face  following discharge, date last encounter closed (first attempt may have been earlier): 10/17/2022     Call initiated 2 business days of discharge: Yes     Hospital discharge follow-up  -     MO DISCHARGE MEDS RECONCILED W/ CURRENT OUTPATIENT MED LIST    Medical Decision Making: high complexity  Return in 1 month (on 11/20/2022). Subjective:   HPI    Inpatient course: Discharge summary reviewed- see chart.     Interval history/Current status: stable    Patient Active Problem List   Diagnosis    GERD (gastroesophageal reflux disease)    Hypothyroidism    Obstructive sleep apnea syndrome, non-compliant with CPAP therapy    Hyperlipidemia LDL goal <100    Obesity (BMI 35.0-39.9 without comorbidity)    Blindness of left eye    Vertigo    Hypertension    Frequent falls    Ataxia    Moderate episode of recurrent major depressive disorder (HCC)    Gait instability    Peripheral neuropathy    Lumbosacral radiculopathy    Dizziness    Alzheimer's disease, unspecified    Subarachnoid hemorrhage ([address]    Acute CVA (cerebrovascular accident) ([address]    Acute chemical conjunctivitis    Myogenic ptosis    Nonexudative age-related macular degeneration    Squamous blepharitis    Pain in eye    History of artificial eye lens    History of insertion of artificial eyeball    AAN MARIA (obstructive sleep apnea)    Exudative age-related macular degeneration of right eye ([address]    Chronic renal disease, stage III ([address] [900570]    Hypertensive emergency    Hypertensive crisis, unspecified    Acute respiratory distress Acute kidney injury [facility])    Delirium    Right occipital Hemorrhagic stroke [facility])       Medications listed as ordered at the time of discharge from hospital     Medication List            Accurate as of October 20, 2022 10:28 AM. If you have any questions, ask your nurse or doctor.                 CONTINUE taking these medications      acetaminophen 325 MG tablet  Commonly known as: TYLENOL  Take 2 tablets by mouth every 6 hours as needed for Pain     amLODIPine 10 MG tablet  Commonly known as: NORVASC  Take 1 tablet by mouth daily     atorvastatin 40 MG tablet  Commonly known as: LIPITOR     cetirizine 10 MG tablet  Commonly known as: ZYRTEC     docusate sodium 100 MG capsule  Commonly known as: COLACE     donepezil 5 MG tablet  Commonly known as: ARICEPT     DULoxetine 30 MG extended release capsule  Commonly known as: CYMBALTA  Take 1 capsule by mouth daily     furosemide 20 MG tablet  Commonly known as: LASIX     Keppra 500 MG tablet  Generic drug: levETIRAcetam     levothyroxine 88 MCG tablet  Commonly known as: SYNTHROID     Lidocaine Pain Relief 4 % external patch  Generic drug: lidocaine     losartan 100 MG tablet  Commonly known as: COZAAR     methylPREDNISolone 4 MG tablet  Commonly known as: MEDROL DOSEPACK  Take by mouth.     metoprolol succinate 25 MG extended release tablet  Commonly known as: TOPROL XL     ondansetron 4 MG tablet  Commonly known as: ZOFRAN  Take 1 tablet by mouth 3 times daily as needed for Nausea or Vomiting     pantoprazole 40 MG tablet  Commonly known as: PROTONIX     potassium chloride 20 MEQ extended release tablet  Commonly known as: KLOR-CON M  Take 1 tablet by mouth daily     PreserVision AREDS 2 Caps     vitamin C 500 MG tablet  Commonly known as: ASCORBIC ACID     Vitamin D (Cholecalciferol) 25 MCG (1000 UT) Tabs               Medications marked \"taking\" at this time  Outpatient Medications Marked as Taking for the 10/20/22 encounter (Office Visit) with Ajay Boyd DO Medication Sig Dispense Refill    levETIRAcetam (KEPPRA) 500 MG tablet Take by mouth      ondansetron (ZOFRAN) 4 MG tablet Take 1 tablet by mouth 3 times daily as needed for Nausea or Vomiting 30 tablet 2    acetaminophen (TYLENOL) 325 MG tablet Take 2 tablets by mouth every 6 hours as needed for Pain 120 tablet 3    potassium chloride (KLOR-CON M) 20 MEQ extended release tablet Take 1 tablet by mouth daily 30 tablet 0    methylPREDNISolone (MEDROL DOSEPACK) 4 MG tablet Take by mouth.  1 kit 0    atorvastatin (LIPITOR) 40 MG tablet Take 40 mg by mouth nightly      docusate sodium (COLACE) 100 MG capsule Take 100 mg by mouth 2 times daily      furosemide (LASIX) 20 MG tablet Take 20 mg by mouth daily      levothyroxine (SYNTHROID) 88 MCG tablet Take 88 mcg by mouth Daily      losartan (COZAAR) 100 MG tablet Take 100 mg by mouth daily      metoprolol succinate (TOPROL XL) 25 MG extended release tablet Take 25 mg by mouth daily      pantoprazole (PROTONIX) 40 MG tablet Take 40 mg by mouth daily      DULoxetine (CYMBALTA) 30 MG extended release capsule Take 1 capsule by mouth daily 30 capsule 2    amLODIPine (NORVASC) 10 MG tablet Take 1 tablet by mouth daily 90 tablet 3    LIDOCAINE PAIN RELIEF 4 % external patch 1 patch daily Apply to lower back      vitamin C (ASCORBIC ACID) 500 MG tablet Take 500 mg by mouth daily      Vitamin D, Cholecalciferol, 25 MCG (1000 UT) TABS Take 1,000 Units by mouth daily      cetirizine (ZYRTEC) 10 MG tablet Take 10 mg by mouth daily as needed for Allergies      Multiple Vitamins-Minerals (PRESERVISION AREDS 2) CAPS Take 1 capsule by mouth 2 times daily          Medications patient taking as of now reconciled against medications ordered at time of hospital discharge: Yes    Review of Systems    Objective:    /70   Pulse 50   Temp 96.9 °F (36.1 °C) (Temporal)   Ht 4' 11\" (1.499 m)   Wt 180 lb (81.6 kg)   LMP 07/24/2014   SpO2 97%   BMI 36.36 kg/m²   Physical Exam    An electronic signature was used to authenticate this note.   --Daja Peña, DO

## 2022-10-21 ENCOUNTER — TELEPHONE (OUTPATIENT)
Dept: FAMILY MEDICINE CLINIC | Age: 82
End: 2022-10-21

## 2022-10-21 NOTE — TELEPHONE ENCOUNTER
Funmi Gaming with [facility] called ([phone]) to advise they saw Yadira Gentile today and will be continuing with her [facility].      She also stated on the patient AVS it lists   Levetracetam (Keppra) 500 mg tablet and Cetirizine (Zyrtec) 10 mg tablets  Funmi Gaming advised me the patient does not have these medications on hand and she wants to know if she should be taking these     Please advise Thank You

## 2022-10-26 ENCOUNTER — CARE COORDINATION (OUTPATIENT)
Dept: CARE COORDINATION | Age: 82
End: 2022-10-26

## 2022-10-26 NOTE — CARE COORDINATION
Care Transitions Outreach Attempt    Call within 2 business days of discharge: Yes   Attempted to reach patient for transitions of care follow up. Unable to reach patient. Unable to leave vm, no option. Will attempt again at later time and date     Patient: Ruddy uBck Patient : 1940 MRN: [id number]    Last Discharge  Street       Date Complaint Diagnosis Description Type Department Provider    10/7/22 Dizziness Dizziness . .. ED to Hosp-Admission (Discharged) (ADMITTED) [facility]  [address] [facility], DO; Magui Malhotra. .. Was this an external facility discharge?  No Discharge Facility: [facility] 10/7-10/14/222 DELIRIUM    Noted following upcoming appointments from discharge chart review:   [facility] follow up appointment(s):   Future Appointments   Date Time Provider Tay Singh   2022  8:40 AM BETINA Ford - CNS [facility]   12/15/2022 10:15 AM DO Elvis Hoang [facility]     Non-[facility] follow up appointment(s): nabeel

## 2022-11-02 ENCOUNTER — CARE COORDINATION (OUTPATIENT)
Dept: CARE COORDINATION | Age: 82
End: 2022-11-02

## 2022-11-09 ENCOUNTER — APPOINTMENT (OUTPATIENT)
Dept: GENERAL RADIOLOGY | Age: 82
End: 2022-11-09
Payer: MEDICARE

## 2022-11-09 ENCOUNTER — HOSPITAL ENCOUNTER (EMERGENCY)
Age: 82
Discharge: HOME | End: 2022-11-09
Attending: EMERGENCY MEDICINE
Payer: MEDICARE

## 2022-11-09 ENCOUNTER — APPOINTMENT (OUTPATIENT)
Dept: CT IMAGING | Age: 82
End: 2022-11-09
Payer: MEDICARE

## 2022-11-09 VITALS
HEART RATE: 62 BPM | BODY MASS INDEX: 36.29 KG/M2 | HEIGHT: 59 IN | SYSTOLIC BLOOD PRESSURE: 185 MMHG | WEIGHT: 180 LBS | DIASTOLIC BLOOD PRESSURE: 45 MMHG | RESPIRATION RATE: 16 BRPM | TEMPERATURE: 97.7 F
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
## 2022-11-09 PROCEDURE — 70450 CT HEAD/BRAIN W/O DYE: CPT

## 2022-11-09 PROCEDURE — 73610 X-RAY EXAM OF ANKLE: CPT

## 2022-11-09 PROCEDURE — 6370000000 HC RX 637 (ALT 250 FOR IP): Performed by: STUDENT IN AN ORGANIZED HEALTH CARE EDUCATION/TRAINING PROGRAM

## 2022-11-09 PROCEDURE — 99284 EMERGENCY DEPT VISIT MOD MDM: CPT

## 2022-11-09 PROCEDURE — 72125 CT NECK SPINE W/O DYE: CPT

## 2022-11-09 PROCEDURE — 71045 X-RAY EXAM CHEST 1 VIEW: CPT

## 2022-11-09 PROCEDURE — 73030 X-RAY EXAM OF SHOULDER: CPT

## 2022-11-09 RX ORDER — ONDANSETRON 4 MG/1
4 TABLET, ORALLY DISINTEGRATING ORAL ONCE
Status: COMPLETED | OUTPATIENT
Start: 2022-11-09 | End: 2022-11-09

## 2022-11-09 RX ORDER — ACETAMINOPHEN 325 MG/1
650 TABLET ORAL ONCE
Status: COMPLETED | OUTPATIENT
Start: 2022-11-09 | End: 2022-11-09

## 2022-11-09 RX ADMIN — ACETAMINOPHEN 650 MG: 325 TABLET ORAL at 12:51

## 2022-11-09 RX ADMIN — ONDANSETRON 4 MG: 4 TABLET, ORALLY DISINTEGRATING ORAL at 12:51

## 2022-11-09 ASSESSMENT — ENCOUNTER SYMPTOMS
SINUS PRESSURE: 0
SHORTNESS OF BREATH: 0
EYE DISCHARGE: 0
VOMITING: 0
ABDOMINAL DISTENTION: 0
BACK PAIN: 0
EYE REDNESS: 0
DIARRHEA: 0
SORE THROAT: 0
EYE PAIN: 0
WHEEZING: 0
NAUSEA: 0
COUGH: 0

## 2022-11-09 ASSESSMENT — PAIN - FUNCTIONAL ASSESSMENT: PAIN_FUNCTIONAL_ASSESSMENT: NONE - DENIES PAIN

## 2022-11-09 ASSESSMENT — PAIN DESCRIPTION - LOCATION: LOCATION: LEG

## 2022-11-09 NOTE — ED PROVIDER NOTES
[facility]  Department of Emergency Medicine     Written by: Esther Newman DO  Patient Name: Dayton Vogt  Attending Provider: January Patel DO  Admit Date: 2022 11:27 AM  MRN: [id number]                   : 1940        No chief complaint on file. - Chief complaint    Patient is a 75-year-old female past medical history of hypothyroidism, hypertension, hyperlipidemia and CVA. Patient presents with chief complaint of fall. Patient states that she tripped and fell earlier today. Patient states that she did strike her head she denies any loss of consciousness. Patient currently complaining of pain to her right shoulder as well as chronic bilateral foot pain secondary to neuropathy. Patient describes the pain as a sharp aching sensations currently rates pain a 4 out of 10. Patient states the pain is slightly worsened with movement and palpation she denies any relieving factors. Patient notes that she has had some falls the past secondary to neuropathy. Patient denies any fevers, chills, nausea, vomiting, chest pain, cough, dizziness, lightheadedness, numbness or tingling. The history is provided by the patient. No  was used. Review of Systems   Constitutional:  Negative for chills and fever. HENT:  Negative for ear pain, sinus pressure and sore throat. Eyes:  Negative for pain, discharge and redness. Respiratory:  Negative for cough, shortness of breath and wheezing. Cardiovascular:  Negative for chest pain. Gastrointestinal:  Negative for abdominal distention, diarrhea, nausea and vomiting. Genitourinary:  Negative for dysuria and frequency. Musculoskeletal:  Positive for arthralgias. Negative for back pain. Skin:  Negative for rash and wound. Neurological:  Negative for weakness and headaches. Hematological:  Negative for adenopathy. All other systems reviewed and are negative.      Physical Exam  Vitals and nursing note reviewed. Constitutional:       General: She is not in acute distress. Appearance: Normal appearance. HENT:      Head: Normocephalic and atraumatic. Nose: No congestion or rhinorrhea. Mouth/Throat:      Mouth: Mucous membranes are moist.      Pharynx: Oropharynx is clear. Eyes:      Extraocular Movements: Extraocular movements intact. Pupils: Pupils are equal, round, and reactive to light. Cardiovascular:      Rate and Rhythm: Normal rate and regular rhythm. Heart sounds: No murmur heard. No gallop. Pulmonary:      Effort: Pulmonary effort is normal. No respiratory distress. Breath sounds: No wheezing, rhonchi or rales. Abdominal:      General: Abdomen is flat. Palpations: Abdomen is soft. There is no mass. Tenderness: There is no abdominal tenderness. There is no guarding. Hernia: No hernia is present. Musculoskeletal:         General: Tenderness present. No swelling or signs of injury. Normal range of motion. Cervical back: Normal range of motion. No rigidity. No muscular tenderness. Comments: On examination of the right shoulder there is mild tenderness palpation there is no crepitus or deformities noted. Range of motion is intact. Right upper extremity is neurovascular intact. Pulses are intact. Skin:     General: Skin is warm and dry. Capillary Refill: Capillary refill takes less than 2 seconds. Neurological:      General: No focal deficit present. Mental Status: She is alert and oriented to person, place, and time. Mental status is at baseline. Psychiatric:         Mood and Affect: Mood normal.         Behavior: Behavior normal.        Procedures       MDM  Number of Diagnoses or Management Options  Fall, initial encounter  Diagnosis management comments: Patient is an 78-year-old female past medical history of hypothyroidism as well as hypertension, hyperlipidemia and CVA.   Patient presents with chief complaint of fall.  Vital signs stable presentation except for mildly elevated blood pressure. On physical exam heart regular rate and rhythm, lungs clear to auscultation bilaterally, abdomen soft nontender no rigidity rebound or guarding. There is mild tenderness palpation along the right shoulder, right upper extremity is neurovascular intact. Fall was mechanical in nature no complaints of chest pain or shortness of breath as well as lightheadedness prior to fall. CT scan of the head and neck as well as chest right ankle left ankle and right shoulder x-rays were obtained and were negative. On repeat evaluation patient does note mild nausea patient was given Tylenol and Zofran with improvement in symptoms. Patient able to ambulate with walker. Decision made to discharge patient. Patient instructed to increase fluids and to follow-up with primary care doctor. In addition patient notes any new worrisome symptoms she was instructed to return to emergency department for evaluation. Plan of care discussed with patient occluding discharge, all questions were answered, patient was agreement plan of care and discharged home in stable condition.        Amount and/or Complexity of Data Reviewed  Clinical lab tests: ordered and reviewed  Tests in the radiology section of CPT®: ordered and reviewed    Risk of Complications, Morbidity, and/or Mortality  Presenting problems: moderate  Diagnostic procedures: moderate  Management options: moderate    Patient Progress  Patient progress: stable             --------------------------------------------- PAST HISTORY ---------------------------------------------  Past Medical History:  has a past medical history of Amaurosis fugax of right eye, Anxiety, Arthritis, CA - cancer of bowel, Cerebral artery occlusion with cerebral infarction ([facility] [address] Chronic back pain, Constipation, Depression, Elevated sed rate, GERD (gastroesophageal reflux disease), Hemorrhagic stroke ([facility] [address] Hemorrhoids, Hyperlipidemia, Hypertension, Ischemic stroke ([facility] [address] Left foot pain, Lumbosacral radiculopathy, Macular degeneration, Peripheral neuropathy, Poor vision, Prosthetic eye globe, SAH (subarachnoid hemorrhage) ([facility]), Seasonal allergies, Skipped heart beats, Sleep apnea, Stroke ([facility] [address] and Thyroid disease. Past Surgical History:  has a past surgical history that includes Eye surgery (years ago); colectomy (1974); Colonoscopy (04/26/2012); Cholecystectomy (1985); Hysterectomy (1974); tumor excision; Upper gastrointestinal endoscopy (05/30/2014); Colonoscopy (05/30/2014); Tonsillectomy; joint replacement (Left, 2010/2012); Upper gastrointestinal endoscopy (01/08/2015); Colonoscopy (01/08/2015); cyst removal (years ago); and Upper gastrointestinal endoscopy (N/A, 4/1/2019). Social History:  reports that she has never smoked. She has been exposed to tobacco smoke. She has never used smokeless tobacco. She reports that she does not drink alcohol and does not use drugs. Family History: family history includes Breast Cancer in her daughter and sister; Cancer in her brother, brother, brother, brother, brother, brother, sister, and sister; Heart Disease in her brother, brother, father, mother, and sister; High Blood Pressure in her daughter and daughter; Hypertension in her brother, brother, father, mother, and sister; Other in her mother; Stroke in her father and mother. The patients home medications have been reviewed. Allergies: Iodine, Bee pollen, Bee venom, Codeine, Hydralazine, Oxycodone-aspirin, Phenergan [promethazine hcl], Sulfa antibiotics, Amoxicillin-pot clavulanate, Aspirin, Darvocet [propoxyphene n-acetaminophen], Influenza vaccines, Percodan [oxycodone-aspirin], and Percodan [oxycodone-aspirin]    -------------------------------------------------- RESULTS -------------------------------------------------  Labs:  No results found for this visit on 11/09/22.     Radiology:  XR CHEST PORTABLE   Final Result   No acute process. XR SHOULDER RIGHT (MIN 2 VIEWS)   Final Result   No acute abnormality. Suspect calcific tendonitis/bursitis. XR ANKLE LEFT (MIN 3 VIEWS)   Final Result   No acute abnormality of the ankle. Diffuse soft tissue swelling. XR ANKLE RIGHT (MIN 3 VIEWS)   Final Result   Soft tissue swelling without acute fracture or dislocation. CT HEAD WO CONTRAST   Final Result   No acute intracranial abnormality. Chronic microvascular disease, encephalomalacia visualized at the location of   the previously visualized parenchymal hematoma in the right occipital lobe. No acute osseous abnormality of the cervical spine. Degenerative changes of the cervical spine. CT CERVICAL SPINE WO CONTRAST   Final Result   No acute intracranial abnormality. Chronic microvascular disease, encephalomalacia visualized at the location of   the previously visualized parenchymal hematoma in the right occipital lobe. No acute osseous abnormality of the cervical spine. Degenerative changes of the cervical spine.             ------------------------- NURSING NOTES AND VITALS REVIEWED ---------------------------  Date / Time Roomed:  11/9/2022 11:27 AM  ED Bed Assignment:  [facility] [id number]    The nursing notes within the ED encounter and vital signs as below have been reviewed. BP (!) 185/45   Pulse 62   Temp 97.7 °F (36.5 °C) (Oral)   Resp 16   Ht 4' 11\" (1.499 m)   Wt 180 lb (81.6 kg)   LMP 07/24/2014   BMI 36.36 kg/m²   Oxygen Saturation Interpretation: Normal      ------------------------------------------ PROGRESS NOTES ------------------------------------------  2:43 PM EST  I have spoken with the patient and discussed todays results, in addition to providing specific details for the plan of care and counseling regarding the diagnosis and prognosis. Their questions are answered at this time and they are agreeable with the plan.  I discussed at length with them reasons for immediate return here for re evaluation. They will followup with their primary care physician by calling their office tomorrow. --------------------------------- ADDITIONAL PROVIDER NOTES ---------------------------------  At this time the patient is without objective evidence of an acute process requiring hospitalization or inpatient management. They have remained hemodynamically stable throughout their entire ED visit and are stable for discharge with outpatient follow-up. The plan has been discussed in detail and they are aware of the specific conditions for emergent return, as well as the importance of follow-up. New Prescriptions    No medications on file       Diagnosis:  1. Fall, initial encounter        Disposition:  Patient's disposition: Discharge to home  Patient's condition is stable. Patient was seen and evaluated by myself and my attending Charmaine Wallace DO. Assessment and Plan discussed with attending provider, please see attestation for final plan of care.      DO Christine Tierney Seen, DO  Resident  11/09/22 [id number]

## 2022-11-09 NOTE — DISCHARGE INSTRUCTIONS
Follow-up with primary care doctor  If you notice any new worrisome symptoms please return to emergency department for evaluation

## 2022-11-16 ENCOUNTER — CARE COORDINATION (OUTPATIENT)
Dept: CARE COORDINATION | Age: 82
End: 2022-11-16

## 2022-11-16 ENCOUNTER — SCHEDULED TELEPHONE ENCOUNTER (OUTPATIENT)
Dept: NEUROLOGY | Age: 82
End: 2022-11-16
Payer: MEDICARE

## 2022-11-16 DIAGNOSIS — I63.9 ISCHEMIC STROKE (HCC): Primary | ICD-10-CM

## 2022-11-16 DIAGNOSIS — I60.9 SUBARACHNOID HEMORRHAGE (HCC): ICD-10-CM

## 2022-11-16 PROCEDURE — 99442 PR PHYS/QHP TELEPHONE EVALUATION 11-20 MIN: CPT | Performed by: CLINICAL NURSE SPECIALIST

## 2022-11-16 NOTE — CARE COORDINATION
Ambulatory Care Coordination Note  11/16/2022    ACC: Dalila Medina, NICOLE      ACSTEPHANE contacted [facility] to invite her to join care coordination and she is in agreement. Medications were reviewed and she states she had a tele visit with neurology today and her Cymbalta may be increased. She reports she lives alone and uses a walker or quad cane with ambulation. She reports minor swelling in her legs. She reports she is active with [facility] who comes every other week to assist with her medications and check her vital signs. Future appointments were reviewed. PLAN  Complete enrollment with next interaction. Continue to follow for care coordination. Offered patient enrollment in the Remote Patient Monitoring (RPM) program for in-home monitoring: Yes, but did not enroll at this time. Ambulatory Care Coordination Assessment    Care Coordination Protocol  Referral from Primary Care Provider: No  Week 1 - Initial Assessment     How often do you have trouble taking your medications the way you have been told to take them?: I always take them as prescribed. Do you have Home O2 Therapy?: No      Ability to seek help/take action for Emergent Urgent situations i.e. fire, crime, inclement weather or health crisis. : Independent  Ability to ambulate to restroom: Independent  Ability handle personal hygeine needs (bathing/dressing/grooming): Independent  Ability to manage Medications: Needs Assistance  Is patient able to live independently?: Yes     Current Housing: Apartment        Per the Fall Risk Screening, did the patient have 2 or more falls or 1 fall with injury in the past year?: Yes  How often do you think you are about to fall and you do NOT fall?  For example, you grab something to stabilize yourself or hold onto a wall/furniture?: Rarely  Use of a Mobility Aid: Yes  Difficulty walking/impaired gait: Yes  Issues with feet or shoes like numbness, edema, shoes not fitting: No  Changes in vision, poor vision or poor lighting in environment: Yes  Dizziness: Yes  Other Fall Risk: No     Frequent urination at night?: No  Do you use rails/bars?: No  Do you have a non-slip tub mat?: Yes        Suggested Interventions and Community Resources  Fall Risk Prevention: Not Started Home Health Services: Completed (Comment: [facility])   Medi Set or Pill Pack: Not Started   Senior Services: Not Started                  Prior to Admission medications    Medication Sig Start Date End Date Taking?  Authorizing Provider   donepezil (ARICEPT) 5 MG tablet TAKE ONE-HALF TABLET BY MOUTH ONE TIME DAILY with breakfast 9/30/22  Yes Historical Provider, MD   ondansetron (ZOFRAN) 4 MG tablet Take 1 tablet by mouth 3 times daily as needed for Nausea or Vomiting 10/17/22  Yes Garrett Boyd, DO   acetaminophen (TYLENOL) 325 MG tablet Take 2 tablets by mouth every 6 hours as needed for Pain 10/14/22  Yes Mayi Jara, DO   potassium chloride (KLOR-CON M) 20 MEQ extended release tablet Take 1 tablet by mouth daily 10/14/22  Yes Mayi Jara, DO   atorvastatin (LIPITOR) 40 MG tablet Take 40 mg by mouth nightly   Yes Historical Provider, MD   docusate sodium (COLACE) 100 MG capsule Take 100 mg by mouth 2 times daily   Yes Historical Provider, MD   furosemide (LASIX) 20 MG tablet Take 20 mg by mouth daily   Yes Historical Provider, MD   levothyroxine (SYNTHROID) 88 MCG tablet Take 88 mcg by mouth Daily   Yes Historical Provider, MD   losartan (COZAAR) 100 MG tablet Take 100 mg by mouth daily   Yes Historical Provider, MD   metoprolol succinate (TOPROL XL) 25 MG extended release tablet Take 25 mg by mouth daily   Yes Historical Provider, MD   pantoprazole (PROTONIX) 40 MG tablet Take 40 mg by mouth daily   Yes Historical Provider, MD   DULoxetine (CYMBALTA) 30 MG extended release capsule Take 1 capsule by mouth daily 9/29/22  Yes Daja Body, DO   amLODIPine (NORVASC) 10 MG tablet Take 1 tablet by mouth daily 8/22/22  Yes Pérez Arredondo Garrett Boyd, DO   vitamin C (ASCORBIC ACID) 500 MG tablet Take 500 mg by mouth daily 5/13/22  Yes Historical Provider, MD   Vitamin D, Cholecalciferol, 25 MCG (1000 UT) TABS Take 1,000 Units by mouth daily 5/13/22  Yes Historical Provider, MD   cetirizine (ZYRTEC) 10 MG tablet Take 10 mg by mouth daily as needed for Allergies   Yes Historical Provider, MD   Multiple Vitamins-Minerals (PRESERVISION AREDS 2) CAPS Take 1 capsule by mouth 2 times daily   Yes Historical Provider, MD   levETIRAcetam (KEPPRA) 500 MG tablet Take by mouth  Patient not taking: Reported on 11/16/2022 8/24/22   Historical Provider, MD   lidocaine (LIDODERM) 5 % Place 1 patch onto the skin daily 12 hours on, 12 hours off.   Patient not taking: Reported on 11/16/2022 10/20/22 11/19/22  Arvinbola Boyd, DO       Future Appointments   Date Time Provider Tay Singh   12/15/2022 10:15 AM Arvin Boyd, DO [facility]      and   General Assessment    Do you have any symptoms that are causing concern?: No
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
## 2022-11-24 NOTE — DISCHARGE SUMMARY
[address]                  [address] [facility],  [facility]                               DISCHARGE SUMMARY    PATIENT NAME: Kathi Gracia                    :        1940  MED REC NO:   [id number]                            ROOM:       6419  ACCOUNT NO:   [de-identified]                           ADMIT DATE: 10/07/2022  PROVIDER:     Davion Madden DO                  DISCHARGE DATE:  10/14/2022    DISCHARGE DIAGNOSES:  1. Acute delirium. 2.  Intracerebral hemorrhage. 3.  History of CVA. 4.  Anxiety. 5.  Hypertension. HISTORY OF PRESENT ILLNESS AND HOSPITAL COURSE:  The patient is an  23-year-old female, who presented to the emergency room with dizziness  and confusion. Diagnostic evaluation revealed intracerebral hemorrhage. She was seen by Neurology as well as Neurosurgery. No neurosurgical  intervention was recommended. Her status stabilized and she was  eventually discharged to home in stable condition on 10/14/2022. DISCHARGE MEDICATIONS:  As per discharge med rec which include;  1. Tylenol p.r.n.  2.  Amlodipine. 3.  Atorvastatin. 4.  Zyrtec p.r.n.  5.  Colace. 6.  Cymbalta. 7.  Furosemide. 8.  Levothyroxine. 9.  Losartan. 10.  Metoprolol succinate. 11.  Protonix. 12.  Potassium chloride. 13.  PreserVision vitamins. 14.  Vitamin C.  15.  Vitamin D.    DISCHARGE INSTRUCTIONS:  The patient instructed to follow up with Dr. Jayden Arenas, call office for appointment.         Joslyn Matute DO    D: 2022 17:23:57       T: 2022 17:26:11     [email or web address]  Job#: [id number]     Doc#: [id number]    CC:

## 2022-11-25 ENCOUNTER — CARE COORDINATION (OUTPATIENT)
Dept: CARE COORDINATION | Age: 82
End: 2022-11-25

## 2022-11-25 NOTE — CARE COORDINATION
ACSTEPHANE contacted Rachele Meyer to complete enrollment in care coordination. She states she is preparing to go to dinner with her sister and requested a call back at a later time. PLAN  Complete enrollment with next interaction. Continue to follow for care coordination.

## 2022-12-02 ENCOUNTER — CARE COORDINATION (OUTPATIENT)
Dept: CARE COORDINATION | Age: 82
End: 2022-12-02

## 2022-12-02 NOTE — CARE COORDINATION
AARTI contacted Flori Mccloud for care coordination follow up. She informed AARTI that she will be moving to [facility] tomorrow. She states she will not have a cell phone. AARTI will resolve episode and place in 90 day exclusion.

## 2022-12-15 ENCOUNTER — OFFICE VISIT (OUTPATIENT)
Dept: FAMILY MEDICINE CLINIC | Age: 82
End: 2022-12-15
Payer: MEDICARE

## 2022-12-15 VITALS
HEART RATE: 58 BPM | TEMPERATURE: 97.3 F | OXYGEN SATURATION: 98 % | BODY MASS INDEX: 38.38 KG/M2 | DIASTOLIC BLOOD PRESSURE: 69 MMHG | HEIGHT: 59 IN | RESPIRATION RATE: 18 BRPM | WEIGHT: 190.4 LBS | SYSTOLIC BLOOD PRESSURE: 135 MMHG

## 2022-12-15 DIAGNOSIS — Z00.00 MEDICARE ANNUAL WELLNESS VISIT, SUBSEQUENT: Primary | ICD-10-CM

## 2022-12-15 PROCEDURE — G0439 PPPS, SUBSEQ VISIT: HCPCS | Performed by: FAMILY MEDICINE

## 2022-12-15 PROCEDURE — 1124F ACP DISCUSS-NO DSCNMKR DOCD: CPT | Performed by: FAMILY MEDICINE

## 2022-12-15 PROCEDURE — 3078F DIAST BP <80 MM HG: CPT | Performed by: FAMILY MEDICINE

## 2022-12-15 PROCEDURE — 3074F SYST BP LT 130 MM HG: CPT | Performed by: FAMILY MEDICINE

## 2022-12-15 PROCEDURE — G8484 FLU IMMUNIZE NO ADMIN: HCPCS | Performed by: FAMILY MEDICINE

## 2022-12-15 RX ORDER — PANTOPRAZOLE SODIUM 40 MG/1
40 TABLET, DELAYED RELEASE ORAL DAILY
Qty: 90 TABLET | Refills: 3 | Status: SHIPPED | OUTPATIENT
Start: 2022-12-15

## 2022-12-15 ASSESSMENT — PATIENT HEALTH QUESTIONNAIRE - PHQ9
3. TROUBLE FALLING OR STAYING ASLEEP: 1
SUM OF ALL RESPONSES TO PHQ9 QUESTIONS 1 & 2: 2
2. FEELING DOWN, DEPRESSED OR HOPELESS: 1
8. MOVING OR SPEAKING SO SLOWLY THAT OTHER PEOPLE COULD HAVE NOTICED. OR THE OPPOSITE, BEING SO FIGETY OR RESTLESS THAT YOU HAVE BEEN MOVING AROUND A LOT MORE THAN USUAL: 1
2. FEELING DOWN, DEPRESSED OR HOPELESS: 1
SUM OF ALL RESPONSES TO PHQ QUESTIONS 1-9: 2
SUM OF ALL RESPONSES TO PHQ QUESTIONS 1-9: 8
4. FEELING TIRED OR HAVING LITTLE ENERGY: 1
SUM OF ALL RESPONSES TO PHQ QUESTIONS 1-9: 7
9. THOUGHTS THAT YOU WOULD BE BETTER OFF DEAD, OR OF HURTING YOURSELF: 1
SUM OF ALL RESPONSES TO PHQ QUESTIONS 1-9: 8
SUM OF ALL RESPONSES TO PHQ QUESTIONS 1-9: 8
SUM OF ALL RESPONSES TO PHQ QUESTIONS 1-9: 2
1. LITTLE INTEREST OR PLEASURE IN DOING THINGS: 1
SUM OF ALL RESPONSES TO PHQ QUESTIONS 1-9: 2
5. POOR APPETITE OR OVEREATING: 1
6. FEELING BAD ABOUT YOURSELF - OR THAT YOU ARE A FAILURE OR HAVE LET YOURSELF OR YOUR FAMILY DOWN: 1
7. TROUBLE CONCENTRATING ON THINGS, SUCH AS READING THE NEWSPAPER OR WATCHING TELEVISION: 1
10. IF YOU CHECKED OFF ANY PROBLEMS, HOW DIFFICULT HAVE THESE PROBLEMS MADE IT FOR YOU TO DO YOUR WORK, TAKE CARE OF THINGS AT HOME, OR GET ALONG WITH OTHER PEOPLE: 1
SUM OF ALL RESPONSES TO PHQ QUESTIONS 1-9: 2

## 2022-12-15 ASSESSMENT — COLUMBIA-SUICIDE SEVERITY RATING SCALE - C-SSRS
1. WITHIN THE PAST MONTH, HAVE YOU WISHED YOU WERE DEAD OR WISHED YOU COULD GO TO SLEEP AND NOT WAKE UP?: NO
2. HAVE YOU ACTUALLY HAD ANY THOUGHTS OF KILLING YOURSELF?: NO
6. HAVE YOU EVER DONE ANYTHING, STARTED TO DO ANYTHING, OR PREPARED TO DO ANYTHING TO END YOUR LIFE?: NO

## 2022-12-15 ASSESSMENT — LIFESTYLE VARIABLES
HOW OFTEN DO YOU HAVE A DRINK CONTAINING ALCOHOL: NEVER
HOW MANY STANDARD DRINKS CONTAINING ALCOHOL DO YOU HAVE ON A TYPICAL DAY: PATIENT DOES NOT DRINK

## 2022-12-15 NOTE — PROGRESS NOTES
Medicare Annual Wellness Visit    Esther Smith is here for Medicare AWV (Pt would like to discuss her stomach issues. States she would like to have her rx printed, so the assisted living can fill her rx, unsure of the pharmacy for the facility. States she is concerned about her gaining weight. States she is having difficulty having a BM, states she has at least 1 a week. HM reviewed. No recent labs, pt is not fasting at this time. )    Assessment & Plan   Medicare annual wellness visit, subsequent      Recommendations for Preventive Services Due: see orders and patient instructions/AVS.  Recommended screening schedule for the next 5-10 years is provided to the patient in written form: see Patient Instructions/AVS.     Return for Medicare Annual Wellness Visit in 1 year. Subjective   The following acute and/or chronic problems were also addressed today:    Patient's complete Health Risk Assessment and screening values have been reviewed and are found in Flowsheets. The following problems were reviewed today and where indicated follow up appointments were made and/or referrals ordered.     Positive Risk Factor Screenings with Interventions:        Depression:  PHQ-2 Score: 2  PHQ-9 Total Score: 8    Interpretation:   1-4 = minimal  5-9 = mild  10-14 = moderate  15-19 = moderately severe  20-27 = severe  Interventions:  Life style changes to improve mood reviewed  See AVS for additional education material  See A/P for any pertinent orders          General HRA Questions:  Select all that apply: (!) New or Increased Pain    Pain Interventions:  See AVS for additional education material  See A/P for plan and any pertinent orders       Weight and Activity:  Physical Activity: Insufficiently Active    Days of Exercise per Week: 1 day    Minutes of Exercise per Session: 30 min     On average, how many days per week do you engage in moderate to strenuous exercise (like a brisk walk)?: 1 day  Have you lost any weight without trying in the past 3 months?: No  Body mass index: (!) 38.45    Obesity Interventions:  Patient declines any further evaluation or treatment          Dentist Screen:  Have you seen the dentist within the past year?: (!) No    Intervention:  encouraged  See AVS for additional education material  See A/P for any pertinent orders     Vision Screen:  Do you have difficulty driving, watching TV, or doing any of your daily activities because of your eyesight?: (!) Yes  Have you had an eye exam within the past year?: Yes  No results found. Interventions:   Patient encouraged to make appointment with their eye specialist  See AVS for additional education material  See A/P for any pertinent orders     ADL's:   Patient reports needing help with:  Select all that apply: (!) Transportation  Interventions:  See AVS for additional education material  See A/P for plan and any pertinent orders    Advanced Directives:  Do you have a Living Will?: (!) No    Intervention:  has an advanced directive - a copy HAS NOT been provided. Objective   Vitals:    12/15/22 1037 12/15/22 1046   BP: (!) 145/73 135/69   Pulse: 58    Resp: 18    Temp: 97.3 °F (36.3 °C)    SpO2: 98%    Weight: 190 lb 6.4 oz (86.4 kg)    Height: 4' 11\" (1.499 m)       Body mass index is 38.46 kg/m².         General Appearance: alert and oriented to person, place and time, well developed and well- nourished, in no acute distress  Skin: warm and dry, no rash or erythema  Head: normocephalic and atraumatic  Eyes: pupils equal, round, and reactive to light, extraocular eye movements intact, conjunctivae normal  ENT: tympanic membrane, external ear and ear canal normal bilaterally, nose without deformity, nasal mucosa and turbinates normal without polyps  Neck: supple and non-tender without mass, no thyromegaly or thyroid nodules, no cervical lymphadenopathy  Pulmonary/Chest: clear to auscultation bilaterally- no wheezes, rales or rhonchi, normal air movement, no respiratory distress  Cardiovascular: normal rate, regular rhythm, normal S1 and S2, no murmurs, rubs, clicks, or gallops, distal pulses intact, no carotid bruits  Abdomen: soft, non-tender, non-distended, normal bowel sounds, no masses or organomegaly  Extremities: no cyanosis, clubbing or edema  Musculoskeletal: normal range of motion, no joint swelling, deformity or tenderness  Neurologic: reflexes normal and symmetric, no cranial nerve deficit, gait, coordination and speech normal       Allergies   Allergen Reactions    Iodine Shortness Of Breath, Swelling and Rash    Bee Pollen Swelling    Bee Venom     Codeine      Patient cannot remember reaction    Hydralazine Itching, Swelling and Other (See Comments)     Hot flashes    Oxycodone-Aspirin      unknown    Phenergan [Promethazine Hcl] Hallucinations    Sulfa Antibiotics Other (See Comments) and Swelling    Amoxicillin-Pot Clavulanate Nausea And Vomiting    Aspirin Nausea And Vomiting    Darvocet [Propoxyphene N-Acetaminophen] Nausea And Vomiting    Influenza Vaccines Nausea And Vomiting    Percodan [Oxycodone-Aspirin] Nausea And Vomiting and Other (See Comments)     sick    Percodan [Oxycodone-Aspirin] Nausea And Vomiting     Prior to Visit Medications    Medication Sig Taking?  Authorizing Provider   donepezil (ARICEPT) 5 MG tablet TAKE ONE-HALF TABLET BY MOUTH ONE TIME DAILY with breakfast Yes Historical Provider, MD   ondansetron (ZOFRAN) 4 MG tablet Take 1 tablet by mouth 3 times daily as needed for Nausea or Vomiting Yes Daja Boyd, DO   acetaminophen (TYLENOL) 325 MG tablet Take 2 tablets by mouth every 6 hours as needed for Pain Yes Luis Rosas, DO   potassium chloride (KLOR-CON M) 20 MEQ extended release tablet Take 1 tablet by mouth daily Yes [facility] Paige, DO   atorvastatin (LIPITOR) 40 MG tablet Take 40 mg by mouth nightly Yes Historical Provider, MD   docusate sodium (COLACE) 100 MG capsule Take 100 mg by mouth 2 times daily Yes Historical Provider, MD   furosemide (LASIX) 20 MG tablet Take 20 mg by mouth daily Yes Historical Provider, MD   levothyroxine (SYNTHROID) 88 MCG tablet Take 88 mcg by mouth Daily Yes Historical Provider, MD   losartan (COZAAR) 100 MG tablet Take 100 mg by mouth daily Yes Historical Provider, MD   metoprolol succinate (TOPROL XL) 25 MG extended release tablet Take 25 mg by mouth daily Yes Historical Provider, MD   pantoprazole (PROTONIX) 40 MG tablet Take 40 mg by mouth daily Yes Historical Provider, MD   DULoxetine (CYMBALTA) 30 MG extended release capsule Take 1 capsule by mouth daily Yes Daja Boyd DO   amLODIPine (NORVASC) 10 MG tablet Take 1 tablet by mouth daily Yes Daja Boyd DO   vitamin C (ASCORBIC ACID) 500 MG tablet Take 500 mg by mouth daily Yes Historical Provider, MD   Vitamin D, Cholecalciferol, 25 MCG (1000 UT) TABS Take 1,000 Units by mouth daily Yes Historical Provider, MD   cetirizine (ZYRTEC) 10 MG tablet Take 10 mg by mouth daily as needed for Allergies Yes Historical Provider, MD   Multiple Vitamins-Minerals (PRESERVISION AREDS 2) CAPS Take 1 capsule by mouth 2 times daily Yes Historical Provider, MD Godinez (Including outside providers/suppliers regularly involved in providing care):   Patient Care Team:  Lyman Severs, DO as PCP - General (Family Medicine)  Caroline Boyd DO as PCP - [facility] Empaneled Provider  Kiah Batres MD as Consulting Physician (Electrophysiology)  Kash Torres RN as Ambulatory Care Manager     Reviewed and updated this visit:  Allergies  Meds  Problems

## 2022-12-15 NOTE — PATIENT INSTRUCTIONS
Learning About Mindfulness for Stress  What are mindfulness and stress? Stress is what you feel when you have to handle more than you are used to. A lot of things can cause stress. You may feel stress when you go on a job interview, take a test, or run a race. This kind of short-term stress is normal and even useful. It can help you if you need to work hard or react quickly. Stress also can last a long time. Long-term stress is caused by stressful situations or events. Examples of long-term stress include long-term health problems, ongoing problems at work, and conflicts in your family. Long-term stress can harm your health. Mindfulness is a focus only on things happening in the present moment. It's a process of purposefully paying attention to and being aware of your surroundings, your emotions, your thoughts, and how your body feels. You are aware of these things, but you aren't judging these experiences as \"good\" or \"bad. \" Mindfulness can help you learn to calm your mind and body to help you cope with illness, pain, and stress. How does mindfulness help to relieve stress? Mindfulness can help quiet your mind and relax your body. Studies show that it can help some people sleep better, feel less anxious, and bring their blood pressure down. And it's been shown to help some people live and cope better with certain health problems like heart disease, depression, chronic pain, and cancer. How do you practice mindfulness? To be mindful is to pay attention, to be present, and to be accepting. When you're mindful, you do just one thing and you pay close attention to that one thing. For example, you may sit quietly and notice your emotions or how your food tastes and smells. When you're present, you focus on the things that are happening right now. You let go of your thoughts about the past and the future. When you dwell on the past or the future, you miss moments that can heal and strengthen you.  You may miss moments like hearing a child laugh or seeing a friendly face when you think you're all alone. When you're accepting, you don't  the present moment. Instead you accept your thoughts and feelings as they come. You can practice anytime, anywhere, and in any way you choose. You can practice in many ways. Here are a few ideas:  While doing your chores, like washing the dishes, let your mind focus on what's in your hand. What does the dish feel like? Is the water warm or cold? Go outside and take a few deep breaths. What is the air like? Is it warm or cold? When you can, take some time at the start of your day to sit alone and think. Take a slow walk by yourself. Count your steps while you breathe in and out. Try yoga breathing exercises, stretches, and poses to strengthen and relax your muscles. At work, if you can, try to stop for a few moments each hour. Note how your body feels. Let yourself regroup and let your mind settle before you return to what you were doing. If you struggle with anxiety or \"worry thoughts,\" imagine your mind as a blue abiola and your worry thoughts as clouds. Now imagine those worry thoughts floating across your mind's abiola. Just let them pass by as you watch. Follow-up care is a key part of your treatment and safety. Be sure to make and go to all appointments, and call your doctor if you are having problems. It's also a good idea to know your test results and keep a list of the medicines you take. Where can you learn more? Go to http://www.garcia.com/ and enter M676 to learn more about \"Learning About Mindfulness for Stress. \"  Current as of: February 9, 2022               Content Version: 13.5  © 2006-2022 Healthwise, Incorporated. Care instructions adapted under license by [facility] ([facility]).  If you have questions about a medical condition or this instruction, always ask your healthcare professional. [address] any warranty or liability for your use of this information. Learning About Dental Care for Older Adults  Dental care for older adults: Overview  Dental care for older people is much the same as for younger adults. But older adults do have concerns that younger adults do not. Older adults may have problems with gum disease and decay on the roots of their teeth. They may need missing teeth replaced or broken fillings fixed. Or they may have dentures that need to be cared for. Some older adults may have trouble holding a toothbrush. You can help remind the person you are caring for to brush and floss their teeth or to clean their dentures. In some cases, you may need to do the brushing and other dental care tasks. People who have trouble using their hands or who have dementia may need this extra help. How can you help with dental care? Normal dental care  To keep the teeth and gums healthy:  Brush the teeth with fluoride toothpaste twice a day--in the morning and at night--and floss at least once a day. Plaque can quickly build up on the teeth of older adults. Watch for the signs of gum disease. These signs include gums that bleed after brushing or after eating hard foods, such as apples. See a dentist regularly. Many experts recommend checkups every 6 months. Keep the dentist up to date on any new medications the person is taking. Encourage a balanced diet that includes whole grains, vegetables, and fruits, and that is low in saturated fat and sodium. Encourage the person you're caring for not to use tobacco products. They can affect dental and general health. Many older adults have a fixed income and feel that they can't afford dental care. But most [facility] and [facility] have programs in which dentists help older adults by lowering fees. Contact your area's public health offices or  for information about dental care in your area.   Using a toothbrush  Older adults with arthritis sometimes have trouble brushing their teeth because they can't easily hold the toothbrush. Their hands and fingers may be stiff, painful, or weak. If this is the case, you can: Offer an electric toothbrush. Enlarge the handle of a non-electric toothbrush by wrapping a sponge, an elastic bandage, or adhesive tape around it. Push the toothbrush handle through a ball made of rubber or soft foam.  Make the handle longer and thicker by taping Popsicle sticks or tongue depressors to it. You may also be able to buy special toothbrushes, toothpaste dispensers, and floss holders. Your doctor may recommend a soft-bristle toothbrush if the person you care for bleeds easily. Bleeding can happen because of a health problem or from certain medicines. A toothpaste for sensitive teeth may help if the person you care for has sensitive teeth. How do you brush and floss someone's teeth? If the person you are caring for has a hard time cleaning their teeth on their own, you may need to brush and floss their teeth for them. It may be easiest to have the person sit and face away from you, and to sit or stand behind them. That way you can steady their head against your arm as you reach around to floss and brush their teeth. Choose a place that has good lighting and is comfortable for both of you. Before you begin, gather your supplies. You will need gloves, floss, a toothbrush, and a container to hold water if you are not near a sink. Wash and dry your hands well and put on gloves. Start by flossing:  Gently work a piece of floss between each of the teeth toward the gums. A plastic flossing tool may make this easier, and they are available at most [facility]. Curve the floss around each tooth into a U-shape and gently slide it under the gum line. Move the floss firmly up and down several times to scrape off the plaque. After you've finished flossing, throw away the used floss and begin brushing:  Wet the brush and apply toothpaste.   Place the brush at a 45-degree vision test is done to check for color blindness. Color vision is often tested as part of a routine exam. You may also have this test when you apply for a job where recognizing different colors is important, such as , electronics, or the [employer]. How are vision tests done? Visual acuity test   You cover one eye at a time. You read aloud from a wall chart across the room. You read aloud from a small card that you hold in your hand. Refraction   You look into a special device. The device puts lenses of different strengths in front of each eye to see how strong your glasses or contact lenses need to be. Visual field tests   Your doctor may have you look through special machines. Or your doctor may simply have you stare straight ahead while they move a finger into and out of your field of vision. Color vision test   You look at pieces of printed test patterns in various colors. You say what number or symbol you see. Your doctor may have you trace the number or symbol using a pointer. How do these tests feel? There is very little chance of having a problem from this test. If dilating drops are used for a vision test, they may make the eyes sting and cause a medicine taste in the mouth. Follow-up care is a key part of your treatment and safety. Be sure to make and go to all appointments, and call your doctor if you are having problems. It's also a good idea to know your test results and keep a list of the medicines you take. Where can you learn more? Go to http://www.garcia.com/ and enter G551 to learn more about \"Learning About Vision Tests. \"  Current as of: October 12, 2022               Content Version: 13.5  © 1060-4496 Healthwise, Incorporated. Care instructions adapted under license by [facility] ([facility]).  If you have questions about a medical condition or this instruction, always ask your healthcare professional. [place] [address] any warranty or liability for your use of this information. Learning About Activities of Daily Living  What are activities of daily living? Activities of daily living (ADLs) are the basic self-care tasks you do every day. As you age, and if you have health problems, you may find that it's harder to do these things for yourself. That's when you may need some help. Your doctor uses ADLs to measure how much help you need. Knowing what you can and can't do for yourself is an important first step to getting help. And when you have the help you need, you can stay as independent as possible. Your doctor will want to know if you are able to do tasks such as: Take a bath or shower without help. Go to the bathroom by yourself. Dress and undress without help. Shave, comb your hair, and brush teeth on your own. Get in and out of bed or a chair without help. Feed yourself without help. If you are having trouble doing basic self-care tasks, talk with your doctor. You may want to bring a caregiver or family member who can help the doctor understand your needs and abilities. How will a doctor assess your ADLs? Asking about ADLs is part of a routine health checkup your doctor will likely do as you age. Your health check might be done in a doctor's office, in your home, or at a hospital. The goal is to find out if you are having any problems that could make your health problems worse or that make it unsafe for you to be on your own. To measure your ADLs, your doctor will ask how hard it is for you to do routine tasks. He or she may also want to know if you have changed the way you do a task because of a health problem. He or she may watch how you:  Walk back and forth. Keep your balance while you stand or walk. Move from sitting to standing or from a bed to a chair. Button or unbutton a shirt or sweater. Remove and put on your shoes.   It's normal to feel a little worried or anxious if you find you can't do all the things you used to be able to do. Talking with your doctor about ADLs isn't a test that you either pass or fail. It's just a way to get more information about your health and safety. Follow-up care is a key part of your treatment and safety. Be sure to make and go to all appointments, and call your doctor if you are having problems. It's also a good idea to know your test results and keep a list of the medicines you take. Current as of: October 6, 2021               Content Version: 13.5  © 2006-2022 [employer]. Care instructions adapted under license by [facility] ([facility]). If you have questions about a medical condition or this instruction, always ask your healthcare professional. [address] any warranty or liability for your use of this information. Advance Directives: Care Instructions  Overview  An advance directive is a legal way to state your wishes at the end of your life. It tells your family and your doctor what to do if you can't say what you want. There are two main types of advance directives. You can change them any time your wishes change. Living will. This form tells your family and your doctor your wishes about life support and other treatment. The form is also called a declaration. Medical power of . This form lets you name a person to make treatment decisions for you when you can't speak for yourself. This person is called a health care agent (health care proxy, health care surrogate). The form is also called a durable power of  for health care. If you do not have an advance directive, decisions about your medical care may be made by a family member, or by a doctor or a  who doesn't know you. It may help to think of an advance directive as a gift to the people who care for you. If you have one, they won't have to make tough decisions by themselves.   For more information, including forms for your state, see the [employer] website (www.caringinfo.org/planning/advance-directives/). Follow-up care is a key part of your treatment and safety. Be sure to make and go to all appointments, and call your doctor if you are having problems. It's also a good idea to know your test results and keep a list of the medicines you take. What should you include in an advance directive? Many states have a unique advance directive form. (It may ask you to address specific issues.) Or you might use a universal form that's approved by many states. If your form doesn't tell you what to address, it may be hard to know what to include in your advance directive. Use the questions below to help you get started. Who do you want to make decisions about your medical care if you are not able to? What life-support measures do you want if you have a serious illness that gets worse over time or can't be cured? What are you most afraid of that might happen? (Maybe you're afraid of having pain, losing your independence, or being kept alive by machines.)  Where would you prefer to die? (Your home? A hospital? A nursing home?)  Do you want to donate your organs when you die? Do you want certain Moravian practices performed before you die? When should you call for help? Be sure to contact your doctor if you have any questions. Where can you learn more? Go to http://www.garcia.com/ and enter R264 to learn more about \"Advance Directives: Care Instructions. \"  Current as of: June 16, 2022               Content Version: 13.5  © 5964-4864 Healthwise, Incorporated. Care instructions adapted under license by [facility] ([facility]). If you have questions about a medical condition or this instruction, always ask your healthcare professional. [place] [address] any warranty or liability for your use of this information. Personalized Preventive Plan for Pierce Napier - 12/15/2022  Medicare offers a range of preventive health benefits.  Some of the tests and screenings are paid in full while other may be subject to a deductible, co-insurance, and/or copay. Some of these benefits include a comprehensive review of your medical history including lifestyle, illnesses that may run in your family, and various assessments and screenings as appropriate. After reviewing your medical record and screening and assessments performed today your provider may have ordered immunizations, labs, imaging, and/or referrals for you. A list of these orders (if applicable) as well as your Preventive Care list are included within your After Visit Summary for your review. Other Preventive Recommendations:    A preventive eye exam performed by an eye specialist is recommended every 1-2 years to screen for glaucoma; cataracts, macular degeneration, and other eye disorders. A preventive dental visit is recommended every 6 months. Try to get at least 150 minutes of exercise per week or 10,000 steps per day on a pedometer . Order or download the FREE \"Exercise & Physical Activity: Your Everyday Guide\" from [employer]. Call 6-725.381.7133 or search [employer] online. You need 1368-0496 mg of calcium and 4055-6158 IU of vitamin D per day. It is possible to meet your calcium requirement with diet alone, but a vitamin D supplement is usually necessary to meet this goal.  When exposed to the sun, use a sunscreen that protects against both UVA and UVB radiation with an SPF of 30 or greater. Reapply every 2 to 3 hours or after sweating, drying off with a towel, or swimming. Always wear a seat belt when traveling in a car. Always wear a helmet when riding a bicycle or motorcycle.

## 2023-01-06 ENCOUNTER — TELEPHONE (OUTPATIENT)
Dept: FAMILY MEDICINE CLINIC | Age: 83
End: 2023-01-06

## 2023-01-06 NOTE — TELEPHONE ENCOUNTER
Mary Anne Maroon called this morning stating that Caryle Cedar has a sinus infection and full ears and she thinks she also has neuropathy-when do you want to see her?

## 2023-01-17 ENCOUNTER — OFFICE VISIT (OUTPATIENT)
Dept: FAMILY MEDICINE CLINIC | Age: 83
End: 2023-01-17
Payer: COMMERCIAL

## 2023-01-17 VITALS
TEMPERATURE: 97.1 F | DIASTOLIC BLOOD PRESSURE: 68 MMHG | HEART RATE: 63 BPM | SYSTOLIC BLOOD PRESSURE: 126 MMHG | RESPIRATION RATE: 16 BRPM | OXYGEN SATURATION: 96 % | BODY MASS INDEX: 39.18 KG/M2 | WEIGHT: 194 LBS

## 2023-01-17 DIAGNOSIS — J40 SINOBRONCHITIS: Primary | ICD-10-CM

## 2023-01-17 DIAGNOSIS — E66.01 SEVERE OBESITY (BMI 35.0-39.9) WITH COMORBIDITY (HCC): ICD-10-CM

## 2023-01-17 DIAGNOSIS — J32.9 SINOBRONCHITIS: Primary | ICD-10-CM

## 2023-01-17 PROCEDURE — 99213 OFFICE O/P EST LOW 20 MIN: CPT | Performed by: FAMILY MEDICINE

## 2023-01-17 PROCEDURE — G8400 PT W/DXA NO RESULTS DOC: HCPCS | Performed by: FAMILY MEDICINE

## 2023-01-17 PROCEDURE — 1090F PRES/ABSN URINE INCON ASSESS: CPT | Performed by: FAMILY MEDICINE

## 2023-01-17 PROCEDURE — G8484 FLU IMMUNIZE NO ADMIN: HCPCS | Performed by: FAMILY MEDICINE

## 2023-01-17 PROCEDURE — 3078F DIAST BP <80 MM HG: CPT | Performed by: FAMILY MEDICINE

## 2023-01-17 PROCEDURE — 3074F SYST BP LT 130 MM HG: CPT | Performed by: FAMILY MEDICINE

## 2023-01-17 PROCEDURE — 1124F ACP DISCUSS-NO DSCNMKR DOCD: CPT | Performed by: FAMILY MEDICINE

## 2023-01-17 PROCEDURE — G8417 CALC BMI ABV UP PARAM F/U: HCPCS | Performed by: FAMILY MEDICINE

## 2023-01-17 PROCEDURE — 1036F TOBACCO NON-USER: CPT | Performed by: FAMILY MEDICINE

## 2023-01-17 PROCEDURE — G8427 DOCREV CUR MEDS BY ELIG CLIN: HCPCS | Performed by: FAMILY MEDICINE

## 2023-01-17 RX ORDER — BENZONATATE 200 MG/1
200 CAPSULE ORAL 3 TIMES DAILY PRN
Qty: 30 CAPSULE | Refills: 0 | Status: SHIPPED | OUTPATIENT
Start: 2023-01-17 | End: 2023-01-24

## 2023-01-17 RX ORDER — DOXYCYCLINE HYCLATE 100 MG
100 TABLET ORAL 2 TIMES DAILY
Qty: 20 TABLET | Refills: 0 | Status: SHIPPED | OUTPATIENT
Start: 2023-01-17 | End: 2023-01-27

## 2023-01-17 ASSESSMENT — PATIENT HEALTH QUESTIONNAIRE - PHQ9
5. POOR APPETITE OR OVEREATING: 0
6. FEELING BAD ABOUT YOURSELF - OR THAT YOU ARE A FAILURE OR HAVE LET YOURSELF OR YOUR FAMILY DOWN: 0
2. FEELING DOWN, DEPRESSED OR HOPELESS: 0
SUM OF ALL RESPONSES TO PHQ QUESTIONS 1-9: 0
SUM OF ALL RESPONSES TO PHQ QUESTIONS 1-9: 0
7. TROUBLE CONCENTRATING ON THINGS, SUCH AS READING THE NEWSPAPER OR WATCHING TELEVISION: 0
9. THOUGHTS THAT YOU WOULD BE BETTER OFF DEAD, OR OF HURTING YOURSELF: 0
4. FEELING TIRED OR HAVING LITTLE ENERGY: 0
10. IF YOU CHECKED OFF ANY PROBLEMS, HOW DIFFICULT HAVE THESE PROBLEMS MADE IT FOR YOU TO DO YOUR WORK, TAKE CARE OF THINGS AT HOME, OR GET ALONG WITH OTHER PEOPLE: 0
8. MOVING OR SPEAKING SO SLOWLY THAT OTHER PEOPLE COULD HAVE NOTICED. OR THE OPPOSITE, BEING SO FIGETY OR RESTLESS THAT YOU HAVE BEEN MOVING AROUND A LOT MORE THAN USUAL: 0
SUM OF ALL RESPONSES TO PHQ QUESTIONS 1-9: 0
SUM OF ALL RESPONSES TO PHQ9 QUESTIONS 1 & 2: 0
SUM OF ALL RESPONSES TO PHQ QUESTIONS 1-9: 0
3. TROUBLE FALLING OR STAYING ASLEEP: 0
1. LITTLE INTEREST OR PLEASURE IN DOING THINGS: 0

## 2023-01-17 NOTE — PROGRESS NOTES
1/17/2023    Chief Complaint   Patient presents with    Cough    Congestion     X 2 weeks       HPI    Nancy Brizuela is a 78 y.o. patient that presents today with cold symptoms. Upper Respiratory Infection:  Patient complains of congestion, cough, and sore throat. Onset of symptoms was many days ago, unchanged since that time. Patient's past medical, surgical, social and/or family history reviewed, updated in chart, and are non-contributory (unless otherwise stated). Medications and allergies also reviewed and updated in chart. ROS negative unless otherwise specified    Physical Exam  /68   Pulse 63   Temp 97.1 °F (36.2 °C)   Resp 16   Wt 194 lb (88 kg)   LMP 07/24/2014   SpO2 96%   BMI 39.18 kg/m²     Wt Readings from Last 3 Encounters:   01/17/23 194 lb (88 kg)   12/15/22 190 lb 6.4 oz (86.4 kg)   11/09/22 180 lb (81.6 kg)     BP Readings from Last 3 Encounters:   01/17/23 126/68   12/15/22 135/69   11/09/22 (!) 185/45         General appearance: alert, well appearing, and in no distress, oriented to person, place, and time and normal appearing weight. HEENT:  HEAD: Atraumatic, normocephalic  EYES: PERRL  EOM intact  EARS: bilateral TM's and external ear canals normal  NOSE: nasal mucosa, septum, turbinates normal bilaterally  MOUTH: mucous membranes moist and normal tonsils  NECK: supple, full range of motion, no mass, normal lymphadenopathy, no thyromegaly    CVS exam: normal rate, regular rhythm, normal S1, S2, no murmurs, rubs, clicks or gallops. Radial pulses 2+ bilateral.  PT/DP pulse 2+ bilat. No C/C/E    Chest: clear to auscultation, no wheezes, rales or rhonchi, symmetric air entry. SKIN: no lesions, jaundice, petechiae, pallor, cyanosis, ecchymosis        Assessment/Plan  Gregoria Prieto was seen today for cough and congestion. Diagnoses and all orders for this visit:    Sinobronchitis  -     doxycycline hyclate (VIBRA-TABS) 100 MG tablet;  Take 1 tablet by mouth 2 times daily for 10 days  -     benzonatate (TESSALON) 200 MG capsule; Take 1 capsule by mouth 3 times daily as needed for Cough    Severe obesity (BMI 35.0-39. 9) with comorbidity ([address]  - Body mass index is 39.18 kg/m². BMI was elevated today, and weight loss plan recommended is : conventional weight loss and daily exercise regimen. No follow-ups on file. Daja Peña, DO    Call or go to ED immediately if symptoms worsen or persist.    Educational materials and/or home exercises printed for patient's review and were included in patient instructions on his/her After Visit Summary and given to patient at the end of visit. Counseled regarding above diagnosis, including possible risks and complications,  especially if left uncontrolled. Counseled regarding the possible side effects, risks, benefits and alternatives to treatment; patient and/or guardian verbalizes understanding, agrees, feels comfortable with and wishes to proceed with above treatment plan. Advised patient to call with any new medication issues, and read all Rx info from pharmacy to assure aware of all possible risks and side effects of medication before taking. Reviewed age and gender appropriate health screening exams and vaccinations. Advised patient regarding importance of keeping up with recommended health maintenance and to schedule as soon as possible if overdue, as this is important in assessing for undiagnosed pathology, especially cancer, as well as protecting against potentially harmful/life threatening disease. Patient and/or guardian verbalizes understanding and agrees with above counseling, assessment and plan. All questions answered.

## 2023-03-01 ENCOUNTER — TELEPHONE (OUTPATIENT)
Dept: FAMILY MEDICINE CLINIC | Age: 83
End: 2023-03-01

## 2023-03-01 DIAGNOSIS — F41.9 ANXIETY: ICD-10-CM

## 2023-03-01 RX ORDER — LORAZEPAM 0.5 MG/1
0.5 TABLET ORAL 2 TIMES DAILY PRN
Qty: 30 TABLET | Refills: 2 | Status: SHIPPED | OUTPATIENT
Start: 2023-03-01 | End: 2023-03-31

## 2023-03-01 NOTE — TELEPHONE ENCOUNTER
Edwin Bejarano from facility called to check on refill for pt, told her it had been sent today. She asked for a copy of this order to be faxed to them, or an updated med list for pt chart.     Ph [phone]  Fax [phone]

## 2023-03-18 ENCOUNTER — APPOINTMENT (OUTPATIENT)
Dept: GENERAL RADIOLOGY | Age: 83
End: 2023-03-18
Payer: COMMERCIAL

## 2023-03-18 ENCOUNTER — APPOINTMENT (OUTPATIENT)
Dept: CT IMAGING | Age: 83
End: 2023-03-18
Payer: COMMERCIAL

## 2023-03-18 ENCOUNTER — HOSPITAL ENCOUNTER (EMERGENCY)
Age: 83
Discharge: HOME | End: 2023-03-18
Attending: STUDENT IN AN ORGANIZED HEALTH CARE EDUCATION/TRAINING PROGRAM
Payer: COMMERCIAL

## 2023-03-18 VITALS
SYSTOLIC BLOOD PRESSURE: 138 MMHG | RESPIRATION RATE: 18 BRPM | OXYGEN SATURATION: 95 % | TEMPERATURE: 97.6 F | DIASTOLIC BLOOD PRESSURE: 64 MMHG | HEART RATE: 72 BPM

## 2023-03-18 DIAGNOSIS — N30.00 ACUTE CYSTITIS WITHOUT HEMATURIA: Primary | ICD-10-CM

## 2023-03-18 DIAGNOSIS — K65.4 SCLEROSING MESENTERITIS (HCC): ICD-10-CM

## 2023-03-18 LAB
ALBUMIN SERPL-MCNC: 3.6 G/DL (ref 3.5–5.2)
ALP SERPL-CCNC: 65 U/L (ref 35–104)
ALT SERPL-CCNC: 12 U/L (ref 0–32)
ANION GAP SERPL CALCULATED.3IONS-SCNC: 12 MMOL/L (ref 7–16)
AST SERPL-CCNC: 16 U/L (ref 0–31)
BACTERIA URNS QL MICRO: ABNORMAL /HPF
BASOPHILS # BLD: 0.02 E9/L (ref 0–0.2)
BASOPHILS NFR BLD: 0.2 % (ref 0–2)
BILIRUB SERPL-MCNC: 0.6 MG/DL (ref 0–1.2)
BILIRUB UR QL STRIP: NEGATIVE
BUN SERPL-MCNC: 25 MG/DL (ref 6–23)
CALCIUM SERPL-MCNC: 8.6 MG/DL (ref 8.6–10.2)
CHLORIDE SERPL-SCNC: 103 MMOL/L (ref 98–107)
CLARITY UR: ABNORMAL
CO2 SERPL-SCNC: 24 MMOL/L (ref 22–29)
COLOR UR: YELLOW
CREAT SERPL-MCNC: 0.9 MG/DL (ref 0.5–1)
EOSINOPHIL # BLD: 0.03 E9/L (ref 0.05–0.5)
EOSINOPHIL NFR BLD: 0.4 % (ref 0–6)
EPI CELLS #/AREA URNS HPF: ABNORMAL /HPF
ERYTHROCYTE [DISTWIDTH] IN BLOOD BY AUTOMATED COUNT: 14.2 FL (ref 11.5–15)
GLUCOSE SERPL-MCNC: 100 MG/DL (ref 74–99)
GLUCOSE UR STRIP-MCNC: NEGATIVE MG/DL
HCT VFR BLD AUTO: 35.4 % (ref 34–48)
HGB BLD-MCNC: 11.5 G/DL (ref 11.5–15.5)
HGB UR QL STRIP: ABNORMAL
IMM GRANULOCYTES # BLD: 0.06 E9/L
IMM GRANULOCYTES NFR BLD: 0.7 % (ref 0–5)
INFLUENZA A BY PCR: NOT DETECTED
INFLUENZA B BY PCR: NOT DETECTED
KETONES UR STRIP-MCNC: NEGATIVE MG/DL
LACTATE BLDV-SCNC: 1.4 MMOL/L (ref 0.5–2.2)
LEUKOCYTE ESTERASE UR QL STRIP: ABNORMAL
LIPASE: 18 U/L (ref 13–60)
LYMPHOCYTES # BLD: 0.94 E9/L (ref 1.5–4)
LYMPHOCYTES NFR BLD: 11.3 % (ref 20–42)
MCH RBC QN AUTO: 28.5 PG (ref 26–35)
MCHC RBC AUTO-ENTMCNC: 32.5 % (ref 32–34.5)
MCV RBC AUTO: 87.6 FL (ref 80–99.9)
MONOCYTES # BLD: 0.54 E9/L (ref 0.1–0.95)
MONOCYTES NFR BLD: 6.5 % (ref 2–12)
NEUTROPHILS # BLD: 6.7 E9/L (ref 1.8–7.3)
NEUTS SEG NFR BLD: 80.9 % (ref 43–80)
NITRITE UR QL STRIP: NEGATIVE
PH UR STRIP: 5.5 [PH] (ref 5–9)
PLATELET # BLD AUTO: 208 E9/L (ref 130–450)
PMV BLD AUTO: 10.2 FL (ref 7–12)
POTASSIUM SERPL-SCNC: 4.1 MMOL/L (ref 3.5–5)
PROT SERPL-MCNC: 6.4 G/DL (ref 6.4–8.3)
PROT UR STRIP-MCNC: 30 MG/DL
RBC # BLD AUTO: 4.04 E12/L (ref 3.5–5.5)
RBC #/AREA URNS HPF: ABNORMAL /HPF (ref 0–2)
SARS-COV-2 RDRP RESP QL NAA+PROBE: NOT DETECTED
SODIUM SERPL-SCNC: 139 MMOL/L (ref 132–146)
SP GR UR STRIP: 1.02 (ref 1–1.03)
TROPONIN, HIGH SENSITIVITY: 19 NG/L (ref 0–9)
TROPONIN, HIGH SENSITIVITY: 21 NG/L (ref 0–9)
UROBILINOGEN UR STRIP-ACNC: 0.2 E.U./DL
WBC # BLD: 8.3 E9/L (ref 4.5–11.5)
WBC #/AREA URNS HPF: ABNORMAL /HPF (ref 0–5)

## 2023-03-18 PROCEDURE — 85025 COMPLETE CBC W/AUTO DIFF WBC: CPT

## 2023-03-18 PROCEDURE — 2580000003 HC RX 258

## 2023-03-18 PROCEDURE — 83690 ASSAY OF LIPASE: CPT

## 2023-03-18 PROCEDURE — 96374 THER/PROPH/DIAG INJ IV PUSH: CPT

## 2023-03-18 PROCEDURE — 99285 EMERGENCY DEPT VISIT HI MDM: CPT

## 2023-03-18 PROCEDURE — 80053 COMPREHEN METABOLIC PANEL: CPT

## 2023-03-18 PROCEDURE — 81001 URINALYSIS AUTO W/SCOPE: CPT

## 2023-03-18 PROCEDURE — 6360000002 HC RX W HCPCS

## 2023-03-18 PROCEDURE — 74176 CT ABD & PELVIS W/O CONTRAST: CPT

## 2023-03-18 PROCEDURE — 6370000000 HC RX 637 (ALT 250 FOR IP)

## 2023-03-18 PROCEDURE — 87635 SARS-COV-2 COVID-19 AMP PRB: CPT

## 2023-03-18 PROCEDURE — 83605 ASSAY OF LACTIC ACID: CPT

## 2023-03-18 PROCEDURE — 87088 URINE BACTERIA CULTURE: CPT

## 2023-03-18 PROCEDURE — 93005 ELECTROCARDIOGRAM TRACING: CPT

## 2023-03-18 PROCEDURE — 87502 INFLUENZA DNA AMP PROBE: CPT

## 2023-03-18 PROCEDURE — 71045 X-RAY EXAM CHEST 1 VIEW: CPT

## 2023-03-18 PROCEDURE — 84484 ASSAY OF TROPONIN QUANT: CPT

## 2023-03-18 RX ORDER — CEFDINIR 300 MG/1
300 CAPSULE ORAL 2 TIMES DAILY
Qty: 14 CAPSULE | Refills: 0 | Status: SHIPPED | OUTPATIENT
Start: 2023-03-18 | End: 2023-03-25

## 2023-03-18 RX ORDER — ONDANSETRON 4 MG/1
4 TABLET, FILM COATED ORAL 3 TIMES DAILY PRN
Qty: 15 TABLET | Refills: 0 | Status: SHIPPED | OUTPATIENT
Start: 2023-03-18

## 2023-03-18 RX ADMIN — LIDOCAINE HYDROCHLORIDE: 20 SOLUTION ORAL; TOPICAL at 15:30

## 2023-03-18 RX ADMIN — CEFTRIAXONE SODIUM 1000 MG: 1 INJECTION, POWDER, FOR SOLUTION INTRAMUSCULAR; INTRAVENOUS at 17:49

## 2023-03-18 NOTE — ED PROVIDER NOTES
[address]        Pt Name: Douglas Mccain  MRN: [id number]  [place] 1940  Date of evaluation: 3/18/2023  Provider: Rabia Marrero DO  PCP: Woody Huff DO  Note Started: 3:03 PM EDT 3/18/23    CHIEF COMPLAINT       Chief Complaint   Patient presents with    Abdominal Pain     Patient having right sided abdominal and chest pain beginning last night. Given zofran at 14:10       HISTORY OF PRESENT ILLNESS: 1 or more Elements   History From: Patient    Limitations to history : None    Douglas Mccain is a 78 y.o. female who presents to the emergency department with chief complaint of right upper quadrant abdominal pain and right lower chest pain. That the symptoms started last night while getting ready go to bed. She states that she has right chest pain that seems to be behind her breast and radiates to her right shoulder and right neck and at the same time she has right upper quadrant abdominal pain. Since the symptoms started patient has been having nausea with vomiting x3 and has not noticed if there is any bilious or bloody hue to them. She states that she was given Zofran for nausea when she got to the emergency department and that has helped with her nausea and vomiting. In general the patient just feels unwell with the symptoms going on and otherwise is a poor historian, unable to provide much history or answer questions of detail. Patient denies any other symptoms including fever, chills, shortness of breath, hematuria or dysuria, constipation or diarrhea. Patient does mention that she has chronic lower back pain which is unchanged today. Patient does not have any pain in her mid scapular region. Nursing Notes were all reviewed and agreed with or any disagreements were addressed in the HPI. REVIEW OF SYSTEMS :      Positives and Pertinent negatives as per HPI.      SURGICAL HISTORY     Past Surgical History:   Procedure Laterality Date    CHOLECYSTECTOMY  1985    open? COLECTOMY  1974    COLONOSCOPY  04/26/2012    and egd    COLONOSCOPY  05/30/2014    COLONOSCOPY  01/08/2015    CYST REMOVAL  years ago    upper gum    EYE SURGERY  years ago    left eye removal,  has artificial eye    HYSTERECTOMY (CERVIX STATUS UNKNOWN)  1974    JOINT REPLACEMENT Left 2010/2012    x 2-knee    TONSILLECTOMY      TUMOR EXCISION      from arm, fatty    UPPER GASTROINTESTINAL ENDOSCOPY  05/30/2014    with biopsy    UPPER GASTROINTESTINAL ENDOSCOPY  01/08/2015    UPPER GASTROINTESTINAL ENDOSCOPY N/A 4/1/2019    EGD ESOPHAGOGASTRODUODENOSCOPY  ++IODINE ALLERGY++ and bx performed by Shameka Soliman MD at [facility]       Discharge Medication List as of 3/18/2023  8:16 PM        CONTINUE these medications which have NOT CHANGED    Details   LORazepam (ATIVAN) 0.5 MG tablet Take 1 tablet by mouth 2 times daily as needed for Anxiety for up to 30 days.  Max Daily Amount: 1 mg, Disp-30 tablet, R-2Normal      pantoprazole (PROTONIX) 40 MG tablet Take 1 tablet by mouth daily, Disp-90 tablet, R-3Print      donepezil (ARICEPT) 5 MG tablet TAKE ONE-HALF TABLET BY MOUTH ONE TIME DAILY with breakfastHistorical Med      acetaminophen (TYLENOL) 325 MG tablet Take 2 tablets by mouth every 6 hours as needed for Pain, Disp-120 tablet, R-3OTC      potassium chloride (KLOR-CON M) 20 MEQ extended release tablet Take 1 tablet by mouth daily, Disp-30 tablet, R-0Normal      atorvastatin (LIPITOR) 40 MG tablet Take 40 mg by mouth nightlyHistorical Med      docusate sodium (COLACE) 100 MG capsule Take 100 mg by mouth 2 times dailyHistorical Med      furosemide (LASIX) 20 MG tablet Take 20 mg by mouth dailyHistorical Med      levothyroxine (SYNTHROID) 88 MCG tablet Take 88 mcg by mouth DailyHistorical Med      losartan (COZAAR) 100 MG tablet Take 100 mg by mouth dailyHistorical Med      metoprolol succinate (TOPROL XL) 25 MG extended release tablet Take 25 mg by mouth dailyHistorical Med      DULoxetine (CYMBALTA) 30 MG extended release capsule Take 1 capsule by mouth daily, Disp-30 capsule, R-2Normal      amLODIPine (NORVASC) 10 MG tablet Take 1 tablet by mouth daily, Disp-90 tablet, R-3Normal      vitamin C (ASCORBIC ACID) 500 MG tablet Take 500 mg by mouth dailyHistorical Med      Vitamin D, Cholecalciferol, 25 MCG (1000 UT) TABS Take 1,000 Units by mouth dailyHistorical Med      cetirizine (ZYRTEC) 10 MG tablet Take 10 mg by mouth daily as needed for AllergiesHistorical Med      Multiple Vitamins-Minerals (PRESERVISION AREDS 2) CAPS Take 1 capsule by mouth 2 times dailyHistorical Med             ALLERGIES     Iodine, Bee pollen, Bee venom, Codeine, Hydralazine, Oxycodone-aspirin, Phenergan [promethazine hcl], Sulfa antibiotics, Amoxicillin-pot clavulanate, Aspirin, Darvocet [propoxyphene n-acetaminophen], Influenza vaccines, Percodan [oxycodone-aspirin], and Percodan [oxycodone-aspirin]    FAMILYHISTORY       Family History   Problem Relation Age of Onset    Stroke Father     Hypertension Father     Heart Disease Father     Stroke Mother     Hypertension Mother     Other Mother         aneurysm    Heart Disease Mother     Hypertension Brother     Cancer Brother     Breast Cancer Sister     Hypertension Sister     Cancer Sister         breast ca    Heart Disease Sister     Hypertension Brother         parkinson    Heart Disease Brother     Cancer Brother     Cancer Brother     Cancer Brother     Cancer Brother     Heart Disease Brother     Cancer Brother     Cancer Sister     High Blood Pressure Daughter     Breast Cancer Daughter     High Blood Pressure Daughter         SOCIAL HISTORY       Social History     Tobacco Use    Smoking status: Never     Passive exposure: Yes    Smokeless tobacco: Never    Tobacco comments:      had smoked,  in    Vaping Use    Vaping Use: Never used   Substance Use Topics    Alcohol use: No    Drug use: Never       SCREENINGS        [place] Coma Scale  Eye Opening: Spontaneous  Best Verbal Response: Oriented  Best Motor Response: Obeys commands  [place] Coma Scale Score: 15                CIWA Assessment  BP: 138/64  Heart Rate: 72           PHYSICAL EXAM  1 or more Elements     ED Triage Vitals [03/18/23 1448]   BP Temp Temp src Heart Rate Resp SpO2 Height Weight   (!) 144/64 97.6 °F (36.4 °C) -- 65 18 95 % -- --       Constitutional/General: Alert and oriented x3  Head: Normocephalic and atraumatic  Eyes: PERRL, EOMI, conjunctiva normal, sclera non icteric  ENT:  Oropharynx clear, handling secretions, no trismus, no asymmetry of the posterior oropharynx or uvular edema  Neck: Supple, full ROM, no stridor, no meningeal signs  Respiratory: Lungs clear to auscultation bilaterally, no wheezes, rales, or rhonchi. Not in respiratory distress  Cardiovascular:  Regular rate. Regular rhythm. No murmurs, no gallops, no rubs. 2+ distal pulses. Equal extremity pulses. Chest: No chest wall tenderness  GI:  Abdomen Soft, diffuse abdominal tenderness, most significant tenderness in the epigastric region, Non distended. No rebound, guarding, or rigidity. Musculoskeletal: Moves all extremities x 4. Warm and well perfused, no clubbing, no cyanosis, mild lower extremity edema. Capillary refill <3 seconds  Integument: skin warm and dry. No rashes.    Neurologic: GCS 15, no focal deficits, symmetric strength 5/5 in the upper and lower extremities bilaterally  Psychiatric: Normal Affect      DIAGNOSTIC RESULTS   LABS:    Labs Reviewed   CBC WITH AUTO DIFFERENTIAL - Abnormal; Notable for the following components:       Result Value    Neutrophils % 80.9 (*)     Lymphocytes % 11.3 (*)     Lymphocytes Absolute 0.94 (*)     Eosinophils Absolute 0.03 (*)     All other components within normal limits   COMPREHENSIVE METABOLIC PANEL - Abnormal; Notable for the following components:    Glucose 100 (*)     BUN 25 (*) All other components within normal limits   URINALYSIS - Abnormal; Notable for the following components:    Protein, UA 30 (*)     Leukocyte Esterase, Urine MODERATE (*)     All other components within normal limits   TROPONIN - Abnormal; Notable for the following components:    Troponin, High Sensitivity 19 (*)     All other components within normal limits   MICROSCOPIC URINALYSIS - Abnormal; Notable for the following components:    WBC, UA 10-20 (*)     Bacteria, UA MANY (*)     All other components within normal limits   TROPONIN - Abnormal; Notable for the following components:    Troponin, High Sensitivity 21 (*)     All other components within normal limits   COVID-19, RAPID   RAPID INFLUENZA A/B ANTIGENS   CULTURE, URINE   LACTIC ACID   LIPASE       As interpreted by me, the above displayed labs are abnormal. All other labs obtained during this visit were within normal range or not returned as of this dictation. EKG Interpretation  Interpreted by emergency department resident physician, Tracey Max DO  EKG: Normal sinus rate and rhythm at 68 bpm with normal axis and QTc of 435. No significant ST changes or elevation as compared to previous EKG performed on 10/7/2022. EKG interpreted by myself. RADIOLOGY:   Non-plain film images such as CT, Ultrasound and MRI are read by the radiologist. Plain radiographic images are visualized and preliminarily interpreted by the ED Provider with the below findings: On my interpretation of portable chest x-ray: No acute signs of pneumonia, pneumothorax, rib fractures, or mediastinal widening. Interpretation per the Radiologist below, if available at the time of this note:    CT ABDOMEN PELVIS WO CONTRAST Additional Contrast? None   Final Result   Mild interval increase in central mesenteric fat stranding with mass effect   and reactive lymph nodes most suggestive of sclerosing mesenteritis.       Otherwise, no acute process identified in the abdomen and pelvis. XR CHEST PORTABLE   Final Result   Enlarged cardiac silhouette and mild atelectasis in both lower lungs. No results found. No results found. 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), Chronic back pain, Constipation, Depression, Elevated sed rate (12/11/2017), GERD (gastroesophageal reflux disease), Hemorrhagic stroke ([address] (08/09/2022), Hemorrhoids, Hyperlipidemia, Hypertension, Ischemic stroke ([address] Left foot pain, Lumbosacral radiculopathy (08/06/2020), Macular degeneration, Peripheral neuropathy (08/06/2020), Poor vision, Prosthetic eye globe, SAH (subarachnoid hemorrhage) ([address] (04/24/2022), Seasonal allergies, Skipped heart beats, Sleep apnea, Stroke ([address] (04/24/2022), and Thyroid disease.      EMERGENCY DEPARTMENT COURSE    Vitals:    Vitals:    03/18/23 1717 03/18/23 1732 03/18/23 1747 03/18/23 2228   BP: (!) 137/55 (!) 129/55 (!) 143/59 138/64   Pulse:   68 72   Resp:   17 18   Temp:       SpO2:   96% 95%       Patient was given the following medications:  Medications   aluminum & magnesium hydroxide-simethicone (MAALOX) 30 mL, lidocaine viscous hcl (XYLOCAINE) 5 mL (GI COCKTAIL) ( Oral Given 3/18/23 1530)   cefTRIAXone (ROCEPHIN) 1,000 mg in sterile water 10 mL IV syringe (1,000 mg IntraVENous Given 3/18/23 1749)       Medical Decision Making/Differential Diagnosis:  CC/HPI Summary, Social Determinants of health, Records Reviewed, DDx, testing done/not done, ED Course, Reassessment, disposition considerations/shared decision making with patient, consults, disposition:        CC/HPI Summary, DDx, ED Course, Reassessment, Tests Considered, Patient expectation:   24-year-old female presenting to the emergency department chief complaints of chest pain and abdominal pain.    Differential diagnosis includes but is not limited to: Acute coronary syndrome, pneumonia, pneumothorax, gastroesophageal reflux disease, gastritis.  ED Course as of 03/19/23 1054   Sat Mar 18, 2023   1502 Patient evaluated at bedside at this time and is resting comfortably and hemodynamically stable. [RW]   1502 Lab studies for CBC, CMP, lipase, lactic acid as well as urinalysis and EKG and troponin.  Portable chest x-ray ordered for the patient as well. [RW]   1503 Patient was given Zofran when she arrived for some nausea and states that that has helped with this since then.  Patient belching at bedside and GI cocktail to be given as well. [RW]   1503 EKG: Normal sinus rate and rhythm at 68 bpm with normal axis and QTc of 435.  No significant ST changes or elevation as compared to previous EKG performed on 10/7/2022.  EKG interpreted by myself. [RW]   1609 Patient symptoms unimproved on reevaluation after GI cocktail.   [RW]   1612 CBC within normal limits with no leukocytosis and no signs of anemia. [RW]   1613 On my interpretation of portable chest x-ray: No acute signs of pneumonia, pneumothorax, rib fractures, or mediastinal widening. [RW]   1629 Lactic acid normal at 1.4. [RW]   1657 Urinalysis demonstrates moderate leukocyte esterase, pending microscopic [RW]   1700 Microscopic urinalysis demonstrates many bacteria with 10-20 white blood cells. [RW]   1737 Initial troponin 19 repeat troponin ordered at this time. [RW]   1846 Repeat trop of 21, delta of 3 [RW]   1917 CT abdomen pelvis without IV contrast added [RW]   1924 Patient reevaluated and is feeling well and sleeping at this time. 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 Patient informed and decides to d/c home with omnicef for UTI. [RW]      ED Course User Index  [RW] Ricky Devries DO      Extensively reviewed lab and imaging findings with  patient/family members and all questions answered at this time to the fullest extent possible. Shared decision making used and patient to be discharged home with close follow up. Encouraged to follow up with family physician over the next several days as well as return to the ED if symptoms persist or worsen. Patient given omnicef prescription for UTI. She has been closely monitored with multiple reevaluations throughout the course of her ED stay and has remained hemodynamically stable. Patient comfortable with plan at this time and is discharged home. Social Determinants affecting Dx or Tx: Patient has good follow-up with medical providers however is a poor historian. Patient is a resident of nursing facility. Chronic Conditions: Hypertension, hyperlipidemia, GERD, history of subarachnoid hemorrhage. Records Reviewed: EKG from 10/07/2022 reviewed in comparison to today. Echocardiogram from 12/21/2019 reviewed for ejection fraction identification, found to be 63%. I am the Primary Clinician of Record. CONSULTS: (Who and What was discussed)  None    FINAL IMPRESSION      1. Acute cystitis without hematuria    2.  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)          DISPOSITION/PLAN     DISPOSITION Decision To Discharge 03/18/2023 08:14:41 PM      PATIENT REFERRED TO:  Stephenie Foley DO  [address]  [phone]    Schedule an appointment as soon as possible for a visit in 2 days      [address] [place] [address] Emergency Department  [address] [address]  [phone]  Go to   If symptoms worsen    DISCHARGE MEDICATIONS:  Discharge Medication List as of 3/18/2023  8:16 PM        START taking these medications    Details   cefdinir (OMNICEF) 300 MG capsule Take 1 capsule by mouth 2 times daily for 7 days, Disp-14 capsule, R-0Print                  (Please note that portions of this note were completed with a voice recognition program. Efforts were made to edit the dictations but occasionally words are mis-transcribed.)    Antione Ernst DO (electronically signed)           Antione Ernst DO  Resident  03/19/23 1052

## 2023-03-18 NOTE — ED NOTES
Patient arrived via EMS, for abdominal pain, stated they ate at a restaurant last night and felt sick after and had emesis x1 this am, patient alert and oriented x4, able to make needs known      Andreea Bautista RN  03/18/23 9427

## 2023-03-19 LAB — BACTERIA UR CULT: NORMAL

## 2023-03-19 NOTE — DISCHARGE INSTRUCTIONS
Please take Omnicef for the next 7 days for your urinary tract infection and use Zofran as needed for nausea with vomiting. Drink plenty of fluids and continue eating as tolerated. Follow-up with her family physician over the next several days and return to ED if symptoms persist or worsen.

## 2023-03-20 LAB
BACTERIA UR CULT: NORMAL
EKG ATRIAL RATE: 68 BPM
EKG P AXIS: 72 DEGREES
EKG P-R INTERVAL: 152 MS
EKG Q-T INTERVAL: 410 MS
EKG QRS DURATION: 76 MS
EKG QTC CALCULATION (BAZETT): 435 MS
EKG R AXIS: -9 DEGREES
EKG T AXIS: 47 DEGREES
EKG VENTRICULAR RATE: 68 BPM

## 2023-03-20 PROCEDURE — 93010 ELECTROCARDIOGRAM REPORT: CPT | Performed by: INTERNAL MEDICINE

## 2023-03-27 LAB
BACTERIA URNS QL MICRO: ABNORMAL /HPF
BILIRUB UR QL STRIP: NEGATIVE
CLARITY UR: CLEAR
COLOR UR: YELLOW
GLUCOSE UR STRIP-MCNC: NEGATIVE MG/DL
HGB UR QL STRIP: NEGATIVE
KETONES UR STRIP-MCNC: NEGATIVE MG/DL
LEUKOCYTE ESTERASE UR QL STRIP: ABNORMAL
NITRITE UR QL STRIP: NEGATIVE
PH UR STRIP: 5.5 [PH] (ref 5–9)
PROT UR STRIP-MCNC: NEGATIVE MG/DL
RBC #/AREA URNS HPF: ABNORMAL /HPF (ref 0–2)
SP GR UR STRIP: <=1.005 (ref 1–1.03)
UROBILINOGEN UR STRIP-ACNC: 0.2 E.U./DL
WBC #/AREA URNS HPF: ABNORMAL /HPF (ref 0–5)

## 2023-03-29 LAB — BACTERIA UR CULT: NORMAL

## 2023-04-12 NOTE — ED PROVIDER NOTES
Patient is an 47-year-old female except past medical history most notable for serious oh, hypertension, hyperlipidemia, 2 TIAs in the past presenting for headache, nausea, elevated blood pressure. States her blood pressure was 220/110 prior to arrival. Recommended by the pharmacist to come to the emergency department for evaluation. States she has had multiple changes to her blood pressure medications in the past, notes that she has been compliant with her current regimen of metoprolol and hydrocodone with Dyazide. Denies any missed doses. Does admit to being under multiple psychosocial stressors at this time. Denies numbness, tingling, weakness, vision changes, vomiting, chest pain, shortness of breath, back pain, dizziness, syncope. Review of Systems   Constitutional: Negative for chills and fever. Respiratory: Negative for cough, chest tightness and shortness of breath. Cardiovascular: Negative for chest pain, palpitations and leg swelling. Gastrointestinal: Positive for nausea. Negative for abdominal pain, blood in stool, diarrhea and vomiting. Genitourinary: Negative for dysuria, flank pain, frequency, menstrual problem, vaginal bleeding and vaginal discharge. Musculoskeletal: Negative for back pain and neck pain. Skin: Negative for color change, rash and wound. Neurological: Positive for headaches. Negative for dizziness, syncope, weakness and light-headedness. Physical Exam   Constitutional: She is oriented to person, place, and time. She appears well-developed and well-nourished. No distress. HENT:   Head: Normocephalic and atraumatic. Mouth/Throat: Oropharynx is clear and moist.   Eyes: Pupils are equal, round, and reactive to light. EOM are normal. No scleral icterus. Neck: Normal range of motion. Neck supple. No JVD present. Cardiovascular: Normal rate, regular rhythm, S1 normal, S2 normal and normal heart sounds. No murmur heard.   Pulmonary/Chest: Effort normal and breath sounds normal. No accessory muscle usage. No respiratory distress. She has no wheezes. She has no rhonchi. She has no rales. Abdominal: Soft. Normal appearance and bowel sounds are normal. She exhibits no distension. There is no tenderness. Musculoskeletal: Normal range of motion. She exhibits no edema. Lymphadenopathy:     She has no cervical adenopathy. Neurological: She is alert and oriented to person, place, and time. She has normal strength. She displays no tremor. No cranial nerve deficit or sensory deficit. She exhibits normal muscle tone. Coordination and gait normal.   Reflex Scores:       Brachioradialis reflexes are 2+ on the right side and 2+ on the left side. Achilles reflexes are 2+ on the right side and 2+ on the left side. Skin: Skin is warm and dry. No rash noted. She is not diaphoretic. No pallor. Nursing note and vitals reviewed. Procedures    MDM    ED Course as of May 12 2016   Sun May 12, 2019   2013 Reassessed, symptoms improving. Discussed results. [RU]      ED Course User Index  [RU] Vicky Whitaker DO       --------------------------------------------- PAST HISTORY ---------------------------------------------  Past Medical History:  has a past medical history of Amaurosis fugax of right eye, Anxiety, Arthritis, CA - cancer of bowel, Cerebral artery occlusion with cerebral infarction ([facility] [address] Chronic back pain, Constipation, Depression, Elevated sed rate, GERD (gastroesophageal reflux disease), Hemorrhoids, Hyperlipidemia, Hypertension, Left foot pain, Macular degeneration, Poor vision, Prosthetic eye globe, Seasonal allergies, Skipped heart beats, Sleep apnea, and Thyroid disease. Past Surgical History:  has a past surgical history that includes Eye surgery (years ago); colectomy (1974); Colonoscopy (04/26/2012); Cholecystectomy (1985); Hysterectomy (1974); tumor excision; Upper gastrointestinal endoscopy (05/30/2014);  Colonoscopy (05/30/2014); Tonsillectomy; joint replacement (Left, 2010/2012); Upper gastrointestinal endoscopy (01/08/2015); Colonoscopy (01/08/2015); cyst removal (years ago); and Upper gastrointestinal endoscopy (N/A, 4/1/2019). Social History:  reports that she has never smoked. She has never used smokeless tobacco. She reports that she does not drink alcohol or use drugs. Family History: family history includes Breast Cancer in her daughter and sister; Cancer in her brother, brother, brother, brother, brother, brother, sister, and sister; Heart Disease in her brother, brother, father, mother, and sister; High Blood Pressure in her daughter and daughter; Hypertension in her brother, brother, father, mother, and sister; Other in her mother; Stroke in her father and mother. The patients home medications have been reviewed. Allergies: Iodine; Codeine; Oxycodone-aspirin; Amoxicillin-pot clavulanate; Darvocet [propoxyphene n-acetaminophen]; Eggs or egg-derived products;  Influenza vaccines; Percodan [oxycodone-aspirin]; and Percodan [oxycodone-aspirin]    -------------------------------------------------- RESULTS -------------------------------------------------    Lab  Results for orders placed or performed during the hospital encounter of 05/12/19   CBC Auto Differential   Result Value Ref Range    WBC 8.6 4.5 - 11.5 E9/L    RBC 4.71 3.50 - 5.50 E12/L    Hemoglobin 13.7 11.5 - 15.5 g/dL    Hematocrit 40.6 34.0 - 48.0 %    MCV 86.2 80.0 - 99.9 fL    MCH 29.1 26.0 - 35.0 pg    MCHC 33.7 32.0 - 34.5 %    RDW 13.4 11.5 - 15.0 fL    Platelets 352 522 - 782 E9/L    MPV 9.7 7.0 - 12.0 fL    Neutrophils % 52.8 43.0 - 80.0 %    Immature Granulocytes % 0.5 0.0 - 5.0 %    Lymphocytes % 37.3 20.0 - 42.0 %    Monocytes % 7.4 2.0 - 12.0 %    Eosinophils % 1.5 0.0 - 6.0 %    Basophils % 0.5 0.0 - 2.0 %    Neutrophils # 4.53 1.80 - 7.30 E9/L    Immature Granulocytes # 0.04 E9/L    Lymphocytes # 3.20 1.50 - 4.00 E9/L    Monocytes # 0.63 0.10 - 0.95 E9/L    Eosinophils # 0.13 0.05 - 0.50 E9/L    Basophils # 0.04 0.00 - 0.20 E9/L       Radiology  CT Head WO Contrast   Final Result   Redemonstration of an empty sella which raises the possibility of   empty sella syndrome. Age-appropriate diffuse atrophy with microangiopathic changes. No intracranial hemorrhage or signs of ischemia which may go   undetected in the hyperacute phase. XR CHEST PORTABLE    (Results Pending)       EKG: This EKG is signed and interpreted by me. Rate:   Rhythm: Sinus  Interpretation: Normal sinus rhythm, no acute ST changes  Comparison: stable as compared to patient's most recent EKG    ------------------------- NURSING NOTES AND VITALS REVIEWED ---------------------------  Date / Time Roomed:  5/12/2019  6:29 PM  ED Bed Assignment:  01/01    The nursing notes within the ED encounter and vital signs as below have been reviewed. Patient Vitals for the past 24 hrs:   BP Temp Pulse Resp SpO2 Height Weight   05/12/19 1828 (!) 220/100 98 °F (36.7 °C) 69 18 97 % 5' 3\" (1.6 m) 189 lb (85.7 kg)       Oxygen Saturation Interpretation: Normal    ------------------------------------------ PROGRESS NOTES ------------------------------------------  Re-evaluation(s):  Reassess following Benadryl, Reglan. Patient reporting significant improvement in her symptoms. Blood pressure improved, currently symptomatic at this time. Cyst was felt stable for discharge. Patient was agreeable with plan. Blood cataract.    --------------------------------------- ED Clinical Course/MDM --------------------------------------    Patient is an 75-year-old female presenting with high blood pressure, headache, nausea. Labs, imaging, EKG reviewed. Presentation was consistent with a non-intractable headache causing elevated blood pressure readings. Given Benadryl, Reglan with significant improvement in her symptoms as well as her blood pressure. Patient was compliant with her medications.  Did not require refills of her medication. Troponin negative, chest x-ray unremarkable, CT head without acute changes. Discussed with her findings of empty sella syndrome and she is to follow-up with her PCP. No any signs of end organ damage secondary to hypertension. Social he felt able for discharge with close outpatient follow-up with her PCP as well as her cardiologist. Patient verbalizes understanding. Given his Symptoms Were to Return Emergency Department. Additional Verbal Orders Provided. 8:57 PM  I have spoken with the patient and discussed todays results, in addition to providing specific details for the plan of care and counseling regarding the diagnosis and prognosis. Their questions are answered at this time and they are agreeable with the plan. I discussed at length with them reasons for immediate return here for re evaluation. They will followup with their cardiologist and primary care physician by calling their office tomorrow. --------------------------------- ADDITIONAL PROVIDER NOTES ---------------------------------  At this time the patient is without objective evidence of an acute process requiring hospitalization or inpatient management. They have remained hemodynamically stable throughout their entire ED visit and are stable for discharge with outpatient follow-up. The plan has been discussed in detail and they are aware of the specific conditions for emergent return, as well as the importance of follow-up. New Prescriptions    No medications on file       Diagnosis:  1. Acute nonintractable headache, unspecified headache type    2. Hypertension, unspecified type        Disposition:  Patient's disposition: Discharge to home  Patient's condition is stable.        Vicky Whitaker DO  Resident  05/12/19 [address] [place] Pregnancy And Lactation Text: This medication is not safe during pregnancy. It is unknown if this medication is excreted in breast milk.

## 2023-04-14 ENCOUNTER — TELEPHONE (OUTPATIENT)
Dept: FAMILY MEDICINE CLINIC | Age: 83
End: 2023-04-14

## 2023-04-17 NOTE — TELEPHONE ENCOUNTER
Spoke with daughter and she is unable to get off work for [facility] appointment

## 2023-05-16 ENCOUNTER — OFFICE VISIT (OUTPATIENT)
Dept: GERIATRIC MEDICINE | Age: 83
End: 2023-05-16
Payer: COMMERCIAL

## 2023-05-16 VITALS
RESPIRATION RATE: 24 BRPM | BODY MASS INDEX: 41.53 KG/M2 | WEIGHT: 206 LBS | TEMPERATURE: 97.3 F | HEIGHT: 59 IN | SYSTOLIC BLOOD PRESSURE: 156 MMHG | HEART RATE: 60 BPM | DIASTOLIC BLOOD PRESSURE: 68 MMHG

## 2023-05-16 DIAGNOSIS — G31.84 MCI (MILD COGNITIVE IMPAIRMENT): Primary | ICD-10-CM

## 2023-05-16 PROCEDURE — 3074F SYST BP LT 130 MM HG: CPT | Performed by: INTERNAL MEDICINE

## 2023-05-16 PROCEDURE — 1124F ACP DISCUSS-NO DSCNMKR DOCD: CPT | Performed by: INTERNAL MEDICINE

## 2023-05-16 PROCEDURE — 3078F DIAST BP <80 MM HG: CPT | Performed by: INTERNAL MEDICINE

## 2023-05-16 PROCEDURE — 99212 OFFICE O/P EST SF 10 MIN: CPT | Performed by: INTERNAL MEDICINE

## 2023-05-16 RX ORDER — BENZONATATE 200 MG/1
200 CAPSULE ORAL EVERY 8 HOURS PRN
COMMUNITY

## 2023-05-16 RX ORDER — LORAZEPAM 0.5 MG/1
0.5 TABLET ORAL EVERY 12 HOURS PRN
COMMUNITY

## 2023-05-16 RX ORDER — DONEPEZIL HYDROCHLORIDE 5 MG/1
2.5 TABLET, FILM COATED ORAL NIGHTLY
Qty: 30 TABLET | Refills: 3 | Status: SHIPPED | OUTPATIENT
Start: 2023-05-16

## 2023-05-16 RX ORDER — LIDOCAINE 4 G/G
1 PATCH TOPICAL DAILY PRN
COMMUNITY

## 2023-05-24 ENCOUNTER — APPOINTMENT (OUTPATIENT)
Dept: CT IMAGING | Age: 83
End: 2023-05-24
Payer: COMMERCIAL

## 2023-05-24 ENCOUNTER — APPOINTMENT (OUTPATIENT)
Dept: CT IMAGING | Age: 83
End: 2023-05-24
Attending: STUDENT IN AN ORGANIZED HEALTH CARE EDUCATION/TRAINING PROGRAM
Payer: COMMERCIAL

## 2023-05-24 ENCOUNTER — HOSPITAL ENCOUNTER (EMERGENCY)
Age: 83
Discharge: HOME | End: 2023-05-24
Attending: EMERGENCY MEDICINE
Payer: COMMERCIAL

## 2023-05-24 VITALS
WEIGHT: 182 LBS | HEIGHT: 59 IN | DIASTOLIC BLOOD PRESSURE: 65 MMHG | TEMPERATURE: 97.9 F | HEART RATE: 63 BPM | RESPIRATION RATE: 14 BRPM | BODY MASS INDEX: 36.69 KG/M2 | SYSTOLIC BLOOD PRESSURE: 189 MMHG | OXYGEN SATURATION: 95 %

## 2023-05-24 DIAGNOSIS — R10.13 ABDOMINAL PAIN, EPIGASTRIC: Primary | ICD-10-CM

## 2023-05-24 LAB
ALBUMIN SERPL-MCNC: 4.3 G/DL (ref 3.5–5.2)
ALP SERPL-CCNC: 78 U/L (ref 35–104)
ALT SERPL-CCNC: 15 U/L (ref 0–32)
ANION GAP SERPL CALCULATED.3IONS-SCNC: 14 MMOL/L (ref 7–16)
AST SERPL-CCNC: 21 U/L (ref 0–31)
BASOPHILS # BLD: 0.03 E9/L (ref 0–0.2)
BASOPHILS NFR BLD: 0.4 % (ref 0–2)
BILIRUB SERPL-MCNC: 0.5 MG/DL (ref 0–1.2)
BNP BLD-MCNC: 183 PG/ML (ref 0–450)
BUN SERPL-MCNC: 21 MG/DL (ref 6–23)
CALCIUM SERPL-MCNC: 9.1 MG/DL (ref 8.6–10.2)
CHLORIDE SERPL-SCNC: 102 MMOL/L (ref 98–107)
CO2 SERPL-SCNC: 26 MMOL/L (ref 22–29)
CREAT SERPL-MCNC: 0.8 MG/DL (ref 0.5–1)
EKG ATRIAL RATE: 57 BPM
EKG P AXIS: 56 DEGREES
EKG P-R INTERVAL: 158 MS
EKG Q-T INTERVAL: 454 MS
EKG QRS DURATION: 74 MS
EKG QTC CALCULATION (BAZETT): 441 MS
EKG R AXIS: -17 DEGREES
EKG T AXIS: 28 DEGREES
EKG VENTRICULAR RATE: 57 BPM
EOSINOPHIL # BLD: 0.08 E9/L (ref 0.05–0.5)
EOSINOPHIL NFR BLD: 1 % (ref 0–6)
ERYTHROCYTE [DISTWIDTH] IN BLOOD BY AUTOMATED COUNT: 14 FL (ref 11.5–15)
GLUCOSE SERPL-MCNC: 115 MG/DL (ref 74–99)
HCT VFR BLD AUTO: 38 % (ref 34–48)
HGB BLD-MCNC: 12.3 G/DL (ref 11.5–15.5)
IMM GRANULOCYTES # BLD: 0.04 E9/L
IMM GRANULOCYTES NFR BLD: 0.5 % (ref 0–5)
LACTATE BLDV-SCNC: 1.5 MMOL/L (ref 0.5–2.2)
LIPASE: 37 U/L (ref 13–60)
LYMPHOCYTES # BLD: 1.83 E9/L (ref 1.5–4)
LYMPHOCYTES NFR BLD: 22.3 % (ref 20–42)
MCH RBC QN AUTO: 28.5 PG (ref 26–35)
MCHC RBC AUTO-ENTMCNC: 32.4 % (ref 32–34.5)
MCV RBC AUTO: 88 FL (ref 80–99.9)
MONOCYTES # BLD: 0.41 E9/L (ref 0.1–0.95)
MONOCYTES NFR BLD: 5 % (ref 2–12)
NEUTROPHILS # BLD: 5.83 E9/L (ref 1.8–7.3)
NEUTS SEG NFR BLD: 70.8 % (ref 43–80)
PLATELET # BLD AUTO: 218 E9/L (ref 130–450)
PMV BLD AUTO: 10 FL (ref 7–12)
POTASSIUM SERPL-SCNC: 4 MMOL/L (ref 3.5–5)
PROT SERPL-MCNC: 7.4 G/DL (ref 6.4–8.3)
RBC # BLD AUTO: 4.32 E12/L (ref 3.5–5.5)
SODIUM SERPL-SCNC: 142 MMOL/L (ref 132–146)
TROPONIN, HIGH SENSITIVITY: 17 NG/L (ref 0–9)
TROPONIN, HIGH SENSITIVITY: 18 NG/L (ref 0–9)
WBC # BLD: 8.2 E9/L (ref 4.5–11.5)

## 2023-05-24 PROCEDURE — 2500000003 HC RX 250 WO HCPCS: Performed by: STUDENT IN AN ORGANIZED HEALTH CARE EDUCATION/TRAINING PROGRAM

## 2023-05-24 PROCEDURE — 6360000004 HC RX CONTRAST MEDICATION: Performed by: RADIOLOGY

## 2023-05-24 PROCEDURE — 93010 ELECTROCARDIOGRAM REPORT: CPT | Performed by: INTERNAL MEDICINE

## 2023-05-24 PROCEDURE — 6360000002 HC RX W HCPCS: Performed by: STUDENT IN AN ORGANIZED HEALTH CARE EDUCATION/TRAINING PROGRAM

## 2023-05-24 PROCEDURE — 96374 THER/PROPH/DIAG INJ IV PUSH: CPT

## 2023-05-24 PROCEDURE — 93005 ELECTROCARDIOGRAM TRACING: CPT | Performed by: STUDENT IN AN ORGANIZED HEALTH CARE EDUCATION/TRAINING PROGRAM

## 2023-05-24 PROCEDURE — 71275 CT ANGIOGRAPHY CHEST: CPT

## 2023-05-24 PROCEDURE — 83690 ASSAY OF LIPASE: CPT

## 2023-05-24 PROCEDURE — 83605 ASSAY OF LACTIC ACID: CPT

## 2023-05-24 PROCEDURE — 99285 EMERGENCY DEPT VISIT HI MDM: CPT

## 2023-05-24 PROCEDURE — 80053 COMPREHEN METABOLIC PANEL: CPT

## 2023-05-24 PROCEDURE — A4216 STERILE WATER/SALINE, 10 ML: HCPCS | Performed by: STUDENT IN AN ORGANIZED HEALTH CARE EDUCATION/TRAINING PROGRAM

## 2023-05-24 PROCEDURE — 84484 ASSAY OF TROPONIN QUANT: CPT

## 2023-05-24 PROCEDURE — 85025 COMPLETE CBC W/AUTO DIFF WBC: CPT

## 2023-05-24 PROCEDURE — 96375 TX/PRO/DX INJ NEW DRUG ADDON: CPT

## 2023-05-24 PROCEDURE — 83880 ASSAY OF NATRIURETIC PEPTIDE: CPT

## 2023-05-24 PROCEDURE — 74177 CT ABD & PELVIS W/CONTRAST: CPT

## 2023-05-24 PROCEDURE — 2580000003 HC RX 258: Performed by: STUDENT IN AN ORGANIZED HEALTH CARE EDUCATION/TRAINING PROGRAM

## 2023-05-24 RX ORDER — DIPHENHYDRAMINE HYDROCHLORIDE 50 MG/ML
25 INJECTION INTRAMUSCULAR; INTRAVENOUS ONCE
Status: COMPLETED | OUTPATIENT
Start: 2023-05-24 | End: 2023-05-24

## 2023-05-24 RX ORDER — FENTANYL CITRATE 50 UG/ML
25 INJECTION, SOLUTION INTRAMUSCULAR; INTRAVENOUS ONCE
Status: DISCONTINUED | OUTPATIENT
Start: 2023-05-24 | End: 2023-05-24 | Stop reason: HOSPADM

## 2023-05-24 RX ORDER — METHYLPREDNISOLONE SODIUM SUCCINATE 40 MG/ML
125 INJECTION, POWDER, LYOPHILIZED, FOR SOLUTION INTRAMUSCULAR; INTRAVENOUS ONCE
Status: COMPLETED | OUTPATIENT
Start: 2023-05-24 | End: 2023-05-24

## 2023-05-24 RX ORDER — WATER 1000 ML/1000ML
INJECTION, SOLUTION INTRAVENOUS
Status: DISCONTINUED
Start: 2023-05-24 | End: 2023-05-24 | Stop reason: HOSPADM

## 2023-05-24 RX ADMIN — FAMOTIDINE 20 MG: 10 INJECTION, SOLUTION INTRAVENOUS at 12:28

## 2023-05-24 RX ADMIN — METHYLPREDNISOLONE SODIUM SUCCINATE 125 MG: 40 INJECTION, POWDER, LYOPHILIZED, FOR SOLUTION INTRAMUSCULAR; INTRAVENOUS at 12:29

## 2023-05-24 RX ADMIN — DIPHENHYDRAMINE HYDROCHLORIDE 25 MG: 50 INJECTION, SOLUTION INTRAMUSCULAR; INTRAVENOUS at 12:29

## 2023-05-24 RX ADMIN — IOPAMIDOL 75 ML: 755 INJECTION, SOLUTION INTRAVENOUS at 14:32

## 2023-05-24 NOTE — ED PROVIDER NOTES
Department of Emergency Medicine   ED  Provider Note  Admit Date/RoomTime: 5/24/2023 11:22 AM  ED Room: [facility]/17A-17          History of Present Illness:  5/24/23, Time: 12:41 PM EDT  Chief Complaint   Patient presents with    Abdominal Pain     RUQ abdominal pain for the last 2 days. Hx of 16 in of bowel removed so she anted to get checked out. Nestor Hicks is a 78 y.o. female presenting to the ED for abdominal pain. Patient states is been ongoing intermittently for the past couple of days. She states the pain is a sharp sensation in the right upper quadrant. Patient states that she otherwise does not have any other accompanying symptoms including but limited to fevers, lightheadedness, chest pain, shortness of breath, nausea, vomiting, or changes to her urine or stool. She states that she has a history of a colectomy status post colon cancer and a cholecystectomy. Review of Systems   All other systems reviewed and are negative.       --------------------------------------------- PAST HISTORY ---------------------------------------------  Past Medical History:  has a past medical history of Amaurosis fugax of right eye, Anxiety, Arthritis, CA - cancer of bowel, Cerebral artery occlusion with cerebral infarction ([address] Chronic back pain, Constipation, Depression, Elevated sed rate, GERD (gastroesophageal reflux disease), Hemorrhagic stroke ([address] Hemorrhoids, Hyperlipidemia, Hypertension, Ischemic stroke ([address] Left foot pain, Lumbosacral radiculopathy, Macular degeneration, Peripheral neuropathy, Poor vision, Prosthetic eye globe, SAH (subarachnoid hemorrhage) ([address] Seasonal allergies, Skipped heart beats, Sleep apnea, Stroke ([address] and Thyroid disease. Past Surgical History:  has a past surgical history that includes Eye surgery (years ago); colectomy (1974); Colonoscopy (04/26/2012); Cholecystectomy (1985); Hysterectomy (1974); tumor excision;  Upper gastrointestinal endoscopy

## 2023-06-20 ENCOUNTER — OFFICE VISIT (OUTPATIENT)
Dept: FAMILY MEDICINE CLINIC | Age: 83
End: 2023-06-20

## 2023-06-20 ENCOUNTER — TELEPHONE (OUTPATIENT)
Dept: FAMILY MEDICINE CLINIC | Age: 83
End: 2023-06-20

## 2023-06-20 VITALS
RESPIRATION RATE: 16 BRPM | BODY MASS INDEX: 42.84 KG/M2 | SYSTOLIC BLOOD PRESSURE: 110 MMHG | TEMPERATURE: 97.2 F | HEART RATE: 73 BPM | HEIGHT: 59 IN | WEIGHT: 212.5 LBS | DIASTOLIC BLOOD PRESSURE: 50 MMHG | OXYGEN SATURATION: 95 %

## 2023-06-20 DIAGNOSIS — G30.9 ALZHEIMER'S DISEASE, UNSPECIFIED (CODE) (HCC): ICD-10-CM

## 2023-06-20 DIAGNOSIS — F33.1 MODERATE EPISODE OF RECURRENT MAJOR DEPRESSIVE DISORDER (HCC): ICD-10-CM

## 2023-06-20 DIAGNOSIS — H35.3210 EXUDATIVE AGE-RELATED MACULAR DEGENERATION OF RIGHT EYE, UNSPECIFIED STAGE (HCC): ICD-10-CM

## 2023-06-20 DIAGNOSIS — Z09 HOSPITAL DISCHARGE FOLLOW-UP: ICD-10-CM

## 2023-06-20 DIAGNOSIS — R10.13 EPIGASTRIC ABDOMINAL PAIN: Primary | ICD-10-CM

## 2023-06-20 DIAGNOSIS — N18.31 STAGE 3A CHRONIC KIDNEY DISEASE (HCC): ICD-10-CM

## 2023-06-20 SDOH — ECONOMIC STABILITY: FOOD INSECURITY: WITHIN THE PAST 12 MONTHS, THE FOOD YOU BOUGHT JUST DIDN'T LAST AND YOU DIDN'T HAVE MONEY TO GET MORE.: NEVER TRUE

## 2023-06-20 SDOH — ECONOMIC STABILITY: INCOME INSECURITY: HOW HARD IS IT FOR YOU TO PAY FOR THE VERY BASICS LIKE FOOD, HOUSING, MEDICAL CARE, AND HEATING?: NOT HARD AT ALL

## 2023-06-20 SDOH — ECONOMIC STABILITY: FOOD INSECURITY: WITHIN THE PAST 12 MONTHS, YOU WORRIED THAT YOUR FOOD WOULD RUN OUT BEFORE YOU GOT MONEY TO BUY MORE.: NEVER TRUE

## 2023-06-20 SDOH — ECONOMIC STABILITY: HOUSING INSECURITY
IN THE LAST 12 MONTHS, WAS THERE A TIME WHEN YOU DID NOT HAVE A STEADY PLACE TO SLEEP OR SLEPT IN A SHELTER (INCLUDING NOW)?: NO

## 2023-06-20 NOTE — TELEPHONE ENCOUNTER
Pt was in the office at the check out desk and stated she forgot to ask the doctor about her moles on her face and neck and she wanted to get them removed. Please Advise.

## 2023-06-26 ENCOUNTER — OFFICE VISIT (OUTPATIENT)
Dept: FAMILY MEDICINE CLINIC | Age: 83
End: 2023-06-26
Payer: COMMERCIAL

## 2023-06-26 VITALS
WEIGHT: 207 LBS | HEIGHT: 59 IN | RESPIRATION RATE: 16 BRPM | TEMPERATURE: 96.7 F | BODY MASS INDEX: 41.73 KG/M2 | HEART RATE: 69 BPM | SYSTOLIC BLOOD PRESSURE: 136 MMHG | DIASTOLIC BLOOD PRESSURE: 62 MMHG | OXYGEN SATURATION: 94 %

## 2023-06-26 DIAGNOSIS — I10 PRIMARY HYPERTENSION: Chronic | ICD-10-CM

## 2023-06-26 DIAGNOSIS — N18.31 STAGE 3A CHRONIC KIDNEY DISEASE (HCC): ICD-10-CM

## 2023-06-26 DIAGNOSIS — R73.09 ELEVATED GLUCOSE: ICD-10-CM

## 2023-06-26 DIAGNOSIS — R60.9 PERIPHERAL EDEMA: ICD-10-CM

## 2023-06-26 DIAGNOSIS — R60.9 PERIPHERAL EDEMA: Primary | ICD-10-CM

## 2023-06-26 DIAGNOSIS — E03.9 ACQUIRED HYPOTHYROIDISM: ICD-10-CM

## 2023-06-26 LAB
ALBUMIN SERPL-MCNC: 4.2 G/DL (ref 3.5–5.2)
ALP SERPL-CCNC: 65 U/L (ref 35–104)
ALT SERPL-CCNC: 15 U/L (ref 0–32)
ANION GAP SERPL CALCULATED.3IONS-SCNC: 18 MMOL/L (ref 7–16)
AST SERPL-CCNC: 20 U/L (ref 0–31)
BILIRUB SERPL-MCNC: 0.3 MG/DL (ref 0–1.2)
BUN SERPL-MCNC: 25 MG/DL (ref 6–23)
CALCIUM SERPL-MCNC: 9 MG/DL (ref 8.6–10.2)
CHLORIDE SERPL-SCNC: 101 MMOL/L (ref 98–107)
CHOLESTEROL, TOTAL: 169 MG/DL (ref 0–199)
CO2 SERPL-SCNC: 23 MMOL/L (ref 22–29)
CREAT SERPL-MCNC: 0.9 MG/DL (ref 0.5–1)
ERYTHROCYTE [DISTWIDTH] IN BLOOD BY AUTOMATED COUNT: 14.3 FL (ref 11.5–15)
GLUCOSE SERPL-MCNC: 117 MG/DL (ref 74–99)
HBA1C MFR BLD: 6 % (ref 4–5.6)
HCT VFR BLD AUTO: 38.1 % (ref 34–48)
HDLC SERPL-MCNC: 65 MG/DL
HGB BLD-MCNC: 11.8 G/DL (ref 11.5–15.5)
LDLC SERPL CALC-MCNC: 81 MG/DL (ref 0–99)
MCH RBC QN AUTO: 28.2 PG (ref 26–35)
MCHC RBC AUTO-ENTMCNC: 31 % (ref 32–34.5)
MCV RBC AUTO: 90.9 FL (ref 80–99.9)
PLATELET # BLD AUTO: 239 E9/L (ref 130–450)
PMV BLD AUTO: 10.5 FL (ref 7–12)
POTASSIUM SERPL-SCNC: 3.9 MMOL/L (ref 3.5–5)
PROT SERPL-MCNC: 7 G/DL (ref 6.4–8.3)
RBC # BLD AUTO: 4.19 E12/L (ref 3.5–5.5)
SODIUM SERPL-SCNC: 142 MMOL/L (ref 132–146)
TRIGL SERPL-MCNC: 115 MG/DL (ref 0–149)
TSH SERPL-MCNC: 1.1 UIU/ML (ref 0.27–4.2)
VLDLC SERPL CALC-MCNC: 23 MG/DL
WBC # BLD: 8.3 E9/L (ref 4.5–11.5)

## 2023-06-26 PROCEDURE — G8417 CALC BMI ABV UP PARAM F/U: HCPCS | Performed by: FAMILY MEDICINE

## 2023-06-26 PROCEDURE — 3074F SYST BP LT 130 MM HG: CPT | Performed by: FAMILY MEDICINE

## 2023-06-26 PROCEDURE — G8400 PT W/DXA NO RESULTS DOC: HCPCS | Performed by: FAMILY MEDICINE

## 2023-06-26 PROCEDURE — 1036F TOBACCO NON-USER: CPT | Performed by: FAMILY MEDICINE

## 2023-06-26 PROCEDURE — G8427 DOCREV CUR MEDS BY ELIG CLIN: HCPCS | Performed by: FAMILY MEDICINE

## 2023-06-26 PROCEDURE — 1090F PRES/ABSN URINE INCON ASSESS: CPT | Performed by: FAMILY MEDICINE

## 2023-06-26 PROCEDURE — 1124F ACP DISCUSS-NO DSCNMKR DOCD: CPT | Performed by: FAMILY MEDICINE

## 2023-06-26 PROCEDURE — 99214 OFFICE O/P EST MOD 30 MIN: CPT | Performed by: FAMILY MEDICINE

## 2023-06-26 PROCEDURE — 3078F DIAST BP <80 MM HG: CPT | Performed by: FAMILY MEDICINE

## 2023-07-24 ENCOUNTER — OFFICE VISIT (OUTPATIENT)
Dept: FAMILY MEDICINE CLINIC | Age: 83
End: 2023-07-24
Payer: COMMERCIAL

## 2023-07-24 VITALS
TEMPERATURE: 97 F | SYSTOLIC BLOOD PRESSURE: 130 MMHG | HEART RATE: 57 BPM | RESPIRATION RATE: 16 BRPM | HEIGHT: 59 IN | DIASTOLIC BLOOD PRESSURE: 62 MMHG | WEIGHT: 213 LBS | BODY MASS INDEX: 42.94 KG/M2 | OXYGEN SATURATION: 94 %

## 2023-07-24 DIAGNOSIS — R60.9 PERIPHERAL EDEMA: ICD-10-CM

## 2023-07-24 DIAGNOSIS — I10 PRIMARY HYPERTENSION: Primary | Chronic | ICD-10-CM

## 2023-07-24 PROCEDURE — 99214 OFFICE O/P EST MOD 30 MIN: CPT | Performed by: FAMILY MEDICINE

## 2023-07-24 PROCEDURE — G8417 CALC BMI ABV UP PARAM F/U: HCPCS | Performed by: FAMILY MEDICINE

## 2023-07-24 PROCEDURE — G8427 DOCREV CUR MEDS BY ELIG CLIN: HCPCS | Performed by: FAMILY MEDICINE

## 2023-07-24 PROCEDURE — G8400 PT W/DXA NO RESULTS DOC: HCPCS | Performed by: FAMILY MEDICINE

## 2023-07-24 PROCEDURE — 3074F SYST BP LT 130 MM HG: CPT | Performed by: FAMILY MEDICINE

## 2023-07-24 PROCEDURE — 3078F DIAST BP <80 MM HG: CPT | Performed by: FAMILY MEDICINE

## 2023-07-24 PROCEDURE — 1090F PRES/ABSN URINE INCON ASSESS: CPT | Performed by: FAMILY MEDICINE

## 2023-07-24 PROCEDURE — 1124F ACP DISCUSS-NO DSCNMKR DOCD: CPT | Performed by: FAMILY MEDICINE

## 2023-07-24 PROCEDURE — 1036F TOBACCO NON-USER: CPT | Performed by: FAMILY MEDICINE

## 2023-07-24 RX ORDER — METOPROLOL SUCCINATE 25 MG/1
CAPSULE, EXTENDED RELEASE ORAL
COMMUNITY
Start: 2023-07-15

## 2023-07-24 NOTE — PROGRESS NOTES
to auscultation, no wheezes, rales or rhonchi, symmetric air entry. Abdomen: Soft, non-tender, non-distended, positive BS in all 4 quadrants    Extremities:Dorsalis pedis pulses palpated bilaterally, no clubbing, cyanosis, 2+ pitting edema, no erythema     SKIN: warm, dry, no lesions, jaundice, petechiae, pallor, cyanosis, ecchymosis    NEURO: gross motor exam normal by observation, gait normal    Mental status - alert, oriented to person, place, and time, normal mood, behavior, speech, dress, motor activity, and thought processes      Assessment/Plan  Ember Medina was seen today for hypertension and foot swelling. Diagnoses and all orders for this visit:    Primary hypertension  Peripheral edema  - DECREASE: amlodipine to 5 mg, watch salt, rto 1 week      Return in about 1 week (around 7/31/2023), or if symptoms worsen or fail to improve. Call or go to ED immediately if symptoms worsen or persist.    Educational materials and/or home exercises printed for patient's review and were included in patient instructions on his/her After Visit Summary and given to patient at the end of visit. Counseled regarding above diagnosis, including possible risks and complications,  especially if left uncontrolled. Counseled regarding the possible side effects, risks, benefits and alternatives to treatment; patient and/or guardian verbalizes understanding, agrees, feels comfortable with and wishes to proceed with above treatment plan. Advised patient to call with any new medication issues, and read all Rx info from pharmacy to assure aware of all possible risks and side effects of medication before taking. Reviewed age and gender appropriate health screening exams and vaccinations.   Advised patient regarding importance of keeping up with recommended health maintenance and to schedule as soon as possible if overdue, as this is important in assessing for undiagnosed pathology, especially cancer, as well as

## 2023-08-01 ENCOUNTER — OFFICE VISIT (OUTPATIENT)
Dept: FAMILY MEDICINE CLINIC | Age: 83
End: 2023-08-01
Payer: COMMERCIAL

## 2023-08-01 VITALS
WEIGHT: 212 LBS | OXYGEN SATURATION: 97 % | HEART RATE: 76 BPM | TEMPERATURE: 96.5 F | HEIGHT: 59 IN | BODY MASS INDEX: 42.74 KG/M2 | RESPIRATION RATE: 16 BRPM | SYSTOLIC BLOOD PRESSURE: 126 MMHG | DIASTOLIC BLOOD PRESSURE: 72 MMHG

## 2023-08-01 DIAGNOSIS — R60.9 PERIPHERAL EDEMA: Primary | ICD-10-CM

## 2023-08-01 DIAGNOSIS — I10 PRIMARY HYPERTENSION: ICD-10-CM

## 2023-08-01 PROCEDURE — 1124F ACP DISCUSS-NO DSCNMKR DOCD: CPT | Performed by: FAMILY MEDICINE

## 2023-08-01 PROCEDURE — G8427 DOCREV CUR MEDS BY ELIG CLIN: HCPCS | Performed by: FAMILY MEDICINE

## 2023-08-01 PROCEDURE — 3078F DIAST BP <80 MM HG: CPT | Performed by: FAMILY MEDICINE

## 2023-08-01 PROCEDURE — 3074F SYST BP LT 130 MM HG: CPT | Performed by: FAMILY MEDICINE

## 2023-08-01 PROCEDURE — 1090F PRES/ABSN URINE INCON ASSESS: CPT | Performed by: FAMILY MEDICINE

## 2023-08-01 PROCEDURE — 1036F TOBACCO NON-USER: CPT | Performed by: FAMILY MEDICINE

## 2023-08-01 PROCEDURE — 99214 OFFICE O/P EST MOD 30 MIN: CPT | Performed by: FAMILY MEDICINE

## 2023-08-01 PROCEDURE — G8400 PT W/DXA NO RESULTS DOC: HCPCS | Performed by: FAMILY MEDICINE

## 2023-08-01 PROCEDURE — G8417 CALC BMI ABV UP PARAM F/U: HCPCS | Performed by: FAMILY MEDICINE

## 2023-08-01 RX ORDER — AMLODIPINE BESYLATE 5 MG/1
TABLET ORAL
COMMUNITY
Start: 2023-07-31

## 2023-08-01 RX ORDER — POLYETHYLENE GLYCOL 3350 17 G/17G
POWDER, FOR SOLUTION ORAL
COMMUNITY
Start: 2023-07-31

## 2023-08-01 RX ORDER — METHYLPREDNISOLONE 4 MG/1
TABLET ORAL DAILY
COMMUNITY
Start: 2023-07-27

## 2023-08-01 RX ORDER — SENNOSIDES 8.6 MG/1
TABLET, COATED ORAL
COMMUNITY
Start: 2023-07-25

## 2023-08-02 ENCOUNTER — TELEPHONE (OUTPATIENT)
Dept: FAMILY MEDICINE CLINIC | Age: 83
End: 2023-08-02

## 2023-08-07 ENCOUNTER — TELEPHONE (OUTPATIENT)
Dept: FAMILY MEDICINE CLINIC | Age: 83
End: 2023-08-07

## 2023-08-19 ENCOUNTER — HOSPITAL ENCOUNTER (EMERGENCY)
Age: 83
Discharge: OTHER INSTITUTION | End: 2023-08-19
Attending: EMERGENCY MEDICINE
Payer: COMMERCIAL

## 2023-08-19 ENCOUNTER — APPOINTMENT (OUTPATIENT)
Dept: ULTRASOUND IMAGING | Age: 83
End: 2023-08-19
Payer: COMMERCIAL

## 2023-08-19 VITALS
RESPIRATION RATE: 18 BRPM | BODY MASS INDEX: 40.32 KG/M2 | HEART RATE: 56 BPM | SYSTOLIC BLOOD PRESSURE: 168 MMHG | TEMPERATURE: 98.3 F | WEIGHT: 200 LBS | OXYGEN SATURATION: 93 % | HEIGHT: 59 IN | DIASTOLIC BLOOD PRESSURE: 71 MMHG

## 2023-08-19 DIAGNOSIS — M79.605 LEG PAIN, ANTERIOR, LEFT: Primary | ICD-10-CM

## 2023-08-19 LAB
ANION GAP SERPL CALCULATED.3IONS-SCNC: 9 MMOL/L (ref 7–16)
BASOPHILS # BLD: 0.03 K/UL (ref 0–0.2)
BASOPHILS NFR BLD: 0 % (ref 0–2)
BUN SERPL-MCNC: 21 MG/DL (ref 6–23)
CALCIUM SERPL-MCNC: 8.9 MG/DL (ref 8.6–10.2)
CHLORIDE SERPL-SCNC: 102 MMOL/L (ref 98–107)
CK SERPL-CCNC: 92 U/L (ref 20–180)
CO2 SERPL-SCNC: 27 MMOL/L (ref 22–29)
CREAT SERPL-MCNC: 1.1 MG/DL (ref 0.5–1)
EOSINOPHIL # BLD: 0.06 K/UL (ref 0.05–0.5)
EOSINOPHILS RELATIVE PERCENT: 1 % (ref 0–6)
ERYTHROCYTE [DISTWIDTH] IN BLOOD BY AUTOMATED COUNT: 14.4 % (ref 11.5–15)
GFR SERPL CREATININE-BSD FRML MDRD: 51 ML/MIN/1.73M2
GLUCOSE SERPL-MCNC: 117 MG/DL (ref 74–99)
HCT VFR BLD AUTO: 36 % (ref 34–48)
HGB BLD-MCNC: 11.6 G/DL (ref 11.5–15.5)
IMM GRANULOCYTES # BLD AUTO: 0.04 K/UL (ref 0–0.58)
IMM GRANULOCYTES NFR BLD: 1 % (ref 0–5)
LYMPHOCYTES NFR BLD: 1.48 K/UL (ref 1.5–4)
LYMPHOCYTES RELATIVE PERCENT: 20 % (ref 20–42)
MCH RBC QN AUTO: 28.4 PG (ref 26–35)
MCHC RBC AUTO-ENTMCNC: 32.2 G/DL (ref 32–34.5)
MCV RBC AUTO: 88.2 FL (ref 80–99.9)
MONOCYTES NFR BLD: 0.41 K/UL (ref 0.1–0.95)
MONOCYTES NFR BLD: 6 % (ref 2–12)
NEUTROPHILS NFR BLD: 73 % (ref 43–80)
NEUTS SEG NFR BLD: 5.37 K/UL (ref 1.8–7.3)
PLATELET # BLD AUTO: 222 K/UL (ref 130–450)
PMV BLD AUTO: 10.2 FL (ref 7–12)
POTASSIUM SERPL-SCNC: 3.8 MMOL/L (ref 3.5–5)
RBC # BLD AUTO: 4.08 M/UL (ref 3.5–5.5)
SODIUM SERPL-SCNC: 138 MMOL/L (ref 132–146)
WBC OTHER # BLD: 7.4 K/UL (ref 4.5–11.5)

## 2023-08-19 PROCEDURE — 6370000000 HC RX 637 (ALT 250 FOR IP): Performed by: EMERGENCY MEDICINE

## 2023-08-19 PROCEDURE — 85025 COMPLETE CBC W/AUTO DIFF WBC: CPT

## 2023-08-19 PROCEDURE — 99284 EMERGENCY DEPT VISIT MOD MDM: CPT

## 2023-08-19 PROCEDURE — 80048 BASIC METABOLIC PNL TOTAL CA: CPT

## 2023-08-19 PROCEDURE — 93970 EXTREMITY STUDY: CPT

## 2023-08-19 PROCEDURE — 82550 ASSAY OF CK (CPK): CPT

## 2023-08-19 RX ORDER — ACETAMINOPHEN 325 MG/1
650 TABLET ORAL ONCE
Status: COMPLETED | OUTPATIENT
Start: 2023-08-19 | End: 2023-08-19

## 2023-08-19 RX ADMIN — ACETAMINOPHEN 650 MG: 325 TABLET ORAL at 13:50

## 2023-08-19 ASSESSMENT — PAIN DESCRIPTION - LOCATION: LOCATION: LEG

## 2023-08-19 ASSESSMENT — PAIN DESCRIPTION - PAIN TYPE
TYPE: ACUTE PAIN
TYPE: ACUTE PAIN

## 2023-08-19 ASSESSMENT — PAIN - FUNCTIONAL ASSESSMENT: PAIN_FUNCTIONAL_ASSESSMENT: 0-10

## 2023-08-19 ASSESSMENT — PAIN DESCRIPTION - FREQUENCY
FREQUENCY: CONTINUOUS
FREQUENCY: CONTINUOUS

## 2023-08-20 NOTE — ED NOTES
[place] notified that sister is coming to pick pt up and take back to facility. Cornelius Gallegos at [facility] said this is okay and was also updated on pt status.       Guilherme Jason RN  08/19/23 2045

## 2023-11-01 ENCOUNTER — OFFICE VISIT (OUTPATIENT)
Dept: FAMILY MEDICINE CLINIC | Age: 83
End: 2023-11-01
Payer: COMMERCIAL

## 2023-11-01 VITALS
OXYGEN SATURATION: 96 % | BODY MASS INDEX: 43.06 KG/M2 | SYSTOLIC BLOOD PRESSURE: 163 MMHG | WEIGHT: 213.6 LBS | TEMPERATURE: 97 F | RESPIRATION RATE: 16 BRPM | HEIGHT: 59 IN | HEART RATE: 66 BPM | DIASTOLIC BLOOD PRESSURE: 68 MMHG

## 2023-11-01 DIAGNOSIS — E03.9 ACQUIRED HYPOTHYROIDISM: ICD-10-CM

## 2023-11-01 DIAGNOSIS — I10 PRIMARY HYPERTENSION: Primary | ICD-10-CM

## 2023-11-01 DIAGNOSIS — E78.5 HYPERLIPIDEMIA LDL GOAL <100: ICD-10-CM

## 2023-11-01 DIAGNOSIS — N18.31 STAGE 3A CHRONIC KIDNEY DISEASE (HCC): ICD-10-CM

## 2023-11-01 DIAGNOSIS — R73.09 ELEVATED GLUCOSE: ICD-10-CM

## 2023-11-01 PROCEDURE — G8400 PT W/DXA NO RESULTS DOC: HCPCS | Performed by: FAMILY MEDICINE

## 2023-11-01 PROCEDURE — 1036F TOBACCO NON-USER: CPT | Performed by: FAMILY MEDICINE

## 2023-11-01 PROCEDURE — 3078F DIAST BP <80 MM HG: CPT | Performed by: FAMILY MEDICINE

## 2023-11-01 PROCEDURE — 99214 OFFICE O/P EST MOD 30 MIN: CPT | Performed by: FAMILY MEDICINE

## 2023-11-01 PROCEDURE — 3074F SYST BP LT 130 MM HG: CPT | Performed by: FAMILY MEDICINE

## 2023-11-01 PROCEDURE — 1090F PRES/ABSN URINE INCON ASSESS: CPT | Performed by: FAMILY MEDICINE

## 2023-11-01 PROCEDURE — G8417 CALC BMI ABV UP PARAM F/U: HCPCS | Performed by: FAMILY MEDICINE

## 2023-11-01 PROCEDURE — 1124F ACP DISCUSS-NO DSCNMKR DOCD: CPT | Performed by: FAMILY MEDICINE

## 2023-11-01 PROCEDURE — G8427 DOCREV CUR MEDS BY ELIG CLIN: HCPCS | Performed by: FAMILY MEDICINE

## 2023-11-01 PROCEDURE — G8484 FLU IMMUNIZE NO ADMIN: HCPCS | Performed by: FAMILY MEDICINE

## 2023-11-01 RX ORDER — FUROSEMIDE 20 MG/1
TABLET ORAL
Qty: 90 TABLET | Refills: 5 | Status: SHIPPED | OUTPATIENT
Start: 2023-11-01

## 2023-11-01 NOTE — PROGRESS NOTES
11/1/2023    Chief Complaint   Patient presents with    Hypertension       Marley Zuluaga is a 78 y.o. patient that presents today for:    Pt complains of persistent bloating and dependant edema. Pt would like to discuss if she should be taking her amlodipine due to todays hypertension, she's not sure if the nursing home is giving her the correct medications. Hypertension: Here for follow up chronic hypertension. Patient is  compliant with lifestyle modifications like exercise and adherence to a low salt diet. Denies chest pain, diaphoresis, dyspnea, dyspnea on exertion, peripheral edema, palpitations, HA, visual issues. Med list reviewed. Taking as prescribed. No adverse effects. Hypothyroidism:  Patient is here today to follow up chronic hypothyroidism. This problem is longstanding. This is   generally controlled on current medication regimen. Takes medication as directed and tolerates well. Denies fatigue, changes in skin, hair or nails, unintentional weight loss or gain. remarkable. TSH:    Lab Results   Component Value Date/Time    TSH 1.100 06/26/2023 11:00 AM         Hyperlipidemia:  Patient presents with hyperlipidemia. Is asymptomatic. This is a chronic problem. Patient is  well controlled, as reviewed and seen on most recent labs. Compliance with treatment thus far has been adequate. No adverse effects. Impaired glucose tolerance:  Patient is here to follow-up regarding elevated glucose levels. Recent interventions diet and exercise. Co-morbidities include dyslipidemia, hypertension, obesity (BMI >= 30 kg/m2), and sedentary lifestyle. Patient does not have polyuria, polydipsia and/or polyphagia. Lab Results   Component Value Date    LABA1C 6.0 (H) 06/26/2023      Pt complains of persistent bloating and dependant edema. Pt would like to discuss if she should be taking her amlodipine due to todays hypertension, she's not sure if the nursing home is giving her the correct medications.

## 2023-11-08 LAB
ALBUMIN SERPL-MCNC: 4.1 G/DL (ref 3.5–5.2)
ALBUMIN SERPL-MCNC: NORMAL G/DL
ALP BLD-CCNC: NORMAL U/L
ALP SERPL-CCNC: 80 U/L (ref 35–104)
ALT SERPL-CCNC: 10 U/L (ref 0–32)
ALT SERPL-CCNC: NORMAL U/L
ANION GAP SERPL CALCULATED.3IONS-SCNC: 16 MMOL/L (ref 7–16)
ANION GAP SERPL CALCULATED.3IONS-SCNC: NORMAL MMOL/L
AST SERPL-CCNC: 18 U/L (ref 0–31)
AST SERPL-CCNC: NORMAL U/L
BASOPHILS # BLD: 0.05 K/UL (ref 0–0.2)
BASOPHILS ABSOLUTE: NORMAL
BASOPHILS NFR BLD: 1 % (ref 0–2)
BASOPHILS RELATIVE PERCENT: NORMAL
BILIRUB SERPL-MCNC: 0.4 MG/DL (ref 0–1.2)
BILIRUB SERPL-MCNC: NORMAL MG/DL
BUN BLDV-MCNC: 20 MG/DL
BUN SERPL-MCNC: 20 MG/DL (ref 6–23)
CALCIUM SERPL-MCNC: 9 MG/DL (ref 8.6–10.2)
CALCIUM SERPL-MCNC: NORMAL MG/DL
CHLORIDE BLD-SCNC: NORMAL MMOL/L
CHLORIDE SERPL-SCNC: 102 MMOL/L (ref 98–107)
CHOLEST SERPL-MCNC: 169 MG/DL
CHOLESTEROL, TOTAL: NORMAL
CHOLESTEROL/HDL RATIO: NORMAL
CO2 SERPL-SCNC: 24 MMOL/L (ref 22–29)
CO2: NORMAL
CREAT SERPL-MCNC: 0.9 MG/DL
CREAT SERPL-MCNC: 0.9 MG/DL (ref 0.5–1)
EGFR: NORMAL
EOSINOPHIL # BLD: 0.15 K/UL (ref 0.05–0.5)
EOSINOPHILS ABSOLUTE: NORMAL
EOSINOPHILS RELATIVE PERCENT: 2 % (ref 0–6)
EOSINOPHILS RELATIVE PERCENT: NORMAL
ERYTHROCYTE [DISTWIDTH] IN BLOOD BY AUTOMATED COUNT: 14.7 % (ref 11.5–15)
GFR SERPL CREATININE-BSD FRML MDRD: 59 ML/MIN/1.73M2
GLUCOSE BLD-MCNC: 88 MG/DL
GLUCOSE SERPL-MCNC: 88 MG/DL (ref 74–99)
HBA1C MFR BLD: 6.3 % (ref 4–5.6)
HCT VFR BLD AUTO: 38.2 % (ref 34–48)
HCT VFR BLD CALC: NORMAL %
HDLC SERPL-MCNC: 60 MG/DL
HDLC SERPL-MCNC: NORMAL MG/DL
HEMOGLOBIN: NORMAL
HGB BLD-MCNC: 11.9 G/DL (ref 11.5–15.5)
IMM GRANULOCYTES # BLD AUTO: 0.04 K/UL (ref 0–0.58)
IMM GRANULOCYTES NFR BLD: 1 % (ref 0–5)
LDL CHOLESTEROL CALCULATED: NORMAL
LDLC SERPL CALC-MCNC: 86 MG/DL
LYMPHOCYTES ABSOLUTE: NORMAL
LYMPHOCYTES NFR BLD: 3.08 K/UL (ref 1.5–4)
LYMPHOCYTES RELATIVE PERCENT: 36 % (ref 20–42)
LYMPHOCYTES RELATIVE PERCENT: NORMAL
MCH RBC QN AUTO: 28.5 PG (ref 26–35)
MCH RBC QN AUTO: NORMAL PG
MCHC RBC AUTO-ENTMCNC: 31.2 G/DL (ref 32–34.5)
MCHC RBC AUTO-ENTMCNC: NORMAL G/DL
MCV RBC AUTO: 91.4 FL (ref 80–99.9)
MCV RBC AUTO: NORMAL FL
MONOCYTES ABSOLUTE: NORMAL
MONOCYTES NFR BLD: 0.86 K/UL (ref 0.1–0.95)
MONOCYTES NFR BLD: 10 % (ref 2–12)
MONOCYTES RELATIVE PERCENT: NORMAL
NEUTROPHILS ABSOLUTE: NORMAL
NEUTROPHILS NFR BLD: 52 % (ref 43–80)
NEUTROPHILS RELATIVE PERCENT: NORMAL
NEUTS SEG NFR BLD: 4.5 K/UL (ref 1.8–7.3)
NONHDLC SERPL-MCNC: NORMAL MG/DL
PDW BLD-RTO: NORMAL %
PLATELET # BLD AUTO: 204 K/UL (ref 130–450)
PLATELET # BLD: NORMAL 10*3/UL
PMV BLD AUTO: 10.8 FL (ref 7–12)
PMV BLD AUTO: NORMAL FL
POTASSIUM SERPL-SCNC: 4.3 MMOL/L
POTASSIUM SERPL-SCNC: 4.3 MMOL/L (ref 3.5–5)
PROT SERPL-MCNC: 7.1 G/DL (ref 6.4–8.3)
RBC # BLD AUTO: 4.18 M/UL (ref 3.5–5.5)
RBC # BLD: NORMAL 10*6/UL
SODIUM BLD-SCNC: NORMAL MMOL/L
SODIUM SERPL-SCNC: 142 MMOL/L (ref 132–146)
TOTAL PROTEIN: NORMAL
TRIGL SERPL-MCNC: 116 MG/DL
TRIGL SERPL-MCNC: NORMAL MG/DL
TSH SERPL DL<=0.05 MIU/L-ACNC: 1.48 UIU/ML (ref 0.27–4.2)
TSH SERPL DL<=0.05 MIU/L-ACNC: NORMAL M[IU]/L
VLDLC SERPL CALC-MCNC: 23 MG/DL
VLDLC SERPL CALC-MCNC: NORMAL MG/DL
WBC # BLD: NORMAL 10*3/UL
WBC OTHER # BLD: 8.7 K/UL (ref 4.5–11.5)

## 2023-11-13 RX ORDER — POLYETHYLENE GLYCOL 3350 17 G/17G
17 POWDER, FOR SOLUTION ORAL DAILY
Qty: 1 EACH | Refills: 2 | Status: SHIPPED | OUTPATIENT
Start: 2023-11-13
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
## 2023-11-28 ENCOUNTER — OFFICE VISIT (OUTPATIENT)
Dept: GERIATRIC MEDICINE | Age: 83
End: 2023-11-28
Payer: COMMERCIAL

## 2023-11-28 VITALS
BODY MASS INDEX: 43.24 KG/M2 | TEMPERATURE: 97.7 F | SYSTOLIC BLOOD PRESSURE: 118 MMHG | DIASTOLIC BLOOD PRESSURE: 58 MMHG | RESPIRATION RATE: 17 BRPM | WEIGHT: 214.1 LBS | HEART RATE: 60 BPM

## 2023-11-28 DIAGNOSIS — G30.9 ALZHEIMER DISEASE (HCC): Primary | ICD-10-CM

## 2023-11-28 DIAGNOSIS — F02.80 ALZHEIMER DISEASE (HCC): Primary | ICD-10-CM

## 2023-11-28 PROCEDURE — G8400 PT W/DXA NO RESULTS DOC: HCPCS | Performed by: INTERNAL MEDICINE

## 2023-11-28 PROCEDURE — 3074F SYST BP LT 130 MM HG: CPT | Performed by: INTERNAL MEDICINE

## 2023-11-28 PROCEDURE — 1124F ACP DISCUSS-NO DSCNMKR DOCD: CPT | Performed by: INTERNAL MEDICINE

## 2023-11-28 PROCEDURE — 3078F DIAST BP <80 MM HG: CPT | Performed by: INTERNAL MEDICINE

## 2023-11-28 PROCEDURE — 1090F PRES/ABSN URINE INCON ASSESS: CPT | Performed by: INTERNAL MEDICINE

## 2023-11-28 PROCEDURE — G8484 FLU IMMUNIZE NO ADMIN: HCPCS | Performed by: INTERNAL MEDICINE

## 2023-11-28 PROCEDURE — 1036F TOBACCO NON-USER: CPT | Performed by: INTERNAL MEDICINE

## 2023-11-28 PROCEDURE — 99213 OFFICE O/P EST LOW 20 MIN: CPT | Performed by: INTERNAL MEDICINE

## 2023-11-28 PROCEDURE — G8417 CALC BMI ABV UP PARAM F/U: HCPCS | Performed by: INTERNAL MEDICINE

## 2023-11-28 PROCEDURE — G8427 DOCREV CUR MEDS BY ELIG CLIN: HCPCS | Performed by: INTERNAL MEDICINE

## 2023-11-28 NOTE — PROGRESS NOTES
CC Recheck memory    Here with daughter  And may be doing worse   And repetition  No diarrhea  Doing well in assisted living  Poor sleeper   Doing all IADL's  Mobile with cane   May be aware of Traficants death  ANA MARIA history  Hx of Mac D   Impression; Early Dementia and progression  Plan' Aricept to 5 mg a day

## 2023-11-28 NOTE — PROGRESS NOTES
Chief Complaint   Patient presents with    Follow-up     6 month follow up patient is here with her daughter Garrett Mcmillan. Patients daughter has some questions about her medications and memory.

## 2023-11-29 ENCOUNTER — TELEPHONE (OUTPATIENT)
Dept: FAMILY MEDICINE CLINIC | Age: 83
End: 2023-11-29

## 2023-11-29 ENCOUNTER — OFFICE VISIT (OUTPATIENT)
Dept: FAMILY MEDICINE CLINIC | Age: 83
End: 2023-11-29
Payer: COMMERCIAL

## 2023-11-29 VITALS
RESPIRATION RATE: 20 BRPM | SYSTOLIC BLOOD PRESSURE: 138 MMHG | BODY MASS INDEX: 43.08 KG/M2 | TEMPERATURE: 97.1 F | WEIGHT: 213.7 LBS | DIASTOLIC BLOOD PRESSURE: 64 MMHG | OXYGEN SATURATION: 95 % | HEART RATE: 64 BPM | HEIGHT: 59 IN

## 2023-11-29 DIAGNOSIS — R60.9 PERIPHERAL EDEMA: Primary | ICD-10-CM

## 2023-11-29 PROCEDURE — G8427 DOCREV CUR MEDS BY ELIG CLIN: HCPCS | Performed by: FAMILY MEDICINE

## 2023-11-29 PROCEDURE — G8417 CALC BMI ABV UP PARAM F/U: HCPCS | Performed by: FAMILY MEDICINE

## 2023-11-29 PROCEDURE — 1036F TOBACCO NON-USER: CPT | Performed by: FAMILY MEDICINE

## 2023-11-29 PROCEDURE — 1124F ACP DISCUSS-NO DSCNMKR DOCD: CPT | Performed by: FAMILY MEDICINE

## 2023-11-29 PROCEDURE — G8484 FLU IMMUNIZE NO ADMIN: HCPCS | Performed by: FAMILY MEDICINE

## 2023-11-29 PROCEDURE — 3078F DIAST BP <80 MM HG: CPT | Performed by: FAMILY MEDICINE

## 2023-11-29 PROCEDURE — G8400 PT W/DXA NO RESULTS DOC: HCPCS | Performed by: FAMILY MEDICINE

## 2023-11-29 PROCEDURE — 1090F PRES/ABSN URINE INCON ASSESS: CPT | Performed by: FAMILY MEDICINE

## 2023-11-29 PROCEDURE — 99214 OFFICE O/P EST MOD 30 MIN: CPT | Performed by: FAMILY MEDICINE

## 2023-11-29 PROCEDURE — 3075F SYST BP GE 130 - 139MM HG: CPT | Performed by: FAMILY MEDICINE

## 2023-11-29 RX ORDER — FUROSEMIDE 20 MG/1
20 TABLET ORAL
Qty: 5 TABLET | Refills: 0 | Status: SHIPPED | OUTPATIENT
Start: 2023-11-29 | End: 2023-12-04

## 2023-11-29 NOTE — TELEPHONE ENCOUNTER
Nurse from assisted living called question new Lasix order.  States that written down is that the patient is to take 2 20mg tablets daily, but the nurse states that the patient is currently taking 40 in the AM and 20 in the PM.

## 2023-11-29 NOTE — TELEPHONE ENCOUNTER
Addended by: JANI THAPA on: 11/29/2023 09:34 AM     Modules accepted: Orders     Spoke with patient and gave results of 14 and explained that it was in normal range

## 2023-12-11 NOTE — PLAN OF CARE
unsuccessful or patient reports new pain     Problem: Neurosensory - Adult  Goal: Achieves stable or improved neurological status  Outcome: Progressing  Flowsheets (Taken 8/11/2022 2000)  Achieves stable or improved neurological status: Assess for and report changes in neurological status  Goal: Absence of seizures  Outcome: Progressing  Flowsheets (Taken 8/11/2022 2000)  Absence of seizures:   Monitor for seizure activity.   If seizure occurs, document type and location of movements and any associated apnea   If seizure occurs, turn head to side and suction secretions as needed  Goal: Achieves maximal functionality and self care  Outcome: Progressing     Problem: Cardiovascular - Adult  Goal: Maintains optimal cardiac output and hemodynamic stability  Outcome: Progressing  Flowsheets (Taken 8/11/2022 2000)  Maintains optimal cardiac output and hemodynamic stability:   Monitor blood pressure and heart rate   Monitor urine output and notify Licensed Independent Practitioner for values outside of normal range  Goal: Absence of cardiac dysrhythmias or at baseline  Outcome: Progressing  Flowsheets (Taken 8/11/2022 2000)  Absence of cardiac dysrhythmias or at baseline:   Monitor cardiac rate and rhythm   Assess for signs of decreased cardiac output     Problem: Skin/Tissue Integrity - Adult  Goal: Skin integrity remains intact  Outcome: Progressing  Flowsheets (Taken 8/11/2022 2000)  Skin Integrity Remains Intact:   Monitor for areas of redness and/or skin breakdown   Assess vascular access sites hourly  Goal: Oral mucous membranes remain intact  Outcome: Progressing  Flowsheets (Taken 8/11/2022 2000)  Oral Mucous Membranes Remain Intact: Assess oral mucosa and hygiene practices     Problem: Musculoskeletal - Adult  Goal: Return mobility to safest level of function  Outcome: Progressing  Flowsheets (Taken 8/11/2022 2000)  Return Mobility to Safest Level of Function:   Assess patient stability and activity tolerance for standing, transferring and ambulating with or without assistive devices   Assist with transfers and ambulation using safe patient handling equipment as needed  Goal: Return ADL status to a safe level of function  Outcome: Progressing  Flowsheets (Taken 8/11/2022 2000)  Return ADL Status to a Safe Level of Function: Assess activities of daily living deficits and provide assistive devices as needed     Problem: Gastrointestinal - Adult  Goal: Minimal or absence of nausea and vomiting  Outcome: Progressing  Goal: Maintains or returns to baseline bowel function  Outcome: Progressing  Flowsheets (Taken 8/11/2022 2000)  Maintains or returns to baseline bowel function:   Assess bowel function   Encourage oral fluids to ensure adequate hydration  Goal: Maintains adequate nutritional intake  Outcome: Progressing  Flowsheets (Taken 8/11/2022 2000)  Maintains adequate nutritional intake: Monitor percentage of each meal consumed     Problem: Genitourinary - Adult  Goal: Absence of urinary retention  Outcome: Progressing  Flowsheets (Taken 8/11/2022 2000)  Absence of urinary retention:   Assess patients ability to void and empty bladder   Monitor intake/output and perform bladder scan as needed     Problem: Infection - Adult  Goal: Absence of infection at discharge  Outcome: Progressing  Flowsheets (Taken 8/11/2022 2000)  Absence of infection at discharge:   Assess and monitor for signs and symptoms of infection   Monitor lab/diagnostic results   Monitor all insertion sites i.e., indwelling lines, tubes and drains  Goal: Absence of infection during hospitalization  Outcome: Progressing  Flowsheets (Taken 8/11/2022 2000)  Absence of infection during hospitalization:   Assess and monitor for signs and symptoms of infection   Monitor lab/diagnostic results   Monitor all insertion sites i.e., indwelling lines, tubes and drains  Goal: Absence of fever/infection during anticipated neutropenic period  Outcome: Progressing  Flowsheets 1

## 2023-12-16 ENCOUNTER — HOSPITAL ENCOUNTER (EMERGENCY)
Age: 83
Discharge: HOME | End: 2023-12-16
Attending: STUDENT IN AN ORGANIZED HEALTH CARE EDUCATION/TRAINING PROGRAM
Payer: COMMERCIAL

## 2023-12-16 ENCOUNTER — APPOINTMENT (OUTPATIENT)
Dept: CT IMAGING | Age: 83
End: 2023-12-16
Payer: COMMERCIAL

## 2023-12-16 ENCOUNTER — APPOINTMENT (OUTPATIENT)
Dept: GENERAL RADIOLOGY | Age: 83
End: 2023-12-16
Payer: COMMERCIAL

## 2023-12-16 VITALS
SYSTOLIC BLOOD PRESSURE: 187 MMHG | HEIGHT: 59 IN | DIASTOLIC BLOOD PRESSURE: 60 MMHG | OXYGEN SATURATION: 97 % | TEMPERATURE: 98 F | RESPIRATION RATE: 16 BRPM | HEART RATE: 68 BPM | WEIGHT: 210 LBS | BODY MASS INDEX: 42.33 KG/M2

## 2023-12-16 DIAGNOSIS — S00.81XA ABRASION OF FACE, INITIAL ENCOUNTER: ICD-10-CM
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
## 2023-12-16 LAB
ALBUMIN SERPL-MCNC: 4.2 G/DL (ref 3.5–5.2)
ALP SERPL-CCNC: 86 U/L (ref 35–104)
ALT SERPL-CCNC: 12 U/L (ref 0–32)
ANION GAP SERPL CALCULATED.3IONS-SCNC: 14 MMOL/L (ref 7–16)
AST SERPL-CCNC: 17 U/L (ref 0–31)
BACTERIA URNS QL MICRO: ABNORMAL
BASOPHILS # BLD: 0.04 K/UL (ref 0–0.2)
BASOPHILS NFR BLD: 1 % (ref 0–2)
BILIRUB SERPL-MCNC: 0.3 MG/DL (ref 0–1.2)
BILIRUB UR QL STRIP: NEGATIVE
BUN SERPL-MCNC: 20 MG/DL (ref 6–23)
CALCIUM SERPL-MCNC: 9.2 MG/DL (ref 8.6–10.2)
CHLORIDE SERPL-SCNC: 100 MMOL/L (ref 98–107)
CLARITY UR: CLEAR
CO2 SERPL-SCNC: 25 MMOL/L (ref 22–29)
COLOR UR: YELLOW
CREAT SERPL-MCNC: 1 MG/DL (ref 0.5–1)
EKG ATRIAL RATE: 62 BPM
EKG P AXIS: 64 DEGREES
EKG P-R INTERVAL: 168 MS
EKG Q-T INTERVAL: 434 MS
EKG QRS DURATION: 76 MS
EKG QTC CALCULATION (BAZETT): 440 MS
EKG R AXIS: -9 DEGREES
EKG T AXIS: 30 DEGREES
EKG VENTRICULAR RATE: 62 BPM
EOSINOPHIL # BLD: 0.06 K/UL (ref 0.05–0.5)
EOSINOPHILS RELATIVE PERCENT: 1 % (ref 0–6)
ERYTHROCYTE [DISTWIDTH] IN BLOOD BY AUTOMATED COUNT: 14.2 % (ref 11.5–15)
GFR SERPL CREATININE-BSD FRML MDRD: 59 ML/MIN/1.73M2
GLUCOSE SERPL-MCNC: 101 MG/DL (ref 74–99)
GLUCOSE UR STRIP-MCNC: NEGATIVE MG/DL
HCT VFR BLD AUTO: 36.7 % (ref 34–48)
HGB BLD-MCNC: 11.9 G/DL (ref 11.5–15.5)
HGB UR QL STRIP.AUTO: NEGATIVE
IMM GRANULOCYTES # BLD AUTO: 0.05 K/UL (ref 0–0.58)
IMM GRANULOCYTES NFR BLD: 1 % (ref 0–5)
KETONES UR STRIP-MCNC: NEGATIVE MG/DL
LEUKOCYTE ESTERASE UR QL STRIP: ABNORMAL
LYMPHOCYTES NFR BLD: 1.81 K/UL (ref 1.5–4)
LYMPHOCYTES RELATIVE PERCENT: 22 % (ref 20–42)
MCH RBC QN AUTO: 27.8 PG (ref 26–35)
MCHC RBC AUTO-ENTMCNC: 32.4 G/DL (ref 32–34.5)
MCV RBC AUTO: 85.7 FL (ref 80–99.9)
MONOCYTES NFR BLD: 0.48 K/UL (ref 0.1–0.95)
MONOCYTES NFR BLD: 6 % (ref 2–12)
NEUTROPHILS NFR BLD: 71 % (ref 43–80)
NEUTS SEG NFR BLD: 5.84 K/UL (ref 1.8–7.3)
NITRITE UR QL STRIP: NEGATIVE
PH UR STRIP: 7 [PH] (ref 5–9)
PLATELET # BLD AUTO: 234 K/UL (ref 130–450)
PMV BLD AUTO: 9.8 FL (ref 7–12)
POTASSIUM SERPL-SCNC: 3.8 MMOL/L (ref 3.5–5)
PROT SERPL-MCNC: 7.2 G/DL (ref 6.4–8.3)
PROT UR STRIP-MCNC: NEGATIVE MG/DL
RBC # BLD AUTO: 4.28 M/UL (ref 3.5–5.5)
RBC #/AREA URNS HPF: ABNORMAL /HPF
SODIUM SERPL-SCNC: 139 MMOL/L (ref 132–146)
SP GR UR STRIP: <1.005 (ref 1–1.03)
TROPONIN I SERPL HS-MCNC: 16 NG/L (ref 0–9)
TROPONIN I SERPL HS-MCNC: 19 NG/L (ref 0–9)
UROBILINOGEN UR STRIP-ACNC: 0.2 EU/DL (ref 0–1)
WBC #/AREA URNS HPF: ABNORMAL /HPF
WBC OTHER # BLD: 8.3 K/UL (ref 4.5–11.5)

## 2023-12-16 PROCEDURE — 81001 URINALYSIS AUTO W/SCOPE: CPT

## 2023-12-16 PROCEDURE — 70450 CT HEAD/BRAIN W/O DYE: CPT

## 2023-12-16 PROCEDURE — 85025 COMPLETE CBC W/AUTO DIFF WBC: CPT

## 2023-12-16 PROCEDURE — 73502 X-RAY EXAM HIP UNI 2-3 VIEWS: CPT

## 2023-12-16 PROCEDURE — 72125 CT NECK SPINE W/O DYE: CPT

## 2023-12-16 PROCEDURE — 80053 COMPREHEN METABOLIC PANEL: CPT

## 2023-12-16 PROCEDURE — 84484 ASSAY OF TROPONIN QUANT: CPT

## 2023-12-16 PROCEDURE — 70486 CT MAXILLOFACIAL W/O DYE: CPT

## 2023-12-16 ASSESSMENT — PAIN SCALES - GENERAL: PAINLEVEL_OUTOF10: 9

## 2023-12-16 NOTE — DISCHARGE INSTRUCTIONS
XR HIP 2-3 VW W PELVIS LEFT   Final Result   No fracture or dislocation. Osteo-degenerative changes again noted, when   compared to the previous studies performed 07/29/2021. CT HEAD WO CONTRAST   Final Result   1. There is no acute intracranial abnormality. Specifically, there is no   intracranial hemorrhage. 2. Atrophy and periventricular leukomalacia,   3. Old right occipital lobe infarct         CT FACIAL BONES WO CONTRAST   Final Result      No acute findings in the face. CT CERVICAL SPINE WO CONTRAST   Final Result   1. There is no acute compression fracture or subluxation of the cervical   spine. 2. Multilevel degenerative disc and degenerative joint disease. Jose López

## 2023-12-16 NOTE — ED PROVIDER NOTES
normal.   Cardiovascular:      Rate and Rhythm: Normal rate and regular rhythm. Heart sounds: Normal heart sounds. Pulmonary:      Effort: Pulmonary effort is normal.      Breath sounds: Normal breath sounds. Abdominal:      General: There is no distension. Palpations: Abdomen is soft. There is no mass. Tenderness: There is no abdominal tenderness. Hernia: No hernia is present. Musculoskeletal:         General: No deformity. Normal range of motion. Cervical back: Normal range of motion and neck supple. Skin:     General: Skin is warm. Neurological:      General: No focal deficit present. Mental Status: She is alert and oriented to person, place, and time. Cranial Nerves: No cranial nerve deficit. Procedures    MDM             Differential diagnosis: ***  Review of ED/ Outpatient Records: ***  Historians that case was discussed with: ***  My EKG interpretation: See ED course  Imaging Non-plain film images such as CT, Ultrasound and MRI are read by the radiologist. Plain radiographic images are visualized and preliminarily interpreted by the ED provider:***  Discussed with other providers: ***  Tests Considered but not ordered: ***  Decision making tools/risks stratification / MDM / Independent Interpretation of labs: Divina Farmer presents to the ED for ***. History from ***, with *** limitations. Workup in the ED revealed  ***. Social Determinants of health impacting treatment or disposition: ***  CODE status and Discussions: ***        Patient continues to be non-toxic on re-evaluation. Findings were discussed with the patient and reasons to immediately return to the ED were articulated to them. They will follow-up with their PCP, Dr. Yonatan Henley and {Specialties; internal medicine subspecialties:05919}. I am the Primary Clinician of Record.       Patient requires continued workup and management of their symptoms and will be admitted to the hospital for further ---------------------------------  At this time the patient is without objective evidence of an acute process requiring hospitalization or inpatient management. They have remained hemodynamically stable throughout their entire ED visit and are stable for discharge with outpatient follow-up. The plan has been discussed in detail and they are aware of the specific conditions for emergent return, as well as the importance of follow-up. Discharge Medication List as of 12/16/2023  4:44 PM          Diagnosis:  1. Fall, initial encounter    2. Abrasion of face, initial encounter        Disposition:  Patient's disposition: Discharge to home  Patient's condition is stable.        Sarath Chun,   12/20/23 1116

## 2024-01-11 ENCOUNTER — OFFICE VISIT (OUTPATIENT)
Dept: FAMILY MEDICINE CLINIC | Age: 84
End: 2024-01-11
Payer: COMMERCIAL

## 2024-01-11 VITALS
BODY MASS INDEX: 43.46 KG/M2 | RESPIRATION RATE: 16 BRPM | DIASTOLIC BLOOD PRESSURE: 73 MMHG | WEIGHT: 215.6 LBS | TEMPERATURE: 97.5 F | SYSTOLIC BLOOD PRESSURE: 159 MMHG | HEART RATE: 57 BPM | HEIGHT: 59 IN | OXYGEN SATURATION: 94 %

## 2024-01-11 DIAGNOSIS — E03.9 ACQUIRED HYPOTHYROIDISM: Primary | Chronic | ICD-10-CM

## 2024-01-11 DIAGNOSIS — F33.1 MODERATE EPISODE OF RECURRENT MAJOR DEPRESSIVE DISORDER (HCC): ICD-10-CM

## 2024-01-11 DIAGNOSIS — R35.0 URINARY FREQUENCY: ICD-10-CM

## 2024-01-11 DIAGNOSIS — N18.31 STAGE 3A CHRONIC KIDNEY DISEASE (HCC): ICD-10-CM

## 2024-01-11 DIAGNOSIS — E66.01 OBESITY, CLASS III, BMI 40-49.9 (MORBID OBESITY) (HCC): ICD-10-CM

## 2024-01-11 DIAGNOSIS — G30.9 ALZHEIMER DISEASE (HCC): ICD-10-CM

## 2024-01-11 DIAGNOSIS — L82.1 SEBORRHEIC KERATOSES: ICD-10-CM

## 2024-01-11 DIAGNOSIS — I10 PRIMARY HYPERTENSION: ICD-10-CM

## 2024-01-11 DIAGNOSIS — F02.80 ALZHEIMER DISEASE (HCC): ICD-10-CM

## 2024-01-11 DIAGNOSIS — H35.3210 EXUDATIVE AGE-RELATED MACULAR DEGENERATION OF RIGHT EYE, UNSPECIFIED STAGE (HCC): ICD-10-CM

## 2024-01-11 PROCEDURE — 1090F PRES/ABSN URINE INCON ASSESS: CPT | Performed by: FAMILY MEDICINE

## 2024-01-11 PROCEDURE — G8400 PT W/DXA NO RESULTS DOC: HCPCS | Performed by: FAMILY MEDICINE

## 2024-01-11 PROCEDURE — 99214 OFFICE O/P EST MOD 30 MIN: CPT | Performed by: FAMILY MEDICINE

## 2024-01-11 PROCEDURE — G8427 DOCREV CUR MEDS BY ELIG CLIN: HCPCS | Performed by: FAMILY MEDICINE

## 2024-01-11 PROCEDURE — 1124F ACP DISCUSS-NO DSCNMKR DOCD: CPT | Performed by: FAMILY MEDICINE

## 2024-01-11 PROCEDURE — G8484 FLU IMMUNIZE NO ADMIN: HCPCS | Performed by: FAMILY MEDICINE

## 2024-01-11 PROCEDURE — 1036F TOBACCO NON-USER: CPT | Performed by: FAMILY MEDICINE

## 2024-01-11 PROCEDURE — G8417 CALC BMI ABV UP PARAM F/U: HCPCS | Performed by: FAMILY MEDICINE

## 2024-01-11 PROCEDURE — 3077F SYST BP >= 140 MM HG: CPT | Performed by: FAMILY MEDICINE

## 2024-01-11 PROCEDURE — 3078F DIAST BP <80 MM HG: CPT | Performed by: FAMILY MEDICINE

## 2024-01-11 RX ORDER — ONDANSETRON 4 MG/1
TABLET, ORALLY DISINTEGRATING ORAL
COMMUNITY
Start: 2023-12-30

## 2024-01-11 RX ORDER — SANITARY PADS
1 EACH MISCELLANEOUS 3 TIMES DAILY PRN
Qty: 30 EACH | Refills: 11 | Status: SHIPPED | OUTPATIENT
Start: 2024-01-11

## 2024-01-11 ASSESSMENT — PATIENT HEALTH QUESTIONNAIRE - PHQ9
4. FEELING TIRED OR HAVING LITTLE ENERGY: 0
8. MOVING OR SPEAKING SO SLOWLY THAT OTHER PEOPLE COULD HAVE NOTICED. OR THE OPPOSITE, BEING SO FIGETY OR RESTLESS THAT YOU HAVE BEEN MOVING AROUND A LOT MORE THAN USUAL: 0
10. IF YOU CHECKED OFF ANY PROBLEMS, HOW DIFFICULT HAVE THESE PROBLEMS MADE IT FOR YOU TO DO YOUR WORK, TAKE CARE OF THINGS AT HOME, OR GET ALONG WITH OTHER PEOPLE: 0
SUM OF ALL RESPONSES TO PHQ QUESTIONS 1-9: 0
9. THOUGHTS THAT YOU WOULD BE BETTER OFF DEAD, OR OF HURTING YOURSELF: 0
SUM OF ALL RESPONSES TO PHQ QUESTIONS 1-9: 0
5. POOR APPETITE OR OVEREATING: 0
1. LITTLE INTEREST OR PLEASURE IN DOING THINGS: 0
2. FEELING DOWN, DEPRESSED OR HOPELESS: 0
6. FEELING BAD ABOUT YOURSELF - OR THAT YOU ARE A FAILURE OR HAVE LET YOURSELF OR YOUR FAMILY DOWN: 0
SUM OF ALL RESPONSES TO PHQ QUESTIONS 1-9: 0
7. TROUBLE CONCENTRATING ON THINGS, SUCH AS READING THE NEWSPAPER OR WATCHING TELEVISION: 0
3. TROUBLE FALLING OR STAYING ASLEEP: 0
SUM OF ALL RESPONSES TO PHQ QUESTIONS 1-9: 0
SUM OF ALL RESPONSES TO PHQ9 QUESTIONS 1 & 2: 0

## 2024-01-11 NOTE — PROGRESS NOTES
1/17/2024    Chief Complaint   Patient presents with    Follow-up       Valarie Mata is a 83 y.o. patient that presents today for:    Hypertension: Here for follow up chronic hypertension. Patient is  compliant with lifestyle modifications like exercise and adherence to a low salt diet. Denies chest pain, diaphoresis, dyspnea, dyspnea on exertion, peripheral edema, palpitations, HA, visual issues. Med list reviewed. Taking as prescribed. No adverse effects.    Hypothyroidism:  Patient is here today to follow up chronic hypothyroidism. This problem is longstanding. This is   generally controlled on current medication regimen. Takes medication as directed and tolerates well. Denies fatigue, changes in skin, hair or nails, unintentional weight loss or gain.  remarkable. TSH:    Lab Results   Component Value Date/Time    TSH 1.48 11/08/2023 03:00 AM      CKD is stable. Sees Nephrology.     Alzheimers Demetia: stable per Geriatrics.     Macular degeneration: Sees Dr. Yanez. Vision is poor.     Growth on her face, would like to see Derm for removal. No change in symmetry, borders, color or diameter. No history of skin cancer.    Feeling well otherwise, no complaints.     Patient's past medical, surgical, social and/or family history reviewed, updated in chart, and are non-contributory (unless otherwise stated).  Medications and allergies also reviewed and updated in chart.    ROS negative unless otherwise specified    Physical Exam  Wt Readings from Last 3 Encounters:   01/11/24 97.8 kg (215 lb 9.6 oz)   12/16/23 95.3 kg (210 lb)   11/29/23 96.9 kg (213 lb 11.2 oz)     Temp Readings from Last 3 Encounters:   01/11/24 97.5 °F (36.4 °C) (Temporal)   12/16/23 98 °F (36.7 °C)   11/29/23 97.1 °F (36.2 °C)     BP Readings from Last 3 Encounters:   01/11/24 (!) 159/73   12/16/23 (!) 187/60   11/29/23 138/64     Pulse Readings from Last 3 Encounters:   01/11/24 57   12/16/23 68   11/29/23 64       General appearance: alert,

## 2024-01-16 ENCOUNTER — TELEPHONE (OUTPATIENT)
Dept: FAMILY MEDICINE CLINIC | Age: 84
End: 2024-01-16

## 2024-01-16 NOTE — TELEPHONE ENCOUNTER
[facility] called and stated that they received the patients referral but her insurance is out of there network so she will need a new referral

## 2024-01-16 NOTE — PROGRESS NOTES
ESTABLISH CARE WITH A PCP   MARY Mathews notified of urology consult per perfectserve/answering service.   Pt added to census

## 2024-01-17 DIAGNOSIS — L82.1 SEBORRHEIC KERATOSES: Primary | ICD-10-CM

## 2024-02-12 ENCOUNTER — OFFICE VISIT (OUTPATIENT)
Dept: FAMILY MEDICINE CLINIC | Age: 84
End: 2024-02-12
Payer: COMMERCIAL

## 2024-02-12 VITALS
BODY MASS INDEX: 42.52 KG/M2 | HEART RATE: 67 BPM | RESPIRATION RATE: 16 BRPM | HEIGHT: 59 IN | SYSTOLIC BLOOD PRESSURE: 150 MMHG | OXYGEN SATURATION: 95 % | DIASTOLIC BLOOD PRESSURE: 80 MMHG | TEMPERATURE: 98.1 F | WEIGHT: 210.9 LBS

## 2024-02-12 DIAGNOSIS — Z00.00 MEDICARE ANNUAL WELLNESS VISIT, SUBSEQUENT: Primary | ICD-10-CM

## 2024-02-12 PROCEDURE — 1124F ACP DISCUSS-NO DSCNMKR DOCD: CPT | Performed by: FAMILY MEDICINE

## 2024-02-12 PROCEDURE — G8484 FLU IMMUNIZE NO ADMIN: HCPCS | Performed by: FAMILY MEDICINE

## 2024-02-12 PROCEDURE — 3079F DIAST BP 80-89 MM HG: CPT | Performed by: FAMILY MEDICINE

## 2024-02-12 PROCEDURE — 3077F SYST BP >= 140 MM HG: CPT | Performed by: FAMILY MEDICINE

## 2024-02-12 PROCEDURE — G0439 PPPS, SUBSEQ VISIT: HCPCS | Performed by: FAMILY MEDICINE

## 2024-02-12 ASSESSMENT — PATIENT HEALTH QUESTIONNAIRE - PHQ9
6. FEELING BAD ABOUT YOURSELF - OR THAT YOU ARE A FAILURE OR HAVE LET YOURSELF OR YOUR FAMILY DOWN: 0
7. TROUBLE CONCENTRATING ON THINGS, SUCH AS READING THE NEWSPAPER OR WATCHING TELEVISION: 0
5. POOR APPETITE OR OVEREATING: 0
9. THOUGHTS THAT YOU WOULD BE BETTER OFF DEAD, OR OF HURTING YOURSELF: 0
8. MOVING OR SPEAKING SO SLOWLY THAT OTHER PEOPLE COULD HAVE NOTICED. OR THE OPPOSITE, BEING SO FIGETY OR RESTLESS THAT YOU HAVE BEEN MOVING AROUND A LOT MORE THAN USUAL: 0
SUM OF ALL RESPONSES TO PHQ QUESTIONS 1-9: 0
SUM OF ALL RESPONSES TO PHQ QUESTIONS 1-9: 0
4. FEELING TIRED OR HAVING LITTLE ENERGY: 0
SUM OF ALL RESPONSES TO PHQ QUESTIONS 1-9: 0
2. FEELING DOWN, DEPRESSED OR HOPELESS: 0
10. IF YOU CHECKED OFF ANY PROBLEMS, HOW DIFFICULT HAVE THESE PROBLEMS MADE IT FOR YOU TO DO YOUR WORK, TAKE CARE OF THINGS AT HOME, OR GET ALONG WITH OTHER PEOPLE: 0
3. TROUBLE FALLING OR STAYING ASLEEP: 0
SUM OF ALL RESPONSES TO PHQ9 QUESTIONS 1 & 2: 0
1. LITTLE INTEREST OR PLEASURE IN DOING THINGS: 0
SUM OF ALL RESPONSES TO PHQ QUESTIONS 1-9: 0

## 2024-02-12 NOTE — PROGRESS NOTES
Medicare Annual Wellness Visit    Valarie Mata is here for Medicare AWV    Assessment & Plan   Medicare annual wellness visit, subsequent  Recommendations for Preventive Services Due: see orders and patient instructions/AVS.  Recommended screening schedule for the next 5-10 years is provided to the patient in written form: see Patient Instructions/AVS.     Return in 6 months (on 8/12/2024), or if symptoms worsen or fail to improve, for Medicare Annual Wellness Visit in 1 year.     Subjective   The following acute and/or chronic problems were also addressed today:  Edema is stable. Does not watch salt in diet, minimally mobile. Elevates legs in recliner.   Patient's complete Health Risk Assessment and screening values have been reviewed and are found in Flowsheets. The following problems were reviewed today and where indicated follow up appointments were made and/or referrals ordered.    Positive Risk Factor Screenings with Interventions:    Fall Risk:  Do you feel unsteady or are you worried about falling? : (!) yes  2 or more falls in past year?: no  Fall with injury in past year?: no     Interventions:    Reviewed medications, home hazards, visual acuity, and co-morbidities that can increase risk for falls    Cognitive:   Clock Drawing Test (CDT): Normal  Words recalled: 0 Words Recalled  Total Score: (!) 2  Total Score Interpretation: Abnormal Mini-Cog  Interventions:  Sees Dr. Palmer           General HRA Questions:  Select all that apply: (!) Stress    Stress Interventions:  On meds      Activity, Diet, and Weight:  On average, how many days per week do you engage in moderate to strenuous exercise (like a brisk walk)?: 0 days  On average, how many minutes do you engage in exercise at this level?: 0 min    Do you eat balanced/healthy meals regularly?: Yes    Body mass index is 42.6 kg/m². (!) Abnormal      Inactivity Interventions:  Patient declined any further interventions or treatment  Obesity

## 2024-02-27 ENCOUNTER — HOSPITAL ENCOUNTER (INPATIENT)
Age: 84
LOS: 4 days | Discharge: NURSING, REHAB OR LONG-TERM CARE | End: 2024-03-02
Attending: EMERGENCY MEDICINE | Admitting: INTERNAL MEDICINE
Payer: COMMERCIAL

## 2024-02-27 ENCOUNTER — APPOINTMENT (OUTPATIENT)
Dept: GENERAL RADIOLOGY | Age: 84
End: 2024-02-27
Payer: COMMERCIAL

## 2024-02-27 ENCOUNTER — APPOINTMENT (OUTPATIENT)
Dept: ULTRASOUND IMAGING | Age: 84
End: 2024-02-27
Payer: COMMERCIAL

## 2024-02-27 ENCOUNTER — APPOINTMENT (OUTPATIENT)
Dept: CT IMAGING | Age: 84
End: 2024-02-27
Payer: COMMERCIAL

## 2024-02-27 ENCOUNTER — APPOINTMENT (OUTPATIENT)
Dept: NUCLEAR MEDICINE | Age: 84
End: 2024-02-27
Payer: COMMERCIAL

## 2024-02-27 DIAGNOSIS — J96.01 ACUTE HYPOXIC RESPIRATORY FAILURE (HCC): Primary | ICD-10-CM

## 2024-02-27 DIAGNOSIS — R60.1 GENERALIZED EDEMA: ICD-10-CM

## 2024-02-27 DIAGNOSIS — H92.01 RIGHT EAR PAIN: ICD-10-CM

## 2024-02-27 DIAGNOSIS — R11.2 NAUSEA AND VOMITING, UNSPECIFIED VOMITING TYPE: ICD-10-CM

## 2024-02-27 LAB
ALBUMIN SERPL-MCNC: 4.3 G/DL (ref 3.5–5.2)
ALP SERPL-CCNC: 86 U/L (ref 35–104)
ALT SERPL-CCNC: 13 U/L (ref 0–32)
ANION GAP SERPL CALCULATED.3IONS-SCNC: 18 MMOL/L (ref 7–16)
AST SERPL-CCNC: 17 U/L (ref 0–31)
B PARAP IS1001 DNA NPH QL NAA+NON-PROBE: NOT DETECTED
B PERT DNA SPEC QL NAA+PROBE: NOT DETECTED
BASOPHILS # BLD: 0.02 K/UL (ref 0–0.2)
BASOPHILS NFR BLD: 0 % (ref 0–2)
BILIRUB DIRECT SERPL-MCNC: <0.2 MG/DL (ref 0–0.3)
BILIRUB INDIRECT SERPL-MCNC: NORMAL MG/DL (ref 0–1)
BILIRUB SERPL-MCNC: 0.6 MG/DL (ref 0–1.2)
BNP SERPL-MCNC: 238 PG/ML (ref 0–450)
BUN SERPL-MCNC: 23 MG/DL (ref 6–23)
C PNEUM DNA NPH QL NAA+NON-PROBE: NOT DETECTED
CALCIUM SERPL-MCNC: 9.2 MG/DL (ref 8.6–10.2)
CHLORIDE SERPL-SCNC: 101 MMOL/L (ref 98–107)
CO2 SERPL-SCNC: 22 MMOL/L (ref 22–29)
CREAT SERPL-MCNC: 0.8 MG/DL (ref 0.5–1)
D DIMER: 3386 NG/ML DDU (ref 0–232)
EKG ATRIAL RATE: 78 BPM
EKG P AXIS: 50 DEGREES
EKG P-R INTERVAL: 158 MS
EKG Q-T INTERVAL: 400 MS
EKG QRS DURATION: 72 MS
EKG QTC CALCULATION (BAZETT): 456 MS
EKG R AXIS: -22 DEGREES
EKG T AXIS: 36 DEGREES
EKG VENTRICULAR RATE: 78 BPM
EOSINOPHIL # BLD: 0.04 K/UL (ref 0.05–0.5)
EOSINOPHILS RELATIVE PERCENT: 0 % (ref 0–6)
ERYTHROCYTE [DISTWIDTH] IN BLOOD BY AUTOMATED COUNT: 14.6 % (ref 11.5–15)
FLUAV RNA NPH QL NAA+NON-PROBE: NOT DETECTED
FLUBV RNA NPH QL NAA+NON-PROBE: NOT DETECTED
GFR SERPL CREATININE-BSD FRML MDRD: >60 ML/MIN/1.73M2
GLUCOSE SERPL-MCNC: 144 MG/DL (ref 74–99)
HADV DNA NPH QL NAA+NON-PROBE: NOT DETECTED
HCOV 229E RNA NPH QL NAA+NON-PROBE: NOT DETECTED
HCOV HKU1 RNA NPH QL NAA+NON-PROBE: NOT DETECTED
HCOV NL63 RNA NPH QL NAA+NON-PROBE: NOT DETECTED
HCOV OC43 RNA NPH QL NAA+NON-PROBE: NOT DETECTED
HCT VFR BLD AUTO: 38.8 % (ref 34–48)
HGB BLD-MCNC: 12.3 G/DL (ref 11.5–15.5)
HMPV RNA NPH QL NAA+NON-PROBE: NOT DETECTED
HPIV1 RNA NPH QL NAA+NON-PROBE: NOT DETECTED
HPIV2 RNA NPH QL NAA+NON-PROBE: NOT DETECTED
HPIV3 RNA NPH QL NAA+NON-PROBE: NOT DETECTED
HPIV4 RNA NPH QL NAA+NON-PROBE: NOT DETECTED
IMM GRANULOCYTES # BLD AUTO: 0.04 K/UL (ref 0–0.58)
IMM GRANULOCYTES NFR BLD: 0 % (ref 0–5)
LACTATE BLDV-SCNC: 2 MMOL/L (ref 0.5–2.2)
LIPASE SERPL-CCNC: 24 U/L (ref 13–60)
LYMPHOCYTES NFR BLD: 0.6 K/UL (ref 1.5–4)
LYMPHOCYTES RELATIVE PERCENT: 6 % (ref 20–42)
M PNEUMO DNA NPH QL NAA+NON-PROBE: NOT DETECTED
MAGNESIUM SERPL-MCNC: 2.2 MG/DL (ref 1.6–2.6)
MCH RBC QN AUTO: 27.6 PG (ref 26–35)
MCHC RBC AUTO-ENTMCNC: 31.7 G/DL (ref 32–34.5)
MCV RBC AUTO: 87 FL (ref 80–99.9)
MONOCYTES NFR BLD: 0.24 K/UL (ref 0.1–0.95)
MONOCYTES NFR BLD: 2 % (ref 2–12)
NEUTROPHILS NFR BLD: 91 % (ref 43–80)
NEUTS SEG NFR BLD: 9.68 K/UL (ref 1.8–7.3)
PLATELET # BLD AUTO: 220 K/UL (ref 130–450)
PMV BLD AUTO: 10.6 FL (ref 7–12)
POTASSIUM SERPL-SCNC: 4.3 MMOL/L (ref 3.5–5)
PROT SERPL-MCNC: 7.6 G/DL (ref 6.4–8.3)
RBC # BLD AUTO: 4.46 M/UL (ref 3.5–5.5)
RSV RNA NPH QL NAA+NON-PROBE: NOT DETECTED
RV+EV RNA NPH QL NAA+NON-PROBE: NOT DETECTED
SARS-COV-2 RNA NPH QL NAA+NON-PROBE: NOT DETECTED
SODIUM SERPL-SCNC: 141 MMOL/L (ref 132–146)
SPECIMEN DESCRIPTION: NORMAL
T4 FREE SERPL-MCNC: 1.3 NG/DL (ref 0.9–1.7)
TROPONIN I SERPL HS-MCNC: 13 NG/L (ref 0–9)
TROPONIN I SERPL HS-MCNC: 13 NG/L (ref 0–9)
TSH SERPL DL<=0.05 MIU/L-ACNC: 0.86 UIU/ML (ref 0.27–4.2)
WBC OTHER # BLD: 10.6 K/UL (ref 4.5–11.5)

## 2024-02-27 PROCEDURE — 71045 X-RAY EXAM CHEST 1 VIEW: CPT

## 2024-02-27 PROCEDURE — A9540 TC99M MAA: HCPCS | Performed by: RADIOLOGY

## 2024-02-27 PROCEDURE — 0202U NFCT DS 22 TRGT SARS-COV-2: CPT

## 2024-02-27 PROCEDURE — 6360000002 HC RX W HCPCS: Performed by: INTERNAL MEDICINE

## 2024-02-27 PROCEDURE — 84443 ASSAY THYROID STIM HORMONE: CPT

## 2024-02-27 PROCEDURE — 81001 URINALYSIS AUTO W/SCOPE: CPT

## 2024-02-27 PROCEDURE — 93970 EXTREMITY STUDY: CPT

## 2024-02-27 PROCEDURE — 3430000000 HC RX DIAGNOSTIC RADIOPHARMACEUTICAL: Performed by: RADIOLOGY

## 2024-02-27 PROCEDURE — 2580000003 HC RX 258

## 2024-02-27 PROCEDURE — 99285 EMERGENCY DEPT VISIT HI MDM: CPT

## 2024-02-27 PROCEDURE — 84439 ASSAY OF FREE THYROXINE: CPT

## 2024-02-27 PROCEDURE — 78582 LUNG VENTILAT&PERFUS IMAGING: CPT

## 2024-02-27 PROCEDURE — 6370000000 HC RX 637 (ALT 250 FOR IP): Performed by: INTERNAL MEDICINE

## 2024-02-27 PROCEDURE — 83690 ASSAY OF LIPASE: CPT

## 2024-02-27 PROCEDURE — 70450 CT HEAD/BRAIN W/O DYE: CPT

## 2024-02-27 PROCEDURE — 93005 ELECTROCARDIOGRAM TRACING: CPT

## 2024-02-27 PROCEDURE — 71250 CT THORAX DX C-: CPT

## 2024-02-27 PROCEDURE — 85025 COMPLETE CBC W/AUTO DIFF WBC: CPT

## 2024-02-27 PROCEDURE — 83735 ASSAY OF MAGNESIUM: CPT

## 2024-02-27 PROCEDURE — 83605 ASSAY OF LACTIC ACID: CPT

## 2024-02-27 PROCEDURE — 83880 ASSAY OF NATRIURETIC PEPTIDE: CPT

## 2024-02-27 PROCEDURE — 2580000003 HC RX 258: Performed by: INTERNAL MEDICINE

## 2024-02-27 PROCEDURE — 2060000000 HC ICU INTERMEDIATE R&B

## 2024-02-27 PROCEDURE — 74176 CT ABD & PELVIS W/O CONTRAST: CPT

## 2024-02-27 PROCEDURE — 6360000002 HC RX W HCPCS

## 2024-02-27 PROCEDURE — 96375 TX/PRO/DX INJ NEW DRUG ADDON: CPT

## 2024-02-27 PROCEDURE — 93010 ELECTROCARDIOGRAM REPORT: CPT | Performed by: INTERNAL MEDICINE

## 2024-02-27 PROCEDURE — 80053 COMPREHEN METABOLIC PANEL: CPT

## 2024-02-27 PROCEDURE — 85379 FIBRIN DEGRADATION QUANT: CPT

## 2024-02-27 PROCEDURE — 6360000002 HC RX W HCPCS: Performed by: EMERGENCY MEDICINE

## 2024-02-27 PROCEDURE — 96374 THER/PROPH/DIAG INJ IV PUSH: CPT

## 2024-02-27 PROCEDURE — A9539 TC99M PENTETATE: HCPCS | Performed by: RADIOLOGY

## 2024-02-27 PROCEDURE — 84484 ASSAY OF TROPONIN QUANT: CPT

## 2024-02-27 PROCEDURE — 82248 BILIRUBIN DIRECT: CPT

## 2024-02-27 RX ORDER — FENTANYL CITRATE 50 UG/ML
25 INJECTION, SOLUTION INTRAMUSCULAR; INTRAVENOUS ONCE
Status: COMPLETED | OUTPATIENT
Start: 2024-02-27 | End: 2024-02-27

## 2024-02-27 RX ORDER — ONDANSETRON 2 MG/ML
4 INJECTION INTRAMUSCULAR; INTRAVENOUS EVERY 6 HOURS PRN
Status: DISCONTINUED | OUTPATIENT
Start: 2024-02-27 | End: 2024-03-02 | Stop reason: HOSPADM

## 2024-02-27 RX ORDER — VITS A,C,E/LUTEIN/MINERALS 300MCG-200
1 TABLET ORAL 2 TIMES DAILY
Status: DISCONTINUED | OUTPATIENT
Start: 2024-02-27 | End: 2024-03-01 | Stop reason: SDUPTHER

## 2024-02-27 RX ORDER — LOSARTAN POTASSIUM 50 MG/1
100 TABLET ORAL DAILY
Status: DISCONTINUED | OUTPATIENT
Start: 2024-02-27 | End: 2024-03-02 | Stop reason: HOSPADM

## 2024-02-27 RX ORDER — VIT C/E/ZN/COPPR/LUTEIN/ZEAXAN 60 MG-6 MG
1 CAPSULE ORAL 2 TIMES DAILY
Status: DISCONTINUED | OUTPATIENT
Start: 2024-02-27 | End: 2024-02-27 | Stop reason: SDUPTHER

## 2024-02-27 RX ORDER — MAGNESIUM SULFATE IN WATER 40 MG/ML
2000 INJECTION, SOLUTION INTRAVENOUS PRN
Status: DISCONTINUED | OUTPATIENT
Start: 2024-02-27 | End: 2024-03-02 | Stop reason: HOSPADM

## 2024-02-27 RX ORDER — ENOXAPARIN SODIUM 100 MG/ML
1 INJECTION SUBCUTANEOUS 2 TIMES DAILY
Status: DISCONTINUED | OUTPATIENT
Start: 2024-02-27 | End: 2024-02-28

## 2024-02-27 RX ORDER — ONDANSETRON 2 MG/ML
4 INJECTION INTRAMUSCULAR; INTRAVENOUS ONCE
Status: COMPLETED | OUTPATIENT
Start: 2024-02-27 | End: 2024-02-27

## 2024-02-27 RX ORDER — POTASSIUM CHLORIDE 20 MEQ/1
40 TABLET, EXTENDED RELEASE ORAL PRN
Status: DISCONTINUED | OUTPATIENT
Start: 2024-02-27 | End: 2024-03-02 | Stop reason: HOSPADM

## 2024-02-27 RX ORDER — AMLODIPINE BESYLATE 10 MG/1
10 TABLET ORAL DAILY
Status: DISCONTINUED | OUTPATIENT
Start: 2024-02-27 | End: 2024-03-02 | Stop reason: HOSPADM

## 2024-02-27 RX ORDER — METHYLPREDNISOLONE 4 MG/1
4 TABLET ORAL DAILY
Status: DISCONTINUED | OUTPATIENT
Start: 2024-02-27 | End: 2024-03-02 | Stop reason: HOSPADM

## 2024-02-27 RX ORDER — PANTOPRAZOLE SODIUM 40 MG/1
40 TABLET, DELAYED RELEASE ORAL DAILY
Status: DISCONTINUED | OUTPATIENT
Start: 2024-02-27 | End: 2024-03-02 | Stop reason: HOSPADM

## 2024-02-27 RX ORDER — POTASSIUM CHLORIDE 7.45 MG/ML
10 INJECTION INTRAVENOUS PRN
Status: DISCONTINUED | OUTPATIENT
Start: 2024-02-27 | End: 2024-03-02 | Stop reason: HOSPADM

## 2024-02-27 RX ORDER — ACETAMINOPHEN 650 MG/1
650 SUPPOSITORY RECTAL EVERY 6 HOURS PRN
Status: DISCONTINUED | OUTPATIENT
Start: 2024-02-27 | End: 2024-03-02 | Stop reason: HOSPADM

## 2024-02-27 RX ORDER — ONDANSETRON 4 MG/1
4 TABLET, ORALLY DISINTEGRATING ORAL EVERY 8 HOURS PRN
Status: DISCONTINUED | OUTPATIENT
Start: 2024-02-27 | End: 2024-03-02 | Stop reason: HOSPADM

## 2024-02-27 RX ORDER — DULOXETIN HYDROCHLORIDE 30 MG/1
30 CAPSULE, DELAYED RELEASE ORAL DAILY
Status: DISCONTINUED | OUTPATIENT
Start: 2024-02-27 | End: 2024-03-02 | Stop reason: HOSPADM

## 2024-02-27 RX ORDER — METOPROLOL SUCCINATE 25 MG/1
25 TABLET, EXTENDED RELEASE ORAL DAILY
Status: DISCONTINUED | OUTPATIENT
Start: 2024-02-27 | End: 2024-03-02 | Stop reason: HOSPADM

## 2024-02-27 RX ORDER — SODIUM CHLORIDE 0.9 % (FLUSH) 0.9 %
5-40 SYRINGE (ML) INJECTION EVERY 12 HOURS SCHEDULED
Status: DISCONTINUED | OUTPATIENT
Start: 2024-02-27 | End: 2024-03-02 | Stop reason: HOSPADM

## 2024-02-27 RX ORDER — SODIUM CHLORIDE 9 MG/ML
INJECTION, SOLUTION INTRAVENOUS PRN
Status: DISCONTINUED | OUTPATIENT
Start: 2024-02-27 | End: 2024-03-02 | Stop reason: HOSPADM

## 2024-02-27 RX ORDER — ACETAMINOPHEN 325 MG/1
650 TABLET ORAL EVERY 6 HOURS PRN
Status: DISCONTINUED | OUTPATIENT
Start: 2024-02-27 | End: 2024-03-02 | Stop reason: HOSPADM

## 2024-02-27 RX ORDER — KIT FOR THE PREPARATION OF TECHNETIUM TC 99M PENTETATE 20 MG/1
35 INJECTION, POWDER, LYOPHILIZED, FOR SOLUTION INTRAVENOUS; RESPIRATORY (INHALATION)
Status: COMPLETED | OUTPATIENT
Start: 2024-02-27 | End: 2024-02-27

## 2024-02-27 RX ORDER — SODIUM CHLORIDE 0.9 % (FLUSH) 0.9 %
5-40 SYRINGE (ML) INJECTION PRN
Status: DISCONTINUED | OUTPATIENT
Start: 2024-02-27 | End: 2024-03-02 | Stop reason: HOSPADM

## 2024-02-27 RX ORDER — FUROSEMIDE 40 MG/1
40 TABLET ORAL DAILY
Status: DISCONTINUED | OUTPATIENT
Start: 2024-02-27 | End: 2024-03-02 | Stop reason: HOSPADM

## 2024-02-27 RX ORDER — POLYETHYLENE GLYCOL 3350 17 G/17G
17 POWDER, FOR SOLUTION ORAL DAILY PRN
Status: DISCONTINUED | OUTPATIENT
Start: 2024-02-27 | End: 2024-03-02 | Stop reason: HOSPADM

## 2024-02-27 RX ORDER — POTASSIUM CHLORIDE 20 MEQ/1
20 TABLET, EXTENDED RELEASE ORAL DAILY
Status: DISCONTINUED | OUTPATIENT
Start: 2024-02-27 | End: 2024-03-02 | Stop reason: HOSPADM

## 2024-02-27 RX ORDER — DONEPEZIL HYDROCHLORIDE 5 MG/1
5 TABLET, FILM COATED ORAL NIGHTLY
Status: DISCONTINUED | OUTPATIENT
Start: 2024-02-27 | End: 2024-03-02 | Stop reason: HOSPADM

## 2024-02-27 RX ORDER — LORAZEPAM 0.5 MG/1
0.5 TABLET ORAL EVERY 12 HOURS PRN
Status: DISCONTINUED | OUTPATIENT
Start: 2024-02-27 | End: 2024-03-02 | Stop reason: HOSPADM

## 2024-02-27 RX ORDER — LEVOTHYROXINE SODIUM 88 UG/1
88 TABLET ORAL DAILY
Status: DISCONTINUED | OUTPATIENT
Start: 2024-02-27 | End: 2024-03-02 | Stop reason: HOSPADM

## 2024-02-27 RX ORDER — ATORVASTATIN CALCIUM 40 MG/1
40 TABLET, FILM COATED ORAL NIGHTLY
Status: DISCONTINUED | OUTPATIENT
Start: 2024-02-27 | End: 2024-03-02 | Stop reason: HOSPADM

## 2024-02-27 RX ADMIN — LOSARTAN POTASSIUM 100 MG: 50 TABLET, FILM COATED ORAL at 15:49

## 2024-02-27 RX ADMIN — KIT FOR THE PREPARATION OF TECHNETIUM TC 99M PENTETATE 35 MILLICURIE: 20 INJECTION, POWDER, LYOPHILIZED, FOR SOLUTION INTRAVENOUS; RESPIRATORY (INHALATION) at 14:16

## 2024-02-27 RX ADMIN — ENOXAPARIN SODIUM 100 MG: 100 INJECTION SUBCUTANEOUS at 15:50

## 2024-02-27 RX ADMIN — FUROSEMIDE 40 MG: 20 TABLET ORAL at 15:48

## 2024-02-27 RX ADMIN — ONDANSETRON 4 MG: 2 INJECTION INTRAMUSCULAR; INTRAVENOUS at 06:24

## 2024-02-27 RX ADMIN — METOPROLOL SUCCINATE 25 MG: 25 TABLET, EXTENDED RELEASE ORAL at 19:32

## 2024-02-27 RX ADMIN — SODIUM CHLORIDE, PRESERVATIVE FREE 10 ML: 5 INJECTION INTRAVENOUS at 21:37

## 2024-02-27 RX ADMIN — DULOXETINE HYDROCHLORIDE 30 MG: 30 CAPSULE, DELAYED RELEASE ORAL at 19:32

## 2024-02-27 RX ADMIN — METHYLPREDNISOLONE 4 MG: 4 TABLET ORAL at 15:51

## 2024-02-27 RX ADMIN — Medication 6 MILLICURIE: at 14:16

## 2024-02-27 RX ADMIN — DONEPEZIL HYDROCHLORIDE 5 MG: 5 TABLET, FILM COATED ORAL at 23:40

## 2024-02-27 RX ADMIN — PANTOPRAZOLE SODIUM 40 MG: 40 TABLET, DELAYED RELEASE ORAL at 15:48

## 2024-02-27 RX ADMIN — CEFTRIAXONE SODIUM 2000 MG: 2 INJECTION, POWDER, FOR SOLUTION INTRAMUSCULAR; INTRAVENOUS at 09:22

## 2024-02-27 RX ADMIN — FENTANYL CITRATE 25 MCG: 50 INJECTION INTRAMUSCULAR; INTRAVENOUS at 06:23

## 2024-02-27 RX ADMIN — POTASSIUM CHLORIDE 20 MEQ: 1500 TABLET, EXTENDED RELEASE ORAL at 15:50

## 2024-02-27 RX ADMIN — LEVOTHYROXINE SODIUM 88 MCG: 0.09 TABLET ORAL at 15:50

## 2024-02-27 RX ADMIN — ATORVASTATIN CALCIUM 40 MG: 40 TABLET, FILM COATED ORAL at 22:17

## 2024-02-27 RX ADMIN — ENOXAPARIN SODIUM 100 MG: 100 INJECTION SUBCUTANEOUS at 22:17

## 2024-02-27 RX ADMIN — Medication 1 TABLET: at 19:31

## 2024-02-27 RX ADMIN — AMLODIPINE BESYLATE 10 MG: 10 TABLET ORAL at 15:47

## 2024-02-27 ASSESSMENT — PAIN DESCRIPTION - DESCRIPTORS
DESCRIPTORS: ACHING
DESCRIPTORS: DISCOMFORT

## 2024-02-27 ASSESSMENT — PAIN DESCRIPTION - LOCATION
LOCATION: ABDOMEN
LOCATION: ABDOMEN

## 2024-02-27 ASSESSMENT — PAIN - FUNCTIONAL ASSESSMENT: PAIN_FUNCTIONAL_ASSESSMENT: 0-10

## 2024-02-27 ASSESSMENT — LIFESTYLE VARIABLES
HOW MANY STANDARD DRINKS CONTAINING ALCOHOL DO YOU HAVE ON A TYPICAL DAY: PATIENT DOES NOT DRINK
HOW OFTEN DO YOU HAVE A DRINK CONTAINING ALCOHOL: NEVER

## 2024-02-27 ASSESSMENT — PAIN SCALES - GENERAL
PAINLEVEL_OUTOF10: 5
PAINLEVEL_OUTOF10: 0
PAINLEVEL_OUTOF10: 7
PAINLEVEL_OUTOF10: 0

## 2024-02-27 NOTE — ED PROVIDER NOTES
Department of Emergency Medicine     Written by: Marcin Dickey MD  Patient Name: Valarie Mata  Admit Date: 2024  5:18 AM  MRN: [id number]                   : 1940    HPI  Chief Complaint   Patient presents with    Emesis     Started  before midnight    Abdominal Pain       Valarie Mata is a 83 y.o. female that presents to the ED with complaints of emesis and abdominal pain.  This started yesterday during the day, she vomited an unknown amount of times, she says \"she cannot count the vomiting times or throwing up\".  She denies any fevers chills diaphoresis.  On arrival, she is 89%.  She denies shortness of breath or chest pain.  She has a history of subarachnoid hemorrhage, she is not on anticoagulation.  She denies any recent diarrhea or constipation.  No urinary symptoms.  She says her legs are normally swollen, particular her left knee as she had a left knee replacement in the past    Review of systems:  Pertinent positives and negatives mentioned in the HPI/MDM.    Physical Exam  Constitutional:       General: She is not in acute distress.     Appearance: Normal appearance.   Eyes:      Extraocular Movements: Extraocular movements intact.      Pupils: Pupils are equal, round, and reactive to light.   Cardiovascular:      Rate and Rhythm: Normal rate and regular rhythm.      Heart sounds: Normal heart sounds.   Pulmonary:      Effort: Pulmonary effort is normal. No respiratory distress.   Abdominal:      Palpations: Abdomen is soft.      Tenderness: There is abdominal tenderness in the periumbilical area.   Neurological:      Mental Status: She is alert and oriented to person, place, and time. Mental status is at baseline.          Chart review: The patient followed with family medicine on  for an annual wellness visit, and followed for acquired hypothyroidism on 2024    Social determinants: the patient resides at a nursing home, does not require re-placement    Acute or

## 2024-02-27 NOTE — PROGRESS NOTES
Complete bed change and bathroom assistance provided.  Pt belongings bagged labeled and placed with patient.

## 2024-02-27 NOTE — CONSULTS
02/27/2024 06:04 AM    RBC 4.46 02/27/2024 06:04 AM    HGB 12.3 02/27/2024 06:04 AM    HCT 38.8 02/27/2024 06:04 AM    MCV 87.0 02/27/2024 06:04 AM    MCH 27.6 02/27/2024 06:04 AM    MCHC 31.7 02/27/2024 06:04 AM    RDW 14.6 02/27/2024 06:04 AM     02/27/2024 06:04 AM    MPV 10.6 02/27/2024 06:04 AM     Lab Results   Component Value Date/Time     02/27/2024 06:04 AM    K 4.3 02/27/2024 06:04 AM    K 4.3 10/07/2022 11:57 PM     02/27/2024 06:04 AM    CO2 22 02/27/2024 06:04 AM    BUN 23 02/27/2024 06:04 AM    CREATININE 0.8 02/27/2024 06:04 AM    LABALBU 4.3 02/27/2024 06:04 AM    LABALBU 4.2 03/24/2012 11:45 PM    CALCIUM 9.2 02/27/2024 06:04 AM    GFRAA >60 10/11/2022 04:52 AM    LABGLOM >60 02/27/2024 06:04 AM     Lab Results   Component Value Date/Time    PROTIME 10.2 08/10/2022 04:29 AM    PROTIME 10.8 03/24/2012 11:45 PM    INR 0.9 08/10/2022 04:29 AM     Recent Labs     02/27/24  0604   PROBNP 238       Assessment:  Acute hypoxic respiratory failure  Elevated [id number]  Abdominal pain with nausea and vomiting  History of subdural hematoma  Obesity, BMI 43    Plan:  Wean oxygen as tolerated to maintain SpO2 greater than 92%  US BLE, negative for DVT  Chest imaging reviewed-negative for any acute process  Obtain VQ scan      Thank you for allowing me to participate in the care of Valarie Mata.   Please feel free to call with questions.     This plan of care was reviewed in collaboration with Dr. Vaughn    Electronically signed by BETINA Goddard CNP on 2/27/2024 at 2:00 PM      Note: This report was completed utilizing computer voice recognition software. Every effort has been made to ensure accuracy, however; inadvertent computerized transcription errors may be present    I personally saw, examined, and cared for the patient. I performed the substantive portion of the visit. Labs, medications, radiographs reviewed. I agree with history exam and plans detailed in NP note.    Patient  having nausea, chest pain, back pain.  Mild hypoxia.  There are some basilar atelectasis.    Lower extremity duplex ordered.  Check V/q. scan    Ricky Vaughn, DO

## 2024-02-28 LAB
BILIRUB UR QL STRIP: NEGATIVE
CLARITY UR: CLEAR
COLOR UR: YELLOW
GLUCOSE UR STRIP-MCNC: NEGATIVE MG/DL
HGB UR QL STRIP.AUTO: NEGATIVE
KETONES UR STRIP-MCNC: NEGATIVE MG/DL
LEUKOCYTE ESTERASE UR QL STRIP: ABNORMAL
NITRITE UR QL STRIP: NEGATIVE
PH UR STRIP: 6 [PH] (ref 5–9)
PROT UR STRIP-MCNC: NEGATIVE MG/DL
RBC #/AREA URNS HPF: ABNORMAL /HPF
SP GR UR STRIP: 1.02 (ref 1–1.03)
UROBILINOGEN UR STRIP-ACNC: 0.2 EU/DL (ref 0–1)
WBC #/AREA URNS HPF: ABNORMAL /HPF

## 2024-02-28 PROCEDURE — 6370000000 HC RX 637 (ALT 250 FOR IP): Performed by: INTERNAL MEDICINE

## 2024-02-28 PROCEDURE — 6360000002 HC RX W HCPCS: Performed by: INTERNAL MEDICINE

## 2024-02-28 PROCEDURE — 2060000000 HC ICU INTERMEDIATE R&B

## 2024-02-28 PROCEDURE — 2580000003 HC RX 258: Performed by: INTERNAL MEDICINE

## 2024-02-28 RX ORDER — ENOXAPARIN SODIUM 100 MG/ML
40 INJECTION SUBCUTANEOUS DAILY
Status: DISCONTINUED | OUTPATIENT
Start: 2024-02-28 | End: 2024-03-02 | Stop reason: HOSPADM

## 2024-02-28 RX ADMIN — Medication 1 TABLET: at 11:07

## 2024-02-28 RX ADMIN — DULOXETINE HYDROCHLORIDE 30 MG: 30 CAPSULE, DELAYED RELEASE ORAL at 11:07

## 2024-02-28 RX ADMIN — METHYLPREDNISOLONE 4 MG: 4 TABLET ORAL at 14:54

## 2024-02-28 RX ADMIN — DONEPEZIL HYDROCHLORIDE 5 MG: 5 TABLET, FILM COATED ORAL at 19:59

## 2024-02-28 RX ADMIN — LOSARTAN POTASSIUM 100 MG: 50 TABLET, FILM COATED ORAL at 11:09

## 2024-02-28 RX ADMIN — FUROSEMIDE 40 MG: 20 TABLET ORAL at 11:07

## 2024-02-28 RX ADMIN — SODIUM CHLORIDE, PRESERVATIVE FREE 10 ML: 5 INJECTION INTRAVENOUS at 20:00

## 2024-02-28 RX ADMIN — POTASSIUM CHLORIDE 20 MEQ: 1500 TABLET, EXTENDED RELEASE ORAL at 11:09

## 2024-02-28 RX ADMIN — AMLODIPINE BESYLATE 10 MG: 10 TABLET ORAL at 11:07

## 2024-02-28 RX ADMIN — LEVOTHYROXINE SODIUM 88 MCG: 0.09 TABLET ORAL at 05:07

## 2024-02-28 RX ADMIN — Medication 1 TABLET: at 19:59

## 2024-02-28 RX ADMIN — METOPROLOL SUCCINATE 25 MG: 25 TABLET, EXTENDED RELEASE ORAL at 11:07

## 2024-02-28 RX ADMIN — ENOXAPARIN SODIUM 40 MG: 100 INJECTION SUBCUTANEOUS at 11:19

## 2024-02-28 RX ADMIN — PANTOPRAZOLE SODIUM 40 MG: 40 TABLET, DELAYED RELEASE ORAL at 05:04

## 2024-02-28 RX ADMIN — ATORVASTATIN CALCIUM 40 MG: 40 TABLET, FILM COATED ORAL at 19:59

## 2024-02-28 RX ADMIN — LORAZEPAM 0.5 MG: 0.5 TABLET ORAL at 22:56

## 2024-02-28 RX ADMIN — SODIUM CHLORIDE, PRESERVATIVE FREE 10 ML: 5 INJECTION INTRAVENOUS at 11:08

## 2024-02-28 ASSESSMENT — PAIN SCALES - GENERAL
PAINLEVEL_OUTOF10: 0

## 2024-02-28 NOTE — ACP (ADVANCE CARE PLANNING)
Advance Care Planning   Healthcare Decision Maker:    Primary Decision Maker: Michelle Mata - Child - [phone]    Secondary Decision Maker: Madhavi Choi - Child - [phone]    Supplemental (Other) Decision Maker: Mode aMta - Child - [phone]    Click here to complete Healthcare Decision Makers including selection of the Healthcare Decision Maker Relationship (ie \"Primary\").

## 2024-02-28 NOTE — PROGRESS NOTES
4 Eyes Skin Assessment     NAME:  Valarie Mata  YOB: 1940  MEDICAL RECORD NUMBER:  [id number]    The patient is being assessed for  Admission    I agree that at least one RN has performed a thorough Head to Toe Skin Assessment on the patient. ALL assessment sites listed below have been assessed.      Areas assessed by both nurses:    Head, Face, Ears, Shoulders, Back, Chest, Arms, Elbows, Hands, Sacrum. Buttock, Coccyx, Ischium, Legs. Feet and Heels, and Under Medical Devices         Does the Patient have a Wound? No noted wound(s)    Redness to bilateral cheeks  Redness to gluteal cleft  Redness to BLE  Heels boggy dry intact- offloaded on pillow and heel booties on       Ozzy Prevention initiated by RN: No  Wound Care Orders initiated by RN: No    Pressure Injury (Stage 3,4, Unstageable, DTI, NWPT, and Complex wounds) if present, place Wound referral order by RN under : No    New Ostomies, if present place, Ostomy referral order under : No     Nurse 1 eSignature: Electronically signed by Yaquelin Palma RN on 2/27/24 at 11:58 PM EST    **SHARE this note so that the co-signing nurse can place an eSignature**    Nurse 2 eSignature: Electronically signed by Kayla Rodriguez RN on 2/28/24 at 12:02 AM EST

## 2024-02-28 NOTE — H&P
[facility]              [address] [place] [address]                           HISTORY & PHYSICAL      PATIENT NAME: ABRNA DEGROOT              : 1940  MED REC NO: [id number]                        ROOM: [facility]  ACCOUNT NO: [id number]                       ADMIT DATE: 2024  PROVIDER: Audie Gibson DO      PRIMARY CARE PHYSICIAN:  Dr. Peña    CHIEF COMPLAINT:  Nausea and vomiting.    HISTORY OF PRESENT ILLNESS:  The patient is an 83-year-old  female who presented to the emergency room complaining of emesis, abdominal pain.  The patient was seen in the emergency room, noted to be hypoxic.  A D-dimer was performed which was markedly elevated.  A CT of the chest without contrast was performed which revealed chronic interstitial changes.  The patient is allergic to iodine.  ProBNP 283.  Troponin 13.  CT of the abdomen revealed postop changes.  Respiratory panel was negative.  She was admitted for further evaluation and treatment.    MEDICATIONS:  Prior to admission, Tylenol, amlodipine, Lipitor, Tessalon, Zyrtec, Colace, Aricept, Cymbalta, Lasix, Kapspargo, Synthroid, lidocaine patch, Ativan, Cozaar, Medrol, PreserVision vitamin, Zofran, Protonix, GlycoLax.    PAST SURGICAL HISTORY:  Left eye removal, colectomy, cholecystectomy, hysterectomy, tonsillectomy, left total knee replacement x2, right eye cataract surgery.    SOCIAL HISTORY:  No tobacco.  No alcohol.    REVIEW OF SYSTEMS:  Remarkable for above-stated chief complaint.    ALLERGIES:  TO IODINE, BEE POLLEN, BEE VENOM, CODEINE, HYDRALAZINE, OXYCODONE/ASPIRIN, PHENERGAN, SULFA, AUGMENTIN, ASPIRIN, DARVOCET, INFLUENZA VACCINE, PERCODAN.      PHYSICAL EXAMINATION:  GENERAL APPEARANCE:  Reveals an 83-year-old  female, who is alert, cooperative, and a fair historian.  VITAL SIGNS ON ADMISSION:  Temperature 97.4, pulse 79, respirations 18, blood pressure 207/80.  HEAD:

## 2024-02-28 NOTE — PROGRESS NOTES
Daughter Michelle called and updated with plan of care and patient's room number. Per daughter, pt has been increasingly tired lately and has had more confusion. Per daughter, patient resides at the [facility] and uses cane/walker at baseline

## 2024-02-28 NOTE — PLAN OF CARE
Problem: Discharge Planning  Goal: Discharge to home or other facility with appropriate resources  Outcome: Progressing  Flowsheets (Taken 2/28/2024 6327)  Discharge to home or other facility with appropriate resources: Arrange for needed discharge resources and transportation as appropriate     Problem: Pain  Goal: Verbalizes/displays adequate comfort level or baseline comfort level  Outcome: Progressing     Problem: Safety - Adult  Goal: Free from fall injury  Outcome: Progressing

## 2024-02-28 NOTE — CARE COORDINATION
Transition of Care: Met with patient at bedside and and called Madhavi [phone], [facility] for Michelle [phone] (awaiting call back) and explained case management role. Patient forgetful and poor historian. Patient lives [facility]. Patient currently wearing 1 liter nasal cannula. Room air baseline.  Patient primary care physician is Daja Hernandez DO. Primary decision maker is Michelle (daughter). Discharge plan is [facility]. Called Traci [phone] with [facility]. They are following but have not accepted patient. Patient needs PT/OT. Will need precert if skillable. Patient came to hospital with Emesis and Abdominal Pain. Found to be hypoxic. CM/SW to follow. MT      Case Management Assessment  Initial Evaluation    Date/Time of Evaluation: 2/28/2024 2:07 PM  Assessment Completed by: Audie Hobson RN    If patient is discharged prior to next notation, then this note serves as note for discharge by case management.    Patient Name: Valarie Mata                   YOB: 1940  Diagnosis: Generalized edema [R60.1]  Acute respiratory failure with hypoxia (HCC) [J96.01]  Nausea and vomiting, unspecified vomiting type [R11.2]  Acute hypoxic respiratory failure (HCC) [J96.01]                   Date / Time: 2/27/2024  5:18 AM    Patient Admission Status: Inpatient   Readmission Risk (Low < 19, Mod (19-27), High > 27): Readmission Risk Score: 15.2    Current PCP: Daja Hernandez DO  PCP verified by CM?      Chart Reviewed: Yes      History Provided by:    Patient Orientation:      Patient Cognition:      Hospitalization in the last 30 days (Readmission):  No    If yes, Readmission Assessment in  Navigator will be completed.    Advance Directives:      Code Status: Full Code   Patient's Primary Decision Maker is: Legal Next of Kin    Primary Decision Maker: Michelle Mata - Child -  her family were provided with a choice of provider and agrees with the discharge plan. Freedom of choice list with basic dialogue that supports the patient's individualized plan of care/goals and shares the quality data associated with the providers was provided to:     Patient Representative Name:       The Patient and/or Patient Representative Agree with the Discharge Plan?      Audie Hobson RN BSN  Case Management  [phone]

## 2024-02-28 NOTE — PROGRESS NOTES
Rajesh Heaton M.D.,[facility]  Ponce Celeste D.O., NAVEEN., [facility]  Dariana Londono M.D.  Lissa Barrett M.D.   ISABELL Quintana.O.        Daily Pulmonary Progress Note    Patient:  Valarie Mata 83 y.o. female MRN: [id number]     Date of Service: 2/28/2024      Synopsis     We are following patient for Acute hypoxic respiratory failure     \"CC\" Emesis, abdominal pain     Code status: FULL CODE      Subjective      Patient was seen and examined lying in bed on 1 L NC, SpO2 93%.  VQ scan showed low probability of PE.  She is not in any distress but still reporting abdominal pain.    Review of Systems:  Constitutional: Denies fever, weight loss, night sweats, and fatigue  Skin: Denies pigmentation, dark lesions, and rashes   HEENT: Denies hearing loss, tinnitus, ear drainage, epistaxis, sore throat, and hoarseness.  Cardiovascular: Denies palpitations, chest pain, and chest pressure.  Respiratory: Denies cough, dyspnea at rest, hemoptysis, apnea, and choking.  Gastrointestinal: Abdominal pain  Genitourinary: Denies dysuria, frequency, urgency or hematuria  Musculoskeletal: Denies myalgias, muscle weakness, and bone pain  Neurological: Denies dizziness, vertigo, headache, and focal weakness  Psychological: Denies anxiety and depression  Endocrine: Denies heat intolerance and cold intolerance  Hematopoietic/Lymphatic: Denies bleeding problems and blood transfusions    24-hour events:  No new events    Objective   OBJECTIVE:   BP (!) 145/55   Pulse 51   Temp 97.4 °F (36.3 °C) (Temporal)   Resp 18   Ht 1.499 m (4' 11\")   Wt 92.9 kg (204 lb 12.9 oz)   LMP 07/24/1974   SpO2 94%   BMI 41.37 kg/m²   SpO2 Readings from Last 1 Encounters:   02/28/24 94%        I/O:    Intake/Output Summary (Last 24 hours) at 2/28/2024 1310  Last data filed at 2/28/2024 0515  Gross per 24 hour   Intake 380 ml   Output 1150 ml   Net -770 ml                      CURRENT MEDS :  Scheduled Meds:   enoxaparin  40 mg SubCUTAneous  Daily    amLODIPine  10 mg Oral Daily    atorvastatin  40 mg Oral Nightly    donepezil  5 mg Oral Nightly    DULoxetine  30 mg Oral Daily    furosemide  40 mg Oral Daily    metoprolol succinate  25 mg Oral Daily    levothyroxine  88 mcg Oral Daily    losartan  100 mg Oral Daily    methylPREDNISolone  4 mg Oral Daily    pantoprazole  40 mg Oral Daily    potassium chloride  20 mEq Oral Daily    sodium chloride flush  5-40 mL IntraVENous 2 times per day    ocuvite-lutein  1 tablet Oral BID       Physical Exam:  General Appearance: appears comfortable in no acute distress.   HEENT: Normocephalic atraumatic without obvious abnormality   Neck: Lips, mucosa, and tongue normal.  Supple, symmetrical, trachea midline, no adenopathy;thyroid:  no enlargement/tenderness/nodules or JVD.  Lung: Breath sounds Diminished  . Respirations   unlabored. Symmetrical expansion.  Heart: RRR, normal S1, S2. No MRG  Abdomen: Soft, NT, ND. BS present x 4 quadrants. No bruit or organomegaly.   Extremities: BLE swelling, warmth, redness   Musculokeletal: No joint swelling, no muscle tenderness. ROM normal in all joints of extremities.   Neurologic: Mental status: Alert and Oriented X3 .    Pertinent/ New Labs and Imaging Studies     Imaging personally reviewed:  CXR 2/27/2024:  FINDINGS:  Portable chest reveals heart to be enlarged.  Lung volumes are low with  chronic seen throughout the lung fields bilaterally..  There is some  atelectatic changes seen lung bases.  No definite pleural effusion or  pneumothorax.  Pulmonary vascularity is within normal limits.  Degenerative  changes seen within the spine with vascular calcifications seen within the  thoracic aorta.     IMPRESSION:  Low lung volumes with chronic changes seen in lung fields and no evidence of  acute parenchymal disease.       Noncontrast chest CT 2/27/2024:  IMPRESSION:  1. No acute cardiopulmonary process.  2. Chronic interstitial lung disease.  3. Mild bronchiectatic

## 2024-02-28 NOTE — PROGRESS NOTES
[facility]    Subjective:  pt alert conversive      Current Facility-Administered Medications:     enoxaparin (LOVENOX) injection 40 mg, 40 mg, SubCUTAneous, Daily, Audie Gibson DO    amLODIPine (NORVASC) tablet 10 mg, 10 mg, Oral, Daily, Audie Gibson DO, 10 mg at 02/27/24 1547    atorvastatin (LIPITOR) tablet 40 mg, 40 mg, Oral, Nightly, Audie Gibson DO, 40 mg at 02/27/24 2217    donepezil (ARICEPT) tablet 5 mg, 5 mg, Oral, Nightly, Audie Gibson DO, 5 mg at 02/27/24 2340    DULoxetine (CYMBALTA) extended release capsule 30 mg, 30 mg, Oral, Daily, Audie Gibson DO, 30 mg at 02/27/24 1932    furosemide (LASIX) tablet 40 mg, 40 mg, Oral, Daily, Audie Gibson DO, 40 mg at 02/27/24 1548    metoprolol succinate (TOPROL XL) extended release tablet 25 mg, 25 mg, Oral, Daily, Audie Gibson DO, 25 mg at 02/27/24 1932    levothyroxine (SYNTHROID) tablet 88 mcg, 88 mcg, Oral, Daily, Audie Gibson DO, 88 mcg at 02/28/24 0507    LORazepam (ATIVAN) tablet 0.5 mg, 0.5 mg, Oral, Q12H PRN, Audie Gibson DO    losartan (COZAAR) tablet 100 mg, 100 mg, Oral, Daily, Audie Gibson DO, 100 mg at 02/27/24 1549    methylPREDNISolone (MEDROL) tablet 4 mg, 4 mg, Oral, Daily, Audie Gibson DO, 4 mg at 02/27/24 1551    pantoprazole (PROTONIX) tablet 40 mg, 40 mg, Oral, Daily, Audie Gibson DO, 40 mg at 02/28/24 0504    potassium chloride (KLOR-CON M) extended release tablet 20 mEq, 20 mEq, Oral, Daily, Audie Gibson DO, 20 mEq at 02/27/24 1550    sodium chloride flush 0.9 % injection 5-40 mL, 5-40 mL, IntraVENous, 2 times per day, Audie Gibson DO, 10 mL at 02/27/24 2137    sodium chloride flush 0.9 % injection 5-40 mL, 5-40 mL, IntraVENous, PRN, Audie Gibson,     0.9 % sodium chloride infusion, , IntraVENous, PRN, Audie Gibson,     potassium chloride (KLOR-CON M) extended release tablet 40 mEq, 40 mEq, Oral, PRN **OR** potassium bicarb-citric acid  06:04 AM    BUN 23 02/27/2024 06:04 AM    CREATININE 0.8 02/27/2024 06:04 AM    GFRAA >60 10/11/2022 04:52 AM    LABGLOM >60 02/27/2024 06:04 AM    GLUCOSE 144 02/27/2024 06:04 AM    GLUCOSE 109 05/04/2012 06:17 AM    PROT 7.6 02/27/2024 06:04 AM    LABALBU 4.3 02/27/2024 06:04 AM    LABALBU 4.2 03/24/2012 11:45 PM    CALCIUM 9.2 02/27/2024 06:04 AM    BILITOT 0.6 02/27/2024 06:04 AM    ALKPHOS 86 02/27/2024 06:04 AM    AST 17 02/27/2024 06:04 AM    ALT 13 02/27/2024 06:04 AM     Warfarin PT/INR:    Lab Results   Component Value Date    INR 0.9 08/10/2022    INR 0.9 08/18/2020    INR 0.9 12/29/2019    PROTIME 10.2 08/10/2022    PROTIME 10.9 08/18/2020    PROTIME 10.5 12/29/2019       Assessment:    Principal Problem:    Acute respiratory failure with hypoxia (HCC)  Resolved Problems:    * No resolved hospital problems. *      Plan:  Change lovenox dose / cont as per piyush Gibson DO  8:39 AM  2/28/2024

## 2024-02-29 ENCOUNTER — APPOINTMENT (OUTPATIENT)
Dept: CT IMAGING | Age: 84
End: 2024-02-29
Payer: COMMERCIAL

## 2024-02-29 LAB
AMMONIA PLAS-SCNC: <10 UMOL/L (ref 11–51)
BILIRUB UR QL STRIP: NEGATIVE
CLARITY UR: CLEAR
COLOR UR: YELLOW
ERYTHROCYTE [DISTWIDTH] IN BLOOD BY AUTOMATED COUNT: 14.5 % (ref 11.5–15)
GLUCOSE UR STRIP-MCNC: NEGATIVE MG/DL
HCT VFR BLD AUTO: 40.1 % (ref 34–48)
HGB BLD-MCNC: 12.9 G/DL (ref 11.5–15.5)
HGB UR QL STRIP.AUTO: NEGATIVE
KETONES UR STRIP-MCNC: NEGATIVE MG/DL
LEUKOCYTE ESTERASE UR QL STRIP: ABNORMAL
MCH RBC QN AUTO: 27.9 PG (ref 26–35)
MCHC RBC AUTO-ENTMCNC: 32.2 G/DL (ref 32–34.5)
MCV RBC AUTO: 86.6 FL (ref 80–99.9)
NITRITE UR QL STRIP: NEGATIVE
PH UR STRIP: 6 [PH] (ref 5–9)
PLATELET # BLD AUTO: 247 K/UL (ref 130–450)
PMV BLD AUTO: 10.6 FL (ref 7–12)
PROT UR STRIP-MCNC: NEGATIVE MG/DL
RBC # BLD AUTO: 4.63 M/UL (ref 3.5–5.5)
RBC #/AREA URNS HPF: ABNORMAL /HPF
SP GR UR STRIP: 1.01 (ref 1–1.03)
UROBILINOGEN UR STRIP-ACNC: 0.2 EU/DL (ref 0–1)
WBC #/AREA URNS HPF: ABNORMAL /HPF
WBC OTHER # BLD: 9.7 K/UL (ref 4.5–11.5)

## 2024-02-29 PROCEDURE — 81001 URINALYSIS AUTO W/SCOPE: CPT

## 2024-02-29 PROCEDURE — 99222 1ST HOSP IP/OBS MODERATE 55: CPT | Performed by: PSYCHIATRY & NEUROLOGY

## 2024-02-29 PROCEDURE — 87040 BLOOD CULTURE FOR BACTERIA: CPT

## 2024-02-29 PROCEDURE — 6360000002 HC RX W HCPCS: Performed by: INTERNAL MEDICINE

## 2024-02-29 PROCEDURE — 82140 ASSAY OF AMMONIA: CPT

## 2024-02-29 PROCEDURE — 85027 COMPLETE CBC AUTOMATED: CPT

## 2024-02-29 PROCEDURE — 97161 PT EVAL LOW COMPLEX 20 MIN: CPT

## 2024-02-29 PROCEDURE — 97535 SELF CARE MNGMENT TRAINING: CPT

## 2024-02-29 PROCEDURE — 97530 THERAPEUTIC ACTIVITIES: CPT

## 2024-02-29 PROCEDURE — 2580000003 HC RX 258: Performed by: INTERNAL MEDICINE

## 2024-02-29 PROCEDURE — 2060000000 HC ICU INTERMEDIATE R&B

## 2024-02-29 PROCEDURE — 36415 COLL VENOUS BLD VENIPUNCTURE: CPT

## 2024-02-29 PROCEDURE — 97165 OT EVAL LOW COMPLEX 30 MIN: CPT

## 2024-02-29 PROCEDURE — 6370000000 HC RX 637 (ALT 250 FOR IP): Performed by: INTERNAL MEDICINE

## 2024-02-29 PROCEDURE — 70450 CT HEAD/BRAIN W/O DYE: CPT

## 2024-02-29 RX ORDER — METOPROLOL SUCCINATE 25 MG/1
25 TABLET, EXTENDED RELEASE ORAL DAILY
Qty: 30 TABLET | Refills: 3
Start: 2024-03-01

## 2024-02-29 RX ORDER — LORAZEPAM 2 MG/ML
0.5 INJECTION INTRAMUSCULAR ONCE
Status: COMPLETED | OUTPATIENT
Start: 2024-02-29 | End: 2024-02-29

## 2024-02-29 RX ORDER — DONEPEZIL HYDROCHLORIDE 5 MG/1
5 TABLET, FILM COATED ORAL NIGHTLY
Qty: 30 TABLET | Refills: 0
Start: 2024-02-29

## 2024-02-29 RX ORDER — FUROSEMIDE 40 MG/1
40 TABLET ORAL DAILY
Qty: 60 TABLET | Refills: 3
Start: 2024-03-01

## 2024-02-29 RX ADMIN — SODIUM CHLORIDE, PRESERVATIVE FREE 10 ML: 5 INJECTION INTRAVENOUS at 21:10

## 2024-02-29 RX ADMIN — LORAZEPAM 0.5 MG: 2 INJECTION INTRAMUSCULAR; INTRAVENOUS at 04:43

## 2024-02-29 RX ADMIN — METOPROLOL SUCCINATE 25 MG: 25 TABLET, EXTENDED RELEASE ORAL at 09:11

## 2024-02-29 RX ADMIN — DULOXETINE HYDROCHLORIDE 30 MG: 30 CAPSULE, DELAYED RELEASE ORAL at 09:12

## 2024-02-29 RX ADMIN — METHYLPREDNISOLONE 4 MG: 4 TABLET ORAL at 17:06

## 2024-02-29 RX ADMIN — FUROSEMIDE 40 MG: 20 TABLET ORAL at 09:12

## 2024-02-29 RX ADMIN — ATORVASTATIN CALCIUM 40 MG: 40 TABLET, FILM COATED ORAL at 21:10

## 2024-02-29 RX ADMIN — Medication 1 TABLET: at 21:10

## 2024-02-29 RX ADMIN — DONEPEZIL HYDROCHLORIDE 5 MG: 5 TABLET, FILM COATED ORAL at 22:55

## 2024-02-29 RX ADMIN — POTASSIUM CHLORIDE 20 MEQ: 1500 TABLET, EXTENDED RELEASE ORAL at 09:11

## 2024-02-29 RX ADMIN — AMLODIPINE BESYLATE 10 MG: 10 TABLET ORAL at 09:11

## 2024-02-29 RX ADMIN — LOSARTAN POTASSIUM 100 MG: 50 TABLET, FILM COATED ORAL at 09:12

## 2024-02-29 RX ADMIN — LORAZEPAM 0.5 MG: 0.5 TABLET ORAL at 21:10

## 2024-02-29 ASSESSMENT — PAIN SCALES - GENERAL: PAINLEVEL_OUTOF10: 0
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
## 2024-02-29 NOTE — CARE COORDINATION
CM Update: Met with patient at bedside and spoke with Michelle (POA daughter) to discuss transition of care plan. Patient confused. Discharge plan is SNF. Referral made to [facility]. Awaiting call back. Patient on room air now. Awaiting PT/OT evaluations and a STAT Urinalysis. Patient came to hospital with Emesis and Abdominal Pain. Found to be hypoxic. CM/SW to follow. MT

## 2024-02-29 NOTE — PLAN OF CARE
Problem: Discharge Planning  Goal: Discharge to home or other facility with appropriate resources  Outcome: Progressing  Flowsheets (Taken 2/29/2024 0800)  Discharge to home or other facility with appropriate resources: Identify barriers to discharge with patient and caregiver     Problem: Pain  Goal: Verbalizes/displays adequate comfort level or baseline comfort level  Outcome: Progressing     Problem: Safety - Adult  Goal: Free from fall injury  Outcome: Progressing     Problem: ABCDS Injury Assessment  Goal: Absence of physical injury  Outcome: Progressing     Problem: Chronic Conditions and Co-morbidities  Goal: Patient's chronic conditions and co-morbidity symptoms are monitored and maintained or improved  Recent Flowsheet Documentation  Taken 2/29/2024 0800 by Shantel Emanuel, RN  Care Plan - Patient's Chronic Conditions and Co-Morbidity Symptoms are Monitored and Maintained or Improved: Monitor and assess patient's chronic conditions and comorbid symptoms for stability, deterioration, or improvement

## 2024-02-29 NOTE — PROGRESS NOTES
vision  [x]Decreased safety awareness   [x]Increased pain       Comments:  Medically cleared for session by RN. Pt was sitting in the bathroom with nursing students upon PT entry and agreeable to participate. Pt was pleasantly confused throughout the session, perseverating on going home to see her mother. Increased cues and reorientation required throughout session. Completed multiple sit<>stand transfers from low chair throughout session with no hands on assist. Ambulated within room with no AD with slowed pace and unsteady gait, frequently reaching for furniture for stability. Pt was provided WW and ambulated increased distance with cues for safety and attention to task and light steadying assist. Pt was seated in bedside chair at conclusion of session, with chair alarm engaged and all needs met. RN notified.     Treatment:  Patient practiced and was instructed in the following treatment:    Transfer training - pt was given verbal and tactile cues to facilitate proper hand placement, technique and safety during sit to stand and stand to sit as well as provided with physical assistance.  Gait training- pt was given verbal and tactile cues to facilitate balance and safety during ambulation as well as provided with physical assistance.  Skilled monitoring of vitals throughout session. SPO2 rest: 94% RA; SPO2 with mobility: 94-97%  RA      Pt's/ family goals   1. Not stated    Prognosis is good for reaching above PT goals.    Patient and or family understand(s) diagnosis, prognosis, and plan of care.  yes    PHYSICAL THERAPY PLAN OF CARE:    PT POC is established based on physician order and patient diagnosis     Referring provider/PT Order:    02/28/24 0830  PT eval and treat  Start:  02/28/24 0830,   End:  02/28/24 0830,   ONE TIME,   Standing Count:  1 Occurrences,   R         Audie Gibson DO     Diagnosis:  Generalized edema [R60.1]  Acute respiratory failure with hypoxia (HCC) [J96.01]  Nausea and vomiting,

## 2024-02-29 NOTE — DISCHARGE INSTR - COC
Continuity of Care Form    Patient Name: Valarie Mata   :  1940  MRN:  [id number]    Admit date:  2024  Discharge date:  24    Code Status Order: Full Code   Advance Directives:     Admitting Physician:  Audie Gibson DO  PCP: Daja Hernandez DO    Discharging Nurse: Shantel Emanuel  Discharging Hospital Unit/Room#: 4504/4504-B  Discharging Unit Phone Number: [phone]    Emergency Contact:   Extended Emergency Contact Information  Primary Emergency Contact: Michelle Mata  Mobile Phone: [phone]  Relation: Child  Preferred language: English   needed? No  Secondary Emergency Contact: Madhavi Choi  Address: [address]  Home Phone: [phone]  Mobile Phone: [phone]  Relation: Child    Past Surgical History:  Past Surgical History:   Procedure Laterality Date    CHOLECYSTECTOMY  1985    open?    COLECTOMY  1974    COLONOSCOPY  2012    and egd    COLONOSCOPY  2014    COLONOSCOPY  2015    CYST REMOVAL  years ago    upper gum    EYE SURGERY  years ago    left eye removal,  has artificial eye    HYSTERECTOMY (CERVIX STATUS UNKNOWN)  1974    JOINT REPLACEMENT Left 2010/2012    x 2-knee    TONSILLECTOMY      TUMOR EXCISION      from arm, fatty    UPPER GASTROINTESTINAL ENDOSCOPY  2014    with biopsy    UPPER GASTROINTESTINAL ENDOSCOPY  2015    UPPER GASTROINTESTINAL ENDOSCOPY N/A 2019    EGD ESOPHAGOGASTRODUODENOSCOPY  ++IODINE ALLERGY++ and bx performed by Aquilino James MD at [facility] ENDOSCOPY       Immunization History:   Immunization History   Administered Date(s) Administered    COVID-19, PFIZER PURPLE top, DILUTE for use, (age 12 y+), 30mcg/0.3mL 2021, 2021, 2021, 2021    Pneumococcal, PCV-13, PREVNAR 13, (age 6w+), IM, 0.5mL 2016    Tetanus 2019       Active Problems:  Patient Active Problem List   Diagnosis Code    GERD (gastroesophageal reflux disease)

## 2024-02-29 NOTE — PROGRESS NOTES
Daughter Michelle notified of patient's new room number and patient's confusion/aggressive behavior this shift via phone.

## 2024-02-29 NOTE — PROGRESS NOTES
[facility] Medicine    Subjective:  pt alert conversive cooperative this am      Current Facility-Administered Medications:     enoxaparin (LOVENOX) injection 40 mg, 40 mg, SubCUTAneous, Daily, Audie Gibson DO, 40 mg at 02/28/24 1119    amLODIPine (NORVASC) tablet 10 mg, 10 mg, Oral, Daily, Audie Gibson, , 10 mg at 02/28/24 1107    atorvastatin (LIPITOR) tablet 40 mg, 40 mg, Oral, Nightly, Audie Gibson DO, 40 mg at 02/28/24 1959    donepezil (ARICEPT) tablet 5 mg, 5 mg, Oral, Nightly, Audie Gibson, , 5 mg at 02/28/24 1959    DULoxetine (CYMBALTA) extended release capsule 30 mg, 30 mg, Oral, Daily, Audie Gibson DO, 30 mg at 02/28/24 1107    furosemide (LASIX) tablet 40 mg, 40 mg, Oral, Daily, Audie Gibson DO, 40 mg at 02/28/24 1107    metoprolol succinate (TOPROL XL) extended release tablet 25 mg, 25 mg, Oral, Daily, Audie Gibson DO, 25 mg at 02/28/24 1107    levothyroxine (SYNTHROID) tablet 88 mcg, 88 mcg, Oral, Daily, Audie Gibson DO, 88 mcg at 02/28/24 0507    LORazepam (ATIVAN) tablet 0.5 mg, 0.5 mg, Oral, Q12H PRN, Audie Gibson DO, 0.5 mg at 02/28/24 2256    losartan (COZAAR) tablet 100 mg, 100 mg, Oral, Daily, Audie Gibson DO, 100 mg at 02/28/24 1109    methylPREDNISolone (MEDROL) tablet 4 mg, 4 mg, Oral, Daily, Audie Gibson DO, 4 mg at 02/28/24 1454    pantoprazole (PROTONIX) tablet 40 mg, 40 mg, Oral, Daily, Audie Gibson DO, 40 mg at 02/28/24 0504    potassium chloride (KLOR-CON M) extended release tablet 20 mEq, 20 mEq, Oral, Daily, Audie Gibson DO, 20 mEq at 02/28/24 1109    sodium chloride flush 0.9 % injection 5-40 mL, 5-40 mL, IntraVENous, 2 times per day, Audie Gibson DO, 10 mL at 02/28/24 2000    sodium chloride flush 0.9 % injection 5-40 mL, 5-40 mL, IntraVENous, PRN, Audie Gibson,     0.9 % sodium chloride infusion, , IntraVENous, PRN, Aduie Gibson DO    potassium chloride (KLOR-CON M) extended release  10/07/2022 11:57 PM     02/27/2024 06:04 AM    CO2 22 02/27/2024 06:04 AM    BUN 23 02/27/2024 06:04 AM    CREATININE 0.8 02/27/2024 06:04 AM    GFRAA >60 10/11/2022 04:52 AM    LABGLOM >60 02/27/2024 06:04 AM    GLUCOSE 144 02/27/2024 06:04 AM    GLUCOSE 109 05/04/2012 06:17 AM    PROT 7.6 02/27/2024 06:04 AM    LABALBU 4.3 02/27/2024 06:04 AM    LABALBU 4.2 03/24/2012 11:45 PM    CALCIUM 9.2 02/27/2024 06:04 AM    BILITOT 0.6 02/27/2024 06:04 AM    ALKPHOS 86 02/27/2024 06:04 AM    AST 17 02/27/2024 06:04 AM    ALT 13 02/27/2024 06:04 AM     Warfarin PT/INR:    Lab Results   Component Value Date    INR 0.9 08/10/2022    INR 0.9 08/18/2020    INR 0.9 12/29/2019    PROTIME 10.2 08/10/2022    PROTIME 10.9 08/18/2020    PROTIME 10.5 12/29/2019       Assessment:    Principal Problem:    Acute respiratory failure with hypoxia (HCC)  Resolved Problems:    * No resolved hospital problems. *      Plan:  Wean o2 increase activity dc shun Gibson DO  7:52 AM  2/29/2024
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
## 2024-02-29 NOTE — PLAN OF CARE
Problem: Discharge Planning  Goal: Discharge to home or other facility with appropriate resources  2/28/2024 2322 by Yaquelin Palma RN  Outcome: Progressing  2/28/2024 1825 by Shantel Emanuel RN  Outcome: Progressing  Flowsheets (Taken 2/28/2024 8325)  Discharge to home or other facility with appropriate resources: Arrange for needed discharge resources and transportation as appropriate     Problem: Pain  Goal: Verbalizes/displays adequate comfort level or baseline comfort level  2/28/2024 2322 by Yaquelin Palma RN  Outcome: Progressing  2/28/2024 1825 by Shantel Emanuel RN  Outcome: Progressing     Problem: Safety - Adult  Goal: Free from fall injury  2/28/2024 2322 by Yaquelin Palma RN  Outcome: Progressing  2/28/2024 1825 by Shantel Emanuel RN  Outcome: Progressing     Problem: ABCDS Injury Assessment  Goal: Absence of physical injury  Outcome: Progressing

## 2024-02-29 NOTE — PLAN OF CARE
Problem: Discharge Planning  Goal: Discharge to home or other facility with appropriate resources  2/29/2024 1506 by Shantel Emanuel, RN  Outcome: Adequate for Discharge  2/29/2024 1506 by Shantel Emanuel, RN  Outcome: Progressing  Flowsheets (Taken 2/29/2024 0800)  Discharge to home or other facility with appropriate resources: Identify barriers to discharge with patient and caregiver     Problem: Safety - Adult  Goal: Free from fall injury  2/29/2024 1506 by Shantel Emanuel, RN  Outcome: Adequate for Discharge  2/29/2024 1506 by Shantel Emanuel, RN  Outcome: Progressing

## 2024-03-01 LAB
ALBUMIN SERPL-MCNC: 3.4 G/DL (ref 3.5–5.2)
ALP SERPL-CCNC: 64 U/L (ref 35–104)
ALT SERPL-CCNC: 11 U/L (ref 0–32)
ANION GAP SERPL CALCULATED.3IONS-SCNC: 12 MMOL/L (ref 7–16)
AST SERPL-CCNC: 16 U/L (ref 0–31)
BILIRUB SERPL-MCNC: 0.3 MG/DL (ref 0–1.2)
BUN SERPL-MCNC: 19 MG/DL (ref 6–23)
CALCIUM SERPL-MCNC: 8.5 MG/DL (ref 8.6–10.2)
CHLORIDE SERPL-SCNC: 103 MMOL/L (ref 98–107)
CO2 SERPL-SCNC: 25 MMOL/L (ref 22–29)
CREAT SERPL-MCNC: 0.9 MG/DL (ref 0.5–1)
GFR SERPL CREATININE-BSD FRML MDRD: >60 ML/MIN/1.73M2
GLUCOSE SERPL-MCNC: 94 MG/DL (ref 74–99)
POTASSIUM SERPL-SCNC: 3.9 MMOL/L (ref 3.5–5)
PROT SERPL-MCNC: 6.2 G/DL (ref 6.4–8.3)
SODIUM SERPL-SCNC: 140 MMOL/L (ref 132–146)

## 2024-03-01 PROCEDURE — 80053 COMPREHEN METABOLIC PANEL: CPT

## 2024-03-01 PROCEDURE — 36415 COLL VENOUS BLD VENIPUNCTURE: CPT

## 2024-03-01 PROCEDURE — 2060000000 HC ICU INTERMEDIATE R&B

## 2024-03-01 PROCEDURE — 2580000003 HC RX 258: Performed by: INTERNAL MEDICINE

## 2024-03-01 PROCEDURE — 6370000000 HC RX 637 (ALT 250 FOR IP): Performed by: INTERNAL MEDICINE

## 2024-03-01 PROCEDURE — 6360000002 HC RX W HCPCS: Performed by: INTERNAL MEDICINE

## 2024-03-01 RX ORDER — VITS A,C,E/LUTEIN/MINERALS 300MCG-200
1 TABLET ORAL 2 TIMES DAILY
Status: DISCONTINUED | OUTPATIENT
Start: 2024-03-01 | End: 2024-03-02 | Stop reason: HOSPADM

## 2024-03-01 RX ADMIN — SODIUM CHLORIDE, PRESERVATIVE FREE 10 ML: 5 INJECTION INTRAVENOUS at 08:26

## 2024-03-01 RX ADMIN — FUROSEMIDE 40 MG: 20 TABLET ORAL at 08:25

## 2024-03-01 RX ADMIN — DULOXETINE HYDROCHLORIDE 30 MG: 30 CAPSULE, DELAYED RELEASE ORAL at 08:24

## 2024-03-01 RX ADMIN — POTASSIUM CHLORIDE 20 MEQ: 1500 TABLET, EXTENDED RELEASE ORAL at 08:24

## 2024-03-01 RX ADMIN — LORAZEPAM 0.5 MG: 0.5 TABLET ORAL at 20:18

## 2024-03-01 RX ADMIN — LOSARTAN POTASSIUM 100 MG: 50 TABLET, FILM COATED ORAL at 08:24

## 2024-03-01 RX ADMIN — ATORVASTATIN CALCIUM 40 MG: 40 TABLET, FILM COATED ORAL at 20:18

## 2024-03-01 RX ADMIN — ACETAMINOPHEN 650 MG: 325 TABLET ORAL at 20:18

## 2024-03-01 RX ADMIN — Medication 1 TABLET: at 20:18

## 2024-03-01 RX ADMIN — Medication 1 TABLET: at 10:57

## 2024-03-01 RX ADMIN — ENOXAPARIN SODIUM 40 MG: 100 INJECTION SUBCUTANEOUS at 08:23

## 2024-03-01 RX ADMIN — AMLODIPINE BESYLATE 10 MG: 10 TABLET ORAL at 08:25

## 2024-03-01 RX ADMIN — ACETAMINOPHEN 650 MG: 325 TABLET ORAL at 13:03

## 2024-03-01 RX ADMIN — METOPROLOL SUCCINATE 25 MG: 25 TABLET, EXTENDED RELEASE ORAL at 08:24

## 2024-03-01 RX ADMIN — METHYLPREDNISOLONE 4 MG: 4 TABLET ORAL at 15:38

## 2024-03-01 RX ADMIN — LEVOTHYROXINE SODIUM 88 MCG: 0.09 TABLET ORAL at 08:24

## 2024-03-01 RX ADMIN — PANTOPRAZOLE SODIUM 40 MG: 40 TABLET, DELAYED RELEASE ORAL at 08:24

## 2024-03-01 RX ADMIN — SODIUM CHLORIDE, PRESERVATIVE FREE 10 ML: 5 INJECTION INTRAVENOUS at 20:22

## 2024-03-01 RX ADMIN — DONEPEZIL HYDROCHLORIDE 5 MG: 5 TABLET, FILM COATED ORAL at 22:12

## 2024-03-01 ASSESSMENT — PAIN SCALES - GENERAL
PAINLEVEL_OUTOF10: 0
PAINLEVEL_OUTOF10: 5

## 2024-03-01 ASSESSMENT — PAIN DESCRIPTION - DESCRIPTORS: DESCRIPTORS: DISCOMFORT;SORE

## 2024-03-01 ASSESSMENT — PAIN - FUNCTIONAL ASSESSMENT: PAIN_FUNCTIONAL_ASSESSMENT: PREVENTS OR INTERFERES SOME ACTIVE ACTIVITIES AND ADLS

## 2024-03-01 ASSESSMENT — PAIN DESCRIPTION - LOCATION: LOCATION: EAR

## 2024-03-01 ASSESSMENT — PAIN DESCRIPTION - ORIENTATION: ORIENTATION: RIGHT

## 2024-03-01 NOTE — PROGRESS NOTES
release tablet 40 mEq, 40 mEq, Oral, PRN **OR** potassium bicarb-citric acid (EFFER-K) effervescent tablet 40 mEq, 40 mEq, Oral, PRN **OR** potassium chloride 10 mEq/100 mL IVPB (Peripheral Line), 10 mEq, IntraVENous, PRN, Audie Gibson DO    magnesium sulfate 2000 mg in 50 mL IVPB premix, 2,000 mg, IntraVENous, PRN, Audie Gibson DO    ondansetron (ZOFRAN-ODT) disintegrating tablet 4 mg, 4 mg, Oral, Q8H PRN **OR** ondansetron (ZOFRAN) injection 4 mg, 4 mg, IntraVENous, Q6H PRN, Audie Gibson DO    polyethylene glycol (GLYCOLAX) packet 17 g, 17 g, Oral, Daily PRN, Audie Gibson DO    acetaminophen (TYLENOL) tablet 650 mg, 650 mg, Oral, Q6H PRN **OR** acetaminophen (TYLENOL) suppository 650 mg, 650 mg, Rectal, Q6H PRN, Audie Gibson DO    antioxidant multivitamin (OCUVITE) tablet, 1 tablet, Oral, BID, Audie Gibson DO, 1 tablet at 02/29/24 2110    Objective:    BP (!) 139/52   Pulse 56   Temp 97.1 °F (36.2 °C) (Tympanic)   Resp 14   Ht 1.499 m (4' 11\")   Wt 92.8 kg (204 lb 9.4 oz)   LMP 07/24/1974   SpO2 96%   BMI 41.32 kg/m²     Heart:  reg eloy  Lungs:  ctab  Abd: + bs soft nontender  Extrem:  min edema legs    CBC with Differential:    Lab Results   Component Value Date/Time    WBC 9.7 02/29/2024 04:38 PM    RBC 4.63 02/29/2024 04:38 PM    HGB 12.9 02/29/2024 04:38 PM    HCT 40.1 02/29/2024 04:38 PM     02/29/2024 04:38 PM    MCV 86.6 02/29/2024 04:38 PM    MCH 27.9 02/29/2024 04:38 PM    MCHC 32.2 02/29/2024 04:38 PM    RDW 14.5 02/29/2024 04:38 PM    SEGSPCT 58 03/11/2014 08:46 AM    LYMPHOPCT 6 02/27/2024 06:04 AM    MONOPCT 2 02/27/2024 06:04 AM    EOSPCT 1 10/07/2010 01:14 PM    BASOPCT 0 02/27/2024 06:04 AM    MONOSABS 0.24 02/27/2024 06:04 AM    LYMPHSABS 0.60 02/27/2024 06:04 AM    EOSABS 0.04 02/27/2024 06:04 AM    BASOSABS 0.02 02/27/2024 06:04 AM     CMP:    Lab Results   Component Value Date/Time     03/01/2024 01:37 AM    K 3.9 03/01/2024 01:37 AM     K 4.3 10/07/2022 11:57 PM     03/01/2024 01:37 AM    CO2 25 03/01/2024 01:37 AM    BUN 19 03/01/2024 01:37 AM    CREATININE 0.9 03/01/2024 01:37 AM    GFRAA >60 10/11/2022 04:52 AM    LABGLOM >60 03/01/2024 01:37 AM    GLUCOSE 94 03/01/2024 01:37 AM    GLUCOSE 109 05/04/2012 06:17 AM    PROT 6.2 03/01/2024 01:37 AM    LABALBU 3.4 03/01/2024 01:37 AM    LABALBU 4.2 03/24/2012 11:45 PM    CALCIUM 8.5 03/01/2024 01:37 AM    BILITOT 0.3 03/01/2024 01:37 AM    ALKPHOS 64 03/01/2024 01:37 AM    AST 16 03/01/2024 01:37 AM    ALT 11 03/01/2024 01:37 AM     Warfarin PT/INR:    Lab Results   Component Value Date    INR 0.9 08/10/2022    INR 0.9 08/18/2020    INR 0.9 12/29/2019    PROTIME 10.2 08/10/2022    PROTIME 10.9 08/18/2020    PROTIME 10.5 12/29/2019       Assessment:    Principal Problem:    Acute respiratory failure with hypoxia (HCC)  Resolved Problems:    * No resolved hospital problems. *  Acute delirium improved    Plan:  Neuro to see dc shun Gibson DO  7:53 AM  3/1/2024

## 2024-03-01 NOTE — CONSULTS
NEUROLOGY CONSULT NOTE      Requesting Physician: Audie Gibson DO    Reason for Consult:  Evaluate for altered mental status.     History of Present Illness:  Valarie Mata is a 83 y.o. female  with h/o CVA, subarachnoid hemorrhage, HTN, HLD, ANA MARIA, thyroid disease, colon cancer, macular degeneration, peripheral neuropathy, prosthetic left eye, and GERD who was admitted to [facility] on 2/27/2024 with presentation of emesis and abdominal pain. Hypoxia noted on ED presentation with diagnosis of acute hypoxic respiratory failure along with generalized edema.  Patient did report abdominal tenderness in the periumbilical area.  There was no chest pain, shortness of breath, diaphoresis, palpitations, bowel or bladder dysfunction, headache, dizziness, or focal weakness/numbness/tingling.  Labs were notable for metabolic profile with slightly elevated glucose of 144 but otherwise within normal limits.  CBC showed WBC count of 10.6 that was 21% neutrophils but otherwise normal.  proBNP was 238 with normal hepatic panel.  CT scan of the head was notable for no acute intracranial abnormalities with cerebral atrophy and leukomalacia as well as old right occipital lobe infarct.  CTA of the chest was negative for any acute cardiopulmonary process.  There was note of chronic interstitial lung disease.  Incidental note of multilevel degenerative changes within the spine.  Patient subsequently admitted for further evaluation respiratory failure and intractable nausea/vomiting.  Neurology not consulted for altered mentation.  Patient was admitted time of this evaluation with only complaint being right ear pain.  No report of any tinnitus or vertigo.  She denies any problems with hearing loss.      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       The patient's records from referring provider and available information in the EHR was reviewed.    I have personally reviewed the following studies: CT scan of the head showed old right occipital infarct with no acute intracranial abnormalities.       Impression:  Altered mental status: Now resolved with no clear etiology, and insufficient information currently to for possible explanation other than hypoxia from respiratory failure.  Acute respiratory failure with hypoxia: Now resolved.  Right ear pain: Presumed secondary to TMJ with no evidence of SCM tenderness that is consistent with this with consideration of possible bruxism.  Further outpatient evaluation warranted and will evaluate further with x-ray of neck and jaw for TMJ versus cervicogenic etiology for symptoms.    Principal Problem:    Acute respiratory failure with hypoxia (HCC)  Resolved Problems:    * No resolved hospital problems. *      Recommendations:                                            Continue current therapy regiment  X-ray of right show for possible TMJ with consideration of more detailed outpatient evaluation with ENT and possibly with a dentist.    Toradol 15 mg every 8 hours x 5 doses  Zanaflex 4 mg p.o. nightly  Consider for gabapentin titration to help with low chronic pain.  Further on follow-up.  Plan of care and all questions and concerns of patient /family were discussed the bedside.       It was my pleasure to evaluate Valarie Mata today.  Please call with questions.      Electronically signed by Dejuan Yoo MD on 2/29/2024 at 8:09 PM

## 2024-03-01 NOTE — CARE COORDINATION
CM Update: Met with patient and called Michelle (daughter) at bedside to discuss transition of care plan. Discharge plan is [facility]. Michelle said she would want a memory care unit if the doctor recommend it, but if they do not then [facility] is where she wants her to go. [facility] said they could not skill patient. Neurology consult for AMS noted as patient had increased confusion. Juli to see patient. MONIKA/HEATHER to follow. MT

## 2024-03-02 VITALS
DIASTOLIC BLOOD PRESSURE: 56 MMHG | RESPIRATION RATE: 15 BRPM | SYSTOLIC BLOOD PRESSURE: 135 MMHG | BODY MASS INDEX: 41.24 KG/M2 | HEIGHT: 59 IN | TEMPERATURE: 97.7 F | WEIGHT: 204.59 LBS | OXYGEN SATURATION: 91 % | HEART RATE: 51 BPM

## 2024-03-02 PROCEDURE — 6370000000 HC RX 637 (ALT 250 FOR IP): Performed by: INTERNAL MEDICINE

## 2024-03-02 PROCEDURE — 6360000002 HC RX W HCPCS: Performed by: INTERNAL MEDICINE

## 2024-03-02 PROCEDURE — 99232 SBSQ HOSP IP/OBS MODERATE 35: CPT

## 2024-03-02 PROCEDURE — 2580000003 HC RX 258: Performed by: INTERNAL MEDICINE

## 2024-03-02 RX ADMIN — ENOXAPARIN SODIUM 40 MG: 100 INJECTION SUBCUTANEOUS at 08:16

## 2024-03-02 RX ADMIN — DULOXETINE HYDROCHLORIDE 30 MG: 30 CAPSULE, DELAYED RELEASE ORAL at 08:16

## 2024-03-02 RX ADMIN — METOPROLOL SUCCINATE 25 MG: 25 TABLET, EXTENDED RELEASE ORAL at 08:16

## 2024-03-02 RX ADMIN — LEVOTHYROXINE SODIUM 88 MCG: 0.09 TABLET ORAL at 08:16

## 2024-03-02 RX ADMIN — POTASSIUM CHLORIDE 20 MEQ: 1500 TABLET, EXTENDED RELEASE ORAL at 08:15

## 2024-03-02 RX ADMIN — Medication 1 TABLET: at 08:17

## 2024-03-02 RX ADMIN — PANTOPRAZOLE SODIUM 40 MG: 40 TABLET, DELAYED RELEASE ORAL at 08:16

## 2024-03-02 RX ADMIN — FUROSEMIDE 40 MG: 20 TABLET ORAL at 08:15

## 2024-03-02 RX ADMIN — AMLODIPINE BESYLATE 10 MG: 10 TABLET ORAL at 08:15

## 2024-03-02 RX ADMIN — SODIUM CHLORIDE, PRESERVATIVE FREE 10 ML: 5 INJECTION INTRAVENOUS at 08:17

## 2024-03-02 RX ADMIN — ACETAMINOPHEN 650 MG: 325 TABLET ORAL at 08:15

## 2024-03-02 RX ADMIN — LOSARTAN POTASSIUM 100 MG: 50 TABLET, FILM COATED ORAL at 08:15

## 2024-03-02 ASSESSMENT — PAIN SCALES - GENERAL: PAINLEVEL_OUTOF10: 9

## 2024-03-02 ASSESSMENT — PAIN DESCRIPTION - ORIENTATION: ORIENTATION: RIGHT

## 2024-03-02 ASSESSMENT — PAIN DESCRIPTION - LOCATION: LOCATION: EAR

## 2024-03-02 ASSESSMENT — PAIN - FUNCTIONAL ASSESSMENT: PAIN_FUNCTIONAL_ASSESSMENT: PREVENTS OR INTERFERES SOME ACTIVE ACTIVITIES AND ADLS

## 2024-03-02 ASSESSMENT — PAIN DESCRIPTION - DESCRIPTORS: DESCRIPTORS: DISCOMFORT;SORE;NAGGING

## 2024-03-02 NOTE — PROGRESS NOTES
Hospital Medicine    Subjective:  pt c/o r ear pain      Current Facility-Administered Medications:     antioxidant multivitamin (OCUVITE) tablet, 1 tablet, Oral, BID, Audie Gibson DO, 1 tablet at 03/02/24 0817    enoxaparin (LOVENOX) injection 40 mg, 40 mg, SubCUTAneous, Daily, Audie Gibson DO, 40 mg at 03/02/24 0816    amLODIPine (NORVASC) tablet 10 mg, 10 mg, Oral, Daily, Audie Gibson DO, 10 mg at 03/02/24 0815    atorvastatin (LIPITOR) tablet 40 mg, 40 mg, Oral, Nightly, Audie Gibson DO, 40 mg at 03/01/24 2018    donepezil (ARICEPT) tablet 5 mg, 5 mg, Oral, Nightly, Audie Gibson DO, 5 mg at 03/01/24 2212    DULoxetine (CYMBALTA) extended release capsule 30 mg, 30 mg, Oral, Daily, Audie Gibson DO, 30 mg at 03/02/24 0816    furosemide (LASIX) tablet 40 mg, 40 mg, Oral, Daily, Audie Gibson DO, 40 mg at 03/02/24 0815    metoprolol succinate (TOPROL XL) extended release tablet 25 mg, 25 mg, Oral, Daily, Audie Gibson DO, 25 mg at 03/02/24 0816    levothyroxine (SYNTHROID) tablet 88 mcg, 88 mcg, Oral, Daily, Audie Gibson DO, 88 mcg at 03/02/24 0816    LORazepam (ATIVAN) tablet 0.5 mg, 0.5 mg, Oral, Q12H PRN, uAdie Gibson DO, 0.5 mg at 03/01/24 2018    losartan (COZAAR) tablet 100 mg, 100 mg, Oral, Daily, Audie Gibson DO, 100 mg at 03/02/24 0815    methylPREDNISolone (MEDROL) tablet 4 mg, 4 mg, Oral, Daily, Audie Gibson DO, 4 mg at 03/01/24 1538    pantoprazole (PROTONIX) tablet 40 mg, 40 mg, Oral, Daily, Audie Gibson DO, 40 mg at 03/02/24 0816    potassium chloride (KLOR-CON M) extended release tablet 20 mEq, 20 mEq, Oral, Daily, Audie Gibson, DO, 20 mEq at 03/02/24 0815    sodium chloride flush 0.9 % injection 5-40 mL, 5-40 mL, IntraVENous, 2 times per day, Audie Gibson, DO, 10 mL at 03/02/24 0817    sodium chloride flush 0.9 % injection 5-40 mL, 5-40 mL, IntraVENous, PRN, Audie Gibson, DO    0.9 % sodium chloride infusion,  , IntraVENous, PRN, Audie Gibson, DO    potassium chloride (KLOR-CON M) extended release tablet 40 mEq, 40 mEq, Oral, PRN **OR** potassium bicarb-citric acid (EFFER-K) effervescent tablet 40 mEq, 40 mEq, Oral, PRN **OR** potassium chloride 10 mEq/100 mL IVPB (Peripheral Line), 10 mEq, IntraVENous, PRN, Audie Gibson, DO    magnesium sulfate 2000 mg in 50 mL IVPB premix, 2,000 mg, IntraVENous, PRN, Audie Gibson, DO    ondansetron (ZOFRAN-ODT) disintegrating tablet 4 mg, 4 mg, Oral, Q8H PRN **OR** ondansetron (ZOFRAN) injection 4 mg, 4 mg, IntraVENous, Q6H PRN, Audie Gibson, DO    polyethylene glycol (GLYCOLAX) packet 17 g, 17 g, Oral, Daily PRN, Audie Gibson,     acetaminophen (TYLENOL) tablet 650 mg, 650 mg, Oral, Q6H PRN, 650 mg at 03/02/24 0815 **OR** acetaminophen (TYLENOL) suppository 650 mg, 650 mg, Rectal, Q6H PRN, Audie Gibson, DO    Objective:    BP (!) 147/61   Pulse 66   Temp 97.7 °F (36.5 °C) (Oral)   Resp 16   Ht 1.499 m (4' 11\")   Wt 92.8 kg (204 lb 9.4 oz)   LMP 07/24/1974   SpO2 94%   BMI 41.32 kg/m²     Heart:  reg  Lungs:  ctab  Abd: + bs soft nontender  Extrem:  min edema legs  Ears: r ear canal w/o erythema or edema no cerumen r ear TMI    CBC with Differential:    Lab Results   Component Value Date/Time    WBC 9.7 02/29/2024 04:38 PM    RBC 4.63 02/29/2024 04:38 PM    HGB 12.9 02/29/2024 04:38 PM    HCT 40.1 02/29/2024 04:38 PM     02/29/2024 04:38 PM    MCV 86.6 02/29/2024 04:38 PM    MCH 27.9 02/29/2024 04:38 PM    MCHC 32.2 02/29/2024 04:38 PM    RDW 14.5 02/29/2024 04:38 PM    SEGSPCT 58 03/11/2014 08:46 AM    LYMPHOPCT 6 02/27/2024 06:04 AM    MONOPCT 2 02/27/2024 06:04 AM    EOSPCT 1 10/07/2010 01:14 PM    BASOPCT 0 02/27/2024 06:04 AM    MONOSABS 0.24 02/27/2024 06:04 AM    LYMPHSABS 0.60 02/27/2024 06:04 AM    EOSABS 0.04 02/27/2024 06:04 AM    BASOSABS 0.02 02/27/2024 06:04 AM     CMP:    Lab Results   Component Value Date/Time

## 2024-03-02 NOTE — CARE COORDINATION
3/2/2024Discharge order noted. Sw spoke with pt's dtr,checking for transport arrangements. Sw will await return call.  Electronically signed by BELINDA Duvall on 3/2/2024 at 9:30 AM    Addendum:Sw notified that pt's dtr will be her to transport at 3 pm. Kelley notified floor.  Electronically signed by BELINDA Duvall on 3/2/2024 at 9:36 AM

## 2024-03-02 NOTE — PLAN OF CARE
Problem: Discharge Planning  Goal: Discharge to home or other facility with appropriate resources  Outcome: Completed  Flowsheets (Taken 3/2/2024 0745)  Discharge to home or other facility with appropriate resources: Identify barriers to discharge with patient and caregiver     Problem: Chronic Conditions and Co-morbidities  Goal: Patient's chronic conditions and co-morbidity symptoms are monitored and maintained or improved  Outcome: Completed  Flowsheets (Taken 3/2/2024 0745)  Care Plan - Patient's Chronic Conditions and Co-Morbidity Symptoms are Monitored and Maintained or Improved: Monitor and assess patient's chronic conditions and comorbid symptoms for stability, deterioration, or improvement     Problem: Respiratory - Adult  Goal: Achieves optimal ventilation and oxygenation  Outcome: Completed  Flowsheets (Taken 3/2/2024 0745)  Achieves optimal ventilation and oxygenation: Position to facilitate oxygenation and minimize respiratory effort     Problem: Musculoskeletal - Adult  Goal: Return mobility to safest level of function  Outcome: Completed  Goal: Maintain proper alignment of affected body part  Outcome: Completed  Goal: Return ADL status to a safe level of function  Outcome: Completed     Problem: Confusion  Goal: Confusion, delirium, dementia, or psychosis is improved or at baseline  Description: INTERVENTIONS:  1. Assess for possible contributors to thought disturbance, including medications, impaired vision or hearing, underlying metabolic abnormalities, dehydration, psychiatric diagnoses, and notify attending LIP  2. [place] high risk fall precautions, as indicated  3. Provide frequent short contacts to provide reality reorientation, refocusing and direction  4. Decrease environmental stimuli, including noise as appropriate  5. Monitor and intervene to maintain adequate nutrition, hydration, elimination, sleep and activity  6. If unable to ensure safety without constant attention obtain sitter  and review sitter guidelines with assigned personnel  7. Initiate Psychosocial CNS and Spiritual Care consult, as indicated  Outcome: Completed  Flowsheets (Taken 3/2/2024 0745)  Effect of thought disturbance (confusion, delirium, dementia, or psychosis) are managed with adequate functional status: Provide frequent short contacts to provide reality reorientation, refocusing and direction     Problem: Behavior  Goal: Pt/Family maintain appropriate behavior and adhere to behavioral management agreement, if implemented  Description: INTERVENTIONS:  1. Assess patient/family's coping skills and  non-compliant behavior (including use of illegal substances)  2. Notify security of behavior or suspected illegal substances which indicate the need for search of the family and/or belongings  3. Encourage verbalization of thoughts and concerns in a socially appropriate manner  4. Utilize positive, consistent limit setting strategies supporting safety of patient, staff and others  5. Encourage participation in the decision making process about the behavioral management agreement  6. If a visitor's behavior poses a threat to safety call refer to organization policy.  7. Initiate consult with , Psychosocial CNS, Spiritual Care as appropriate  Outcome: Completed  Flowsheets (Taken 3/2/2024 0745)  Patient/family maintains appropriate behavior and adheres to behavioral management agreement, if implemented: Encourage verbalization of thoughts and concerns in a socially appropriate manner     Problem: Skin/Tissue Integrity - Adult  Goal: Skin integrity remains intact  Outcome: Completed  Flowsheets (Taken 3/2/2024 0745)  Skin Integrity Remains Intact: Monitor for areas of redness and/or skin breakdown  Goal: Incisions, wounds, or drain sites healing without S/S of infection  Outcome: Completed  Flowsheets (Taken 3/2/2024 0745)  Incisions, Wounds, or Drain Sites Healing Without Sign and Symptoms of Infection: ADMISSION and

## 2024-03-02 NOTE — PROGRESS NOTES
Discharge discussed with patient and daughter. IV catheter removed without complications. Cardiac monitor removed and placed in appropriate storage per unit guidelines.   Discharge instructions given and thoroughly explained to patient discussing new medications, diagnosis and follow-ups needed for outpatient.   Patient verified all belongings are accounted for.   Waiting for daughter to arrive to transport patient back to [facility].

## 2024-03-02 NOTE — PROGRESS NOTES
abnormality, atraumatic  Eyes: conjunctivae/corneas clear,   Neck: no adenopathy,  supple, symmetrical, trachea midline and thyroid not enlarged, symmetric, no tenderness/mass/nodules  Lungs: Regular respirations on room air  Heart: No chest pain or palpitations  Abdomen: soft, non-tender; bowel sounds normal; no masses,  no organomegaly  Extremities:  normal, atraumatic, no cyanosis bilateral lower leg edema  Pulses: 2+ and symmetric  Skin vascular discoloration bilateral lower extremities      Mental Status: Alert, oriented, thought content appropriate, alertness: alert, orientation: time, date, person, place, city, affect: normal, thought content exhibits logical connections     Appropriate attention/concentration  Intact fundus of knowledge  Intact memories      Speech: Clear  Language: No aphasia's    Cranial Nerves:  I: smell    II: visual acuity     II: visual fields Blind in left eye   II: pupils PAYTON   III,VII: ptosis Left eye ptosis   III,IV,VI: extraocular muscles  Full ROM   V: mastication    V: facial light touch sensation  Normal   V,VII: corneal reflex     VII: facial muscle function - upper  Left eye ptosis   VII: facial muscle function - lower Normal   VIII: hearing Normal   IX: soft palate elevation     IX,X: gag reflex    XI: trapezius strength  5/5   XI: sternocleidomastoid strength    XI: neck extension strength     XII: tongue strength  Normal     Motor:  5/5 throughout  Normal bulk and tone  No drift   No abnormal movements    Sensory:  LT and PP normal  Vibration normal    Coordination:   FN, FFM and ZORA normal  HS normal    Gait:  Deferred for patient status as well consideration    DTR:   1+ throughout  No Babinskis  No Case's    No other pathological reflexes    Laboratory/Radiology:     CBC with Differential:    Lab Results   Component Value Date/Time    WBC 9.7 02/29/2024 04:38 PM    RBC 4.63 02/29/2024 04:38 PM    HGB 12.9 02/29/2024 04:38 PM    HCT 40.1 02/29/2024 04:38 PM    PLT  247 02/29/2024 04:38 PM    MCV 86.6 02/29/2024 04:38 PM    MCH 27.9 02/29/2024 04:38 PM    MCHC 32.2 02/29/2024 04:38 PM    RDW 14.5 02/29/2024 04:38 PM    SEGSPCT 58 03/11/2014 08:46 AM    LYMPHOPCT 6 02/27/2024 06:04 AM    MONOPCT 2 02/27/2024 06:04 AM    EOSPCT 1 10/07/2010 01:14 PM    BASOPCT 0 02/27/2024 06:04 AM    MONOSABS 0.24 02/27/2024 06:04 AM    LYMPHSABS 0.60 02/27/2024 06:04 AM    EOSABS 0.04 02/27/2024 06:04 AM    BASOSABS 0.02 02/27/2024 06:04 AM     CMP:    Lab Results   Component Value Date/Time     03/01/2024 01:37 AM    K 3.9 03/01/2024 01:37 AM    K 4.3 10/07/2022 11:57 PM     03/01/2024 01:37 AM    CO2 25 03/01/2024 01:37 AM    BUN 19 03/01/2024 01:37 AM    CREATININE 0.9 03/01/2024 01:37 AM    GFRAA >60 10/11/2022 04:52 AM    LABGLOM >60 03/01/2024 01:37 AM    GLUCOSE 94 03/01/2024 01:37 AM    GLUCOSE 109 05/04/2012 06:17 AM    PROT 6.2 03/01/2024 01:37 AM    LABALBU 3.4 03/01/2024 01:37 AM    LABALBU 4.2 03/24/2012 11:45 PM    CALCIUM 8.5 03/01/2024 01:37 AM    BILITOT 0.3 03/01/2024 01:37 AM    ALKPHOS 64 03/01/2024 01:37 AM    AST 16 03/01/2024 01:37 AM    ALT 11 03/01/2024 01:37 AM     HgBA1c:    Lab Results   Component Value Date/Time    LABA1C 6.3 11/08/2023 03:00 AM     FLP:    Lab Results   Component Value Date/Time    TRIG 116 11/08/2023 03:00 AM    HDL 60 11/08/2023 03:00 AM    LDLCALC 81 06/26/2023 11:00 AM     CT head 2/27/2024  1. There is no acute intracranial abnormality.  Specifically, there is no  intracranial hemorrhage.  2. Atrophy and periventricular leukomalacia,  3. 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    CT head 2/29/24  No acute intracranial abnormality.     No significant interval changes since the previous study February 27, 2024.        All labs and imaging studies reviewed independently today                   BETINA Herndon - CNP  11:09 AM  3/2/2024

## 2024-03-03 LAB
MICROORGANISM SPEC CULT: NORMAL
MICROORGANISM SPEC CULT: NORMAL
SERVICE CMNT-IMP: NORMAL
SERVICE CMNT-IMP: NORMAL
SPECIMEN DESCRIPTION: NORMAL
SPECIMEN DESCRIPTION: NORMAL

## 2024-03-19 ENCOUNTER — TELEPHONE (OUTPATIENT)
Dept: FAMILY MEDICINE CLINIC | Age: 84
End: 2024-03-19

## 2024-03-19 NOTE — TELEPHONE ENCOUNTER
Assisted Living called stating that the patient has 3+ pitting edema in bilateral lower extremities. Denies pain, warmth and shortness of breath. Nurse states that the patient is taking Lasix 40mg daily and her B/P today was 146/68, her weight was 203 lbs.  Patient has an appointment with you on 03/25/2024.

## 2024-03-25 ENCOUNTER — OFFICE VISIT (OUTPATIENT)
Dept: FAMILY MEDICINE CLINIC | Age: 84
End: 2024-03-25
Payer: COMMERCIAL

## 2024-03-25 VITALS
HEIGHT: 59 IN | BODY MASS INDEX: 40.12 KG/M2 | DIASTOLIC BLOOD PRESSURE: 78 MMHG | HEART RATE: 68 BPM | OXYGEN SATURATION: 97 % | TEMPERATURE: 98.3 F | SYSTOLIC BLOOD PRESSURE: 144 MMHG | RESPIRATION RATE: 16 BRPM | WEIGHT: 199 LBS

## 2024-03-25 DIAGNOSIS — I10 PRIMARY HYPERTENSION: Primary | ICD-10-CM

## 2024-03-25 DIAGNOSIS — F33.1 MODERATE EPISODE OF RECURRENT MAJOR DEPRESSIVE DISORDER (HCC): ICD-10-CM

## 2024-03-25 DIAGNOSIS — Z09 HOSPITAL DISCHARGE FOLLOW-UP: ICD-10-CM

## 2024-03-25 DIAGNOSIS — R60.0 PERIPHERAL EDEMA: ICD-10-CM

## 2024-03-25 DIAGNOSIS — R35.0 URINARY FREQUENCY: ICD-10-CM

## 2024-03-25 PROCEDURE — G8427 DOCREV CUR MEDS BY ELIG CLIN: HCPCS | Performed by: FAMILY MEDICINE

## 2024-03-25 PROCEDURE — 1111F DSCHRG MED/CURRENT MED MERGE: CPT | Performed by: FAMILY MEDICINE

## 2024-03-25 PROCEDURE — 81002 URINALYSIS NONAUTO W/O SCOPE: CPT | Performed by: FAMILY MEDICINE

## 2024-03-25 PROCEDURE — 1090F PRES/ABSN URINE INCON ASSESS: CPT | Performed by: FAMILY MEDICINE

## 2024-03-25 PROCEDURE — 99214 OFFICE O/P EST MOD 30 MIN: CPT | Performed by: FAMILY MEDICINE

## 2024-03-25 PROCEDURE — G8400 PT W/DXA NO RESULTS DOC: HCPCS | Performed by: FAMILY MEDICINE

## 2024-03-25 PROCEDURE — 3078F DIAST BP <80 MM HG: CPT | Performed by: FAMILY MEDICINE

## 2024-03-25 PROCEDURE — 1036F TOBACCO NON-USER: CPT | Performed by: FAMILY MEDICINE

## 2024-03-25 PROCEDURE — G8417 CALC BMI ABV UP PARAM F/U: HCPCS | Performed by: FAMILY MEDICINE

## 2024-03-25 PROCEDURE — G8484 FLU IMMUNIZE NO ADMIN: HCPCS | Performed by: FAMILY MEDICINE

## 2024-03-25 PROCEDURE — 1123F ACP DISCUSS/DSCN MKR DOCD: CPT | Performed by: FAMILY MEDICINE

## 2024-03-25 PROCEDURE — 3077F SYST BP >= 140 MM HG: CPT | Performed by: FAMILY MEDICINE

## 2024-03-25 RX ORDER — AMLODIPINE BESYLATE 10 MG/1
10 TABLET ORAL DAILY
Qty: 90 TABLET | Refills: 3 | Status: SHIPPED | OUTPATIENT
Start: 2024-03-25

## 2024-03-25 RX ORDER — POLYETHYLENE GLYCOL 3350 17 G/17G
17 POWDER, FOR SOLUTION ORAL DAILY
Qty: 1 EACH | Refills: 2 | Status: SHIPPED | OUTPATIENT
Start: 2024-03-25

## 2024-03-25 RX ORDER — PANTOPRAZOLE SODIUM 40 MG/1
40 TABLET, DELAYED RELEASE ORAL DAILY
Qty: 90 TABLET | Refills: 3 | Status: SHIPPED | OUTPATIENT
Start: 2024-03-25

## 2024-03-25 RX ORDER — FUROSEMIDE 40 MG/1
40 TABLET ORAL 2 TIMES DAILY
Qty: 60 TABLET | Refills: 0 | Status: SHIPPED | OUTPATIENT
Start: 2024-03-25

## 2024-03-25 RX ORDER — DULOXETIN HYDROCHLORIDE 30 MG/1
30 CAPSULE, DELAYED RELEASE ORAL DAILY
Qty: 30 CAPSULE | Refills: 2 | Status: SHIPPED | OUTPATIENT
Start: 2024-03-25

## 2024-03-25 RX ORDER — SANITARY PADS
1 EACH MISCELLANEOUS 3 TIMES DAILY PRN
Qty: 30 EACH | Refills: 11 | Status: SHIPPED | OUTPATIENT
Start: 2024-03-25

## 2024-03-25 NOTE — PROGRESS NOTES
Post-Discharge Transitional Care Follow Up      Valarie Mata   YOB: 1940    Date of Office Visit:  3/25/2024  Date of Hospital Admission: 2/27/24  Date of Hospital Discharge: 3/2/24  Readmission Risk Score (high >=14%. Medium >=10%):Readmission Risk Score: 15.4      Care management risk score Rising risk (score 2-5) and Complex Care (Scores >=6): No Risk Score On File     Non face to face  following discharge, date last encounter closed (first attempt may have been earlier): *No documented post hospital discharge outreach found in the last 14 days     Call initiated 2 business days of discharge: *No response recorded in the last 14 days     Primary hypertension  -     amLODIPine (NORVASC) 10 MG tablet; Take 1 tablet by mouth daily, Disp-90 tablet, R-3Normal  -     furosemide (LASIX) 40 MG tablet; Take 1 tablet by mouth 2 times daily STOP prior prescription. New prescription reflects dose increase, Disp-60 tablet, R-0Normal  -     Basic Metabolic Panel; Future  Moderate episode of recurrent major depressive disorder (HCC)  -     DULoxetine (CYMBALTA) 30 MG extended release capsule; Take 1 capsule by mouth daily, Disp-30 capsule, R-2Normal  Urinary frequency  -     Sanitary Napkins & Tampons (MAXI PAD ULTRA THIN) PADS; 3 TIMES DAILY PRN Starting Mon 3/25/2024, Disp-30 each, R-11, Normal  -     POCT Urinalysis no Micro  -     Culture, Urine; Future  Hospital discharge follow-up  -     MO DISCHARGE MEDS RECONCILED W/ CURRENT OUTPATIENT MED LIST  Peripheral edema  -     furosemide (LASIX) 40 MG tablet; Take 1 tablet by mouth 2 times daily STOP prior prescription. New prescription reflects dose increase, Disp-60 tablet, R-0Normal      Medical Decision Making: low complexity  Return in 2 weeks (on 4/8/2024).           Subjective:   HPI    Inpatient course: Discharge summary reviewed- see chart.  Confusion per SNF. Is on Aricept.  Does see Dr. Palmer 6/4/2024    Interval history/Current status:

## 2024-03-27 LAB
CULTURE: ABNORMAL
SPECIMEN DESCRIPTION: ABNORMAL

## 2024-03-29 ENCOUNTER — TELEPHONE (OUTPATIENT)
Dept: FAMILY MEDICINE CLINIC | Age: 84
End: 2024-03-29

## 2024-03-29 NOTE — TELEPHONE ENCOUNTER
The nurse over at [facility] called and stated that the patient has 3 large blisters on her back side due to her sitting on a heating pad for a long period of time. The nurse Ronda Edwards put some [place] honey and a sacral patch on the area. She wanted to inform you of everything and to also see if you thought it be best to send anything else in.

## 2024-04-02 LAB
ANION GAP SERPL CALCULATED.3IONS-SCNC: 17 MMOL/L (ref 7–16)
BUN SERPL-MCNC: 15 MG/DL (ref 6–23)
CALCIUM SERPL-MCNC: 8.9 MG/DL (ref 8.6–10.2)
CHLORIDE SERPL-SCNC: 102 MMOL/L (ref 98–107)
CO2 SERPL-SCNC: 23 MMOL/L (ref 22–29)
CREAT SERPL-MCNC: 0.8 MG/DL (ref 0.5–1)
GFR SERPL CREATININE-BSD FRML MDRD: 71 ML/MIN/1.73M2
GLUCOSE SERPL-MCNC: 80 MG/DL (ref 74–99)
POTASSIUM SERPL-SCNC: 3.9 MMOL/L (ref 3.5–5)
SODIUM SERPL-SCNC: 142 MMOL/L (ref 132–146)

## 2024-04-04 ENCOUNTER — TELEPHONE (OUTPATIENT)
Dept: FAMILY MEDICINE CLINIC | Age: 84
End: 2024-04-04

## 2024-04-04 NOTE — TELEPHONE ENCOUNTER
Called the assisted living, but the phone kept ringing and nobody answered. Will try again in a little bit.

## 2024-04-04 NOTE — TELEPHONE ENCOUNTER
----- Message from Kiya Sahu sent at 4/4/2024 11:24 AM EDT -----  Subject: Appointment Request    Reason for Call: Established Patient Appointment needed: Semi-Routine Skin   Problem    QUESTIONS    Reason for appointment request? Available appointments did not meet   patient need     Additional Information for Provider? Nikki with [facility] called in to reschedule the patient's appointment that she has on   4/9 in regards to blisters she has. Available appointments does not meet   patient needs. Patient needs an in office visit and would like an   appointment for next week but not on 4/9. Please contact to further   assist.   ---------------------------------------------------------------------------  --------------  CALL BACK INFO  [phone]; Do not leave any message, patient will call back for answer  ---------------------------------------------------------------------------  --------------  SCRIPT ANSWERS

## 2024-04-16 ENCOUNTER — APPOINTMENT (OUTPATIENT)
Dept: GENERAL RADIOLOGY | Age: 84
End: 2024-04-16
Payer: COMMERCIAL

## 2024-04-16 ENCOUNTER — HOSPITAL ENCOUNTER (EMERGENCY)
Age: 84
Discharge: HOME | End: 2024-04-17
Attending: EMERGENCY MEDICINE
Payer: COMMERCIAL

## 2024-04-16 ENCOUNTER — TELEPHONE (OUTPATIENT)
Dept: FAMILY MEDICINE CLINIC | Age: 84
End: 2024-04-16

## 2024-04-16 ENCOUNTER — APPOINTMENT (OUTPATIENT)
Dept: CT IMAGING | Age: 84
End: 2024-04-16
Payer: COMMERCIAL

## 2024-04-16 DIAGNOSIS — R41.82 ALTERED MENTAL STATUS, UNSPECIFIED ALTERED MENTAL STATUS TYPE: Primary | ICD-10-CM

## 2024-04-16 DIAGNOSIS — K59.00 CONSTIPATION, UNSPECIFIED CONSTIPATION TYPE: ICD-10-CM

## 2024-04-16 LAB
ALBUMIN SERPL-MCNC: 4.1 G/DL (ref 3.5–5.2)
ALP SERPL-CCNC: 85 U/L (ref 35–104)
ALT SERPL-CCNC: 14 U/L (ref 0–32)
ANION GAP SERPL CALCULATED.3IONS-SCNC: 14 MMOL/L (ref 7–16)
AST SERPL-CCNC: 22 U/L (ref 0–31)
BACTERIA URNS QL MICRO: ABNORMAL
BASOPHILS # BLD: 0.04 K/UL (ref 0–0.2)
BASOPHILS NFR BLD: 0 % (ref 0–2)
BILIRUB SERPL-MCNC: 0.3 MG/DL (ref 0–1.2)
BILIRUB UR QL STRIP: NEGATIVE
BILIRUB UR QL STRIP: NEGATIVE
BNP SERPL-MCNC: 188 PG/ML (ref 0–450)
BUN SERPL-MCNC: 15 MG/DL (ref 6–23)
CALCIUM SERPL-MCNC: 9.1 MG/DL (ref 8.6–10.2)
CHLORIDE SERPL-SCNC: 99 MMOL/L (ref 98–107)
CLARITY UR: CLEAR
CLARITY UR: CLEAR
CO2 SERPL-SCNC: 23 MMOL/L (ref 22–29)
COLOR UR: YELLOW
COLOR UR: YELLOW
CREAT SERPL-MCNC: 0.9 MG/DL (ref 0.5–1)
EOSINOPHIL # BLD: 0.03 K/UL (ref 0.05–0.5)
EOSINOPHILS RELATIVE PERCENT: 0 % (ref 0–6)
EPI CELLS #/AREA URNS HPF: ABNORMAL /HPF
ERYTHROCYTE [DISTWIDTH] IN BLOOD BY AUTOMATED COUNT: 14.6 % (ref 11.5–15)
GFR SERPL CREATININE-BSD FRML MDRD: 61 ML/MIN/1.73M2
GLUCOSE SERPL-MCNC: 106 MG/DL (ref 74–99)
GLUCOSE UR STRIP-MCNC: NEGATIVE MG/DL
GLUCOSE UR STRIP-MCNC: NEGATIVE MG/DL
HCT VFR BLD AUTO: 35.3 % (ref 34–48)
HGB BLD-MCNC: 11.6 G/DL (ref 11.5–15.5)
HGB UR QL STRIP.AUTO: NEGATIVE
HGB UR QL STRIP.AUTO: NEGATIVE
IMM GRANULOCYTES # BLD AUTO: 0.05 K/UL (ref 0–0.58)
IMM GRANULOCYTES NFR BLD: 1 % (ref 0–5)
KETONES UR STRIP-MCNC: NEGATIVE MG/DL
KETONES UR STRIP-MCNC: NEGATIVE MG/DL
LEUKOCYTE ESTERASE UR QL STRIP: ABNORMAL
LEUKOCYTE ESTERASE UR QL STRIP: ABNORMAL
LYMPHOCYTES NFR BLD: 1.87 K/UL (ref 1.5–4)
LYMPHOCYTES RELATIVE PERCENT: 20 % (ref 20–42)
MAGNESIUM SERPL-MCNC: 2.1 MG/DL (ref 1.6–2.6)
MCH RBC QN AUTO: 28.3 PG (ref 26–35)
MCHC RBC AUTO-ENTMCNC: 32.9 G/DL (ref 32–34.5)
MCV RBC AUTO: 86.1 FL (ref 80–99.9)
MONOCYTES NFR BLD: 0.69 K/UL (ref 0.1–0.95)
MONOCYTES NFR BLD: 8 % (ref 2–12)
NEUTROPHILS NFR BLD: 71 % (ref 43–80)
NEUTS SEG NFR BLD: 6.48 K/UL (ref 1.8–7.3)
NITRITE UR QL STRIP: NEGATIVE
NITRITE UR QL STRIP: NEGATIVE
PH UR STRIP: 6 [PH] (ref 5–9)
PH UR STRIP: 7 [PH] (ref 5–9)
PLATELET # BLD AUTO: 267 K/UL (ref 130–450)
PMV BLD AUTO: 10.6 FL (ref 7–12)
POTASSIUM SERPL-SCNC: 3.8 MMOL/L (ref 3.5–5)
PROT SERPL-MCNC: 7.2 G/DL (ref 6.4–8.3)
PROT UR STRIP-MCNC: NEGATIVE MG/DL
PROT UR STRIP-MCNC: NEGATIVE MG/DL
RBC # BLD AUTO: 4.1 M/UL (ref 3.5–5.5)
RBC #/AREA URNS HPF: ABNORMAL /HPF
RBC #/AREA URNS HPF: ABNORMAL /HPF
SODIUM SERPL-SCNC: 136 MMOL/L (ref 132–146)
SP GR UR STRIP: 1.01 (ref 1–1.03)
SP GR UR STRIP: 1.01 (ref 1–1.03)
TROPONIN I SERPL HS-MCNC: 15 NG/L (ref 0–9)
TROPONIN I SERPL HS-MCNC: 19 NG/L (ref 0–9)
UROBILINOGEN UR STRIP-ACNC: 0.2 EU/DL (ref 0–1)
UROBILINOGEN UR STRIP-ACNC: 0.2 EU/DL (ref 0–1)
WBC #/AREA URNS HPF: ABNORMAL /HPF
WBC #/AREA URNS HPF: ABNORMAL /HPF
WBC OTHER # BLD: 9.2 K/UL (ref 4.5–11.5)

## 2024-04-16 PROCEDURE — 85025 COMPLETE CBC W/AUTO DIFF WBC: CPT

## 2024-04-16 PROCEDURE — 99285 EMERGENCY DEPT VISIT HI MDM: CPT

## 2024-04-16 PROCEDURE — 81001 URINALYSIS AUTO W/SCOPE: CPT

## 2024-04-16 PROCEDURE — 80053 COMPREHEN METABOLIC PANEL: CPT

## 2024-04-16 PROCEDURE — 83735 ASSAY OF MAGNESIUM: CPT

## 2024-04-16 PROCEDURE — 6360000002 HC RX W HCPCS: Performed by: EMERGENCY MEDICINE

## 2024-04-16 PROCEDURE — 84484 ASSAY OF TROPONIN QUANT: CPT

## 2024-04-16 PROCEDURE — 71046 X-RAY EXAM CHEST 2 VIEWS: CPT

## 2024-04-16 PROCEDURE — 70450 CT HEAD/BRAIN W/O DYE: CPT

## 2024-04-16 PROCEDURE — 96374 THER/PROPH/DIAG INJ IV PUSH: CPT

## 2024-04-16 PROCEDURE — 74177 CT ABD & PELVIS W/CONTRAST: CPT

## 2024-04-16 PROCEDURE — 2580000003 HC RX 258: Performed by: EMERGENCY MEDICINE

## 2024-04-16 PROCEDURE — 83880 ASSAY OF NATRIURETIC PEPTIDE: CPT

## 2024-04-16 PROCEDURE — A4216 STERILE WATER/SALINE, 10 ML: HCPCS | Performed by: EMERGENCY MEDICINE

## 2024-04-16 PROCEDURE — 2500000003 HC RX 250 WO HCPCS: Performed by: EMERGENCY MEDICINE

## 2024-04-16 PROCEDURE — 96375 TX/PRO/DX INJ NEW DRUG ADDON: CPT

## 2024-04-16 PROCEDURE — 93005 ELECTROCARDIOGRAM TRACING: CPT | Performed by: EMERGENCY MEDICINE

## 2024-04-16 PROCEDURE — 6360000004 HC RX CONTRAST MEDICATION: Performed by: RADIOLOGY

## 2024-04-16 RX ORDER — DIPHENHYDRAMINE HYDROCHLORIDE 50 MG/ML
25 INJECTION INTRAMUSCULAR; INTRAVENOUS ONCE
Status: COMPLETED | OUTPATIENT
Start: 2024-04-16 | End: 2024-04-16

## 2024-04-16 RX ORDER — LORAZEPAM 2 MG/ML
0.5 INJECTION INTRAMUSCULAR ONCE
Status: COMPLETED | OUTPATIENT
Start: 2024-04-16 | End: 2024-04-16

## 2024-04-16 RX ORDER — POLYETHYLENE GLYCOL 3350 17 G/17G
17 POWDER, FOR SOLUTION ORAL DAILY PRN
Qty: 510 G | Refills: 0 | Status: SHIPPED | OUTPATIENT
Start: 2024-04-16 | End: 2024-05-16

## 2024-04-16 RX ADMIN — DIPHENHYDRAMINE HYDROCHLORIDE 25 MG: 50 INJECTION, SOLUTION INTRAMUSCULAR; INTRAVENOUS at 16:32

## 2024-04-16 RX ADMIN — IOPAMIDOL 75 ML: 755 INJECTION, SOLUTION INTRAVENOUS at 19:52

## 2024-04-16 RX ADMIN — WATER 125 MG: 1 INJECTION INTRAMUSCULAR; INTRAVENOUS; SUBCUTANEOUS at 16:33

## 2024-04-16 RX ADMIN — FAMOTIDINE 20 MG: 10 INJECTION, SOLUTION INTRAVENOUS at 16:32

## 2024-04-16 RX ADMIN — LORAZEPAM 0.5 MG: 2 INJECTION INTRAMUSCULAR; INTRAVENOUS at 19:37

## 2024-04-16 NOTE — ED NOTES
Pt trying to get out of bed and leave hospital, pt confused and yelling at nurses as well as [employer]. Pt stated \"ill crack you in the face if you touch me\" Dr Alva notified and orders obtained

## 2024-04-16 NOTE — TELEPHONE ENCOUNTER
The [facility] nurse Ronda Edwards called and wanted to report that the patient has had a huge change in her mental status in the last 4 days. She has been calling the police, calling the hospital looking for her , seeing things that are not there, major sundowning so she has not been getting any rest, she has also become a flight risk due to her trying to leave the facility. They ordered an UA&C and waiting for those results. She thinks that it may be due to her no longer being on the Aricept per the families choice to [place], but she is not 100 percent sure, just a thought. She wanted to know what your thoughts on all of this information.

## 2024-04-16 NOTE — ED NOTES
Pt AxOX4 but has some areas of confusion/ visual hallucinations. Pt is yelling at kids to \"get down from there\" when there are no kids around. Pt also states she keeps seeing flies

## 2024-04-16 NOTE — ED PROVIDER NOTES
[facility] EMERGENCY DEPARTMENT  EMERGENCY DEPARTMENT ENCOUNTER        Pt Name: Valarie Mata  MRN: [id number]  Birthdate 1940  Date of evaluation: 4/16/2024  Provider: Opal Cruz DO  PCP: Daja Hernandez DO  Note Started: 3:38 PM EDT 4/16/24    CHIEF COMPLAINT       Chief Complaint   Patient presents with    Altered Mental Status     Sent from nursing home for increased AMS. Patient has been wondering and having visual hallucinations stating she's seeing flies.        HISTORY OF PRESENT ILLNESS: 1 or more Elements   History From: Patient    Limitations to history : None    Valarie Mata is a 83 y.o. female who presents with concern for altered mental status and constipation.  The patient does not think that she has been confused but nursing facility had told EMS that she was seeing things that were not there, hallucinating saying that she self lies.  She does reside in assisted living facility.  Patient is mainly concerned because she has not had a good bowel movement for several days, said that she little bit this morning and is experiencing generalized abdominal discomfort.  She says that she does have a history of bowel cancer but does not think she is ever had an obstruction in the past.  No nausea or vomiting, no fevers or chills, no chest pain difficulty breathing although she has been having worsening swelling in her lower extremities.  No coughing, no other associated complaints.  Takes her medications as indicated, not on anticoagulation    REVIEW OF SYSTEMS :      Positives and Pertinent negatives as per HPI.     SURGICAL HISTORY     Past Surgical History:   Procedure Laterality Date    CHOLECYSTECTOMY  1985    open?    COLECTOMY  1974    COLONOSCOPY  04/26/2012    and egd    COLONOSCOPY  05/30/2014    COLONOSCOPY  01/08/2015    CYST REMOVAL  years ago    upper gum    EYE SURGERY  years ago    left eye removal,  has artificial eye    HYSTERECTOMY

## 2024-04-17 ENCOUNTER — TELEPHONE (OUTPATIENT)
Dept: AUDIOLOGY | Age: 84
End: 2024-04-17

## 2024-04-17 VITALS
DIASTOLIC BLOOD PRESSURE: 59 MMHG | HEIGHT: 59 IN | TEMPERATURE: 97.8 F | RESPIRATION RATE: 16 BRPM | BODY MASS INDEX: 39.92 KG/M2 | HEART RATE: 62 BPM | SYSTOLIC BLOOD PRESSURE: 128 MMHG | OXYGEN SATURATION: 93 % | WEIGHT: 198 LBS

## 2024-04-17 LAB
EKG ATRIAL RATE: 68 BPM
EKG P AXIS: 89 DEGREES
EKG P-R INTERVAL: 160 MS
EKG Q-T INTERVAL: 412 MS
EKG QRS DURATION: 72 MS
EKG QTC CALCULATION (BAZETT): 438 MS
EKG R AXIS: -25 DEGREES
EKG T AXIS: 29 DEGREES
EKG VENTRICULAR RATE: 68 BPM
MICROORGANISM SPEC CULT: ABNORMAL
SPECIMEN DESCRIPTION: ABNORMAL

## 2024-04-17 PROCEDURE — 93010 ELECTROCARDIOGRAM REPORT: CPT | Performed by: INTERNAL MEDICINE

## 2024-04-17 NOTE — TELEPHONE ENCOUNTER
FYI: [facility], Cancelled 4/19/24 for New pt Ref: Tonja Starr Rt Ear, Right ear pain patient no able to come in without assistance. Transferring to another NH possibly and they will call back and reschedule if needed.

## 2024-04-17 NOTE — ED NOTES
Spoke to Palma joyce [facility] pt being d/c waiting on PAS ETA 4-6AM

## 2024-04-17 NOTE — ED NOTES
Pt within view of nurses station, awake alert confused talking to self, verbally combative, bed low locked rails up x 2

## 2024-04-19 ENCOUNTER — TELEPHONE (OUTPATIENT)
Dept: PRIMARY CARE CLINIC | Age: 84
End: 2024-04-19

## 2024-04-19 ENCOUNTER — TELEPHONE (OUTPATIENT)
Dept: ENT CLINIC | Age: 84
End: 2024-04-19

## 2024-04-19 ENCOUNTER — PROCEDURE VISIT (OUTPATIENT)
Dept: AUDIOLOGY | Age: 84
End: 2024-04-19

## 2024-04-19 ENCOUNTER — OFFICE VISIT (OUTPATIENT)
Dept: ENT CLINIC | Age: 84
End: 2024-04-19
Payer: COMMERCIAL

## 2024-04-19 VITALS
HEART RATE: 65 BPM | HEIGHT: 59 IN | WEIGHT: 198 LBS | DIASTOLIC BLOOD PRESSURE: 76 MMHG | SYSTOLIC BLOOD PRESSURE: 152 MMHG | BODY MASS INDEX: 39.92 KG/M2

## 2024-04-19 DIAGNOSIS — H92.03 OTALGIA OF BOTH EARS: Primary | ICD-10-CM

## 2024-04-19 DIAGNOSIS — H69.93 DYSFUNCTION OF BOTH EUSTACHIAN TUBES: ICD-10-CM

## 2024-04-19 DIAGNOSIS — R94.120 NEGATIVE MIDDLE EAR PRESSURE OF BOTH EARS WITH TYPE C TYMPANOGRAM CURVE: ICD-10-CM

## 2024-04-19 PROCEDURE — G8417 CALC BMI ABV UP PARAM F/U: HCPCS | Performed by: NURSE PRACTITIONER

## 2024-04-19 PROCEDURE — 1036F TOBACCO NON-USER: CPT | Performed by: NURSE PRACTITIONER

## 2024-04-19 PROCEDURE — 3077F SYST BP >= 140 MM HG: CPT | Performed by: NURSE PRACTITIONER

## 2024-04-19 PROCEDURE — G8400 PT W/DXA NO RESULTS DOC: HCPCS | Performed by: NURSE PRACTITIONER

## 2024-04-19 PROCEDURE — 1123F ACP DISCUSS/DSCN MKR DOCD: CPT | Performed by: NURSE PRACTITIONER

## 2024-04-19 PROCEDURE — 99203 OFFICE O/P NEW LOW 30 MIN: CPT | Performed by: NURSE PRACTITIONER

## 2024-04-19 PROCEDURE — 3078F DIAST BP <80 MM HG: CPT | Performed by: NURSE PRACTITIONER

## 2024-04-19 PROCEDURE — G8427 DOCREV CUR MEDS BY ELIG CLIN: HCPCS | Performed by: NURSE PRACTITIONER

## 2024-04-19 PROCEDURE — 1090F PRES/ABSN URINE INCON ASSESS: CPT | Performed by: NURSE PRACTITIONER

## 2024-04-19 RX ORDER — FLUTICASONE PROPIONATE 50 MCG
2 SPRAY, SUSPENSION (ML) NASAL DAILY
Qty: 16 G | Refills: 1 | Status: SHIPPED | OUTPATIENT
Start: 2024-04-19

## 2024-04-19 ASSESSMENT — ENCOUNTER SYMPTOMS
EYES NEGATIVE: 1
SHORTNESS OF BREATH: 0
SINUS PRESSURE: 0
STRIDOR: 0
SINUS PAIN: 0
RESPIRATORY NEGATIVE: 1
RHINORRHEA: 1

## 2024-04-19 NOTE — TELEPHONE ENCOUNTER
patient arrived in office from nursing facility after Ronda from facility had cancelled appointment. Patient was added back on to schedule. Upon brining patient back to receive audio testing it was noticed but Michelle Pearce (audiologist) that patient had no cane, walker or wheelchair to assist with mobility. She was very unsteady on her feet. Audiologist assisted in patient walking from lobby to testing cramer. At the time of testing it was also noticed that patient had delayed mental stated, was unable to follow simple instructions and seemed very confused. Patient did not have nurse or aid present while being seen. phone call was made to both of patients daughters to inform. Nursing home notified via paperwork that patient is to not attend appointments with out mobility assistance or aid/nurse to memory concerns.

## 2024-04-19 NOTE — PROGRESS NOTES
This patient was referred for audiometric and tympanometric testing by JOSE L Kay due to ear pain. The patient was a poor historian and unable to give much information as to why she was here today.  She has a diagnosis of Alzheimer's disease per charting.  She came alone today and did not have her cane which she stated she needed.  Patient was assisted with walking to and from audiology office today.     Audiometry was attempted, however, could not be completed due to patient status today. Patient does has negative middle ear pressure. Per ENT we will assess the negative middle ear pressure first, and need for hearing testing can be reassessed at a later date.     Tympanometry revealed negative middle ear pressure (-155 daPa right, and -209 daPa left), bilaterally.    The results were reviewed with the ENT NP.     Recommendations for follow up will be made pending physician consult.    Electronically signed by Lydia Adames on 4/19/2024 at 9:26 AM

## 2024-04-19 NOTE — TELEPHONE ENCOUNTER
Nikki with [facility] called and states this pt is a new pt to facility and she has been having increased confusion, family would like to know how long it will take for you to see her as a new patient.     Pts new patient paper work was just sent over today I advised faculty we will call them when you go over pt information and tell us to put her on the schedule

## 2024-04-19 NOTE — PROGRESS NOTES
appearance.   HENT:      Head: Normocephalic.      Right Ear: Tympanic membrane, ear canal and external ear normal.      Left Ear: Ear canal and external ear normal. Tympanic membrane is retracted.      Nose: Rhinorrhea present. Rhinorrhea is clear.      Right Turbinates: Pale.      Left Turbinates: Pale.      Mouth/Throat:      Lips: Pink.      Mouth: Mucous membranes are moist.      Pharynx: Oropharynx is clear.   Eyes:      Conjunctiva/sclera: Conjunctivae normal.      Pupils: Pupils are equal, round, and reactive to light.   Cardiovascular:      Rate and Rhythm: Normal rate and regular rhythm.      Pulses: Normal pulses.   Pulmonary:      Effort: Pulmonary effort is normal. No respiratory distress.      Breath sounds: No stridor.   Musculoskeletal:         General: Normal range of motion.      Cervical back: Normal range of motion. No rigidity. No muscular tenderness.   Skin:     General: Skin is warm and dry.   Neurological:      General: No focal deficit present.      Mental Status: She is alert and oriented to person, place, and time.   Psychiatric:         Mood and Affect: Mood normal.         Behavior: Behavior normal.         Thought Content: Thought content normal.         Judgment: Judgment normal.       Tympanogram reviewed with patient.  Reveals type C curve in the right ear, with type C curve in the left ear.    IMPRESSION/PLAN:      Valarie was seen today for new patient.    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  She will follow-up again in 6 weeks.  Due to patient's Alzheimer's and confusion patient will need a chaperone or family

## 2024-04-24 ENCOUNTER — TELEPHONE (OUTPATIENT)
Dept: FAMILY MEDICINE CLINIC | Age: 84
End: 2024-04-24

## 2024-04-24 DIAGNOSIS — I10 PRIMARY HYPERTENSION: ICD-10-CM

## 2024-04-24 DIAGNOSIS — R60.0 PERIPHERAL EDEMA: ICD-10-CM

## 2024-04-24 RX ORDER — FUROSEMIDE 40 MG/1
40 TABLET ORAL DAILY
Qty: 30 TABLET | Refills: 0 | Status: SHIPPED | OUTPATIENT
Start: 2024-04-24

## 2024-04-24 NOTE — TELEPHONE ENCOUNTER
[facility] living just called and stated that the patient is out of her Lasiks, the pharmacy said that the script that they have is . The nurse would like to know if we could send in another one and is she is going to stay at the 40mg twice a day or back down to the 40mg once per day, please advise.  [phone]

## 2024-05-02 NOTE — PROGRESS NOTES
5/3/2024    Chief Complaint   Patient presents with    Hypertension    Leg Swelling     Home visit medically necessary in lieu of an office visit due to:  MIGUEL ÁNGEL resident, uses walker/cane, difficult to get out. Seen with sister Ellyn.    HPI:  Valarie Mata (:  1940) is a 83 y.o. female, who is seen today for home medical care evaluation and has Gastroesophageal reflux disease without esophagitis; Acquired hypothyroidism; Obstructive sleep apnea syndrome, non-compliant with CPAP therapy; Hyperlipidemia LDL goal <100; Class 3 severe obesity due to excess calories with serious comorbidity and body mass index (BMI) of 40.0 to 44.9 in adult (HCC); Blindness of left eye; Vertigo; Primary hypertension; Frequent falls; Ataxia; Recurrent major depressive disorder, in partial remission (HCC); Gait instability; Peripheral neuropathy; Lumbosacral radiculopathy; Dizziness; Moderate late onset Alzheimer's dementia with mood disturbance ([facility]); History of subarachnoid hemorrhage; History of CVA (cerebrovascular accident) without residual deficits; Nonexudative age-related macular degeneration; Squamous blepharitis; Pain in eye; History of artificial eye lens; History of insertion of artificial eyeball; ANA MARIA (obstructive sleep apnea); Exudative age-related macular degeneration of right eye ([facility]); and Edema of both lower legs due to peripheral venous insufficiency on their problem list. Patient has lived at [facility] for about 2 years. She said that her stomach hurts on and off quite some time. She has seen more than 1 GI specialist and has been tx'd for H. Pylori. She has bloating and also struggles with constipation.  She is on stool softener and Miralax. She has remote hx of colon CA and had large part of colon removed. She has problems with memory and was recently stopped then restarted on Aricept, which seems to help. She has had 2 brain bleeds and is on Lipitor.  She was seen in [facility] ED 2 wks ago for AMS.  She had a urine

## 2024-05-03 ENCOUNTER — OFFICE VISIT (OUTPATIENT)
Dept: PRIMARY CARE CLINIC | Age: 84
End: 2024-05-03

## 2024-05-03 VITALS
SYSTOLIC BLOOD PRESSURE: 128 MMHG | BODY MASS INDEX: 42.21 KG/M2 | DIASTOLIC BLOOD PRESSURE: 51 MMHG | HEART RATE: 66 BPM | RESPIRATION RATE: 16 BRPM | WEIGHT: 209.4 LBS | HEIGHT: 59 IN | OXYGEN SATURATION: 94 % | TEMPERATURE: 98.4 F

## 2024-05-03 DIAGNOSIS — Z97.0: ICD-10-CM

## 2024-05-03 DIAGNOSIS — E66.01 CLASS 3 SEVERE OBESITY DUE TO EXCESS CALORIES WITH SERIOUS COMORBIDITY AND BODY MASS INDEX (BMI) OF 40.0 TO 44.9 IN ADULT (HCC): ICD-10-CM

## 2024-05-03 DIAGNOSIS — Z86.79 HISTORY OF SUBARACHNOID HEMORRHAGE: ICD-10-CM

## 2024-05-03 DIAGNOSIS — Z86.73 HISTORY OF CVA (CEREBROVASCULAR ACCIDENT) WITHOUT RESIDUAL DEFICITS: ICD-10-CM

## 2024-05-03 DIAGNOSIS — G62.89 OTHER POLYNEUROPATHY: Chronic | ICD-10-CM

## 2024-05-03 DIAGNOSIS — R26.81 GAIT INSTABILITY: Chronic | ICD-10-CM

## 2024-05-03 DIAGNOSIS — G30.1 MODERATE LATE ONSET ALZHEIMER'S DEMENTIA WITH MOOD DISTURBANCE (HCC): Primary | ICD-10-CM

## 2024-05-03 DIAGNOSIS — I87.2 EDEMA OF BOTH LOWER LEGS DUE TO PERIPHERAL VENOUS INSUFFICIENCY: ICD-10-CM

## 2024-05-03 DIAGNOSIS — R29.6 FREQUENT FALLS: Chronic | ICD-10-CM

## 2024-05-03 DIAGNOSIS — R60.0 EDEMA OF BOTH LOWER LEGS DUE TO PERIPHERAL VENOUS INSUFFICIENCY: ICD-10-CM

## 2024-05-03 DIAGNOSIS — E78.5 HYPERLIPIDEMIA LDL GOAL <100: Chronic | ICD-10-CM

## 2024-05-03 DIAGNOSIS — M54.17 LUMBOSACRAL RADICULOPATHY: Chronic | ICD-10-CM

## 2024-05-03 DIAGNOSIS — I10 PRIMARY HYPERTENSION: ICD-10-CM

## 2024-05-03 DIAGNOSIS — F02.B3 MODERATE LATE ONSET ALZHEIMER'S DEMENTIA WITH MOOD DISTURBANCE (HCC): Primary | ICD-10-CM

## 2024-05-03 DIAGNOSIS — E03.9 ACQUIRED HYPOTHYROIDISM: ICD-10-CM

## 2024-05-03 DIAGNOSIS — K21.9 GASTROESOPHAGEAL REFLUX DISEASE WITHOUT ESOPHAGITIS: ICD-10-CM

## 2024-05-03 DIAGNOSIS — F33.41 RECURRENT MAJOR DEPRESSIVE DISORDER, IN PARTIAL REMISSION (HCC): ICD-10-CM

## 2024-05-03 PROBLEM — R06.03 ACUTE RESPIRATORY DISTRESS: Status: RESOLVED | Noted: 2022-08-09 | Resolved: 2024-05-03

## 2024-05-03 PROBLEM — N18.30 CHRONIC RENAL DISEASE, STAGE III (HCC): Status: RESOLVED | Noted: 2022-07-12 | Resolved: 2024-05-03

## 2024-05-03 PROBLEM — N17.9 ACUTE KIDNEY INJURY (HCC): Status: RESOLVED | Noted: 2022-08-10 | Resolved: 2024-05-03

## 2024-05-03 PROBLEM — I16.1 HYPERTENSIVE EMERGENCY: Status: RESOLVED | Noted: 2022-08-09 | Resolved: 2024-05-03

## 2024-05-03 PROBLEM — R41.0 DELIRIUM: Status: RESOLVED | Noted: 2022-10-08 | Resolved: 2024-05-03

## 2024-05-03 PROBLEM — F02.80 ALZHEIMER DISEASE (HCC): Status: ACTIVE | Noted: 2022-03-21

## 2024-05-03 PROBLEM — I61.9 HEMORRHAGIC STROKE (HCC): Status: RESOLVED | Noted: 2022-10-09 | Resolved: 2024-05-03

## 2024-05-03 PROBLEM — I16.9 HYPERTENSIVE CRISIS, UNSPECIFIED: Status: RESOLVED | Noted: 2022-08-09 | Resolved: 2024-05-03

## 2024-05-03 PROBLEM — J96.01 ACUTE RESPIRATORY FAILURE WITH HYPOXIA (HCC): Status: RESOLVED | Noted: 2024-02-27 | Resolved: 2024-05-03

## 2024-05-03 RX ORDER — DONEPEZIL HYDROCHLORIDE 10 MG/1
10 TABLET, FILM COATED ORAL NIGHTLY
Qty: 30 TABLET | Refills: 3 | Status: SHIPPED
Start: 2024-05-03 | End: 2024-05-03

## 2024-05-03 RX ORDER — DONEPEZIL HYDROCHLORIDE 10 MG/1
10 TABLET, FILM COATED ORAL NIGHTLY
Qty: 30 TABLET | Refills: 3
Start: 2024-05-03

## 2024-05-03 RX ORDER — FUROSEMIDE 40 MG/1
TABLET ORAL
Qty: 60 TABLET | Refills: 5 | Status: SHIPPED | OUTPATIENT
Start: 2024-05-03

## 2024-05-03 RX ORDER — TRIAMCINOLONE ACETONIDE 1 MG/G
CREAM TOPICAL
Qty: 80 G | Refills: 2
Start: 2024-05-03

## 2024-05-03 RX ORDER — TRIAMCINOLONE ACETONIDE 1 MG/G
CREAM TOPICAL
Qty: 80 G | Refills: 2 | Status: SHIPPED
Start: 2024-05-03 | End: 2024-05-03

## 2024-05-03 RX ORDER — ONDANSETRON 4 MG/1
4 TABLET, ORALLY DISINTEGRATING ORAL EVERY 8 HOURS PRN
COMMUNITY
Start: 2024-04-27

## 2024-05-04 LAB
BILIRUB UR QL STRIP: NEGATIVE
CLARITY UR: CLEAR
COLOR UR: YELLOW
GLUCOSE UR STRIP-MCNC: NEGATIVE MG/DL
HGB UR QL STRIP.AUTO: NEGATIVE
KETONES UR STRIP-MCNC: NEGATIVE MG/DL
LEUKOCYTE ESTERASE UR QL STRIP: ABNORMAL
NITRITE UR QL STRIP: NEGATIVE
PH UR STRIP: 6 [PH] (ref 5–9)
PROT UR STRIP-MCNC: NEGATIVE MG/DL
RBC #/AREA URNS HPF: ABNORMAL /HPF
SP GR UR STRIP: 1.01 (ref 1–1.03)
UROBILINOGEN UR STRIP-ACNC: 0.2 EU/DL (ref 0–1)
WBC #/AREA URNS HPF: ABNORMAL /HPF

## 2024-05-05 LAB
MICROORGANISM SPEC CULT: ABNORMAL
SPECIMEN DESCRIPTION: ABNORMAL

## 2024-05-08 DIAGNOSIS — F41.9 ANXIETY: Primary | ICD-10-CM

## 2024-05-08 RX ORDER — LORAZEPAM 0.5 MG/1
0.5 TABLET ORAL EVERY 12 HOURS PRN
Qty: 60 TABLET | Refills: 0 | Status: SHIPPED | OUTPATIENT
Start: 2024-05-08 | End: 2024-06-07

## 2024-05-14 RX ORDER — SENNOSIDES 8.6 MG/1
TABLET, COATED ORAL
COMMUNITY
Start: 2024-04-20

## 2024-05-14 RX ORDER — TRAZODONE HYDROCHLORIDE 50 MG/1
50 TABLET ORAL NIGHTLY
Qty: 30 TABLET | Refills: 5 | Status: SHIPPED | OUTPATIENT
Start: 2024-05-14

## 2024-05-21 ENCOUNTER — TELEPHONE (OUTPATIENT)
Dept: PRIMARY CARE CLINIC | Age: 84
End: 2024-05-21

## 2024-05-21 RX ORDER — DIVALPROEX SODIUM 125 MG/1
125 CAPSULE, COATED PELLETS ORAL 2 TIMES DAILY
Qty: 60 CAPSULE | Refills: 3 | Status: SHIPPED | OUTPATIENT
Start: 2024-05-21

## 2024-05-21 NOTE — TELEPHONE ENCOUNTER
Message from [facility] nurse that patient has had increased confusion, wandering around looking for her  and son.  She does not know where she is at and she wants to go home.  E Rx sent for Depakote 125 mg twice daily for behaviors.

## 2024-05-28 LAB
ALBUMIN SERPL-MCNC: 4.1 G/DL (ref 3.5–5.2)
ALP SERPL-CCNC: 77 U/L (ref 35–104)
ALT SERPL-CCNC: 8 U/L (ref 0–32)
ANION GAP SERPL CALCULATED.3IONS-SCNC: 16 MMOL/L (ref 7–16)
AST SERPL-CCNC: 20 U/L (ref 0–31)
BASOPHILS # BLD: 0.04 K/UL (ref 0–0.2)
BASOPHILS NFR BLD: 1 % (ref 0–2)
BILIRUB SERPL-MCNC: 0.6 MG/DL (ref 0–1.2)
BUN SERPL-MCNC: 18 MG/DL (ref 6–23)
CALCIUM SERPL-MCNC: 9.3 MG/DL (ref 8.6–10.2)
CHLORIDE SERPL-SCNC: 104 MMOL/L (ref 98–107)
CO2 SERPL-SCNC: 21 MMOL/L (ref 22–29)
CREAT SERPL-MCNC: 0.9 MG/DL (ref 0.5–1)
EOSINOPHIL # BLD: 0.11 K/UL (ref 0.05–0.5)
EOSINOPHILS RELATIVE PERCENT: 1 % (ref 0–6)
ERYTHROCYTE [DISTWIDTH] IN BLOOD BY AUTOMATED COUNT: 14.6 % (ref 11.5–15)
FOLATE SERPL-MCNC: 11.9 NG/ML (ref 4.8–24.2)
GFR, ESTIMATED: 65 ML/MIN/1.73M2
GLUCOSE SERPL-MCNC: 101 MG/DL (ref 74–99)
HCT VFR BLD AUTO: 34.8 % (ref 34–48)
HGB BLD-MCNC: 11.1 G/DL (ref 11.5–15.5)
IMM GRANULOCYTES # BLD AUTO: 0.03 K/UL (ref 0–0.58)
IMM GRANULOCYTES NFR BLD: 0 % (ref 0–5)
LYMPHOCYTES NFR BLD: 1.71 K/UL (ref 1.5–4)
LYMPHOCYTES RELATIVE PERCENT: 22 % (ref 20–42)
MCH RBC QN AUTO: 27.6 PG (ref 26–35)
MCHC RBC AUTO-ENTMCNC: 31.9 G/DL (ref 32–34.5)
MCV RBC AUTO: 86.6 FL (ref 80–99.9)
MONOCYTES NFR BLD: 0.89 K/UL (ref 0.1–0.95)
MONOCYTES NFR BLD: 11 % (ref 2–12)
NEUTROPHILS NFR BLD: 65 % (ref 43–80)
NEUTS SEG NFR BLD: 5.13 K/UL (ref 1.8–7.3)
PLATELET # BLD AUTO: 275 K/UL (ref 130–450)
PMV BLD AUTO: 10 FL (ref 7–12)
POTASSIUM SERPL-SCNC: 4 MMOL/L (ref 3.5–5)
PROT SERPL-MCNC: 7 G/DL (ref 6.4–8.3)
RBC # BLD AUTO: 4.02 M/UL (ref 3.5–5.5)
SODIUM SERPL-SCNC: 141 MMOL/L (ref 132–146)
TSH SERPL DL<=0.05 MIU/L-ACNC: 2.61 UIU/ML (ref 0.27–4.2)
VIT B12 SERPL-MCNC: 334 PG/ML (ref 211–946)
WBC OTHER # BLD: 7.9 K/UL (ref 4.5–11.5)

## 2024-05-29 LAB
BACTERIA URNS QL MICRO: ABNORMAL
BILIRUB UR QL STRIP: NEGATIVE
CLARITY UR: CLEAR
COLOR UR: YELLOW
GLUCOSE UR STRIP-MCNC: NEGATIVE MG/DL
HGB UR QL STRIP.AUTO: NEGATIVE
KETONES UR STRIP-MCNC: NEGATIVE MG/DL
LEUKOCYTE ESTERASE UR QL STRIP: ABNORMAL
NITRITE UR QL STRIP: NEGATIVE
PH UR STRIP: 6 [PH] (ref 5–9)
PROT UR STRIP-MCNC: NEGATIVE MG/DL
RBC #/AREA URNS HPF: ABNORMAL /HPF
RPR SER QL: NONREACTIVE
SP GR UR STRIP: 1.01 (ref 1–1.03)
UROBILINOGEN UR STRIP-ACNC: 0.2 EU/DL (ref 0–1)
WBC #/AREA URNS HPF: ABNORMAL /HPF

## 2024-06-03 ENCOUNTER — TELEPHONE (OUTPATIENT)
Dept: GERIATRIC MEDICINE | Age: 84
End: 2024-06-03

## 2024-06-03 LAB
ALBUMIN SERPL-MCNC: 3.9 G/DL (ref 3.5–5.2)
ALP SERPL-CCNC: 72 U/L (ref 35–104)
ALT SERPL-CCNC: 14 U/L (ref 0–32)
ANION GAP SERPL CALCULATED.3IONS-SCNC: 18 MMOL/L (ref 7–16)
AST SERPL-CCNC: 30 U/L (ref 0–31)
BASOPHILS # BLD: 0.04 K/UL (ref 0–0.2)
BASOPHILS NFR BLD: 1 % (ref 0–2)
BILIRUB SERPL-MCNC: 0.3 MG/DL (ref 0–1.2)
BUN SERPL-MCNC: 15 MG/DL (ref 6–23)
CALCIUM SERPL-MCNC: 9 MG/DL (ref 8.6–10.2)
CHLORIDE SERPL-SCNC: 103 MMOL/L (ref 98–107)
CO2 SERPL-SCNC: 21 MMOL/L (ref 22–29)
CREAT SERPL-MCNC: 0.9 MG/DL (ref 0.5–1)
DATE LAST DOSE: NORMAL
EOSINOPHIL # BLD: 0.11 K/UL (ref 0.05–0.5)
EOSINOPHILS RELATIVE PERCENT: 2 % (ref 0–6)
ERYTHROCYTE [DISTWIDTH] IN BLOOD BY AUTOMATED COUNT: 14.8 % (ref 11.5–15)
GFR, ESTIMATED: 64 ML/MIN/1.73M2
GLUCOSE SERPL-MCNC: 90 MG/DL (ref 74–99)
HCT VFR BLD AUTO: 36.1 % (ref 34–48)
HGB BLD-MCNC: 11.6 G/DL (ref 11.5–15.5)
IMM GRANULOCYTES # BLD AUTO: 0.03 K/UL (ref 0–0.58)
IMM GRANULOCYTES NFR BLD: 0 % (ref 0–5)
LYMPHOCYTES NFR BLD: 2.33 K/UL (ref 1.5–4)
LYMPHOCYTES RELATIVE PERCENT: 33 % (ref 20–42)
MCH RBC QN AUTO: 28.4 PG (ref 26–35)
MCHC RBC AUTO-ENTMCNC: 32.1 G/DL (ref 32–34.5)
MCV RBC AUTO: 88.5 FL (ref 80–99.9)
MONOCYTES NFR BLD: 0.92 K/UL (ref 0.1–0.95)
MONOCYTES NFR BLD: 13 % (ref 2–12)
NEUTROPHILS NFR BLD: 51 % (ref 43–80)
NEUTS SEG NFR BLD: 3.56 K/UL (ref 1.8–7.3)
PLATELET # BLD AUTO: 246 K/UL (ref 130–450)
PMV BLD AUTO: 10.6 FL (ref 7–12)
POTASSIUM SERPL-SCNC: 3.6 MMOL/L (ref 3.5–5)
PROT SERPL-MCNC: 7.2 G/DL (ref 6.4–8.3)
RBC # BLD AUTO: 4.08 M/UL (ref 3.5–5.5)
SODIUM SERPL-SCNC: 142 MMOL/L (ref 132–146)
TME LAST DOSE: NORMAL H
VALPROATE SERPL-MCNC: 58 UG/ML (ref 50–100)
VANCOMYCIN DOSE: NORMAL MG
WBC OTHER # BLD: 7 K/UL (ref 4.5–11.5)

## 2024-06-03 NOTE — TELEPHONE ENCOUNTER
As HANS -- daughter left a VM message cancelling tomorrow's OV.  States pt went from [facility] to [facility].  Was told pt didn't do well there so they transferred her to [facility] facility where she is currently a patient.

## 2024-06-27 LAB
ALBUMIN SERPL-MCNC: 3.2 G/DL (ref 3.5–5.2)
ALP SERPL-CCNC: 65 U/L (ref 35–104)
ALT SERPL-CCNC: 10 U/L (ref 0–32)
ANION GAP SERPL CALCULATED.3IONS-SCNC: 14 MMOL/L (ref 7–16)
AST SERPL-CCNC: 16 U/L (ref 0–31)
BASOPHILS # BLD: 0.05 K/UL (ref 0–0.2)
BASOPHILS NFR BLD: 1 % (ref 0–2)
BILIRUB DIRECT SERPL-MCNC: <0.2 MG/DL (ref 0–0.3)
BILIRUB INDIRECT SERPL-MCNC: ABNORMAL MG/DL (ref 0–1)
BILIRUB SERPL-MCNC: 0.3 MG/DL (ref 0–1.2)
BUN SERPL-MCNC: 25 MG/DL (ref 6–23)
CALCIUM SERPL-MCNC: 9 MG/DL (ref 8.6–10.2)
CHLORIDE SERPL-SCNC: 102 MMOL/L (ref 98–107)
CO2 SERPL-SCNC: 27 MMOL/L (ref 22–29)
CREAT SERPL-MCNC: 1 MG/DL (ref 0.5–1)
DATE LAST DOSE: NORMAL
EOSINOPHIL # BLD: 0.24 K/UL (ref 0.05–0.5)
EOSINOPHILS RELATIVE PERCENT: 3 % (ref 0–6)
ERYTHROCYTE [DISTWIDTH] IN BLOOD BY AUTOMATED COUNT: 14.7 % (ref 11.5–15)
GFR, ESTIMATED: 53 ML/MIN/1.73M2
GLUCOSE SERPL-MCNC: 75 MG/DL (ref 74–99)
HCT VFR BLD AUTO: 32.4 % (ref 34–48)
HGB BLD-MCNC: 10.1 G/DL (ref 11.5–15.5)
IMM GRANULOCYTES # BLD AUTO: 0.04 K/UL (ref 0–0.58)
IMM GRANULOCYTES NFR BLD: 1 % (ref 0–5)
LYMPHOCYTES NFR BLD: 3.22 K/UL (ref 1.5–4)
LYMPHOCYTES RELATIVE PERCENT: 43 % (ref 20–42)
MCH RBC QN AUTO: 27.2 PG (ref 26–35)
MCHC RBC AUTO-ENTMCNC: 31.2 G/DL (ref 32–34.5)
MCV RBC AUTO: 87.3 FL (ref 80–99.9)
MONOCYTES NFR BLD: 0.77 K/UL (ref 0.1–0.95)
MONOCYTES NFR BLD: 10 % (ref 2–12)
NEUTROPHILS NFR BLD: 43 % (ref 43–80)
NEUTS SEG NFR BLD: 3.21 K/UL (ref 1.8–7.3)
PLATELET # BLD AUTO: 239 K/UL (ref 130–450)
PMV BLD AUTO: 10.1 FL (ref 7–12)
POTASSIUM SERPL-SCNC: 3.8 MMOL/L (ref 3.5–5)
PROT SERPL-MCNC: 6.1 G/DL (ref 6.4–8.3)
RBC # BLD AUTO: 3.71 M/UL (ref 3.5–5.5)
SODIUM SERPL-SCNC: 143 MMOL/L (ref 132–146)
TME LAST DOSE: NORMAL H
VALPROATE SERPL-MCNC: 55 UG/ML (ref 50–100)
VANCOMYCIN DOSE: 500 MG
WBC OTHER # BLD: 7.5 K/UL (ref 4.5–11.5)

## 2024-07-25 LAB
BASOPHILS # BLD: 0.04 K/UL (ref 0–0.2)
BASOPHILS NFR BLD: 1 % (ref 0–2)
DATE LAST DOSE: NORMAL
EOSINOPHIL # BLD: 0.2 K/UL (ref 0.05–0.5)
EOSINOPHILS RELATIVE PERCENT: 3 % (ref 0–6)
ERYTHROCYTE [DISTWIDTH] IN BLOOD BY AUTOMATED COUNT: 15.7 % (ref 11.5–15)
HCT VFR BLD AUTO: 35 % (ref 34–48)
HGB BLD-MCNC: 11.2 G/DL (ref 11.5–15.5)
IMM GRANULOCYTES # BLD AUTO: <0.03 K/UL (ref 0–0.58)
IMM GRANULOCYTES NFR BLD: 0 % (ref 0–5)
LYMPHOCYTES NFR BLD: 2.58 K/UL (ref 1.5–4)
LYMPHOCYTES RELATIVE PERCENT: 38 % (ref 20–42)
MCH RBC QN AUTO: 27.7 PG (ref 26–35)
MCHC RBC AUTO-ENTMCNC: 32 G/DL (ref 32–34.5)
MCV RBC AUTO: 86.6 FL (ref 80–99.9)
MONOCYTES NFR BLD: 0.71 K/UL (ref 0.1–0.95)
MONOCYTES NFR BLD: 10 % (ref 2–12)
NEUTROPHILS NFR BLD: 48 % (ref 43–80)
NEUTS SEG NFR BLD: 3.28 K/UL (ref 1.8–7.3)
PLATELET # BLD AUTO: 209 K/UL (ref 130–450)
PMV BLD AUTO: 10.3 FL (ref 7–12)
RBC # BLD AUTO: 4.04 M/UL (ref 3.5–5.5)
TME LAST DOSE: NORMAL H
VALPROATE SERPL-MCNC: 55 UG/ML (ref 50–100)
VANCOMYCIN DOSE: NORMAL MG
WBC OTHER # BLD: 6.8 K/UL (ref 4.5–11.5)

## 2024-07-31 LAB
ALBUMIN SERPL-MCNC: 3.3 G/DL (ref 3.5–5.2)
ALP SERPL-CCNC: 60 U/L (ref 35–104)
ALT SERPL-CCNC: 20 U/L (ref 0–32)
ANION GAP SERPL CALCULATED.3IONS-SCNC: 12 MMOL/L (ref 7–16)
AST SERPL-CCNC: 27 U/L (ref 0–31)
BILIRUB SERPL-MCNC: 0.2 MG/DL (ref 0–1.2)
BUN SERPL-MCNC: 21 MG/DL (ref 6–23)
CALCIUM SERPL-MCNC: 8.5 MG/DL (ref 8.6–10.2)
CHLORIDE SERPL-SCNC: 105 MMOL/L (ref 98–107)
CHOLEST SERPL-MCNC: 184 MG/DL
CO2 SERPL-SCNC: 26 MMOL/L (ref 22–29)
CREAT SERPL-MCNC: 1.1 MG/DL (ref 0.5–1)
ERYTHROCYTE [DISTWIDTH] IN BLOOD BY AUTOMATED COUNT: 15.6 % (ref 11.5–15)
GFR, ESTIMATED: 52 ML/MIN/1.73M2
GLUCOSE P FAST SERPL-MCNC: 85 MG/DL (ref 74–99)
HCT VFR BLD AUTO: 32 % (ref 34–48)
HDLC SERPL-MCNC: 38 MG/DL
HGB BLD-MCNC: 10.3 G/DL (ref 11.5–15.5)
LDLC SERPL CALC-MCNC: 118 MG/DL
MCH RBC QN AUTO: 28.1 PG (ref 26–35)
MCHC RBC AUTO-ENTMCNC: 32.2 G/DL (ref 32–34.5)
MCV RBC AUTO: 87.2 FL (ref 80–99.9)
PLATELET # BLD AUTO: 155 K/UL (ref 130–450)
PMV BLD AUTO: 10.2 FL (ref 7–12)
POTASSIUM SERPL-SCNC: 4.2 MMOL/L (ref 3.5–5)
PROT SERPL-MCNC: 6 G/DL (ref 6.4–8.3)
RBC # BLD AUTO: 3.67 M/UL (ref 3.5–5.5)
SODIUM SERPL-SCNC: 143 MMOL/L (ref 132–146)
T3FREE SERPL-MCNC: 2.22 PG/ML (ref 2–4.4)
T4 FREE SERPL-MCNC: 1.2 NG/DL (ref 0.9–1.7)
TRIGL SERPL-MCNC: 141 MG/DL
TSH SERPL DL<=0.05 MIU/L-ACNC: 1.26 UIU/ML (ref 0.27–4.2)
VLDLC SERPL CALC-MCNC: 28 MG/DL
WBC OTHER # BLD: 6.7 K/UL (ref 4.5–11.5)

## 2024-09-10 LAB
BILIRUB UR QL STRIP: NEGATIVE
CLARITY UR: CLEAR
COLOR UR: YELLOW
GLUCOSE UR STRIP-MCNC: NEGATIVE MG/DL
HGB UR QL STRIP.AUTO: ABNORMAL
KETONES UR STRIP-MCNC: NEGATIVE MG/DL
LEUKOCYTE ESTERASE UR QL STRIP: ABNORMAL
NITRITE UR QL STRIP: NEGATIVE
PH UR STRIP: 6 [PH] (ref 5–9)
PROT UR STRIP-MCNC: NEGATIVE MG/DL
RBC #/AREA URNS HPF: ABNORMAL /HPF
SP GR UR STRIP: 1.01 (ref 1–1.03)
UROBILINOGEN UR STRIP-ACNC: 0.2 EU/DL (ref 0–1)
WBC #/AREA URNS HPF: ABNORMAL /HPF

## 2024-09-12 LAB
MICROORGANISM SPEC CULT: ABNORMAL
SPECIMEN DESCRIPTION: ABNORMAL

## 2024-10-17 NOTE — CONSULTS
----- Message from ADOLFO Adhikari CNP sent at 10/16/2024  1:15 PM EDT -----  Regarding: one hour  Please let her know she passed her one hour.   Provider, MD     Social History     Tobacco Use    Smoking status: Never Smoker    Smokeless tobacco: Never Used   Substance Use Topics    Alcohol use: No     Frequency: Never     Binge frequency: Never    Drug use: Never     Family History   Problem Relation Age of Onset   Ted Pugh Stroke Father     Hypertension Father     Heart Disease Father     Stroke Mother     Hypertension Mother     Other Mother         aneurysm    Heart Disease Mother     Hypertension Brother     Cancer Brother     Breast Cancer Sister     Hypertension Sister     Cancer Sister         breast ca    Heart Disease Sister     Hypertension Brother         parkinson    Heart Disease Brother     Cancer Brother     Cancer Brother     Cancer Brother     Cancer Brother     Heart Disease Brother     Cancer Brother     Cancer Sister     High Blood Pressure Daughter     Breast Cancer Daughter     High Blood Pressure Daughter      Past Surgical History:   Procedure Laterality Date   [address]    open?     COLECTOMY  1974    COLONOSCOPY  04/26/2012    and egd    COLONOSCOPY  05/30/2014    COLONOSCOPY  01/08/2015    CYST REMOVAL  years ago    upper gum    EYE SURGERY  years ago    left eye removal,  has artificial eye    HYSTERECTOMY  1974    JOINT REPLACEMENT Left 2010/2012    x 2-knee    TONSILLECTOMY      TUMOR EXCISION      from arm, fatty    UPPER GASTROINTESTINAL ENDOSCOPY  05/30/2014    with biopsy    UPPER GASTROINTESTINAL ENDOSCOPY  01/08/2015    UPPER GASTROINTESTINAL ENDOSCOPY N/A 4/1/2019    EGD ESOPHAGOGASTRODUODENOSCOPY  ++IODINE ALLERGY++ and bx performed by Glendy Smith MD at [facility] ENDOSCOPY     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

## 2024-11-12 LAB
DATE LAST DOSE: ABNORMAL
TME LAST DOSE: ABNORMAL H
VALPROATE SERPL-MCNC: 9 UG/ML (ref 50–100)
VANCOMYCIN DOSE: ABNORMAL MG

## 2024-11-14 LAB
ALBUMIN SERPL-MCNC: 3.5 G/DL (ref 3.5–5.2)
ALP SERPL-CCNC: 52 U/L (ref 35–104)
ALT SERPL-CCNC: 10 U/L (ref 0–32)
ANION GAP SERPL CALCULATED.3IONS-SCNC: 10 MMOL/L (ref 7–16)
AST SERPL-CCNC: 21 U/L (ref 0–31)
BASOPHILS # BLD: 0.04 K/UL (ref 0–0.2)
BASOPHILS NFR BLD: 1 % (ref 0–2)
BILIRUB SERPL-MCNC: 0.3 MG/DL (ref 0–1.2)
BUN SERPL-MCNC: 31 MG/DL (ref 6–23)
CALCIUM SERPL-MCNC: 8.9 MG/DL (ref 8.6–10.2)
CHLORIDE SERPL-SCNC: 109 MMOL/L (ref 98–107)
CO2 SERPL-SCNC: 24 MMOL/L (ref 22–29)
CREAT SERPL-MCNC: 1 MG/DL (ref 0.5–1)
EOSINOPHIL # BLD: 0.19 K/UL (ref 0.05–0.5)
EOSINOPHILS RELATIVE PERCENT: 4 % (ref 0–6)
ERYTHROCYTE [DISTWIDTH] IN BLOOD BY AUTOMATED COUNT: 14.9 % (ref 11.5–15)
GFR, ESTIMATED: 56 ML/MIN/1.73M2
GLUCOSE SERPL-MCNC: 75 MG/DL (ref 74–99)
HCT VFR BLD AUTO: 30.1 % (ref 34–48)
HGB BLD-MCNC: 9.6 G/DL (ref 11.5–15.5)
IMM GRANULOCYTES # BLD AUTO: <0.03 K/UL (ref 0–0.58)
IMM GRANULOCYTES NFR BLD: 0 % (ref 0–5)
LYMPHOCYTES NFR BLD: 2.41 K/UL (ref 1.5–4)
LYMPHOCYTES RELATIVE PERCENT: 47 % (ref 20–42)
MCH RBC QN AUTO: 29.4 PG (ref 26–35)
MCHC RBC AUTO-ENTMCNC: 31.9 G/DL (ref 32–34.5)
MCV RBC AUTO: 92.3 FL (ref 80–99.9)
MONOCYTES NFR BLD: 0.54 K/UL (ref 0.1–0.95)
MONOCYTES NFR BLD: 11 % (ref 2–12)
NEUTROPHILS NFR BLD: 38 % (ref 43–80)
NEUTS SEG NFR BLD: 1.95 K/UL (ref 1.8–7.3)
PLATELET # BLD AUTO: 160 K/UL (ref 130–450)
PMV BLD AUTO: 10.4 FL (ref 7–12)
POTASSIUM SERPL-SCNC: 3.8 MMOL/L (ref 3.5–5)
PROT SERPL-MCNC: 5.8 G/DL (ref 6.4–8.3)
RBC # BLD AUTO: 3.26 M/UL (ref 3.5–5.5)
SODIUM SERPL-SCNC: 143 MMOL/L (ref 132–146)
WBC OTHER # BLD: 5.1 K/UL (ref 4.5–11.5)

## 2024-11-17 ENCOUNTER — HOSPITAL ENCOUNTER (INPATIENT)
Age: 84
LOS: 4 days | Discharge: NURSING, REHAB OR LONG-TERM CARE | DRG: 555 | End: 2024-11-21
Attending: EMERGENCY MEDICINE | Admitting: INTERNAL MEDICINE
Payer: COMMERCIAL

## 2024-11-17 ENCOUNTER — APPOINTMENT (OUTPATIENT)
Dept: GENERAL RADIOLOGY | Age: 84
DRG: 555 | End: 2024-11-17
Payer: COMMERCIAL

## 2024-11-17 ENCOUNTER — APPOINTMENT (OUTPATIENT)
Dept: CT IMAGING | Age: 84
DRG: 555 | End: 2024-11-17
Payer: COMMERCIAL

## 2024-11-17 ENCOUNTER — APPOINTMENT (OUTPATIENT)
Dept: CT IMAGING | Age: 84
DRG: 555 | End: 2024-11-17
Attending: EMERGENCY MEDICINE
Payer: COMMERCIAL

## 2024-11-17 DIAGNOSIS — M25.552 LEFT HIP PAIN: ICD-10-CM
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
## 2024-11-17 PROBLEM — M25.519 SHOULDER PAIN: Status: ACTIVE | Noted: 2024-11-17

## 2024-11-17 LAB
ALBUMIN SERPL-MCNC: 3.8 G/DL (ref 3.5–5.2)
ALP SERPL-CCNC: 59 U/L (ref 35–104)
ALT SERPL-CCNC: 32 U/L (ref 0–32)
ANION GAP SERPL CALCULATED.3IONS-SCNC: 10 MMOL/L (ref 7–16)
AST SERPL-CCNC: 109 U/L (ref 0–31)
B PARAP IS1001 DNA NPH QL NAA+NON-PROBE: NOT DETECTED
B PERT DNA SPEC QL NAA+PROBE: NOT DETECTED
BASOPHILS # BLD: 0.04 K/UL (ref 0–0.2)
BASOPHILS NFR BLD: 1 % (ref 0–2)
BILIRUB SERPL-MCNC: 0.7 MG/DL (ref 0–1.2)
BUN SERPL-MCNC: 32 MG/DL (ref 6–23)
C PNEUM DNA NPH QL NAA+NON-PROBE: NOT DETECTED
CALCIUM SERPL-MCNC: 8.9 MG/DL (ref 8.6–10.2)
CHLORIDE SERPL-SCNC: 102 MMOL/L (ref 98–107)
CO2 SERPL-SCNC: 24 MMOL/L (ref 22–29)
CREAT SERPL-MCNC: 0.9 MG/DL (ref 0.5–1)
EOSINOPHIL # BLD: 0.14 K/UL (ref 0.05–0.5)
EOSINOPHILS RELATIVE PERCENT: 2 % (ref 0–6)
ERYTHROCYTE [DISTWIDTH] IN BLOOD BY AUTOMATED COUNT: 14.6 % (ref 11.5–15)
FLUAV RNA NPH QL NAA+NON-PROBE: NOT DETECTED
FLUBV RNA NPH QL NAA+NON-PROBE: NOT DETECTED
GFR, ESTIMATED: 66 ML/MIN/1.73M2
GLUCOSE SERPL-MCNC: 105 MG/DL (ref 74–99)
HADV DNA NPH QL NAA+NON-PROBE: NOT DETECTED
HCOV 229E RNA NPH QL NAA+NON-PROBE: NOT DETECTED
HCOV HKU1 RNA NPH QL NAA+NON-PROBE: NOT DETECTED
HCOV NL63 RNA NPH QL NAA+NON-PROBE: NOT DETECTED
HCOV OC43 RNA NPH QL NAA+NON-PROBE: NOT DETECTED
HCT VFR BLD AUTO: 31.8 % (ref 34–48)
HGB BLD-MCNC: 10.6 G/DL (ref 11.5–15.5)
HMPV RNA NPH QL NAA+NON-PROBE: NOT DETECTED
HPIV1 RNA NPH QL NAA+NON-PROBE: NOT DETECTED
HPIV2 RNA NPH QL NAA+NON-PROBE: NOT DETECTED
HPIV3 RNA NPH QL NAA+NON-PROBE: NOT DETECTED
HPIV4 RNA NPH QL NAA+NON-PROBE: NOT DETECTED
IMM GRANULOCYTES # BLD AUTO: 0.03 K/UL (ref 0–0.58)
IMM GRANULOCYTES NFR BLD: 1 % (ref 0–5)
LACTATE BLDV-SCNC: 1.2 MMOL/L (ref 0.5–2.2)
LYMPHOCYTES NFR BLD: 1.73 K/UL (ref 1.5–4)
LYMPHOCYTES RELATIVE PERCENT: 26 % (ref 20–42)
M PNEUMO DNA NPH QL NAA+NON-PROBE: NOT DETECTED
MAGNESIUM SERPL-MCNC: 2.2 MG/DL (ref 1.6–2.6)
MCH RBC QN AUTO: 29.1 PG (ref 26–35)
MCHC RBC AUTO-ENTMCNC: 33.3 G/DL (ref 32–34.5)
MCV RBC AUTO: 87.4 FL (ref 80–99.9)
MONOCYTES NFR BLD: 0.83 K/UL (ref 0.1–0.95)
MONOCYTES NFR BLD: 13 % (ref 2–12)
NEUTROPHILS NFR BLD: 58 % (ref 43–80)
NEUTS SEG NFR BLD: 3.86 K/UL (ref 1.8–7.3)
PLATELET # BLD AUTO: 143 K/UL (ref 130–450)
PMV BLD AUTO: 9.4 FL (ref 7–12)
POTASSIUM SERPL-SCNC: 4 MMOL/L (ref 3.5–5)
PROT SERPL-MCNC: 6.8 G/DL (ref 6.4–8.3)
RBC # BLD AUTO: 3.64 M/UL (ref 3.5–5.5)
RSV RNA NPH QL NAA+NON-PROBE: NOT DETECTED
RV+EV RNA NPH QL NAA+NON-PROBE: NOT DETECTED
SARS-COV-2 RNA NPH QL NAA+NON-PROBE: DETECTED
SODIUM SERPL-SCNC: 136 MMOL/L (ref 132–146)
SPECIMEN DESCRIPTION: ABNORMAL
TROPONIN I SERPL HS-MCNC: 40 NG/L (ref 0–9)
TROPONIN I SERPL HS-MCNC: 47 NG/L (ref 0–9)
WBC OTHER # BLD: 6.6 K/UL (ref 4.5–11.5)

## 2024-11-17 PROCEDURE — 73502 X-RAY EXAM HIP UNI 2-3 VIEWS: CPT

## 2024-11-17 PROCEDURE — 80053 COMPREHEN METABOLIC PANEL: CPT

## 2024-11-17 PROCEDURE — 84484 ASSAY OF TROPONIN QUANT: CPT

## 2024-11-17 PROCEDURE — 99285 EMERGENCY DEPT VISIT HI MDM: CPT

## 2024-11-17 PROCEDURE — 74176 CT ABD & PELVIS W/O CONTRAST: CPT

## 2024-11-17 PROCEDURE — 70450 CT HEAD/BRAIN W/O DYE: CPT

## 2024-11-17 PROCEDURE — 72128 CT CHEST SPINE W/O DYE: CPT

## 2024-11-17 PROCEDURE — 73110 X-RAY EXAM OF WRIST: CPT

## 2024-11-17 PROCEDURE — 93005 ELECTROCARDIOGRAM TRACING: CPT

## 2024-11-17 PROCEDURE — 73060 X-RAY EXAM OF HUMERUS: CPT

## 2024-11-17 PROCEDURE — 6370000000 HC RX 637 (ALT 250 FOR IP): Performed by: STUDENT IN AN ORGANIZED HEALTH CARE EDUCATION/TRAINING PROGRAM

## 2024-11-17 PROCEDURE — 85025 COMPLETE CBC W/AUTO DIFF WBC: CPT

## 2024-11-17 PROCEDURE — 71250 CT THORAX DX C-: CPT

## 2024-11-17 PROCEDURE — 72131 CT LUMBAR SPINE W/O DYE: CPT

## 2024-11-17 PROCEDURE — 83735 ASSAY OF MAGNESIUM: CPT

## 2024-11-17 PROCEDURE — 96374 THER/PROPH/DIAG INJ IV PUSH: CPT

## 2024-11-17 PROCEDURE — 0202U NFCT DS 22 TRGT SARS-COV-2: CPT

## 2024-11-17 PROCEDURE — 1200000000 HC SEMI PRIVATE

## 2024-11-17 PROCEDURE — 72125 CT NECK SPINE W/O DYE: CPT

## 2024-11-17 PROCEDURE — 73200 CT UPPER EXTREMITY W/O DYE: CPT

## 2024-11-17 PROCEDURE — 6360000002 HC RX W HCPCS

## 2024-11-17 PROCEDURE — 83605 ASSAY OF LACTIC ACID: CPT

## 2024-11-17 PROCEDURE — 99222 1ST HOSP IP/OBS MODERATE 55: CPT | Performed by: STUDENT IN AN ORGANIZED HEALTH CARE EDUCATION/TRAINING PROGRAM

## 2024-11-17 RX ORDER — POLYETHYLENE GLYCOL 3350 17 G/17G
17 POWDER, FOR SOLUTION ORAL DAILY PRN
Status: DISCONTINUED | OUTPATIENT
Start: 2024-11-17 | End: 2024-11-21 | Stop reason: HOSPADM

## 2024-11-17 RX ORDER — LOSARTAN POTASSIUM 50 MG/1
50 TABLET ORAL DAILY
Status: DISCONTINUED | OUTPATIENT
Start: 2024-11-17 | End: 2024-11-21 | Stop reason: HOSPADM

## 2024-11-17 RX ORDER — ENOXAPARIN SODIUM 100 MG/ML
40 INJECTION SUBCUTANEOUS DAILY
Status: DISCONTINUED | OUTPATIENT
Start: 2024-11-17 | End: 2024-11-21 | Stop reason: HOSPADM

## 2024-11-17 RX ORDER — LIDOCAINE 4 G/G
1 PATCH TOPICAL DAILY
Status: DISCONTINUED | OUTPATIENT
Start: 2024-11-17 | End: 2024-11-21 | Stop reason: HOSPADM

## 2024-11-17 RX ORDER — VALPROIC ACID 250 MG/5ML
500 SOLUTION ORAL 2 TIMES DAILY
Status: DISCONTINUED | OUTPATIENT
Start: 2024-11-17 | End: 2024-11-21 | Stop reason: HOSPADM

## 2024-11-17 RX ORDER — FENTANYL CITRATE 50 UG/ML
50 INJECTION, SOLUTION INTRAMUSCULAR; INTRAVENOUS
Status: DISCONTINUED | OUTPATIENT
Start: 2024-11-17 | End: 2024-11-21

## 2024-11-17 RX ORDER — MIRTAZAPINE 15 MG/1
15 TABLET, FILM COATED ORAL NIGHTLY
Status: ON HOLD | COMMUNITY
End: 2024-11-20 | Stop reason: HOSPADM

## 2024-11-17 RX ORDER — MEMANTINE HYDROCHLORIDE 10 MG/1
10 TABLET ORAL 2 TIMES DAILY
Status: DISCONTINUED | OUTPATIENT
Start: 2024-11-17 | End: 2024-11-21 | Stop reason: HOSPADM

## 2024-11-17 RX ORDER — QUETIAPINE FUMARATE 25 MG/1
25 TABLET, FILM COATED ORAL NIGHTLY
COMMUNITY

## 2024-11-17 RX ORDER — ONDANSETRON 2 MG/ML
4 INJECTION INTRAMUSCULAR; INTRAVENOUS EVERY 6 HOURS PRN
Status: DISCONTINUED | OUTPATIENT
Start: 2024-11-17 | End: 2024-11-21 | Stop reason: HOSPADM

## 2024-11-17 RX ORDER — ACETAMINOPHEN 650 MG/1
650 SUPPOSITORY RECTAL EVERY 6 HOURS PRN
Status: DISCONTINUED | OUTPATIENT
Start: 2024-11-17 | End: 2024-11-21 | Stop reason: HOSPADM

## 2024-11-17 RX ORDER — SODIUM CHLORIDE 0.9 % (FLUSH) 0.9 %
10 SYRINGE (ML) INJECTION PRN
Status: DISCONTINUED | OUTPATIENT
Start: 2024-11-17 | End: 2024-11-21 | Stop reason: HOSPADM

## 2024-11-17 RX ORDER — M-VIT,TX,IRON,MINS/CALC/FOLIC 27MG-0.4MG
1 TABLET ORAL DAILY
COMMUNITY

## 2024-11-17 RX ORDER — PANTOPRAZOLE SODIUM 40 MG/1
40 TABLET, DELAYED RELEASE ORAL DAILY
Status: DISCONTINUED | OUTPATIENT
Start: 2024-11-17 | End: 2024-11-21 | Stop reason: HOSPADM

## 2024-11-17 RX ORDER — SODIUM CHLORIDE 9 MG/ML
INJECTION, SOLUTION INTRAVENOUS PRN
Status: DISCONTINUED | OUTPATIENT
Start: 2024-11-17 | End: 2024-11-21 | Stop reason: HOSPADM

## 2024-11-17 RX ORDER — FENTANYL CITRATE 50 UG/ML
50 INJECTION, SOLUTION INTRAMUSCULAR; INTRAVENOUS ONCE
Status: COMPLETED | OUTPATIENT
Start: 2024-11-17 | End: 2024-11-17

## 2024-11-17 RX ORDER — SENNOSIDES A AND B 8.6 MG/1
1 TABLET, FILM COATED ORAL DAILY PRN
Status: DISCONTINUED | OUTPATIENT
Start: 2024-11-17 | End: 2024-11-21 | Stop reason: HOSPADM

## 2024-11-17 RX ORDER — ACETAMINOPHEN 325 MG/1
650 TABLET ORAL EVERY 6 HOURS PRN
Status: DISCONTINUED | OUTPATIENT
Start: 2024-11-17 | End: 2024-11-21 | Stop reason: HOSPADM

## 2024-11-17 RX ORDER — QUETIAPINE FUMARATE 25 MG/1
25 TABLET, FILM COATED ORAL NIGHTLY
Status: DISCONTINUED | OUTPATIENT
Start: 2024-11-17 | End: 2024-11-21 | Stop reason: HOSPADM

## 2024-11-17 RX ORDER — SODIUM CHLORIDE 0.9 % (FLUSH) 0.9 %
10 SYRINGE (ML) INJECTION EVERY 12 HOURS SCHEDULED
Status: DISCONTINUED | OUTPATIENT
Start: 2024-11-17 | End: 2024-11-21 | Stop reason: HOSPADM

## 2024-11-17 RX ORDER — VALPROIC ACID 250 MG/5ML
500 SOLUTION ORAL 2 TIMES DAILY
COMMUNITY

## 2024-11-17 RX ORDER — FLUTICASONE PROPIONATE 50 MCG
2 SPRAY, SUSPENSION (ML) NASAL DAILY
Status: DISCONTINUED | OUTPATIENT
Start: 2024-11-17 | End: 2024-11-21 | Stop reason: HOSPADM

## 2024-11-17 RX ORDER — RIVASTIGMINE TARTRATE 3 MG/1
6 CAPSULE ORAL 2 TIMES DAILY
Status: DISCONTINUED | OUTPATIENT
Start: 2024-11-17 | End: 2024-11-21 | Stop reason: HOSPADM

## 2024-11-17 RX ORDER — IBUPROFEN 600 MG/1
600 TABLET, FILM COATED ORAL EVERY 8 HOURS
Status: ON HOLD | COMMUNITY
End: 2024-11-20 | Stop reason: HOSPADM

## 2024-11-17 RX ORDER — RIVASTIGMINE TARTRATE 6 MG/1
6 CAPSULE ORAL 2 TIMES DAILY
COMMUNITY

## 2024-11-17 RX ORDER — MIRTAZAPINE 15 MG/1
15 TABLET, FILM COATED ORAL NIGHTLY
Status: DISCONTINUED | OUTPATIENT
Start: 2024-11-17 | End: 2024-11-21 | Stop reason: HOSPADM

## 2024-11-17 RX ORDER — MEMANTINE HYDROCHLORIDE 10 MG/1
10 TABLET ORAL 2 TIMES DAILY
COMMUNITY

## 2024-11-17 RX ORDER — LEVOTHYROXINE SODIUM 88 UG/1
88 TABLET ORAL DAILY
Status: DISCONTINUED | OUTPATIENT
Start: 2024-11-18 | End: 2024-11-21 | Stop reason: HOSPADM

## 2024-11-17 RX ORDER — ONDANSETRON 4 MG/1
4 TABLET, ORALLY DISINTEGRATING ORAL EVERY 8 HOURS PRN
Status: DISCONTINUED | OUTPATIENT
Start: 2024-11-17 | End: 2024-11-21 | Stop reason: HOSPADM

## 2024-11-17 RX ADMIN — MEMANTINE 10 MG: 10 TABLET ORAL at 19:53

## 2024-11-17 RX ADMIN — VALPROIC ACID 500 MG: 250 SOLUTION ORAL at 20:02

## 2024-11-17 RX ADMIN — LOSARTAN POTASSIUM 50 MG: 50 TABLET, FILM COATED ORAL at 18:14

## 2024-11-17 RX ADMIN — QUETIAPINE FUMARATE 25 MG: 25 TABLET ORAL at 19:53

## 2024-11-17 RX ADMIN — RIVASTIGMINE TARTRATE 6 MG: 3 CAPSULE ORAL at 19:53

## 2024-11-17 RX ADMIN — PANTOPRAZOLE SODIUM 40 MG: 40 TABLET, DELAYED RELEASE ORAL at 18:15

## 2024-11-17 RX ADMIN — FENTANYL CITRATE 50 MCG: 50 INJECTION INTRAMUSCULAR; INTRAVENOUS at 11:22

## 2024-11-17 RX ADMIN — ACETAMINOPHEN 650 MG: 325 TABLET ORAL at 19:53

## 2024-11-17 RX ADMIN — MIRTAZAPINE 15 MG: 15 TABLET, FILM COATED ORAL at 19:53

## 2024-11-17 ASSESSMENT — PAIN DESCRIPTION - ONSET: ONSET: GRADUAL

## 2024-11-17 ASSESSMENT — PAIN DESCRIPTION - DESCRIPTORS
DESCRIPTORS: ACHING;CRAMPING
DESCRIPTORS: ACHING;DULL;DISCOMFORT

## 2024-11-17 ASSESSMENT — PAIN SCALES - GENERAL
PAINLEVEL_OUTOF10: 10
PAINLEVEL_OUTOF10: 8

## 2024-11-17 ASSESSMENT — PAIN DESCRIPTION - FREQUENCY: FREQUENCY: CONTINUOUS

## 2024-11-17 ASSESSMENT — PAIN DESCRIPTION - ORIENTATION
ORIENTATION: LEFT
ORIENTATION: LEFT

## 2024-11-17 ASSESSMENT — PAIN DESCRIPTION - LOCATION
LOCATION: HIP
LOCATION: HIP;ARM

## 2024-11-17 ASSESSMENT — PAIN DESCRIPTION - PAIN TYPE: TYPE: ACUTE PAIN

## 2024-11-17 ASSESSMENT — PAIN - FUNCTIONAL ASSESSMENT: PAIN_FUNCTIONAL_ASSESSMENT: ACTIVITIES ARE NOT PREVENTED

## 2024-11-17 NOTE — PROGRESS NOTES
4 Eyes Skin Assessment     NAME:  Valarie Mata  YOB: 1940  MEDICAL RECORD NUMBER:  [id number]    The patient is being assessed for  Admission    I agree that at least one RN has performed a thorough Head to Toe Skin Assessment on the patient. ALL assessment sites listed below have been assessed.      Areas assessed by both nurses:    Head, Face, Ears, Shoulders, Back, Chest, Arms, Elbows, Hands, Sacrum. Buttock, Coccyx, Ischium, and Legs. Feet and Heels        Does the Patient have a Wound? No noted wound(s)    Soft/boggy heels  Redness to bilat shins  Dry/flaky skin generalized       Ozzy Prevention initiated by RN: No  Wound Care Orders initiated by RN: No    Pressure Injury (Stage 3,4, Unstageable, DTI, NWPT, and Complex wounds) if present, place Wound referral order by RN under : No    New Ostomies, if present place, Ostomy referral order under : No     Nurse 1 eSignature: Electronically signed by Joana Cordero RN on 11/17/24 at 6:40 PM EST    **SHARE this note so that the co-signing nurse can place an eSignature**    Nurse 2 eSignature: Electronically signed by Maribell Yoon RN on 11/17/24 at 6:42 PM EST

## 2024-11-17 NOTE — CARE COORDINATION
Presenting with a fall     Having Shoulder and hip pain     Ortho is consulted they have ordered additional imaging     Pain control     PT OT

## 2024-11-17 NOTE — ED PROVIDER NOTES
[facility]  EMERGENCY DEPARTMENT ENCOUNTER        Pt Name: Valarie Mata  MRN: [id number]  Birthdate 1940  Date of evaluation: 11/17/2024  Provider: Cecile Ware DO  PCP: Daja Hernandez DO  Note Started: 11:15 AM EST 11/17/24    CHIEF COMPLAINT       Chief Complaint   Patient presents with    Fall     Fall @ generations-patient complains of back & hip pain, concern for left shoulder fracture per facility xray       HISTORY OF PRESENT ILLNESS: 1 or more Elements   History From: PATIENT and chart review     Limitations to history : patient's baseline alert and oriented to self    Valarie Mata is a 84 y.o. female presented to ED for fall. She was sent in from nursing facility, at baseline she was alert and oriented to herself. Based on nursing facility staff, she tripped on something and fell, XR there showed fracture on left arm per their staff. Patient is a poor historian. On physical exam, she was alert and oriented to place and time, there was tenderness on her left arm and left hip. She was able to lift her right arm, denied pain in her right arm and lower leg. She denied abdominal pain or back pain.       Nursing Notes were all reviewed and agreed with or any disagreements were addressed in the HPI.    REVIEW OF SYSTEMS :      Review of Systems   Constitutional:  Negative for chills, diaphoresis and fever.   Respiratory:  Negative for cough, chest tightness and wheezing.    Cardiovascular:  Negative for chest pain, palpitations and leg swelling.   Gastrointestinal:  Negative for abdominal pain, nausea and vomiting.   Genitourinary:  Negative for dysuria, flank pain and hematuria.   Musculoskeletal:         Left arm pain, left hip pain   Psychiatric/Behavioral:  Negative for confusion and suicidal ideas.          SURGICAL HISTORY     Past Surgical History:   Procedure Laterality Date    CHOLECYSTECTOMY  1985    open?    COLECTOMY  1974    COLONOSCOPY  04/26/2012    and egd     every morning and 1 tab at noon.  Qty: 60 tablet, Refills: 5    Associated Diagnoses: Primary hypertension; Edema of both lower legs due to peripheral venous insufficiency      donepezil (ARICEPT) 10 MG tablet Take 1 tablet by mouth nightly  Qty: 30 tablet, Refills: 3    Associated Diagnoses: Moderate late onset Alzheimer's dementia with mood disturbance (HCC)      triamcinolone (KENALOG) 0.1 % cream Apply topically to lower legs 2 times daily until resolved.  Qty: 80 g, Refills: 2    Associated Diagnoses: Edema of both lower legs due to peripheral venous insufficiency      fluticasone (FLONASE) 50 MCG/ACT nasal spray 2 sprays by Each Nostril route daily  Qty: 16 g, Refills: 1      amLODIPine (NORVASC) 10 MG tablet Take 1 tablet by mouth daily  Qty: 90 tablet, Refills: 3    Associated Diagnoses: Primary hypertension      DULoxetine (CYMBALTA) 30 MG extended release capsule Take 1 capsule by mouth daily  Qty: 30 capsule, Refills: 2    Associated Diagnoses: Moderate episode of recurrent major depressive disorder (HCC)      pantoprazole (PROTONIX) 40 MG tablet Take 1 tablet by mouth daily  Qty: 90 tablet, Refills: 3      polyethylene glycol (GLYCOLAX) 17 GM/SCOOP powder Take 17 g by mouth daily  Qty: 1 each, Refills: 2      Sanitary Napkins & Tampons (MAXI PAD ULTRA THIN) PADS 1 Units by Does not apply route 3 times daily as needed (incontinence)  Qty: 30 each, Refills: 11    Associated Diagnoses: Urinary frequency      metoprolol succinate (TOPROL XL) 25 MG extended release tablet Take 1 tablet by mouth daily  Qty: 30 tablet, Refills: 3      methylPREDNISolone (MEDROL) 4 MG tablet Take 1 tablet by mouth every other day      benzonatate (TESSALON) 200 MG capsule Take 1 capsule by mouth every 8 hours as needed for Cough      lidocaine 4 % external patch Apply 1 patch topically daily Indications: Pain      ondansetron (ZOFRAN) 4 MG tablet Take 1 tablet by mouth 3 times daily as needed for Nausea or Vomiting  Qty: 15

## 2024-11-18 LAB
ALBUMIN SERPL-MCNC: 3 G/DL (ref 3.5–5.2)
ALP SERPL-CCNC: 55 U/L (ref 35–104)
ALT SERPL-CCNC: 33 U/L (ref 0–32)
ANION GAP SERPL CALCULATED.3IONS-SCNC: 14 MMOL/L (ref 7–16)
AST SERPL-CCNC: 126 U/L (ref 0–31)
BASOPHILS # BLD: 0.04 K/UL (ref 0–0.2)
BASOPHILS NFR BLD: 1 % (ref 0–2)
BILIRUB SERPL-MCNC: 0.7 MG/DL (ref 0–1.2)
BUN SERPL-MCNC: 29 MG/DL (ref 6–23)
CALCIUM SERPL-MCNC: 8.6 MG/DL (ref 8.6–10.2)
CHLORIDE SERPL-SCNC: 102 MMOL/L (ref 98–107)
CO2 SERPL-SCNC: 17 MMOL/L (ref 22–29)
CREAT SERPL-MCNC: 0.8 MG/DL (ref 0.5–1)
EKG ATRIAL RATE: 67 BPM
EKG P AXIS: 42 DEGREES
EKG P-R INTERVAL: 150 MS
EKG Q-T INTERVAL: 410 MS
EKG QRS DURATION: 76 MS
EKG QTC CALCULATION (BAZETT): 433 MS
EKG R AXIS: 3 DEGREES
EKG T AXIS: 38 DEGREES
EKG VENTRICULAR RATE: 67 BPM
EOSINOPHIL # BLD: 0.06 K/UL (ref 0.05–0.5)
EOSINOPHILS RELATIVE PERCENT: 1 % (ref 0–6)
ERYTHROCYTE [DISTWIDTH] IN BLOOD BY AUTOMATED COUNT: 14.5 % (ref 11.5–15)
GFR, ESTIMATED: 73 ML/MIN/1.73M2
GLUCOSE SERPL-MCNC: 68 MG/DL (ref 74–99)
HCT VFR BLD AUTO: 34.7 % (ref 34–48)
HGB BLD-MCNC: 10.6 G/DL (ref 11.5–15.5)
IMM GRANULOCYTES # BLD AUTO: 0.03 K/UL (ref 0–0.58)
IMM GRANULOCYTES NFR BLD: 0 % (ref 0–5)
LYMPHOCYTES NFR BLD: 2.06 K/UL (ref 1.5–4)
LYMPHOCYTES RELATIVE PERCENT: 26 % (ref 20–42)
MCH RBC QN AUTO: 29.4 PG (ref 26–35)
MCHC RBC AUTO-ENTMCNC: 30.5 G/DL (ref 32–34.5)
MCV RBC AUTO: 96.1 FL (ref 80–99.9)
MONOCYTES NFR BLD: 0.98 K/UL (ref 0.1–0.95)
MONOCYTES NFR BLD: 12 % (ref 2–12)
NEUTROPHILS NFR BLD: 60 % (ref 43–80)
NEUTS SEG NFR BLD: 4.83 K/UL (ref 1.8–7.3)
PLATELET # BLD AUTO: 115 K/UL (ref 130–450)
PMV BLD AUTO: 10 FL (ref 7–12)
POTASSIUM SERPL-SCNC: 4.1 MMOL/L (ref 3.5–5)
PROT SERPL-MCNC: 6.2 G/DL (ref 6.4–8.3)
RBC # BLD AUTO: 3.61 M/UL (ref 3.5–5.5)
SODIUM SERPL-SCNC: 133 MMOL/L (ref 132–146)
WBC OTHER # BLD: 8 K/UL (ref 4.5–11.5)

## 2024-11-18 PROCEDURE — 1200000000 HC SEMI PRIVATE

## 2024-11-18 PROCEDURE — 6370000000 HC RX 637 (ALT 250 FOR IP): Performed by: STUDENT IN AN ORGANIZED HEALTH CARE EDUCATION/TRAINING PROGRAM

## 2024-11-18 PROCEDURE — 36415 COLL VENOUS BLD VENIPUNCTURE: CPT

## 2024-11-18 PROCEDURE — 80053 COMPREHEN METABOLIC PANEL: CPT

## 2024-11-18 PROCEDURE — 6360000002 HC RX W HCPCS: Performed by: STUDENT IN AN ORGANIZED HEALTH CARE EDUCATION/TRAINING PROGRAM

## 2024-11-18 PROCEDURE — 85025 COMPLETE CBC W/AUTO DIFF WBC: CPT

## 2024-11-18 PROCEDURE — 2580000003 HC RX 258: Performed by: STUDENT IN AN ORGANIZED HEALTH CARE EDUCATION/TRAINING PROGRAM

## 2024-11-18 RX ADMIN — MEMANTINE 10 MG: 10 TABLET ORAL at 19:58

## 2024-11-18 RX ADMIN — SODIUM CHLORIDE, PRESERVATIVE FREE 10 ML: 5 INJECTION INTRAVENOUS at 08:43

## 2024-11-18 RX ADMIN — RIVASTIGMINE TARTRATE 6 MG: 3 CAPSULE ORAL at 19:58

## 2024-11-18 RX ADMIN — MIRTAZAPINE 15 MG: 15 TABLET, FILM COATED ORAL at 19:58

## 2024-11-18 RX ADMIN — VALPROIC ACID 500 MG: 250 SOLUTION ORAL at 19:59

## 2024-11-18 RX ADMIN — SODIUM CHLORIDE, PRESERVATIVE FREE 10 ML: 5 INJECTION INTRAVENOUS at 19:59

## 2024-11-18 RX ADMIN — VALPROIC ACID 500 MG: 250 SOLUTION ORAL at 08:42

## 2024-11-18 RX ADMIN — LOSARTAN POTASSIUM 50 MG: 50 TABLET, FILM COATED ORAL at 08:41

## 2024-11-18 RX ADMIN — QUETIAPINE FUMARATE 25 MG: 25 TABLET ORAL at 19:58

## 2024-11-18 RX ADMIN — RIVASTIGMINE TARTRATE 6 MG: 3 CAPSULE ORAL at 08:42

## 2024-11-18 RX ADMIN — ENOXAPARIN SODIUM 40 MG: 100 INJECTION SUBCUTANEOUS at 08:41

## 2024-11-18 RX ADMIN — LEVOTHYROXINE SODIUM 88 MCG: 0.09 TABLET ORAL at 05:32

## 2024-11-18 RX ADMIN — PANTOPRAZOLE SODIUM 40 MG: 40 TABLET, DELAYED RELEASE ORAL at 08:42

## 2024-11-18 RX ADMIN — ACETAMINOPHEN 650 MG: 325 TABLET ORAL at 08:41

## 2024-11-18 RX ADMIN — MEMANTINE 10 MG: 10 TABLET ORAL at 08:41

## 2024-11-18 ASSESSMENT — PAIN DESCRIPTION - LOCATION: LOCATION: ARM;HIP;SHOULDER

## 2024-11-18 ASSESSMENT — PAIN SCALES - GENERAL
PAINLEVEL_OUTOF10: 0
PAINLEVEL_OUTOF10: 0
PAINLEVEL_OUTOF10: 6

## 2024-11-18 ASSESSMENT — PAIN DESCRIPTION - ORIENTATION: ORIENTATION: LEFT

## 2024-11-18 ASSESSMENT — PAIN DESCRIPTION - ONSET: ONSET: ON-GOING

## 2024-11-18 ASSESSMENT — PAIN - FUNCTIONAL ASSESSMENT: PAIN_FUNCTIONAL_ASSESSMENT: ACTIVITIES ARE NOT PREVENTED

## 2024-11-18 ASSESSMENT — PAIN DESCRIPTION - FREQUENCY: FREQUENCY: CONTINUOUS

## 2024-11-18 ASSESSMENT — PAIN DESCRIPTION - PAIN TYPE: TYPE: ACUTE PAIN

## 2024-11-18 ASSESSMENT — PAIN DESCRIPTION - DESCRIPTORS: DESCRIPTORS: ACHING;DISCOMFORT;SORE

## 2024-11-18 NOTE — PLAN OF CARE
Problem: Chronic Conditions and Co-morbidities  Goal: Patient's chronic conditions and co-morbidity symptoms are monitored and maintained or improved  11/18/2024 1124 by William Frost RN  Outcome: Progressing     Problem: Safety - Adult  Goal: Free from fall injury  11/18/2024 1124 by William Frost RN  Outcome: Progressing     Problem: ABCDS Injury Assessment  Goal: Absence of physical injury  11/18/2024 1124 by William Frost RN  Outcome: Progressing     Problem: Skin/Tissue Integrity  Goal: Absence of new skin breakdown  Description: 1.  Monitor for areas of redness and/or skin breakdown  2.  Assess vascular access sites hourly  3.  Every 4-6 hours minimum:  Change oxygen saturation probe site  4.  Every 4-6 hours:  If on nasal continuous positive airway pressure, respiratory therapy assess nares and determine need for appliance change or resting period.  11/18/2024 1124 by William Frost RN  Outcome: Progressing     Problem: Pain  Goal: Verbalizes/displays adequate comfort level or baseline comfort level  11/18/2024 1124 by William Frost RN  Outcome: Progressing     Problem: Confusion  Goal: Confusion, delirium, dementia, or psychosis is improved or at baseline  Description: INTERVENTIONS:  1. Assess for possible contributors to thought disturbance, including medications, impaired vision or hearing, underlying metabolic abnormalities, dehydration, psychiatric diagnoses, and notify attending LIP  2. [place] high risk fall precautions, as indicated  3. Provide frequent short contacts to provide reality reorientation, refocusing and direction  4. Decrease environmental stimuli, including noise as appropriate  5. Monitor and intervene to maintain adequate nutrition, hydration, elimination, sleep and activity  6. If unable to ensure safety without constant attention obtain sitter and review sitter guidelines with assigned personnel  7. Initiate Psychosocial CNS and Spiritual Care  consult, as indicated  11/18/2024 1124 by William Frost, RN  Outcome: Progressing

## 2024-11-18 NOTE — CONSULTS
Department of Orthopedic Surgery  Resident Consult Note          Reason for Consult: Left hip pain and left shoulder pain    HISTORY OF PRESENT ILLNESS:       Patient is a 84 y.o. female who is currently slightly demented and confused, who presents with left hip and shoulder pain following a fall at her nursing facility.  Patient normally uses walker at baseline.  Orthopedics is consulted for management for concern for left hip fracture and left proximal humerus fracture.  Patient states she fractured her left proximal humerus approximate one 1 year ago and has been doing well since that time.  She is complaining of left hip pain only upon my presentation.  Denies pain anywhere else on the body at this time.  Denies any increasing numbness tingling paresthesias traveling down left upper extremity or left lower extremity at this time.  Is of note patient is currently confused that this may be unreliable.  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, treated with surgery and chemo    Cerebral artery occlusion with cerebral infarction ([facility])     possibly TIA    Chronic back pain     Constipation     Depression     Elevated sed rate 12/11/2017    hx of    GERD (gastroesophageal reflux disease)     Hemorrhagic stroke ([facility]) 08/09/2022    Hemorrhoids     history of    Hyperlipidemia     Hypertension     Ischemic stroke ([facility])     Left foot pain     dropped a Kettle on foot    Lumbosacral radiculopathy 08/06/2020    Macular degeneration     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/24/2015    Peripheral neuropathy 08/06/2020    Poor vision     right eye / had bleeding behind eye, is receiving \"shots\" at this time  / 3/22/2019    Prosthetic eye globe     left eye implant;    SAH (subarachnoid hemorrhage) ([facility]) 04/24/2022    Seasonal allergies      mg, Oral, BID  mirtazapine (REMERON) tablet 15 mg, 15 mg, Oral, Nightly  pantoprazole (PROTONIX) tablet 40 mg, 40 mg, Oral, Daily  polyethylene glycol (GLYCOLAX) packet 17 g, 17 g, Oral, Daily PRN  QUEtiapine (SEROQUEL) tablet 25 mg, 25 mg, Oral, Nightly  rivastigmine (EXELON) capsule 6 mg, 6 mg, Oral, BID  valproic acid (DEPAKENE) 250 MG/5ML oral solution 500 mg, 500 mg, Oral, BID  Allergies:  Iodine, Bee pollen, Bee venom, Codeine, Hydralazine, Oxycodone-aspirin, Phenergan [promethazine hcl], Sulfa antibiotics, Amoxicillin-pot clavulanate, Aspirin, Darvocet [propoxyphene n-acetaminophen], Influenza vaccines, Percodan [oxycodone-aspirin], and Percodan [oxycodone-aspirin]    Social History:   TOBACCO:   reports that she has never smoked. She has been exposed to tobacco smoke. She has never used smokeless tobacco.  ETOH:   reports no history of alcohol use.  DRUGS:   reports no history of drug use.  ACTIVITIES OF DAILY LIVING:    OCCUPATION:    Family History:       Problem Relation Age of Onset    Stroke Father     Hypertension Father     Heart Disease Father     Stroke Mother     Hypertension Mother     Other Mother         aneurysm    Heart Disease Mother     Hypertension Brother     Cancer Brother     Breast Cancer Sister     Hypertension Sister     Cancer Sister         breast ca    Heart Disease Sister     Hypertension Brother         parkinson    Heart Disease Brother     Cancer Brother     Cancer Brother     Cancer Brother     Cancer Brother     Heart Disease Brother     Cancer Brother     Cancer Sister     High Blood Pressure Daughter     Breast Cancer Daughter     High Blood Pressure Daughter        REVIEW OF SYSTEMS: Unreliable due to patient's confused status    PHYSICAL EXAM:    VITALS:  BP (!) 175/60   Pulse 69   Temp 98.4 °F (36.9 °C) (Oral)   Resp 22   LMP 07/24/1974   SpO2 96%   CONSTITUTIONAL: Awake and alert but confused  MUSCULOSKELETAL:  Left lower Extremity:  +2/4 DP PT pulse good cap

## 2024-11-18 NOTE — H&P
[facility]              [address] [id number]                           HISTORY & PHYSICAL      PATIENT NAME: ABRAN DEGROOT              : 1940  MED REC NO: [id number]                        ROOM: 8414  ACCOUNT NO: [id number]                       ADMIT DATE: 2024  PROVIDER: Audie Gibson DO      PRIMARY CARE PHYSICIAN:  Dr. Peña    CHIEF COMPLAINT:  Status post fall.    HISTORY OF PRESENT ILLNESS:  The patient is an 84-year-old  female who presented to the emergency room from a nursing facility.  She had tripped and fallen.  X-ray at the facility was concerning for left upper extremity fracture.  The patient was seen in the emergency room and admitted for further evaluation and treatment.  Diagnostic evaluation was remarkable for a positive COVID infection.    MEDICATIONS:  Prior to admission; Tylenol p.r.n., Flonase, ibuprofen, Synthroid, lidocaine patch, Cozaar, Namenda, Remeron, multivitamin, Protonix, lubricant eye drops, Seroquel, Exelon, Depakote.    SOCIAL HISTORY:  No tobacco.  No alcohol.    REVIEW OF SYSTEMS:  Remarkable for above-stated chief complaint.    ALLERGIES:  TO BEE POLLEN, BEE VENOM, CODEINE, HYDRALAZINE, OXYCODONE/ASPIRIN, PHENERGAN, SULFA, AMOXICILLIN/CLAVULANATE, ASPIRIN, DARVOCET, INFLUENZA VACCINE, PERCODAN.      PAST SURGICAL HISTORY:  Left eye removal, colectomy, cholecystectomy, hysterectomy, tonsillectomy, left total knee replacement x2, right eye cataract surgery.    PAST MEDICAL HISTORY:  Macular degeneration, prosthetic left eye, hypertension, hypothyroidism, GERD, history of CVA, bowel cancer status post surgery and chemo.    PHYSICAL EXAMINATION:  GENERAL APPEARANCE:  Reveals an 84-year-old  female who is responsive to verbal stimuli, cooperative, and a poor historian.  VITAL SIGNS:  On admission, temperature 98.8, pulse 69, respirations 20, blood pressure 180/62.  HEAD:   Dapsone Counseling: I discussed with the patient the risks of dapsone including but not limited to hemolytic anemia, agranulocytosis, rashes, methemoglobinemia, kidney failure, peripheral neuropathy, headaches, GI upset, and liver toxicity.  Patients who start dapsone require monitoring including baseline LFTs and weekly CBCs for the first month, then every month thereafter.  The patient verbalized understanding of the proper use and possible adverse effects of dapsone.  All of the patient's questions and concerns were addressed.

## 2024-11-18 NOTE — PROGRESS NOTES
Occupational Therapy  Occupational Therapy    Date:2024  Patient Name: Valarie Mata  MRN: [id number]  : 1940  Room: [address] [place] [address]-A     OT orders received and chart reviewed. OT eval on hold at this time pending MRI of L hip to r/o occult fx.   OT will follow and re-attempt eval as appropriate, pending imaging, at a later time/date.     Mae Saldana, RUBIN, OTR/L [id number]

## 2024-11-18 NOTE — ACP (ADVANCE CARE PLANNING)
Advance Care Planning   The patient has the following advanced directives on file:  Advance Directives       Power of  Living Will ACP-Advance Directive ACP-Power of     Not on File Not on File Filed Not on File            The patient has appointed the following active healthcare agents:    Primary Decision Maker: Michelle Mata - Child - [phone]    Secondary Decision Maker: Madhavi Choi - Child - [phone]    Supplemental (Other) Decision Maker: Mode Mata - Child - [phone]      BELINDA Rosado  11/18/2024

## 2024-11-18 NOTE — CARE COORDINATION
Social Work/Case Management Transition of Care Planning (Stephanie TREVINO [phone]):  Patient presented to the hospital after a fall at [facility].  Patient expressed concerns of back, hip and right shoulder pain.  Imaging was negative for any acute process.  MRI of the left hip is pending.  Ortho surgery was consulted. PT/OT to moose.  Patient was found to be Covid positive.  Patient is noted to be oriented x1.  Spoke with her daughter, Michelle.  Patient has been residing at [facility] in [place] for the past few months.  She is dependent on staff for assistance with all aspects of care.  She sees the facility physician and uses their pharmacy.  She uses a FWW for ambulation.  No oxygen needs.  Discharge plan is to [facility] if within the 72 hour window.  If not, the discharge plan will be to return to [facility] if behavior is under control.  If behavior remains an issue, a new referral to [facility] will need to be made.  CM/SW will follow.  BELINDA Rosado  11/18/2024    Case Management Assessment  Initial Evaluation    Date/Time of Evaluation: 11/18/2024 3:32 PM  Assessment Completed by: BELINDA Rosado    If patient is discharged prior to next notation, then this note serves as note for discharge by case management.    Patient Name: Valarie Mata                   YOB: 1940  Diagnosis: Shoulder pain [M25.519]  Fall, initial encounter [W19.XXXA]                   Date / Time: 11/17/2024 10:45 AM    Patient Admission Status: Inpatient   Readmission Risk (Low < 19, Mod (19-27), High > 27): Readmission Risk Score: 15.1    Current PCP: Daja Hernandez, DO  PCP verified by CM? Yes    Chart Reviewed: Yes      History Provided by: Child/Family  Patient Orientation: Alert and Oriented, Self    Patient Cognition: Dementia / Early Alzheimer's    Hospitalization in the last 30 days (Readmission):  No    If yes, Readmission Assessment in CM Navigator will be  completed.    Advance Directives:      Code Status: Full Code   Patient's Primary Decision Maker is:      Primary Decision Maker: Michelle Mata - Child - [phone]    Secondary Decision Maker: Madhavi Choi - Child - [phone]    Supplemental (Other) Decision Maker: Mode Mata - Child - [phone]    Discharge Planning:    Patient lives with:   Type of Home:    Primary Care Giver: Other (Comment) ([facility] staff)  Patient Support Systems include:     Current Financial resources:    Current community resources:    Current services prior to admission:              Current DME:              Type of Home Care services:       ADLS  Prior functional level: Assistance with the following:, Bathing, Dressing, Toileting, Cooking, Housework, Shopping  Current functional level: Assistance with the following:, Bathing, Dressing, Toileting, Cooking, Housework, Shopping, Mobility    PT AM-PAC:   /24  OT AM-PAC:   /24    Family can provide assistance at DC: No  Would you like Case Management to discuss the discharge plan with any other family members/significant others, and if so, who? Yes (daughter, Michelle)  Plans to Return to Present Housing: Unknown at present  Other Identified Issues/Barriers to RETURNING to current housing:   Potential Assistance needed at discharge:              Potential DME:    Patient expects to discharge to:    Plan for transportation at discharge:      Financial    [facility] / [facility] / Product Type: *No Product type* /     Does insurance require precert for SNF: Yes    Potential assistance Purchasing Medications:    Meds-to-Beds request:        [facility] - [place], [place] - [address] - P [phone] - F [phone]  [address]  [place] [address]  Phone: [phone] Fax: [phone]      Notes:    Factors facilitating achievement of predicted outcomes: Family support and Caregiver support    Barriers to discharge:

## 2024-11-18 NOTE — PLAN OF CARE
Problem: Safety - Adult  Goal: Free from fall injury  Outcome: Progressing     Problem: Chronic Conditions and Co-morbidities  Goal: Patient's chronic conditions and co-morbidity symptoms are monitored and maintained or improved  Outcome: Progressing     Problem: ABCDS Injury Assessment  Goal: Absence of physical injury  Outcome: Progressing     Problem: Skin/Tissue Integrity  Goal: Absence of new skin breakdown  Description: 1.  Monitor for areas of redness and/or skin breakdown  2.  Assess vascular access sites hourly  3.  Every 4-6 hours minimum:  Change oxygen saturation probe site  4.  Every 4-6 hours:  If on nasal continuous positive airway pressure, respiratory therapy assess nares and determine need for appliance change or resting period.  Outcome: Progressing     Problem: Pain  Goal: Verbalizes/displays adequate comfort level or baseline comfort level  Outcome: Progressing     Problem: Confusion  Goal: Confusion, delirium, dementia, or psychosis is improved or at baseline  Description: INTERVENTIONS:  1. Assess for possible contributors to thought disturbance, including medications, impaired vision or hearing, underlying metabolic abnormalities, dehydration, psychiatric diagnoses, and notify attending LIP  2. [place] high risk fall precautions, as indicated  3. Provide frequent short contacts to provide reality reorientation, refocusing and direction  4. Decrease environmental stimuli, including noise as appropriate  5. Monitor and intervene to maintain adequate nutrition, hydration, elimination, sleep and activity  6. If unable to ensure safety without constant attention obtain sitter and review sitter guidelines with assigned personnel  7. Initiate Psychosocial CNS and Spiritual Care consult, as indicated  Outcome: Progressing

## 2024-11-18 NOTE — PROGRESS NOTES
Patient Impulsive, rips off telemetry monitor and refusing to wear, patient repositioned many times, pulls gowns and blankets off.

## 2024-11-18 NOTE — PROGRESS NOTES
Department of Orthopedic Surgery  Resident Progress Note    Patient seen and examined at bedside.  Resting comfortably upon entering room easily awakened but not oriented patient is very confused.  Complaining of left hip pain.  Says pain has not decreased since entering the hospital.  Denies any numbness tingling paresthesias down the left lower extremity left foot.  Denies any chest pain shortness of breath fevers chills nausea vomiting although she is confused so this subjective may be unreliable    VITALS:  BP (!) 139/59   Pulse 56   Temp 97.6 °F (36.4 °C) (Axillary)   Resp 16   Ht 1.575 m (5' 2\")   LMP 07/24/1974   SpO2 97%   BMI 38.30 kg/m²     General: Awake but confused    MUSCULOSKELETAL:   left lower extremity:  Compartments soft and compressible  +PF/DF/EHL  +2/4 DP & PT pulses, Brisk Cap refill, Toes warm and perfused  Distal sensation grossly intact to Peroneals, Sural, Saphenous, and tibial nrs intact but patient is confused that this may be unreliable    CBC:   Lab Results   Component Value Date/Time    WBC 6.6 11/17/2024 11:15 AM    HGB 10.6 11/17/2024 11:15 AM    HCT 31.8 11/17/2024 11:15 AM     11/17/2024 11:15 AM     PT/INR:    Lab Results   Component Value Date/Time    PROTIME 10.2 08/10/2022 04:29 AM    PROTIME 10.8 03/24/2012 11:45 PM    INR 0.9 08/10/2022 04:29 AM       ASSESSMENT  Left hip pain    PLAN    Plan discussed with patient all questions answered to her satisfaction  MRI left hip pending  PT/OT as able  Weight-bear as tolerated left lower extremity for time being  Medical management appreciated  DVT prophylax pain control per admitting service  Plan: We are awaiting MRI left hip to rule out any occult fractures.  Will continue to follow  D/W attending  Electronically signed by Sb Marcus DO on 11/18/2024 at 6:12 AM

## 2024-11-19 ENCOUNTER — APPOINTMENT (OUTPATIENT)
Dept: MRI IMAGING | Age: 84
DRG: 555 | End: 2024-11-19
Payer: COMMERCIAL

## 2024-11-19 LAB
ALBUMIN SERPL-MCNC: 3.1 G/DL (ref 3.5–5.2)
ALP SERPL-CCNC: 50 U/L (ref 35–104)
ALT SERPL-CCNC: 29 U/L (ref 0–32)
ANION GAP SERPL CALCULATED.3IONS-SCNC: 12 MMOL/L (ref 7–16)
AST SERPL-CCNC: 76 U/L (ref 0–31)
BASOPHILS # BLD: 0.02 K/UL (ref 0–0.2)
BASOPHILS NFR BLD: 0 % (ref 0–2)
BILIRUB SERPL-MCNC: 0.5 MG/DL (ref 0–1.2)
BUN SERPL-MCNC: 25 MG/DL (ref 6–23)
CALCIUM SERPL-MCNC: 8.1 MG/DL (ref 8.6–10.2)
CHLORIDE SERPL-SCNC: 105 MMOL/L (ref 98–107)
CO2 SERPL-SCNC: 22 MMOL/L (ref 22–29)
CREAT SERPL-MCNC: 0.8 MG/DL (ref 0.5–1)
EOSINOPHIL # BLD: 0.14 K/UL (ref 0.05–0.5)
EOSINOPHILS RELATIVE PERCENT: 3 % (ref 0–6)
ERYTHROCYTE [DISTWIDTH] IN BLOOD BY AUTOMATED COUNT: 14.6 % (ref 11.5–15)
GFR, ESTIMATED: 72 ML/MIN/1.73M2
GLUCOSE SERPL-MCNC: 78 MG/DL (ref 74–99)
HCT VFR BLD AUTO: 30.6 % (ref 34–48)
HGB BLD-MCNC: 10.1 G/DL (ref 11.5–15.5)
IMM GRANULOCYTES # BLD AUTO: <0.03 K/UL (ref 0–0.58)
IMM GRANULOCYTES NFR BLD: 0 % (ref 0–5)
LYMPHOCYTES NFR BLD: 1.49 K/UL (ref 1.5–4)
LYMPHOCYTES RELATIVE PERCENT: 28 % (ref 20–42)
MAGNESIUM SERPL-MCNC: 2.2 MG/DL (ref 1.6–2.6)
MCH RBC QN AUTO: 29.6 PG (ref 26–35)
MCHC RBC AUTO-ENTMCNC: 33 G/DL (ref 32–34.5)
MCV RBC AUTO: 89.7 FL (ref 80–99.9)
MONOCYTES NFR BLD: 0.54 K/UL (ref 0.1–0.95)
MONOCYTES NFR BLD: 10 % (ref 2–12)
NEUTROPHILS NFR BLD: 59 % (ref 43–80)
NEUTS SEG NFR BLD: 3.14 K/UL (ref 1.8–7.3)
PLATELET # BLD AUTO: 141 K/UL (ref 130–450)
PMV BLD AUTO: 10 FL (ref 7–12)
POTASSIUM SERPL-SCNC: 3 MMOL/L (ref 3.5–5)
PROT SERPL-MCNC: 5.5 G/DL (ref 6.4–8.3)
RBC # BLD AUTO: 3.41 M/UL (ref 3.5–5.5)
SODIUM SERPL-SCNC: 139 MMOL/L (ref 132–146)
WBC OTHER # BLD: 5.3 K/UL (ref 4.5–11.5)

## 2024-11-19 PROCEDURE — 6360000002 HC RX W HCPCS: Performed by: STUDENT IN AN ORGANIZED HEALTH CARE EDUCATION/TRAINING PROGRAM

## 2024-11-19 PROCEDURE — 85025 COMPLETE CBC W/AUTO DIFF WBC: CPT

## 2024-11-19 PROCEDURE — 73721 MRI JNT OF LWR EXTRE W/O DYE: CPT

## 2024-11-19 PROCEDURE — 6370000000 HC RX 637 (ALT 250 FOR IP): Performed by: INTERNAL MEDICINE

## 2024-11-19 PROCEDURE — 36415 COLL VENOUS BLD VENIPUNCTURE: CPT

## 2024-11-19 PROCEDURE — 2580000003 HC RX 258: Performed by: STUDENT IN AN ORGANIZED HEALTH CARE EDUCATION/TRAINING PROGRAM

## 2024-11-19 PROCEDURE — 80053 COMPREHEN METABOLIC PANEL: CPT

## 2024-11-19 PROCEDURE — 83735 ASSAY OF MAGNESIUM: CPT

## 2024-11-19 PROCEDURE — 6370000000 HC RX 637 (ALT 250 FOR IP): Performed by: STUDENT IN AN ORGANIZED HEALTH CARE EDUCATION/TRAINING PROGRAM

## 2024-11-19 PROCEDURE — 1200000000 HC SEMI PRIVATE

## 2024-11-19 RX ORDER — POTASSIUM CHLORIDE 1500 MG/1
40 TABLET, EXTENDED RELEASE ORAL ONCE
Status: COMPLETED | OUTPATIENT
Start: 2024-11-19 | End: 2024-11-19

## 2024-11-19 RX ADMIN — QUETIAPINE FUMARATE 25 MG: 25 TABLET ORAL at 20:45

## 2024-11-19 RX ADMIN — MIRTAZAPINE 15 MG: 15 TABLET, FILM COATED ORAL at 20:44

## 2024-11-19 RX ADMIN — POTASSIUM CHLORIDE 40 MEQ: 1500 TABLET, EXTENDED RELEASE ORAL at 15:30

## 2024-11-19 RX ADMIN — FLUTICASONE PROPIONATE 2 SPRAY: 50 SPRAY, METERED NASAL at 08:56

## 2024-11-19 RX ADMIN — SODIUM CHLORIDE, PRESERVATIVE FREE 10 ML: 5 INJECTION INTRAVENOUS at 20:45

## 2024-11-19 RX ADMIN — VALPROIC ACID 500 MG: 250 SOLUTION ORAL at 20:45

## 2024-11-19 RX ADMIN — LEVOTHYROXINE SODIUM 88 MCG: 0.09 TABLET ORAL at 05:05

## 2024-11-19 RX ADMIN — MEMANTINE 10 MG: 10 TABLET ORAL at 20:44

## 2024-11-19 RX ADMIN — ACETAMINOPHEN 650 MG: 325 TABLET ORAL at 17:41

## 2024-11-19 RX ADMIN — MEMANTINE 10 MG: 10 TABLET ORAL at 08:55

## 2024-11-19 RX ADMIN — FENTANYL CITRATE 50 MCG: 50 INJECTION INTRAMUSCULAR; INTRAVENOUS at 08:56

## 2024-11-19 RX ADMIN — LOSARTAN POTASSIUM 50 MG: 50 TABLET, FILM COATED ORAL at 08:55

## 2024-11-19 RX ADMIN — RIVASTIGMINE TARTRATE 6 MG: 3 CAPSULE ORAL at 08:54

## 2024-11-19 RX ADMIN — ACETAMINOPHEN 650 MG: 325 TABLET ORAL at 05:08

## 2024-11-19 RX ADMIN — SODIUM CHLORIDE, PRESERVATIVE FREE 10 ML: 5 INJECTION INTRAVENOUS at 08:56

## 2024-11-19 RX ADMIN — PANTOPRAZOLE SODIUM 40 MG: 40 TABLET, DELAYED RELEASE ORAL at 08:55

## 2024-11-19 RX ADMIN — ENOXAPARIN SODIUM 40 MG: 100 INJECTION SUBCUTANEOUS at 08:55

## 2024-11-19 RX ADMIN — FENTANYL CITRATE 50 MCG: 50 INJECTION INTRAMUSCULAR; INTRAVENOUS at 19:09

## 2024-11-19 RX ADMIN — RIVASTIGMINE TARTRATE 6 MG: 3 CAPSULE ORAL at 20:44

## 2024-11-19 RX ADMIN — VALPROIC ACID 500 MG: 250 SOLUTION ORAL at 08:55

## 2024-11-19 ASSESSMENT — PAIN DESCRIPTION - DESCRIPTORS
DESCRIPTORS: ACHING;DISCOMFORT;SORE
DESCRIPTORS: ACHING;DISCOMFORT;TENDER
DESCRIPTORS: THROBBING;SORE;DISCOMFORT
DESCRIPTORS: ACHING;THROBBING;DISCOMFORT

## 2024-11-19 ASSESSMENT — PAIN DESCRIPTION - ONSET: ONSET: ON-GOING

## 2024-11-19 ASSESSMENT — PAIN DESCRIPTION - LOCATION
LOCATION: FOOT
LOCATION: HIP
LOCATION: LEG
LOCATION: LEG;HIP

## 2024-11-19 ASSESSMENT — PAIN SCALES - GENERAL
PAINLEVEL_OUTOF10: 3
PAINLEVEL_OUTOF10: 0
PAINLEVEL_OUTOF10: 3
PAINLEVEL_OUTOF10: 0
PAINLEVEL_OUTOF10: 0
PAINLEVEL_OUTOF10: 7
PAINLEVEL_OUTOF10: 5
PAINLEVEL_OUTOF10: 7

## 2024-11-19 ASSESSMENT — PAIN DESCRIPTION - FREQUENCY: FREQUENCY: CONTINUOUS

## 2024-11-19 ASSESSMENT — PAIN DESCRIPTION - PAIN TYPE: TYPE: ACUTE PAIN

## 2024-11-19 ASSESSMENT — PAIN DESCRIPTION - ORIENTATION
ORIENTATION: LEFT
ORIENTATION: LEFT;RIGHT
ORIENTATION: LEFT
ORIENTATION: LEFT

## 2024-11-19 ASSESSMENT — PAIN - FUNCTIONAL ASSESSMENT: PAIN_FUNCTIONAL_ASSESSMENT: ACTIVITIES ARE NOT PREVENTED

## 2024-11-19 NOTE — PLAN OF CARE
Problem: Chronic Conditions and Co-morbidities  Goal: Patient's chronic conditions and co-morbidity symptoms are monitored and maintained or improved  11/18/2024 2154 by Pawan Finney RN  Outcome: Progressing  11/18/2024 1124 by William Frost RN  Outcome: Progressing     Problem: Safety - Adult  Goal: Free from fall injury  11/18/2024 2154 by Pawan Finney RN  Outcome: Progressing  11/18/2024 1124 by William Frost RN  Outcome: Progressing     Problem: Pain  Goal: Verbalizes/displays adequate comfort level or baseline comfort level  11/18/2024 1124 by William Frost RN  Outcome: Progressing     Problem: Confusion  Goal: Confusion, delirium, dementia, or psychosis is improved or at baseline  Description: INTERVENTIONS:  1. Assess for possible contributors to thought disturbance, including medications, impaired vision or hearing, underlying metabolic abnormalities, dehydration, psychiatric diagnoses, and notify attending LIP  2. [place] high risk fall precautions, as indicated  3. Provide frequent short contacts to provide reality reorientation, refocusing and direction  4. Decrease environmental stimuli, including noise as appropriate  5. Monitor and intervene to maintain adequate nutrition, hydration, elimination, sleep and activity  6. If unable to ensure safety without constant attention obtain sitter and review sitter guidelines with assigned personnel  7. Initiate Psychosocial CNS and Spiritual Care consult, as indicated  11/18/2024 1124 by William Frost RN  Outcome: Progressing

## 2024-11-19 NOTE — PROGRESS NOTES
Occupational Therapy    Date:2024  Patient Name: Valarie Mata  MRN: [id number]  : 1940  Room: [address] [place] [address]-A     OT orders received and chart reviewed. OT eval on hold at this time pending imaging results of MRI L hip (r/o occult fx).  OT will follow and re-attempt eval as appropriate at a later time/date.    Scarlet Peraza OTR/L; [id number]

## 2024-11-19 NOTE — PROGRESS NOTES
Physical Therapy    Date: 2024       Patient Name: Valarie Mata  : 1940      MRN: [id number]    Physical therapy order received and chart reviewed. PT evaluation held pending MRI of L hip.   Will follow up as appropriate.     Ginger Solomon PT, DPT  [id number]

## 2024-11-19 NOTE — PROGRESS NOTES
[facility]    Subjective:  pt alert conversive      Current Facility-Administered Medications:     potassium chloride (KLOR-CON M) extended release tablet 40 mEq, 40 mEq, Oral, Once, Audie Gibson DO    fentaNYL (SUBLIMAZE) injection 50 mcg, 50 mcg, IntraVENous, Q3H PRN, Alex Ramirez MD, 50 mcg at 11/19/24 0856    sodium chloride flush 0.9 % injection 10 mL, 10 mL, IntraVENous, 2 times per day, Alex Ramirez MD, 10 mL at 11/19/24 0856    sodium chloride flush 0.9 % injection 10 mL, 10 mL, IntraVENous, PRN, Alex Ramirez MD    0.9 % sodium chloride infusion, , IntraVENous, PRN, Alex Ramirez MD    enoxaparin (LOVENOX) injection 40 mg, 40 mg, SubCUTAneous, Daily, Alex Ramirez MD, 40 mg at 11/19/24 0855    ondansetron (ZOFRAN-ODT) disintegrating tablet 4 mg, 4 mg, Oral, Q8H PRN **OR** ondansetron (ZOFRAN) injection 4 mg, 4 mg, IntraVENous, Q6H PRN, Alex Ramirez MD    senna (SENOKOT) tablet 8.6 mg, 1 tablet, Oral, Daily PRN, Alex Ramirez MD    acetaminophen (TYLENOL) tablet 650 mg, 650 mg, Oral, Q6H PRN, 650 mg at 11/19/24 0508 **OR** acetaminophen (TYLENOL) suppository 650 mg, 650 mg, Rectal, Q6H PRN, Alex Ramirez MD    fluticasone (FLONASE) 50 MCG/ACT nasal spray 2 spray, 2 spray, Each Nostril, Daily, Alex Ramirez MD, 2 spray at 11/19/24 0856    levothyroxine (SYNTHROID) tablet 88 mcg, 88 mcg, Oral, Daily, Alex Ramirez MD, 88 mcg at 11/19/24 0505    lidocaine 4 % external patch 1 patch, 1 patch, Topical, Daily, Alex Ramirez MD, 1 patch at 11/19/24 0858    losartan (COZAAR) tablet 50 mg, 50 mg, Oral, Daily, Alex Ramirez MD, 50 mg at 11/19/24 0855    memantine (NAMENDA) tablet 10 mg, 10 mg, Oral, BID, Alex Ramirez MD, 10 mg at 11/19/24 0855    mirtazapine (REMERON) tablet 15 mg, 15 mg, Oral, Nightly, Alex Ramirez MD, 15 mg at 11/18/24 1958    pantoprazole (PROTONIX) tablet 40 mg, 40 mg, Oral, Daily, Alex Ramirez MD, 40 mg at 11/19/24 0855    polyethylene glycol (GLYCOLAX) packet

## 2024-11-19 NOTE — CARE COORDINATION
CM update note.  Discharge plan is to return to [facility] if discharged within 72 hours of admission to this hospital.  Spoke with [facility] intake.  Patient would need to be back in the facility before 1030 on Wednesday 11/20/24.  If patient is outside of 72 hour window and continues to have behaviors then a new referral will need to be made.  Pt is originally from [facility].  Spoke with Allyson from [facility], requested Pt/ot evals to see if patient is appropriate to return to the [facility].  Pt/ot evals pending MRI hip.  Covid positive and in isolation.  CM/SW to follow.  Kevin Diggs RN -[phone].

## 2024-11-19 NOTE — PLAN OF CARE
Problem: Chronic Conditions and Co-morbidities  Goal: Patient's chronic conditions and co-morbidity symptoms are monitored and maintained or improved  11/19/2024 1105 by William Frost RN  Outcome: Progressing     Problem: Safety - Adult  Goal: Free from fall injury  11/19/2024 1105 by William Frost RN  Outcome: Progressing     Problem: ABCDS Injury Assessment  Goal: Absence of physical injury  Outcome: Progressing     Problem: Skin/Tissue Integrity  Goal: Absence of new skin breakdown  Description: 1.  Monitor for areas of redness and/or skin breakdown  2.  Assess vascular access sites hourly  3.  Every 4-6 hours minimum:  Change oxygen saturation probe site  4.  Every 4-6 hours:  If on nasal continuous positive airway pressure, respiratory therapy assess nares and determine need for appliance change or resting period.  Outcome: Progressing     Problem: Pain  Goal: Verbalizes/displays adequate comfort level or baseline comfort level  Outcome: Progressing     Problem: Confusion  Goal: Confusion, delirium, dementia, or psychosis is improved or at baseline  Description: INTERVENTIONS:  1. Assess for possible contributors to thought disturbance, including medications, impaired vision or hearing, underlying metabolic abnormalities, dehydration, psychiatric diagnoses, and notify attending LIP  2. [place] high risk fall precautions, as indicated  3. Provide frequent short contacts to provide reality reorientation, refocusing and direction  4. Decrease environmental stimuli, including noise as appropriate  5. Monitor and intervene to maintain adequate nutrition, hydration, elimination, sleep and activity  6. If unable to ensure safety without constant attention obtain sitter and review sitter guidelines with assigned personnel  7. Initiate Psychosocial CNS and Spiritual Care consult, as indicated  Outcome: Progressing

## 2024-11-20 LAB
ALBUMIN SERPL-MCNC: 2.8 G/DL (ref 3.5–5.2)
ALP SERPL-CCNC: 48 U/L (ref 35–104)
ALT SERPL-CCNC: 23 U/L (ref 0–32)
ANION GAP SERPL CALCULATED.3IONS-SCNC: 9 MMOL/L (ref 7–16)
AST SERPL-CCNC: 52 U/L (ref 0–31)
BASOPHILS # BLD: 0.01 K/UL (ref 0–0.2)
BASOPHILS NFR BLD: 0 % (ref 0–2)
BILIRUB SERPL-MCNC: 0.4 MG/DL (ref 0–1.2)
BUN SERPL-MCNC: 24 MG/DL (ref 6–23)
CALCIUM SERPL-MCNC: 7.8 MG/DL (ref 8.6–10.2)
CHLORIDE SERPL-SCNC: 107 MMOL/L (ref 98–107)
CO2 SERPL-SCNC: 23 MMOL/L (ref 22–29)
CREAT SERPL-MCNC: 0.8 MG/DL (ref 0.5–1)
EOSINOPHIL # BLD: 0.2 K/UL (ref 0.05–0.5)
EOSINOPHILS RELATIVE PERCENT: 5 % (ref 0–6)
ERYTHROCYTE [DISTWIDTH] IN BLOOD BY AUTOMATED COUNT: 14.3 % (ref 11.5–15)
GFR, ESTIMATED: 71 ML/MIN/1.73M2
GLUCOSE SERPL-MCNC: 72 MG/DL (ref 74–99)
HCT VFR BLD AUTO: 29.5 % (ref 34–48)
HGB BLD-MCNC: 9.5 G/DL (ref 11.5–15.5)
IMM GRANULOCYTES # BLD AUTO: <0.03 K/UL (ref 0–0.58)
IMM GRANULOCYTES NFR BLD: 1 % (ref 0–5)
LYMPHOCYTES NFR BLD: 1.65 K/UL (ref 1.5–4)
LYMPHOCYTES RELATIVE PERCENT: 40 % (ref 20–42)
MAGNESIUM SERPL-MCNC: 2.2 MG/DL (ref 1.6–2.6)
MCH RBC QN AUTO: 29 PG (ref 26–35)
MCHC RBC AUTO-ENTMCNC: 32.2 G/DL (ref 32–34.5)
MCV RBC AUTO: 89.9 FL (ref 80–99.9)
MONOCYTES NFR BLD: 0.39 K/UL (ref 0.1–0.95)
MONOCYTES NFR BLD: 10 % (ref 2–12)
NEUTROPHILS NFR BLD: 45 % (ref 43–80)
NEUTS SEG NFR BLD: 1.84 K/UL (ref 1.8–7.3)
PLATELET # BLD AUTO: 136 K/UL (ref 130–450)
PMV BLD AUTO: 9.8 FL (ref 7–12)
POTASSIUM SERPL-SCNC: 3.4 MMOL/L (ref 3.5–5)
PROT SERPL-MCNC: 5.5 G/DL (ref 6.4–8.3)
RBC # BLD AUTO: 3.28 M/UL (ref 3.5–5.5)
SODIUM SERPL-SCNC: 139 MMOL/L (ref 132–146)
WBC OTHER # BLD: 4.1 K/UL (ref 4.5–11.5)

## 2024-11-20 PROCEDURE — 97166 OT EVAL MOD COMPLEX 45 MIN: CPT

## 2024-11-20 PROCEDURE — 97165 OT EVAL LOW COMPLEX 30 MIN: CPT

## 2024-11-20 PROCEDURE — 80053 COMPREHEN METABOLIC PANEL: CPT

## 2024-11-20 PROCEDURE — 1200000000 HC SEMI PRIVATE

## 2024-11-20 PROCEDURE — 6370000000 HC RX 637 (ALT 250 FOR IP): Performed by: STUDENT IN AN ORGANIZED HEALTH CARE EDUCATION/TRAINING PROGRAM

## 2024-11-20 PROCEDURE — 83735 ASSAY OF MAGNESIUM: CPT

## 2024-11-20 PROCEDURE — 2580000003 HC RX 258: Performed by: STUDENT IN AN ORGANIZED HEALTH CARE EDUCATION/TRAINING PROGRAM

## 2024-11-20 PROCEDURE — 97161 PT EVAL LOW COMPLEX 20 MIN: CPT

## 2024-11-20 PROCEDURE — 36415 COLL VENOUS BLD VENIPUNCTURE: CPT

## 2024-11-20 PROCEDURE — 85025 COMPLETE CBC W/AUTO DIFF WBC: CPT

## 2024-11-20 PROCEDURE — 97530 THERAPEUTIC ACTIVITIES: CPT

## 2024-11-20 PROCEDURE — 97535 SELF CARE MNGMENT TRAINING: CPT

## 2024-11-20 RX ORDER — POLYETHYLENE GLYCOL 3350 17 G/17G
17 POWDER, FOR SOLUTION ORAL DAILY PRN
COMMUNITY
Start: 2024-11-20

## 2024-11-20 RX ADMIN — RIVASTIGMINE TARTRATE 6 MG: 3 CAPSULE ORAL at 21:12

## 2024-11-20 RX ADMIN — SODIUM CHLORIDE, PRESERVATIVE FREE 10 ML: 5 INJECTION INTRAVENOUS at 21:12

## 2024-11-20 RX ADMIN — RIVASTIGMINE TARTRATE 6 MG: 3 CAPSULE ORAL at 09:13

## 2024-11-20 RX ADMIN — PANTOPRAZOLE SODIUM 40 MG: 40 TABLET, DELAYED RELEASE ORAL at 09:14

## 2024-11-20 RX ADMIN — VALPROIC ACID 500 MG: 250 SOLUTION ORAL at 21:12

## 2024-11-20 RX ADMIN — SODIUM CHLORIDE, PRESERVATIVE FREE 10 ML: 5 INJECTION INTRAVENOUS at 09:16

## 2024-11-20 RX ADMIN — ACETAMINOPHEN 650 MG: 325 TABLET ORAL at 01:02

## 2024-11-20 RX ADMIN — QUETIAPINE FUMARATE 25 MG: 25 TABLET ORAL at 21:12

## 2024-11-20 RX ADMIN — MEMANTINE 10 MG: 10 TABLET ORAL at 21:11

## 2024-11-20 RX ADMIN — FLUTICASONE PROPIONATE 2 SPRAY: 50 SPRAY, METERED NASAL at 09:13

## 2024-11-20 RX ADMIN — LEVOTHYROXINE SODIUM 88 MCG: 0.09 TABLET ORAL at 05:05

## 2024-11-20 RX ADMIN — MIRTAZAPINE 15 MG: 15 TABLET, FILM COATED ORAL at 21:11

## 2024-11-20 RX ADMIN — VALPROIC ACID 500 MG: 250 SOLUTION ORAL at 09:15

## 2024-11-20 RX ADMIN — ACETAMINOPHEN 650 MG: 325 TABLET ORAL at 09:12

## 2024-11-20 RX ADMIN — MEMANTINE 10 MG: 10 TABLET ORAL at 09:14

## 2024-11-20 ASSESSMENT — PAIN SCALES - GENERAL
PAINLEVEL_OUTOF10: 4
PAINLEVEL_OUTOF10: 4

## 2024-11-20 ASSESSMENT — PAIN DESCRIPTION - DESCRIPTORS: DESCRIPTORS: ACHING;DISCOMFORT;GNAWING

## 2024-11-20 NOTE — DISCHARGE INSTR - COC
Continuity of Care Form    Patient Name: Valarie Mata   :  1940  MRN:  [id number]    Admit date:  2024  Discharge date:  2024    Code Status Order: Full Code   Advance Directives:   Advance Care Flowsheet Documentation             Admitting Physician:  Audie Gibson DO  PCP: Daja Hernandez DO    Discharging Nurse: NICOLE Posadas   Discharging Hospital Unit/Room#: 8414/8414-A  Discharging Unit Phone Number: [phone]    Emergency Contact:   Extended Emergency Contact Information  Primary Emergency Contact: Michelle Mata  Address: [address] [place] [address]  Home Phone: [phone]  Mobile Phone: [phone]  Relation: Child  Preferred language: English   needed? No  Secondary Emergency Contact: Madhavi Choi  Address: [address]  Home Phone: [phone]  Mobile Phone: [phone]  Relation: Child    Past Surgical History:  Past Surgical History:   Procedure Laterality Date    CHOLECYSTECTOMY  1985    open?    COLECTOMY  1974    COLONOSCOPY  2012    and egd    COLONOSCOPY  2014    COLONOSCOPY  2015    CYST REMOVAL  years ago    upper gum    EYE SURGERY  years ago    left eye removal,  has artificial eye    HYSTERECTOMY (CERVIX STATUS UNKNOWN)  1974    JOINT REPLACEMENT Left 2010/2012    x 2-knee    TONSILLECTOMY      TUMOR EXCISION      from arm, fatty    UPPER GASTROINTESTINAL ENDOSCOPY  2014    with biopsy    UPPER GASTROINTESTINAL ENDOSCOPY  2015    UPPER GASTROINTESTINAL ENDOSCOPY N/A 2019    EGD ESOPHAGOGASTRODUODENOSCOPY  ++IODINE ALLERGY++ and bx performed by Aquilino James MD at [facility] ENDOSCOPY       Immunization History:   Immunization History   Administered Date(s) Administered    COVID-19, PFIZER PURPLE top, DILUTE for use, (age 12 y+), 30mcg/0.3mL 2021, 2021, 2021, 2021    Pneumococcal, PCV-13, PREVNAR 13,  22 Infection                        Nurse Assessment:  Last Vital Signs: BP (!) 152/48   Pulse 57   Temp 97 °F (36.1 °C) (Temporal)   Resp 19   Ht 1.575 m (5' 2\")   LMP 1974   SpO2 100%   BMI 38.30 kg/m²     Last documented pain score (0-10 scale): Pain Level: 4  Last Weight:   Wt Readings from Last 1 Encounters:   24 95 kg (209 lb 6.4 oz)     Mental Status:  alert to self only    IV Access:  - None    Nursing Mobility/ADLs:  Walking   Assisted  Transfer  Assisted  Bathing  Assisted  Dressing  Assisted  Toileting  Assisted  Feeding  Assisted, needs set up  Med Admin  Assisted  Med Delivery   whole    Wound Care Documentation and Therapy:        Elimination:  Continence:   Bowel: Yes  Bladder: Yes  Urinary Catheter: None   Colostomy/Ileostomy/Ileal Conduit: No       Date of Last BM:  No record found     Intake/Output Summary (Last 24 hours) at 2024 1216  Last data filed at 2024 1109  Gross per 24 hour   Intake 600 ml   Output 200 ml   Net 400 ml     I/O last 3 completed shifts:  In: 840 [P.O.:840]  Out: 850 [Urine:850]    Safety Concerns:     At Risk for Falls    Impairments/Disabilities:      Vision prosthetic left eye    Nutrition Therapy:  Current Nutrition Therapy:   - Oral Diet:  General    Routes of Feeding: Oral  Liquids: No Restrictions  Daily Fluid Restriction: no  Last Modified Barium Swallow with Video (Video Swallowing Test): not done    Treatments at the Time of Hospital Discharge:   Respiratory Treatments: room air  Oxygen Therapy:  is not on home oxygen therapy.  Ventilator:    - No ventilator support    Rehab Therapies: Physical Therapy and Occupational Therapy  Weight Bearing Status/Restrictions: Weight bearing as tolerated on LLE  Other Medical Equipment (for information only, NOT a DME order):  wheelchair, bedside commode, and hospital bed  Other Treatments: ***    Patient's personal belongings (please select all that are sent with patient):  None    RN  SIGNATURE:  Electronically signed by Katharina Mcdonald RN on 11/21/24 at 3: 50 PM EST    CASE MANAGEMENT/SOCIAL WORK SECTION    Inpatient Status Date: 11/17/24    Readmission Risk Assessment Score:  Readmission Risk              Risk of Unplanned Readmission:  20           Discharging to Facility/ Agency   Name: [facility]   Address:  Phone:  Fax:    Dialysis Facility (if applicable)   Name:  Address:  Dialysis Schedule:  Phone:  Fax:    / signature: Electronically signed by Tonja Diggs RN on 11/20/24 at 2:41 PM EST    PHYSICIAN SECTION    Prognosis: {Prognosis:1586542263}    Condition at Discharge: { Patient Condition:207194303}    Rehab Potential (if transferring to Rehab): {Prognosis:9758386327}    Recommended Labs or Other Treatments After Discharge: ***    Physician Certification: I certify the above information and transfer of Valarie Mata  is necessary for the continuing treatment of the diagnosis listed and that she requires Skilled Nursing Facility for less 30 days.     Update Admission H&P: {CHP DME Changes in HandP:781782700}    PHYSICIAN SIGNATURE:  {Esignature:[id number]}Electronically signed by Audie Gibson DO on 11/20/2024 at 12:16 PM

## 2024-11-20 NOTE — PLAN OF CARE
Problem: Chronic Conditions and Co-morbidities  Goal: Patient's chronic conditions and co-morbidity symptoms are monitored and maintained or improved  11/19/2024 2139 by Pawan Finney RN  Outcome: Progressing  11/19/2024 1105 by William Frost RN  Outcome: Progressing     Problem: Safety - Adult  Goal: Free from fall injury  11/19/2024 2139 by Pawan Finney RN  Outcome: Progressing  11/19/2024 1105 by William Frost RN  Outcome: Progressing     Problem: Pain  Goal: Verbalizes/displays adequate comfort level or baseline comfort level  11/19/2024 2139 by Pawan Finney RN  Outcome: Progressing  11/19/2024 1105 by William Frost RN  Outcome: Progressing     Problem: Confusion  Goal: Confusion, delirium, dementia, or psychosis is improved or at baseline  Description: INTERVENTIONS:  1. Assess for possible contributors to thought disturbance, including medications, impaired vision or hearing, underlying metabolic abnormalities, dehydration, psychiatric diagnoses, and notify attending LIP  2. [place] high risk fall precautions, as indicated  3. Provide frequent short contacts to provide reality reorientation, refocusing and direction  4. Decrease environmental stimuli, including noise as appropriate  5. Monitor and intervene to maintain adequate nutrition, hydration, elimination, sleep and activity  6. If unable to ensure safety without constant attention obtain sitter and review sitter guidelines with assigned personnel  7. Initiate Psychosocial CNS and Spiritual Care consult, as indicated  11/19/2024 2139 by Pawan Finney RN  Outcome: Progressing  11/19/2024 1105 by William Frost RN  Outcome: Progressing

## 2024-11-20 NOTE — PROGRESS NOTES
Orthopedic progress note    Patient seen and examined at bedside.  Patient resting comfortably.  Patient seems somewhat confused.  Patient states that she has been getting up walking with moderate amount of pain in her hip and her feet.  We did discuss the potential use of an abductor brace to help with ambulation however patient is refusing at this time stating that she does not think she needs a brace.    MRI left hip reviewed demonstrating no acute fractures within the left hip.  Patient does however have an acute abductor tear on her left side.  No acute orthopedic surgical intervention at this time.  Continue current management  Weightbearing as tolerated left lower extremity  PT OT as able  Medical management appreciated  DVT prophylaxis and pain control per admitting  Patient is okay to discharge from orthopedic standpoint once medically stable.  Orthopedics will continue to follow peripherally.  Please reach out with any questions.    Electronically signed by Anibal Kaminski DO on 11/20/2024 at 6:25 AM

## 2024-11-20 NOTE — PROGRESS NOTES
Physical Therapy  Physical Therapy Initial Assessment     Name: Valarie Mata  : 1940  MRN: [id number]      Date of Service: 2024    Evaluating PT:  Beau Betts PT, DPT    Room #:  8414/8414-A  Diagnosis:  Shoulder pain [M25.519]  Fall, initial encounter [W19.XXXA]  PMHx/PSHx:  dementia, ANA MARIA, obesity, CVA, L eye blindness  Procedure/Surgery:  N/A  Precautions:  fall risk, cognition, sitter, COVID+  Equipment Owned: ww, w/c  Equipment Needs:  TBD    SUBJECTIVE:    Pt a poor historian, admitted from [facility]  Pt ambulated with ww PTA.    OBJECTIVE:   Initial Evaluation  Date: 24 Treatment Short Term/ Long Term   Goals   AM-PAC 6 Clicks      Was pt agreeable to Eval/treatment? yes     Does pt have pain? Unrated BLE pain     Bed Mobility  Rolling: max A  Supine to sit: max Ax2  Sit to supine: max Ax2  Scooting: max A  Rolling: min A  Supine to sit: min A  Sit to supine: min A  Scooting: min A   Transfers Sit to stand: max Ax2  Stand to sit: max Ax2  Stand pivot: NT  Sit to stand: min A  Stand to sit: min A  Stand pivot: min A with AAD   Ambulation    NT  25'+ with AAD min A   Stair negotiation: ascended and descended  NT       Strength/ROM:   BLE grossly 2/5  BLE AROM limited by pain and weakness    Balance:   Static Sitting: CGA  Dynamic Sitting: min A  Static Standing: max A with ww  Dynamic Standing: NT    Pt is A & O x 1  Sensation:  Pt denies numbness and tingling to extremities  Edema:  unremarkable    Vitals:  SpO2 and HR were stable during session    Therapeutic Exercises:    Bed mobility: supine<>sit, cued for EOB positioning  Transfers: STS x1, cued for hand placement and postural correction  BLE AROM    Patient education  Pt educated on role of PT, importance of functional mobility during hospital stay, safety with functional mobility    Patient response to education:   Pt verbalized understanding Pt demonstrated skill Pt requires further education in this area    partial partial yes     ASSESSMENT:    Conditions Requiring Skilled Therapeutic Intervention:    [x]Decreased strength     []Decreased ROM  [x]Decreased functional mobility  [x]Decreased balance   [x]Decreased endurance   [x]Decreased posture  []Decreased sensation  []Decreased coordination   []Decreased vision  [x]Decreased safety awareness   [x]Increased pain       Comments:  Pt supine in bed upon entering, pt agreeable to participate. Pt instructed to transfer to EOB, completing transfer with assist of trunk and BLE. Pt sitting upright with fair sitting balance. Pt with no c/o dizziness with position change. Pt then cued for hand placement and instructed to stand from EOB. Pt standing with poor balance with ww, cueing provided for postural correction. Pt unable to tolerate standing for >10\" and was assisted back to EOB. Pt demonstrated limited tolerance to functional mobility. Pt was assisted back to supine position in bed. Pt positioned for comfort with all needs met and call bell in reach prior to exiting. Sitter present.    Treatment:  Patient practiced and was instructed in the following treatment:    Bed mobility training - pt given verbal and tactile cues to facilitate proper sequencing and safety during rolling and supine>sit as well as provided with physical assistance to complete task   STS and pivot transfer training - pt educated on proper hand and foot placement, safety and sequencing, and use of verbal and tactile cues to safely complete sit<>stand and pivot transfers with physical assistance to complete task safely   Skilled positioning - Pt placed in the chair position with pillows utilized to facilitate upright posture, joint and skin integrity, and interaction with environment.       Pt's/ family goals   1. Return to PLOF    Prognosis is fair for reaching above PT goals.    Patient and or family understand(s) diagnosis, prognosis, and plan of care.  No 2/2 cognitive deficits    PHYSICAL THERAPY

## 2024-11-20 NOTE — CARE COORDINATION
CM update note.  Received a call from [facility].  Pt is out of the 72 hour window to return.  If she would need to return she would need to pink slipped.  Patient is not displaying any behaviors on this admission.  Ortho has signed off.  Pt/Ot evals are pending.  Pt is from [facility].  Allyson is following and awaiting therapy evals to see if patient is appropriate to return.   Per Allyson pt will need to go to SNF.  She has reached out the patient daughter Michelle to discuss different facilities.  She is agreeable to [facility].  I left VM message with patient daughter.  MONIKA Myles assistant to start precert.  Konstantin/destination/4970 completed.  Ambulance form with envelope is on the soft chart.  CM/SW to follow.  Kevin Diggs RN  [phone].

## 2024-11-20 NOTE — PROGRESS NOTES
Occupational Therapy    OCCUPATIONAL THERAPY INITIAL EVALUATION    [facility]  [address], [place]        Date:2024                                                  Patient Name: Valarie Mata    MRN: [id number]    : 1940    Room: [address] [place] [address]          Evaluating OT: Mae Saldana, FERNANDAD, OTR/L; [id number]      Occupational therapy physician order:   Start   Ordering Provider    24  OT eval and treat  Start:  24,   End:  24,   ONE TIME,   Standing Count:  1 Occurrences,   R         Alex Ramirez MD          Pt presents to ED with shoulder pain      Diagnosis:    1. Fall, initial encounter    2. Left hip pain       Patient Active Problem List   Diagnosis    Gastroesophageal reflux disease without esophagitis    Acquired hypothyroidism    Obstructive sleep apnea syndrome, non-compliant with CPAP therapy    Hyperlipidemia LDL goal <100    Class 3 severe obesity due to excess calories with serious comorbidity and body mass index (BMI) of 40.0 to 44.9 in adult    Blindness of left eye    Vertigo    Primary hypertension    Frequent falls    Ataxia    Recurrent major depressive disorder, in partial remission (HCC)    Gait instability    Peripheral neuropathy    Lumbosacral radiculopathy    Dizziness    Moderate late onset Alzheimer's dementia with mood disturbance (HCC)    History of subarachnoid hemorrhage    History of CVA (cerebrovascular accident) without residual deficits    Nonexudative age-related macular degeneration    Squamous blepharitis    Pain in eye    History of artificial eye lens    History of insertion of artificial eyeball    ANA MARIA (obstructive sleep apnea)    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      Eval Complexity:      Description  Performance deficits  Clinical decision making  Co-morbidities affecting occupational performance  Modification or assistance to complete eval    Low Complexity   1 to 3 []  Low []  None []  None []   Moderate Complexity   3 to 5 [x]  Mod [x]  Maybe []  Min to Mod [x]   High Complexity   5 or more []  High []  Yes [x]  Max []     The above evaluation is classified as moderate  complexity based off the noted performance deficits, personal factors, co-morbidities, assistance required, and other factors as noted in the clinical evaluation and functional testing.     Evaluation time includes thorough review of current medical information, gathering information on past medical & social history & PLOF, completion of standardized testing, informal observation of tasks, consultation with other medical professions/disciplines, assessment of data & development of POC/goals.     Time In: 1106  Time Out: 1129  Total Treatment Time: 8 min    Min Units   OT Eval Low 04584       OT Eval Medium 32761  X    OT Eval High 77261      OT Re-Eval 71687       Therapeutic Ex 05615       Therapeutic Activities 77253       ADL/Self Care 26101  8 1    Orthotic Management 77036       Manual 11261     Neuro Re-Ed 19937       Non-Billable Time                  Mae Saldana, RUBIN, OTR/L; [id number]       '

## 2024-11-20 NOTE — PROGRESS NOTES
[facility] Medicine    Subjective:  pt responsive to verbal stimuli mri noted      Current Facility-Administered Medications:     fentaNYL (SUBLIMAZE) injection 50 mcg, 50 mcg, IntraVENous, Q3H PRN, Alex Ramirez MD, 50 mcg at 11/19/24 1909    sodium chloride flush 0.9 % injection 10 mL, 10 mL, IntraVENous, 2 times per day, Alex Ramirez MD, 10 mL at 11/20/24 0916    sodium chloride flush 0.9 % injection 10 mL, 10 mL, IntraVENous, PRN, Alex Ramirez MD    0.9 % sodium chloride infusion, , IntraVENous, PRN, Alex Ramirez MD    enoxaparin (LOVENOX) injection 40 mg, 40 mg, SubCUTAneous, Daily, Alex Ramirez MD, 40 mg at 11/19/24 0855    ondansetron (ZOFRAN-ODT) disintegrating tablet 4 mg, 4 mg, Oral, Q8H PRN **OR** ondansetron (ZOFRAN) injection 4 mg, 4 mg, IntraVENous, Q6H PRN, Alex Ramirez MD    senna (SENOKOT) tablet 8.6 mg, 1 tablet, Oral, Daily PRN, Alex Ramirez MD    acetaminophen (TYLENOL) tablet 650 mg, 650 mg, Oral, Q6H PRN, 650 mg at 11/20/24 0912 **OR** acetaminophen (TYLENOL) suppository 650 mg, 650 mg, Rectal, Q6H PRN, Alex Ramirez MD    fluticasone (FLONASE) 50 MCG/ACT nasal spray 2 spray, 2 spray, Each Nostril, Daily, Alex Ramirez MD, 2 spray at 11/20/24 0913    levothyroxine (SYNTHROID) tablet 88 mcg, 88 mcg, Oral, Daily, Alex Ramirez MD, 88 mcg at 11/20/24 0505    lidocaine 4 % external patch 1 patch, 1 patch, Topical, Daily, Alex Ramirez MD, 1 patch at 11/19/24 0858    losartan (COZAAR) tablet 50 mg, 50 mg, Oral, Daily, Alex Ramirez MD, 50 mg at 11/19/24 0855    memantine (NAMENDA) tablet 10 mg, 10 mg, Oral, BID, Alex Ramirez MD, 10 mg at 11/20/24 0914    mirtazapine (REMERON) tablet 15 mg, 15 mg, Oral, Nightly, Alex Ramirez MD, 15 mg at 11/19/24 2044    pantoprazole (PROTONIX) tablet 40 mg, 40 mg, Oral, Daily, Alex Ramirez MD, 40 mg at 11/20/24 0914    polyethylene glycol (GLYCOLAX) packet 17 g, 17 g, Oral, Daily PRN, Alex Ramirez MD    QUEtiapine (SEROQUEL) tablet 25 mg,  25 mg, Oral, Nightly, Alex Ramirez MD, 25 mg at 11/19/24 2045    rivastigmine (EXELON) capsule 6 mg, 6 mg, Oral, BID, Alex Ramirez MD, 6 mg at 11/20/24 0913    valproic acid (DEPAKENE) 250 MG/5ML oral solution 500 mg, 500 mg, Oral, BID, Alex Ramirez MD, 500 mg at 11/20/24 0915    Objective:    BP (!) 152/48   Pulse 57   Temp 97 °F (36.1 °C) (Temporal)   Resp 19   Ht 1.575 m (5' 2\")   LMP 07/24/1974   SpO2 100%   BMI 38.30 kg/m²     Heart:  reg  Lungs:  ctab  Abd: + bs soft nontender  Extrem:  min edema legs    CBC with Differential:    Lab Results   Component Value Date/Time    WBC 4.1 11/20/2024 04:26 AM    RBC 3.28 11/20/2024 04:26 AM    HGB 9.5 11/20/2024 04:26 AM    HCT 29.5 11/20/2024 04:26 AM     11/20/2024 04:26 AM    MCV 89.9 11/20/2024 04:26 AM    MCH 29.0 11/20/2024 04:26 AM    MCHC 32.2 11/20/2024 04:26 AM    RDW 14.3 11/20/2024 04:26 AM    LYMPHOPCT 40 11/20/2024 04:26 AM    MONOPCT 10 11/20/2024 04:26 AM    EOSPCT 5 11/20/2024 04:26 AM    BASOPCT 0 11/20/2024 04:26 AM    MONOSABS 0.39 11/20/2024 04:26 AM    LYMPHSABS 1.65 11/20/2024 04:26 AM    EOSABS 0.20 11/20/2024 04:26 AM    BASOSABS 0.01 11/20/2024 04:26 AM     CMP:    Lab Results   Component Value Date/Time     11/20/2024 04:26 AM    K 3.4 11/20/2024 04:26 AM    K 4.3 10/07/2022 11:57 PM     11/20/2024 04:26 AM    CO2 23 11/20/2024 04:26 AM    BUN 24 11/20/2024 04:26 AM    CREATININE 0.8 11/20/2024 04:26 AM    GFRAA >60 10/11/2022 04:52 AM    LABGLOM 71 11/20/2024 04:26 AM    LABGLOM 61 04/16/2024 03:59 PM    GLUCOSE 72 11/20/2024 04:26 AM    GLUCOSE 109 05/04/2012 06:17 AM    CALCIUM 7.8 11/20/2024 04:26 AM    BILITOT 0.4 11/20/2024 04:26 AM    ALKPHOS 48 11/20/2024 04:26 AM    AST 52 11/20/2024 04:26 AM    ALT 23 11/20/2024 04:26 AM     Warfarin PT/INR:    Lab Results   Component Value Date    INR 0.9 08/10/2022    INR 0.9 08/18/2020    INR 0.9 12/29/2019    PROTIME 10.2 08/10/2022    PROTIME 10.9 08/18/2020

## 2024-11-21 VITALS
HEART RATE: 57 BPM | WEIGHT: 181.88 LBS | SYSTOLIC BLOOD PRESSURE: 162 MMHG | DIASTOLIC BLOOD PRESSURE: 49 MMHG | BODY MASS INDEX: 33.47 KG/M2 | RESPIRATION RATE: 18 BRPM | TEMPERATURE: 98.5 F | OXYGEN SATURATION: 94 % | HEIGHT: 62 IN

## 2024-11-21 PROCEDURE — 6370000000 HC RX 637 (ALT 250 FOR IP): Performed by: STUDENT IN AN ORGANIZED HEALTH CARE EDUCATION/TRAINING PROGRAM

## 2024-11-21 RX ADMIN — MEMANTINE 10 MG: 10 TABLET ORAL at 09:13

## 2024-11-21 RX ADMIN — VALPROIC ACID 500 MG: 250 SOLUTION ORAL at 09:15

## 2024-11-21 RX ADMIN — FLUTICASONE PROPIONATE 2 SPRAY: 50 SPRAY, METERED NASAL at 09:15

## 2024-11-21 RX ADMIN — LOSARTAN POTASSIUM 50 MG: 50 TABLET, FILM COATED ORAL at 09:13

## 2024-11-21 RX ADMIN — RIVASTIGMINE TARTRATE 6 MG: 3 CAPSULE ORAL at 09:13

## 2024-11-21 RX ADMIN — PANTOPRAZOLE SODIUM 40 MG: 40 TABLET, DELAYED RELEASE ORAL at 09:14

## 2024-11-21 RX ADMIN — LEVOTHYROXINE SODIUM 88 MCG: 0.09 TABLET ORAL at 06:05

## 2024-11-21 NOTE — PLAN OF CARE
Problem: Chronic Conditions and Co-morbidities  Goal: Patient's chronic conditions and co-morbidity symptoms are monitored and maintained or improved  Outcome: Progressing  Flowsheets (Taken 11/21/2024 0114)  Care Plan - Patient's Chronic Conditions and Co-Morbidity Symptoms are Monitored and Maintained or Improved: Monitor and assess patient's chronic conditions and comorbid symptoms for stability, deterioration, or improvement     Problem: Safety - Adult  Goal: Free from fall injury  11/21/2024 0228 by Rosalinda Anderson RN  Outcome: Progressing  11/20/2024 2217 by Cherise Lim RN  Outcome: Progressing     Problem: ABCDS Injury Assessment  Goal: Absence of physical injury  Outcome: Progressing     Problem: Skin/Tissue Integrity  Goal: Absence of new skin breakdown  Description: 1.  Monitor for areas of redness and/or skin breakdown  2.  Assess vascular access sites hourly  3.  Every 4-6 hours minimum:  Change oxygen saturation probe site  4.  Every 4-6 hours:  If on nasal continuous positive airway pressure, respiratory therapy assess nares and determine need for appliance change or resting period.  11/21/2024 0228 by Rosalinda Anderson RN  Outcome: Progressing  11/20/2024 2217 by Cherise Lim RN  Outcome: Progressing     Problem: Pain  Goal: Verbalizes/displays adequate comfort level or baseline comfort level  Outcome: Progressing     Problem: Confusion  Goal: Confusion, delirium, dementia, or psychosis is improved or at baseline  Description: INTERVENTIONS:  1. Assess for possible contributors to thought disturbance, including medications, impaired vision or hearing, underlying metabolic abnormalities, dehydration, psychiatric diagnoses, and notify attending LIP  2. [place] high risk fall precautions, as indicated  3. Provide frequent short contacts to provide reality reorientation, refocusing and direction  4. Decrease environmental stimuli, including noise as appropriate  5. Monitor and  intervene to maintain adequate nutrition, hydration, elimination, sleep and activity  6. If unable to ensure safety without constant attention obtain sitter and review sitter guidelines with assigned personnel  7. Initiate Psychosocial CNS and Spiritual Care consult, as indicated  11/21/2024 0228 by Rosalinda Anderson, RN  Outcome: Progressing  11/20/2024 2217 by Cherise Lim, RN  Outcome: Progressing

## 2024-11-21 NOTE — PLAN OF CARE
Problem: Safety - Adult  Goal: Free from fall injury  Outcome: Progressing     Problem: Skin/Tissue Integrity  Goal: Absence of new skin breakdown  Description: 1.  Monitor for areas of redness and/or skin breakdown  2.  Assess vascular access sites hourly  3.  Every 4-6 hours minimum:  Change oxygen saturation probe site  4.  Every 4-6 hours:  If on nasal continuous positive airway pressure, respiratory therapy assess nares and determine need for appliance change or resting period.  Outcome: Progressing     Problem: Confusion  Goal: Confusion, delirium, dementia, or psychosis is improved or at baseline  Description: INTERVENTIONS:  1. Assess for possible contributors to thought disturbance, including medications, impaired vision or hearing, underlying metabolic abnormalities, dehydration, psychiatric diagnoses, and notify attending LIP  2. [place] high risk fall precautions, as indicated  3. Provide frequent short contacts to provide reality reorientation, refocusing and direction  4. Decrease environmental stimuli, including noise as appropriate  5. Monitor and intervene to maintain adequate nutrition, hydration, elimination, sleep and activity  6. If unable to ensure safety without constant attention obtain sitter and review sitter guidelines with assigned personnel  7. Initiate Psychosocial CNS and Spiritual Care consult, as indicated  Outcome: Progressing

## 2024-11-21 NOTE — PLAN OF CARE
Problem: Chronic Conditions and Co-morbidities  Goal: Patient's chronic conditions and co-morbidity symptoms are monitored and maintained or improved  11/21/2024 1602 by Katharina Mcpherson RN  Outcome: Adequate for Discharge  11/21/2024 0228 by Rosalinda Anderson RN  Outcome: Progressing  Flowsheets (Taken 11/21/2024 0114)  Care Plan - Patient's Chronic Conditions and Co-Morbidity Symptoms are Monitored and Maintained or Improved: Monitor and assess patient's chronic conditions and comorbid symptoms for stability, deterioration, or improvement     Problem: Safety - Adult  Goal: Free from fall injury  11/21/2024 1602 by Katharina Mcpherson RN  Outcome: Adequate for Discharge  11/21/2024 0228 by Rosalinda Anderson RN  Outcome: Progressing     Problem: ABCDS Injury Assessment  Goal: Absence of physical injury  11/21/2024 1602 by Katharina Mcpherson RN  Outcome: Adequate for Discharge  11/21/2024 0228 by Rosalinda Anderson RN  Outcome: Progressing     Problem: Skin/Tissue Integrity  Goal: Absence of new skin breakdown  Description: 1.  Monitor for areas of redness and/or skin breakdown  2.  Assess vascular access sites hourly  3.  Every 4-6 hours minimum:  Change oxygen saturation probe site  4.  Every 4-6 hours:  If on nasal continuous positive airway pressure, respiratory therapy assess nares and determine need for appliance change or resting period.  11/21/2024 1602 by Katharina Mcpherson RN  Outcome: Adequate for Discharge  11/21/2024 0228 by Rosalinda Anderson RN  Outcome: Progressing     Problem: Pain  Goal: Verbalizes/displays adequate comfort level or baseline comfort level  11/21/2024 1602 by Katharina Mcpherson RN  Outcome: Adequate for Discharge  11/21/2024 0228 by Rosalinda Anderson RN  Outcome: Progressing     Problem: Confusion  Goal: Confusion, delirium, dementia, or psychosis is improved or at

## 2024-11-21 NOTE — PROGRESS NOTES
[facility] Medicine    Subjective:  pt alert conversive      Current Facility-Administered Medications:     fentaNYL (SUBLIMAZE) injection 50 mcg, 50 mcg, IntraVENous, Q3H PRN, Alex Ramirez MD, 50 mcg at 11/19/24 1909    sodium chloride flush 0.9 % injection 10 mL, 10 mL, IntraVENous, 2 times per day, Alex Ramirez MD, 10 mL at 11/20/24 2112    sodium chloride flush 0.9 % injection 10 mL, 10 mL, IntraVENous, PRN, Alex Ramirez MD    0.9 % sodium chloride infusion, , IntraVENous, PRN, Alex Ramirez MD    enoxaparin (LOVENOX) injection 40 mg, 40 mg, SubCUTAneous, Daily, Alex Ramirez MD, 40 mg at 11/19/24 0855    ondansetron (ZOFRAN-ODT) disintegrating tablet 4 mg, 4 mg, Oral, Q8H PRN **OR** ondansetron (ZOFRAN) injection 4 mg, 4 mg, IntraVENous, Q6H PRN, Alex Ramirez MD    senna (SENOKOT) tablet 8.6 mg, 1 tablet, Oral, Daily PRN, Alex Ramirez MD    acetaminophen (TYLENOL) tablet 650 mg, 650 mg, Oral, Q6H PRN, 650 mg at 11/20/24 0912 **OR** acetaminophen (TYLENOL) suppository 650 mg, 650 mg, Rectal, Q6H PRN, Alex Ramirez MD    fluticasone (FLONASE) 50 MCG/ACT nasal spray 2 spray, 2 spray, Each Nostril, Daily, Alex Ramirez MD, 2 spray at 11/20/24 0913    levothyroxine (SYNTHROID) tablet 88 mcg, 88 mcg, Oral, Daily, Alex Ramirez MD, 88 mcg at 11/21/24 0605    lidocaine 4 % external patch 1 patch, 1 patch, Topical, Daily, Alex Ramirez MD, 1 patch at 11/19/24 0858    losartan (COZAAR) tablet 50 mg, 50 mg, Oral, Daily, Alex Ramirez MD, 50 mg at 11/19/24 0855    memantine (NAMENDA) tablet 10 mg, 10 mg, Oral, BID, Alex Ramirez MD, 10 mg at 11/20/24 2111    mirtazapine (REMERON) tablet 15 mg, 15 mg, Oral, Nightly, Alex Ramirez MD, 15 mg at 11/20/24 2111    pantoprazole (PROTONIX) tablet 40 mg, 40 mg, Oral, Daily, Alex Ramirez MD, 40 mg at 11/20/24 0914    polyethylene glycol (GLYCOLAX) packet 17 g, 17 g, Oral, Daily PRN, Alex Ramirez MD    QUEtiapine (SEROQUEL) tablet 25 mg, 25 mg, Oral, Nightly,

## 2024-11-21 NOTE — CARE COORDINATION
CM update note.  Insurance precert obtained of [employer]. Transport set up via stretcher with [facility].   time is [id number].  Nurse will need to call report to [phone].  Facility liaison, patient daughter and RN notified of  time.  Kevin Diggs RN  [phone].

## 2024-11-21 NOTE — CARE COORDINATION
MONIKA update note.  Discharge order noted.  Discharge plan is [facility] pending insurance precert.  Precert was started by MONIKA Myles assistant on 11/20.  Konstantin/destination/5700 completed.  Ambulance form with envelope is on the soft chart.  Spoke with patient daughter Michelle via phone.  Provided updates, she is agreeable to discharge plan.  MONIKA/HEATHER to follow.  Kevin Diggs RN -[phone].

## 2024-12-05 NOTE — PROGRESS NOTES
Physician Progress Note      PATIENT:               ABRAN DEGROOT  CSN #:                  [id number]  :                       1940  ADMIT DATE:       2024 10:45 AM  DISCH DATE:        2024 7:25 PM  RESPONDING  PROVIDER #:        Audie Gibson DO          QUERY TEXT:    Pt admitted with L hip and shoulder pain after a fall.  Noted documentation of   an acute abductor tear on her left hip on  by ordered orthopedic   consultant.  If possible, please document in progress notes and discharge   summary:    The medical record reflects the following:  Risk Factors: Fall  Clinical Indicators: Per ortho progress note on  \"...MRI left hip   reviewed demonstrating no acute fractures within the left hip.  Patient does   however have an acute abductor tear on her left side...\"  Treatment: Abductor brace offered but pt refused, MRI left hip, Xray L hip,   orthopedic consult  Options provided:  -- Acute abductor tear on L hip confirmed present on admission  -- Acute abductor tear on L hip ruled out  -- Other - I will add my own diagnosis  -- Disagree - Not applicable / Not valid  -- Disagree - Clinically unable to determine / Unknown  -- Refer to Clinical Documentation Reviewer    PROVIDER RESPONSE TEXT:    The diagnosis of acute abductor tear on L hip was confirmed as present on   admission.    Query created by: Tonja Atkinson on 2024 4:22 PM      Electronically signed by:  Audie Gibson DO 2024 4:10 PM

## 2025-01-13 NOTE — TELEPHONE ENCOUNTER
Pt forgot to ask you for a name of a counselor . Who do you recommend ? [facility] ph# [phone]. 13-Jan-2025 13:12

## (undated) DEVICE — SPONGE GZ W4XL4IN RAYON POLY FILL CVR W/ NONWOVEN FAB

## (undated) DEVICE — BLOCK BITE 60FR RUBBER ADLT DENTAL

## (undated) DEVICE — FORCEPS BX OVL CUP FEN DISPOSABLE CAP L 160CM RAD JAW 4

## (undated) DEVICE — GRADUATE TRIANG MEASURE 1000ML BLK PRNT